# Patient Record
Sex: FEMALE | Race: BLACK OR AFRICAN AMERICAN | NOT HISPANIC OR LATINO | URBAN - METROPOLITAN AREA
[De-identification: names, ages, dates, MRNs, and addresses within clinical notes are randomized per-mention and may not be internally consistent; named-entity substitution may affect disease eponyms.]

---

## 2017-01-01 ENCOUNTER — EMERGENCY (EMERGENCY)
Facility: HOSPITAL | Age: 77
LOS: 1 days | Discharge: HOME | End: 2017-01-01
Attending: EMERGENCY MEDICINE | Admitting: EMERGENCY MEDICINE
Payer: MEDICARE

## 2017-01-01 ENCOUNTER — INPATIENT (INPATIENT)
Facility: HOSPITAL | Age: 77
LOS: 15 days | Discharge: NURSING, REHAB OR LONG-TERM CARE | End: 2017-12-11
Attending: HOSPITALIST | Admitting: HOSPITALIST
Payer: MEDICARE

## 2017-01-01 ENCOUNTER — INPATIENT (INPATIENT)
Facility: HOSPITAL | Age: 77
LOS: 5 days | Discharge: NURSING, REHAB OR LONG-TERM CARE | End: 2017-08-23
Attending: HOSPITALIST | Admitting: HOSPITALIST
Payer: MEDICARE

## 2017-01-01 ENCOUNTER — INPATIENT (INPATIENT)
Facility: HOSPITAL | Age: 77
LOS: 5 days | Discharge: NURSING, REHAB OR LONG-TERM CARE | End: 2017-05-26
Attending: INTERNAL MEDICINE | Admitting: INTERNAL MEDICINE
Payer: MEDICARE

## 2017-01-01 ENCOUNTER — INPATIENT (INPATIENT)
Facility: HOSPITAL | Age: 77
LOS: 11 days | Discharge: NURSING, REHAB OR LONG-TERM CARE | End: 2017-04-13
Attending: INTERNAL MEDICINE | Admitting: INTERNAL MEDICINE
Payer: MEDICARE

## 2017-01-01 ENCOUNTER — INPATIENT (INPATIENT)
Facility: HOSPITAL | Age: 77
LOS: 4 days | Discharge: HOME | End: 2017-03-30
Attending: INTERNAL MEDICINE | Admitting: INTERNAL MEDICINE
Payer: MEDICARE

## 2017-01-01 ENCOUNTER — INPATIENT (INPATIENT)
Facility: HOSPITAL | Age: 77
LOS: 9 days | Discharge: NURSING, REHAB OR LONG-TERM CARE | End: 2017-07-06
Attending: INTERNAL MEDICINE | Admitting: INTERNAL MEDICINE
Payer: MEDICARE

## 2017-01-01 ENCOUNTER — INPATIENT (INPATIENT)
Facility: HOSPITAL | Age: 77
LOS: 25 days | End: 2018-01-07
Attending: STUDENT IN AN ORGANIZED HEALTH CARE EDUCATION/TRAINING PROGRAM | Admitting: STUDENT IN AN ORGANIZED HEALTH CARE EDUCATION/TRAINING PROGRAM
Payer: MEDICARE

## 2017-01-01 VITALS
RESPIRATION RATE: 18 BRPM | HEART RATE: 86 BPM | OXYGEN SATURATION: 97 % | SYSTOLIC BLOOD PRESSURE: 116 MMHG | TEMPERATURE: 97 F | DIASTOLIC BLOOD PRESSURE: 58 MMHG

## 2017-01-01 VITALS
OXYGEN SATURATION: 100 % | DIASTOLIC BLOOD PRESSURE: 51 MMHG | TEMPERATURE: 98 F | RESPIRATION RATE: 18 BRPM | SYSTOLIC BLOOD PRESSURE: 97 MMHG | HEART RATE: 65 BPM

## 2017-01-01 VITALS
SYSTOLIC BLOOD PRESSURE: 126 MMHG | HEART RATE: 87 BPM | TEMPERATURE: 98 F | OXYGEN SATURATION: 100 % | RESPIRATION RATE: 16 BRPM | DIASTOLIC BLOOD PRESSURE: 59 MMHG

## 2017-01-01 VITALS
DIASTOLIC BLOOD PRESSURE: 60 MMHG | RESPIRATION RATE: 18 BRPM | SYSTOLIC BLOOD PRESSURE: 102 MMHG | OXYGEN SATURATION: 100 % | HEART RATE: 76 BPM

## 2017-01-01 VITALS
OXYGEN SATURATION: 100 % | HEART RATE: 77 BPM | TEMPERATURE: 98 F | SYSTOLIC BLOOD PRESSURE: 115 MMHG | RESPIRATION RATE: 18 BRPM | DIASTOLIC BLOOD PRESSURE: 69 MMHG

## 2017-01-01 VITALS
SYSTOLIC BLOOD PRESSURE: 115 MMHG | HEART RATE: 80 BPM | OXYGEN SATURATION: 98 % | RESPIRATION RATE: 14 BRPM | TEMPERATURE: 98 F | DIASTOLIC BLOOD PRESSURE: 62 MMHG

## 2017-01-01 VITALS
OXYGEN SATURATION: 98 % | DIASTOLIC BLOOD PRESSURE: 42 MMHG | HEART RATE: 67 BPM | SYSTOLIC BLOOD PRESSURE: 136 MMHG | RESPIRATION RATE: 18 BRPM

## 2017-01-01 VITALS
TEMPERATURE: 98 F | OXYGEN SATURATION: 97 % | HEART RATE: 72 BPM | RESPIRATION RATE: 16 BRPM | DIASTOLIC BLOOD PRESSURE: 51 MMHG | SYSTOLIC BLOOD PRESSURE: 99 MMHG

## 2017-01-01 VITALS
TEMPERATURE: 99 F | OXYGEN SATURATION: 79 % | RESPIRATION RATE: 20 BRPM | SYSTOLIC BLOOD PRESSURE: 97 MMHG | HEART RATE: 99 BPM | DIASTOLIC BLOOD PRESSURE: 59 MMHG

## 2017-01-01 VITALS
WEIGHT: 119.93 LBS | OXYGEN SATURATION: 100 % | HEIGHT: 61 IN | RESPIRATION RATE: 18 BRPM | TEMPERATURE: 99 F | DIASTOLIC BLOOD PRESSURE: 69 MMHG | HEART RATE: 94 BPM | SYSTOLIC BLOOD PRESSURE: 124 MMHG

## 2017-01-01 VITALS
SYSTOLIC BLOOD PRESSURE: 121 MMHG | HEART RATE: 77 BPM | DIASTOLIC BLOOD PRESSURE: 71 MMHG | OXYGEN SATURATION: 100 % | TEMPERATURE: 98 F | RESPIRATION RATE: 16 BRPM

## 2017-01-01 VITALS
OXYGEN SATURATION: 99 % | DIASTOLIC BLOOD PRESSURE: 94 MMHG | HEART RATE: 97 BPM | TEMPERATURE: 99 F | RESPIRATION RATE: 15 BRPM | SYSTOLIC BLOOD PRESSURE: 148 MMHG

## 2017-01-01 VITALS
SYSTOLIC BLOOD PRESSURE: 122 MMHG | TEMPERATURE: 99 F | OXYGEN SATURATION: 100 % | DIASTOLIC BLOOD PRESSURE: 59 MMHG | HEART RATE: 63 BPM | RESPIRATION RATE: 16 BRPM

## 2017-01-01 VITALS
SYSTOLIC BLOOD PRESSURE: 142 MMHG | OXYGEN SATURATION: 100 % | RESPIRATION RATE: 18 BRPM | HEART RATE: 88 BPM | DIASTOLIC BLOOD PRESSURE: 68 MMHG

## 2017-01-01 VITALS
HEART RATE: 103 BPM | TEMPERATURE: 101 F | DIASTOLIC BLOOD PRESSURE: 72 MMHG | OXYGEN SATURATION: 99 % | RESPIRATION RATE: 17 BRPM | SYSTOLIC BLOOD PRESSURE: 114 MMHG

## 2017-01-01 VITALS
SYSTOLIC BLOOD PRESSURE: 104 MMHG | TEMPERATURE: 98 F | HEART RATE: 86 BPM | OXYGEN SATURATION: 97 % | DIASTOLIC BLOOD PRESSURE: 53 MMHG | RESPIRATION RATE: 17 BRPM

## 2017-01-01 VITALS
TEMPERATURE: 98 F | HEART RATE: 66 BPM | DIASTOLIC BLOOD PRESSURE: 58 MMHG | RESPIRATION RATE: 17 BRPM | SYSTOLIC BLOOD PRESSURE: 112 MMHG | OXYGEN SATURATION: 100 %

## 2017-01-01 VITALS
TEMPERATURE: 99 F | RESPIRATION RATE: 18 BRPM | HEART RATE: 69 BPM | DIASTOLIC BLOOD PRESSURE: 75 MMHG | OXYGEN SATURATION: 98 % | SYSTOLIC BLOOD PRESSURE: 110 MMHG

## 2017-01-01 VITALS
DIASTOLIC BLOOD PRESSURE: 58 MMHG | HEART RATE: 66 BPM | OXYGEN SATURATION: 98 % | RESPIRATION RATE: 16 BRPM | SYSTOLIC BLOOD PRESSURE: 102 MMHG

## 2017-01-01 DIAGNOSIS — C85.90 NON-HODGKIN LYMPHOMA, UNSPECIFIED, UNSPECIFIED SITE: ICD-10-CM

## 2017-01-01 DIAGNOSIS — R50.9 FEVER, UNSPECIFIED: ICD-10-CM

## 2017-01-01 DIAGNOSIS — N30.00 ACUTE CYSTITIS WITHOUT HEMATURIA: ICD-10-CM

## 2017-01-01 DIAGNOSIS — I10 ESSENTIAL (PRIMARY) HYPERTENSION: ICD-10-CM

## 2017-01-01 DIAGNOSIS — E87.0 HYPEROSMOLALITY AND HYPERNATREMIA: ICD-10-CM

## 2017-01-01 DIAGNOSIS — R55 SYNCOPE AND COLLAPSE: ICD-10-CM

## 2017-01-01 DIAGNOSIS — I25.10 ATHEROSCLEROTIC HEART DISEASE OF NATIVE CORONARY ARTERY WITHOUT ANGINA PECTORIS: ICD-10-CM

## 2017-01-01 DIAGNOSIS — R07.9 CHEST PAIN, UNSPECIFIED: ICD-10-CM

## 2017-01-01 DIAGNOSIS — Z95.810 PRESENCE OF AUTOMATIC (IMPLANTABLE) CARDIAC DEFIBRILLATOR: Chronic | ICD-10-CM

## 2017-01-01 DIAGNOSIS — I48.0 PAROXYSMAL ATRIAL FIBRILLATION: ICD-10-CM

## 2017-01-01 DIAGNOSIS — Z98.89 OTHER SPECIFIED POSTPROCEDURAL STATES: Chronic | ICD-10-CM

## 2017-01-01 DIAGNOSIS — M79.2 NEURALGIA AND NEURITIS, UNSPECIFIED: ICD-10-CM

## 2017-01-01 DIAGNOSIS — Z95.5 PRESENCE OF CORONARY ANGIOPLASTY IMPLANT AND GRAFT: Chronic | ICD-10-CM

## 2017-01-01 DIAGNOSIS — E78.5 HYPERLIPIDEMIA, UNSPECIFIED: ICD-10-CM

## 2017-01-01 DIAGNOSIS — F05 DELIRIUM DUE TO KNOWN PHYSIOLOGICAL CONDITION: ICD-10-CM

## 2017-01-01 DIAGNOSIS — R74.0 NONSPECIFIC ELEVATION OF LEVELS OF TRANSAMINASE AND LACTIC ACID DEHYDROGENASE [LDH]: ICD-10-CM

## 2017-01-01 DIAGNOSIS — K21.9 GASTRO-ESOPHAGEAL REFLUX DISEASE WITHOUT ESOPHAGITIS: ICD-10-CM

## 2017-01-01 DIAGNOSIS — R30.0 DYSURIA: ICD-10-CM

## 2017-01-01 DIAGNOSIS — Z90.49 ACQUIRED ABSENCE OF OTHER SPECIFIED PARTS OF DIGESTIVE TRACT: Chronic | ICD-10-CM

## 2017-01-01 DIAGNOSIS — J44.9 CHRONIC OBSTRUCTIVE PULMONARY DISEASE, UNSPECIFIED: ICD-10-CM

## 2017-01-01 DIAGNOSIS — R11.0 NAUSEA: ICD-10-CM

## 2017-01-01 DIAGNOSIS — G93.40 ENCEPHALOPATHY, UNSPECIFIED: ICD-10-CM

## 2017-01-01 DIAGNOSIS — I50.9 HEART FAILURE, UNSPECIFIED: ICD-10-CM

## 2017-01-01 DIAGNOSIS — Z29.9 ENCOUNTER FOR PROPHYLACTIC MEASURES, UNSPECIFIED: ICD-10-CM

## 2017-01-01 DIAGNOSIS — I50.22 CHRONIC SYSTOLIC (CONGESTIVE) HEART FAILURE: ICD-10-CM

## 2017-01-01 DIAGNOSIS — Z87.19 PERSONAL HISTORY OF OTHER DISEASES OF THE DIGESTIVE SYSTEM: ICD-10-CM

## 2017-01-01 DIAGNOSIS — R52 PAIN, UNSPECIFIED: ICD-10-CM

## 2017-01-01 DIAGNOSIS — R53.2 FUNCTIONAL QUADRIPLEGIA: ICD-10-CM

## 2017-01-01 DIAGNOSIS — N39.0 URINARY TRACT INFECTION, SITE NOT SPECIFIED: ICD-10-CM

## 2017-01-01 DIAGNOSIS — J18.9 PNEUMONIA, UNSPECIFIED ORGANISM: ICD-10-CM

## 2017-01-01 DIAGNOSIS — S22.000A WEDGE COMPRESSION FRACTURE OF UNSPECIFIED THORACIC VERTEBRA, INITIAL ENCOUNTER FOR CLOSED FRACTURE: ICD-10-CM

## 2017-01-01 DIAGNOSIS — R10.84 GENERALIZED ABDOMINAL PAIN: ICD-10-CM

## 2017-01-01 DIAGNOSIS — A41.9 SEPSIS, UNSPECIFIED ORGANISM: ICD-10-CM

## 2017-01-01 DIAGNOSIS — Z51.5 ENCOUNTER FOR PALLIATIVE CARE: ICD-10-CM

## 2017-01-01 DIAGNOSIS — I50.23 ACUTE ON CHRONIC SYSTOLIC (CONGESTIVE) HEART FAILURE: ICD-10-CM

## 2017-01-01 DIAGNOSIS — E03.9 HYPOTHYROIDISM, UNSPECIFIED: ICD-10-CM

## 2017-01-01 DIAGNOSIS — Z78.9 OTHER SPECIFIED HEALTH STATUS: ICD-10-CM

## 2017-01-01 DIAGNOSIS — E11.9 TYPE 2 DIABETES MELLITUS WITHOUT COMPLICATIONS: ICD-10-CM

## 2017-01-01 DIAGNOSIS — L89.150 PRESSURE ULCER OF SACRAL REGION, UNSTAGEABLE: ICD-10-CM

## 2017-01-01 DIAGNOSIS — L89.154 PRESSURE ULCER OF SACRAL REGION, STAGE 4: ICD-10-CM

## 2017-01-01 DIAGNOSIS — R41.0 DISORIENTATION, UNSPECIFIED: ICD-10-CM

## 2017-01-01 DIAGNOSIS — S22.000S WEDGE COMPRESSION FRACTURE OF UNSPECIFIED THORACIC VERTEBRA, SEQUELA: ICD-10-CM

## 2017-01-01 DIAGNOSIS — R10.30 LOWER ABDOMINAL PAIN, UNSPECIFIED: ICD-10-CM

## 2017-01-01 DIAGNOSIS — E87.6 HYPOKALEMIA: ICD-10-CM

## 2017-01-01 DIAGNOSIS — Z88.0 ALLERGY STATUS TO PENICILLIN: ICD-10-CM

## 2017-01-01 DIAGNOSIS — Z71.89 OTHER SPECIFIED COUNSELING: ICD-10-CM

## 2017-01-01 DIAGNOSIS — L89.159 PRESSURE ULCER OF SACRAL REGION, UNSPECIFIED STAGE: ICD-10-CM

## 2017-01-01 DIAGNOSIS — R63.8 OTHER SYMPTOMS AND SIGNS CONCERNING FOOD AND FLUID INTAKE: ICD-10-CM

## 2017-01-01 DIAGNOSIS — E43 UNSPECIFIED SEVERE PROTEIN-CALORIE MALNUTRITION: ICD-10-CM

## 2017-01-01 DIAGNOSIS — J45.909 UNSPECIFIED ASTHMA, UNCOMPLICATED: ICD-10-CM

## 2017-01-01 DIAGNOSIS — Z41.8 ENCOUNTER FOR OTHER PROCEDURES FOR PURPOSES OTHER THAN REMEDYING HEALTH STATE: ICD-10-CM

## 2017-01-01 DIAGNOSIS — R13.10 DYSPHAGIA, UNSPECIFIED: ICD-10-CM

## 2017-01-01 DIAGNOSIS — R78.81 BACTEREMIA: ICD-10-CM

## 2017-01-01 DIAGNOSIS — M79.604 PAIN IN RIGHT LEG: ICD-10-CM

## 2017-01-01 DIAGNOSIS — R41.82 ALTERED MENTAL STATUS, UNSPECIFIED: ICD-10-CM

## 2017-01-01 LAB
-  AMIKACIN: SIGNIFICANT CHANGE UP
-  AMPICILLIN/SULBACTAM: SIGNIFICANT CHANGE UP
-  AMPICILLIN/SULBACTAM: SIGNIFICANT CHANGE UP
-  AMPICILLIN: SIGNIFICANT CHANGE UP
-  AZTREONAM: SIGNIFICANT CHANGE UP
-  CEFAZOLIN: SIGNIFICANT CHANGE UP
-  CEFEPIME: SIGNIFICANT CHANGE UP
-  CEFOXITIN: SIGNIFICANT CHANGE UP
-  CEFOXITIN: SIGNIFICANT CHANGE UP
-  CEFTAZIDIME: SIGNIFICANT CHANGE UP
-  CEFTRIAXONE: SIGNIFICANT CHANGE UP
-  CEFTRIAXONE: SIGNIFICANT CHANGE UP
-  CIPROFLOXACIN: SIGNIFICANT CHANGE UP
-  CLINDAMYCIN: SIGNIFICANT CHANGE UP
-  DAPTOMYCIN: SIGNIFICANT CHANGE UP
-  ERTAPENEM: SIGNIFICANT CHANGE UP
-  ERTAPENEM: SIGNIFICANT CHANGE UP
-  ERYTHROMYCIN: SIGNIFICANT CHANGE UP
-  GENTAMICIN: SIGNIFICANT CHANGE UP
-  IMIPENEM: SIGNIFICANT CHANGE UP
-  LEVOFLOXACIN: SIGNIFICANT CHANGE UP
-  LINEZOLID: SIGNIFICANT CHANGE UP
-  MEROPENEM: SIGNIFICANT CHANGE UP
-  MOXIFLOXACIN(AEROBIC): SIGNIFICANT CHANGE UP
-  NITROFURANTOIN: SIGNIFICANT CHANGE UP
-  NITROFURANTOIN: SIGNIFICANT CHANGE UP
-  OXACILLIN: SIGNIFICANT CHANGE UP
-  PENICILLIN: SIGNIFICANT CHANGE UP
-  PIPERACILLIN/TAZOBACTAM: SIGNIFICANT CHANGE UP
-  RIFAMPIN.: SIGNIFICANT CHANGE UP
-  TETRACYCLINE: SIGNIFICANT CHANGE UP
-  TIGECYCLINE: SIGNIFICANT CHANGE UP
-  TIGECYCLINE: SIGNIFICANT CHANGE UP
-  TOBRAMYCIN: SIGNIFICANT CHANGE UP
-  TRIMETHOPRIM/SULFAMETHOXAZOLE: SIGNIFICANT CHANGE UP
-  VANCOMYCIN: SIGNIFICANT CHANGE UP
ACID FAST STN SPT: SIGNIFICANT CHANGE UP
ALBUMIN SERPL ELPH-MCNC: 1.9 G/DL — LOW (ref 3.3–5)
ALBUMIN SERPL ELPH-MCNC: 2 G/DL — LOW (ref 3.3–5)
ALBUMIN SERPL ELPH-MCNC: 2.1 G/DL — LOW (ref 3.3–5)
ALBUMIN SERPL ELPH-MCNC: 2.1 G/DL — LOW (ref 3.3–5)
ALBUMIN SERPL ELPH-MCNC: 2.2 G/DL — LOW (ref 3.3–5)
ALBUMIN SERPL ELPH-MCNC: 2.3 G/DL — LOW (ref 3.3–5)
ALBUMIN SERPL ELPH-MCNC: 2.4 G/DL — LOW (ref 3.3–5)
ALBUMIN SERPL ELPH-MCNC: 2.4 G/DL — LOW (ref 3.3–5)
ALBUMIN SERPL ELPH-MCNC: 2.5 G/DL — LOW (ref 3.3–5)
ALBUMIN SERPL ELPH-MCNC: 2.8 G/DL — LOW (ref 3.3–5)
ALBUMIN SERPL ELPH-MCNC: 3 G/DL — LOW (ref 3.3–5)
ALBUMIN SERPL ELPH-MCNC: 3 G/DL — LOW (ref 3.3–5)
ALBUMIN SERPL ELPH-MCNC: 3.2 G/DL — LOW (ref 3.3–5)
ALBUMIN SERPL ELPH-MCNC: 3.3 G/DL — SIGNIFICANT CHANGE UP (ref 3.3–5)
ALBUMIN SERPL ELPH-MCNC: 3.4 G/DL — SIGNIFICANT CHANGE UP (ref 3.3–5)
ALBUMIN SERPL ELPH-MCNC: 3.5 G/DL — SIGNIFICANT CHANGE UP (ref 3.3–5)
ALBUMIN SERPL ELPH-MCNC: 3.6 G/DL — SIGNIFICANT CHANGE UP (ref 3.3–5)
ALBUMIN SERPL ELPH-MCNC: 3.7 G/DL — SIGNIFICANT CHANGE UP (ref 3.3–5)
ALBUMIN SERPL ELPH-MCNC: 3.7 G/DL — SIGNIFICANT CHANGE UP (ref 3.3–5)
ALBUMIN SERPL ELPH-MCNC: 3.8 G/DL — SIGNIFICANT CHANGE UP (ref 3.3–5)
ALBUMIN SERPL ELPH-MCNC: 3.9 G/DL — SIGNIFICANT CHANGE UP (ref 3.3–5)
ALP SERPL-CCNC: 100 U/L — SIGNIFICANT CHANGE UP (ref 40–120)
ALP SERPL-CCNC: 102 U/L — SIGNIFICANT CHANGE UP (ref 40–120)
ALP SERPL-CCNC: 102 U/L — SIGNIFICANT CHANGE UP (ref 40–120)
ALP SERPL-CCNC: 103 U/L — SIGNIFICANT CHANGE UP (ref 40–120)
ALP SERPL-CCNC: 105 U/L — SIGNIFICANT CHANGE UP (ref 40–120)
ALP SERPL-CCNC: 112 U/L — SIGNIFICANT CHANGE UP (ref 40–120)
ALP SERPL-CCNC: 113 U/L — SIGNIFICANT CHANGE UP (ref 40–120)
ALP SERPL-CCNC: 115 U/L — SIGNIFICANT CHANGE UP (ref 40–120)
ALP SERPL-CCNC: 122 U/L — HIGH (ref 40–120)
ALP SERPL-CCNC: 140 U/L — HIGH (ref 40–120)
ALP SERPL-CCNC: 140 U/L — HIGH (ref 40–120)
ALP SERPL-CCNC: 143 U/L — HIGH (ref 40–120)
ALP SERPL-CCNC: 169 U/L — HIGH (ref 40–120)
ALP SERPL-CCNC: 66 U/L — SIGNIFICANT CHANGE UP (ref 40–120)
ALP SERPL-CCNC: 74 U/L — SIGNIFICANT CHANGE UP (ref 40–120)
ALP SERPL-CCNC: 80 U/L — SIGNIFICANT CHANGE UP (ref 40–120)
ALP SERPL-CCNC: 83 U/L — SIGNIFICANT CHANGE UP (ref 40–120)
ALP SERPL-CCNC: 84 U/L — SIGNIFICANT CHANGE UP (ref 40–120)
ALP SERPL-CCNC: 84 U/L — SIGNIFICANT CHANGE UP (ref 40–120)
ALP SERPL-CCNC: 85 U/L — SIGNIFICANT CHANGE UP (ref 40–120)
ALP SERPL-CCNC: 86 U/L — SIGNIFICANT CHANGE UP (ref 40–120)
ALP SERPL-CCNC: 89 U/L — SIGNIFICANT CHANGE UP (ref 40–120)
ALP SERPL-CCNC: 90 U/L — SIGNIFICANT CHANGE UP (ref 40–120)
ALP SERPL-CCNC: 92 U/L — SIGNIFICANT CHANGE UP (ref 40–120)
ALP SERPL-CCNC: 93 U/L — SIGNIFICANT CHANGE UP (ref 40–120)
ALP SERPL-CCNC: 94 U/L — SIGNIFICANT CHANGE UP (ref 40–120)
ALP SERPL-CCNC: 95 U/L — SIGNIFICANT CHANGE UP (ref 40–120)
ALP SERPL-CCNC: 97 U/L — SIGNIFICANT CHANGE UP (ref 40–120)
ALP SERPL-CCNC: 97 U/L — SIGNIFICANT CHANGE UP (ref 40–120)
ALP SERPL-CCNC: 98 U/L — SIGNIFICANT CHANGE UP (ref 40–120)
ALT FLD-CCNC: 11 U/L — SIGNIFICANT CHANGE UP (ref 4–33)
ALT FLD-CCNC: 11 U/L — SIGNIFICANT CHANGE UP (ref 4–33)
ALT FLD-CCNC: 12 U/L — SIGNIFICANT CHANGE UP (ref 4–33)
ALT FLD-CCNC: 14 U/L — SIGNIFICANT CHANGE UP (ref 4–33)
ALT FLD-CCNC: 15 U/L — SIGNIFICANT CHANGE UP (ref 4–33)
ALT FLD-CCNC: 16 U/L — SIGNIFICANT CHANGE UP (ref 4–33)
ALT FLD-CCNC: 17 U/L — SIGNIFICANT CHANGE UP (ref 4–33)
ALT FLD-CCNC: 17 U/L — SIGNIFICANT CHANGE UP (ref 4–33)
ALT FLD-CCNC: 18 U/L — SIGNIFICANT CHANGE UP (ref 4–33)
ALT FLD-CCNC: 20 U/L — SIGNIFICANT CHANGE UP (ref 4–33)
ALT FLD-CCNC: 20 U/L — SIGNIFICANT CHANGE UP (ref 4–33)
ALT FLD-CCNC: 21 U/L — SIGNIFICANT CHANGE UP (ref 4–33)
ALT FLD-CCNC: 22 U/L — SIGNIFICANT CHANGE UP (ref 4–33)
ALT FLD-CCNC: 22 U/L — SIGNIFICANT CHANGE UP (ref 4–33)
ALT FLD-CCNC: 28 U/L — SIGNIFICANT CHANGE UP (ref 4–33)
ALT FLD-CCNC: 32 U/L — SIGNIFICANT CHANGE UP (ref 4–33)
ALT FLD-CCNC: 32 U/L — SIGNIFICANT CHANGE UP (ref 4–33)
ALT FLD-CCNC: 34 U/L — HIGH (ref 4–33)
ALT FLD-CCNC: 35 U/L — HIGH (ref 4–33)
ALT FLD-CCNC: 36 U/L — HIGH (ref 4–33)
ALT FLD-CCNC: 36 U/L — HIGH (ref 4–33)
ALT FLD-CCNC: 37 U/L — HIGH (ref 4–33)
ALT FLD-CCNC: 38 U/L — HIGH (ref 4–33)
ALT FLD-CCNC: 39 U/L — HIGH (ref 4–33)
ALT FLD-CCNC: 39 U/L — HIGH (ref 4–33)
ALT FLD-CCNC: 46 U/L — HIGH (ref 4–33)
ALT FLD-CCNC: 48 U/L — HIGH (ref 4–33)
ALT FLD-CCNC: 50 U/L — HIGH (ref 4–33)
ALT FLD-CCNC: 53 U/L — HIGH (ref 4–33)
ALT FLD-CCNC: 61 U/L — HIGH (ref 4–33)
AMYLASE P1 CFR SERPL: 65 U/L — SIGNIFICANT CHANGE UP (ref 25–125)
ANA TITR SER: NEGATIVE — SIGNIFICANT CHANGE UP
ANISOCYTOSIS BLD QL: SLIGHT — SIGNIFICANT CHANGE UP
APPEARANCE UR: CLEAR — SIGNIFICANT CHANGE UP
APPEARANCE UR: SIGNIFICANT CHANGE UP
APTT BLD: 26.9 SEC — LOW (ref 27.5–37.4)
APTT BLD: 28.4 SEC — SIGNIFICANT CHANGE UP (ref 27.5–37.4)
APTT BLD: 32.1 SEC — SIGNIFICANT CHANGE UP (ref 27.5–37.4)
APTT BLD: 38.7 SEC — HIGH (ref 27.5–37.4)
AST SERPL-CCNC: 19 U/L — SIGNIFICANT CHANGE UP (ref 4–32)
AST SERPL-CCNC: 21 U/L — SIGNIFICANT CHANGE UP (ref 4–32)
AST SERPL-CCNC: 21 U/L — SIGNIFICANT CHANGE UP (ref 4–32)
AST SERPL-CCNC: 22 U/L — SIGNIFICANT CHANGE UP (ref 4–32)
AST SERPL-CCNC: 24 U/L — SIGNIFICANT CHANGE UP (ref 4–32)
AST SERPL-CCNC: 25 U/L — SIGNIFICANT CHANGE UP (ref 4–32)
AST SERPL-CCNC: 25 U/L — SIGNIFICANT CHANGE UP (ref 4–32)
AST SERPL-CCNC: 27 U/L — SIGNIFICANT CHANGE UP (ref 4–32)
AST SERPL-CCNC: 27 U/L — SIGNIFICANT CHANGE UP (ref 4–32)
AST SERPL-CCNC: 28 U/L — SIGNIFICANT CHANGE UP (ref 4–32)
AST SERPL-CCNC: 29 U/L — SIGNIFICANT CHANGE UP (ref 4–32)
AST SERPL-CCNC: 30 U/L — SIGNIFICANT CHANGE UP (ref 4–32)
AST SERPL-CCNC: 32 U/L — SIGNIFICANT CHANGE UP (ref 4–32)
AST SERPL-CCNC: 34 U/L — HIGH (ref 4–32)
AST SERPL-CCNC: 34 U/L — HIGH (ref 4–32)
AST SERPL-CCNC: 37 U/L — HIGH (ref 4–32)
AST SERPL-CCNC: 38 U/L — HIGH (ref 4–32)
AST SERPL-CCNC: 39 U/L — HIGH (ref 4–32)
AST SERPL-CCNC: 39 U/L — HIGH (ref 4–32)
AST SERPL-CCNC: 43 U/L — HIGH (ref 4–32)
AST SERPL-CCNC: 44 U/L — HIGH (ref 4–32)
AST SERPL-CCNC: 44 U/L — HIGH (ref 4–32)
AST SERPL-CCNC: 51 U/L — HIGH (ref 4–32)
AST SERPL-CCNC: 51 U/L — HIGH (ref 4–32)
AST SERPL-CCNC: 52 U/L — HIGH (ref 4–32)
AST SERPL-CCNC: 57 U/L — HIGH (ref 4–32)
AST SERPL-CCNC: 59 U/L — HIGH (ref 4–32)
AST SERPL-CCNC: 68 U/L — HIGH (ref 4–32)
AST SERPL-CCNC: 69 U/L — HIGH (ref 4–32)
AST SERPL-CCNC: 81 U/L — HIGH (ref 4–32)
AST SERPL-CCNC: 84 U/L — HIGH (ref 4–32)
AST SERPL-CCNC: 89 U/L — HIGH (ref 4–32)
B PERT DNA SPEC QL NAA+PROBE: SIGNIFICANT CHANGE UP
B PERT DNA SPEC QL NAA+PROBE: SIGNIFICANT CHANGE UP
BACTERIA # UR AUTO: SIGNIFICANT CHANGE UP
BACTERIA BLD CULT: SIGNIFICANT CHANGE UP
BACTERIA SKIN AEROBE CULT: SIGNIFICANT CHANGE UP
BACTERIA UR CULT: SIGNIFICANT CHANGE UP
BACTERIA WND CULT: SIGNIFICANT CHANGE UP
BASE EXCESS BLDV CALC-SCNC: 4.1 MMOL/L — SIGNIFICANT CHANGE UP
BASE EXCESS BLDV CALC-SCNC: 4.3 MMOL/L — SIGNIFICANT CHANGE UP
BASE EXCESS BLDV CALC-SCNC: 7.1 MMOL/L — SIGNIFICANT CHANGE UP
BASE EXCESS BLDV CALC-SCNC: 7.1 MMOL/L — SIGNIFICANT CHANGE UP
BASE EXCESS BLDV CALC-SCNC: 7.3 MMOL/L — SIGNIFICANT CHANGE UP
BASE EXCESS BLDV CALC-SCNC: 7.3 MMOL/L — SIGNIFICANT CHANGE UP
BASE EXCESS BLDV CALC-SCNC: 8.2 MMOL/L — SIGNIFICANT CHANGE UP
BASE EXCESS BLDV CALC-SCNC: 8.5 MMOL/L — SIGNIFICANT CHANGE UP
BASOPHILS # BLD AUTO: 0.01 K/UL — SIGNIFICANT CHANGE UP (ref 0–0.2)
BASOPHILS # BLD AUTO: 0.02 K/UL — SIGNIFICANT CHANGE UP (ref 0–0.2)
BASOPHILS # BLD AUTO: 0.03 K/UL — SIGNIFICANT CHANGE UP (ref 0–0.2)
BASOPHILS # BLD AUTO: 0.04 K/UL — SIGNIFICANT CHANGE UP (ref 0–0.2)
BASOPHILS # BLD AUTO: 0.05 K/UL — SIGNIFICANT CHANGE UP (ref 0–0.2)
BASOPHILS # BLD AUTO: 0.06 K/UL — SIGNIFICANT CHANGE UP (ref 0–0.2)
BASOPHILS # BLD AUTO: 0.06 K/UL — SIGNIFICANT CHANGE UP (ref 0–0.2)
BASOPHILS NFR BLD AUTO: 0.1 % — SIGNIFICANT CHANGE UP (ref 0–2)
BASOPHILS NFR BLD AUTO: 0.2 % — SIGNIFICANT CHANGE UP (ref 0–2)
BASOPHILS NFR BLD AUTO: 0.3 % — SIGNIFICANT CHANGE UP (ref 0–2)
BASOPHILS NFR BLD AUTO: 0.4 % — SIGNIFICANT CHANGE UP (ref 0–2)
BASOPHILS NFR BLD AUTO: 0.5 % — SIGNIFICANT CHANGE UP (ref 0–2)
BASOPHILS NFR BLD AUTO: 0.6 % — SIGNIFICANT CHANGE UP (ref 0–2)
BASOPHILS NFR BLD AUTO: 0.7 % — SIGNIFICANT CHANGE UP (ref 0–2)
BASOPHILS NFR BLD AUTO: 0.8 % — SIGNIFICANT CHANGE UP (ref 0–2)
BASOPHILS NFR BLD AUTO: 0.8 % — SIGNIFICANT CHANGE UP (ref 0–2)
BASOPHILS NFR SPEC: 0 % — SIGNIFICANT CHANGE UP (ref 0–2)
BILIRUB SERPL-MCNC: 0.2 MG/DL — SIGNIFICANT CHANGE UP (ref 0.2–1.2)
BILIRUB SERPL-MCNC: 0.2 MG/DL — SIGNIFICANT CHANGE UP (ref 0.2–1.2)
BILIRUB SERPL-MCNC: 0.3 MG/DL — SIGNIFICANT CHANGE UP (ref 0.2–1.2)
BILIRUB SERPL-MCNC: 0.4 MG/DL — SIGNIFICANT CHANGE UP (ref 0.2–1.2)
BILIRUB SERPL-MCNC: 0.5 MG/DL — SIGNIFICANT CHANGE UP (ref 0.2–1.2)
BILIRUB SERPL-MCNC: 0.5 MG/DL — SIGNIFICANT CHANGE UP (ref 0.2–1.2)
BILIRUB SERPL-MCNC: < 0.2 MG/DL — LOW (ref 0.2–1.2)
BILIRUB UR-MCNC: NEGATIVE — SIGNIFICANT CHANGE UP
BLD GP AB SCN SERPL QL: NEGATIVE — SIGNIFICANT CHANGE UP
BLOOD GAS VENOUS - CREATININE: 0.43 MG/DL — LOW (ref 0.5–1.3)
BLOOD GAS VENOUS - CREATININE: 0.52 MG/DL — SIGNIFICANT CHANGE UP (ref 0.5–1.3)
BLOOD GAS VENOUS - CREATININE: 0.58 MG/DL — SIGNIFICANT CHANGE UP (ref 0.5–1.3)
BLOOD GAS VENOUS - CREATININE: 0.64 MG/DL — SIGNIFICANT CHANGE UP (ref 0.5–1.3)
BLOOD GAS VENOUS - CREATININE: 0.68 MG/DL — SIGNIFICANT CHANGE UP (ref 0.5–1.3)
BLOOD GAS VENOUS - CREATININE: 0.77 MG/DL — SIGNIFICANT CHANGE UP (ref 0.5–1.3)
BLOOD GAS VENOUS - CREATININE: 0.78 MG/DL — SIGNIFICANT CHANGE UP (ref 0.5–1.3)
BLOOD GAS VENOUS - CREATININE: 0.79 MG/DL — SIGNIFICANT CHANGE UP (ref 0.5–1.3)
BLOOD UR QL VISUAL: HIGH
BLOOD UR QL VISUAL: NEGATIVE — SIGNIFICANT CHANGE UP
BUN SERPL-MCNC: 10 MG/DL — SIGNIFICANT CHANGE UP (ref 7–23)
BUN SERPL-MCNC: 11 MG/DL — SIGNIFICANT CHANGE UP (ref 7–23)
BUN SERPL-MCNC: 12 MG/DL — SIGNIFICANT CHANGE UP (ref 7–23)
BUN SERPL-MCNC: 13 MG/DL — SIGNIFICANT CHANGE UP (ref 7–23)
BUN SERPL-MCNC: 14 MG/DL — SIGNIFICANT CHANGE UP (ref 7–23)
BUN SERPL-MCNC: 15 MG/DL — SIGNIFICANT CHANGE UP (ref 7–23)
BUN SERPL-MCNC: 16 MG/DL — SIGNIFICANT CHANGE UP (ref 7–23)
BUN SERPL-MCNC: 16 MG/DL — SIGNIFICANT CHANGE UP (ref 7–23)
BUN SERPL-MCNC: 17 MG/DL — SIGNIFICANT CHANGE UP (ref 7–23)
BUN SERPL-MCNC: 18 MG/DL — SIGNIFICANT CHANGE UP (ref 7–23)
BUN SERPL-MCNC: 18 MG/DL — SIGNIFICANT CHANGE UP (ref 7–23)
BUN SERPL-MCNC: 19 MG/DL — SIGNIFICANT CHANGE UP (ref 7–23)
BUN SERPL-MCNC: 20 MG/DL — SIGNIFICANT CHANGE UP (ref 7–23)
BUN SERPL-MCNC: 21 MG/DL — SIGNIFICANT CHANGE UP (ref 7–23)
BUN SERPL-MCNC: 21 MG/DL — SIGNIFICANT CHANGE UP (ref 7–23)
BUN SERPL-MCNC: 22 MG/DL — SIGNIFICANT CHANGE UP (ref 7–23)
BUN SERPL-MCNC: 23 MG/DL — SIGNIFICANT CHANGE UP (ref 7–23)
BUN SERPL-MCNC: 23 MG/DL — SIGNIFICANT CHANGE UP (ref 7–23)
BUN SERPL-MCNC: 24 MG/DL — HIGH (ref 7–23)
BUN SERPL-MCNC: 25 MG/DL — HIGH (ref 7–23)
BUN SERPL-MCNC: 27 MG/DL — HIGH (ref 7–23)
BUN SERPL-MCNC: 27 MG/DL — HIGH (ref 7–23)
BUN SERPL-MCNC: 3 MG/DL — LOW (ref 7–23)
BUN SERPL-MCNC: 30 MG/DL — HIGH (ref 7–23)
BUN SERPL-MCNC: 36 MG/DL — HIGH (ref 7–23)
BUN SERPL-MCNC: 37 MG/DL — HIGH (ref 7–23)
BUN SERPL-MCNC: 38 MG/DL — HIGH (ref 7–23)
BUN SERPL-MCNC: 38 MG/DL — HIGH (ref 7–23)
BUN SERPL-MCNC: 39 MG/DL — HIGH (ref 7–23)
BUN SERPL-MCNC: 4 MG/DL — LOW (ref 7–23)
BUN SERPL-MCNC: 6 MG/DL — LOW (ref 7–23)
BUN SERPL-MCNC: 8 MG/DL — SIGNIFICANT CHANGE UP (ref 7–23)
BUN SERPL-MCNC: 9 MG/DL — SIGNIFICANT CHANGE UP (ref 7–23)
C DIFF TOX GENS STL QL NAA+PROBE: SIGNIFICANT CHANGE UP
C PNEUM DNA SPEC QL NAA+PROBE: NOT DETECTED — SIGNIFICANT CHANGE UP
C PNEUM DNA SPEC QL NAA+PROBE: NOT DETECTED — SIGNIFICANT CHANGE UP
CALCIUM SERPL-MCNC: 10 MG/DL — SIGNIFICANT CHANGE UP (ref 8.4–10.5)
CALCIUM SERPL-MCNC: 10.2 MG/DL — SIGNIFICANT CHANGE UP (ref 8.4–10.5)
CALCIUM SERPL-MCNC: 10.4 MG/DL — SIGNIFICANT CHANGE UP (ref 8.4–10.5)
CALCIUM SERPL-MCNC: 7.8 MG/DL — LOW (ref 8.4–10.5)
CALCIUM SERPL-MCNC: 7.9 MG/DL — LOW (ref 8.4–10.5)
CALCIUM SERPL-MCNC: 8.2 MG/DL — LOW (ref 8.4–10.5)
CALCIUM SERPL-MCNC: 8.2 MG/DL — LOW (ref 8.4–10.5)
CALCIUM SERPL-MCNC: 8.3 MG/DL — LOW (ref 8.4–10.5)
CALCIUM SERPL-MCNC: 8.4 MG/DL — SIGNIFICANT CHANGE UP (ref 8.4–10.5)
CALCIUM SERPL-MCNC: 8.4 MG/DL — SIGNIFICANT CHANGE UP (ref 8.4–10.5)
CALCIUM SERPL-MCNC: 8.5 MG/DL — SIGNIFICANT CHANGE UP (ref 8.4–10.5)
CALCIUM SERPL-MCNC: 8.6 MG/DL — SIGNIFICANT CHANGE UP (ref 8.4–10.5)
CALCIUM SERPL-MCNC: 8.7 MG/DL — SIGNIFICANT CHANGE UP (ref 8.4–10.5)
CALCIUM SERPL-MCNC: 8.8 MG/DL — SIGNIFICANT CHANGE UP (ref 8.4–10.5)
CALCIUM SERPL-MCNC: 8.9 MG/DL — SIGNIFICANT CHANGE UP (ref 8.4–10.5)
CALCIUM SERPL-MCNC: 9 MG/DL — SIGNIFICANT CHANGE UP (ref 8.4–10.5)
CALCIUM SERPL-MCNC: 9.1 MG/DL — SIGNIFICANT CHANGE UP (ref 8.4–10.5)
CALCIUM SERPL-MCNC: 9.2 MG/DL — SIGNIFICANT CHANGE UP (ref 8.4–10.5)
CALCIUM SERPL-MCNC: 9.3 MG/DL — SIGNIFICANT CHANGE UP (ref 8.4–10.5)
CALCIUM SERPL-MCNC: 9.4 MG/DL — SIGNIFICANT CHANGE UP (ref 8.4–10.5)
CALCIUM SERPL-MCNC: 9.5 MG/DL — SIGNIFICANT CHANGE UP (ref 8.4–10.5)
CALCIUM SERPL-MCNC: 9.6 MG/DL — SIGNIFICANT CHANGE UP (ref 8.4–10.5)
CALCIUM SERPL-MCNC: 9.8 MG/DL — SIGNIFICANT CHANGE UP (ref 8.4–10.5)
CALCIUM SERPL-MCNC: 9.9 MG/DL — SIGNIFICANT CHANGE UP (ref 8.4–10.5)
CHLORIDE BLDV-SCNC: 100 MMOL/L — SIGNIFICANT CHANGE UP (ref 96–108)
CHLORIDE BLDV-SCNC: 107 MMOL/L — SIGNIFICANT CHANGE UP (ref 96–108)
CHLORIDE BLDV-SCNC: 108 MMOL/L — SIGNIFICANT CHANGE UP (ref 96–108)
CHLORIDE BLDV-SCNC: 95 MMOL/L — LOW (ref 96–108)
CHLORIDE BLDV-SCNC: 96 MMOL/L — SIGNIFICANT CHANGE UP (ref 96–108)
CHLORIDE BLDV-SCNC: 98 MMOL/L — SIGNIFICANT CHANGE UP (ref 96–108)
CHLORIDE BLDV-SCNC: 98 MMOL/L — SIGNIFICANT CHANGE UP (ref 96–108)
CHLORIDE BLDV-SCNC: 99 MMOL/L — SIGNIFICANT CHANGE UP (ref 96–108)
CHLORIDE SERPL-SCNC: 100 MMOL/L — SIGNIFICANT CHANGE UP (ref 98–107)
CHLORIDE SERPL-SCNC: 101 MMOL/L — SIGNIFICANT CHANGE UP (ref 98–107)
CHLORIDE SERPL-SCNC: 102 MMOL/L — SIGNIFICANT CHANGE UP (ref 98–107)
CHLORIDE SERPL-SCNC: 103 MMOL/L — SIGNIFICANT CHANGE UP (ref 98–107)
CHLORIDE SERPL-SCNC: 104 MMOL/L — SIGNIFICANT CHANGE UP (ref 98–107)
CHLORIDE SERPL-SCNC: 104 MMOL/L — SIGNIFICANT CHANGE UP (ref 98–107)
CHLORIDE SERPL-SCNC: 105 MMOL/L — SIGNIFICANT CHANGE UP (ref 98–107)
CHLORIDE SERPL-SCNC: 107 MMOL/L — SIGNIFICANT CHANGE UP (ref 98–107)
CHLORIDE SERPL-SCNC: 111 MMOL/L — HIGH (ref 98–107)
CHLORIDE SERPL-SCNC: 113 MMOL/L — HIGH (ref 98–107)
CHLORIDE SERPL-SCNC: 113 MMOL/L — HIGH (ref 98–107)
CHLORIDE SERPL-SCNC: 114 MMOL/L — HIGH (ref 98–107)
CHLORIDE SERPL-SCNC: 114 MMOL/L — HIGH (ref 98–107)
CHLORIDE SERPL-SCNC: 116 MMOL/L — HIGH (ref 98–107)
CHLORIDE SERPL-SCNC: 92 MMOL/L — LOW (ref 98–107)
CHLORIDE SERPL-SCNC: 93 MMOL/L — LOW (ref 98–107)
CHLORIDE SERPL-SCNC: 93 MMOL/L — LOW (ref 98–107)
CHLORIDE SERPL-SCNC: 94 MMOL/L — LOW (ref 98–107)
CHLORIDE SERPL-SCNC: 95 MMOL/L — LOW (ref 98–107)
CHLORIDE SERPL-SCNC: 96 MMOL/L — LOW (ref 98–107)
CHLORIDE SERPL-SCNC: 97 MMOL/L — LOW (ref 98–107)
CHLORIDE SERPL-SCNC: 98 MMOL/L — SIGNIFICANT CHANGE UP (ref 98–107)
CHLORIDE SERPL-SCNC: 99 MMOL/L — SIGNIFICANT CHANGE UP (ref 98–107)
CHOLEST SERPL-MCNC: 136 MG/DL — SIGNIFICANT CHANGE UP (ref 120–199)
CHOLEST SERPL-MCNC: 153 MG/DL — SIGNIFICANT CHANGE UP (ref 120–199)
CHOLEST SERPL-MCNC: 165 MG/DL — SIGNIFICANT CHANGE UP (ref 120–199)
CHOLEST SERPL-MCNC: 178 MG/DL — SIGNIFICANT CHANGE UP (ref 120–199)
CK MB BLD-MCNC: 0.6 — SIGNIFICANT CHANGE UP (ref 0–2.5)
CK MB BLD-MCNC: 1.42 NG/ML — SIGNIFICANT CHANGE UP (ref 1–4.7)
CK MB BLD-MCNC: 1.73 NG/ML — SIGNIFICANT CHANGE UP (ref 1–4.7)
CK MB BLD-MCNC: 1.75 NG/ML — SIGNIFICANT CHANGE UP (ref 1–4.7)
CK MB BLD-MCNC: 1.75 NG/ML — SIGNIFICANT CHANGE UP (ref 1–4.7)
CK MB BLD-MCNC: 1.84 NG/ML — SIGNIFICANT CHANGE UP (ref 1–4.7)
CK MB BLD-MCNC: 2.09 NG/ML — SIGNIFICANT CHANGE UP (ref 1–4.7)
CK MB BLD-MCNC: 2.27 NG/ML — SIGNIFICANT CHANGE UP (ref 1–4.7)
CK MB BLD-MCNC: 2.57 NG/ML — SIGNIFICANT CHANGE UP (ref 1–4.7)
CK MB BLD-MCNC: 2.59 NG/ML — SIGNIFICANT CHANGE UP (ref 1–4.7)
CK MB BLD-MCNC: 2.66 NG/ML — SIGNIFICANT CHANGE UP (ref 1–4.7)
CK MB BLD-MCNC: 2.72 NG/ML — SIGNIFICANT CHANGE UP (ref 1–4.7)
CK MB BLD-MCNC: SIGNIFICANT CHANGE UP (ref 0–2.5)
CK SERPL-CCNC: 173 U/L — HIGH (ref 25–170)
CK SERPL-CCNC: 223 U/L — HIGH (ref 25–170)
CK SERPL-CCNC: 29 U/L — SIGNIFICANT CHANGE UP (ref 25–170)
CK SERPL-CCNC: 33 U/L — SIGNIFICANT CHANGE UP (ref 25–170)
CK SERPL-CCNC: 33 U/L — SIGNIFICANT CHANGE UP (ref 25–170)
CK SERPL-CCNC: 38 U/L — SIGNIFICANT CHANGE UP (ref 25–170)
CK SERPL-CCNC: 41 U/L — SIGNIFICANT CHANGE UP (ref 25–170)
CK SERPL-CCNC: 49 U/L — SIGNIFICANT CHANGE UP (ref 25–170)
CK SERPL-CCNC: 49 U/L — SIGNIFICANT CHANGE UP (ref 25–170)
CK SERPL-CCNC: 54 U/L — SIGNIFICANT CHANGE UP (ref 25–170)
CK SERPL-CCNC: 60 U/L — SIGNIFICANT CHANGE UP (ref 25–170)
CLOSURE TME COLL+EPINEP BLD: SIGNIFICANT CHANGE UP K/UL (ref 150–400)
CO2 SERPL-SCNC: 19 MMOL/L — LOW (ref 22–31)
CO2 SERPL-SCNC: 20 MMOL/L — LOW (ref 22–31)
CO2 SERPL-SCNC: 21 MMOL/L — LOW (ref 22–31)
CO2 SERPL-SCNC: 21 MMOL/L — LOW (ref 22–31)
CO2 SERPL-SCNC: 22 MMOL/L — SIGNIFICANT CHANGE UP (ref 22–31)
CO2 SERPL-SCNC: 23 MMOL/L — SIGNIFICANT CHANGE UP (ref 22–31)
CO2 SERPL-SCNC: 24 MMOL/L — SIGNIFICANT CHANGE UP (ref 22–31)
CO2 SERPL-SCNC: 25 MMOL/L — SIGNIFICANT CHANGE UP (ref 22–31)
CO2 SERPL-SCNC: 26 MMOL/L — SIGNIFICANT CHANGE UP (ref 22–31)
CO2 SERPL-SCNC: 27 MMOL/L — SIGNIFICANT CHANGE UP (ref 22–31)
CO2 SERPL-SCNC: 28 MMOL/L — SIGNIFICANT CHANGE UP (ref 22–31)
CO2 SERPL-SCNC: 29 MMOL/L — SIGNIFICANT CHANGE UP (ref 22–31)
CO2 SERPL-SCNC: 30 MMOL/L — SIGNIFICANT CHANGE UP (ref 22–31)
CO2 SERPL-SCNC: 33 MMOL/L — HIGH (ref 22–31)
CO2 SERPL-SCNC: 33 MMOL/L — HIGH (ref 22–31)
CO2 SERPL-SCNC: 34 MMOL/L — HIGH (ref 22–31)
COD CRY URNS QL: SIGNIFICANT CHANGE UP (ref 0–0)
COLOR SPEC: SIGNIFICANT CHANGE UP
COLOR SPEC: YELLOW — SIGNIFICANT CHANGE UP
CREAT SERPL-MCNC: 0.27 MG/DL — LOW (ref 0.5–1.3)
CREAT SERPL-MCNC: 0.34 MG/DL — LOW (ref 0.5–1.3)
CREAT SERPL-MCNC: 0.35 MG/DL — LOW (ref 0.5–1.3)
CREAT SERPL-MCNC: 0.36 MG/DL — LOW (ref 0.5–1.3)
CREAT SERPL-MCNC: 0.37 MG/DL — LOW (ref 0.5–1.3)
CREAT SERPL-MCNC: 0.38 MG/DL — LOW (ref 0.5–1.3)
CREAT SERPL-MCNC: 0.39 MG/DL — LOW (ref 0.5–1.3)
CREAT SERPL-MCNC: 0.39 MG/DL — LOW (ref 0.5–1.3)
CREAT SERPL-MCNC: 0.4 MG/DL — LOW (ref 0.5–1.3)
CREAT SERPL-MCNC: 0.41 MG/DL — LOW (ref 0.5–1.3)
CREAT SERPL-MCNC: 0.41 MG/DL — LOW (ref 0.5–1.3)
CREAT SERPL-MCNC: 0.42 MG/DL — LOW (ref 0.5–1.3)
CREAT SERPL-MCNC: 0.42 MG/DL — LOW (ref 0.5–1.3)
CREAT SERPL-MCNC: 0.43 MG/DL — LOW (ref 0.5–1.3)
CREAT SERPL-MCNC: 0.43 MG/DL — LOW (ref 0.5–1.3)
CREAT SERPL-MCNC: 0.44 MG/DL — LOW (ref 0.5–1.3)
CREAT SERPL-MCNC: 0.45 MG/DL — LOW (ref 0.5–1.3)
CREAT SERPL-MCNC: 0.46 MG/DL — LOW (ref 0.5–1.3)
CREAT SERPL-MCNC: 0.47 MG/DL — LOW (ref 0.5–1.3)
CREAT SERPL-MCNC: 0.49 MG/DL — LOW (ref 0.5–1.3)
CREAT SERPL-MCNC: 0.49 MG/DL — LOW (ref 0.5–1.3)
CREAT SERPL-MCNC: 0.5 MG/DL — SIGNIFICANT CHANGE UP (ref 0.5–1.3)
CREAT SERPL-MCNC: 0.5 MG/DL — SIGNIFICANT CHANGE UP (ref 0.5–1.3)
CREAT SERPL-MCNC: 0.51 MG/DL — SIGNIFICANT CHANGE UP (ref 0.5–1.3)
CREAT SERPL-MCNC: 0.51 MG/DL — SIGNIFICANT CHANGE UP (ref 0.5–1.3)
CREAT SERPL-MCNC: 0.52 MG/DL — SIGNIFICANT CHANGE UP (ref 0.5–1.3)
CREAT SERPL-MCNC: 0.53 MG/DL — SIGNIFICANT CHANGE UP (ref 0.5–1.3)
CREAT SERPL-MCNC: 0.54 MG/DL — SIGNIFICANT CHANGE UP (ref 0.5–1.3)
CREAT SERPL-MCNC: 0.55 MG/DL — SIGNIFICANT CHANGE UP (ref 0.5–1.3)
CREAT SERPL-MCNC: 0.57 MG/DL — SIGNIFICANT CHANGE UP (ref 0.5–1.3)
CREAT SERPL-MCNC: 0.58 MG/DL — SIGNIFICANT CHANGE UP (ref 0.5–1.3)
CREAT SERPL-MCNC: 0.59 MG/DL — SIGNIFICANT CHANGE UP (ref 0.5–1.3)
CREAT SERPL-MCNC: 0.62 MG/DL — SIGNIFICANT CHANGE UP (ref 0.5–1.3)
CREAT SERPL-MCNC: 0.62 MG/DL — SIGNIFICANT CHANGE UP (ref 0.5–1.3)
CREAT SERPL-MCNC: 0.64 MG/DL — SIGNIFICANT CHANGE UP (ref 0.5–1.3)
CREAT SERPL-MCNC: 0.64 MG/DL — SIGNIFICANT CHANGE UP (ref 0.5–1.3)
CREAT SERPL-MCNC: 0.66 MG/DL — SIGNIFICANT CHANGE UP (ref 0.5–1.3)
CREAT SERPL-MCNC: 0.66 MG/DL — SIGNIFICANT CHANGE UP (ref 0.5–1.3)
CREAT SERPL-MCNC: 0.67 MG/DL — SIGNIFICANT CHANGE UP (ref 0.5–1.3)
CREAT SERPL-MCNC: 0.68 MG/DL — SIGNIFICANT CHANGE UP (ref 0.5–1.3)
CREAT SERPL-MCNC: 0.68 MG/DL — SIGNIFICANT CHANGE UP (ref 0.5–1.3)
CREAT SERPL-MCNC: 0.7 MG/DL — SIGNIFICANT CHANGE UP (ref 0.5–1.3)
CREAT SERPL-MCNC: 0.71 MG/DL — SIGNIFICANT CHANGE UP (ref 0.5–1.3)
CREAT SERPL-MCNC: 0.73 MG/DL — SIGNIFICANT CHANGE UP (ref 0.5–1.3)
CREAT SERPL-MCNC: 0.74 MG/DL — SIGNIFICANT CHANGE UP (ref 0.5–1.3)
CREAT SERPL-MCNC: 0.75 MG/DL — SIGNIFICANT CHANGE UP (ref 0.5–1.3)
CREAT SERPL-MCNC: 0.76 MG/DL — SIGNIFICANT CHANGE UP (ref 0.5–1.3)
CREAT SERPL-MCNC: 0.76 MG/DL — SIGNIFICANT CHANGE UP (ref 0.5–1.3)
CREAT SERPL-MCNC: 0.78 MG/DL — SIGNIFICANT CHANGE UP (ref 0.5–1.3)
CREAT SERPL-MCNC: 0.8 MG/DL — SIGNIFICANT CHANGE UP (ref 0.5–1.3)
CREAT SERPL-MCNC: 0.83 MG/DL — SIGNIFICANT CHANGE UP (ref 0.5–1.3)
CREAT SERPL-MCNC: 0.83 MG/DL — SIGNIFICANT CHANGE UP (ref 0.5–1.3)
CREAT SERPL-MCNC: 0.85 MG/DL — SIGNIFICANT CHANGE UP (ref 0.5–1.3)
CREAT SERPL-MCNC: 0.87 MG/DL — SIGNIFICANT CHANGE UP (ref 0.5–1.3)
CREAT SERPL-MCNC: 0.87 MG/DL — SIGNIFICANT CHANGE UP (ref 0.5–1.3)
CREAT SERPL-MCNC: 0.93 MG/DL — SIGNIFICANT CHANGE UP (ref 0.5–1.3)
CREAT SERPL-MCNC: 0.95 MG/DL — SIGNIFICANT CHANGE UP (ref 0.5–1.3)
CREAT SERPL-MCNC: 0.97 MG/DL — SIGNIFICANT CHANGE UP (ref 0.5–1.3)
CREAT SERPL-MCNC: 1 MG/DL — SIGNIFICANT CHANGE UP (ref 0.5–1.3)
CREAT SERPL-MCNC: 1.04 MG/DL — SIGNIFICANT CHANGE UP (ref 0.5–1.3)
CULTURE - ACID FAST SMEAR CONCENTRATED: SIGNIFICANT CHANGE UP
D DIMER BLD IA.RAPID-MCNC: 623 NG/ML — SIGNIFICANT CHANGE UP
D DIMER BLD IA.RAPID-MCNC: 876 NG/ML — SIGNIFICANT CHANGE UP
ENA SCL70 AB SER-ACNC: <0.2 ZZ — SIGNIFICANT CHANGE UP
EOSINOPHIL # BLD AUTO: 0 K/UL — SIGNIFICANT CHANGE UP (ref 0–0.5)
EOSINOPHIL # BLD AUTO: 0.01 K/UL — SIGNIFICANT CHANGE UP (ref 0–0.5)
EOSINOPHIL # BLD AUTO: 0.02 K/UL — SIGNIFICANT CHANGE UP (ref 0–0.5)
EOSINOPHIL # BLD AUTO: 0.03 K/UL — SIGNIFICANT CHANGE UP (ref 0–0.5)
EOSINOPHIL # BLD AUTO: 0.04 K/UL — SIGNIFICANT CHANGE UP (ref 0–0.5)
EOSINOPHIL # BLD AUTO: 0.05 K/UL — SIGNIFICANT CHANGE UP (ref 0–0.5)
EOSINOPHIL # BLD AUTO: 0.06 K/UL — SIGNIFICANT CHANGE UP (ref 0–0.5)
EOSINOPHIL # BLD AUTO: 0.07 K/UL — SIGNIFICANT CHANGE UP (ref 0–0.5)
EOSINOPHIL # BLD AUTO: 0.08 K/UL — SIGNIFICANT CHANGE UP (ref 0–0.5)
EOSINOPHIL # BLD AUTO: 0.09 K/UL — SIGNIFICANT CHANGE UP (ref 0–0.5)
EOSINOPHIL # BLD AUTO: 0.1 K/UL — SIGNIFICANT CHANGE UP (ref 0–0.5)
EOSINOPHIL # BLD AUTO: 0.1 K/UL — SIGNIFICANT CHANGE UP (ref 0–0.5)
EOSINOPHIL # BLD AUTO: 0.11 K/UL — SIGNIFICANT CHANGE UP (ref 0–0.5)
EOSINOPHIL # BLD AUTO: 0.12 K/UL — SIGNIFICANT CHANGE UP (ref 0–0.5)
EOSINOPHIL # BLD AUTO: 0.13 K/UL — SIGNIFICANT CHANGE UP (ref 0–0.5)
EOSINOPHIL # BLD AUTO: 0.13 K/UL — SIGNIFICANT CHANGE UP (ref 0–0.5)
EOSINOPHIL # BLD AUTO: 0.14 K/UL — SIGNIFICANT CHANGE UP (ref 0–0.5)
EOSINOPHIL # BLD AUTO: 0.18 K/UL — SIGNIFICANT CHANGE UP (ref 0–0.5)
EOSINOPHIL # BLD AUTO: 0.18 K/UL — SIGNIFICANT CHANGE UP (ref 0–0.5)
EOSINOPHIL NFR BLD AUTO: 0 % — SIGNIFICANT CHANGE UP (ref 0–6)
EOSINOPHIL NFR BLD AUTO: 0.1 % — SIGNIFICANT CHANGE UP (ref 0–6)
EOSINOPHIL NFR BLD AUTO: 0.2 % — SIGNIFICANT CHANGE UP (ref 0–6)
EOSINOPHIL NFR BLD AUTO: 0.3 % — SIGNIFICANT CHANGE UP (ref 0–6)
EOSINOPHIL NFR BLD AUTO: 0.4 % — SIGNIFICANT CHANGE UP (ref 0–6)
EOSINOPHIL NFR BLD AUTO: 0.4 % — SIGNIFICANT CHANGE UP (ref 0–6)
EOSINOPHIL NFR BLD AUTO: 0.5 % — SIGNIFICANT CHANGE UP (ref 0–6)
EOSINOPHIL NFR BLD AUTO: 0.6 % — SIGNIFICANT CHANGE UP (ref 0–6)
EOSINOPHIL NFR BLD AUTO: 0.7 % — SIGNIFICANT CHANGE UP (ref 0–6)
EOSINOPHIL NFR BLD AUTO: 0.8 % — SIGNIFICANT CHANGE UP (ref 0–6)
EOSINOPHIL NFR BLD AUTO: 0.8 % — SIGNIFICANT CHANGE UP (ref 0–6)
EOSINOPHIL NFR BLD AUTO: 0.9 % — SIGNIFICANT CHANGE UP (ref 0–6)
EOSINOPHIL NFR BLD AUTO: 1 % — SIGNIFICANT CHANGE UP (ref 0–6)
EOSINOPHIL NFR BLD AUTO: 1 % — SIGNIFICANT CHANGE UP (ref 0–6)
EOSINOPHIL NFR BLD AUTO: 1.1 % — SIGNIFICANT CHANGE UP (ref 0–6)
EOSINOPHIL NFR BLD AUTO: 1.3 % — SIGNIFICANT CHANGE UP (ref 0–6)
EOSINOPHIL NFR BLD AUTO: 1.3 % — SIGNIFICANT CHANGE UP (ref 0–6)
EOSINOPHIL NFR BLD AUTO: 1.4 % — SIGNIFICANT CHANGE UP (ref 0–6)
EOSINOPHIL NFR BLD AUTO: 1.5 % — SIGNIFICANT CHANGE UP (ref 0–6)
EOSINOPHIL NFR BLD AUTO: 1.5 % — SIGNIFICANT CHANGE UP (ref 0–6)
EOSINOPHIL NFR BLD AUTO: 1.7 % — SIGNIFICANT CHANGE UP (ref 0–6)
EOSINOPHIL NFR BLD AUTO: 2 % — SIGNIFICANT CHANGE UP (ref 0–6)
EOSINOPHIL NFR BLD AUTO: 2 % — SIGNIFICANT CHANGE UP (ref 0–6)
EOSINOPHIL NFR BLD AUTO: 2.6 % — SIGNIFICANT CHANGE UP (ref 0–6)
EOSINOPHIL NFR BLD AUTO: 2.8 % — SIGNIFICANT CHANGE UP (ref 0–6)
EOSINOPHIL NFR BLD AUTO: 2.8 % — SIGNIFICANT CHANGE UP (ref 0–6)
EOSINOPHIL NFR BLD AUTO: 3.4 % — SIGNIFICANT CHANGE UP (ref 0–6)
EOSINOPHIL NFR BLD AUTO: 3.9 % — SIGNIFICANT CHANGE UP (ref 0–6)
EOSINOPHIL NFR FLD: 0.9 % — SIGNIFICANT CHANGE UP (ref 0–6)
ERYTHROCYTE [SEDIMENTATION RATE] IN BLOOD: 101 MM/HR — HIGH (ref 4–25)
ERYTHROCYTE [SEDIMENTATION RATE] IN BLOOD: 87 MM/HR — HIGH (ref 4–25)
FERRITIN SERPL-MCNC: 280.5 NG/ML — HIGH (ref 15–150)
FLUAV H1 2009 PAND RNA SPEC QL NAA+PROBE: NOT DETECTED — SIGNIFICANT CHANGE UP
FLUAV H1 2009 PAND RNA SPEC QL NAA+PROBE: NOT DETECTED — SIGNIFICANT CHANGE UP
FLUAV H1 RNA SPEC QL NAA+PROBE: NOT DETECTED — SIGNIFICANT CHANGE UP
FLUAV H1 RNA SPEC QL NAA+PROBE: NOT DETECTED — SIGNIFICANT CHANGE UP
FLUAV H3 RNA SPEC QL NAA+PROBE: NOT DETECTED — SIGNIFICANT CHANGE UP
FLUAV H3 RNA SPEC QL NAA+PROBE: NOT DETECTED — SIGNIFICANT CHANGE UP
FLUAV SUBTYP SPEC NAA+PROBE: SIGNIFICANT CHANGE UP
FLUAV SUBTYP SPEC NAA+PROBE: SIGNIFICANT CHANGE UP
FLUBV RNA SPEC QL NAA+PROBE: NOT DETECTED — SIGNIFICANT CHANGE UP
FLUBV RNA SPEC QL NAA+PROBE: NOT DETECTED — SIGNIFICANT CHANGE UP
FOLATE SERPL-MCNC: 16.9 NG/ML — SIGNIFICANT CHANGE UP (ref 4.7–20)
GAS PNL BLDV: 130 MMOL/L — LOW (ref 136–146)
GAS PNL BLDV: 131 MMOL/L — LOW (ref 136–146)
GAS PNL BLDV: 132 MMOL/L — LOW (ref 136–146)
GAS PNL BLDV: 137 MMOL/L — SIGNIFICANT CHANGE UP (ref 136–146)
GAS PNL BLDV: 138 MMOL/L — SIGNIFICANT CHANGE UP (ref 136–146)
GAS PNL BLDV: 149 MMOL/L — HIGH (ref 136–146)
GIANT PLATELETS BLD QL SMEAR: PRESENT — SIGNIFICANT CHANGE UP
GLUCOSE BLDC GLUCOMTR-MCNC: 100 MG/DL — HIGH (ref 70–99)
GLUCOSE BLDC GLUCOMTR-MCNC: 100 MG/DL — HIGH (ref 70–99)
GLUCOSE BLDC GLUCOMTR-MCNC: 103 MG/DL — HIGH (ref 70–99)
GLUCOSE BLDC GLUCOMTR-MCNC: 104 MG/DL — HIGH (ref 70–99)
GLUCOSE BLDC GLUCOMTR-MCNC: 104 MG/DL — HIGH (ref 70–99)
GLUCOSE BLDC GLUCOMTR-MCNC: 108 MG/DL — HIGH (ref 70–99)
GLUCOSE BLDC GLUCOMTR-MCNC: 109 MG/DL — HIGH (ref 70–99)
GLUCOSE BLDC GLUCOMTR-MCNC: 111 MG/DL — HIGH (ref 70–99)
GLUCOSE BLDC GLUCOMTR-MCNC: 112 MG/DL — HIGH (ref 70–99)
GLUCOSE BLDC GLUCOMTR-MCNC: 115 MG/DL — HIGH (ref 70–99)
GLUCOSE BLDC GLUCOMTR-MCNC: 133 MG/DL — HIGH (ref 70–99)
GLUCOSE BLDC GLUCOMTR-MCNC: 133 MG/DL — HIGH (ref 70–99)
GLUCOSE BLDC GLUCOMTR-MCNC: 149 MG/DL — HIGH (ref 70–99)
GLUCOSE BLDC GLUCOMTR-MCNC: 159 MG/DL — HIGH (ref 70–99)
GLUCOSE BLDC GLUCOMTR-MCNC: 183 MG/DL — HIGH (ref 70–99)
GLUCOSE BLDC GLUCOMTR-MCNC: 223 MG/DL — HIGH (ref 70–99)
GLUCOSE BLDC GLUCOMTR-MCNC: 61 MG/DL — LOW (ref 70–99)
GLUCOSE BLDC GLUCOMTR-MCNC: 73 MG/DL — SIGNIFICANT CHANGE UP (ref 70–99)
GLUCOSE BLDC GLUCOMTR-MCNC: 74 MG/DL — SIGNIFICANT CHANGE UP (ref 70–99)
GLUCOSE BLDC GLUCOMTR-MCNC: 76 MG/DL — SIGNIFICANT CHANGE UP (ref 70–99)
GLUCOSE BLDC GLUCOMTR-MCNC: 76 MG/DL — SIGNIFICANT CHANGE UP (ref 70–99)
GLUCOSE BLDC GLUCOMTR-MCNC: 78 MG/DL — SIGNIFICANT CHANGE UP (ref 70–99)
GLUCOSE BLDC GLUCOMTR-MCNC: 78 MG/DL — SIGNIFICANT CHANGE UP (ref 70–99)
GLUCOSE BLDC GLUCOMTR-MCNC: 80 MG/DL — SIGNIFICANT CHANGE UP (ref 70–99)
GLUCOSE BLDC GLUCOMTR-MCNC: 81 MG/DL — SIGNIFICANT CHANGE UP (ref 70–99)
GLUCOSE BLDC GLUCOMTR-MCNC: 82 MG/DL — SIGNIFICANT CHANGE UP (ref 70–99)
GLUCOSE BLDC GLUCOMTR-MCNC: 84 MG/DL — SIGNIFICANT CHANGE UP (ref 70–99)
GLUCOSE BLDC GLUCOMTR-MCNC: 86 MG/DL — SIGNIFICANT CHANGE UP (ref 70–99)
GLUCOSE BLDC GLUCOMTR-MCNC: 87 MG/DL — SIGNIFICANT CHANGE UP (ref 70–99)
GLUCOSE BLDC GLUCOMTR-MCNC: 87 MG/DL — SIGNIFICANT CHANGE UP (ref 70–99)
GLUCOSE BLDC GLUCOMTR-MCNC: 88 MG/DL — SIGNIFICANT CHANGE UP (ref 70–99)
GLUCOSE BLDC GLUCOMTR-MCNC: 89 MG/DL — SIGNIFICANT CHANGE UP (ref 70–99)
GLUCOSE BLDC GLUCOMTR-MCNC: 90 MG/DL — SIGNIFICANT CHANGE UP (ref 70–99)
GLUCOSE BLDC GLUCOMTR-MCNC: 90 MG/DL — SIGNIFICANT CHANGE UP (ref 70–99)
GLUCOSE BLDC GLUCOMTR-MCNC: 92 MG/DL — SIGNIFICANT CHANGE UP (ref 70–99)
GLUCOSE BLDC GLUCOMTR-MCNC: 93 MG/DL — SIGNIFICANT CHANGE UP (ref 70–99)
GLUCOSE BLDC GLUCOMTR-MCNC: 95 MG/DL — SIGNIFICANT CHANGE UP (ref 70–99)
GLUCOSE BLDC GLUCOMTR-MCNC: 96 MG/DL — SIGNIFICANT CHANGE UP (ref 70–99)
GLUCOSE BLDC GLUCOMTR-MCNC: 96 MG/DL — SIGNIFICANT CHANGE UP (ref 70–99)
GLUCOSE BLDC GLUCOMTR-MCNC: 97 MG/DL — SIGNIFICANT CHANGE UP (ref 70–99)
GLUCOSE BLDC GLUCOMTR-MCNC: 98 MG/DL — SIGNIFICANT CHANGE UP (ref 70–99)
GLUCOSE BLDC GLUCOMTR-MCNC: 99 MG/DL — SIGNIFICANT CHANGE UP (ref 70–99)
GLUCOSE BLDV-MCNC: 103 — HIGH (ref 70–99)
GLUCOSE BLDV-MCNC: 105 — HIGH (ref 70–99)
GLUCOSE BLDV-MCNC: 108 — HIGH (ref 70–99)
GLUCOSE BLDV-MCNC: 109 — HIGH (ref 70–99)
GLUCOSE BLDV-MCNC: 122 — HIGH (ref 70–99)
GLUCOSE BLDV-MCNC: 94 — SIGNIFICANT CHANGE UP (ref 70–99)
GLUCOSE BLDV-MCNC: 96 — SIGNIFICANT CHANGE UP (ref 70–99)
GLUCOSE BLDV-MCNC: 99 — SIGNIFICANT CHANGE UP (ref 70–99)
GLUCOSE SERPL-MCNC: 101 MG/DL — HIGH (ref 70–99)
GLUCOSE SERPL-MCNC: 102 MG/DL — HIGH (ref 70–99)
GLUCOSE SERPL-MCNC: 102 MG/DL — HIGH (ref 70–99)
GLUCOSE SERPL-MCNC: 103 MG/DL — HIGH (ref 70–99)
GLUCOSE SERPL-MCNC: 105 MG/DL — HIGH (ref 70–99)
GLUCOSE SERPL-MCNC: 108 MG/DL — HIGH (ref 70–99)
GLUCOSE SERPL-MCNC: 109 MG/DL — HIGH (ref 70–99)
GLUCOSE SERPL-MCNC: 109 MG/DL — HIGH (ref 70–99)
GLUCOSE SERPL-MCNC: 114 MG/DL — HIGH (ref 70–99)
GLUCOSE SERPL-MCNC: 124 MG/DL — HIGH (ref 70–99)
GLUCOSE SERPL-MCNC: 131 MG/DL — HIGH (ref 70–99)
GLUCOSE SERPL-MCNC: 139 MG/DL — HIGH (ref 70–99)
GLUCOSE SERPL-MCNC: 66 MG/DL — LOW (ref 70–99)
GLUCOSE SERPL-MCNC: 70 MG/DL — SIGNIFICANT CHANGE UP (ref 70–99)
GLUCOSE SERPL-MCNC: 75 MG/DL — SIGNIFICANT CHANGE UP (ref 70–99)
GLUCOSE SERPL-MCNC: 76 MG/DL — SIGNIFICANT CHANGE UP (ref 70–99)
GLUCOSE SERPL-MCNC: 77 MG/DL — SIGNIFICANT CHANGE UP (ref 70–99)
GLUCOSE SERPL-MCNC: 78 MG/DL — SIGNIFICANT CHANGE UP (ref 70–99)
GLUCOSE SERPL-MCNC: 79 MG/DL — SIGNIFICANT CHANGE UP (ref 70–99)
GLUCOSE SERPL-MCNC: 80 MG/DL — SIGNIFICANT CHANGE UP (ref 70–99)
GLUCOSE SERPL-MCNC: 80 MG/DL — SIGNIFICANT CHANGE UP (ref 70–99)
GLUCOSE SERPL-MCNC: 81 MG/DL — SIGNIFICANT CHANGE UP (ref 70–99)
GLUCOSE SERPL-MCNC: 83 MG/DL — SIGNIFICANT CHANGE UP (ref 70–99)
GLUCOSE SERPL-MCNC: 84 MG/DL — SIGNIFICANT CHANGE UP (ref 70–99)
GLUCOSE SERPL-MCNC: 84 MG/DL — SIGNIFICANT CHANGE UP (ref 70–99)
GLUCOSE SERPL-MCNC: 85 MG/DL — SIGNIFICANT CHANGE UP (ref 70–99)
GLUCOSE SERPL-MCNC: 87 MG/DL — SIGNIFICANT CHANGE UP (ref 70–99)
GLUCOSE SERPL-MCNC: 88 MG/DL — SIGNIFICANT CHANGE UP (ref 70–99)
GLUCOSE SERPL-MCNC: 88 MG/DL — SIGNIFICANT CHANGE UP (ref 70–99)
GLUCOSE SERPL-MCNC: 89 MG/DL — SIGNIFICANT CHANGE UP (ref 70–99)
GLUCOSE SERPL-MCNC: 90 MG/DL — SIGNIFICANT CHANGE UP (ref 70–99)
GLUCOSE SERPL-MCNC: 91 MG/DL — SIGNIFICANT CHANGE UP (ref 70–99)
GLUCOSE SERPL-MCNC: 92 MG/DL — SIGNIFICANT CHANGE UP (ref 70–99)
GLUCOSE SERPL-MCNC: 93 MG/DL — SIGNIFICANT CHANGE UP (ref 70–99)
GLUCOSE SERPL-MCNC: 94 MG/DL — SIGNIFICANT CHANGE UP (ref 70–99)
GLUCOSE SERPL-MCNC: 94 MG/DL — SIGNIFICANT CHANGE UP (ref 70–99)
GLUCOSE SERPL-MCNC: 95 MG/DL — SIGNIFICANT CHANGE UP (ref 70–99)
GLUCOSE SERPL-MCNC: 95 MG/DL — SIGNIFICANT CHANGE UP (ref 70–99)
GLUCOSE SERPL-MCNC: 96 MG/DL — SIGNIFICANT CHANGE UP (ref 70–99)
GLUCOSE SERPL-MCNC: 96 MG/DL — SIGNIFICANT CHANGE UP (ref 70–99)
GLUCOSE SERPL-MCNC: 97 MG/DL — SIGNIFICANT CHANGE UP (ref 70–99)
GLUCOSE SERPL-MCNC: 97 MG/DL — SIGNIFICANT CHANGE UP (ref 70–99)
GLUCOSE SERPL-MCNC: 99 MG/DL — SIGNIFICANT CHANGE UP (ref 70–99)
GLUCOSE UR-MCNC: NEGATIVE — SIGNIFICANT CHANGE UP
GRAM STN WND: SIGNIFICANT CHANGE UP
HADV DNA SPEC QL NAA+PROBE: NOT DETECTED — SIGNIFICANT CHANGE UP
HADV DNA SPEC QL NAA+PROBE: NOT DETECTED — SIGNIFICANT CHANGE UP
HBA1C BLD-MCNC: 5.7 % — HIGH (ref 4–5.6)
HBA1C BLD-MCNC: 6.3 % — HIGH (ref 4–5.6)
HBA1C BLD-MCNC: 6.5 % — HIGH (ref 4–5.6)
HCO3 BLDV-SCNC: 26 MMOL/L — SIGNIFICANT CHANGE UP (ref 20–27)
HCO3 BLDV-SCNC: 28 MMOL/L — HIGH (ref 20–27)
HCO3 BLDV-SCNC: 28 MMOL/L — HIGH (ref 20–27)
HCO3 BLDV-SCNC: 29 MMOL/L — HIGH (ref 20–27)
HCO3 BLDV-SCNC: 29 MMOL/L — HIGH (ref 20–27)
HCO3 BLDV-SCNC: 30 MMOL/L — HIGH (ref 20–27)
HCO3 BLDV-SCNC: 30 MMOL/L — HIGH (ref 20–27)
HCO3 BLDV-SCNC: 31 MMOL/L — HIGH (ref 20–27)
HCOV 229E RNA SPEC QL NAA+PROBE: NOT DETECTED — SIGNIFICANT CHANGE UP
HCOV 229E RNA SPEC QL NAA+PROBE: NOT DETECTED — SIGNIFICANT CHANGE UP
HCOV HKU1 RNA SPEC QL NAA+PROBE: NOT DETECTED — SIGNIFICANT CHANGE UP
HCOV HKU1 RNA SPEC QL NAA+PROBE: NOT DETECTED — SIGNIFICANT CHANGE UP
HCOV NL63 RNA SPEC QL NAA+PROBE: NOT DETECTED — SIGNIFICANT CHANGE UP
HCOV NL63 RNA SPEC QL NAA+PROBE: NOT DETECTED — SIGNIFICANT CHANGE UP
HCOV OC43 RNA SPEC QL NAA+PROBE: NOT DETECTED — SIGNIFICANT CHANGE UP
HCOV OC43 RNA SPEC QL NAA+PROBE: NOT DETECTED — SIGNIFICANT CHANGE UP
HCT VFR BLD CALC: 15.2 % — CRITICAL LOW (ref 34.5–45)
HCT VFR BLD CALC: 24.8 % — LOW (ref 34.5–45)
HCT VFR BLD CALC: 24.9 % — LOW (ref 34.5–45)
HCT VFR BLD CALC: 25.7 % — LOW (ref 34.5–45)
HCT VFR BLD CALC: 26.2 % — LOW (ref 34.5–45)
HCT VFR BLD CALC: 26.4 % — LOW (ref 34.5–45)
HCT VFR BLD CALC: 27.2 % — LOW (ref 34.5–45)
HCT VFR BLD CALC: 27.7 % — LOW (ref 34.5–45)
HCT VFR BLD CALC: 27.8 % — LOW (ref 34.5–45)
HCT VFR BLD CALC: 27.8 % — LOW (ref 34.5–45)
HCT VFR BLD CALC: 28 % — LOW (ref 34.5–45)
HCT VFR BLD CALC: 28.1 % — LOW (ref 34.5–45)
HCT VFR BLD CALC: 28.3 % — LOW (ref 34.5–45)
HCT VFR BLD CALC: 28.4 % — LOW (ref 34.5–45)
HCT VFR BLD CALC: 29.1 % — LOW (ref 34.5–45)
HCT VFR BLD CALC: 29.3 % — LOW (ref 34.5–45)
HCT VFR BLD CALC: 29.3 % — LOW (ref 34.5–45)
HCT VFR BLD CALC: 30.2 % — LOW (ref 34.5–45)
HCT VFR BLD CALC: 30.6 % — LOW (ref 34.5–45)
HCT VFR BLD CALC: 30.8 % — LOW (ref 34.5–45)
HCT VFR BLD CALC: 30.8 % — LOW (ref 34.5–45)
HCT VFR BLD CALC: 31.4 % — LOW (ref 34.5–45)
HCT VFR BLD CALC: 31.5 % — LOW (ref 34.5–45)
HCT VFR BLD CALC: 31.7 % — LOW (ref 34.5–45)
HCT VFR BLD CALC: 31.8 % — LOW (ref 34.5–45)
HCT VFR BLD CALC: 32 % — LOW (ref 34.5–45)
HCT VFR BLD CALC: 32.2 % — LOW (ref 34.5–45)
HCT VFR BLD CALC: 32.5 % — LOW (ref 34.5–45)
HCT VFR BLD CALC: 32.6 % — LOW (ref 34.5–45)
HCT VFR BLD CALC: 32.6 % — LOW (ref 34.5–45)
HCT VFR BLD CALC: 32.7 % — LOW (ref 34.5–45)
HCT VFR BLD CALC: 32.8 % — LOW (ref 34.5–45)
HCT VFR BLD CALC: 32.8 % — LOW (ref 34.5–45)
HCT VFR BLD CALC: 32.9 % — LOW (ref 34.5–45)
HCT VFR BLD CALC: 33.3 % — LOW (ref 34.5–45)
HCT VFR BLD CALC: 33.5 % — LOW (ref 34.5–45)
HCT VFR BLD CALC: 33.6 % — LOW (ref 34.5–45)
HCT VFR BLD CALC: 33.7 % — LOW (ref 34.5–45)
HCT VFR BLD CALC: 34 % — LOW (ref 34.5–45)
HCT VFR BLD CALC: 34 % — LOW (ref 34.5–45)
HCT VFR BLD CALC: 34.4 % — LOW (ref 34.5–45)
HCT VFR BLD CALC: 34.4 % — LOW (ref 34.5–45)
HCT VFR BLD CALC: 34.6 % — SIGNIFICANT CHANGE UP (ref 34.5–45)
HCT VFR BLD CALC: 34.7 % — SIGNIFICANT CHANGE UP (ref 34.5–45)
HCT VFR BLD CALC: 36.1 % — SIGNIFICANT CHANGE UP (ref 34.5–45)
HCT VFR BLD CALC: 36.1 % — SIGNIFICANT CHANGE UP (ref 34.5–45)
HCT VFR BLD CALC: 36.3 % — SIGNIFICANT CHANGE UP (ref 34.5–45)
HCT VFR BLD CALC: 36.7 % — SIGNIFICANT CHANGE UP (ref 34.5–45)
HCT VFR BLD CALC: 36.7 % — SIGNIFICANT CHANGE UP (ref 34.5–45)
HCT VFR BLD CALC: 36.8 % — SIGNIFICANT CHANGE UP (ref 34.5–45)
HCT VFR BLD CALC: 37 % — SIGNIFICANT CHANGE UP (ref 34.5–45)
HCT VFR BLD CALC: 37.2 % — SIGNIFICANT CHANGE UP (ref 34.5–45)
HCT VFR BLD CALC: 37.5 % — SIGNIFICANT CHANGE UP (ref 34.5–45)
HCT VFR BLD CALC: 38 % — SIGNIFICANT CHANGE UP (ref 34.5–45)
HCT VFR BLD CALC: 38 % — SIGNIFICANT CHANGE UP (ref 34.5–45)
HCT VFR BLD CALC: 38.1 % — SIGNIFICANT CHANGE UP (ref 34.5–45)
HCT VFR BLD CALC: 38.1 % — SIGNIFICANT CHANGE UP (ref 34.5–45)
HCT VFR BLD CALC: 38.2 % — SIGNIFICANT CHANGE UP (ref 34.5–45)
HCT VFR BLD CALC: 38.6 % — SIGNIFICANT CHANGE UP (ref 34.5–45)
HCT VFR BLD CALC: 38.7 % — SIGNIFICANT CHANGE UP (ref 34.5–45)
HCT VFR BLD CALC: 38.9 % — SIGNIFICANT CHANGE UP (ref 34.5–45)
HCT VFR BLD CALC: 39 % — SIGNIFICANT CHANGE UP (ref 34.5–45)
HCT VFR BLD CALC: 39.5 % — SIGNIFICANT CHANGE UP (ref 34.5–45)
HCT VFR BLD CALC: 39.7 % — SIGNIFICANT CHANGE UP (ref 34.5–45)
HCT VFR BLD CALC: 39.8 % — SIGNIFICANT CHANGE UP (ref 34.5–45)
HCT VFR BLD CALC: 39.9 % — SIGNIFICANT CHANGE UP (ref 34.5–45)
HCT VFR BLD CALC: 39.9 % — SIGNIFICANT CHANGE UP (ref 34.5–45)
HCT VFR BLD CALC: 40.3 % — SIGNIFICANT CHANGE UP (ref 34.5–45)
HCT VFR BLD CALC: 40.8 % — SIGNIFICANT CHANGE UP (ref 34.5–45)
HCT VFR BLD CALC: 42.2 % — SIGNIFICANT CHANGE UP (ref 34.5–45)
HCT VFR BLDV CALC: 33 % — LOW (ref 34.5–45)
HCT VFR BLDV CALC: 35.2 % — SIGNIFICANT CHANGE UP (ref 34.5–45)
HCT VFR BLDV CALC: 36.2 % — SIGNIFICANT CHANGE UP (ref 34.5–45)
HCT VFR BLDV CALC: 38.6 % — SIGNIFICANT CHANGE UP (ref 34.5–45)
HCT VFR BLDV CALC: 39 % — SIGNIFICANT CHANGE UP (ref 34.5–45)
HCT VFR BLDV CALC: 39.7 % — SIGNIFICANT CHANGE UP (ref 34.5–45)
HCT VFR BLDV CALC: 39.9 % — SIGNIFICANT CHANGE UP (ref 34.5–45)
HCT VFR BLDV CALC: 43 % — SIGNIFICANT CHANGE UP (ref 34.5–45)
HDLC SERPL-MCNC: 57 MG/DL — SIGNIFICANT CHANGE UP (ref 45–65)
HDLC SERPL-MCNC: 61 MG/DL — SIGNIFICANT CHANGE UP (ref 45–65)
HDLC SERPL-MCNC: 71 MG/DL — HIGH (ref 45–65)
HDLC SERPL-MCNC: 72 MG/DL — HIGH (ref 45–65)
HGB BLD-MCNC: 10 G/DL — LOW (ref 11.5–15.5)
HGB BLD-MCNC: 10 G/DL — LOW (ref 11.5–15.5)
HGB BLD-MCNC: 10.1 G/DL — LOW (ref 11.5–15.5)
HGB BLD-MCNC: 10.1 G/DL — LOW (ref 11.5–15.5)
HGB BLD-MCNC: 10.2 G/DL — LOW (ref 11.5–15.5)
HGB BLD-MCNC: 10.3 G/DL — LOW (ref 11.5–15.5)
HGB BLD-MCNC: 10.5 G/DL — LOW (ref 11.5–15.5)
HGB BLD-MCNC: 10.6 G/DL — LOW (ref 11.5–15.5)
HGB BLD-MCNC: 10.6 G/DL — LOW (ref 11.5–15.5)
HGB BLD-MCNC: 10.7 G/DL — LOW (ref 11.5–15.5)
HGB BLD-MCNC: 10.7 G/DL — LOW (ref 11.5–15.5)
HGB BLD-MCNC: 10.8 G/DL — LOW (ref 11.5–15.5)
HGB BLD-MCNC: 10.9 G/DL — LOW (ref 11.5–15.5)
HGB BLD-MCNC: 11.1 G/DL — LOW (ref 11.5–15.5)
HGB BLD-MCNC: 11.1 G/DL — LOW (ref 11.5–15.5)
HGB BLD-MCNC: 11.2 G/DL — LOW (ref 11.5–15.5)
HGB BLD-MCNC: 11.3 G/DL — LOW (ref 11.5–15.5)
HGB BLD-MCNC: 11.4 G/DL — LOW (ref 11.5–15.5)
HGB BLD-MCNC: 11.5 G/DL — SIGNIFICANT CHANGE UP (ref 11.5–15.5)
HGB BLD-MCNC: 11.5 G/DL — SIGNIFICANT CHANGE UP (ref 11.5–15.5)
HGB BLD-MCNC: 11.6 G/DL — SIGNIFICANT CHANGE UP (ref 11.5–15.5)
HGB BLD-MCNC: 11.7 G/DL — SIGNIFICANT CHANGE UP (ref 11.5–15.5)
HGB BLD-MCNC: 11.7 G/DL — SIGNIFICANT CHANGE UP (ref 11.5–15.5)
HGB BLD-MCNC: 11.8 G/DL — SIGNIFICANT CHANGE UP (ref 11.5–15.5)
HGB BLD-MCNC: 11.9 G/DL — SIGNIFICANT CHANGE UP (ref 11.5–15.5)
HGB BLD-MCNC: 12.1 G/DL — SIGNIFICANT CHANGE UP (ref 11.5–15.5)
HGB BLD-MCNC: 12.2 G/DL — SIGNIFICANT CHANGE UP (ref 11.5–15.5)
HGB BLD-MCNC: 12.3 G/DL — SIGNIFICANT CHANGE UP (ref 11.5–15.5)
HGB BLD-MCNC: 12.3 G/DL — SIGNIFICANT CHANGE UP (ref 11.5–15.5)
HGB BLD-MCNC: 12.4 G/DL — SIGNIFICANT CHANGE UP (ref 11.5–15.5)
HGB BLD-MCNC: 12.4 G/DL — SIGNIFICANT CHANGE UP (ref 11.5–15.5)
HGB BLD-MCNC: 12.5 G/DL — SIGNIFICANT CHANGE UP (ref 11.5–15.5)
HGB BLD-MCNC: 12.6 G/DL — SIGNIFICANT CHANGE UP (ref 11.5–15.5)
HGB BLD-MCNC: 12.6 G/DL — SIGNIFICANT CHANGE UP (ref 11.5–15.5)
HGB BLD-MCNC: 12.7 G/DL — SIGNIFICANT CHANGE UP (ref 11.5–15.5)
HGB BLD-MCNC: 12.8 G/DL — SIGNIFICANT CHANGE UP (ref 11.5–15.5)
HGB BLD-MCNC: 12.8 G/DL — SIGNIFICANT CHANGE UP (ref 11.5–15.5)
HGB BLD-MCNC: 12.9 G/DL — SIGNIFICANT CHANGE UP (ref 11.5–15.5)
HGB BLD-MCNC: 13 G/DL — SIGNIFICANT CHANGE UP (ref 11.5–15.5)
HGB BLD-MCNC: 13.8 G/DL — SIGNIFICANT CHANGE UP (ref 11.5–15.5)
HGB BLD-MCNC: 5.3 G/DL — CRITICAL LOW (ref 11.5–15.5)
HGB BLD-MCNC: 8 G/DL — LOW (ref 11.5–15.5)
HGB BLD-MCNC: 8.3 G/DL — LOW (ref 11.5–15.5)
HGB BLD-MCNC: 8.3 G/DL — LOW (ref 11.5–15.5)
HGB BLD-MCNC: 8.4 G/DL — LOW (ref 11.5–15.5)
HGB BLD-MCNC: 8.4 G/DL — LOW (ref 11.5–15.5)
HGB BLD-MCNC: 8.6 G/DL — LOW (ref 11.5–15.5)
HGB BLD-MCNC: 8.6 G/DL — LOW (ref 11.5–15.5)
HGB BLD-MCNC: 8.7 G/DL — LOW (ref 11.5–15.5)
HGB BLD-MCNC: 8.7 G/DL — LOW (ref 11.5–15.5)
HGB BLD-MCNC: 9 G/DL — LOW (ref 11.5–15.5)
HGB BLD-MCNC: 9.1 G/DL — LOW (ref 11.5–15.5)
HGB BLD-MCNC: 9.2 G/DL — LOW (ref 11.5–15.5)
HGB BLD-MCNC: 9.2 G/DL — LOW (ref 11.5–15.5)
HGB BLD-MCNC: 9.6 G/DL — LOW (ref 11.5–15.5)
HGB BLD-MCNC: 9.7 G/DL — LOW (ref 11.5–15.5)
HGB BLD-MCNC: 9.9 G/DL — LOW (ref 11.5–15.5)
HGB BLDV-MCNC: 10.7 G/DL — LOW (ref 11.5–15.5)
HGB BLDV-MCNC: 11.4 G/DL — LOW (ref 11.5–15.5)
HGB BLDV-MCNC: 11.8 G/DL — SIGNIFICANT CHANGE UP (ref 11.5–15.5)
HGB BLDV-MCNC: 12.6 G/DL — SIGNIFICANT CHANGE UP (ref 11.5–15.5)
HGB BLDV-MCNC: 12.7 G/DL — SIGNIFICANT CHANGE UP (ref 11.5–15.5)
HGB BLDV-MCNC: 12.9 G/DL — SIGNIFICANT CHANGE UP (ref 11.5–15.5)
HGB BLDV-MCNC: 13 G/DL — SIGNIFICANT CHANGE UP (ref 11.5–15.5)
HGB BLDV-MCNC: 14 G/DL — SIGNIFICANT CHANGE UP (ref 11.5–15.5)
HMPV RNA SPEC QL NAA+PROBE: NOT DETECTED — SIGNIFICANT CHANGE UP
HMPV RNA SPEC QL NAA+PROBE: NOT DETECTED — SIGNIFICANT CHANGE UP
HPIV1 RNA SPEC QL NAA+PROBE: NOT DETECTED — SIGNIFICANT CHANGE UP
HPIV1 RNA SPEC QL NAA+PROBE: NOT DETECTED — SIGNIFICANT CHANGE UP
HPIV2 RNA SPEC QL NAA+PROBE: NOT DETECTED — SIGNIFICANT CHANGE UP
HPIV2 RNA SPEC QL NAA+PROBE: NOT DETECTED — SIGNIFICANT CHANGE UP
HPIV3 RNA SPEC QL NAA+PROBE: NOT DETECTED — SIGNIFICANT CHANGE UP
HPIV3 RNA SPEC QL NAA+PROBE: NOT DETECTED — SIGNIFICANT CHANGE UP
HPIV4 RNA SPEC QL NAA+PROBE: NOT DETECTED — SIGNIFICANT CHANGE UP
HPIV4 RNA SPEC QL NAA+PROBE: NOT DETECTED — SIGNIFICANT CHANGE UP
HYALINE CASTS # UR AUTO: SIGNIFICANT CHANGE UP (ref 0–?)
IMM GRANULOCYTES # BLD AUTO: 0.01 # — SIGNIFICANT CHANGE UP
IMM GRANULOCYTES # BLD AUTO: 0.02 # — SIGNIFICANT CHANGE UP
IMM GRANULOCYTES # BLD AUTO: 0.03 # — SIGNIFICANT CHANGE UP
IMM GRANULOCYTES # BLD AUTO: 0.04 # — SIGNIFICANT CHANGE UP
IMM GRANULOCYTES # BLD AUTO: 0.05 # — SIGNIFICANT CHANGE UP
IMM GRANULOCYTES # BLD AUTO: 0.05 # — SIGNIFICANT CHANGE UP
IMM GRANULOCYTES # BLD AUTO: 0.06 # — SIGNIFICANT CHANGE UP
IMM GRANULOCYTES # BLD AUTO: 0.09 # — SIGNIFICANT CHANGE UP
IMM GRANULOCYTES # BLD AUTO: 0.1 # — SIGNIFICANT CHANGE UP
IMM GRANULOCYTES # BLD AUTO: 0.11 # — SIGNIFICANT CHANGE UP
IMM GRANULOCYTES # BLD AUTO: 0.11 # — SIGNIFICANT CHANGE UP
IMM GRANULOCYTES # BLD AUTO: 0.12 # — SIGNIFICANT CHANGE UP
IMM GRANULOCYTES # BLD AUTO: 0.14 # — SIGNIFICANT CHANGE UP
IMM GRANULOCYTES # BLD AUTO: 0.15 # — SIGNIFICANT CHANGE UP
IMM GRANULOCYTES # BLD AUTO: 0.16 # — SIGNIFICANT CHANGE UP
IMM GRANULOCYTES # BLD AUTO: 0.17 # — SIGNIFICANT CHANGE UP
IMM GRANULOCYTES # BLD AUTO: 0.18 # — SIGNIFICANT CHANGE UP
IMM GRANULOCYTES # BLD AUTO: 0.18 # — SIGNIFICANT CHANGE UP
IMM GRANULOCYTES # BLD AUTO: 0.2 # — SIGNIFICANT CHANGE UP
IMM GRANULOCYTES # BLD AUTO: 0.23 # — SIGNIFICANT CHANGE UP
IMM GRANULOCYTES # BLD AUTO: 0.25 # — SIGNIFICANT CHANGE UP
IMM GRANULOCYTES # BLD AUTO: 0.29 # — SIGNIFICANT CHANGE UP
IMM GRANULOCYTES # BLD AUTO: 0.3 # — SIGNIFICANT CHANGE UP
IMM GRANULOCYTES # BLD AUTO: 0.31 # — SIGNIFICANT CHANGE UP
IMM GRANULOCYTES # BLD AUTO: 0.33 # — SIGNIFICANT CHANGE UP
IMM GRANULOCYTES # BLD AUTO: 0.38 # — SIGNIFICANT CHANGE UP
IMM GRANULOCYTES # BLD AUTO: 0.41 # — SIGNIFICANT CHANGE UP
IMM GRANULOCYTES # BLD AUTO: 0.56 # — SIGNIFICANT CHANGE UP
IMM GRANULOCYTES NFR BLD AUTO: 0 % — SIGNIFICANT CHANGE UP (ref 0–1.5)
IMM GRANULOCYTES NFR BLD AUTO: 0.1 % — SIGNIFICANT CHANGE UP (ref 0–1.5)
IMM GRANULOCYTES NFR BLD AUTO: 0.1 % — SIGNIFICANT CHANGE UP (ref 0–1.5)
IMM GRANULOCYTES NFR BLD AUTO: 0.2 % — SIGNIFICANT CHANGE UP (ref 0–1.5)
IMM GRANULOCYTES NFR BLD AUTO: 0.3 % — SIGNIFICANT CHANGE UP (ref 0–1.5)
IMM GRANULOCYTES NFR BLD AUTO: 0.4 % — SIGNIFICANT CHANGE UP (ref 0–1.5)
IMM GRANULOCYTES NFR BLD AUTO: 0.5 % — SIGNIFICANT CHANGE UP (ref 0–1.5)
IMM GRANULOCYTES NFR BLD AUTO: 0.6 % — SIGNIFICANT CHANGE UP (ref 0–1.5)
IMM GRANULOCYTES NFR BLD AUTO: 0.6 % — SIGNIFICANT CHANGE UP (ref 0–1.5)
IMM GRANULOCYTES NFR BLD AUTO: 0.7 % — SIGNIFICANT CHANGE UP (ref 0–1.5)
IMM GRANULOCYTES NFR BLD AUTO: 0.8 % — SIGNIFICANT CHANGE UP (ref 0–1.5)
IMM GRANULOCYTES NFR BLD AUTO: 0.8 % — SIGNIFICANT CHANGE UP (ref 0–1.5)
IMM GRANULOCYTES NFR BLD AUTO: 0.9 % — SIGNIFICANT CHANGE UP (ref 0–1.5)
IMM GRANULOCYTES NFR BLD AUTO: 1 % — SIGNIFICANT CHANGE UP (ref 0–1.5)
IMM GRANULOCYTES NFR BLD AUTO: 1.1 % — SIGNIFICANT CHANGE UP (ref 0–1.5)
IMM GRANULOCYTES NFR BLD AUTO: 1.1 % — SIGNIFICANT CHANGE UP (ref 0–1.5)
IMM GRANULOCYTES NFR BLD AUTO: 1.2 % — SIGNIFICANT CHANGE UP (ref 0–1.5)
IMM GRANULOCYTES NFR BLD AUTO: 1.3 % — SIGNIFICANT CHANGE UP (ref 0–1.5)
IMM GRANULOCYTES NFR BLD AUTO: 1.4 % — SIGNIFICANT CHANGE UP (ref 0–1.5)
IMM GRANULOCYTES NFR BLD AUTO: 1.4 % — SIGNIFICANT CHANGE UP (ref 0–1.5)
IMM GRANULOCYTES NFR BLD AUTO: 1.5 % — SIGNIFICANT CHANGE UP (ref 0–1.5)
IMM GRANULOCYTES NFR BLD AUTO: 1.8 % — HIGH (ref 0–1.5)
IMM GRANULOCYTES NFR BLD AUTO: 1.8 % — HIGH (ref 0–1.5)
IMM GRANULOCYTES NFR BLD AUTO: 2.1 % — HIGH (ref 0–1.5)
IMM GRANULOCYTES NFR BLD AUTO: 2.2 % — HIGH (ref 0–1.5)
IMM GRANULOCYTES NFR BLD AUTO: 2.4 % — HIGH (ref 0–1.5)
IMM GRANULOCYTES NFR BLD AUTO: 2.4 % — HIGH (ref 0–1.5)
IMM GRANULOCYTES NFR BLD AUTO: 2.5 % — HIGH (ref 0–1.5)
IMM GRANULOCYTES NFR BLD AUTO: 2.8 % — HIGH (ref 0–1.5)
IMM GRANULOCYTES NFR BLD AUTO: 3.2 % — HIGH (ref 0–1.5)
IMM GRANULOCYTES NFR BLD AUTO: 3.2 % — HIGH (ref 0–1.5)
IMM GRANULOCYTES NFR BLD AUTO: 4.1 % — HIGH (ref 0–1.5)
IMM GRANULOCYTES NFR BLD AUTO: 4.9 % — HIGH (ref 0–1.5)
INR BLD: 0.94 — SIGNIFICANT CHANGE UP (ref 0.88–1.17)
INR BLD: 0.99 — SIGNIFICANT CHANGE UP (ref 0.88–1.17)
INR BLD: 1 — SIGNIFICANT CHANGE UP (ref 0.88–1.17)
INR BLD: 1.02 — SIGNIFICANT CHANGE UP (ref 0.88–1.17)
INR BLD: 1.04 — SIGNIFICANT CHANGE UP (ref 0.88–1.17)
IRON SATN MFR SERPL: 22 UG/DL — LOW (ref 30–160)
IRON SATN MFR SERPL: 220 UG/DL — SIGNIFICANT CHANGE UP (ref 140–530)
KETONES UR-MCNC: NEGATIVE — SIGNIFICANT CHANGE UP
KETONES UR-MCNC: SIGNIFICANT CHANGE UP
LACTATE BLDV-MCNC: 0.9 MMOL/L — SIGNIFICANT CHANGE UP (ref 0.5–2)
LACTATE BLDV-MCNC: 1.3 MMOL/L — SIGNIFICANT CHANGE UP (ref 0.5–2)
LACTATE BLDV-MCNC: 1.4 MMOL/L — SIGNIFICANT CHANGE UP (ref 0.5–2)
LACTATE BLDV-MCNC: 1.4 MMOL/L — SIGNIFICANT CHANGE UP (ref 0.5–2)
LACTATE BLDV-MCNC: 1.5 MMOL/L — SIGNIFICANT CHANGE UP (ref 0.5–2)
LACTATE BLDV-MCNC: 1.8 MMOL/L — SIGNIFICANT CHANGE UP (ref 0.5–2)
LACTATE BLDV-MCNC: 1.9 MMOL/L — SIGNIFICANT CHANGE UP (ref 0.5–2)
LACTATE BLDV-MCNC: 1.9 MMOL/L — SIGNIFICANT CHANGE UP (ref 0.5–2)
LACTATE SERPL-SCNC: 1.1 MMOL/L — SIGNIFICANT CHANGE UP (ref 0.5–2)
LDH SERPL L TO P-CCNC: 272 U/L — HIGH (ref 135–225)
LDH SERPL L TO P-CCNC: 310 U/L — HIGH (ref 135–225)
LEUKOCYTE ESTERASE UR-ACNC: HIGH
LEUKOCYTE ESTERASE UR-ACNC: NEGATIVE — SIGNIFICANT CHANGE UP
LIDOCAIN IGE QN: 16.7 U/L — SIGNIFICANT CHANGE UP (ref 7–60)
LIDOCAIN IGE QN: 18.7 U/L — SIGNIFICANT CHANGE UP (ref 7–60)
LIPID PNL WITH DIRECT LDL SERPL: 105 MG/DL — SIGNIFICANT CHANGE UP
LIPID PNL WITH DIRECT LDL SERPL: 73 MG/DL — SIGNIFICANT CHANGE UP
LIPID PNL WITH DIRECT LDL SERPL: 76 MG/DL — SIGNIFICANT CHANGE UP
LIPID PNL WITH DIRECT LDL SERPL: 90 MG/DL — SIGNIFICANT CHANGE UP
LYMPHOCYTES # BLD AUTO: 0.67 K/UL — LOW (ref 1–3.3)
LYMPHOCYTES # BLD AUTO: 0.73 K/UL — LOW (ref 1–3.3)
LYMPHOCYTES # BLD AUTO: 0.8 K/UL — LOW (ref 1–3.3)
LYMPHOCYTES # BLD AUTO: 0.82 K/UL — LOW (ref 1–3.3)
LYMPHOCYTES # BLD AUTO: 0.83 K/UL — LOW (ref 1–3.3)
LYMPHOCYTES # BLD AUTO: 0.88 K/UL — LOW (ref 1–3.3)
LYMPHOCYTES # BLD AUTO: 0.9 K/UL — LOW (ref 1–3.3)
LYMPHOCYTES # BLD AUTO: 0.92 K/UL — LOW (ref 1–3.3)
LYMPHOCYTES # BLD AUTO: 0.94 K/UL — LOW (ref 1–3.3)
LYMPHOCYTES # BLD AUTO: 0.96 K/UL — LOW (ref 1–3.3)
LYMPHOCYTES # BLD AUTO: 0.97 K/UL — LOW (ref 1–3.3)
LYMPHOCYTES # BLD AUTO: 1.01 K/UL — SIGNIFICANT CHANGE UP (ref 1–3.3)
LYMPHOCYTES # BLD AUTO: 1.03 K/UL — SIGNIFICANT CHANGE UP (ref 1–3.3)
LYMPHOCYTES # BLD AUTO: 1.04 K/UL — SIGNIFICANT CHANGE UP (ref 1–3.3)
LYMPHOCYTES # BLD AUTO: 1.04 K/UL — SIGNIFICANT CHANGE UP (ref 1–3.3)
LYMPHOCYTES # BLD AUTO: 1.07 K/UL — SIGNIFICANT CHANGE UP (ref 1–3.3)
LYMPHOCYTES # BLD AUTO: 1.07 K/UL — SIGNIFICANT CHANGE UP (ref 1–3.3)
LYMPHOCYTES # BLD AUTO: 1.1 K/UL — SIGNIFICANT CHANGE UP (ref 1–3.3)
LYMPHOCYTES # BLD AUTO: 1.11 K/UL — SIGNIFICANT CHANGE UP (ref 1–3.3)
LYMPHOCYTES # BLD AUTO: 1.12 K/UL — SIGNIFICANT CHANGE UP (ref 1–3.3)
LYMPHOCYTES # BLD AUTO: 1.13 K/UL — SIGNIFICANT CHANGE UP (ref 1–3.3)
LYMPHOCYTES # BLD AUTO: 1.14 K/UL — SIGNIFICANT CHANGE UP (ref 1–3.3)
LYMPHOCYTES # BLD AUTO: 1.18 K/UL — SIGNIFICANT CHANGE UP (ref 1–3.3)
LYMPHOCYTES # BLD AUTO: 1.18 K/UL — SIGNIFICANT CHANGE UP (ref 1–3.3)
LYMPHOCYTES # BLD AUTO: 1.19 K/UL — SIGNIFICANT CHANGE UP (ref 1–3.3)
LYMPHOCYTES # BLD AUTO: 1.21 K/UL — SIGNIFICANT CHANGE UP (ref 1–3.3)
LYMPHOCYTES # BLD AUTO: 1.25 K/UL — SIGNIFICANT CHANGE UP (ref 1–3.3)
LYMPHOCYTES # BLD AUTO: 1.28 K/UL — SIGNIFICANT CHANGE UP (ref 1–3.3)
LYMPHOCYTES # BLD AUTO: 1.3 K/UL — SIGNIFICANT CHANGE UP (ref 1–3.3)
LYMPHOCYTES # BLD AUTO: 1.31 K/UL — SIGNIFICANT CHANGE UP (ref 1–3.3)
LYMPHOCYTES # BLD AUTO: 1.33 K/UL — SIGNIFICANT CHANGE UP (ref 1–3.3)
LYMPHOCYTES # BLD AUTO: 1.34 K/UL — SIGNIFICANT CHANGE UP (ref 1–3.3)
LYMPHOCYTES # BLD AUTO: 1.36 K/UL — SIGNIFICANT CHANGE UP (ref 1–3.3)
LYMPHOCYTES # BLD AUTO: 1.38 K/UL — SIGNIFICANT CHANGE UP (ref 1–3.3)
LYMPHOCYTES # BLD AUTO: 1.42 K/UL — SIGNIFICANT CHANGE UP (ref 1–3.3)
LYMPHOCYTES # BLD AUTO: 1.42 K/UL — SIGNIFICANT CHANGE UP (ref 1–3.3)
LYMPHOCYTES # BLD AUTO: 1.43 K/UL — SIGNIFICANT CHANGE UP (ref 1–3.3)
LYMPHOCYTES # BLD AUTO: 1.44 K/UL — SIGNIFICANT CHANGE UP (ref 1–3.3)
LYMPHOCYTES # BLD AUTO: 1.45 K/UL — SIGNIFICANT CHANGE UP (ref 1–3.3)
LYMPHOCYTES # BLD AUTO: 1.46 K/UL — SIGNIFICANT CHANGE UP (ref 1–3.3)
LYMPHOCYTES # BLD AUTO: 1.48 K/UL — SIGNIFICANT CHANGE UP (ref 1–3.3)
LYMPHOCYTES # BLD AUTO: 1.5 K/UL — SIGNIFICANT CHANGE UP (ref 1–3.3)
LYMPHOCYTES # BLD AUTO: 1.53 K/UL — SIGNIFICANT CHANGE UP (ref 1–3.3)
LYMPHOCYTES # BLD AUTO: 1.55 K/UL — SIGNIFICANT CHANGE UP (ref 1–3.3)
LYMPHOCYTES # BLD AUTO: 1.56 K/UL — SIGNIFICANT CHANGE UP (ref 1–3.3)
LYMPHOCYTES # BLD AUTO: 1.57 K/UL — SIGNIFICANT CHANGE UP (ref 1–3.3)
LYMPHOCYTES # BLD AUTO: 1.71 K/UL — SIGNIFICANT CHANGE UP (ref 1–3.3)
LYMPHOCYTES # BLD AUTO: 1.83 K/UL — SIGNIFICANT CHANGE UP (ref 1–3.3)
LYMPHOCYTES # BLD AUTO: 1.89 K/UL — SIGNIFICANT CHANGE UP (ref 1–3.3)
LYMPHOCYTES # BLD AUTO: 10.2 % — LOW (ref 13–44)
LYMPHOCYTES # BLD AUTO: 10.4 % — LOW (ref 13–44)
LYMPHOCYTES # BLD AUTO: 10.7 % — LOW (ref 13–44)
LYMPHOCYTES # BLD AUTO: 10.9 % — LOW (ref 13–44)
LYMPHOCYTES # BLD AUTO: 11.2 % — LOW (ref 13–44)
LYMPHOCYTES # BLD AUTO: 11.4 % — LOW (ref 13–44)
LYMPHOCYTES # BLD AUTO: 11.4 % — LOW (ref 13–44)
LYMPHOCYTES # BLD AUTO: 12 % — LOW (ref 13–44)
LYMPHOCYTES # BLD AUTO: 12.4 % — LOW (ref 13–44)
LYMPHOCYTES # BLD AUTO: 12.5 % — LOW (ref 13–44)
LYMPHOCYTES # BLD AUTO: 12.6 % — LOW (ref 13–44)
LYMPHOCYTES # BLD AUTO: 13.4 % — SIGNIFICANT CHANGE UP (ref 13–44)
LYMPHOCYTES # BLD AUTO: 13.7 % — SIGNIFICANT CHANGE UP (ref 13–44)
LYMPHOCYTES # BLD AUTO: 14.1 % — SIGNIFICANT CHANGE UP (ref 13–44)
LYMPHOCYTES # BLD AUTO: 14.2 % — SIGNIFICANT CHANGE UP (ref 13–44)
LYMPHOCYTES # BLD AUTO: 14.4 % — SIGNIFICANT CHANGE UP (ref 13–44)
LYMPHOCYTES # BLD AUTO: 14.7 % — SIGNIFICANT CHANGE UP (ref 13–44)
LYMPHOCYTES # BLD AUTO: 14.9 % — SIGNIFICANT CHANGE UP (ref 13–44)
LYMPHOCYTES # BLD AUTO: 14.9 % — SIGNIFICANT CHANGE UP (ref 13–44)
LYMPHOCYTES # BLD AUTO: 15 % — SIGNIFICANT CHANGE UP (ref 13–44)
LYMPHOCYTES # BLD AUTO: 15 % — SIGNIFICANT CHANGE UP (ref 13–44)
LYMPHOCYTES # BLD AUTO: 15.2 % — SIGNIFICANT CHANGE UP (ref 13–44)
LYMPHOCYTES # BLD AUTO: 15.3 % — SIGNIFICANT CHANGE UP (ref 13–44)
LYMPHOCYTES # BLD AUTO: 16 % — SIGNIFICANT CHANGE UP (ref 13–44)
LYMPHOCYTES # BLD AUTO: 16.2 % — SIGNIFICANT CHANGE UP (ref 13–44)
LYMPHOCYTES # BLD AUTO: 17.1 % — SIGNIFICANT CHANGE UP (ref 13–44)
LYMPHOCYTES # BLD AUTO: 17.7 % — SIGNIFICANT CHANGE UP (ref 13–44)
LYMPHOCYTES # BLD AUTO: 17.8 % — SIGNIFICANT CHANGE UP (ref 13–44)
LYMPHOCYTES # BLD AUTO: 17.8 % — SIGNIFICANT CHANGE UP (ref 13–44)
LYMPHOCYTES # BLD AUTO: 18.9 % — SIGNIFICANT CHANGE UP (ref 13–44)
LYMPHOCYTES # BLD AUTO: 19.1 % — SIGNIFICANT CHANGE UP (ref 13–44)
LYMPHOCYTES # BLD AUTO: 19.2 % — SIGNIFICANT CHANGE UP (ref 13–44)
LYMPHOCYTES # BLD AUTO: 19.7 % — SIGNIFICANT CHANGE UP (ref 13–44)
LYMPHOCYTES # BLD AUTO: 19.9 % — SIGNIFICANT CHANGE UP (ref 13–44)
LYMPHOCYTES # BLD AUTO: 2.11 K/UL — SIGNIFICANT CHANGE UP (ref 1–3.3)
LYMPHOCYTES # BLD AUTO: 2.26 K/UL — SIGNIFICANT CHANGE UP (ref 1–3.3)
LYMPHOCYTES # BLD AUTO: 2.43 K/UL — SIGNIFICANT CHANGE UP (ref 1–3.3)
LYMPHOCYTES # BLD AUTO: 20.3 % — SIGNIFICANT CHANGE UP (ref 13–44)
LYMPHOCYTES # BLD AUTO: 20.5 % — SIGNIFICANT CHANGE UP (ref 13–44)
LYMPHOCYTES # BLD AUTO: 21 % — SIGNIFICANT CHANGE UP (ref 13–44)
LYMPHOCYTES # BLD AUTO: 21.5 % — SIGNIFICANT CHANGE UP (ref 13–44)
LYMPHOCYTES # BLD AUTO: 22.6 % — SIGNIFICANT CHANGE UP (ref 13–44)
LYMPHOCYTES # BLD AUTO: 23.9 % — SIGNIFICANT CHANGE UP (ref 13–44)
LYMPHOCYTES # BLD AUTO: 24.3 % — SIGNIFICANT CHANGE UP (ref 13–44)
LYMPHOCYTES # BLD AUTO: 6.6 % — LOW (ref 13–44)
LYMPHOCYTES # BLD AUTO: 6.9 % — LOW (ref 13–44)
LYMPHOCYTES # BLD AUTO: 7.4 % — LOW (ref 13–44)
LYMPHOCYTES # BLD AUTO: 7.5 % — LOW (ref 13–44)
LYMPHOCYTES # BLD AUTO: 7.8 % — LOW (ref 13–44)
LYMPHOCYTES NFR SPEC AUTO: 8.7 % — LOW (ref 13–44)
M PNEUMO DNA SPEC QL NAA+PROBE: NOT DETECTED — SIGNIFICANT CHANGE UP
M PNEUMO DNA SPEC QL NAA+PROBE: NOT DETECTED — SIGNIFICANT CHANGE UP
MACROCYTES BLD QL: SLIGHT — SIGNIFICANT CHANGE UP
MAGNESIUM SERPL-MCNC: 1.6 MG/DL — SIGNIFICANT CHANGE UP (ref 1.6–2.6)
MAGNESIUM SERPL-MCNC: 1.7 MG/DL — SIGNIFICANT CHANGE UP (ref 1.6–2.6)
MAGNESIUM SERPL-MCNC: 1.8 MG/DL — SIGNIFICANT CHANGE UP (ref 1.6–2.6)
MAGNESIUM SERPL-MCNC: 1.9 MG/DL — SIGNIFICANT CHANGE UP (ref 1.6–2.6)
MAGNESIUM SERPL-MCNC: 2 MG/DL — SIGNIFICANT CHANGE UP (ref 1.6–2.6)
MAGNESIUM SERPL-MCNC: 2.1 MG/DL — SIGNIFICANT CHANGE UP (ref 1.6–2.6)
MAGNESIUM SERPL-MCNC: 2.2 MG/DL — SIGNIFICANT CHANGE UP (ref 1.6–2.6)
MAGNESIUM SERPL-MCNC: 2.3 MG/DL — SIGNIFICANT CHANGE UP (ref 1.6–2.6)
MAGNESIUM SERPL-MCNC: 2.3 MG/DL — SIGNIFICANT CHANGE UP (ref 1.6–2.6)
MAGNESIUM SERPL-MCNC: 2.4 MG/DL — SIGNIFICANT CHANGE UP (ref 1.6–2.6)
MANUAL SMEAR VERIFICATION: SIGNIFICANT CHANGE UP
MANUAL SMEAR VERIFICATION: SIGNIFICANT CHANGE UP
MCHC RBC-ENTMCNC: 26.3 PG — LOW (ref 27–34)
MCHC RBC-ENTMCNC: 27.5 % — LOW (ref 32–36)
MCHC RBC-ENTMCNC: 27.6 PG — SIGNIFICANT CHANGE UP (ref 27–34)
MCHC RBC-ENTMCNC: 27.7 PG — SIGNIFICANT CHANGE UP (ref 27–34)
MCHC RBC-ENTMCNC: 27.7 PG — SIGNIFICANT CHANGE UP (ref 27–34)
MCHC RBC-ENTMCNC: 28 PG — SIGNIFICANT CHANGE UP (ref 27–34)
MCHC RBC-ENTMCNC: 28.1 PG — SIGNIFICANT CHANGE UP (ref 27–34)
MCHC RBC-ENTMCNC: 28.2 PG — SIGNIFICANT CHANGE UP (ref 27–34)
MCHC RBC-ENTMCNC: 28.3 PG — SIGNIFICANT CHANGE UP (ref 27–34)
MCHC RBC-ENTMCNC: 28.4 PG — SIGNIFICANT CHANGE UP (ref 27–34)
MCHC RBC-ENTMCNC: 28.5 PG — SIGNIFICANT CHANGE UP (ref 27–34)
MCHC RBC-ENTMCNC: 28.6 PG — SIGNIFICANT CHANGE UP (ref 27–34)
MCHC RBC-ENTMCNC: 28.7 PG — SIGNIFICANT CHANGE UP (ref 27–34)
MCHC RBC-ENTMCNC: 28.7 PG — SIGNIFICANT CHANGE UP (ref 27–34)
MCHC RBC-ENTMCNC: 28.9 PG — SIGNIFICANT CHANGE UP (ref 27–34)
MCHC RBC-ENTMCNC: 28.9 PG — SIGNIFICANT CHANGE UP (ref 27–34)
MCHC RBC-ENTMCNC: 29 PG — SIGNIFICANT CHANGE UP (ref 27–34)
MCHC RBC-ENTMCNC: 29.1 PG — SIGNIFICANT CHANGE UP (ref 27–34)
MCHC RBC-ENTMCNC: 29.2 PG — SIGNIFICANT CHANGE UP (ref 27–34)
MCHC RBC-ENTMCNC: 29.3 PG — SIGNIFICANT CHANGE UP (ref 27–34)
MCHC RBC-ENTMCNC: 29.4 % — LOW (ref 32–36)
MCHC RBC-ENTMCNC: 29.4 PG — SIGNIFICANT CHANGE UP (ref 27–34)
MCHC RBC-ENTMCNC: 29.4 PG — SIGNIFICANT CHANGE UP (ref 27–34)
MCHC RBC-ENTMCNC: 29.5 PG — SIGNIFICANT CHANGE UP (ref 27–34)
MCHC RBC-ENTMCNC: 29.5 PG — SIGNIFICANT CHANGE UP (ref 27–34)
MCHC RBC-ENTMCNC: 29.6 PG — SIGNIFICANT CHANGE UP (ref 27–34)
MCHC RBC-ENTMCNC: 29.7 PG — SIGNIFICANT CHANGE UP (ref 27–34)
MCHC RBC-ENTMCNC: 29.8 PG — SIGNIFICANT CHANGE UP (ref 27–34)
MCHC RBC-ENTMCNC: 29.9 PG — SIGNIFICANT CHANGE UP (ref 27–34)
MCHC RBC-ENTMCNC: 29.9 PG — SIGNIFICANT CHANGE UP (ref 27–34)
MCHC RBC-ENTMCNC: 30 PG — SIGNIFICANT CHANGE UP (ref 27–34)
MCHC RBC-ENTMCNC: 30.1 PG — SIGNIFICANT CHANGE UP (ref 27–34)
MCHC RBC-ENTMCNC: 30.2 PG — SIGNIFICANT CHANGE UP (ref 27–34)
MCHC RBC-ENTMCNC: 30.3 PG — SIGNIFICANT CHANGE UP (ref 27–34)
MCHC RBC-ENTMCNC: 30.4 % — LOW (ref 32–36)
MCHC RBC-ENTMCNC: 30.4 PG — SIGNIFICANT CHANGE UP (ref 27–34)
MCHC RBC-ENTMCNC: 30.4 PG — SIGNIFICANT CHANGE UP (ref 27–34)
MCHC RBC-ENTMCNC: 30.5 % — LOW (ref 32–36)
MCHC RBC-ENTMCNC: 30.5 PG — SIGNIFICANT CHANGE UP (ref 27–34)
MCHC RBC-ENTMCNC: 30.6 % — LOW (ref 32–36)
MCHC RBC-ENTMCNC: 30.7 % — LOW (ref 32–36)
MCHC RBC-ENTMCNC: 30.7 % — LOW (ref 32–36)
MCHC RBC-ENTMCNC: 30.7 PG — SIGNIFICANT CHANGE UP (ref 27–34)
MCHC RBC-ENTMCNC: 30.8 PG — SIGNIFICANT CHANGE UP (ref 27–34)
MCHC RBC-ENTMCNC: 30.8 PG — SIGNIFICANT CHANGE UP (ref 27–34)
MCHC RBC-ENTMCNC: 30.9 % — LOW (ref 32–36)
MCHC RBC-ENTMCNC: 30.9 % — LOW (ref 32–36)
MCHC RBC-ENTMCNC: 30.9 PG — SIGNIFICANT CHANGE UP (ref 27–34)
MCHC RBC-ENTMCNC: 31 % — LOW (ref 32–36)
MCHC RBC-ENTMCNC: 31 PG — SIGNIFICANT CHANGE UP (ref 27–34)
MCHC RBC-ENTMCNC: 31.1 % — LOW (ref 32–36)
MCHC RBC-ENTMCNC: 31.1 % — LOW (ref 32–36)
MCHC RBC-ENTMCNC: 31.1 PG — SIGNIFICANT CHANGE UP (ref 27–34)
MCHC RBC-ENTMCNC: 31.1 PG — SIGNIFICANT CHANGE UP (ref 27–34)
MCHC RBC-ENTMCNC: 31.2 % — LOW (ref 32–36)
MCHC RBC-ENTMCNC: 31.2 % — LOW (ref 32–36)
MCHC RBC-ENTMCNC: 31.2 PG — SIGNIFICANT CHANGE UP (ref 27–34)
MCHC RBC-ENTMCNC: 31.3 % — LOW (ref 32–36)
MCHC RBC-ENTMCNC: 31.4 % — LOW (ref 32–36)
MCHC RBC-ENTMCNC: 31.4 PG — SIGNIFICANT CHANGE UP (ref 27–34)
MCHC RBC-ENTMCNC: 31.5 % — LOW (ref 32–36)
MCHC RBC-ENTMCNC: 31.5 % — LOW (ref 32–36)
MCHC RBC-ENTMCNC: 31.6 % — LOW (ref 32–36)
MCHC RBC-ENTMCNC: 31.7 % — LOW (ref 32–36)
MCHC RBC-ENTMCNC: 31.7 % — LOW (ref 32–36)
MCHC RBC-ENTMCNC: 31.8 % — LOW (ref 32–36)
MCHC RBC-ENTMCNC: 32 % — SIGNIFICANT CHANGE UP (ref 32–36)
MCHC RBC-ENTMCNC: 32 % — SIGNIFICANT CHANGE UP (ref 32–36)
MCHC RBC-ENTMCNC: 32.1 % — SIGNIFICANT CHANGE UP (ref 32–36)
MCHC RBC-ENTMCNC: 32.2 % — SIGNIFICANT CHANGE UP (ref 32–36)
MCHC RBC-ENTMCNC: 32.3 % — SIGNIFICANT CHANGE UP (ref 32–36)
MCHC RBC-ENTMCNC: 32.4 % — SIGNIFICANT CHANGE UP (ref 32–36)
MCHC RBC-ENTMCNC: 32.5 % — SIGNIFICANT CHANGE UP (ref 32–36)
MCHC RBC-ENTMCNC: 32.6 % — SIGNIFICANT CHANGE UP (ref 32–36)
MCHC RBC-ENTMCNC: 32.6 % — SIGNIFICANT CHANGE UP (ref 32–36)
MCHC RBC-ENTMCNC: 32.7 % — SIGNIFICANT CHANGE UP (ref 32–36)
MCHC RBC-ENTMCNC: 32.8 % — SIGNIFICANT CHANGE UP (ref 32–36)
MCHC RBC-ENTMCNC: 32.9 % — SIGNIFICANT CHANGE UP (ref 32–36)
MCHC RBC-ENTMCNC: 32.9 % — SIGNIFICANT CHANGE UP (ref 32–36)
MCHC RBC-ENTMCNC: 33 % — SIGNIFICANT CHANGE UP (ref 32–36)
MCHC RBC-ENTMCNC: 33.1 % — SIGNIFICANT CHANGE UP (ref 32–36)
MCHC RBC-ENTMCNC: 33.3 % — SIGNIFICANT CHANGE UP (ref 32–36)
MCHC RBC-ENTMCNC: 33.8 % — SIGNIFICANT CHANGE UP (ref 32–36)
MCHC RBC-ENTMCNC: 33.9 % — SIGNIFICANT CHANGE UP (ref 32–36)
MCHC RBC-ENTMCNC: 34.9 % — SIGNIFICANT CHANGE UP (ref 32–36)
MCV RBC AUTO: 85.4 FL — SIGNIFICANT CHANGE UP (ref 80–100)
MCV RBC AUTO: 85.9 FL — SIGNIFICANT CHANGE UP (ref 80–100)
MCV RBC AUTO: 86.8 FL — SIGNIFICANT CHANGE UP (ref 80–100)
MCV RBC AUTO: 86.8 FL — SIGNIFICANT CHANGE UP (ref 80–100)
MCV RBC AUTO: 87.3 FL — SIGNIFICANT CHANGE UP (ref 80–100)
MCV RBC AUTO: 87.4 FL — SIGNIFICANT CHANGE UP (ref 80–100)
MCV RBC AUTO: 87.5 FL — SIGNIFICANT CHANGE UP (ref 80–100)
MCV RBC AUTO: 88.3 FL — SIGNIFICANT CHANGE UP (ref 80–100)
MCV RBC AUTO: 88.7 FL — SIGNIFICANT CHANGE UP (ref 80–100)
MCV RBC AUTO: 89 FL — SIGNIFICANT CHANGE UP (ref 80–100)
MCV RBC AUTO: 89 FL — SIGNIFICANT CHANGE UP (ref 80–100)
MCV RBC AUTO: 89.1 FL — SIGNIFICANT CHANGE UP (ref 80–100)
MCV RBC AUTO: 89.1 FL — SIGNIFICANT CHANGE UP (ref 80–100)
MCV RBC AUTO: 89.4 FL — SIGNIFICANT CHANGE UP (ref 80–100)
MCV RBC AUTO: 89.6 FL — SIGNIFICANT CHANGE UP (ref 80–100)
MCV RBC AUTO: 90.1 FL — SIGNIFICANT CHANGE UP (ref 80–100)
MCV RBC AUTO: 90.2 FL — SIGNIFICANT CHANGE UP (ref 80–100)
MCV RBC AUTO: 90.4 FL — SIGNIFICANT CHANGE UP (ref 80–100)
MCV RBC AUTO: 90.9 FL — SIGNIFICANT CHANGE UP (ref 80–100)
MCV RBC AUTO: 91.3 FL — SIGNIFICANT CHANGE UP (ref 80–100)
MCV RBC AUTO: 91.4 FL — SIGNIFICANT CHANGE UP (ref 80–100)
MCV RBC AUTO: 92.1 FL — SIGNIFICANT CHANGE UP (ref 80–100)
MCV RBC AUTO: 92.4 FL — SIGNIFICANT CHANGE UP (ref 80–100)
MCV RBC AUTO: 92.6 FL — SIGNIFICANT CHANGE UP (ref 80–100)
MCV RBC AUTO: 92.7 FL — SIGNIFICANT CHANGE UP (ref 80–100)
MCV RBC AUTO: 92.7 FL — SIGNIFICANT CHANGE UP (ref 80–100)
MCV RBC AUTO: 92.8 FL — SIGNIFICANT CHANGE UP (ref 80–100)
MCV RBC AUTO: 93 FL — SIGNIFICANT CHANGE UP (ref 80–100)
MCV RBC AUTO: 93 FL — SIGNIFICANT CHANGE UP (ref 80–100)
MCV RBC AUTO: 93.1 FL — SIGNIFICANT CHANGE UP (ref 80–100)
MCV RBC AUTO: 93.2 FL — SIGNIFICANT CHANGE UP (ref 80–100)
MCV RBC AUTO: 93.3 FL — SIGNIFICANT CHANGE UP (ref 80–100)
MCV RBC AUTO: 93.4 FL — SIGNIFICANT CHANGE UP (ref 80–100)
MCV RBC AUTO: 93.5 FL — SIGNIFICANT CHANGE UP (ref 80–100)
MCV RBC AUTO: 93.6 FL — SIGNIFICANT CHANGE UP (ref 80–100)
MCV RBC AUTO: 93.7 FL — SIGNIFICANT CHANGE UP (ref 80–100)
MCV RBC AUTO: 93.7 FL — SIGNIFICANT CHANGE UP (ref 80–100)
MCV RBC AUTO: 93.8 FL — SIGNIFICANT CHANGE UP (ref 80–100)
MCV RBC AUTO: 93.8 FL — SIGNIFICANT CHANGE UP (ref 80–100)
MCV RBC AUTO: 93.9 FL — SIGNIFICANT CHANGE UP (ref 80–100)
MCV RBC AUTO: 94 FL — SIGNIFICANT CHANGE UP (ref 80–100)
MCV RBC AUTO: 94 FL — SIGNIFICANT CHANGE UP (ref 80–100)
MCV RBC AUTO: 94.2 FL — SIGNIFICANT CHANGE UP (ref 80–100)
MCV RBC AUTO: 94.3 FL — SIGNIFICANT CHANGE UP (ref 80–100)
MCV RBC AUTO: 94.7 FL — SIGNIFICANT CHANGE UP (ref 80–100)
MCV RBC AUTO: 94.9 FL — SIGNIFICANT CHANGE UP (ref 80–100)
MCV RBC AUTO: 95 FL — SIGNIFICANT CHANGE UP (ref 80–100)
MCV RBC AUTO: 95.2 FL — SIGNIFICANT CHANGE UP (ref 80–100)
MCV RBC AUTO: 95.2 FL — SIGNIFICANT CHANGE UP (ref 80–100)
MCV RBC AUTO: 95.6 FL — SIGNIFICANT CHANGE UP (ref 80–100)
MCV RBC AUTO: 95.6 FL — SIGNIFICANT CHANGE UP (ref 80–100)
MCV RBC AUTO: 95.7 FL — SIGNIFICANT CHANGE UP (ref 80–100)
MCV RBC AUTO: 96 FL — SIGNIFICANT CHANGE UP (ref 80–100)
MCV RBC AUTO: 96.1 FL — SIGNIFICANT CHANGE UP (ref 80–100)
MCV RBC AUTO: 96.2 FL — SIGNIFICANT CHANGE UP (ref 80–100)
MCV RBC AUTO: 96.3 FL — SIGNIFICANT CHANGE UP (ref 80–100)
MCV RBC AUTO: 96.5 FL — SIGNIFICANT CHANGE UP (ref 80–100)
MCV RBC AUTO: 96.9 FL — SIGNIFICANT CHANGE UP (ref 80–100)
MCV RBC AUTO: 96.9 FL — SIGNIFICANT CHANGE UP (ref 80–100)
MCV RBC AUTO: 97.9 FL — SIGNIFICANT CHANGE UP (ref 80–100)
MCV RBC AUTO: 99.1 FL — SIGNIFICANT CHANGE UP (ref 80–100)
METHOD TYPE: SIGNIFICANT CHANGE UP
MONOCYTES # BLD AUTO: 0.42 K/UL — SIGNIFICANT CHANGE UP (ref 0–0.9)
MONOCYTES # BLD AUTO: 0.44 K/UL — SIGNIFICANT CHANGE UP (ref 0–0.9)
MONOCYTES # BLD AUTO: 0.47 K/UL — SIGNIFICANT CHANGE UP (ref 0–0.9)
MONOCYTES # BLD AUTO: 0.47 K/UL — SIGNIFICANT CHANGE UP (ref 0–0.9)
MONOCYTES # BLD AUTO: 0.49 K/UL — SIGNIFICANT CHANGE UP (ref 0–0.9)
MONOCYTES # BLD AUTO: 0.49 K/UL — SIGNIFICANT CHANGE UP (ref 0–0.9)
MONOCYTES # BLD AUTO: 0.5 K/UL — SIGNIFICANT CHANGE UP (ref 0–0.9)
MONOCYTES # BLD AUTO: 0.5 K/UL — SIGNIFICANT CHANGE UP (ref 0–0.9)
MONOCYTES # BLD AUTO: 0.52 K/UL — SIGNIFICANT CHANGE UP (ref 0–0.9)
MONOCYTES # BLD AUTO: 0.58 K/UL — SIGNIFICANT CHANGE UP (ref 0–0.9)
MONOCYTES # BLD AUTO: 0.58 K/UL — SIGNIFICANT CHANGE UP (ref 0–0.9)
MONOCYTES # BLD AUTO: 0.59 K/UL — SIGNIFICANT CHANGE UP (ref 0–0.9)
MONOCYTES # BLD AUTO: 0.61 K/UL — SIGNIFICANT CHANGE UP (ref 0–0.9)
MONOCYTES # BLD AUTO: 0.61 K/UL — SIGNIFICANT CHANGE UP (ref 0–0.9)
MONOCYTES # BLD AUTO: 0.62 K/UL — SIGNIFICANT CHANGE UP (ref 0–0.9)
MONOCYTES # BLD AUTO: 0.66 K/UL — SIGNIFICANT CHANGE UP (ref 0–0.9)
MONOCYTES # BLD AUTO: 0.69 K/UL — SIGNIFICANT CHANGE UP (ref 0–0.9)
MONOCYTES # BLD AUTO: 0.69 K/UL — SIGNIFICANT CHANGE UP (ref 0–0.9)
MONOCYTES # BLD AUTO: 0.72 K/UL — SIGNIFICANT CHANGE UP (ref 0–0.9)
MONOCYTES # BLD AUTO: 0.72 K/UL — SIGNIFICANT CHANGE UP (ref 0–0.9)
MONOCYTES # BLD AUTO: 0.73 K/UL — SIGNIFICANT CHANGE UP (ref 0–0.9)
MONOCYTES # BLD AUTO: 0.77 K/UL — SIGNIFICANT CHANGE UP (ref 0–0.9)
MONOCYTES # BLD AUTO: 0.77 K/UL — SIGNIFICANT CHANGE UP (ref 0–0.9)
MONOCYTES # BLD AUTO: 0.8 K/UL — SIGNIFICANT CHANGE UP (ref 0–0.9)
MONOCYTES # BLD AUTO: 0.81 K/UL — SIGNIFICANT CHANGE UP (ref 0–0.9)
MONOCYTES # BLD AUTO: 0.85 K/UL — SIGNIFICANT CHANGE UP (ref 0–0.9)
MONOCYTES # BLD AUTO: 0.88 K/UL — SIGNIFICANT CHANGE UP (ref 0–0.9)
MONOCYTES # BLD AUTO: 0.89 K/UL — SIGNIFICANT CHANGE UP (ref 0–0.9)
MONOCYTES # BLD AUTO: 0.92 K/UL — HIGH (ref 0–0.9)
MONOCYTES # BLD AUTO: 0.93 K/UL — HIGH (ref 0–0.9)
MONOCYTES # BLD AUTO: 0.93 K/UL — HIGH (ref 0–0.9)
MONOCYTES # BLD AUTO: 0.94 K/UL — HIGH (ref 0–0.9)
MONOCYTES # BLD AUTO: 0.95 K/UL — HIGH (ref 0–0.9)
MONOCYTES # BLD AUTO: 0.98 K/UL — HIGH (ref 0–0.9)
MONOCYTES # BLD AUTO: 0.99 K/UL — HIGH (ref 0–0.9)
MONOCYTES # BLD AUTO: 1.02 K/UL — HIGH (ref 0–0.9)
MONOCYTES # BLD AUTO: 1.06 K/UL — HIGH (ref 0–0.9)
MONOCYTES # BLD AUTO: 1.15 K/UL — HIGH (ref 0–0.9)
MONOCYTES # BLD AUTO: 1.21 K/UL — HIGH (ref 0–0.9)
MONOCYTES # BLD AUTO: 1.26 K/UL — HIGH (ref 0–0.9)
MONOCYTES # BLD AUTO: 1.26 K/UL — HIGH (ref 0–0.9)
MONOCYTES # BLD AUTO: 1.33 K/UL — HIGH (ref 0–0.9)
MONOCYTES # BLD AUTO: 1.64 K/UL — HIGH (ref 0–0.9)
MONOCYTES NFR BLD AUTO: 10 % — SIGNIFICANT CHANGE UP (ref 2–14)
MONOCYTES NFR BLD AUTO: 10 % — SIGNIFICANT CHANGE UP (ref 2–14)
MONOCYTES NFR BLD AUTO: 10.1 % — SIGNIFICANT CHANGE UP (ref 2–14)
MONOCYTES NFR BLD AUTO: 10.2 % — SIGNIFICANT CHANGE UP (ref 2–14)
MONOCYTES NFR BLD AUTO: 10.7 % — SIGNIFICANT CHANGE UP (ref 2–14)
MONOCYTES NFR BLD AUTO: 11 % — SIGNIFICANT CHANGE UP (ref 2–14)
MONOCYTES NFR BLD AUTO: 11.3 % — SIGNIFICANT CHANGE UP (ref 2–14)
MONOCYTES NFR BLD AUTO: 12.8 % — SIGNIFICANT CHANGE UP (ref 2–14)
MONOCYTES NFR BLD AUTO: 12.9 % — SIGNIFICANT CHANGE UP (ref 2–14)
MONOCYTES NFR BLD AUTO: 13 % — SIGNIFICANT CHANGE UP (ref 2–14)
MONOCYTES NFR BLD AUTO: 13.4 % — SIGNIFICANT CHANGE UP (ref 2–14)
MONOCYTES NFR BLD AUTO: 15.8 % — HIGH (ref 2–14)
MONOCYTES NFR BLD AUTO: 19.5 % — HIGH (ref 2–14)
MONOCYTES NFR BLD AUTO: 5 % — SIGNIFICANT CHANGE UP (ref 2–14)
MONOCYTES NFR BLD AUTO: 5.2 % — SIGNIFICANT CHANGE UP (ref 2–14)
MONOCYTES NFR BLD AUTO: 5.2 % — SIGNIFICANT CHANGE UP (ref 2–14)
MONOCYTES NFR BLD AUTO: 5.7 % — SIGNIFICANT CHANGE UP (ref 2–14)
MONOCYTES NFR BLD AUTO: 5.7 % — SIGNIFICANT CHANGE UP (ref 2–14)
MONOCYTES NFR BLD AUTO: 5.8 % — SIGNIFICANT CHANGE UP (ref 2–14)
MONOCYTES NFR BLD AUTO: 6.1 % — SIGNIFICANT CHANGE UP (ref 2–14)
MONOCYTES NFR BLD AUTO: 6.2 % — SIGNIFICANT CHANGE UP (ref 2–14)
MONOCYTES NFR BLD AUTO: 6.2 % — SIGNIFICANT CHANGE UP (ref 2–14)
MONOCYTES NFR BLD AUTO: 6.4 % — SIGNIFICANT CHANGE UP (ref 2–14)
MONOCYTES NFR BLD AUTO: 6.4 % — SIGNIFICANT CHANGE UP (ref 2–14)
MONOCYTES NFR BLD AUTO: 6.5 % — SIGNIFICANT CHANGE UP (ref 2–14)
MONOCYTES NFR BLD AUTO: 6.7 % — SIGNIFICANT CHANGE UP (ref 2–14)
MONOCYTES NFR BLD AUTO: 6.8 % — SIGNIFICANT CHANGE UP (ref 2–14)
MONOCYTES NFR BLD AUTO: 7.2 % — SIGNIFICANT CHANGE UP (ref 2–14)
MONOCYTES NFR BLD AUTO: 7.2 % — SIGNIFICANT CHANGE UP (ref 2–14)
MONOCYTES NFR BLD AUTO: 7.3 % — SIGNIFICANT CHANGE UP (ref 2–14)
MONOCYTES NFR BLD AUTO: 7.4 % — SIGNIFICANT CHANGE UP (ref 2–14)
MONOCYTES NFR BLD AUTO: 7.5 % — SIGNIFICANT CHANGE UP (ref 2–14)
MONOCYTES NFR BLD AUTO: 7.6 % — SIGNIFICANT CHANGE UP (ref 2–14)
MONOCYTES NFR BLD AUTO: 7.8 % — SIGNIFICANT CHANGE UP (ref 2–14)
MONOCYTES NFR BLD AUTO: 7.9 % — SIGNIFICANT CHANGE UP (ref 2–14)
MONOCYTES NFR BLD AUTO: 8.2 % — SIGNIFICANT CHANGE UP (ref 2–14)
MONOCYTES NFR BLD AUTO: 8.2 % — SIGNIFICANT CHANGE UP (ref 2–14)
MONOCYTES NFR BLD AUTO: 8.5 % — SIGNIFICANT CHANGE UP (ref 2–14)
MONOCYTES NFR BLD AUTO: 8.6 % — SIGNIFICANT CHANGE UP (ref 2–14)
MONOCYTES NFR BLD AUTO: 8.8 % — SIGNIFICANT CHANGE UP (ref 2–14)
MONOCYTES NFR BLD AUTO: 9.2 % — SIGNIFICANT CHANGE UP (ref 2–14)
MONOCYTES NFR BLD AUTO: 9.3 % — SIGNIFICANT CHANGE UP (ref 2–14)
MONOCYTES NFR BLD AUTO: 9.4 % — SIGNIFICANT CHANGE UP (ref 2–14)
MONOCYTES NFR BLD AUTO: 9.5 % — SIGNIFICANT CHANGE UP (ref 2–14)
MONOCYTES NFR BLD AUTO: 9.5 % — SIGNIFICANT CHANGE UP (ref 2–14)
MONOCYTES NFR BLD AUTO: 9.6 % — SIGNIFICANT CHANGE UP (ref 2–14)
MONOCYTES NFR BLD AUTO: 9.6 % — SIGNIFICANT CHANGE UP (ref 2–14)
MONOCYTES NFR BLD AUTO: 9.8 % — SIGNIFICANT CHANGE UP (ref 2–14)
MONOCYTES NFR BLD AUTO: 9.9 % — SIGNIFICANT CHANGE UP (ref 2–14)
MONOCYTES NFR BLD AUTO: 9.9 % — SIGNIFICANT CHANGE UP (ref 2–14)
MONOCYTES NFR BLD: 3.5 % — SIGNIFICANT CHANGE UP (ref 2–9)
MORPHOLOGY BLD-IMP: NORMAL — SIGNIFICANT CHANGE UP
MUCOUS THREADS # UR AUTO: SIGNIFICANT CHANGE UP
MYELOCYTES NFR BLD: 1.7 % — HIGH (ref 0–0)
NEUTROPHIL AB SER-ACNC: 80.9 % — HIGH (ref 43–77)
NEUTROPHILS # BLD AUTO: 10.01 K/UL — HIGH (ref 1.8–7.4)
NEUTROPHILS # BLD AUTO: 10.06 K/UL — HIGH (ref 1.8–7.4)
NEUTROPHILS # BLD AUTO: 10.54 K/UL — HIGH (ref 1.8–7.4)
NEUTROPHILS # BLD AUTO: 10.95 K/UL — HIGH (ref 1.8–7.4)
NEUTROPHILS # BLD AUTO: 11.08 K/UL — HIGH (ref 1.8–7.4)
NEUTROPHILS # BLD AUTO: 11.1 K/UL — HIGH (ref 1.8–7.4)
NEUTROPHILS # BLD AUTO: 11.18 K/UL — HIGH (ref 1.8–7.4)
NEUTROPHILS # BLD AUTO: 11.42 K/UL — HIGH (ref 1.8–7.4)
NEUTROPHILS # BLD AUTO: 11.53 K/UL — HIGH (ref 1.8–7.4)
NEUTROPHILS # BLD AUTO: 12.55 K/UL — HIGH (ref 1.8–7.4)
NEUTROPHILS # BLD AUTO: 13.92 K/UL — HIGH (ref 1.8–7.4)
NEUTROPHILS # BLD AUTO: 16.77 K/UL — HIGH (ref 1.8–7.4)
NEUTROPHILS # BLD AUTO: 17.54 K/UL — HIGH (ref 1.8–7.4)
NEUTROPHILS # BLD AUTO: 17.74 K/UL — HIGH (ref 1.8–7.4)
NEUTROPHILS # BLD AUTO: 2.77 K/UL — SIGNIFICANT CHANGE UP (ref 1.8–7.4)
NEUTROPHILS # BLD AUTO: 2.97 K/UL — SIGNIFICANT CHANGE UP (ref 1.8–7.4)
NEUTROPHILS # BLD AUTO: 3.05 K/UL — SIGNIFICANT CHANGE UP (ref 1.8–7.4)
NEUTROPHILS # BLD AUTO: 3.24 K/UL — SIGNIFICANT CHANGE UP (ref 1.8–7.4)
NEUTROPHILS # BLD AUTO: 3.28 K/UL — SIGNIFICANT CHANGE UP (ref 1.8–7.4)
NEUTROPHILS # BLD AUTO: 3.33 K/UL — SIGNIFICANT CHANGE UP (ref 1.8–7.4)
NEUTROPHILS # BLD AUTO: 3.48 K/UL — SIGNIFICANT CHANGE UP (ref 1.8–7.4)
NEUTROPHILS # BLD AUTO: 3.7 K/UL — SIGNIFICANT CHANGE UP (ref 1.8–7.4)
NEUTROPHILS # BLD AUTO: 3.7 K/UL — SIGNIFICANT CHANGE UP (ref 1.8–7.4)
NEUTROPHILS # BLD AUTO: 3.84 K/UL — SIGNIFICANT CHANGE UP (ref 1.8–7.4)
NEUTROPHILS # BLD AUTO: 3.91 K/UL — SIGNIFICANT CHANGE UP (ref 1.8–7.4)
NEUTROPHILS # BLD AUTO: 4.22 K/UL — SIGNIFICANT CHANGE UP (ref 1.8–7.4)
NEUTROPHILS # BLD AUTO: 4.27 K/UL — SIGNIFICANT CHANGE UP (ref 1.8–7.4)
NEUTROPHILS # BLD AUTO: 4.28 K/UL — SIGNIFICANT CHANGE UP (ref 1.8–7.4)
NEUTROPHILS # BLD AUTO: 4.36 K/UL — SIGNIFICANT CHANGE UP (ref 1.8–7.4)
NEUTROPHILS # BLD AUTO: 4.54 K/UL — SIGNIFICANT CHANGE UP (ref 1.8–7.4)
NEUTROPHILS # BLD AUTO: 4.65 K/UL — SIGNIFICANT CHANGE UP (ref 1.8–7.4)
NEUTROPHILS # BLD AUTO: 4.66 K/UL — SIGNIFICANT CHANGE UP (ref 1.8–7.4)
NEUTROPHILS # BLD AUTO: 4.68 K/UL — SIGNIFICANT CHANGE UP (ref 1.8–7.4)
NEUTROPHILS # BLD AUTO: 5.28 K/UL — SIGNIFICANT CHANGE UP (ref 1.8–7.4)
NEUTROPHILS # BLD AUTO: 5.46 K/UL — SIGNIFICANT CHANGE UP (ref 1.8–7.4)
NEUTROPHILS # BLD AUTO: 5.48 K/UL — SIGNIFICANT CHANGE UP (ref 1.8–7.4)
NEUTROPHILS # BLD AUTO: 5.78 K/UL — SIGNIFICANT CHANGE UP (ref 1.8–7.4)
NEUTROPHILS # BLD AUTO: 5.85 K/UL — SIGNIFICANT CHANGE UP (ref 1.8–7.4)
NEUTROPHILS # BLD AUTO: 6.05 K/UL — SIGNIFICANT CHANGE UP (ref 1.8–7.4)
NEUTROPHILS # BLD AUTO: 6.93 K/UL — SIGNIFICANT CHANGE UP (ref 1.8–7.4)
NEUTROPHILS # BLD AUTO: 7.04 K/UL — SIGNIFICANT CHANGE UP (ref 1.8–7.4)
NEUTROPHILS # BLD AUTO: 8.35 K/UL — HIGH (ref 1.8–7.4)
NEUTROPHILS # BLD AUTO: 9.05 K/UL — HIGH (ref 1.8–7.4)
NEUTROPHILS # BLD AUTO: 9.06 K/UL — HIGH (ref 1.8–7.4)
NEUTROPHILS # BLD AUTO: 9.21 K/UL — HIGH (ref 1.8–7.4)
NEUTROPHILS # BLD AUTO: 9.22 K/UL — HIGH (ref 1.8–7.4)
NEUTROPHILS # BLD AUTO: 9.36 K/UL — HIGH (ref 1.8–7.4)
NEUTROPHILS # BLD AUTO: 9.37 K/UL — HIGH (ref 1.8–7.4)
NEUTROPHILS # BLD AUTO: 9.64 K/UL — HIGH (ref 1.8–7.4)
NEUTROPHILS # BLD AUTO: 9.91 K/UL — HIGH (ref 1.8–7.4)
NEUTROPHILS NFR BLD AUTO: 59.7 % — SIGNIFICANT CHANGE UP (ref 43–77)
NEUTROPHILS NFR BLD AUTO: 60.8 % — SIGNIFICANT CHANGE UP (ref 43–77)
NEUTROPHILS NFR BLD AUTO: 61.1 % — SIGNIFICANT CHANGE UP (ref 43–77)
NEUTROPHILS NFR BLD AUTO: 61.3 % — SIGNIFICANT CHANGE UP (ref 43–77)
NEUTROPHILS NFR BLD AUTO: 64.7 % — SIGNIFICANT CHANGE UP (ref 43–77)
NEUTROPHILS NFR BLD AUTO: 65.1 % — SIGNIFICANT CHANGE UP (ref 43–77)
NEUTROPHILS NFR BLD AUTO: 65.9 % — SIGNIFICANT CHANGE UP (ref 43–77)
NEUTROPHILS NFR BLD AUTO: 66.3 % — SIGNIFICANT CHANGE UP (ref 43–77)
NEUTROPHILS NFR BLD AUTO: 66.7 % — SIGNIFICANT CHANGE UP (ref 43–77)
NEUTROPHILS NFR BLD AUTO: 66.7 % — SIGNIFICANT CHANGE UP (ref 43–77)
NEUTROPHILS NFR BLD AUTO: 67.1 % — SIGNIFICANT CHANGE UP (ref 43–77)
NEUTROPHILS NFR BLD AUTO: 68.3 % — SIGNIFICANT CHANGE UP (ref 43–77)
NEUTROPHILS NFR BLD AUTO: 68.5 % — SIGNIFICANT CHANGE UP (ref 43–77)
NEUTROPHILS NFR BLD AUTO: 70.4 % — SIGNIFICANT CHANGE UP (ref 43–77)
NEUTROPHILS NFR BLD AUTO: 70.8 % — SIGNIFICANT CHANGE UP (ref 43–77)
NEUTROPHILS NFR BLD AUTO: 70.9 % — SIGNIFICANT CHANGE UP (ref 43–77)
NEUTROPHILS NFR BLD AUTO: 71.1 % — SIGNIFICANT CHANGE UP (ref 43–77)
NEUTROPHILS NFR BLD AUTO: 71.7 % — SIGNIFICANT CHANGE UP (ref 43–77)
NEUTROPHILS NFR BLD AUTO: 72.7 % — SIGNIFICANT CHANGE UP (ref 43–77)
NEUTROPHILS NFR BLD AUTO: 72.8 % — SIGNIFICANT CHANGE UP (ref 43–77)
NEUTROPHILS NFR BLD AUTO: 73.3 % — SIGNIFICANT CHANGE UP (ref 43–77)
NEUTROPHILS NFR BLD AUTO: 73.4 % — SIGNIFICANT CHANGE UP (ref 43–77)
NEUTROPHILS NFR BLD AUTO: 73.5 % — SIGNIFICANT CHANGE UP (ref 43–77)
NEUTROPHILS NFR BLD AUTO: 73.5 % — SIGNIFICANT CHANGE UP (ref 43–77)
NEUTROPHILS NFR BLD AUTO: 73.9 % — SIGNIFICANT CHANGE UP (ref 43–77)
NEUTROPHILS NFR BLD AUTO: 74 % — SIGNIFICANT CHANGE UP (ref 43–77)
NEUTROPHILS NFR BLD AUTO: 74.1 % — SIGNIFICANT CHANGE UP (ref 43–77)
NEUTROPHILS NFR BLD AUTO: 75.2 % — SIGNIFICANT CHANGE UP (ref 43–77)
NEUTROPHILS NFR BLD AUTO: 75.2 % — SIGNIFICANT CHANGE UP (ref 43–77)
NEUTROPHILS NFR BLD AUTO: 76 % — SIGNIFICANT CHANGE UP (ref 43–77)
NEUTROPHILS NFR BLD AUTO: 76.4 % — SIGNIFICANT CHANGE UP (ref 43–77)
NEUTROPHILS NFR BLD AUTO: 76.7 % — SIGNIFICANT CHANGE UP (ref 43–77)
NEUTROPHILS NFR BLD AUTO: 76.8 % — SIGNIFICANT CHANGE UP (ref 43–77)
NEUTROPHILS NFR BLD AUTO: 76.9 % — SIGNIFICANT CHANGE UP (ref 43–77)
NEUTROPHILS NFR BLD AUTO: 77.2 % — HIGH (ref 43–77)
NEUTROPHILS NFR BLD AUTO: 77.4 % — HIGH (ref 43–77)
NEUTROPHILS NFR BLD AUTO: 77.9 % — HIGH (ref 43–77)
NEUTROPHILS NFR BLD AUTO: 78.4 % — HIGH (ref 43–77)
NEUTROPHILS NFR BLD AUTO: 78.5 % — HIGH (ref 43–77)
NEUTROPHILS NFR BLD AUTO: 78.8 % — HIGH (ref 43–77)
NEUTROPHILS NFR BLD AUTO: 79.2 % — HIGH (ref 43–77)
NEUTROPHILS NFR BLD AUTO: 79.5 % — HIGH (ref 43–77)
NEUTROPHILS NFR BLD AUTO: 80.7 % — HIGH (ref 43–77)
NEUTROPHILS NFR BLD AUTO: 81.1 % — HIGH (ref 43–77)
NEUTROPHILS NFR BLD AUTO: 82 % — HIGH (ref 43–77)
NEUTROPHILS NFR BLD AUTO: 82.5 % — HIGH (ref 43–77)
NEUTROPHILS NFR BLD AUTO: 82.8 % — HIGH (ref 43–77)
NEUTROPHILS NFR BLD AUTO: 83.8 % — HIGH (ref 43–77)
NEUTROPHILS NFR BLD AUTO: 84.3 % — HIGH (ref 43–77)
NEUTROPHILS NFR BLD AUTO: 84.4 % — HIGH (ref 43–77)
NEUTROPHILS NFR BLD AUTO: 85.7 % — HIGH (ref 43–77)
NEUTROPHILS NFR BLD AUTO: 85.9 % — HIGH (ref 43–77)
NITRITE UR-MCNC: NEGATIVE — SIGNIFICANT CHANGE UP
NITRITE UR-MCNC: POSITIVE — HIGH
NON-SQ EPI CELLS # UR AUTO: 1 — SIGNIFICANT CHANGE UP
NON-SQ EPI CELLS # UR AUTO: 7 — SIGNIFICANT CHANGE UP
NON-SQ EPI CELLS # UR AUTO: <1 — SIGNIFICANT CHANGE UP
NRBC # FLD: 0 — SIGNIFICANT CHANGE UP
NRBC # FLD: 0.02 — SIGNIFICANT CHANGE UP
NT-PROBNP SERPL-SCNC: 1122 PG/ML — SIGNIFICANT CHANGE UP
NT-PROBNP SERPL-SCNC: 1404 PG/ML — SIGNIFICANT CHANGE UP
NT-PROBNP SERPL-SCNC: 2349 PG/ML — SIGNIFICANT CHANGE UP
NT-PROBNP SERPL-SCNC: 3787 PG/ML — SIGNIFICANT CHANGE UP
NT-PROBNP SERPL-SCNC: 4592 PG/ML — SIGNIFICANT CHANGE UP
NT-PROBNP SERPL-SCNC: 89.23 PG/ML — SIGNIFICANT CHANGE UP
OB PNL STL: NEGATIVE — SIGNIFICANT CHANGE UP
ORGANISM # SPEC MICROSCOPIC CNT: SIGNIFICANT CHANGE UP
P AERUGINOSA DNA BLD POS NAA+NON-PROBE: SIGNIFICANT CHANGE UP
PCO2 BLDV: 44 MMHG — SIGNIFICANT CHANGE UP (ref 41–51)
PCO2 BLDV: 47 MMHG — SIGNIFICANT CHANGE UP (ref 41–51)
PCO2 BLDV: 47 MMHG — SIGNIFICANT CHANGE UP (ref 41–51)
PCO2 BLDV: 50 MMHG — SIGNIFICANT CHANGE UP (ref 41–51)
PCO2 BLDV: 51 MMHG — SIGNIFICANT CHANGE UP (ref 41–51)
PCO2 BLDV: 54 MMHG — HIGH (ref 41–51)
PCO2 BLDV: 56 MMHG — HIGH (ref 41–51)
PCO2 BLDV: 57 MMHG — HIGH (ref 41–51)
PH BLDV: 7.39 PH — SIGNIFICANT CHANGE UP (ref 7.32–7.43)
PH BLDV: 7.39 PH — SIGNIFICANT CHANGE UP (ref 7.32–7.43)
PH BLDV: 7.4 PH — SIGNIFICANT CHANGE UP (ref 7.32–7.43)
PH BLDV: 7.4 PH — SIGNIFICANT CHANGE UP (ref 7.32–7.43)
PH BLDV: 7.41 PH — SIGNIFICANT CHANGE UP (ref 7.32–7.43)
PH BLDV: 7.42 PH — SIGNIFICANT CHANGE UP (ref 7.32–7.43)
PH BLDV: 7.43 PH — SIGNIFICANT CHANGE UP (ref 7.32–7.43)
PH BLDV: 7.44 PH — HIGH (ref 7.32–7.43)
PH UR: 5.5 — SIGNIFICANT CHANGE UP (ref 4.6–8)
PH UR: 6 — SIGNIFICANT CHANGE UP (ref 4.6–8)
PH UR: 6.5 — SIGNIFICANT CHANGE UP (ref 4.6–8)
PHOSPHATE SERPL-MCNC: 1.5 MG/DL — LOW (ref 2.5–4.5)
PHOSPHATE SERPL-MCNC: 1.7 MG/DL — LOW (ref 2.5–4.5)
PHOSPHATE SERPL-MCNC: 1.8 MG/DL — LOW (ref 2.5–4.5)
PHOSPHATE SERPL-MCNC: 1.8 MG/DL — LOW (ref 2.5–4.5)
PHOSPHATE SERPL-MCNC: 1.9 MG/DL — LOW (ref 2.5–4.5)
PHOSPHATE SERPL-MCNC: 1.9 MG/DL — LOW (ref 2.5–4.5)
PHOSPHATE SERPL-MCNC: 2.1 MG/DL — LOW (ref 2.5–4.5)
PHOSPHATE SERPL-MCNC: 2.3 MG/DL — LOW (ref 2.5–4.5)
PHOSPHATE SERPL-MCNC: 2.4 MG/DL — LOW (ref 2.5–4.5)
PHOSPHATE SERPL-MCNC: 2.5 MG/DL — SIGNIFICANT CHANGE UP (ref 2.5–4.5)
PHOSPHATE SERPL-MCNC: 2.6 MG/DL — SIGNIFICANT CHANGE UP (ref 2.5–4.5)
PHOSPHATE SERPL-MCNC: 2.7 MG/DL — SIGNIFICANT CHANGE UP (ref 2.5–4.5)
PHOSPHATE SERPL-MCNC: 2.8 MG/DL — SIGNIFICANT CHANGE UP (ref 2.5–4.5)
PHOSPHATE SERPL-MCNC: 3 MG/DL — SIGNIFICANT CHANGE UP (ref 2.5–4.5)
PHOSPHATE SERPL-MCNC: 3 MG/DL — SIGNIFICANT CHANGE UP (ref 2.5–4.5)
PHOSPHATE SERPL-MCNC: 3.3 MG/DL — SIGNIFICANT CHANGE UP (ref 2.5–4.5)
PHOSPHATE SERPL-MCNC: 3.6 MG/DL — SIGNIFICANT CHANGE UP (ref 2.5–4.5)
PHOSPHATE SERPL-MCNC: 3.6 MG/DL — SIGNIFICANT CHANGE UP (ref 2.5–4.5)
PLATELET # BLD AUTO: 112 K/UL — LOW (ref 150–400)
PLATELET # BLD AUTO: 123 K/UL — LOW (ref 150–400)
PLATELET # BLD AUTO: 144 K/UL — LOW (ref 150–400)
PLATELET # BLD AUTO: 182 K/UL — SIGNIFICANT CHANGE UP (ref 150–400)
PLATELET # BLD AUTO: 183 K/UL — SIGNIFICANT CHANGE UP (ref 150–400)
PLATELET # BLD AUTO: 187 K/UL — SIGNIFICANT CHANGE UP (ref 150–400)
PLATELET # BLD AUTO: 215 K/UL — SIGNIFICANT CHANGE UP (ref 150–400)
PLATELET # BLD AUTO: 224 K/UL — SIGNIFICANT CHANGE UP (ref 150–400)
PLATELET # BLD AUTO: 225 K/UL — SIGNIFICANT CHANGE UP (ref 150–400)
PLATELET # BLD AUTO: 225 K/UL — SIGNIFICANT CHANGE UP (ref 150–400)
PLATELET # BLD AUTO: 228 K/UL — SIGNIFICANT CHANGE UP (ref 150–400)
PLATELET # BLD AUTO: 228 K/UL — SIGNIFICANT CHANGE UP (ref 150–400)
PLATELET # BLD AUTO: 233 K/UL — SIGNIFICANT CHANGE UP (ref 150–400)
PLATELET # BLD AUTO: 237 K/UL — SIGNIFICANT CHANGE UP (ref 150–400)
PLATELET # BLD AUTO: 242 K/UL — SIGNIFICANT CHANGE UP (ref 150–400)
PLATELET # BLD AUTO: 243 K/UL — SIGNIFICANT CHANGE UP (ref 150–400)
PLATELET # BLD AUTO: 245 K/UL — SIGNIFICANT CHANGE UP (ref 150–400)
PLATELET # BLD AUTO: 253 K/UL — SIGNIFICANT CHANGE UP (ref 150–400)
PLATELET # BLD AUTO: 255 K/UL — SIGNIFICANT CHANGE UP (ref 150–400)
PLATELET # BLD AUTO: 255 K/UL — SIGNIFICANT CHANGE UP (ref 150–400)
PLATELET # BLD AUTO: 269 K/UL — SIGNIFICANT CHANGE UP (ref 150–400)
PLATELET # BLD AUTO: 273 K/UL — SIGNIFICANT CHANGE UP (ref 150–400)
PLATELET # BLD AUTO: 278 K/UL — SIGNIFICANT CHANGE UP (ref 150–400)
PLATELET # BLD AUTO: 280 K/UL — SIGNIFICANT CHANGE UP (ref 150–400)
PLATELET # BLD AUTO: 294 K/UL — SIGNIFICANT CHANGE UP (ref 150–400)
PLATELET # BLD AUTO: 295 K/UL — SIGNIFICANT CHANGE UP (ref 150–400)
PLATELET # BLD AUTO: 296 K/UL — SIGNIFICANT CHANGE UP (ref 150–400)
PLATELET # BLD AUTO: 302 K/UL — SIGNIFICANT CHANGE UP (ref 150–400)
PLATELET # BLD AUTO: 305 K/UL — SIGNIFICANT CHANGE UP (ref 150–400)
PLATELET # BLD AUTO: 305 K/UL — SIGNIFICANT CHANGE UP (ref 150–400)
PLATELET # BLD AUTO: 309 K/UL — SIGNIFICANT CHANGE UP (ref 150–400)
PLATELET # BLD AUTO: 310 K/UL — SIGNIFICANT CHANGE UP (ref 150–400)
PLATELET # BLD AUTO: 311 K/UL — SIGNIFICANT CHANGE UP (ref 150–400)
PLATELET # BLD AUTO: 312 K/UL — SIGNIFICANT CHANGE UP (ref 150–400)
PLATELET # BLD AUTO: 314 K/UL — SIGNIFICANT CHANGE UP (ref 150–400)
PLATELET # BLD AUTO: 317 K/UL — SIGNIFICANT CHANGE UP (ref 150–400)
PLATELET # BLD AUTO: 318 K/UL — SIGNIFICANT CHANGE UP (ref 150–400)
PLATELET # BLD AUTO: 323 K/UL — SIGNIFICANT CHANGE UP (ref 150–400)
PLATELET # BLD AUTO: 328 K/UL — SIGNIFICANT CHANGE UP (ref 150–400)
PLATELET # BLD AUTO: 329 K/UL — SIGNIFICANT CHANGE UP (ref 150–400)
PLATELET # BLD AUTO: 330 K/UL — SIGNIFICANT CHANGE UP (ref 150–400)
PLATELET # BLD AUTO: 335 K/UL — SIGNIFICANT CHANGE UP (ref 150–400)
PLATELET # BLD AUTO: 342 K/UL — SIGNIFICANT CHANGE UP (ref 150–400)
PLATELET # BLD AUTO: 347 K/UL — SIGNIFICANT CHANGE UP (ref 150–400)
PLATELET # BLD AUTO: 348 K/UL — SIGNIFICANT CHANGE UP (ref 150–400)
PLATELET # BLD AUTO: 348 K/UL — SIGNIFICANT CHANGE UP (ref 150–400)
PLATELET # BLD AUTO: 349 K/UL — SIGNIFICANT CHANGE UP (ref 150–400)
PLATELET # BLD AUTO: 351 K/UL — SIGNIFICANT CHANGE UP (ref 150–400)
PLATELET # BLD AUTO: 353 K/UL — SIGNIFICANT CHANGE UP (ref 150–400)
PLATELET # BLD AUTO: 356 K/UL — SIGNIFICANT CHANGE UP (ref 150–400)
PLATELET # BLD AUTO: 362 K/UL — SIGNIFICANT CHANGE UP (ref 150–400)
PLATELET # BLD AUTO: 363 K/UL — SIGNIFICANT CHANGE UP (ref 150–400)
PLATELET # BLD AUTO: 364 K/UL — SIGNIFICANT CHANGE UP (ref 150–400)
PLATELET # BLD AUTO: 366 K/UL — SIGNIFICANT CHANGE UP (ref 150–400)
PLATELET # BLD AUTO: 367 K/UL — SIGNIFICANT CHANGE UP (ref 150–400)
PLATELET # BLD AUTO: 378 K/UL — SIGNIFICANT CHANGE UP (ref 150–400)
PLATELET # BLD AUTO: 381 K/UL — SIGNIFICANT CHANGE UP (ref 150–400)
PLATELET # BLD AUTO: 382 K/UL — SIGNIFICANT CHANGE UP (ref 150–400)
PLATELET # BLD AUTO: 385 K/UL — SIGNIFICANT CHANGE UP (ref 150–400)
PLATELET # BLD AUTO: 388 K/UL — SIGNIFICANT CHANGE UP (ref 150–400)
PLATELET # BLD AUTO: 392 K/UL — SIGNIFICANT CHANGE UP (ref 150–400)
PLATELET # BLD AUTO: 396 K/UL — SIGNIFICANT CHANGE UP (ref 150–400)
PLATELET # BLD AUTO: 402 K/UL — HIGH (ref 150–400)
PLATELET # BLD AUTO: 414 K/UL — HIGH (ref 150–400)
PLATELET # BLD AUTO: 434 K/UL — HIGH (ref 150–400)
PLATELET # BLD AUTO: 450 K/UL — HIGH (ref 150–400)
PLATELET # BLD AUTO: 461 K/UL — HIGH (ref 150–400)
PLATELET # BLD AUTO: 481 K/UL — HIGH (ref 150–400)
PLATELET # BLD AUTO: 65 K/UL — LOW (ref 150–400)
PLATELET # BLD AUTO: 65 K/UL — LOW (ref 150–400)
PLATELET # BLD AUTO: 67 K/UL — LOW (ref 150–400)
PLATELET # BLD AUTO: 68 K/UL — LOW (ref 150–400)
PLATELET # BLD AUTO: 79 K/UL — LOW (ref 150–400)
PLATELET COUNT - ESTIMATE: NORMAL — SIGNIFICANT CHANGE UP
PLATELET COUNT - ESTIMATE: SIGNIFICANT CHANGE UP
PMV BLD: 10 FL — SIGNIFICANT CHANGE UP (ref 7–13)
PMV BLD: 10.1 FL — SIGNIFICANT CHANGE UP (ref 7–13)
PMV BLD: 10.2 FL — SIGNIFICANT CHANGE UP (ref 7–13)
PMV BLD: 10.3 FL — SIGNIFICANT CHANGE UP (ref 7–13)
PMV BLD: 10.4 FL — SIGNIFICANT CHANGE UP (ref 7–13)
PMV BLD: 10.5 FL — SIGNIFICANT CHANGE UP (ref 7–13)
PMV BLD: 10.6 FL — SIGNIFICANT CHANGE UP (ref 7–13)
PMV BLD: 10.7 FL — SIGNIFICANT CHANGE UP (ref 7–13)
PMV BLD: 10.8 FL — SIGNIFICANT CHANGE UP (ref 7–13)
PMV BLD: 10.9 FL — SIGNIFICANT CHANGE UP (ref 7–13)
PMV BLD: 11 FL — SIGNIFICANT CHANGE UP (ref 7–13)
PMV BLD: 11.1 FL — SIGNIFICANT CHANGE UP (ref 7–13)
PMV BLD: 11.2 FL — SIGNIFICANT CHANGE UP (ref 7–13)
PMV BLD: 11.3 FL — SIGNIFICANT CHANGE UP (ref 7–13)
PMV BLD: 11.4 FL — SIGNIFICANT CHANGE UP (ref 7–13)
PMV BLD: 11.5 FL — SIGNIFICANT CHANGE UP (ref 7–13)
PMV BLD: 11.7 FL — SIGNIFICANT CHANGE UP (ref 7–13)
PMV BLD: 11.7 FL — SIGNIFICANT CHANGE UP (ref 7–13)
PMV BLD: 11.8 FL — SIGNIFICANT CHANGE UP (ref 7–13)
PMV BLD: 12 FL — SIGNIFICANT CHANGE UP (ref 7–13)
PMV BLD: 9.7 FL — SIGNIFICANT CHANGE UP (ref 7–13)
PMV BLD: 9.9 FL — SIGNIFICANT CHANGE UP (ref 7–13)
PO2 BLDV: 25 MMHG — LOW (ref 35–40)
PO2 BLDV: 29 MMHG — LOW (ref 35–40)
PO2 BLDV: 30 MMHG — LOW (ref 35–40)
PO2 BLDV: 32 MMHG — LOW (ref 35–40)
PO2 BLDV: 40 MMHG — SIGNIFICANT CHANGE UP (ref 35–40)
PO2 BLDV: 45 MMHG — HIGH (ref 35–40)
PO2 BLDV: < 24 MMHG — LOW (ref 35–40)
PO2 BLDV: < 24 MMHG — LOW (ref 35–40)
POTASSIUM BLDV-SCNC: 2.9 MMOL/L — CRITICAL LOW (ref 3.4–4.5)
POTASSIUM BLDV-SCNC: 3.2 MMOL/L — LOW (ref 3.4–4.5)
POTASSIUM BLDV-SCNC: 3.8 MMOL/L — SIGNIFICANT CHANGE UP (ref 3.4–4.5)
POTASSIUM BLDV-SCNC: 4 MMOL/L — SIGNIFICANT CHANGE UP (ref 3.4–4.5)
POTASSIUM BLDV-SCNC: 4.5 MMOL/L — SIGNIFICANT CHANGE UP (ref 3.4–4.5)
POTASSIUM BLDV-SCNC: 5.1 MMOL/L — HIGH (ref 3.4–4.5)
POTASSIUM SERPL-MCNC: 3 MMOL/L — LOW (ref 3.5–5.3)
POTASSIUM SERPL-MCNC: 3.1 MMOL/L — LOW (ref 3.5–5.3)
POTASSIUM SERPL-MCNC: 3.1 MMOL/L — LOW (ref 3.5–5.3)
POTASSIUM SERPL-MCNC: 3.2 MMOL/L — LOW (ref 3.5–5.3)
POTASSIUM SERPL-MCNC: 3.3 MMOL/L — LOW (ref 3.5–5.3)
POTASSIUM SERPL-MCNC: 3.4 MMOL/L — LOW (ref 3.5–5.3)
POTASSIUM SERPL-MCNC: 3.5 MMOL/L — SIGNIFICANT CHANGE UP (ref 3.5–5.3)
POTASSIUM SERPL-MCNC: 3.6 MMOL/L — SIGNIFICANT CHANGE UP (ref 3.5–5.3)
POTASSIUM SERPL-MCNC: 3.6 MMOL/L — SIGNIFICANT CHANGE UP (ref 3.5–5.3)
POTASSIUM SERPL-MCNC: 3.7 MMOL/L — SIGNIFICANT CHANGE UP (ref 3.5–5.3)
POTASSIUM SERPL-MCNC: 3.8 MMOL/L — SIGNIFICANT CHANGE UP (ref 3.5–5.3)
POTASSIUM SERPL-MCNC: 3.9 MMOL/L — SIGNIFICANT CHANGE UP (ref 3.5–5.3)
POTASSIUM SERPL-MCNC: 4 MMOL/L — SIGNIFICANT CHANGE UP (ref 3.5–5.3)
POTASSIUM SERPL-MCNC: 4 MMOL/L — SIGNIFICANT CHANGE UP (ref 3.5–5.3)
POTASSIUM SERPL-MCNC: 4.1 MMOL/L — SIGNIFICANT CHANGE UP (ref 3.5–5.3)
POTASSIUM SERPL-MCNC: 4.2 MMOL/L — SIGNIFICANT CHANGE UP (ref 3.5–5.3)
POTASSIUM SERPL-MCNC: 4.3 MMOL/L — SIGNIFICANT CHANGE UP (ref 3.5–5.3)
POTASSIUM SERPL-MCNC: 4.4 MMOL/L — SIGNIFICANT CHANGE UP (ref 3.5–5.3)
POTASSIUM SERPL-MCNC: 4.5 MMOL/L — SIGNIFICANT CHANGE UP (ref 3.5–5.3)
POTASSIUM SERPL-MCNC: 4.5 MMOL/L — SIGNIFICANT CHANGE UP (ref 3.5–5.3)
POTASSIUM SERPL-MCNC: 4.6 MMOL/L — SIGNIFICANT CHANGE UP (ref 3.5–5.3)
POTASSIUM SERPL-MCNC: 4.7 MMOL/L — SIGNIFICANT CHANGE UP (ref 3.5–5.3)
POTASSIUM SERPL-MCNC: 4.8 MMOL/L — SIGNIFICANT CHANGE UP (ref 3.5–5.3)
POTASSIUM SERPL-MCNC: 4.8 MMOL/L — SIGNIFICANT CHANGE UP (ref 3.5–5.3)
POTASSIUM SERPL-MCNC: 4.9 MMOL/L — SIGNIFICANT CHANGE UP (ref 3.5–5.3)
POTASSIUM SERPL-MCNC: 5 MMOL/L — SIGNIFICANT CHANGE UP (ref 3.5–5.3)
POTASSIUM SERPL-MCNC: 6.8 MMOL/L — CRITICAL HIGH (ref 3.5–5.3)
POTASSIUM SERPL-MCNC: SIGNIFICANT CHANGE UP MMOL/L (ref 3.5–5.3)
POTASSIUM SERPL-SCNC: 3 MMOL/L — LOW (ref 3.5–5.3)
POTASSIUM SERPL-SCNC: 3.1 MMOL/L — LOW (ref 3.5–5.3)
POTASSIUM SERPL-SCNC: 3.1 MMOL/L — LOW (ref 3.5–5.3)
POTASSIUM SERPL-SCNC: 3.2 MMOL/L — LOW (ref 3.5–5.3)
POTASSIUM SERPL-SCNC: 3.3 MMOL/L — LOW (ref 3.5–5.3)
POTASSIUM SERPL-SCNC: 3.4 MMOL/L — LOW (ref 3.5–5.3)
POTASSIUM SERPL-SCNC: 3.5 MMOL/L — SIGNIFICANT CHANGE UP (ref 3.5–5.3)
POTASSIUM SERPL-SCNC: 3.6 MMOL/L — SIGNIFICANT CHANGE UP (ref 3.5–5.3)
POTASSIUM SERPL-SCNC: 3.6 MMOL/L — SIGNIFICANT CHANGE UP (ref 3.5–5.3)
POTASSIUM SERPL-SCNC: 3.7 MMOL/L — SIGNIFICANT CHANGE UP (ref 3.5–5.3)
POTASSIUM SERPL-SCNC: 3.8 MMOL/L — SIGNIFICANT CHANGE UP (ref 3.5–5.3)
POTASSIUM SERPL-SCNC: 3.9 MMOL/L — SIGNIFICANT CHANGE UP (ref 3.5–5.3)
POTASSIUM SERPL-SCNC: 4 MMOL/L — SIGNIFICANT CHANGE UP (ref 3.5–5.3)
POTASSIUM SERPL-SCNC: 4 MMOL/L — SIGNIFICANT CHANGE UP (ref 3.5–5.3)
POTASSIUM SERPL-SCNC: 4.1 MMOL/L — SIGNIFICANT CHANGE UP (ref 3.5–5.3)
POTASSIUM SERPL-SCNC: 4.2 MMOL/L — SIGNIFICANT CHANGE UP (ref 3.5–5.3)
POTASSIUM SERPL-SCNC: 4.3 MMOL/L — SIGNIFICANT CHANGE UP (ref 3.5–5.3)
POTASSIUM SERPL-SCNC: 4.4 MMOL/L — SIGNIFICANT CHANGE UP (ref 3.5–5.3)
POTASSIUM SERPL-SCNC: 4.5 MMOL/L — SIGNIFICANT CHANGE UP (ref 3.5–5.3)
POTASSIUM SERPL-SCNC: 4.5 MMOL/L — SIGNIFICANT CHANGE UP (ref 3.5–5.3)
POTASSIUM SERPL-SCNC: 4.6 MMOL/L — SIGNIFICANT CHANGE UP (ref 3.5–5.3)
POTASSIUM SERPL-SCNC: 4.7 MMOL/L — SIGNIFICANT CHANGE UP (ref 3.5–5.3)
POTASSIUM SERPL-SCNC: 4.8 MMOL/L — SIGNIFICANT CHANGE UP (ref 3.5–5.3)
POTASSIUM SERPL-SCNC: 4.8 MMOL/L — SIGNIFICANT CHANGE UP (ref 3.5–5.3)
POTASSIUM SERPL-SCNC: 4.9 MMOL/L — SIGNIFICANT CHANGE UP (ref 3.5–5.3)
POTASSIUM SERPL-SCNC: 5 MMOL/L — SIGNIFICANT CHANGE UP (ref 3.5–5.3)
POTASSIUM SERPL-SCNC: 6.8 MMOL/L — CRITICAL HIGH (ref 3.5–5.3)
POTASSIUM SERPL-SCNC: SIGNIFICANT CHANGE UP MMOL/L (ref 3.5–5.3)
PREALB SERPL-MCNC: 22 MG/DL — SIGNIFICANT CHANGE UP (ref 20–40)
PROT SERPL-MCNC: 4.7 G/DL — LOW (ref 6–8.3)
PROT SERPL-MCNC: 5.1 G/DL — LOW (ref 6–8.3)
PROT SERPL-MCNC: 5.1 G/DL — LOW (ref 6–8.3)
PROT SERPL-MCNC: 5.3 G/DL — LOW (ref 6–8.3)
PROT SERPL-MCNC: 5.5 G/DL — LOW (ref 6–8.3)
PROT SERPL-MCNC: 5.5 G/DL — LOW (ref 6–8.3)
PROT SERPL-MCNC: 5.6 G/DL — LOW (ref 6–8.3)
PROT SERPL-MCNC: 5.7 G/DL — LOW (ref 6–8.3)
PROT SERPL-MCNC: 5.7 G/DL — LOW (ref 6–8.3)
PROT SERPL-MCNC: 5.8 G/DL — LOW (ref 6–8.3)
PROT SERPL-MCNC: 5.8 G/DL — LOW (ref 6–8.3)
PROT SERPL-MCNC: 5.9 G/DL — LOW (ref 6–8.3)
PROT SERPL-MCNC: 5.9 G/DL — LOW (ref 6–8.3)
PROT SERPL-MCNC: 6 G/DL — SIGNIFICANT CHANGE UP (ref 6–8.3)
PROT SERPL-MCNC: 6.1 G/DL — SIGNIFICANT CHANGE UP (ref 6–8.3)
PROT SERPL-MCNC: 6.1 G/DL — SIGNIFICANT CHANGE UP (ref 6–8.3)
PROT SERPL-MCNC: 6.4 G/DL — SIGNIFICANT CHANGE UP (ref 6–8.3)
PROT SERPL-MCNC: 6.5 G/DL — SIGNIFICANT CHANGE UP (ref 6–8.3)
PROT SERPL-MCNC: 6.5 G/DL — SIGNIFICANT CHANGE UP (ref 6–8.3)
PROT SERPL-MCNC: 6.7 G/DL — SIGNIFICANT CHANGE UP (ref 6–8.3)
PROT SERPL-MCNC: 6.7 G/DL — SIGNIFICANT CHANGE UP (ref 6–8.3)
PROT SERPL-MCNC: 6.9 G/DL — SIGNIFICANT CHANGE UP (ref 6–8.3)
PROT SERPL-MCNC: 7 G/DL — SIGNIFICANT CHANGE UP (ref 6–8.3)
PROT SERPL-MCNC: 7.3 G/DL — SIGNIFICANT CHANGE UP (ref 6–8.3)
PROT SERPL-MCNC: 7.5 G/DL — SIGNIFICANT CHANGE UP (ref 6–8.3)
PROT SERPL-MCNC: 7.5 G/DL — SIGNIFICANT CHANGE UP (ref 6–8.3)
PROT SERPL-MCNC: 7.7 G/DL — SIGNIFICANT CHANGE UP (ref 6–8.3)
PROT UR-MCNC: 10 — SIGNIFICANT CHANGE UP
PROT UR-MCNC: 10 — SIGNIFICANT CHANGE UP
PROT UR-MCNC: 100 — HIGH
PROT UR-MCNC: 20 MG/DL — SIGNIFICANT CHANGE UP
PROT UR-MCNC: 20 — SIGNIFICANT CHANGE UP
PROT UR-MCNC: 30 MG/DL — HIGH
PROT UR-MCNC: 30 — HIGH
PROT UR-MCNC: 30 — HIGH
PROT UR-MCNC: 300 MG/DL — HIGH
PROT UR-MCNC: NEGATIVE — SIGNIFICANT CHANGE UP
PROTHROM AB SERPL-ACNC: 10.5 SEC — SIGNIFICANT CHANGE UP (ref 9.8–13.1)
PROTHROM AB SERPL-ACNC: 11.1 SEC — SIGNIFICANT CHANGE UP (ref 9.8–13.1)
PROTHROM AB SERPL-ACNC: 11.2 SEC — SIGNIFICANT CHANGE UP (ref 9.8–13.1)
PROTHROM AB SERPL-ACNC: 11.4 SEC — SIGNIFICANT CHANGE UP (ref 9.8–13.1)
PROTHROM AB SERPL-ACNC: 11.7 SEC — SIGNIFICANT CHANGE UP (ref 9.8–13.1)
RBC # BLD: 1.78 M/UL — LOW (ref 3.8–5.2)
RBC # BLD: 2.84 M/UL — LOW (ref 3.8–5.2)
RBC # BLD: 2.9 M/UL — LOW (ref 3.8–5.2)
RBC # BLD: 2.92 M/UL — LOW (ref 3.8–5.2)
RBC # BLD: 2.93 M/UL — LOW (ref 3.8–5.2)
RBC # BLD: 2.95 M/UL — LOW (ref 3.8–5.2)
RBC # BLD: 2.96 M/UL — LOW (ref 3.8–5.2)
RBC # BLD: 2.99 M/UL — LOW (ref 3.8–5.2)
RBC # BLD: 3 M/UL — LOW (ref 3.8–5.2)
RBC # BLD: 3.07 M/UL — LOW (ref 3.8–5.2)
RBC # BLD: 3.11 M/UL — LOW (ref 3.8–5.2)
RBC # BLD: 3.11 M/UL — LOW (ref 3.8–5.2)
RBC # BLD: 3.15 M/UL — LOW (ref 3.8–5.2)
RBC # BLD: 3.15 M/UL — LOW (ref 3.8–5.2)
RBC # BLD: 3.16 M/UL — LOW (ref 3.8–5.2)
RBC # BLD: 3.18 M/UL — LOW (ref 3.8–5.2)
RBC # BLD: 3.19 M/UL — LOW (ref 3.8–5.2)
RBC # BLD: 3.21 M/UL — LOW (ref 3.8–5.2)
RBC # BLD: 3.26 M/UL — LOW (ref 3.8–5.2)
RBC # BLD: 3.29 M/UL — LOW (ref 3.8–5.2)
RBC # BLD: 3.3 M/UL — LOW (ref 3.8–5.2)
RBC # BLD: 3.32 M/UL — LOW (ref 3.8–5.2)
RBC # BLD: 3.36 M/UL — LOW (ref 3.8–5.2)
RBC # BLD: 3.37 M/UL — LOW (ref 3.8–5.2)
RBC # BLD: 3.38 M/UL — LOW (ref 3.8–5.2)
RBC # BLD: 3.38 M/UL — LOW (ref 3.8–5.2)
RBC # BLD: 3.46 M/UL — LOW (ref 3.8–5.2)
RBC # BLD: 3.47 M/UL — LOW (ref 3.8–5.2)
RBC # BLD: 3.48 M/UL — LOW (ref 3.8–5.2)
RBC # BLD: 3.48 M/UL — LOW (ref 3.8–5.2)
RBC # BLD: 3.49 M/UL — LOW (ref 3.8–5.2)
RBC # BLD: 3.5 M/UL — LOW (ref 3.8–5.2)
RBC # BLD: 3.5 M/UL — LOW (ref 3.8–5.2)
RBC # BLD: 3.51 M/UL — LOW (ref 3.8–5.2)
RBC # BLD: 3.53 M/UL — LOW (ref 3.8–5.2)
RBC # BLD: 3.55 M/UL — LOW (ref 3.8–5.2)
RBC # BLD: 3.59 M/UL — LOW (ref 3.8–5.2)
RBC # BLD: 3.61 M/UL — LOW (ref 3.8–5.2)
RBC # BLD: 3.61 M/UL — LOW (ref 3.8–5.2)
RBC # BLD: 3.63 M/UL — LOW (ref 3.8–5.2)
RBC # BLD: 3.66 M/UL — LOW (ref 3.8–5.2)
RBC # BLD: 3.67 M/UL — LOW (ref 3.8–5.2)
RBC # BLD: 3.69 M/UL — LOW (ref 3.8–5.2)
RBC # BLD: 3.75 M/UL — LOW (ref 3.8–5.2)
RBC # BLD: 3.85 M/UL — SIGNIFICANT CHANGE UP (ref 3.8–5.2)
RBC # BLD: 3.85 M/UL — SIGNIFICANT CHANGE UP (ref 3.8–5.2)
RBC # BLD: 3.87 M/UL — SIGNIFICANT CHANGE UP (ref 3.8–5.2)
RBC # BLD: 3.87 M/UL — SIGNIFICANT CHANGE UP (ref 3.8–5.2)
RBC # BLD: 3.91 M/UL — SIGNIFICANT CHANGE UP (ref 3.8–5.2)
RBC # BLD: 3.92 M/UL — SIGNIFICANT CHANGE UP (ref 3.8–5.2)
RBC # BLD: 3.92 M/UL — SIGNIFICANT CHANGE UP (ref 3.8–5.2)
RBC # BLD: 3.96 M/UL — SIGNIFICANT CHANGE UP (ref 3.8–5.2)
RBC # BLD: 3.96 M/UL — SIGNIFICANT CHANGE UP (ref 3.8–5.2)
RBC # BLD: 3.98 M/UL — SIGNIFICANT CHANGE UP (ref 3.8–5.2)
RBC # BLD: 4.01 M/UL — SIGNIFICANT CHANGE UP (ref 3.8–5.2)
RBC # BLD: 4.01 M/UL — SIGNIFICANT CHANGE UP (ref 3.8–5.2)
RBC # BLD: 4.08 M/UL — SIGNIFICANT CHANGE UP (ref 3.8–5.2)
RBC # BLD: 4.08 M/UL — SIGNIFICANT CHANGE UP (ref 3.8–5.2)
RBC # BLD: 4.12 M/UL — SIGNIFICANT CHANGE UP (ref 3.8–5.2)
RBC # BLD: 4.12 M/UL — SIGNIFICANT CHANGE UP (ref 3.8–5.2)
RBC # BLD: 4.13 M/UL — SIGNIFICANT CHANGE UP (ref 3.8–5.2)
RBC # BLD: 4.15 M/UL — SIGNIFICANT CHANGE UP (ref 3.8–5.2)
RBC # BLD: 4.16 M/UL — SIGNIFICANT CHANGE UP (ref 3.8–5.2)
RBC # BLD: 4.17 M/UL — SIGNIFICANT CHANGE UP (ref 3.8–5.2)
RBC # BLD: 4.18 M/UL — SIGNIFICANT CHANGE UP (ref 3.8–5.2)
RBC # BLD: 4.19 M/UL — SIGNIFICANT CHANGE UP (ref 3.8–5.2)
RBC # BLD: 4.23 M/UL — SIGNIFICANT CHANGE UP (ref 3.8–5.2)
RBC # BLD: 4.31 M/UL — SIGNIFICANT CHANGE UP (ref 3.8–5.2)
RBC # BLD: 4.35 M/UL — SIGNIFICANT CHANGE UP (ref 3.8–5.2)
RBC # BLD: 4.4 M/UL — SIGNIFICANT CHANGE UP (ref 3.8–5.2)
RBC # BLD: 4.46 M/UL — SIGNIFICANT CHANGE UP (ref 3.8–5.2)
RBC # BLD: 4.48 M/UL — SIGNIFICANT CHANGE UP (ref 3.8–5.2)
RBC # BLD: 4.48 M/UL — SIGNIFICANT CHANGE UP (ref 3.8–5.2)
RBC # FLD: 14.3 % — SIGNIFICANT CHANGE UP (ref 10.3–14.5)
RBC # FLD: 14.3 % — SIGNIFICANT CHANGE UP (ref 10.3–14.5)
RBC # FLD: 14.4 % — SIGNIFICANT CHANGE UP (ref 10.3–14.5)
RBC # FLD: 14.5 % — SIGNIFICANT CHANGE UP (ref 10.3–14.5)
RBC # FLD: 14.7 % — HIGH (ref 10.3–14.5)
RBC # FLD: 14.8 % — HIGH (ref 10.3–14.5)
RBC # FLD: 14.8 % — HIGH (ref 10.3–14.5)
RBC # FLD: 14.9 % — HIGH (ref 10.3–14.5)
RBC # FLD: 15.1 % — HIGH (ref 10.3–14.5)
RBC # FLD: 15.2 % — HIGH (ref 10.3–14.5)
RBC # FLD: 15.3 % — HIGH (ref 10.3–14.5)
RBC # FLD: 15.4 % — HIGH (ref 10.3–14.5)
RBC # FLD: 15.5 % — HIGH (ref 10.3–14.5)
RBC # FLD: 15.6 % — HIGH (ref 10.3–14.5)
RBC # FLD: 15.6 % — HIGH (ref 10.3–14.5)
RBC # FLD: 15.7 % — HIGH (ref 10.3–14.5)
RBC # FLD: 15.8 % — HIGH (ref 10.3–14.5)
RBC # FLD: 15.9 % — HIGH (ref 10.3–14.5)
RBC # FLD: 16 % — HIGH (ref 10.3–14.5)
RBC # FLD: 16.1 % — HIGH (ref 10.3–14.5)
RBC # FLD: 16.1 % — HIGH (ref 10.3–14.5)
RBC # FLD: 16.3 % — HIGH (ref 10.3–14.5)
RBC # FLD: 16.3 % — HIGH (ref 10.3–14.5)
RBC # FLD: 16.5 % — HIGH (ref 10.3–14.5)
RBC # FLD: 16.6 % — HIGH (ref 10.3–14.5)
RBC # FLD: 16.7 % — HIGH (ref 10.3–14.5)
RBC # FLD: 17 % — HIGH (ref 10.3–14.5)
RBC # FLD: 17.2 % — HIGH (ref 10.3–14.5)
RBC # FLD: 17.2 % — HIGH (ref 10.3–14.5)
RBC # FLD: 17.6 % — HIGH (ref 10.3–14.5)
RBC # FLD: 17.6 % — HIGH (ref 10.3–14.5)
RBC # FLD: 17.8 % — HIGH (ref 10.3–14.5)
RBC # FLD: 17.9 % — HIGH (ref 10.3–14.5)
RBC # FLD: 18.4 % — HIGH (ref 10.3–14.5)
RBC # FLD: 18.6 % — HIGH (ref 10.3–14.5)
RBC # FLD: 18.7 % — HIGH (ref 10.3–14.5)
RBC # FLD: 18.8 % — HIGH (ref 10.3–14.5)
RBC CASTS # UR COMP ASSIST: >50 — HIGH (ref 0–?)
RBC CASTS # UR COMP ASSIST: >50 — HIGH (ref 0–?)
RBC CASTS # UR COMP ASSIST: SIGNIFICANT CHANGE UP (ref 0–?)
RETICS #: 86.9 10X3/UL — HIGH (ref 17–73)
RETICS/RBC NFR: 2.6 % — HIGH (ref 0.5–2.5)
REVIEW TO FOLLOW: YES — SIGNIFICANT CHANGE UP
REVIEW TO FOLLOW: YES — SIGNIFICANT CHANGE UP
RH IG SCN BLD-IMP: POSITIVE — SIGNIFICANT CHANGE UP
RSV RNA SPEC QL NAA+PROBE: NOT DETECTED — SIGNIFICANT CHANGE UP
RSV RNA SPEC QL NAA+PROBE: NOT DETECTED — SIGNIFICANT CHANGE UP
RV+EV RNA SPEC QL NAA+PROBE: NOT DETECTED — SIGNIFICANT CHANGE UP
RV+EV RNA SPEC QL NAA+PROBE: NOT DETECTED — SIGNIFICANT CHANGE UP
SAO2 % BLDV: 15.2 % — LOW (ref 60–85)
SAO2 % BLDV: 21.4 % — LOW (ref 60–85)
SAO2 % BLDV: 36 % — LOW (ref 60–85)
SAO2 % BLDV: 49 % — LOW (ref 60–85)
SAO2 % BLDV: 55.7 % — LOW (ref 60–85)
SAO2 % BLDV: 56.3 % — LOW (ref 60–85)
SAO2 % BLDV: 71.1 % — SIGNIFICANT CHANGE UP (ref 60–85)
SAO2 % BLDV: 78.4 % — SIGNIFICANT CHANGE UP (ref 60–85)
SODIUM SERPL-SCNC: 127 MMOL/L — LOW (ref 135–145)
SODIUM SERPL-SCNC: 128 MMOL/L — LOW (ref 135–145)
SODIUM SERPL-SCNC: 130 MMOL/L — LOW (ref 135–145)
SODIUM SERPL-SCNC: 131 MMOL/L — LOW (ref 135–145)
SODIUM SERPL-SCNC: 132 MMOL/L — LOW (ref 135–145)
SODIUM SERPL-SCNC: 132 MMOL/L — LOW (ref 135–145)
SODIUM SERPL-SCNC: 133 MMOL/L — LOW (ref 135–145)
SODIUM SERPL-SCNC: 134 MMOL/L — LOW (ref 135–145)
SODIUM SERPL-SCNC: 135 MMOL/L — SIGNIFICANT CHANGE UP (ref 135–145)
SODIUM SERPL-SCNC: 136 MMOL/L — SIGNIFICANT CHANGE UP (ref 135–145)
SODIUM SERPL-SCNC: 137 MMOL/L — SIGNIFICANT CHANGE UP (ref 135–145)
SODIUM SERPL-SCNC: 138 MMOL/L — SIGNIFICANT CHANGE UP (ref 135–145)
SODIUM SERPL-SCNC: 139 MMOL/L — SIGNIFICANT CHANGE UP (ref 135–145)
SODIUM SERPL-SCNC: 140 MMOL/L — SIGNIFICANT CHANGE UP (ref 135–145)
SODIUM SERPL-SCNC: 141 MMOL/L — SIGNIFICANT CHANGE UP (ref 135–145)
SODIUM SERPL-SCNC: 142 MMOL/L — SIGNIFICANT CHANGE UP (ref 135–145)
SODIUM SERPL-SCNC: 145 MMOL/L — SIGNIFICANT CHANGE UP (ref 135–145)
SODIUM SERPL-SCNC: 146 MMOL/L — HIGH (ref 135–145)
SODIUM SERPL-SCNC: 146 MMOL/L — HIGH (ref 135–145)
SODIUM SERPL-SCNC: 147 MMOL/L — HIGH (ref 135–145)
SODIUM SERPL-SCNC: 147 MMOL/L — HIGH (ref 135–145)
SODIUM SERPL-SCNC: 150 MMOL/L — HIGH (ref 135–145)
SODIUM SERPL-SCNC: 150 MMOL/L — HIGH (ref 135–145)
SODIUM SERPL-SCNC: 151 MMOL/L — HIGH (ref 135–145)
SODIUM SERPL-SCNC: 152 MMOL/L — HIGH (ref 135–145)
SP GR SPEC: 1 — SIGNIFICANT CHANGE UP (ref 1–1.04)
SP GR SPEC: 1.01 — SIGNIFICANT CHANGE UP (ref 1–1.03)
SP GR SPEC: 1.01 — SIGNIFICANT CHANGE UP (ref 1–1.04)
SP GR SPEC: 1.02 — SIGNIFICANT CHANGE UP (ref 1–1.03)
SP GR SPEC: 1.02 — SIGNIFICANT CHANGE UP (ref 1–1.04)
SP GR SPEC: > 1.04 — HIGH (ref 1–1.03)
SPECIMEN SOURCE: SIGNIFICANT CHANGE UP
SQUAMOUS # UR AUTO: SIGNIFICANT CHANGE UP
SURGICAL PATHOLOGY STUDY: SIGNIFICANT CHANGE UP
T4 FREE SERPL-MCNC: 1.35 NG/DL — SIGNIFICANT CHANGE UP (ref 0.9–1.8)
TARGETS BLD QL SMEAR: SLIGHT — SIGNIFICANT CHANGE UP
TRIGL SERPL-MCNC: 67 MG/DL — SIGNIFICANT CHANGE UP (ref 10–149)
TRIGL SERPL-MCNC: 69 MG/DL — SIGNIFICANT CHANGE UP (ref 10–149)
TRIGL SERPL-MCNC: 86 MG/DL — SIGNIFICANT CHANGE UP (ref 10–149)
TRIGL SERPL-MCNC: 94 MG/DL — SIGNIFICANT CHANGE UP (ref 10–149)
TROPONIN T SERPL-MCNC: < 0.06 NG/ML — SIGNIFICANT CHANGE UP (ref 0–0.06)
TSH SERPL-MCNC: 1.64 UIU/ML — SIGNIFICANT CHANGE UP (ref 0.27–4.2)
TSH SERPL-MCNC: 5.39 UIU/ML — HIGH (ref 0.27–4.2)
TSH SERPL-MCNC: 5.94 UIU/ML — HIGH (ref 0.27–4.2)
UIBC SERPL-MCNC: 198 UG/DL — SIGNIFICANT CHANGE UP (ref 110–370)
URATE SERPL-MCNC: 5.2 MG/DL — SIGNIFICANT CHANGE UP (ref 2.5–7)
UROBILINOGEN FLD QL: NORMAL E.U. — SIGNIFICANT CHANGE UP (ref 0.1–0.2)
UROBILINOGEN FLD QL: NORMAL MG/DL — SIGNIFICANT CHANGE UP
VANCOMYCIN TROUGH SERPL-MCNC: 12.1 UG/ML — SIGNIFICANT CHANGE UP (ref 10–20)
VANCOMYCIN TROUGH SERPL-MCNC: 14.6 UG/ML — SIGNIFICANT CHANGE UP (ref 10–20)
VANCOMYCIN TROUGH SERPL-MCNC: 15.6 UG/ML — SIGNIFICANT CHANGE UP (ref 10–20)
VANCOMYCIN TROUGH SERPL-MCNC: 16.1 UG/ML — SIGNIFICANT CHANGE UP (ref 10–20)
VANCOMYCIN TROUGH SERPL-MCNC: 19 UG/ML — SIGNIFICANT CHANGE UP (ref 10–20)
VANCOMYCIN TROUGH SERPL-MCNC: 21.5 UG/ML — HIGH (ref 10–20)
VANCOMYCIN TROUGH SERPL-MCNC: 26.1 UG/ML — CRITICAL HIGH (ref 10–20)
VANCOMYCIN TROUGH SERPL-MCNC: 29.1 UG/ML — CRITICAL HIGH (ref 10–20)
VANCOMYCIN TROUGH SERPL-MCNC: 7 UG/ML — LOW (ref 10–20)
VANCOMYCIN TROUGH SERPL-MCNC: 7.7 UG/ML — LOW (ref 10–20)
VANCOMYCIN TROUGH SERPL-MCNC: 9.8 UG/ML — LOW (ref 10–20)
VARIANT LYMPHS # BLD: 4.3 % — SIGNIFICANT CHANGE UP
VIT B12 SERPL-MCNC: 666 PG/ML — SIGNIFICANT CHANGE UP (ref 200–900)
WBC # BLD: 10.02 K/UL — SIGNIFICANT CHANGE UP (ref 3.8–10.5)
WBC # BLD: 11.39 K/UL — HIGH (ref 3.8–10.5)
WBC # BLD: 11.62 K/UL — HIGH (ref 3.8–10.5)
WBC # BLD: 11.63 K/UL — HIGH (ref 3.8–10.5)
WBC # BLD: 11.84 K/UL — HIGH (ref 3.8–10.5)
WBC # BLD: 11.94 K/UL — HIGH (ref 3.8–10.5)
WBC # BLD: 12.11 K/UL — HIGH (ref 3.8–10.5)
WBC # BLD: 12.29 K/UL — HIGH (ref 3.8–10.5)
WBC # BLD: 12.74 K/UL — HIGH (ref 3.8–10.5)
WBC # BLD: 12.77 K/UL — HIGH (ref 3.8–10.5)
WBC # BLD: 12.86 K/UL — HIGH (ref 3.8–10.5)
WBC # BLD: 13.09 K/UL — HIGH (ref 3.8–10.5)
WBC # BLD: 13.36 K/UL — HIGH (ref 3.8–10.5)
WBC # BLD: 13.47 K/UL — HIGH (ref 3.8–10.5)
WBC # BLD: 13.66 K/UL — HIGH (ref 3.8–10.5)
WBC # BLD: 13.69 K/UL — HIGH (ref 3.8–10.5)
WBC # BLD: 13.73 K/UL — HIGH (ref 3.8–10.5)
WBC # BLD: 13.96 K/UL — HIGH (ref 3.8–10.5)
WBC # BLD: 14.04 K/UL — HIGH (ref 3.8–10.5)
WBC # BLD: 14.5 K/UL — HIGH (ref 3.8–10.5)
WBC # BLD: 14.72 K/UL — HIGH (ref 3.8–10.5)
WBC # BLD: 15.14 K/UL — HIGH (ref 3.8–10.5)
WBC # BLD: 16.48 K/UL — HIGH (ref 3.8–10.5)
WBC # BLD: 20.21 K/UL — HIGH (ref 3.8–10.5)
WBC # BLD: 20.43 K/UL — HIGH (ref 3.8–10.5)
WBC # BLD: 20.71 K/UL — HIGH (ref 3.8–10.5)
WBC # BLD: 20.77 K/UL — HIGH (ref 3.8–10.5)
WBC # BLD: 4.01 K/UL — SIGNIFICANT CHANGE UP (ref 3.8–10.5)
WBC # BLD: 4.52 K/UL — SIGNIFICANT CHANGE UP (ref 3.8–10.5)
WBC # BLD: 4.63 K/UL — SIGNIFICANT CHANGE UP (ref 3.8–10.5)
WBC # BLD: 4.92 K/UL — SIGNIFICANT CHANGE UP (ref 3.8–10.5)
WBC # BLD: 4.98 K/UL — SIGNIFICANT CHANGE UP (ref 3.8–10.5)
WBC # BLD: 5.03 K/UL — SIGNIFICANT CHANGE UP (ref 3.8–10.5)
WBC # BLD: 5.33 K/UL — SIGNIFICANT CHANGE UP (ref 3.8–10.5)
WBC # BLD: 5.33 K/UL — SIGNIFICANT CHANGE UP (ref 3.8–10.5)
WBC # BLD: 5.38 K/UL — SIGNIFICANT CHANGE UP (ref 3.8–10.5)
WBC # BLD: 5.44 K/UL — SIGNIFICANT CHANGE UP (ref 3.8–10.5)
WBC # BLD: 5.54 K/UL — SIGNIFICANT CHANGE UP (ref 3.8–10.5)
WBC # BLD: 5.58 K/UL — SIGNIFICANT CHANGE UP (ref 3.8–10.5)
WBC # BLD: 5.62 K/UL — SIGNIFICANT CHANGE UP (ref 3.8–10.5)
WBC # BLD: 5.8 K/UL — SIGNIFICANT CHANGE UP (ref 3.8–10.5)
WBC # BLD: 5.84 K/UL — SIGNIFICANT CHANGE UP (ref 3.8–10.5)
WBC # BLD: 5.9 K/UL — SIGNIFICANT CHANGE UP (ref 3.8–10.5)
WBC # BLD: 5.9 K/UL — SIGNIFICANT CHANGE UP (ref 3.8–10.5)
WBC # BLD: 5.92 K/UL — SIGNIFICANT CHANGE UP (ref 3.8–10.5)
WBC # BLD: 5.96 K/UL — SIGNIFICANT CHANGE UP (ref 3.8–10.5)
WBC # BLD: 5.96 K/UL — SIGNIFICANT CHANGE UP (ref 3.8–10.5)
WBC # BLD: 6 K/UL — SIGNIFICANT CHANGE UP (ref 3.8–10.5)
WBC # BLD: 6.02 K/UL — SIGNIFICANT CHANGE UP (ref 3.8–10.5)
WBC # BLD: 6.08 K/UL — SIGNIFICANT CHANGE UP (ref 3.8–10.5)
WBC # BLD: 6.09 K/UL — SIGNIFICANT CHANGE UP (ref 3.8–10.5)
WBC # BLD: 6.17 K/UL — SIGNIFICANT CHANGE UP (ref 3.8–10.5)
WBC # BLD: 6.35 K/UL — SIGNIFICANT CHANGE UP (ref 3.8–10.5)
WBC # BLD: 6.36 K/UL — SIGNIFICANT CHANGE UP (ref 3.8–10.5)
WBC # BLD: 6.38 K/UL — SIGNIFICANT CHANGE UP (ref 3.8–10.5)
WBC # BLD: 6.39 K/UL — SIGNIFICANT CHANGE UP (ref 3.8–10.5)
WBC # BLD: 6.4 K/UL — SIGNIFICANT CHANGE UP (ref 3.8–10.5)
WBC # BLD: 6.53 K/UL — SIGNIFICANT CHANGE UP (ref 3.8–10.5)
WBC # BLD: 6.55 K/UL — SIGNIFICANT CHANGE UP (ref 3.8–10.5)
WBC # BLD: 6.64 K/UL — SIGNIFICANT CHANGE UP (ref 3.8–10.5)
WBC # BLD: 6.83 K/UL — SIGNIFICANT CHANGE UP (ref 3.8–10.5)
WBC # BLD: 6.98 K/UL — SIGNIFICANT CHANGE UP (ref 3.8–10.5)
WBC # BLD: 7.02 K/UL — SIGNIFICANT CHANGE UP (ref 3.8–10.5)
WBC # BLD: 7.18 K/UL — SIGNIFICANT CHANGE UP (ref 3.8–10.5)
WBC # BLD: 7.4 K/UL — SIGNIFICANT CHANGE UP (ref 3.8–10.5)
WBC # BLD: 7.79 K/UL — SIGNIFICANT CHANGE UP (ref 3.8–10.5)
WBC # BLD: 7.85 K/UL — SIGNIFICANT CHANGE UP (ref 3.8–10.5)
WBC # BLD: 7.86 K/UL — SIGNIFICANT CHANGE UP (ref 3.8–10.5)
WBC # BLD: 8.02 K/UL — SIGNIFICANT CHANGE UP (ref 3.8–10.5)
WBC # BLD: 8.47 K/UL — SIGNIFICANT CHANGE UP (ref 3.8–10.5)
WBC # BLD: 9.07 K/UL — SIGNIFICANT CHANGE UP (ref 3.8–10.5)
WBC # BLD: 9.51 K/UL — SIGNIFICANT CHANGE UP (ref 3.8–10.5)
WBC # BLD: 9.76 K/UL — SIGNIFICANT CHANGE UP (ref 3.8–10.5)
WBC # FLD AUTO: 10.02 K/UL — SIGNIFICANT CHANGE UP (ref 3.8–10.5)
WBC # FLD AUTO: 11.39 K/UL — HIGH (ref 3.8–10.5)
WBC # FLD AUTO: 11.62 K/UL — HIGH (ref 3.8–10.5)
WBC # FLD AUTO: 11.63 K/UL — HIGH (ref 3.8–10.5)
WBC # FLD AUTO: 11.84 K/UL — HIGH (ref 3.8–10.5)
WBC # FLD AUTO: 11.94 K/UL — HIGH (ref 3.8–10.5)
WBC # FLD AUTO: 12.11 K/UL — HIGH (ref 3.8–10.5)
WBC # FLD AUTO: 12.29 K/UL — HIGH (ref 3.8–10.5)
WBC # FLD AUTO: 12.74 K/UL — HIGH (ref 3.8–10.5)
WBC # FLD AUTO: 12.77 K/UL — HIGH (ref 3.8–10.5)
WBC # FLD AUTO: 12.86 K/UL — HIGH (ref 3.8–10.5)
WBC # FLD AUTO: 13.09 K/UL — HIGH (ref 3.8–10.5)
WBC # FLD AUTO: 13.36 K/UL — HIGH (ref 3.8–10.5)
WBC # FLD AUTO: 13.47 K/UL — HIGH (ref 3.8–10.5)
WBC # FLD AUTO: 13.66 K/UL — HIGH (ref 3.8–10.5)
WBC # FLD AUTO: 13.69 K/UL — HIGH (ref 3.8–10.5)
WBC # FLD AUTO: 13.73 K/UL — HIGH (ref 3.8–10.5)
WBC # FLD AUTO: 13.96 K/UL — HIGH (ref 3.8–10.5)
WBC # FLD AUTO: 14.04 K/UL — HIGH (ref 3.8–10.5)
WBC # FLD AUTO: 14.5 K/UL — HIGH (ref 3.8–10.5)
WBC # FLD AUTO: 14.72 K/UL — HIGH (ref 3.8–10.5)
WBC # FLD AUTO: 15.14 K/UL — HIGH (ref 3.8–10.5)
WBC # FLD AUTO: 16.48 K/UL — HIGH (ref 3.8–10.5)
WBC # FLD AUTO: 20.21 K/UL — HIGH (ref 3.8–10.5)
WBC # FLD AUTO: 20.43 K/UL — HIGH (ref 3.8–10.5)
WBC # FLD AUTO: 20.71 K/UL — HIGH (ref 3.8–10.5)
WBC # FLD AUTO: 20.77 K/UL — HIGH (ref 3.8–10.5)
WBC # FLD AUTO: 4.01 K/UL — SIGNIFICANT CHANGE UP (ref 3.8–10.5)
WBC # FLD AUTO: 4.52 K/UL — SIGNIFICANT CHANGE UP (ref 3.8–10.5)
WBC # FLD AUTO: 4.63 K/UL — SIGNIFICANT CHANGE UP (ref 3.8–10.5)
WBC # FLD AUTO: 4.92 K/UL — SIGNIFICANT CHANGE UP (ref 3.8–10.5)
WBC # FLD AUTO: 4.98 K/UL — SIGNIFICANT CHANGE UP (ref 3.8–10.5)
WBC # FLD AUTO: 5.03 K/UL — SIGNIFICANT CHANGE UP (ref 3.8–10.5)
WBC # FLD AUTO: 5.33 K/UL — SIGNIFICANT CHANGE UP (ref 3.8–10.5)
WBC # FLD AUTO: 5.33 K/UL — SIGNIFICANT CHANGE UP (ref 3.8–10.5)
WBC # FLD AUTO: 5.38 K/UL — SIGNIFICANT CHANGE UP (ref 3.8–10.5)
WBC # FLD AUTO: 5.44 K/UL — SIGNIFICANT CHANGE UP (ref 3.8–10.5)
WBC # FLD AUTO: 5.54 K/UL — SIGNIFICANT CHANGE UP (ref 3.8–10.5)
WBC # FLD AUTO: 5.58 K/UL — SIGNIFICANT CHANGE UP (ref 3.8–10.5)
WBC # FLD AUTO: 5.62 K/UL — SIGNIFICANT CHANGE UP (ref 3.8–10.5)
WBC # FLD AUTO: 5.8 K/UL — SIGNIFICANT CHANGE UP (ref 3.8–10.5)
WBC # FLD AUTO: 5.84 K/UL — SIGNIFICANT CHANGE UP (ref 3.8–10.5)
WBC # FLD AUTO: 5.9 K/UL — SIGNIFICANT CHANGE UP (ref 3.8–10.5)
WBC # FLD AUTO: 5.9 K/UL — SIGNIFICANT CHANGE UP (ref 3.8–10.5)
WBC # FLD AUTO: 5.92 K/UL — SIGNIFICANT CHANGE UP (ref 3.8–10.5)
WBC # FLD AUTO: 5.96 K/UL — SIGNIFICANT CHANGE UP (ref 3.8–10.5)
WBC # FLD AUTO: 5.96 K/UL — SIGNIFICANT CHANGE UP (ref 3.8–10.5)
WBC # FLD AUTO: 6 K/UL — SIGNIFICANT CHANGE UP (ref 3.8–10.5)
WBC # FLD AUTO: 6.02 K/UL — SIGNIFICANT CHANGE UP (ref 3.8–10.5)
WBC # FLD AUTO: 6.08 K/UL — SIGNIFICANT CHANGE UP (ref 3.8–10.5)
WBC # FLD AUTO: 6.09 K/UL — SIGNIFICANT CHANGE UP (ref 3.8–10.5)
WBC # FLD AUTO: 6.17 K/UL — SIGNIFICANT CHANGE UP (ref 3.8–10.5)
WBC # FLD AUTO: 6.35 K/UL — SIGNIFICANT CHANGE UP (ref 3.8–10.5)
WBC # FLD AUTO: 6.36 K/UL — SIGNIFICANT CHANGE UP (ref 3.8–10.5)
WBC # FLD AUTO: 6.38 K/UL — SIGNIFICANT CHANGE UP (ref 3.8–10.5)
WBC # FLD AUTO: 6.39 K/UL — SIGNIFICANT CHANGE UP (ref 3.8–10.5)
WBC # FLD AUTO: 6.4 K/UL — SIGNIFICANT CHANGE UP (ref 3.8–10.5)
WBC # FLD AUTO: 6.53 K/UL — SIGNIFICANT CHANGE UP (ref 3.8–10.5)
WBC # FLD AUTO: 6.55 K/UL — SIGNIFICANT CHANGE UP (ref 3.8–10.5)
WBC # FLD AUTO: 6.64 K/UL — SIGNIFICANT CHANGE UP (ref 3.8–10.5)
WBC # FLD AUTO: 6.83 K/UL — SIGNIFICANT CHANGE UP (ref 3.8–10.5)
WBC # FLD AUTO: 6.98 K/UL — SIGNIFICANT CHANGE UP (ref 3.8–10.5)
WBC # FLD AUTO: 7.02 K/UL — SIGNIFICANT CHANGE UP (ref 3.8–10.5)
WBC # FLD AUTO: 7.18 K/UL — SIGNIFICANT CHANGE UP (ref 3.8–10.5)
WBC # FLD AUTO: 7.4 K/UL — SIGNIFICANT CHANGE UP (ref 3.8–10.5)
WBC # FLD AUTO: 7.79 K/UL — SIGNIFICANT CHANGE UP (ref 3.8–10.5)
WBC # FLD AUTO: 7.85 K/UL — SIGNIFICANT CHANGE UP (ref 3.8–10.5)
WBC # FLD AUTO: 7.86 K/UL — SIGNIFICANT CHANGE UP (ref 3.8–10.5)
WBC # FLD AUTO: 8.02 K/UL — SIGNIFICANT CHANGE UP (ref 3.8–10.5)
WBC # FLD AUTO: 8.47 K/UL — SIGNIFICANT CHANGE UP (ref 3.8–10.5)
WBC # FLD AUTO: 9.07 K/UL — SIGNIFICANT CHANGE UP (ref 3.8–10.5)
WBC # FLD AUTO: 9.51 K/UL — SIGNIFICANT CHANGE UP (ref 3.8–10.5)
WBC # FLD AUTO: 9.76 K/UL — SIGNIFICANT CHANGE UP (ref 3.8–10.5)
WBC CLUMPS #/AREA URNS HPF: PRESENT — HIGH (ref 0–?)
WBC UR QL: >50 — HIGH (ref 0–?)
WBC UR QL: SIGNIFICANT CHANGE UP (ref 0–?)
YEAST BUDDING # UR COMP ASSIST: SIGNIFICANT CHANGE UP

## 2017-01-01 PROCEDURE — 99233 SBSQ HOSP IP/OBS HIGH 50: CPT

## 2017-01-01 PROCEDURE — 72170 X-RAY EXAM OF PELVIS: CPT | Mod: 26

## 2017-01-01 PROCEDURE — 71010: CPT | Mod: 26

## 2017-01-01 PROCEDURE — 99497 ADVNCD CARE PLAN 30 MIN: CPT | Mod: GC

## 2017-01-01 PROCEDURE — 72128 CT CHEST SPINE W/O DYE: CPT | Mod: 26

## 2017-01-01 PROCEDURE — 99233 SBSQ HOSP IP/OBS HIGH 50: CPT | Mod: GC

## 2017-01-01 PROCEDURE — 72132 CT LUMBAR SPINE W/DYE: CPT | Mod: 26

## 2017-01-01 PROCEDURE — 71250 CT THORAX DX C-: CPT | Mod: 26

## 2017-01-01 PROCEDURE — 99239 HOSP IP/OBS DSCHRG MGMT >30: CPT

## 2017-01-01 PROCEDURE — 99222 1ST HOSP IP/OBS MODERATE 55: CPT

## 2017-01-01 PROCEDURE — 99232 SBSQ HOSP IP/OBS MODERATE 35: CPT

## 2017-01-01 PROCEDURE — 99254 IP/OBS CNSLTJ NEW/EST MOD 60: CPT | Mod: GC

## 2017-01-01 PROCEDURE — 71010: CPT | Mod: 26,77

## 2017-01-01 PROCEDURE — 99284 EMERGENCY DEPT VISIT MOD MDM: CPT | Mod: GC

## 2017-01-01 PROCEDURE — 76770 US EXAM ABDO BACK WALL COMP: CPT | Mod: 26

## 2017-01-01 PROCEDURE — 93284 PRGRMG EVAL IMPLANTABLE DFB: CPT | Mod: 26

## 2017-01-01 PROCEDURE — 93306 TTE W/DOPPLER COMPLETE: CPT | Mod: 26

## 2017-01-01 PROCEDURE — 99285 EMERGENCY DEPT VISIT HI MDM: CPT | Mod: GC

## 2017-01-01 PROCEDURE — 71020: CPT | Mod: 26

## 2017-01-01 PROCEDURE — 99232 SBSQ HOSP IP/OBS MODERATE 35: CPT | Mod: GC

## 2017-01-01 PROCEDURE — 74220 X-RAY XM ESOPHAGUS 1CNTRST: CPT | Mod: 26

## 2017-01-01 PROCEDURE — 93010 ELECTROCARDIOGRAM REPORT: CPT

## 2017-01-01 PROCEDURE — 71275 CT ANGIOGRAPHY CHEST: CPT | Mod: 26

## 2017-01-01 PROCEDURE — 72125 CT NECK SPINE W/O DYE: CPT | Mod: 26

## 2017-01-01 PROCEDURE — 74230 X-RAY XM SWLNG FUNCJ C+: CPT | Mod: 26

## 2017-01-01 PROCEDURE — 70450 CT HEAD/BRAIN W/O DYE: CPT | Mod: 26

## 2017-01-01 PROCEDURE — 88312 SPECIAL STAINS GROUP 1: CPT | Mod: 26

## 2017-01-01 PROCEDURE — 99223 1ST HOSP IP/OBS HIGH 75: CPT | Mod: GC

## 2017-01-01 PROCEDURE — 70470 CT HEAD/BRAIN W/O & W/DYE: CPT | Mod: 26

## 2017-01-01 PROCEDURE — 11047 DBRDMT BONE EACH ADDL: CPT

## 2017-01-01 PROCEDURE — 72110 X-RAY EXAM L-2 SPINE 4/>VWS: CPT | Mod: 26

## 2017-01-01 PROCEDURE — 99222 1ST HOSP IP/OBS MODERATE 55: CPT | Mod: GC

## 2017-01-01 PROCEDURE — 88305 TISSUE EXAM BY PATHOLOGIST: CPT | Mod: 26

## 2017-01-01 PROCEDURE — 72131 CT LUMBAR SPINE W/O DYE: CPT | Mod: 26

## 2017-01-01 PROCEDURE — 74177 CT ABD & PELVIS W/CONTRAST: CPT | Mod: 26

## 2017-01-01 PROCEDURE — 99223 1ST HOSP IP/OBS HIGH 75: CPT

## 2017-01-01 PROCEDURE — 72100 X-RAY EXAM L-S SPINE 2/3 VWS: CPT | Mod: 26

## 2017-01-01 PROCEDURE — 11044 DBRDMT BONE 1ST 20 SQ CM/<: CPT

## 2017-01-01 PROCEDURE — 93970 EXTREMITY STUDY: CPT | Mod: 26

## 2017-01-01 PROCEDURE — 72129 CT CHEST SPINE W/DYE: CPT | Mod: 26

## 2017-01-01 PROCEDURE — 72074 X-RAY EXAM THORAC SPINE4/>VW: CPT | Mod: 26

## 2017-01-01 RX ORDER — LOSARTAN POTASSIUM 100 MG/1
25 TABLET, FILM COATED ORAL DAILY
Qty: 0 | Refills: 0 | Status: DISCONTINUED | OUTPATIENT
Start: 2017-01-01 | End: 2017-01-01

## 2017-01-01 RX ORDER — SODIUM CHLORIDE 9 MG/ML
1000 INJECTION, SOLUTION INTRAVENOUS
Qty: 0 | Refills: 0 | Status: DISCONTINUED | OUTPATIENT
Start: 2017-01-01 | End: 2017-01-01

## 2017-01-01 RX ORDER — CEFTRIAXONE 500 MG/1
1 INJECTION, POWDER, FOR SOLUTION INTRAMUSCULAR; INTRAVENOUS ONCE
Qty: 0 | Refills: 0 | Status: COMPLETED | OUTPATIENT
Start: 2017-01-01 | End: 2017-01-01

## 2017-01-01 RX ORDER — SUCRALFATE 1 G
1 TABLET ORAL
Qty: 0 | Refills: 0 | Status: DISCONTINUED | OUTPATIENT
Start: 2017-01-01 | End: 2017-01-01

## 2017-01-01 RX ORDER — SENNA PLUS 8.6 MG/1
2 TABLET ORAL
Qty: 0 | Refills: 0 | COMMUNITY
Start: 2017-01-01

## 2017-01-01 RX ORDER — DOCUSATE SODIUM 100 MG
1 CAPSULE ORAL
Qty: 0 | Refills: 0 | COMMUNITY
Start: 2017-01-01

## 2017-01-01 RX ORDER — AMIODARONE HYDROCHLORIDE 400 MG/1
400 TABLET ORAL DAILY
Qty: 0 | Refills: 0 | Status: DISCONTINUED | OUTPATIENT
Start: 2017-01-01 | End: 2017-01-01

## 2017-01-01 RX ORDER — MIRTAZAPINE 45 MG/1
1 TABLET, ORALLY DISINTEGRATING ORAL
Qty: 0 | Refills: 0 | COMMUNITY
Start: 2017-01-01

## 2017-01-01 RX ORDER — NIFEDIPINE 30 MG
30 TABLET, EXTENDED RELEASE 24 HR ORAL DAILY
Qty: 0 | Refills: 0 | Status: DISCONTINUED | OUTPATIENT
Start: 2017-01-01 | End: 2017-01-01

## 2017-01-01 RX ORDER — ACETAMINOPHEN 500 MG
650 TABLET ORAL EVERY 6 HOURS
Qty: 0 | Refills: 0 | Status: DISCONTINUED | OUTPATIENT
Start: 2017-01-01 | End: 2017-01-01

## 2017-01-01 RX ORDER — ACETAMINOPHEN 500 MG
650 TABLET ORAL ONCE
Qty: 0 | Refills: 0 | Status: COMPLETED | OUTPATIENT
Start: 2017-01-01 | End: 2017-01-01

## 2017-01-01 RX ORDER — LOSARTAN POTASSIUM 100 MG/1
1 TABLET, FILM COATED ORAL
Qty: 0 | Refills: 0 | COMMUNITY
Start: 2017-01-01

## 2017-01-01 RX ORDER — VANCOMYCIN HCL 1 G
VIAL (EA) INTRAVENOUS
Qty: 0 | Refills: 0 | Status: DISCONTINUED | OUTPATIENT
Start: 2017-01-01 | End: 2017-01-01

## 2017-01-01 RX ORDER — ATORVASTATIN CALCIUM 80 MG/1
1 TABLET, FILM COATED ORAL
Qty: 0 | Refills: 0 | COMMUNITY
Start: 2017-01-01

## 2017-01-01 RX ORDER — DOCUSATE SODIUM 100 MG
100 CAPSULE ORAL THREE TIMES A DAY
Qty: 0 | Refills: 0 | Status: DISCONTINUED | OUTPATIENT
Start: 2017-01-01 | End: 2017-01-01

## 2017-01-01 RX ORDER — METRONIDAZOLE 500 MG
500 TABLET ORAL EVERY 8 HOURS
Qty: 0 | Refills: 0 | Status: DISCONTINUED | OUTPATIENT
Start: 2017-01-01 | End: 2017-01-01

## 2017-01-01 RX ORDER — SIMETHICONE 80 MG/1
80 TABLET, CHEWABLE ORAL EVERY 6 HOURS
Qty: 0 | Refills: 0 | Status: DISCONTINUED | OUTPATIENT
Start: 2017-01-01 | End: 2017-01-01

## 2017-01-01 RX ORDER — METOPROLOL TARTRATE 50 MG
1 TABLET ORAL
Qty: 0 | Refills: 0 | COMMUNITY
Start: 2017-01-01

## 2017-01-01 RX ORDER — TIOTROPIUM BROMIDE 18 UG/1
1 CAPSULE ORAL; RESPIRATORY (INHALATION) DAILY
Qty: 0 | Refills: 0 | Status: DISCONTINUED | OUTPATIENT
Start: 2017-01-01 | End: 2017-01-01

## 2017-01-01 RX ORDER — LEVOTHYROXINE SODIUM 125 MCG
50 TABLET ORAL DAILY
Qty: 0 | Refills: 0 | Status: DISCONTINUED | OUTPATIENT
Start: 2017-01-01 | End: 2017-01-01

## 2017-01-01 RX ORDER — IMIPENEM AND CILASTATIN 250; 250 MG/100ML; MG/100ML
500 INJECTION, POWDER, FOR SOLUTION INTRAVENOUS EVERY 8 HOURS
Qty: 0 | Refills: 0 | Status: DISCONTINUED | OUTPATIENT
Start: 2017-01-01 | End: 2017-01-01

## 2017-01-01 RX ORDER — LACTOBACILLUS ACIDOPHILUS 100MM CELL
1 CAPSULE ORAL
Qty: 0 | Refills: 0 | COMMUNITY
Start: 2017-01-01

## 2017-01-01 RX ORDER — ATORVASTATIN CALCIUM 80 MG/1
20 TABLET, FILM COATED ORAL AT BEDTIME
Qty: 0 | Refills: 0 | Status: DISCONTINUED | OUTPATIENT
Start: 2017-01-01 | End: 2018-01-01

## 2017-01-01 RX ORDER — DEXTROSE 50 % IN WATER 50 %
25 SYRINGE (ML) INTRAVENOUS ONCE
Qty: 0 | Refills: 0 | Status: DISCONTINUED | OUTPATIENT
Start: 2017-01-01 | End: 2017-01-01

## 2017-01-01 RX ORDER — VANCOMYCIN HCL 1 G
1000 VIAL (EA) INTRAVENOUS EVERY 12 HOURS
Qty: 0 | Refills: 0 | Status: DISCONTINUED | OUTPATIENT
Start: 2017-01-01 | End: 2017-01-01

## 2017-01-01 RX ORDER — CEPHALEXIN 500 MG
1 CAPSULE ORAL
Qty: 10 | Refills: 0 | OUTPATIENT
Start: 2017-01-01 | End: 2017-01-01

## 2017-01-01 RX ORDER — SODIUM,POTASSIUM PHOSPHATES 278-250MG
1 POWDER IN PACKET (EA) ORAL
Qty: 0 | Refills: 0 | Status: COMPLETED | OUTPATIENT
Start: 2017-01-01 | End: 2017-01-01

## 2017-01-01 RX ORDER — SENNA PLUS 8.6 MG/1
2 TABLET ORAL AT BEDTIME
Qty: 0 | Refills: 0 | Status: DISCONTINUED | OUTPATIENT
Start: 2017-01-01 | End: 2017-01-01

## 2017-01-01 RX ORDER — SODIUM CHLORIDE 9 MG/ML
250 INJECTION INTRAMUSCULAR; INTRAVENOUS; SUBCUTANEOUS ONCE
Qty: 0 | Refills: 0 | Status: COMPLETED | OUTPATIENT
Start: 2017-01-01 | End: 2017-01-01

## 2017-01-01 RX ORDER — LATANOPROST 0.05 MG/ML
1 SOLUTION/ DROPS OPHTHALMIC; TOPICAL AT BEDTIME
Qty: 0 | Refills: 0 | Status: DISCONTINUED | OUTPATIENT
Start: 2017-01-01 | End: 2017-01-01

## 2017-01-01 RX ORDER — VANCOMYCIN HCL 1 G
500 VIAL (EA) INTRAVENOUS ONCE
Qty: 0 | Refills: 0 | Status: COMPLETED | OUTPATIENT
Start: 2017-01-01 | End: 2017-01-01

## 2017-01-01 RX ORDER — ASPIRIN/CALCIUM CARB/MAGNESIUM 324 MG
1 TABLET ORAL
Qty: 30 | Refills: 0 | OUTPATIENT
Start: 2017-01-01 | End: 2017-01-01

## 2017-01-01 RX ORDER — MIRTAZAPINE 45 MG/1
7.5 TABLET, ORALLY DISINTEGRATING ORAL AT BEDTIME
Qty: 0 | Refills: 0 | Status: DISCONTINUED | OUTPATIENT
Start: 2017-01-01 | End: 2018-01-01

## 2017-01-01 RX ORDER — PANTOPRAZOLE SODIUM 20 MG/1
1 TABLET, DELAYED RELEASE ORAL
Qty: 0 | Refills: 0 | COMMUNITY
Start: 2017-01-01

## 2017-01-01 RX ORDER — DEXTROSE 50 % IN WATER 50 %
12.5 SYRINGE (ML) INTRAVENOUS ONCE
Qty: 0 | Refills: 0 | Status: DISCONTINUED | OUTPATIENT
Start: 2017-01-01 | End: 2017-01-01

## 2017-01-01 RX ORDER — MORPHINE SULFATE 50 MG/1
1 CAPSULE, EXTENDED RELEASE ORAL EVERY 6 HOURS
Qty: 0 | Refills: 0 | Status: DISCONTINUED | OUTPATIENT
Start: 2017-01-01 | End: 2017-01-01

## 2017-01-01 RX ORDER — MEROPENEM 1 G/30ML
INJECTION INTRAVENOUS
Qty: 0 | Refills: 0 | Status: DISCONTINUED | OUTPATIENT
Start: 2017-01-01 | End: 2017-01-01

## 2017-01-01 RX ORDER — MAGNESIUM SULFATE 500 MG/ML
2 VIAL (ML) INJECTION ONCE
Qty: 0 | Refills: 0 | Status: COMPLETED | OUTPATIENT
Start: 2017-01-01 | End: 2017-01-01

## 2017-01-01 RX ORDER — SODIUM CHLORIDE 9 MG/ML
500 INJECTION INTRAMUSCULAR; INTRAVENOUS; SUBCUTANEOUS ONCE
Qty: 0 | Refills: 0 | Status: COMPLETED | OUTPATIENT
Start: 2017-01-01 | End: 2017-01-01

## 2017-01-01 RX ORDER — PANTOPRAZOLE SODIUM 20 MG/1
40 TABLET, DELAYED RELEASE ORAL
Qty: 0 | Refills: 0 | Status: DISCONTINUED | OUTPATIENT
Start: 2017-01-01 | End: 2017-01-01

## 2017-01-01 RX ORDER — SIMETHICONE 80 MG/1
80 TABLET, CHEWABLE ORAL EVERY 6 HOURS
Qty: 0 | Refills: 0 | Status: COMPLETED | OUTPATIENT
Start: 2017-01-01 | End: 2017-01-01

## 2017-01-01 RX ORDER — CEFEPIME 1 G/1
2000 INJECTION, POWDER, FOR SOLUTION INTRAMUSCULAR; INTRAVENOUS EVERY 12 HOURS
Qty: 0 | Refills: 0 | Status: DISCONTINUED | OUTPATIENT
Start: 2017-01-01 | End: 2017-01-01

## 2017-01-01 RX ORDER — DEXTROSE 50 % IN WATER 50 %
1 SYRINGE (ML) INTRAVENOUS EVERY 6 HOURS
Qty: 0 | Refills: 0 | Status: DISCONTINUED | OUTPATIENT
Start: 2017-01-01 | End: 2018-01-01

## 2017-01-01 RX ORDER — SODIUM CHLORIDE 9 MG/ML
1000 INJECTION INTRAMUSCULAR; INTRAVENOUS; SUBCUTANEOUS ONCE
Qty: 0 | Refills: 0 | Status: COMPLETED | OUTPATIENT
Start: 2017-01-01 | End: 2017-01-01

## 2017-01-01 RX ORDER — BUDESONIDE AND FORMOTEROL FUMARATE DIHYDRATE 160; 4.5 UG/1; UG/1
2 AEROSOL RESPIRATORY (INHALATION)
Qty: 0 | Refills: 0 | COMMUNITY
Start: 2017-01-01

## 2017-01-01 RX ORDER — ACETAMINOPHEN 500 MG
2 TABLET ORAL
Qty: 0 | Refills: 0 | COMMUNITY
Start: 2017-01-01

## 2017-01-01 RX ORDER — ACETAMINOPHEN 500 MG
1000 TABLET ORAL ONCE
Qty: 0 | Refills: 0 | Status: DISCONTINUED | OUTPATIENT
Start: 2017-01-01 | End: 2017-01-01

## 2017-01-01 RX ORDER — ACETAMINOPHEN 500 MG
1000 TABLET ORAL EVERY 24 HOURS
Qty: 0 | Refills: 0 | Status: DISCONTINUED | OUTPATIENT
Start: 2017-01-01 | End: 2017-01-01

## 2017-01-01 RX ORDER — ATORVASTATIN CALCIUM 80 MG/1
20 TABLET, FILM COATED ORAL AT BEDTIME
Qty: 0 | Refills: 0 | Status: DISCONTINUED | OUTPATIENT
Start: 2017-01-01 | End: 2017-01-01

## 2017-01-01 RX ORDER — CEFEPIME 1 G/1
2000 INJECTION, POWDER, FOR SOLUTION INTRAMUSCULAR; INTRAVENOUS ONCE
Qty: 0 | Refills: 0 | Status: DISCONTINUED | OUTPATIENT
Start: 2017-01-01 | End: 2017-01-01

## 2017-01-01 RX ORDER — METOPROLOL TARTRATE 50 MG
25 TABLET ORAL DAILY
Qty: 0 | Refills: 0 | Status: DISCONTINUED | OUTPATIENT
Start: 2017-01-01 | End: 2017-01-01

## 2017-01-01 RX ORDER — METOCLOPRAMIDE HCL 10 MG
5 TABLET ORAL EVERY 6 HOURS
Qty: 0 | Refills: 0 | Status: DISCONTINUED | OUTPATIENT
Start: 2017-01-01 | End: 2017-01-01

## 2017-01-01 RX ORDER — AMIODARONE HYDROCHLORIDE 400 MG/1
2 TABLET ORAL
Qty: 0 | Refills: 0 | COMMUNITY
Start: 2017-01-01

## 2017-01-01 RX ORDER — ACETAMINOPHEN 500 MG
1000 TABLET ORAL ONCE
Qty: 0 | Refills: 0 | Status: COMPLETED | OUTPATIENT
Start: 2017-01-01 | End: 2017-01-01

## 2017-01-01 RX ORDER — DOCUSATE SODIUM 100 MG
1 CAPSULE ORAL
Qty: 90 | Refills: 0 | OUTPATIENT
Start: 2017-01-01 | End: 2017-01-01

## 2017-01-01 RX ORDER — VANCOMYCIN HCL 1 G
1000 VIAL (EA) INTRAVENOUS EVERY 24 HOURS
Qty: 0 | Refills: 0 | Status: DISCONTINUED | OUTPATIENT
Start: 2017-01-01 | End: 2017-01-01

## 2017-01-01 RX ORDER — VANCOMYCIN HCL 1 G
750 VIAL (EA) INTRAVENOUS EVERY 24 HOURS
Qty: 0 | Refills: 0 | Status: DISCONTINUED | OUTPATIENT
Start: 2017-01-01 | End: 2017-01-01

## 2017-01-01 RX ORDER — SALIVA SUBSTITUTE COMB NO.11 351 MG
5 POWDER IN PACKET (EA) MUCOUS MEMBRANE THREE TIMES A DAY
Qty: 0 | Refills: 0 | Status: DISCONTINUED | OUTPATIENT
Start: 2017-01-01 | End: 2018-01-01

## 2017-01-01 RX ORDER — ALLOPURINOL 300 MG
1 TABLET ORAL
Qty: 0 | Refills: 0 | COMMUNITY
Start: 2017-01-01

## 2017-01-01 RX ORDER — ACETAMINOPHEN 500 MG
325 TABLET ORAL EVERY 6 HOURS
Qty: 0 | Refills: 0 | Status: DISCONTINUED | OUTPATIENT
Start: 2017-01-01 | End: 2017-01-01

## 2017-01-01 RX ORDER — ALBUTEROL 90 UG/1
2 AEROSOL, METERED ORAL
Qty: 1 | Refills: 0 | OUTPATIENT
Start: 2017-01-01 | End: 2017-01-01

## 2017-01-01 RX ORDER — BUDESONIDE AND FORMOTEROL FUMARATE DIHYDRATE 160; 4.5 UG/1; UG/1
2 AEROSOL RESPIRATORY (INHALATION)
Qty: 0 | Refills: 0 | Status: DISCONTINUED | OUTPATIENT
Start: 2017-01-01 | End: 2017-01-01

## 2017-01-01 RX ORDER — INSULIN LISPRO 100/ML
VIAL (ML) SUBCUTANEOUS AT BEDTIME
Qty: 0 | Refills: 0 | Status: DISCONTINUED | OUTPATIENT
Start: 2017-01-01 | End: 2017-01-01

## 2017-01-01 RX ORDER — ALUMINUM HYDROXIDE
30 POWDER (GRAM) MISCELLANEOUS ONCE
Qty: 0 | Refills: 0 | Status: DISCONTINUED | OUTPATIENT
Start: 2017-01-01 | End: 2017-01-01

## 2017-01-01 RX ORDER — VANCOMYCIN HCL 1 G
500 VIAL (EA) INTRAVENOUS EVERY 12 HOURS
Qty: 0 | Refills: 0 | Status: DISCONTINUED | OUTPATIENT
Start: 2017-01-01 | End: 2017-01-01

## 2017-01-01 RX ORDER — ALLOPURINOL 300 MG
300 TABLET ORAL DAILY
Qty: 0 | Refills: 0 | Status: DISCONTINUED | OUTPATIENT
Start: 2017-01-01 | End: 2017-01-01

## 2017-01-01 RX ORDER — POTASSIUM CHLORIDE 20 MEQ
10 PACKET (EA) ORAL
Qty: 0 | Refills: 0 | Status: DISCONTINUED | OUTPATIENT
Start: 2017-01-01 | End: 2017-01-01

## 2017-01-01 RX ORDER — ALLOPURINOL 300 MG
1 TABLET ORAL
Qty: 30 | Refills: 0 | OUTPATIENT
Start: 2017-01-01 | End: 2017-01-01

## 2017-01-01 RX ORDER — FUROSEMIDE 40 MG
40 TABLET ORAL DAILY
Qty: 0 | Refills: 0 | Status: DISCONTINUED | OUTPATIENT
Start: 2017-01-01 | End: 2017-01-01

## 2017-01-01 RX ORDER — HYDROMORPHONE HYDROCHLORIDE 2 MG/ML
0.25 INJECTION INTRAMUSCULAR; INTRAVENOUS; SUBCUTANEOUS ONCE
Qty: 0 | Refills: 0 | Status: DISCONTINUED | OUTPATIENT
Start: 2017-01-01 | End: 2017-01-01

## 2017-01-01 RX ORDER — TIOTROPIUM BROMIDE 18 UG/1
1 CAPSULE ORAL; RESPIRATORY (INHALATION)
Qty: 0 | Refills: 0 | COMMUNITY
Start: 2017-01-01

## 2017-01-01 RX ORDER — SODIUM,POTASSIUM PHOSPHATES 278-250MG
2 POWDER IN PACKET (EA) ORAL ONCE
Qty: 0 | Refills: 0 | Status: COMPLETED | OUTPATIENT
Start: 2017-01-01 | End: 2017-01-01

## 2017-01-01 RX ORDER — INSULIN LISPRO 100/ML
VIAL (ML) SUBCUTANEOUS
Qty: 0 | Refills: 0 | Status: DISCONTINUED | OUTPATIENT
Start: 2017-01-01 | End: 2017-01-01

## 2017-01-01 RX ORDER — POTASSIUM CHLORIDE 20 MEQ
20 PACKET (EA) ORAL ONCE
Qty: 0 | Refills: 0 | Status: DISCONTINUED | OUTPATIENT
Start: 2017-01-01 | End: 2017-01-01

## 2017-01-01 RX ORDER — SUCRALFATE 1 G
1 TABLET ORAL
Qty: 0 | Refills: 0 | COMMUNITY
Start: 2017-01-01

## 2017-01-01 RX ORDER — KETOROLAC TROMETHAMINE 30 MG/ML
15 SYRINGE (ML) INJECTION ONCE
Qty: 0 | Refills: 0 | Status: DISCONTINUED | OUTPATIENT
Start: 2017-01-01 | End: 2017-01-01

## 2017-01-01 RX ORDER — AMIODARONE HYDROCHLORIDE 400 MG/1
150 TABLET ORAL DAILY
Qty: 0 | Refills: 0 | Status: DISCONTINUED | OUTPATIENT
Start: 2017-01-01 | End: 2017-01-01

## 2017-01-01 RX ORDER — ASPIRIN/CALCIUM CARB/MAGNESIUM 324 MG
81 TABLET ORAL DAILY
Qty: 0 | Refills: 0 | Status: DISCONTINUED | OUTPATIENT
Start: 2017-01-01 | End: 2017-01-01

## 2017-01-01 RX ORDER — GABAPENTIN 400 MG/1
100 CAPSULE ORAL THREE TIMES A DAY
Qty: 0 | Refills: 0 | Status: DISCONTINUED | OUTPATIENT
Start: 2017-01-01 | End: 2017-01-01

## 2017-01-01 RX ORDER — POTASSIUM CHLORIDE 20 MEQ
40 PACKET (EA) ORAL ONCE
Qty: 0 | Refills: 0 | Status: COMPLETED | OUTPATIENT
Start: 2017-01-01 | End: 2017-01-01

## 2017-01-01 RX ORDER — DEXTROSE 50 % IN WATER 50 %
1 SYRINGE (ML) INTRAVENOUS ONCE
Qty: 0 | Refills: 0 | Status: DISCONTINUED | OUTPATIENT
Start: 2017-01-01 | End: 2017-01-01

## 2017-01-01 RX ORDER — SODIUM HYPOCHLORITE 0.125 %
1 SOLUTION, NON-ORAL MISCELLANEOUS
Qty: 0 | Refills: 0 | Status: DISCONTINUED | OUTPATIENT
Start: 2017-01-01 | End: 2018-01-01

## 2017-01-01 RX ORDER — MAGNESIUM SULFATE 500 MG/ML
1 VIAL (ML) INJECTION ONCE
Qty: 0 | Refills: 0 | Status: COMPLETED | OUTPATIENT
Start: 2017-01-01 | End: 2017-01-01

## 2017-01-01 RX ORDER — NIFEDIPINE 30 MG
1 TABLET, EXTENDED RELEASE 24 HR ORAL
Qty: 0 | Refills: 0 | COMMUNITY
Start: 2017-01-01

## 2017-01-01 RX ORDER — SODIUM CHLORIDE 9 MG/ML
3 INJECTION INTRAMUSCULAR; INTRAVENOUS; SUBCUTANEOUS EVERY 8 HOURS
Qty: 0 | Refills: 0 | Status: DISCONTINUED | OUTPATIENT
Start: 2017-01-01 | End: 2017-01-01

## 2017-01-01 RX ORDER — VANCOMYCIN HCL 1 G
1000 VIAL (EA) INTRAVENOUS ONCE
Qty: 0 | Refills: 0 | Status: COMPLETED | OUTPATIENT
Start: 2017-01-01 | End: 2017-01-01

## 2017-01-01 RX ORDER — BUDESONIDE AND FORMOTEROL FUMARATE DIHYDRATE 160; 4.5 UG/1; UG/1
2 AEROSOL RESPIRATORY (INHALATION)
Qty: 0 | Refills: 0 | COMMUNITY

## 2017-01-01 RX ORDER — MAGNESIUM SULFATE 500 MG/ML
4 VIAL (ML) INJECTION ONCE
Qty: 0 | Refills: 0 | Status: DISCONTINUED | OUTPATIENT
Start: 2017-01-01 | End: 2017-01-01

## 2017-01-01 RX ORDER — IPRATROPIUM/ALBUTEROL SULFATE 18-103MCG
3 AEROSOL WITH ADAPTER (GRAM) INHALATION ONCE
Qty: 0 | Refills: 0 | Status: COMPLETED | OUTPATIENT
Start: 2017-01-01 | End: 2017-01-01

## 2017-01-01 RX ORDER — POLYETHYLENE GLYCOL 3350 17 G/17G
17 POWDER, FOR SOLUTION ORAL
Qty: 0 | Refills: 0 | COMMUNITY
Start: 2017-01-01

## 2017-01-01 RX ORDER — CEFTRIAXONE 500 MG/1
1 INJECTION, POWDER, FOR SOLUTION INTRAMUSCULAR; INTRAVENOUS EVERY 24 HOURS
Qty: 0 | Refills: 0 | Status: DISCONTINUED | OUTPATIENT
Start: 2017-01-01 | End: 2017-01-01

## 2017-01-01 RX ORDER — SIMETHICONE 80 MG/1
80 TABLET, CHEWABLE ORAL ONCE
Qty: 0 | Refills: 0 | Status: COMPLETED | OUTPATIENT
Start: 2017-01-01 | End: 2017-01-01

## 2017-01-01 RX ORDER — POLYETHYLENE GLYCOL 3350 17 G/17G
17 POWDER, FOR SOLUTION ORAL ONCE
Qty: 0 | Refills: 0 | Status: COMPLETED | OUTPATIENT
Start: 2017-01-01 | End: 2017-01-01

## 2017-01-01 RX ORDER — OXYCODONE AND ACETAMINOPHEN 5; 325 MG/1; MG/1
1 TABLET ORAL EVERY 6 HOURS
Qty: 0 | Refills: 0 | Status: DISCONTINUED | OUTPATIENT
Start: 2017-01-01 | End: 2017-01-01

## 2017-01-01 RX ORDER — POTASSIUM CHLORIDE 20 MEQ
10 PACKET (EA) ORAL
Qty: 0 | Refills: 0 | Status: COMPLETED | OUTPATIENT
Start: 2017-01-01 | End: 2017-01-01

## 2017-01-01 RX ORDER — ASPIRIN/CALCIUM CARB/MAGNESIUM 324 MG
1 TABLET ORAL
Qty: 0 | Refills: 0 | COMMUNITY
Start: 2017-01-01

## 2017-01-01 RX ORDER — MORPHINE SULFATE 50 MG/1
2 CAPSULE, EXTENDED RELEASE ORAL ONCE
Qty: 0 | Refills: 0 | Status: DISCONTINUED | OUTPATIENT
Start: 2017-01-01 | End: 2017-01-01

## 2017-01-01 RX ORDER — ENOXAPARIN SODIUM 100 MG/ML
30 INJECTION SUBCUTANEOUS DAILY
Qty: 0 | Refills: 0 | Status: DISCONTINUED | OUTPATIENT
Start: 2017-01-01 | End: 2017-01-01

## 2017-01-01 RX ORDER — MEROPENEM 1 G/30ML
1000 INJECTION INTRAVENOUS ONCE
Qty: 0 | Refills: 0 | Status: COMPLETED | OUTPATIENT
Start: 2017-01-01 | End: 2017-01-01

## 2017-01-01 RX ORDER — LATANOPROST 0.05 MG/ML
1 SOLUTION/ DROPS OPHTHALMIC; TOPICAL
Qty: 0 | Refills: 0 | COMMUNITY

## 2017-01-01 RX ORDER — ENOXAPARIN SODIUM 100 MG/ML
40 INJECTION SUBCUTANEOUS DAILY
Qty: 0 | Refills: 0 | Status: DISCONTINUED | OUTPATIENT
Start: 2017-01-01 | End: 2017-01-01

## 2017-01-01 RX ORDER — PANTOPRAZOLE SODIUM 20 MG/1
1 TABLET, DELAYED RELEASE ORAL
Qty: 60 | Refills: 0 | OUTPATIENT
Start: 2017-01-01 | End: 2017-01-01

## 2017-01-01 RX ORDER — BENZOCAINE AND MENTHOL 5; 1 G/100ML; G/100ML
1 LIQUID ORAL ONCE
Qty: 0 | Refills: 0 | Status: COMPLETED | OUTPATIENT
Start: 2017-01-01 | End: 2017-01-01

## 2017-01-01 RX ORDER — MORPHINE SULFATE 50 MG/1
0.5 CAPSULE, EXTENDED RELEASE ORAL EVERY 4 HOURS
Qty: 0 | Refills: 0 | Status: DISCONTINUED | OUTPATIENT
Start: 2017-01-01 | End: 2017-01-01

## 2017-01-01 RX ORDER — MEROPENEM 1 G/30ML
1000 INJECTION INTRAVENOUS EVERY 12 HOURS
Qty: 0 | Refills: 0 | Status: COMPLETED | OUTPATIENT
Start: 2017-01-01 | End: 2018-01-01

## 2017-01-01 RX ORDER — ENOXAPARIN SODIUM 100 MG/ML
40 INJECTION SUBCUTANEOUS EVERY 24 HOURS
Qty: 0 | Refills: 0 | Status: DISCONTINUED | OUTPATIENT
Start: 2017-01-01 | End: 2017-01-01

## 2017-01-01 RX ORDER — SPIRONOLACTONE 25 MG/1
0.5 TABLET, FILM COATED ORAL
Qty: 0 | Refills: 0 | COMMUNITY
Start: 2017-01-01

## 2017-01-01 RX ORDER — POLYETHYLENE GLYCOL 3350 17 G/17G
17 POWDER, FOR SOLUTION ORAL DAILY
Qty: 0 | Refills: 0 | Status: DISCONTINUED | OUTPATIENT
Start: 2017-01-01 | End: 2017-01-01

## 2017-01-01 RX ORDER — CEFEPIME 1 G/1
INJECTION, POWDER, FOR SOLUTION INTRAMUSCULAR; INTRAVENOUS
Qty: 0 | Refills: 0 | Status: DISCONTINUED | OUTPATIENT
Start: 2017-01-01 | End: 2017-01-01

## 2017-01-01 RX ORDER — KETOROLAC TROMETHAMINE 30 MG/ML
30 SYRINGE (ML) INJECTION ONCE
Qty: 0 | Refills: 0 | Status: DISCONTINUED | OUTPATIENT
Start: 2017-01-01 | End: 2017-01-01

## 2017-01-01 RX ORDER — MORPHINE SULFATE 50 MG/1
0.5 CAPSULE, EXTENDED RELEASE ORAL EVERY 6 HOURS
Qty: 0 | Refills: 0 | Status: DISCONTINUED | OUTPATIENT
Start: 2017-01-01 | End: 2017-01-01

## 2017-01-01 RX ORDER — AZTREONAM 2 G
2000 VIAL (EA) INJECTION EVERY 12 HOURS
Qty: 0 | Refills: 0 | Status: DISCONTINUED | OUTPATIENT
Start: 2017-01-01 | End: 2017-01-01

## 2017-01-01 RX ORDER — IMIPENEM AND CILASTATIN 250; 250 MG/100ML; MG/100ML
INJECTION, POWDER, FOR SOLUTION INTRAVENOUS
Qty: 0 | Refills: 0 | Status: DISCONTINUED | OUTPATIENT
Start: 2017-01-01 | End: 2017-01-01

## 2017-01-01 RX ORDER — POTASSIUM PHOSPHATE, MONOBASIC POTASSIUM PHOSPHATE, DIBASIC 236; 224 MG/ML; MG/ML
15 INJECTION, SOLUTION INTRAVENOUS ONCE
Qty: 0 | Refills: 0 | Status: COMPLETED | OUTPATIENT
Start: 2017-01-01 | End: 2017-01-01

## 2017-01-01 RX ORDER — METOPROLOL TARTRATE 50 MG
2.5 TABLET ORAL EVERY 6 HOURS
Qty: 0 | Refills: 0 | Status: DISCONTINUED | OUTPATIENT
Start: 2017-01-01 | End: 2018-01-01

## 2017-01-01 RX ORDER — POTASSIUM CHLORIDE 20 MEQ
20 PACKET (EA) ORAL
Qty: 0 | Refills: 0 | Status: COMPLETED | OUTPATIENT
Start: 2017-01-01 | End: 2017-01-01

## 2017-01-01 RX ORDER — FUROSEMIDE 40 MG
40 TABLET ORAL
Qty: 0 | Refills: 0 | Status: DISCONTINUED | OUTPATIENT
Start: 2017-01-01 | End: 2017-01-01

## 2017-01-01 RX ORDER — SPIRONOLACTONE 25 MG/1
12.5 TABLET, FILM COATED ORAL DAILY
Qty: 0 | Refills: 0 | Status: DISCONTINUED | OUTPATIENT
Start: 2017-01-01 | End: 2017-01-01

## 2017-01-01 RX ORDER — GLUCAGON INJECTION, SOLUTION 0.5 MG/.1ML
1 INJECTION, SOLUTION SUBCUTANEOUS ONCE
Qty: 0 | Refills: 0 | Status: DISCONTINUED | OUTPATIENT
Start: 2017-01-01 | End: 2017-01-01

## 2017-01-01 RX ORDER — FUROSEMIDE 40 MG
1 TABLET ORAL
Qty: 0 | Refills: 0 | COMMUNITY
Start: 2017-01-01

## 2017-01-01 RX ORDER — DEXTROSE 50 % IN WATER 50 %
12.5 SYRINGE (ML) INTRAVENOUS ONCE
Qty: 0 | Refills: 0 | Status: DISCONTINUED | OUTPATIENT
Start: 2017-01-01 | End: 2018-01-01

## 2017-01-01 RX ORDER — COLLAGENASE CLOSTRIDIUM HIST. 250 UNIT/G
1 OINTMENT (GRAM) TOPICAL
Qty: 0 | Refills: 0 | Status: DISCONTINUED | OUTPATIENT
Start: 2017-01-01 | End: 2017-01-01

## 2017-01-01 RX ORDER — NIFEDIPINE 30 MG
1 TABLET, EXTENDED RELEASE 24 HR ORAL
Qty: 30 | Refills: 0 | OUTPATIENT
Start: 2017-01-01 | End: 2017-01-01

## 2017-01-01 RX ORDER — CIPROFLOXACIN LACTATE 400MG/40ML
400 VIAL (ML) INTRAVENOUS EVERY 12 HOURS
Qty: 0 | Refills: 0 | Status: DISCONTINUED | OUTPATIENT
Start: 2017-01-01 | End: 2017-01-01

## 2017-01-01 RX ORDER — TIOTROPIUM BROMIDE 18 UG/1
1 CAPSULE ORAL; RESPIRATORY (INHALATION) DAILY
Qty: 0 | Refills: 0 | Status: DISCONTINUED | OUTPATIENT
Start: 2017-01-01 | End: 2018-01-01

## 2017-01-01 RX ORDER — ACETAMINOPHEN 500 MG
650 TABLET ORAL EVERY 6 HOURS
Qty: 0 | Refills: 0 | Status: DISCONTINUED | OUTPATIENT
Start: 2017-01-01 | End: 2018-01-01

## 2017-01-01 RX ORDER — POTASSIUM CHLORIDE 20 MEQ
40 PACKET (EA) ORAL EVERY 4 HOURS
Qty: 0 | Refills: 0 | Status: COMPLETED | OUTPATIENT
Start: 2017-01-01 | End: 2017-01-01

## 2017-01-01 RX ORDER — SODIUM,POTASSIUM PHOSPHATES 278-250MG
1 POWDER IN PACKET (EA) ORAL
Qty: 0 | Refills: 0 | Status: DISCONTINUED | OUTPATIENT
Start: 2017-01-01 | End: 2017-01-01

## 2017-01-01 RX ORDER — LEVOTHYROXINE SODIUM 125 MCG
1 TABLET ORAL
Qty: 0 | Refills: 0 | COMMUNITY
Start: 2017-01-01

## 2017-01-01 RX ORDER — SODIUM CHLORIDE 9 MG/ML
1000 INJECTION, SOLUTION INTRAVENOUS
Qty: 0 | Refills: 0 | Status: DISCONTINUED | OUTPATIENT
Start: 2017-01-01 | End: 2018-01-01

## 2017-01-01 RX ORDER — CIPROFLOXACIN LACTATE 400MG/40ML
400 VIAL (ML) INTRAVENOUS ONCE
Qty: 0 | Refills: 0 | Status: COMPLETED | OUTPATIENT
Start: 2017-01-01 | End: 2017-01-01

## 2017-01-01 RX ORDER — DEXTROSE 50 % IN WATER 50 %
50 SYRINGE (ML) INTRAVENOUS ONCE
Qty: 0 | Refills: 0 | Status: COMPLETED | OUTPATIENT
Start: 2017-01-01 | End: 2017-01-01

## 2017-01-01 RX ORDER — VANCOMYCIN HCL 1 G
500 VIAL (EA) INTRAVENOUS EVERY 24 HOURS
Qty: 0 | Refills: 0 | Status: DISCONTINUED | OUTPATIENT
Start: 2017-01-01 | End: 2017-01-01

## 2017-01-01 RX ORDER — ASPIRIN/CALCIUM CARB/MAGNESIUM 324 MG
162 TABLET ORAL ONCE
Qty: 0 | Refills: 0 | Status: COMPLETED | OUTPATIENT
Start: 2017-01-01 | End: 2017-01-01

## 2017-01-01 RX ORDER — ALBUTEROL 90 UG/1
2 AEROSOL, METERED ORAL
Qty: 0 | Refills: 0 | COMMUNITY
Start: 2017-01-01

## 2017-01-01 RX ORDER — LACTOBACILLUS ACIDOPHILUS 100MM CELL
1 CAPSULE ORAL
Qty: 0 | Refills: 0 | Status: DISCONTINUED | OUTPATIENT
Start: 2017-01-01 | End: 2017-01-01

## 2017-01-01 RX ORDER — CEFEPIME 1 G/1
1000 INJECTION, POWDER, FOR SOLUTION INTRAMUSCULAR; INTRAVENOUS ONCE
Qty: 0 | Refills: 0 | Status: COMPLETED | OUTPATIENT
Start: 2017-01-01 | End: 2017-01-01

## 2017-01-01 RX ORDER — LEVOTHYROXINE SODIUM 125 MCG
1 TABLET ORAL
Qty: 30 | Refills: 0 | OUTPATIENT
Start: 2017-01-01 | End: 2017-01-01

## 2017-01-01 RX ORDER — CEFTRIAXONE 500 MG/1
INJECTION, POWDER, FOR SOLUTION INTRAMUSCULAR; INTRAVENOUS
Qty: 0 | Refills: 0 | Status: DISCONTINUED | OUTPATIENT
Start: 2017-01-01 | End: 2017-01-01

## 2017-01-01 RX ORDER — FUROSEMIDE 40 MG
40 TABLET ORAL ONCE
Qty: 0 | Refills: 0 | Status: COMPLETED | OUTPATIENT
Start: 2017-01-01 | End: 2017-01-01

## 2017-01-01 RX ORDER — TIOTROPIUM BROMIDE 18 UG/1
1 CAPSULE ORAL; RESPIRATORY (INHALATION)
Qty: 1 | Refills: 0 | OUTPATIENT
Start: 2017-01-01 | End: 2017-01-01

## 2017-01-01 RX ORDER — ASPIRIN/CALCIUM CARB/MAGNESIUM 324 MG
81 TABLET ORAL DAILY
Qty: 0 | Refills: 0 | Status: DISCONTINUED | OUTPATIENT
Start: 2017-01-01 | End: 2018-01-01

## 2017-01-01 RX ORDER — ENOXAPARIN SODIUM 100 MG/ML
40 INJECTION SUBCUTANEOUS DAILY
Qty: 0 | Refills: 0 | Status: DISCONTINUED | OUTPATIENT
Start: 2017-01-01 | End: 2018-01-01

## 2017-01-01 RX ORDER — LINEZOLID 600 MG/300ML
1 INJECTION, SOLUTION INTRAVENOUS
Qty: 0 | Refills: 0 | COMMUNITY
Start: 2017-01-01

## 2017-01-01 RX ORDER — MEROPENEM 1 G/30ML
INJECTION INTRAVENOUS
Qty: 0 | Refills: 0 | Status: COMPLETED | OUTPATIENT
Start: 2017-01-01 | End: 2018-01-01

## 2017-01-01 RX ORDER — LACTOBACILLUS ACIDOPHILUS 100MM CELL
1 CAPSULE ORAL DAILY
Qty: 0 | Refills: 0 | Status: DISCONTINUED | OUTPATIENT
Start: 2017-01-01 | End: 2017-01-01

## 2017-01-01 RX ORDER — MORPHINE SULFATE 50 MG/1
4 CAPSULE, EXTENDED RELEASE ORAL ONCE
Qty: 0 | Refills: 0 | Status: DISCONTINUED | OUTPATIENT
Start: 2017-01-01 | End: 2017-01-01

## 2017-01-01 RX ORDER — AMITRIPTYLINE HCL 25 MG
25 TABLET ORAL AT BEDTIME
Qty: 0 | Refills: 0 | Status: DISCONTINUED | OUTPATIENT
Start: 2017-01-01 | End: 2017-01-01

## 2017-01-01 RX ORDER — SODIUM CHLORIDE 9 MG/ML
500 INJECTION INTRAMUSCULAR; INTRAVENOUS; SUBCUTANEOUS ONCE
Qty: 0 | Refills: 0 | Status: DISCONTINUED | OUTPATIENT
Start: 2017-01-01 | End: 2017-01-01

## 2017-01-01 RX ORDER — VANCOMYCIN HCL 1 G
750 VIAL (EA) INTRAVENOUS EVERY 12 HOURS
Qty: 0 | Refills: 0 | Status: DISCONTINUED | OUTPATIENT
Start: 2017-01-01 | End: 2017-01-01

## 2017-01-01 RX ORDER — LEVOTHYROXINE SODIUM 125 MCG
25 TABLET ORAL DAILY
Qty: 0 | Refills: 0 | Status: DISCONTINUED | OUTPATIENT
Start: 2017-01-01 | End: 2017-01-01

## 2017-01-01 RX ORDER — SENNA PLUS 8.6 MG/1
2 TABLET ORAL
Qty: 60 | Refills: 0 | OUTPATIENT
Start: 2017-01-01 | End: 2017-01-01

## 2017-01-01 RX ORDER — GABAPENTIN 400 MG/1
200 CAPSULE ORAL THREE TIMES A DAY
Qty: 0 | Refills: 0 | Status: DISCONTINUED | OUTPATIENT
Start: 2017-01-01 | End: 2017-01-01

## 2017-01-01 RX ORDER — METOPROLOL TARTRATE 50 MG
2.5 TABLET ORAL EVERY 6 HOURS
Qty: 0 | Refills: 0 | Status: DISCONTINUED | OUTPATIENT
Start: 2017-01-01 | End: 2017-01-01

## 2017-01-01 RX ORDER — DULOXETINE HYDROCHLORIDE 30 MG/1
1 CAPSULE, DELAYED RELEASE ORAL
Qty: 0 | Refills: 0 | COMMUNITY
Start: 2017-01-01

## 2017-01-01 RX ORDER — CEFEPIME 1 G/1
2000 INJECTION, POWDER, FOR SOLUTION INTRAMUSCULAR; INTRAVENOUS ONCE
Qty: 0 | Refills: 0 | Status: COMPLETED | OUTPATIENT
Start: 2017-01-01 | End: 2017-01-01

## 2017-01-01 RX ORDER — AMIODARONE HYDROCHLORIDE 400 MG/1
400 TABLET ORAL DAILY
Qty: 0 | Refills: 0 | Status: DISCONTINUED | OUTPATIENT
Start: 2017-01-01 | End: 2018-01-01

## 2017-01-01 RX ORDER — ACETAMINOPHEN 500 MG
1000 TABLET ORAL EVERY 8 HOURS
Qty: 0 | Refills: 0 | Status: COMPLETED | OUTPATIENT
Start: 2017-01-01 | End: 2017-01-01

## 2017-01-01 RX ORDER — PIPERACILLIN AND TAZOBACTAM 4; .5 G/20ML; G/20ML
3.38 INJECTION, POWDER, LYOPHILIZED, FOR SOLUTION INTRAVENOUS EVERY 8 HOURS
Qty: 0 | Refills: 0 | Status: DISCONTINUED | OUTPATIENT
Start: 2017-01-01 | End: 2017-01-01

## 2017-01-01 RX ORDER — ACETAMINOPHEN 500 MG
1000 TABLET ORAL DAILY
Qty: 0 | Refills: 0 | Status: COMPLETED | OUTPATIENT
Start: 2017-01-01 | End: 2017-01-01

## 2017-01-01 RX ORDER — SPIRONOLACTONE 25 MG/1
0.5 TABLET, FILM COATED ORAL
Qty: 15 | Refills: 0 | OUTPATIENT
Start: 2017-01-01 | End: 2017-01-01

## 2017-01-01 RX ORDER — LATANOPROST 0.05 MG/ML
1 SOLUTION/ DROPS OPHTHALMIC; TOPICAL AT BEDTIME
Qty: 0 | Refills: 0 | Status: DISCONTINUED | OUTPATIENT
Start: 2017-01-01 | End: 2018-01-01

## 2017-01-01 RX ORDER — LINEZOLID 600 MG/300ML
600 INJECTION, SOLUTION INTRAVENOUS EVERY 12 HOURS
Qty: 0 | Refills: 0 | Status: DISCONTINUED | OUTPATIENT
Start: 2017-01-01 | End: 2017-01-01

## 2017-01-01 RX ORDER — LEVOTHYROXINE SODIUM 125 MCG
25 TABLET ORAL DAILY
Qty: 0 | Refills: 0 | Status: DISCONTINUED | OUTPATIENT
Start: 2017-01-01 | End: 2018-01-01

## 2017-01-01 RX ORDER — INFLUENZA VIRUS VACCINE 15; 15; 15; 15 UG/.5ML; UG/.5ML; UG/.5ML; UG/.5ML
0.5 SUSPENSION INTRAMUSCULAR ONCE
Qty: 0 | Refills: 0 | Status: DISCONTINUED | OUTPATIENT
Start: 2017-01-01 | End: 2017-01-01

## 2017-01-01 RX ORDER — MIRTAZAPINE 45 MG/1
7.5 TABLET, ORALLY DISINTEGRATING ORAL AT BEDTIME
Qty: 0 | Refills: 0 | Status: DISCONTINUED | OUTPATIENT
Start: 2017-01-01 | End: 2017-01-01

## 2017-01-01 RX ORDER — METRONIDAZOLE 500 MG
500 TABLET ORAL ONCE
Qty: 0 | Refills: 0 | Status: COMPLETED | OUTPATIENT
Start: 2017-01-01 | End: 2017-01-01

## 2017-01-01 RX ORDER — BUDESONIDE AND FORMOTEROL FUMARATE DIHYDRATE 160; 4.5 UG/1; UG/1
1 AEROSOL RESPIRATORY (INHALATION)
Qty: 0 | Refills: 0 | Status: DISCONTINUED | OUTPATIENT
Start: 2017-01-01 | End: 2017-01-01

## 2017-01-01 RX ORDER — DIPHENHYDRAMINE HYDROCHLORIDE AND LIDOCAINE HYDROCHLORIDE AND ALUMINUM HYDROXIDE AND MAGNESIUM HYDRO
5 KIT THREE TIMES A DAY
Qty: 0 | Refills: 0 | Status: DISCONTINUED | OUTPATIENT
Start: 2017-01-01 | End: 2018-01-01

## 2017-01-01 RX ORDER — GABAPENTIN 400 MG/1
300 CAPSULE ORAL EVERY 8 HOURS
Qty: 0 | Refills: 0 | Status: DISCONTINUED | OUTPATIENT
Start: 2017-01-01 | End: 2017-01-01

## 2017-01-01 RX ORDER — LIDOCAINE 4 G/100G
1 CREAM TOPICAL DAILY
Qty: 0 | Refills: 0 | Status: DISCONTINUED | OUTPATIENT
Start: 2017-01-01 | End: 2017-01-01

## 2017-01-01 RX ORDER — MIRTAZAPINE 45 MG/1
7.5 TABLET, ORALLY DISINTEGRATING ORAL DAILY
Qty: 0 | Refills: 0 | Status: DISCONTINUED | OUTPATIENT
Start: 2017-01-01 | End: 2017-01-01

## 2017-01-01 RX ORDER — AZTREONAM 2 G
2000 VIAL (EA) INJECTION ONCE
Qty: 0 | Refills: 0 | Status: COMPLETED | OUTPATIENT
Start: 2017-01-01 | End: 2017-01-01

## 2017-01-01 RX ORDER — MORPHINE SULFATE 50 MG/1
1 CAPSULE, EXTENDED RELEASE ORAL ONCE
Qty: 0 | Refills: 0 | Status: DISCONTINUED | OUTPATIENT
Start: 2017-01-01 | End: 2017-01-01

## 2017-01-01 RX ORDER — INSULIN LISPRO 100/ML
VIAL (ML) SUBCUTANEOUS EVERY 6 HOURS
Qty: 0 | Refills: 0 | Status: DISCONTINUED | OUTPATIENT
Start: 2017-01-01 | End: 2017-01-01

## 2017-01-01 RX ORDER — AMIODARONE HYDROCHLORIDE 400 MG/1
400 TABLET ORAL THREE TIMES A DAY
Qty: 0 | Refills: 0 | Status: COMPLETED | OUTPATIENT
Start: 2017-01-01 | End: 2017-01-01

## 2017-01-01 RX ORDER — INSULIN LISPRO 100/ML
0 VIAL (ML) SUBCUTANEOUS
Qty: 0 | Refills: 0 | COMMUNITY
Start: 2017-01-01

## 2017-01-01 RX ORDER — MORPHINE SULFATE 50 MG/1
1 CAPSULE, EXTENDED RELEASE ORAL EVERY 4 HOURS
Qty: 0 | Refills: 0 | Status: DISCONTINUED | OUTPATIENT
Start: 2017-01-01 | End: 2018-01-01

## 2017-01-01 RX ORDER — METOPROLOL TARTRATE 50 MG
1 TABLET ORAL
Qty: 30 | Refills: 0 | OUTPATIENT
Start: 2017-01-01 | End: 2017-01-01

## 2017-01-01 RX ORDER — GABAPENTIN 400 MG/1
1 CAPSULE ORAL
Qty: 0 | Refills: 0 | COMMUNITY
Start: 2017-01-01

## 2017-01-01 RX ORDER — SIMETHICONE 80 MG/1
1 TABLET, CHEWABLE ORAL
Qty: 0 | Refills: 0 | COMMUNITY
Start: 2017-01-01

## 2017-01-01 RX ORDER — VANCOMYCIN HCL 1 G
750 VIAL (EA) INTRAVENOUS ONCE
Qty: 0 | Refills: 0 | Status: COMPLETED | OUTPATIENT
Start: 2017-01-01 | End: 2017-01-01

## 2017-01-01 RX ORDER — NITROFURANTOIN MACROCRYSTAL 50 MG
100 CAPSULE ORAL
Qty: 0 | Refills: 0 | Status: DISCONTINUED | OUTPATIENT
Start: 2017-01-01 | End: 2017-01-01

## 2017-01-01 RX ORDER — DEXTROSE 50 % IN WATER 50 %
12.5 SYRINGE (ML) INTRAVENOUS ONCE
Qty: 0 | Refills: 0 | Status: COMPLETED | OUTPATIENT
Start: 2017-01-01 | End: 2017-01-01

## 2017-01-01 RX ORDER — DULOXETINE HYDROCHLORIDE 30 MG/1
60 CAPSULE, DELAYED RELEASE ORAL DAILY
Qty: 0 | Refills: 0 | Status: DISCONTINUED | OUTPATIENT
Start: 2017-01-01 | End: 2017-01-01

## 2017-01-01 RX ORDER — ALBUTEROL 90 UG/1
2 AEROSOL, METERED ORAL EVERY 6 HOURS
Qty: 0 | Refills: 0 | Status: DISCONTINUED | OUTPATIENT
Start: 2017-01-01 | End: 2017-01-01

## 2017-01-01 RX ORDER — KETOROLAC TROMETHAMINE 30 MG/ML
15 SYRINGE (ML) INJECTION EVERY 8 HOURS
Qty: 0 | Refills: 0 | Status: DISCONTINUED | OUTPATIENT
Start: 2017-01-01 | End: 2017-01-01

## 2017-01-01 RX ORDER — CIPROFLOXACIN LACTATE 400MG/40ML
500 VIAL (ML) INTRAVENOUS ONCE
Qty: 0 | Refills: 0 | Status: COMPLETED | OUTPATIENT
Start: 2017-01-01 | End: 2017-01-01

## 2017-01-01 RX ORDER — LOSARTAN POTASSIUM 100 MG/1
1 TABLET, FILM COATED ORAL
Qty: 30 | Refills: 0 | OUTPATIENT
Start: 2017-01-01 | End: 2017-01-01

## 2017-01-01 RX ORDER — OXYCODONE AND ACETAMINOPHEN 5; 325 MG/1; MG/1
1 TABLET ORAL ONCE
Qty: 0 | Refills: 0 | Status: DISCONTINUED | OUTPATIENT
Start: 2017-01-01 | End: 2017-01-01

## 2017-01-01 RX ORDER — LATANOPROST 0.05 MG/ML
1 SOLUTION/ DROPS OPHTHALMIC; TOPICAL
Qty: 0 | Refills: 0 | COMMUNITY
Start: 2017-01-01

## 2017-01-01 RX ORDER — IPRATROPIUM/ALBUTEROL SULFATE 18-103MCG
3 AEROSOL WITH ADAPTER (GRAM) INHALATION EVERY 6 HOURS
Qty: 0 | Refills: 0 | Status: DISCONTINUED | OUTPATIENT
Start: 2017-01-01 | End: 2017-01-01

## 2017-01-01 RX ORDER — METRONIDAZOLE 500 MG
TABLET ORAL
Qty: 0 | Refills: 0 | Status: DISCONTINUED | OUTPATIENT
Start: 2017-01-01 | End: 2017-01-01

## 2017-01-01 RX ORDER — IMIPENEM AND CILASTATIN 250; 250 MG/100ML; MG/100ML
500 INJECTION, POWDER, FOR SOLUTION INTRAVENOUS ONCE
Qty: 0 | Refills: 0 | Status: COMPLETED | OUTPATIENT
Start: 2017-01-01 | End: 2017-01-01

## 2017-01-01 RX ORDER — BUDESONIDE AND FORMOTEROL FUMARATE DIHYDRATE 160; 4.5 UG/1; UG/1
2 AEROSOL RESPIRATORY (INHALATION)
Qty: 0 | Refills: 0 | Status: DISCONTINUED | OUTPATIENT
Start: 2017-01-01 | End: 2018-01-01

## 2017-01-01 RX ORDER — BUDESONIDE AND FORMOTEROL FUMARATE DIHYDRATE 160; 4.5 UG/1; UG/1
2 AEROSOL RESPIRATORY (INHALATION)
Qty: 1 | Refills: 0 | OUTPATIENT
Start: 2017-01-01 | End: 2017-01-01

## 2017-01-01 RX ORDER — LIDOCAINE 4 G/100G
1 CREAM TOPICAL
Qty: 0 | Refills: 0 | COMMUNITY
Start: 2017-01-01

## 2017-01-01 RX ORDER — SUCRALFATE 1 G
1 TABLET ORAL
Qty: 120 | Refills: 0 | OUTPATIENT
Start: 2017-01-01 | End: 2017-01-01

## 2017-01-01 RX ORDER — SODIUM CHLORIDE 9 MG/ML
1000 INJECTION INTRAMUSCULAR; INTRAVENOUS; SUBCUTANEOUS
Qty: 0 | Refills: 0 | Status: DISCONTINUED | OUTPATIENT
Start: 2017-01-01 | End: 2017-01-01

## 2017-01-01 RX ORDER — OMEPRAZOLE 10 MG/1
1 CAPSULE, DELAYED RELEASE ORAL
Qty: 0 | Refills: 0 | COMMUNITY

## 2017-01-01 RX ORDER — ATORVASTATIN CALCIUM 80 MG/1
1 TABLET, FILM COATED ORAL
Qty: 30 | Refills: 0 | OUTPATIENT
Start: 2017-01-01 | End: 2017-01-01

## 2017-01-01 RX ADMIN — BUDESONIDE AND FORMOTEROL FUMARATE DIHYDRATE 2 PUFF(S): 160; 4.5 AEROSOL RESPIRATORY (INHALATION) at 23:46

## 2017-01-01 RX ADMIN — Medication 300 MILLIGRAM(S): at 12:12

## 2017-01-01 RX ADMIN — Medication 100 MILLIGRAM(S): at 07:30

## 2017-01-01 RX ADMIN — Medication 81 MILLIGRAM(S): at 11:26

## 2017-01-01 RX ADMIN — Medication 1 TABLET(S): at 11:34

## 2017-01-01 RX ADMIN — Medication 50 MICROGRAM(S): at 05:46

## 2017-01-01 RX ADMIN — SODIUM CHLORIDE 1000 MILLILITER(S): 9 INJECTION INTRAMUSCULAR; INTRAVENOUS; SUBCUTANEOUS at 12:25

## 2017-01-01 RX ADMIN — ENOXAPARIN SODIUM 40 MILLIGRAM(S): 100 INJECTION SUBCUTANEOUS at 11:47

## 2017-01-01 RX ADMIN — BUDESONIDE AND FORMOTEROL FUMARATE DIHYDRATE 2 PUFF(S): 160; 4.5 AEROSOL RESPIRATORY (INHALATION) at 11:43

## 2017-01-01 RX ADMIN — Medication 25 MILLIGRAM(S): at 05:28

## 2017-01-01 RX ADMIN — ENOXAPARIN SODIUM 40 MILLIGRAM(S): 100 INJECTION SUBCUTANEOUS at 22:56

## 2017-01-01 RX ADMIN — AMIODARONE HYDROCHLORIDE 400 MILLIGRAM(S): 400 TABLET ORAL at 05:10

## 2017-01-01 RX ADMIN — Medication 1 DROP(S): at 13:24

## 2017-01-01 RX ADMIN — AMIODARONE HYDROCHLORIDE 400 MILLIGRAM(S): 400 TABLET ORAL at 06:19

## 2017-01-01 RX ADMIN — TIOTROPIUM BROMIDE 1 CAPSULE(S): 18 CAPSULE ORAL; RESPIRATORY (INHALATION) at 10:52

## 2017-01-01 RX ADMIN — SODIUM CHLORIDE 60 MILLILITER(S): 9 INJECTION, SOLUTION INTRAVENOUS at 20:54

## 2017-01-01 RX ADMIN — SENNA PLUS 2 TABLET(S): 8.6 TABLET ORAL at 21:18

## 2017-01-01 RX ADMIN — Medication 1 GRAM(S): at 23:34

## 2017-01-01 RX ADMIN — Medication 650 MILLIGRAM(S): at 22:50

## 2017-01-01 RX ADMIN — Medication 81 MILLIGRAM(S): at 11:44

## 2017-01-01 RX ADMIN — Medication 250 MILLIGRAM(S): at 13:19

## 2017-01-01 RX ADMIN — Medication 81 MILLIGRAM(S): at 12:23

## 2017-01-01 RX ADMIN — CEFEPIME 100 MILLIGRAM(S): 1 INJECTION, POWDER, FOR SOLUTION INTRAMUSCULAR; INTRAVENOUS at 05:55

## 2017-01-01 RX ADMIN — SPIRONOLACTONE 12.5 MILLIGRAM(S): 25 TABLET, FILM COATED ORAL at 06:15

## 2017-01-01 RX ADMIN — Medication 81 MILLIGRAM(S): at 12:06

## 2017-01-01 RX ADMIN — Medication 150 MILLIGRAM(S): at 06:15

## 2017-01-01 RX ADMIN — Medication 100 MILLIEQUIVALENT(S): at 07:00

## 2017-01-01 RX ADMIN — Medication 650 MILLIGRAM(S): at 06:47

## 2017-01-01 RX ADMIN — Medication 40 MILLIGRAM(S): at 06:15

## 2017-01-01 RX ADMIN — MORPHINE SULFATE 0.5 MILLIGRAM(S): 50 CAPSULE, EXTENDED RELEASE ORAL at 06:49

## 2017-01-01 RX ADMIN — Medication 1 APPLICATION(S): at 06:00

## 2017-01-01 RX ADMIN — Medication 40 MILLIEQUIVALENT(S): at 05:53

## 2017-01-01 RX ADMIN — MIRTAZAPINE 7.5 MILLIGRAM(S): 45 TABLET, ORALLY DISINTEGRATING ORAL at 22:09

## 2017-01-01 RX ADMIN — SODIUM CHLORIDE 60 MILLILITER(S): 9 INJECTION, SOLUTION INTRAVENOUS at 22:59

## 2017-01-01 RX ADMIN — DIPHENHYDRAMINE HYDROCHLORIDE AND LIDOCAINE HYDROCHLORIDE AND ALUMINUM HYDROXIDE AND MAGNESIUM HYDRO 5 MILLILITER(S): KIT at 22:16

## 2017-01-01 RX ADMIN — MORPHINE SULFATE 1 MILLIGRAM(S): 50 CAPSULE, EXTENDED RELEASE ORAL at 18:44

## 2017-01-01 RX ADMIN — BUDESONIDE AND FORMOTEROL FUMARATE DIHYDRATE 2 PUFF(S): 160; 4.5 AEROSOL RESPIRATORY (INHALATION) at 22:08

## 2017-01-01 RX ADMIN — ATORVASTATIN CALCIUM 20 MILLIGRAM(S): 80 TABLET, FILM COATED ORAL at 22:13

## 2017-01-01 RX ADMIN — PANTOPRAZOLE SODIUM 40 MILLIGRAM(S): 20 TABLET, DELAYED RELEASE ORAL at 05:33

## 2017-01-01 RX ADMIN — MORPHINE SULFATE 0.5 MILLIGRAM(S): 50 CAPSULE, EXTENDED RELEASE ORAL at 20:23

## 2017-01-01 RX ADMIN — Medication 1 APPLICATION(S): at 18:11

## 2017-01-01 RX ADMIN — MORPHINE SULFATE 1 MILLIGRAM(S): 50 CAPSULE, EXTENDED RELEASE ORAL at 10:16

## 2017-01-01 RX ADMIN — MORPHINE SULFATE 1 MILLIGRAM(S): 50 CAPSULE, EXTENDED RELEASE ORAL at 18:52

## 2017-01-01 RX ADMIN — GABAPENTIN 300 MILLIGRAM(S): 400 CAPSULE ORAL at 08:42

## 2017-01-01 RX ADMIN — Medication 63.75 MILLIMOLE(S): at 18:56

## 2017-01-01 RX ADMIN — Medication 30 MILLIGRAM(S): at 20:45

## 2017-01-01 RX ADMIN — Medication 1 GRAM(S): at 11:37

## 2017-01-01 RX ADMIN — Medication 1 GRAM(S): at 23:45

## 2017-01-01 RX ADMIN — ENOXAPARIN SODIUM 40 MILLIGRAM(S): 100 INJECTION SUBCUTANEOUS at 13:13

## 2017-01-01 RX ADMIN — MORPHINE SULFATE 0.5 MILLIGRAM(S): 50 CAPSULE, EXTENDED RELEASE ORAL at 12:33

## 2017-01-01 RX ADMIN — OXYCODONE AND ACETAMINOPHEN 1 TABLET(S): 5; 325 TABLET ORAL at 21:59

## 2017-01-01 RX ADMIN — Medication 20 MILLIEQUIVALENT(S): at 13:11

## 2017-01-01 RX ADMIN — Medication 1 GRAM(S): at 17:18

## 2017-01-01 RX ADMIN — LATANOPROST 1 DROP(S): 0.05 SOLUTION/ DROPS OPHTHALMIC; TOPICAL at 21:23

## 2017-01-01 RX ADMIN — CEFEPIME 100 MILLIGRAM(S): 1 INJECTION, POWDER, FOR SOLUTION INTRAMUSCULAR; INTRAVENOUS at 17:16

## 2017-01-01 RX ADMIN — LINEZOLID 600 MILLIGRAM(S): 600 INJECTION, SOLUTION INTRAVENOUS at 06:25

## 2017-01-01 RX ADMIN — LOSARTAN POTASSIUM 25 MILLIGRAM(S): 100 TABLET, FILM COATED ORAL at 11:26

## 2017-01-01 RX ADMIN — AMIODARONE HYDROCHLORIDE 400 MILLIGRAM(S): 400 TABLET ORAL at 06:26

## 2017-01-01 RX ADMIN — AMIODARONE HYDROCHLORIDE 400 MILLIGRAM(S): 400 TABLET ORAL at 07:30

## 2017-01-01 RX ADMIN — Medication 5 MILLIGRAM(S): at 17:02

## 2017-01-01 RX ADMIN — LOSARTAN POTASSIUM 25 MILLIGRAM(S): 100 TABLET, FILM COATED ORAL at 05:19

## 2017-01-01 RX ADMIN — ATORVASTATIN CALCIUM 20 MILLIGRAM(S): 80 TABLET, FILM COATED ORAL at 22:03

## 2017-01-01 RX ADMIN — Medication 10 MILLIGRAM(S): at 17:01

## 2017-01-01 RX ADMIN — TIOTROPIUM BROMIDE 1 CAPSULE(S): 18 CAPSULE ORAL; RESPIRATORY (INHALATION) at 09:26

## 2017-01-01 RX ADMIN — AMIODARONE HYDROCHLORIDE 400 MILLIGRAM(S): 400 TABLET ORAL at 05:46

## 2017-01-01 RX ADMIN — Medication 81 MILLIGRAM(S): at 13:04

## 2017-01-01 RX ADMIN — BUDESONIDE AND FORMOTEROL FUMARATE DIHYDRATE 1 PUFF(S): 160; 4.5 AEROSOL RESPIRATORY (INHALATION) at 21:49

## 2017-01-01 RX ADMIN — ENOXAPARIN SODIUM 40 MILLIGRAM(S): 100 INJECTION SUBCUTANEOUS at 21:55

## 2017-01-01 RX ADMIN — SODIUM CHLORIDE 60 MILLILITER(S): 9 INJECTION, SOLUTION INTRAVENOUS at 06:35

## 2017-01-01 RX ADMIN — Medication 1 GRAM(S): at 22:42

## 2017-01-01 RX ADMIN — MORPHINE SULFATE 0.5 MILLIGRAM(S): 50 CAPSULE, EXTENDED RELEASE ORAL at 19:23

## 2017-01-01 RX ADMIN — SODIUM CHLORIDE 75 MILLILITER(S): 9 INJECTION, SOLUTION INTRAVENOUS at 10:24

## 2017-01-01 RX ADMIN — Medication 50 MICROGRAM(S): at 06:45

## 2017-01-01 RX ADMIN — ATORVASTATIN CALCIUM 20 MILLIGRAM(S): 80 TABLET, FILM COATED ORAL at 22:43

## 2017-01-01 RX ADMIN — ENOXAPARIN SODIUM 40 MILLIGRAM(S): 100 INJECTION SUBCUTANEOUS at 22:43

## 2017-01-01 RX ADMIN — Medication 1 APPLICATION(S): at 07:38

## 2017-01-01 RX ADMIN — GABAPENTIN 300 MILLIGRAM(S): 400 CAPSULE ORAL at 23:22

## 2017-01-01 RX ADMIN — Medication 25 MILLIGRAM(S): at 05:36

## 2017-01-01 RX ADMIN — Medication 300 MILLIGRAM(S): at 13:00

## 2017-01-01 RX ADMIN — ATORVASTATIN CALCIUM 20 MILLIGRAM(S): 80 TABLET, FILM COATED ORAL at 22:53

## 2017-01-01 RX ADMIN — MORPHINE SULFATE 0.5 MILLIGRAM(S): 50 CAPSULE, EXTENDED RELEASE ORAL at 02:53

## 2017-01-01 RX ADMIN — SODIUM CHLORIDE 3 MILLILITER(S): 9 INJECTION INTRAMUSCULAR; INTRAVENOUS; SUBCUTANEOUS at 21:19

## 2017-01-01 RX ADMIN — Medication 1 APPLICATION(S): at 05:24

## 2017-01-01 RX ADMIN — Medication 1 GRAM(S): at 23:32

## 2017-01-01 RX ADMIN — CEFEPIME 100 MILLIGRAM(S): 1 INJECTION, POWDER, FOR SOLUTION INTRAMUSCULAR; INTRAVENOUS at 17:46

## 2017-01-01 RX ADMIN — MORPHINE SULFATE 0.5 MILLIGRAM(S): 50 CAPSULE, EXTENDED RELEASE ORAL at 13:32

## 2017-01-01 RX ADMIN — SIMETHICONE 80 MILLIGRAM(S): 80 TABLET, CHEWABLE ORAL at 17:22

## 2017-01-01 RX ADMIN — Medication 1 GRAM(S): at 05:59

## 2017-01-01 RX ADMIN — Medication 100 MILLIGRAM(S): at 05:32

## 2017-01-01 RX ADMIN — Medication 25 MILLIGRAM(S): at 05:59

## 2017-01-01 RX ADMIN — Medication 250 MILLIGRAM(S): at 12:07

## 2017-01-01 RX ADMIN — PANTOPRAZOLE SODIUM 40 MILLIGRAM(S): 20 TABLET, DELAYED RELEASE ORAL at 05:19

## 2017-01-01 RX ADMIN — MORPHINE SULFATE 1 MILLIGRAM(S): 50 CAPSULE, EXTENDED RELEASE ORAL at 14:35

## 2017-01-01 RX ADMIN — OXYCODONE AND ACETAMINOPHEN 1 TABLET(S): 5; 325 TABLET ORAL at 08:25

## 2017-01-01 RX ADMIN — Medication 650 MILLIGRAM(S): at 11:27

## 2017-01-01 RX ADMIN — BUDESONIDE AND FORMOTEROL FUMARATE DIHYDRATE 2 PUFF(S): 160; 4.5 AEROSOL RESPIRATORY (INHALATION) at 21:10

## 2017-01-01 RX ADMIN — BUDESONIDE AND FORMOTEROL FUMARATE DIHYDRATE 2 PUFF(S): 160; 4.5 AEROSOL RESPIRATORY (INHALATION) at 10:09

## 2017-01-01 RX ADMIN — Medication 650 MILLIGRAM(S): at 23:21

## 2017-01-01 RX ADMIN — LATANOPROST 1 DROP(S): 0.05 SOLUTION/ DROPS OPHTHALMIC; TOPICAL at 22:41

## 2017-01-01 RX ADMIN — Medication 50 MICROGRAM(S): at 11:48

## 2017-01-01 RX ADMIN — ENOXAPARIN SODIUM 40 MILLIGRAM(S): 100 INJECTION SUBCUTANEOUS at 21:12

## 2017-01-01 RX ADMIN — Medication 10 MILLIGRAM(S): at 19:08

## 2017-01-01 RX ADMIN — Medication 1 GRAM(S): at 05:10

## 2017-01-01 RX ADMIN — Medication 1 GRAM(S): at 01:35

## 2017-01-01 RX ADMIN — TIOTROPIUM BROMIDE 1 CAPSULE(S): 18 CAPSULE ORAL; RESPIRATORY (INHALATION) at 10:09

## 2017-01-01 RX ADMIN — BUDESONIDE AND FORMOTEROL FUMARATE DIHYDRATE 1 PUFF(S): 160; 4.5 AEROSOL RESPIRATORY (INHALATION) at 21:04

## 2017-01-01 RX ADMIN — SODIUM CHLORIDE 75 MILLILITER(S): 9 INJECTION, SOLUTION INTRAVENOUS at 06:45

## 2017-01-01 RX ADMIN — Medication 40 MILLIGRAM(S): at 07:10

## 2017-01-01 RX ADMIN — BUDESONIDE AND FORMOTEROL FUMARATE DIHYDRATE 2 PUFF(S): 160; 4.5 AEROSOL RESPIRATORY (INHALATION) at 10:07

## 2017-01-01 RX ADMIN — Medication 81 MILLIGRAM(S): at 11:54

## 2017-01-01 RX ADMIN — SODIUM CHLORIDE 500 MILLILITER(S): 9 INJECTION INTRAMUSCULAR; INTRAVENOUS; SUBCUTANEOUS at 03:24

## 2017-01-01 RX ADMIN — BUDESONIDE AND FORMOTEROL FUMARATE DIHYDRATE 2 PUFF(S): 160; 4.5 AEROSOL RESPIRATORY (INHALATION) at 21:51

## 2017-01-01 RX ADMIN — ENOXAPARIN SODIUM 40 MILLIGRAM(S): 100 INJECTION SUBCUTANEOUS at 21:44

## 2017-01-01 RX ADMIN — CEFEPIME 100 MILLIGRAM(S): 1 INJECTION, POWDER, FOR SOLUTION INTRAMUSCULAR; INTRAVENOUS at 17:15

## 2017-01-01 RX ADMIN — Medication 100 MILLIGRAM(S): at 17:41

## 2017-01-01 RX ADMIN — Medication 25 MICROGRAM(S): at 06:29

## 2017-01-01 RX ADMIN — Medication 25 MILLIGRAM(S): at 14:32

## 2017-01-01 RX ADMIN — AMIODARONE HYDROCHLORIDE 400 MILLIGRAM(S): 400 TABLET ORAL at 06:40

## 2017-01-01 RX ADMIN — LIDOCAINE 1 PATCH: 4 CREAM TOPICAL at 23:30

## 2017-01-01 RX ADMIN — PANTOPRAZOLE SODIUM 40 MILLIGRAM(S): 20 TABLET, DELAYED RELEASE ORAL at 06:51

## 2017-01-01 RX ADMIN — Medication 10 MILLIGRAM(S): at 17:29

## 2017-01-01 RX ADMIN — Medication 650 MILLIGRAM(S): at 17:45

## 2017-01-01 RX ADMIN — AMIODARONE HYDROCHLORIDE 400 MILLIGRAM(S): 400 TABLET ORAL at 21:15

## 2017-01-01 RX ADMIN — LOSARTAN POTASSIUM 25 MILLIGRAM(S): 100 TABLET, FILM COATED ORAL at 06:02

## 2017-01-01 RX ADMIN — Medication 1 TABLET(S): at 11:35

## 2017-01-01 RX ADMIN — GABAPENTIN 300 MILLIGRAM(S): 400 CAPSULE ORAL at 15:21

## 2017-01-01 RX ADMIN — LATANOPROST 1 DROP(S): 0.05 SOLUTION/ DROPS OPHTHALMIC; TOPICAL at 22:55

## 2017-01-01 RX ADMIN — SPIRONOLACTONE 12.5 MILLIGRAM(S): 25 TABLET, FILM COATED ORAL at 07:14

## 2017-01-01 RX ADMIN — PANTOPRAZOLE SODIUM 40 MILLIGRAM(S): 20 TABLET, DELAYED RELEASE ORAL at 05:05

## 2017-01-01 RX ADMIN — Medication 81 MILLIGRAM(S): at 14:51

## 2017-01-01 RX ADMIN — Medication 400 MILLIGRAM(S): at 15:59

## 2017-01-01 RX ADMIN — Medication 5 MILLILITER(S): at 13:24

## 2017-01-01 RX ADMIN — Medication 25 MILLIGRAM(S): at 06:45

## 2017-01-01 RX ADMIN — Medication 1 GRAM(S): at 13:00

## 2017-01-01 RX ADMIN — DIPHENHYDRAMINE HYDROCHLORIDE AND LIDOCAINE HYDROCHLORIDE AND ALUMINUM HYDROXIDE AND MAGNESIUM HYDRO 5 MILLILITER(S): KIT at 06:45

## 2017-01-01 RX ADMIN — LATANOPROST 1 DROP(S): 0.05 SOLUTION/ DROPS OPHTHALMIC; TOPICAL at 21:54

## 2017-01-01 RX ADMIN — Medication 100 MILLIGRAM(S): at 22:03

## 2017-01-01 RX ADMIN — Medication 62.5 MILLIMOLE(S): at 11:44

## 2017-01-01 RX ADMIN — Medication 40 MILLIGRAM(S): at 06:00

## 2017-01-01 RX ADMIN — TIOTROPIUM BROMIDE 1 CAPSULE(S): 18 CAPSULE ORAL; RESPIRATORY (INHALATION) at 09:11

## 2017-01-01 RX ADMIN — ATORVASTATIN CALCIUM 20 MILLIGRAM(S): 80 TABLET, FILM COATED ORAL at 22:11

## 2017-01-01 RX ADMIN — Medication 25 MICROGRAM(S): at 06:23

## 2017-01-01 RX ADMIN — Medication 1 TABLET(S): at 17:38

## 2017-01-01 RX ADMIN — Medication 50 MICROGRAM(S): at 06:30

## 2017-01-01 RX ADMIN — AMIODARONE HYDROCHLORIDE 400 MILLIGRAM(S): 400 TABLET ORAL at 06:27

## 2017-01-01 RX ADMIN — Medication 63.75 MILLIMOLE(S): at 11:55

## 2017-01-01 RX ADMIN — Medication 1 GRAM(S): at 16:54

## 2017-01-01 RX ADMIN — Medication 650 MILLIGRAM(S): at 00:30

## 2017-01-01 RX ADMIN — Medication 650 MILLIGRAM(S): at 00:15

## 2017-01-01 RX ADMIN — Medication 300 MILLIGRAM(S): at 11:41

## 2017-01-01 RX ADMIN — IMIPENEM AND CILASTATIN 100 MILLIGRAM(S): 250; 250 INJECTION, POWDER, FOR SOLUTION INTRAVENOUS at 15:40

## 2017-01-01 RX ADMIN — SODIUM CHLORIDE 3 MILLILITER(S): 9 INJECTION INTRAMUSCULAR; INTRAVENOUS; SUBCUTANEOUS at 07:30

## 2017-01-01 RX ADMIN — LIDOCAINE 1 PATCH: 4 CREAM TOPICAL at 12:30

## 2017-01-01 RX ADMIN — SODIUM CHLORIDE 60 MILLILITER(S): 9 INJECTION, SOLUTION INTRAVENOUS at 17:37

## 2017-01-01 RX ADMIN — IMIPENEM AND CILASTATIN 100 MILLIGRAM(S): 250; 250 INJECTION, POWDER, FOR SOLUTION INTRAVENOUS at 05:13

## 2017-01-01 RX ADMIN — Medication 62.5 MILLIMOLE(S): at 12:51

## 2017-01-01 RX ADMIN — Medication 1 APPLICATION(S): at 20:27

## 2017-01-01 RX ADMIN — BUDESONIDE AND FORMOTEROL FUMARATE DIHYDRATE 2 PUFF(S): 160; 4.5 AEROSOL RESPIRATORY (INHALATION) at 10:23

## 2017-01-01 RX ADMIN — ENOXAPARIN SODIUM 40 MILLIGRAM(S): 100 INJECTION SUBCUTANEOUS at 21:39

## 2017-01-01 RX ADMIN — ENOXAPARIN SODIUM 40 MILLIGRAM(S): 100 INJECTION SUBCUTANEOUS at 11:24

## 2017-01-01 RX ADMIN — Medication 1 APPLICATION(S): at 10:42

## 2017-01-01 RX ADMIN — BUDESONIDE AND FORMOTEROL FUMARATE DIHYDRATE 2 PUFF(S): 160; 4.5 AEROSOL RESPIRATORY (INHALATION) at 22:23

## 2017-01-01 RX ADMIN — Medication 25 MILLIGRAM(S): at 05:34

## 2017-01-01 RX ADMIN — SPIRONOLACTONE 12.5 MILLIGRAM(S): 25 TABLET, FILM COATED ORAL at 05:47

## 2017-01-01 RX ADMIN — TIOTROPIUM BROMIDE 1 CAPSULE(S): 18 CAPSULE ORAL; RESPIRATORY (INHALATION) at 14:37

## 2017-01-01 RX ADMIN — DIPHENHYDRAMINE HYDROCHLORIDE AND LIDOCAINE HYDROCHLORIDE AND ALUMINUM HYDROXIDE AND MAGNESIUM HYDRO 5 MILLILITER(S): KIT at 14:37

## 2017-01-01 RX ADMIN — Medication 1 APPLICATION(S): at 18:06

## 2017-01-01 RX ADMIN — BUDESONIDE AND FORMOTEROL FUMARATE DIHYDRATE 2 PUFF(S): 160; 4.5 AEROSOL RESPIRATORY (INHALATION) at 09:07

## 2017-01-01 RX ADMIN — Medication 650 MILLIGRAM(S): at 23:54

## 2017-01-01 RX ADMIN — Medication 650 MILLIGRAM(S): at 18:15

## 2017-01-01 RX ADMIN — Medication 162 MILLIGRAM(S): at 23:34

## 2017-01-01 RX ADMIN — Medication 1 APPLICATION(S): at 17:58

## 2017-01-01 RX ADMIN — BUDESONIDE AND FORMOTEROL FUMARATE DIHYDRATE 2 PUFF(S): 160; 4.5 AEROSOL RESPIRATORY (INHALATION) at 22:57

## 2017-01-01 RX ADMIN — CEFEPIME 100 MILLIGRAM(S): 1 INJECTION, POWDER, FOR SOLUTION INTRAMUSCULAR; INTRAVENOUS at 17:27

## 2017-01-01 RX ADMIN — CEFEPIME 100 MILLIGRAM(S): 1 INJECTION, POWDER, FOR SOLUTION INTRAMUSCULAR; INTRAVENOUS at 18:09

## 2017-01-01 RX ADMIN — PANTOPRAZOLE SODIUM 40 MILLIGRAM(S): 20 TABLET, DELAYED RELEASE ORAL at 17:02

## 2017-01-01 RX ADMIN — Medication 81 MILLIGRAM(S): at 22:24

## 2017-01-01 RX ADMIN — Medication 25 MILLIGRAM(S): at 18:52

## 2017-01-01 RX ADMIN — Medication 1 GRAM(S): at 05:55

## 2017-01-01 RX ADMIN — MORPHINE SULFATE 0.5 MILLIGRAM(S): 50 CAPSULE, EXTENDED RELEASE ORAL at 19:08

## 2017-01-01 RX ADMIN — BUDESONIDE AND FORMOTEROL FUMARATE DIHYDRATE 2 PUFF(S): 160; 4.5 AEROSOL RESPIRATORY (INHALATION) at 22:41

## 2017-01-01 RX ADMIN — Medication 100 MILLIGRAM(S): at 22:27

## 2017-01-01 RX ADMIN — AMIODARONE HYDROCHLORIDE 400 MILLIGRAM(S): 400 TABLET ORAL at 14:32

## 2017-01-01 RX ADMIN — Medication 1 APPLICATION(S): at 06:46

## 2017-01-01 RX ADMIN — Medication 100 MILLIGRAM(S): at 21:50

## 2017-01-01 RX ADMIN — POLYETHYLENE GLYCOL 3350 17 GRAM(S): 17 POWDER, FOR SOLUTION ORAL at 13:42

## 2017-01-01 RX ADMIN — BUDESONIDE AND FORMOTEROL FUMARATE DIHYDRATE 2 PUFF(S): 160; 4.5 AEROSOL RESPIRATORY (INHALATION) at 21:43

## 2017-01-01 RX ADMIN — ATORVASTATIN CALCIUM 20 MILLIGRAM(S): 80 TABLET, FILM COATED ORAL at 21:10

## 2017-01-01 RX ADMIN — Medication 400 MILLIGRAM(S): at 11:28

## 2017-01-01 RX ADMIN — CEFEPIME 100 MILLIGRAM(S): 1 INJECTION, POWDER, FOR SOLUTION INTRAMUSCULAR; INTRAVENOUS at 17:33

## 2017-01-01 RX ADMIN — Medication 25 MICROGRAM(S): at 06:27

## 2017-01-01 RX ADMIN — Medication 25 MICROGRAM(S): at 05:59

## 2017-01-01 RX ADMIN — LATANOPROST 1 DROP(S): 0.05 SOLUTION/ DROPS OPHTHALMIC; TOPICAL at 21:45

## 2017-01-01 RX ADMIN — SENNA PLUS 2 TABLET(S): 8.6 TABLET ORAL at 23:23

## 2017-01-01 RX ADMIN — Medication 1 GRAM(S): at 21:53

## 2017-01-01 RX ADMIN — BUDESONIDE AND FORMOTEROL FUMARATE DIHYDRATE 1 PUFF(S): 160; 4.5 AEROSOL RESPIRATORY (INHALATION) at 11:59

## 2017-01-01 RX ADMIN — Medication 25 MILLIGRAM(S): at 05:37

## 2017-01-01 RX ADMIN — ATORVASTATIN CALCIUM 20 MILLIGRAM(S): 80 TABLET, FILM COATED ORAL at 21:15

## 2017-01-01 RX ADMIN — TIOTROPIUM BROMIDE 1 CAPSULE(S): 18 CAPSULE ORAL; RESPIRATORY (INHALATION) at 14:39

## 2017-01-01 RX ADMIN — BUDESONIDE AND FORMOTEROL FUMARATE DIHYDRATE 2 PUFF(S): 160; 4.5 AEROSOL RESPIRATORY (INHALATION) at 09:22

## 2017-01-01 RX ADMIN — PANTOPRAZOLE SODIUM 40 MILLIGRAM(S): 20 TABLET, DELAYED RELEASE ORAL at 05:36

## 2017-01-01 RX ADMIN — Medication 50 MICROGRAM(S): at 06:42

## 2017-01-01 RX ADMIN — MORPHINE SULFATE 0.5 MILLIGRAM(S): 50 CAPSULE, EXTENDED RELEASE ORAL at 09:54

## 2017-01-01 RX ADMIN — ATORVASTATIN CALCIUM 20 MILLIGRAM(S): 80 TABLET, FILM COATED ORAL at 21:23

## 2017-01-01 RX ADMIN — SENNA PLUS 2 TABLET(S): 8.6 TABLET ORAL at 21:01

## 2017-01-01 RX ADMIN — Medication 81 MILLIGRAM(S): at 18:52

## 2017-01-01 RX ADMIN — SODIUM CHLORIDE 3 MILLILITER(S): 9 INJECTION INTRAMUSCULAR; INTRAVENOUS; SUBCUTANEOUS at 06:25

## 2017-01-01 RX ADMIN — Medication 100 MILLIEQUIVALENT(S): at 20:24

## 2017-01-01 RX ADMIN — Medication 1 GRAM(S): at 17:29

## 2017-01-01 RX ADMIN — SPIRONOLACTONE 12.5 MILLIGRAM(S): 25 TABLET, FILM COATED ORAL at 07:01

## 2017-01-01 RX ADMIN — Medication 1 APPLICATION(S): at 17:09

## 2017-01-01 RX ADMIN — SIMETHICONE 80 MILLIGRAM(S): 80 TABLET, CHEWABLE ORAL at 23:45

## 2017-01-01 RX ADMIN — Medication 1 GRAM(S): at 05:33

## 2017-01-01 RX ADMIN — BUDESONIDE AND FORMOTEROL FUMARATE DIHYDRATE 2 PUFF(S): 160; 4.5 AEROSOL RESPIRATORY (INHALATION) at 08:49

## 2017-01-01 RX ADMIN — MORPHINE SULFATE 1 MILLIGRAM(S): 50 CAPSULE, EXTENDED RELEASE ORAL at 14:51

## 2017-01-01 RX ADMIN — CEFEPIME 100 MILLIGRAM(S): 1 INJECTION, POWDER, FOR SOLUTION INTRAMUSCULAR; INTRAVENOUS at 17:00

## 2017-01-01 RX ADMIN — MORPHINE SULFATE 0.5 MILLIGRAM(S): 50 CAPSULE, EXTENDED RELEASE ORAL at 17:25

## 2017-01-01 RX ADMIN — AMIODARONE HYDROCHLORIDE 400 MILLIGRAM(S): 400 TABLET ORAL at 06:20

## 2017-01-01 RX ADMIN — Medication 10 MILLIGRAM(S): at 15:13

## 2017-01-01 RX ADMIN — ENOXAPARIN SODIUM 40 MILLIGRAM(S): 100 INJECTION SUBCUTANEOUS at 23:23

## 2017-01-01 RX ADMIN — MORPHINE SULFATE 1 MILLIGRAM(S): 50 CAPSULE, EXTENDED RELEASE ORAL at 22:22

## 2017-01-01 RX ADMIN — MIRTAZAPINE 7.5 MILLIGRAM(S): 45 TABLET, ORALLY DISINTEGRATING ORAL at 21:48

## 2017-01-01 RX ADMIN — SPIRONOLACTONE 12.5 MILLIGRAM(S): 25 TABLET, FILM COATED ORAL at 06:55

## 2017-01-01 RX ADMIN — MORPHINE SULFATE 0.5 MILLIGRAM(S): 50 CAPSULE, EXTENDED RELEASE ORAL at 05:59

## 2017-01-01 RX ADMIN — Medication 81 MILLIGRAM(S): at 11:28

## 2017-01-01 RX ADMIN — CEFEPIME 100 MILLIGRAM(S): 1 INJECTION, POWDER, FOR SOLUTION INTRAMUSCULAR; INTRAVENOUS at 06:21

## 2017-01-01 RX ADMIN — PANTOPRAZOLE SODIUM 40 MILLIGRAM(S): 20 TABLET, DELAYED RELEASE ORAL at 17:39

## 2017-01-01 RX ADMIN — Medication 15 MILLIGRAM(S): at 22:53

## 2017-01-01 RX ADMIN — Medication 50 MICROGRAM(S): at 06:54

## 2017-01-01 RX ADMIN — LOSARTAN POTASSIUM 25 MILLIGRAM(S): 100 TABLET, FILM COATED ORAL at 06:25

## 2017-01-01 RX ADMIN — Medication 1 GRAM(S): at 16:57

## 2017-01-01 RX ADMIN — Medication 100 MILLIGRAM(S): at 05:09

## 2017-01-01 RX ADMIN — Medication 1 GRAM(S): at 06:04

## 2017-01-01 RX ADMIN — Medication 650 MILLIGRAM(S): at 18:25

## 2017-01-01 RX ADMIN — Medication 400 MILLIGRAM(S): at 11:31

## 2017-01-01 RX ADMIN — Medication 15 MILLIGRAM(S): at 23:08

## 2017-01-01 RX ADMIN — PANTOPRAZOLE SODIUM 40 MILLIGRAM(S): 20 TABLET, DELAYED RELEASE ORAL at 06:00

## 2017-01-01 RX ADMIN — Medication 1 GRAM(S): at 00:15

## 2017-01-01 RX ADMIN — Medication 100 MILLIGRAM(S): at 05:18

## 2017-01-01 RX ADMIN — TIOTROPIUM BROMIDE 1 CAPSULE(S): 18 CAPSULE ORAL; RESPIRATORY (INHALATION) at 09:25

## 2017-01-01 RX ADMIN — Medication 1 TABLET(S): at 12:27

## 2017-01-01 RX ADMIN — TIOTROPIUM BROMIDE 1 CAPSULE(S): 18 CAPSULE ORAL; RESPIRATORY (INHALATION) at 09:40

## 2017-01-01 RX ADMIN — Medication 25 MICROGRAM(S): at 07:26

## 2017-01-01 RX ADMIN — ATORVASTATIN CALCIUM 20 MILLIGRAM(S): 80 TABLET, FILM COATED ORAL at 21:38

## 2017-01-01 RX ADMIN — LATANOPROST 1 DROP(S): 0.05 SOLUTION/ DROPS OPHTHALMIC; TOPICAL at 22:03

## 2017-01-01 RX ADMIN — Medication 1 GRAM(S): at 15:40

## 2017-01-01 RX ADMIN — Medication 100 MILLIGRAM(S): at 05:29

## 2017-01-01 RX ADMIN — Medication 1 TABLET(S): at 09:22

## 2017-01-01 RX ADMIN — AMIODARONE HYDROCHLORIDE 400 MILLIGRAM(S): 400 TABLET ORAL at 05:55

## 2017-01-01 RX ADMIN — Medication 30 MILLILITER(S): at 23:54

## 2017-01-01 RX ADMIN — Medication 1 GRAM(S): at 17:23

## 2017-01-01 RX ADMIN — PANTOPRAZOLE SODIUM 40 MILLIGRAM(S): 20 TABLET, DELAYED RELEASE ORAL at 16:59

## 2017-01-01 RX ADMIN — Medication 400 MILLIGRAM(S): at 02:14

## 2017-01-01 RX ADMIN — Medication 100 MILLIGRAM(S): at 13:28

## 2017-01-01 RX ADMIN — Medication 10 MILLIGRAM(S): at 06:41

## 2017-01-01 RX ADMIN — OXYCODONE AND ACETAMINOPHEN 1 TABLET(S): 5; 325 TABLET ORAL at 23:45

## 2017-01-01 RX ADMIN — SPIRONOLACTONE 12.5 MILLIGRAM(S): 25 TABLET, FILM COATED ORAL at 05:19

## 2017-01-01 RX ADMIN — BUDESONIDE AND FORMOTEROL FUMARATE DIHYDRATE 2 PUFF(S): 160; 4.5 AEROSOL RESPIRATORY (INHALATION) at 11:57

## 2017-01-01 RX ADMIN — Medication 81 MILLIGRAM(S): at 12:12

## 2017-01-01 RX ADMIN — Medication 1 APPLICATION(S): at 22:28

## 2017-01-01 RX ADMIN — PANTOPRAZOLE SODIUM 40 MILLIGRAM(S): 20 TABLET, DELAYED RELEASE ORAL at 06:26

## 2017-01-01 RX ADMIN — Medication 50 MILLILITER(S): at 23:16

## 2017-01-01 RX ADMIN — ENOXAPARIN SODIUM 40 MILLIGRAM(S): 100 INJECTION SUBCUTANEOUS at 12:27

## 2017-01-01 RX ADMIN — Medication 1 DROP(S): at 13:30

## 2017-01-01 RX ADMIN — TIOTROPIUM BROMIDE 1 CAPSULE(S): 18 CAPSULE ORAL; RESPIRATORY (INHALATION) at 09:20

## 2017-01-01 RX ADMIN — Medication 1 GRAM(S): at 17:48

## 2017-01-01 RX ADMIN — Medication 100 MILLIGRAM(S): at 05:25

## 2017-01-01 RX ADMIN — Medication 650 MILLIGRAM(S): at 23:40

## 2017-01-01 RX ADMIN — LINEZOLID 600 MILLIGRAM(S): 600 INJECTION, SOLUTION INTRAVENOUS at 17:02

## 2017-01-01 RX ADMIN — Medication 1 GRAM(S): at 05:09

## 2017-01-01 RX ADMIN — MORPHINE SULFATE 0.5 MILLIGRAM(S): 50 CAPSULE, EXTENDED RELEASE ORAL at 03:08

## 2017-01-01 RX ADMIN — ENOXAPARIN SODIUM 40 MILLIGRAM(S): 100 INJECTION SUBCUTANEOUS at 16:12

## 2017-01-01 RX ADMIN — AMIODARONE HYDROCHLORIDE 400 MILLIGRAM(S): 400 TABLET ORAL at 21:38

## 2017-01-01 RX ADMIN — SODIUM CHLORIDE 3 MILLILITER(S): 9 INJECTION INTRAMUSCULAR; INTRAVENOUS; SUBCUTANEOUS at 05:18

## 2017-01-01 RX ADMIN — Medication 300 MILLIGRAM(S): at 11:48

## 2017-01-01 RX ADMIN — Medication 25 MILLIGRAM(S): at 05:09

## 2017-01-01 RX ADMIN — ENOXAPARIN SODIUM 40 MILLIGRAM(S): 100 INJECTION SUBCUTANEOUS at 12:59

## 2017-01-01 RX ADMIN — Medication 50 MICROGRAM(S): at 06:21

## 2017-01-01 RX ADMIN — TIOTROPIUM BROMIDE 1 CAPSULE(S): 18 CAPSULE ORAL; RESPIRATORY (INHALATION) at 09:06

## 2017-01-01 RX ADMIN — LOSARTAN POTASSIUM 25 MILLIGRAM(S): 100 TABLET, FILM COATED ORAL at 06:42

## 2017-01-01 RX ADMIN — AMIODARONE HYDROCHLORIDE 400 MILLIGRAM(S): 400 TABLET ORAL at 15:02

## 2017-01-01 RX ADMIN — TIOTROPIUM BROMIDE 1 CAPSULE(S): 18 CAPSULE ORAL; RESPIRATORY (INHALATION) at 10:53

## 2017-01-01 RX ADMIN — BUDESONIDE AND FORMOTEROL FUMARATE DIHYDRATE 2 PUFF(S): 160; 4.5 AEROSOL RESPIRATORY (INHALATION) at 09:03

## 2017-01-01 RX ADMIN — AMIODARONE HYDROCHLORIDE 400 MILLIGRAM(S): 400 TABLET ORAL at 06:56

## 2017-01-01 RX ADMIN — LATANOPROST 1 DROP(S): 0.05 SOLUTION/ DROPS OPHTHALMIC; TOPICAL at 21:03

## 2017-01-01 RX ADMIN — Medication 650 MILLIGRAM(S): at 22:10

## 2017-01-01 RX ADMIN — Medication 1 GRAM(S): at 22:26

## 2017-01-01 RX ADMIN — Medication 81 MILLIGRAM(S): at 11:51

## 2017-01-01 RX ADMIN — Medication 40 MILLIGRAM(S): at 05:11

## 2017-01-01 RX ADMIN — GABAPENTIN 300 MILLIGRAM(S): 400 CAPSULE ORAL at 05:37

## 2017-01-01 RX ADMIN — CEFEPIME 100 MILLIGRAM(S): 1 INJECTION, POWDER, FOR SOLUTION INTRAMUSCULAR; INTRAVENOUS at 05:49

## 2017-01-01 RX ADMIN — SODIUM CHLORIDE 100 MILLILITER(S): 9 INJECTION, SOLUTION INTRAVENOUS at 17:53

## 2017-01-01 RX ADMIN — Medication 40 MILLIGRAM(S): at 05:25

## 2017-01-01 RX ADMIN — Medication 1 GRAM(S): at 12:12

## 2017-01-01 RX ADMIN — Medication 100 MILLIGRAM(S): at 14:23

## 2017-01-01 RX ADMIN — LOSARTAN POTASSIUM 25 MILLIGRAM(S): 100 TABLET, FILM COATED ORAL at 14:49

## 2017-01-01 RX ADMIN — Medication 1 TABLET(S): at 11:44

## 2017-01-01 RX ADMIN — Medication 650 MILLIGRAM(S): at 12:00

## 2017-01-01 RX ADMIN — Medication 1 APPLICATION(S): at 05:49

## 2017-01-01 RX ADMIN — BUDESONIDE AND FORMOTEROL FUMARATE DIHYDRATE 2 PUFF(S): 160; 4.5 AEROSOL RESPIRATORY (INHALATION) at 08:38

## 2017-01-01 RX ADMIN — Medication 1 GRAM(S): at 05:07

## 2017-01-01 RX ADMIN — Medication 150 MILLIGRAM(S): at 17:38

## 2017-01-01 RX ADMIN — MORPHINE SULFATE 1 MILLIGRAM(S): 50 CAPSULE, EXTENDED RELEASE ORAL at 03:21

## 2017-01-01 RX ADMIN — Medication 1 GRAM(S): at 12:53

## 2017-01-01 RX ADMIN — Medication 150 MILLIGRAM(S): at 08:01

## 2017-01-01 RX ADMIN — Medication 1 GRAM(S): at 12:11

## 2017-01-01 RX ADMIN — Medication 81 MILLIGRAM(S): at 13:15

## 2017-01-01 RX ADMIN — Medication 100 MILLIGRAM(S): at 08:18

## 2017-01-01 RX ADMIN — PANTOPRAZOLE SODIUM 40 MILLIGRAM(S): 20 TABLET, DELAYED RELEASE ORAL at 17:28

## 2017-01-01 RX ADMIN — Medication 40 MILLIGRAM(S): at 07:00

## 2017-01-01 RX ADMIN — BUDESONIDE AND FORMOTEROL FUMARATE DIHYDRATE 2 PUFF(S): 160; 4.5 AEROSOL RESPIRATORY (INHALATION) at 08:13

## 2017-01-01 RX ADMIN — SODIUM CHLORIDE 100 MILLILITER(S): 9 INJECTION, SOLUTION INTRAVENOUS at 06:21

## 2017-01-01 RX ADMIN — Medication 1 DROP(S): at 11:44

## 2017-01-01 RX ADMIN — Medication 150 MILLIGRAM(S): at 10:28

## 2017-01-01 RX ADMIN — SODIUM CHLORIDE 60 MILLILITER(S): 9 INJECTION, SOLUTION INTRAVENOUS at 17:44

## 2017-01-01 RX ADMIN — Medication 100 MILLIGRAM(S): at 13:06

## 2017-01-01 RX ADMIN — Medication 25 MILLIGRAM(S): at 22:57

## 2017-01-01 RX ADMIN — PANTOPRAZOLE SODIUM 40 MILLIGRAM(S): 20 TABLET, DELAYED RELEASE ORAL at 06:10

## 2017-01-01 RX ADMIN — LATANOPROST 1 DROP(S): 0.05 SOLUTION/ DROPS OPHTHALMIC; TOPICAL at 22:53

## 2017-01-01 RX ADMIN — ENOXAPARIN SODIUM 40 MILLIGRAM(S): 100 INJECTION SUBCUTANEOUS at 13:23

## 2017-01-01 RX ADMIN — TIOTROPIUM BROMIDE 1 CAPSULE(S): 18 CAPSULE ORAL; RESPIRATORY (INHALATION) at 11:57

## 2017-01-01 RX ADMIN — Medication 300 MILLIGRAM(S): at 11:28

## 2017-01-01 RX ADMIN — Medication 40 MILLIGRAM(S): at 06:02

## 2017-01-01 RX ADMIN — LOSARTAN POTASSIUM 25 MILLIGRAM(S): 100 TABLET, FILM COATED ORAL at 05:10

## 2017-01-01 RX ADMIN — Medication 25 MILLIGRAM(S): at 06:26

## 2017-01-01 RX ADMIN — LATANOPROST 1 DROP(S): 0.05 SOLUTION/ DROPS OPHTHALMIC; TOPICAL at 21:27

## 2017-01-01 RX ADMIN — AMIODARONE HYDROCHLORIDE 400 MILLIGRAM(S): 400 TABLET ORAL at 05:36

## 2017-01-01 RX ADMIN — LATANOPROST 1 DROP(S): 0.05 SOLUTION/ DROPS OPHTHALMIC; TOPICAL at 22:36

## 2017-01-01 RX ADMIN — BUDESONIDE AND FORMOTEROL FUMARATE DIHYDRATE 2 PUFF(S): 160; 4.5 AEROSOL RESPIRATORY (INHALATION) at 22:42

## 2017-01-01 RX ADMIN — MORPHINE SULFATE 1 MILLIGRAM(S): 50 CAPSULE, EXTENDED RELEASE ORAL at 17:46

## 2017-01-01 RX ADMIN — PANTOPRAZOLE SODIUM 40 MILLIGRAM(S): 20 TABLET, DELAYED RELEASE ORAL at 05:26

## 2017-01-01 RX ADMIN — Medication 25 MICROGRAM(S): at 06:06

## 2017-01-01 RX ADMIN — LATANOPROST 1 DROP(S): 0.05 SOLUTION/ DROPS OPHTHALMIC; TOPICAL at 22:00

## 2017-01-01 RX ADMIN — Medication 1 TABLET(S): at 16:39

## 2017-01-01 RX ADMIN — IMIPENEM AND CILASTATIN 100 MILLIGRAM(S): 250; 250 INJECTION, POWDER, FOR SOLUTION INTRAVENOUS at 05:07

## 2017-01-01 RX ADMIN — Medication 650 MILLIGRAM(S): at 06:21

## 2017-01-01 RX ADMIN — Medication 40 MILLIGRAM(S): at 06:52

## 2017-01-01 RX ADMIN — Medication 1 APPLICATION(S): at 17:27

## 2017-01-01 RX ADMIN — Medication 650 MILLIGRAM(S): at 15:28

## 2017-01-01 RX ADMIN — GABAPENTIN 300 MILLIGRAM(S): 400 CAPSULE ORAL at 13:12

## 2017-01-01 RX ADMIN — SENNA PLUS 2 TABLET(S): 8.6 TABLET ORAL at 21:28

## 2017-01-01 RX ADMIN — Medication 150 MILLIGRAM(S): at 17:27

## 2017-01-01 RX ADMIN — CEFEPIME 100 MILLIGRAM(S): 1 INJECTION, POWDER, FOR SOLUTION INTRAMUSCULAR; INTRAVENOUS at 18:06

## 2017-01-01 RX ADMIN — ENOXAPARIN SODIUM 40 MILLIGRAM(S): 100 INJECTION SUBCUTANEOUS at 12:18

## 2017-01-01 RX ADMIN — MORPHINE SULFATE 1 MILLIGRAM(S): 50 CAPSULE, EXTENDED RELEASE ORAL at 02:08

## 2017-01-01 RX ADMIN — Medication 5 MILLIGRAM(S): at 12:33

## 2017-01-01 RX ADMIN — LATANOPROST 1 DROP(S): 0.05 SOLUTION/ DROPS OPHTHALMIC; TOPICAL at 21:48

## 2017-01-01 RX ADMIN — Medication 650 MILLIGRAM(S): at 00:21

## 2017-01-01 RX ADMIN — CEFEPIME 100 MILLIGRAM(S): 1 INJECTION, POWDER, FOR SOLUTION INTRAMUSCULAR; INTRAVENOUS at 17:50

## 2017-01-01 RX ADMIN — Medication 25 MICROGRAM(S): at 05:34

## 2017-01-01 RX ADMIN — Medication 100 MILLIGRAM(S): at 21:27

## 2017-01-01 RX ADMIN — Medication 250 MILLIGRAM(S): at 12:29

## 2017-01-01 RX ADMIN — SPIRONOLACTONE 12.5 MILLIGRAM(S): 25 TABLET, FILM COATED ORAL at 06:49

## 2017-01-01 RX ADMIN — LOSARTAN POTASSIUM 25 MILLIGRAM(S): 100 TABLET, FILM COATED ORAL at 06:20

## 2017-01-01 RX ADMIN — Medication 50 MILLIGRAM(S): at 22:05

## 2017-01-01 RX ADMIN — CEFEPIME 100 MILLIGRAM(S): 1 INJECTION, POWDER, FOR SOLUTION INTRAMUSCULAR; INTRAVENOUS at 12:18

## 2017-01-01 RX ADMIN — Medication 100 MILLIEQUIVALENT(S): at 12:09

## 2017-01-01 RX ADMIN — POLYETHYLENE GLYCOL 3350 17 GRAM(S): 17 POWDER, FOR SOLUTION ORAL at 12:12

## 2017-01-01 RX ADMIN — POLYETHYLENE GLYCOL 3350 17 GRAM(S): 17 POWDER, FOR SOLUTION ORAL at 11:26

## 2017-01-01 RX ADMIN — LATANOPROST 1 DROP(S): 0.05 SOLUTION/ DROPS OPHTHALMIC; TOPICAL at 22:25

## 2017-01-01 RX ADMIN — Medication 40 MILLIGRAM(S): at 06:30

## 2017-01-01 RX ADMIN — CEFEPIME 100 MILLIGRAM(S): 1 INJECTION, POWDER, FOR SOLUTION INTRAMUSCULAR; INTRAVENOUS at 06:03

## 2017-01-01 RX ADMIN — Medication 650 MILLIGRAM(S): at 12:05

## 2017-01-01 RX ADMIN — Medication 150 MILLIGRAM(S): at 09:54

## 2017-01-01 RX ADMIN — Medication 100 MILLIGRAM(S): at 21:26

## 2017-01-01 RX ADMIN — MORPHINE SULFATE 1 MILLIGRAM(S): 50 CAPSULE, EXTENDED RELEASE ORAL at 13:31

## 2017-01-01 RX ADMIN — Medication 1 TABLET(S): at 18:11

## 2017-01-01 RX ADMIN — ATORVASTATIN CALCIUM 20 MILLIGRAM(S): 80 TABLET, FILM COATED ORAL at 21:46

## 2017-01-01 RX ADMIN — Medication 5 MILLIGRAM(S): at 05:25

## 2017-01-01 RX ADMIN — Medication 30 MILLIGRAM(S): at 05:19

## 2017-01-01 RX ADMIN — Medication 1 TABLET(S): at 13:12

## 2017-01-01 RX ADMIN — Medication 650 MILLIGRAM(S): at 22:00

## 2017-01-01 RX ADMIN — ENOXAPARIN SODIUM 40 MILLIGRAM(S): 100 INJECTION SUBCUTANEOUS at 11:27

## 2017-01-01 RX ADMIN — GABAPENTIN 300 MILLIGRAM(S): 400 CAPSULE ORAL at 22:27

## 2017-01-01 RX ADMIN — PANTOPRAZOLE SODIUM 40 MILLIGRAM(S): 20 TABLET, DELAYED RELEASE ORAL at 05:59

## 2017-01-01 RX ADMIN — Medication 1 GRAM(S): at 05:36

## 2017-01-01 RX ADMIN — ENOXAPARIN SODIUM 40 MILLIGRAM(S): 100 INJECTION SUBCUTANEOUS at 15:21

## 2017-01-01 RX ADMIN — POLYETHYLENE GLYCOL 3350 17 GRAM(S): 17 POWDER, FOR SOLUTION ORAL at 11:54

## 2017-01-01 RX ADMIN — LIDOCAINE 1 PATCH: 4 CREAM TOPICAL at 11:04

## 2017-01-01 RX ADMIN — MORPHINE SULFATE 1 MILLIGRAM(S): 50 CAPSULE, EXTENDED RELEASE ORAL at 18:10

## 2017-01-01 RX ADMIN — MORPHINE SULFATE 1 MILLIGRAM(S): 50 CAPSULE, EXTENDED RELEASE ORAL at 02:13

## 2017-01-01 RX ADMIN — SODIUM CHLORIDE 75 MILLILITER(S): 9 INJECTION, SOLUTION INTRAVENOUS at 11:50

## 2017-01-01 RX ADMIN — Medication 150 MILLIGRAM(S): at 18:10

## 2017-01-01 RX ADMIN — ENOXAPARIN SODIUM 40 MILLIGRAM(S): 100 INJECTION SUBCUTANEOUS at 21:52

## 2017-01-01 RX ADMIN — Medication 1 GRAM(S): at 13:14

## 2017-01-01 RX ADMIN — SODIUM CHLORIDE 100 MILLILITER(S): 9 INJECTION, SOLUTION INTRAVENOUS at 23:05

## 2017-01-01 RX ADMIN — MORPHINE SULFATE 1 MILLIGRAM(S): 50 CAPSULE, EXTENDED RELEASE ORAL at 18:25

## 2017-01-01 RX ADMIN — ENOXAPARIN SODIUM 40 MILLIGRAM(S): 100 INJECTION SUBCUTANEOUS at 12:11

## 2017-01-01 RX ADMIN — MORPHINE SULFATE 1 MILLIGRAM(S): 50 CAPSULE, EXTENDED RELEASE ORAL at 18:01

## 2017-01-01 RX ADMIN — PANTOPRAZOLE SODIUM 40 MILLIGRAM(S): 20 TABLET, DELAYED RELEASE ORAL at 11:48

## 2017-01-01 RX ADMIN — Medication 50 MICROGRAM(S): at 06:00

## 2017-01-01 RX ADMIN — Medication 100 MILLIGRAM(S): at 14:31

## 2017-01-01 RX ADMIN — Medication 200 MILLIGRAM(S): at 17:22

## 2017-01-01 RX ADMIN — Medication 1 DROP(S): at 12:51

## 2017-01-01 RX ADMIN — MORPHINE SULFATE 0.5 MILLIGRAM(S): 50 CAPSULE, EXTENDED RELEASE ORAL at 06:34

## 2017-01-01 RX ADMIN — Medication 5 MILLIGRAM(S): at 23:45

## 2017-01-01 RX ADMIN — GABAPENTIN 300 MILLIGRAM(S): 400 CAPSULE ORAL at 22:57

## 2017-01-01 RX ADMIN — PANTOPRAZOLE SODIUM 40 MILLIGRAM(S): 20 TABLET, DELAYED RELEASE ORAL at 05:28

## 2017-01-01 RX ADMIN — Medication 1 TABLET(S): at 17:14

## 2017-01-01 RX ADMIN — Medication 1 DROP(S): at 21:48

## 2017-01-01 RX ADMIN — Medication 650 MILLIGRAM(S): at 21:57

## 2017-01-01 RX ADMIN — DIPHENHYDRAMINE HYDROCHLORIDE AND LIDOCAINE HYDROCHLORIDE AND ALUMINUM HYDROXIDE AND MAGNESIUM HYDRO 5 MILLILITER(S): KIT at 13:24

## 2017-01-01 RX ADMIN — MEROPENEM 100 MILLIGRAM(S): 1 INJECTION INTRAVENOUS at 06:58

## 2017-01-01 RX ADMIN — PANTOPRAZOLE SODIUM 40 MILLIGRAM(S): 20 TABLET, DELAYED RELEASE ORAL at 05:25

## 2017-01-01 RX ADMIN — Medication 50 MICROGRAM(S): at 18:52

## 2017-01-01 RX ADMIN — Medication 30 MILLILITER(S): at 21:53

## 2017-01-01 RX ADMIN — Medication 250 MILLIGRAM(S): at 17:13

## 2017-01-01 RX ADMIN — BUDESONIDE AND FORMOTEROL FUMARATE DIHYDRATE 2 PUFF(S): 160; 4.5 AEROSOL RESPIRATORY (INHALATION) at 22:46

## 2017-01-01 RX ADMIN — AMIODARONE HYDROCHLORIDE 400 MILLIGRAM(S): 400 TABLET ORAL at 05:34

## 2017-01-01 RX ADMIN — Medication 5 MILLILITER(S): at 13:01

## 2017-01-01 RX ADMIN — Medication 81 MILLIGRAM(S): at 11:05

## 2017-01-01 RX ADMIN — LATANOPROST 1 DROP(S): 0.05 SOLUTION/ DROPS OPHTHALMIC; TOPICAL at 22:58

## 2017-01-01 RX ADMIN — Medication 1 GRAM(S): at 15:22

## 2017-01-01 RX ADMIN — SIMETHICONE 80 MILLIGRAM(S): 80 TABLET, CHEWABLE ORAL at 05:33

## 2017-01-01 RX ADMIN — BUDESONIDE AND FORMOTEROL FUMARATE DIHYDRATE 2 PUFF(S): 160; 4.5 AEROSOL RESPIRATORY (INHALATION) at 10:58

## 2017-01-01 RX ADMIN — Medication 25 MICROGRAM(S): at 06:11

## 2017-01-01 RX ADMIN — BUDESONIDE AND FORMOTEROL FUMARATE DIHYDRATE 2 PUFF(S): 160; 4.5 AEROSOL RESPIRATORY (INHALATION) at 11:48

## 2017-01-01 RX ADMIN — MORPHINE SULFATE 1 MILLIGRAM(S): 50 CAPSULE, EXTENDED RELEASE ORAL at 21:48

## 2017-01-01 RX ADMIN — CEFEPIME 100 MILLIGRAM(S): 1 INJECTION, POWDER, FOR SOLUTION INTRAMUSCULAR; INTRAVENOUS at 06:46

## 2017-01-01 RX ADMIN — Medication 1 GRAM(S): at 23:43

## 2017-01-01 RX ADMIN — LATANOPROST 1 DROP(S): 0.05 SOLUTION/ DROPS OPHTHALMIC; TOPICAL at 21:57

## 2017-01-01 RX ADMIN — Medication 100 MILLIEQUIVALENT(S): at 21:37

## 2017-01-01 RX ADMIN — IMIPENEM AND CILASTATIN 100 MILLIGRAM(S): 250; 250 INJECTION, POWDER, FOR SOLUTION INTRAVENOUS at 16:56

## 2017-01-01 RX ADMIN — MEROPENEM 100 MILLIGRAM(S): 1 INJECTION INTRAVENOUS at 07:26

## 2017-01-01 RX ADMIN — Medication 100 MILLIGRAM(S): at 15:58

## 2017-01-01 RX ADMIN — Medication 100 MILLIGRAM(S): at 08:42

## 2017-01-01 RX ADMIN — Medication 1 TABLET(S): at 11:51

## 2017-01-01 RX ADMIN — SODIUM CHLORIDE 50 MILLILITER(S): 9 INJECTION, SOLUTION INTRAVENOUS at 13:03

## 2017-01-01 RX ADMIN — AMIODARONE HYDROCHLORIDE 400 MILLIGRAM(S): 400 TABLET ORAL at 05:25

## 2017-01-01 RX ADMIN — BUDESONIDE AND FORMOTEROL FUMARATE DIHYDRATE 1 PUFF(S): 160; 4.5 AEROSOL RESPIRATORY (INHALATION) at 12:15

## 2017-01-01 RX ADMIN — Medication 40 MILLIGRAM(S): at 09:22

## 2017-01-01 RX ADMIN — Medication 50 MICROGRAM(S): at 05:25

## 2017-01-01 RX ADMIN — Medication 100 MILLIEQUIVALENT(S): at 05:00

## 2017-01-01 RX ADMIN — LATANOPROST 1 DROP(S): 0.05 SOLUTION/ DROPS OPHTHALMIC; TOPICAL at 22:43

## 2017-01-01 RX ADMIN — Medication 650 MILLIGRAM(S): at 03:00

## 2017-01-01 RX ADMIN — MORPHINE SULFATE 1 MILLIGRAM(S): 50 CAPSULE, EXTENDED RELEASE ORAL at 06:21

## 2017-01-01 RX ADMIN — Medication 25 MICROGRAM(S): at 05:46

## 2017-01-01 RX ADMIN — MORPHINE SULFATE 1 MILLIGRAM(S): 50 CAPSULE, EXTENDED RELEASE ORAL at 10:42

## 2017-01-01 RX ADMIN — Medication 1 APPLICATION(S): at 08:10

## 2017-01-01 RX ADMIN — Medication 50 MICROGRAM(S): at 05:32

## 2017-01-01 RX ADMIN — Medication 50 MICROGRAM(S): at 05:19

## 2017-01-01 RX ADMIN — SIMETHICONE 80 MILLIGRAM(S): 80 TABLET, CHEWABLE ORAL at 17:28

## 2017-01-01 RX ADMIN — Medication 50 MICROGRAM(S): at 05:37

## 2017-01-01 RX ADMIN — MEROPENEM 100 MILLIGRAM(S): 1 INJECTION INTRAVENOUS at 07:37

## 2017-01-01 RX ADMIN — LATANOPROST 1 DROP(S): 0.05 SOLUTION/ DROPS OPHTHALMIC; TOPICAL at 22:56

## 2017-01-01 RX ADMIN — Medication 650 MILLIGRAM(S): at 23:11

## 2017-01-01 RX ADMIN — Medication 81 MILLIGRAM(S): at 12:08

## 2017-01-01 RX ADMIN — ENOXAPARIN SODIUM 40 MILLIGRAM(S): 100 INJECTION SUBCUTANEOUS at 13:28

## 2017-01-01 RX ADMIN — ATORVASTATIN CALCIUM 20 MILLIGRAM(S): 80 TABLET, FILM COATED ORAL at 21:52

## 2017-01-01 RX ADMIN — Medication 50 MICROGRAM(S): at 05:58

## 2017-01-01 RX ADMIN — Medication 200 MILLIGRAM(S): at 16:54

## 2017-01-01 RX ADMIN — Medication 1 GRAM(S): at 14:50

## 2017-01-01 RX ADMIN — CEFEPIME 100 MILLIGRAM(S): 1 INJECTION, POWDER, FOR SOLUTION INTRAMUSCULAR; INTRAVENOUS at 14:12

## 2017-01-01 RX ADMIN — SPIRONOLACTONE 12.5 MILLIGRAM(S): 25 TABLET, FILM COATED ORAL at 05:56

## 2017-01-01 RX ADMIN — POLYETHYLENE GLYCOL 3350 17 GRAM(S): 17 POWDER, FOR SOLUTION ORAL at 12:33

## 2017-01-01 RX ADMIN — Medication 30 MILLIGRAM(S): at 15:52

## 2017-01-01 RX ADMIN — BUDESONIDE AND FORMOTEROL FUMARATE DIHYDRATE 1 PUFF(S): 160; 4.5 AEROSOL RESPIRATORY (INHALATION) at 08:46

## 2017-01-01 RX ADMIN — Medication 25 MILLIGRAM(S): at 08:43

## 2017-01-01 RX ADMIN — GABAPENTIN 200 MILLIGRAM(S): 400 CAPSULE ORAL at 21:44

## 2017-01-01 RX ADMIN — Medication 1 GRAM(S): at 17:09

## 2017-01-01 RX ADMIN — BUDESONIDE AND FORMOTEROL FUMARATE DIHYDRATE 2 PUFF(S): 160; 4.5 AEROSOL RESPIRATORY (INHALATION) at 21:22

## 2017-01-01 RX ADMIN — Medication 40 MILLIGRAM(S): at 06:19

## 2017-01-01 RX ADMIN — Medication 25 MICROGRAM(S): at 06:15

## 2017-01-01 RX ADMIN — LATANOPROST 1 DROP(S): 0.05 SOLUTION/ DROPS OPHTHALMIC; TOPICAL at 22:38

## 2017-01-01 RX ADMIN — Medication 40 MILLIGRAM(S): at 05:18

## 2017-01-01 RX ADMIN — Medication 30 MILLIGRAM(S): at 21:15

## 2017-01-01 RX ADMIN — MIRTAZAPINE 7.5 MILLIGRAM(S): 45 TABLET, ORALLY DISINTEGRATING ORAL at 21:37

## 2017-01-01 RX ADMIN — Medication 2.5 MILLIGRAM(S): at 12:47

## 2017-01-01 RX ADMIN — LATANOPROST 1 DROP(S): 0.05 SOLUTION/ DROPS OPHTHALMIC; TOPICAL at 22:46

## 2017-01-01 RX ADMIN — Medication 1 APPLICATION(S): at 22:57

## 2017-01-01 RX ADMIN — Medication 10 MILLIGRAM(S): at 17:00

## 2017-01-01 RX ADMIN — SODIUM CHLORIDE 60 MILLILITER(S): 9 INJECTION, SOLUTION INTRAVENOUS at 23:57

## 2017-01-01 RX ADMIN — TIOTROPIUM BROMIDE 1 CAPSULE(S): 18 CAPSULE ORAL; RESPIRATORY (INHALATION) at 09:55

## 2017-01-01 RX ADMIN — Medication 1 APPLICATION(S): at 05:50

## 2017-01-01 RX ADMIN — Medication 300 MILLIGRAM(S): at 13:04

## 2017-01-01 RX ADMIN — SPIRONOLACTONE 12.5 MILLIGRAM(S): 25 TABLET, FILM COATED ORAL at 05:58

## 2017-01-01 RX ADMIN — PANTOPRAZOLE SODIUM 40 MILLIGRAM(S): 20 TABLET, DELAYED RELEASE ORAL at 05:47

## 2017-01-01 RX ADMIN — GABAPENTIN 200 MILLIGRAM(S): 400 CAPSULE ORAL at 21:23

## 2017-01-01 RX ADMIN — Medication 50 MICROGRAM(S): at 05:29

## 2017-01-01 RX ADMIN — Medication 1 TABLET(S): at 17:09

## 2017-01-01 RX ADMIN — BUDESONIDE AND FORMOTEROL FUMARATE DIHYDRATE 2 PUFF(S): 160; 4.5 AEROSOL RESPIRATORY (INHALATION) at 09:30

## 2017-01-01 RX ADMIN — BUDESONIDE AND FORMOTEROL FUMARATE DIHYDRATE 2 PUFF(S): 160; 4.5 AEROSOL RESPIRATORY (INHALATION) at 21:54

## 2017-01-01 RX ADMIN — Medication 1 APPLICATION(S): at 06:02

## 2017-01-01 RX ADMIN — Medication 1 GRAM(S): at 17:22

## 2017-01-01 RX ADMIN — MORPHINE SULFATE 0.5 MILLIGRAM(S): 50 CAPSULE, EXTENDED RELEASE ORAL at 00:27

## 2017-01-01 RX ADMIN — SPIRONOLACTONE 12.5 MILLIGRAM(S): 25 TABLET, FILM COATED ORAL at 06:21

## 2017-01-01 RX ADMIN — Medication 81 MILLIGRAM(S): at 17:35

## 2017-01-01 RX ADMIN — AMIODARONE HYDROCHLORIDE 400 MILLIGRAM(S): 400 TABLET ORAL at 05:09

## 2017-01-01 RX ADMIN — LIDOCAINE 1 PATCH: 4 CREAM TOPICAL at 11:53

## 2017-01-01 RX ADMIN — MORPHINE SULFATE 0.5 MILLIGRAM(S): 50 CAPSULE, EXTENDED RELEASE ORAL at 07:10

## 2017-01-01 RX ADMIN — Medication 40 MILLIGRAM(S): at 05:29

## 2017-01-01 RX ADMIN — MORPHINE SULFATE 1 MILLIGRAM(S): 50 CAPSULE, EXTENDED RELEASE ORAL at 18:19

## 2017-01-01 RX ADMIN — MEROPENEM 100 MILLIGRAM(S): 1 INJECTION INTRAVENOUS at 18:28

## 2017-01-01 RX ADMIN — PANTOPRAZOLE SODIUM 40 MILLIGRAM(S): 20 TABLET, DELAYED RELEASE ORAL at 07:01

## 2017-01-01 RX ADMIN — PANTOPRAZOLE SODIUM 40 MILLIGRAM(S): 20 TABLET, DELAYED RELEASE ORAL at 17:22

## 2017-01-01 RX ADMIN — SPIRONOLACTONE 12.5 MILLIGRAM(S): 25 TABLET, FILM COATED ORAL at 06:51

## 2017-01-01 RX ADMIN — SPIRONOLACTONE 12.5 MILLIGRAM(S): 25 TABLET, FILM COATED ORAL at 06:25

## 2017-01-01 RX ADMIN — Medication 1000 MILLIGRAM(S): at 22:30

## 2017-01-01 RX ADMIN — Medication 1 GRAM(S): at 23:27

## 2017-01-01 RX ADMIN — LOSARTAN POTASSIUM 25 MILLIGRAM(S): 100 TABLET, FILM COATED ORAL at 05:25

## 2017-01-01 RX ADMIN — BUDESONIDE AND FORMOTEROL FUMARATE DIHYDRATE 1 PUFF(S): 160; 4.5 AEROSOL RESPIRATORY (INHALATION) at 21:00

## 2017-01-01 RX ADMIN — PANTOPRAZOLE SODIUM 40 MILLIGRAM(S): 20 TABLET, DELAYED RELEASE ORAL at 17:29

## 2017-01-01 RX ADMIN — BUDESONIDE AND FORMOTEROL FUMARATE DIHYDRATE 1 PUFF(S): 160; 4.5 AEROSOL RESPIRATORY (INHALATION) at 11:25

## 2017-01-01 RX ADMIN — BUDESONIDE AND FORMOTEROL FUMARATE DIHYDRATE 2 PUFF(S): 160; 4.5 AEROSOL RESPIRATORY (INHALATION) at 22:35

## 2017-01-01 RX ADMIN — LATANOPROST 1 DROP(S): 0.05 SOLUTION/ DROPS OPHTHALMIC; TOPICAL at 23:07

## 2017-01-01 RX ADMIN — Medication 1 APPLICATION(S): at 10:00

## 2017-01-01 RX ADMIN — Medication 40 MILLIGRAM(S): at 02:07

## 2017-01-01 RX ADMIN — Medication 100 GRAM(S): at 12:20

## 2017-01-01 RX ADMIN — LOSARTAN POTASSIUM 25 MILLIGRAM(S): 100 TABLET, FILM COATED ORAL at 17:02

## 2017-01-01 RX ADMIN — Medication 30 MILLILITER(S): at 13:53

## 2017-01-01 RX ADMIN — TIOTROPIUM BROMIDE 1 CAPSULE(S): 18 CAPSULE ORAL; RESPIRATORY (INHALATION) at 10:24

## 2017-01-01 RX ADMIN — Medication 40 MILLIGRAM(S): at 06:56

## 2017-01-01 RX ADMIN — Medication 200 MILLIGRAM(S): at 05:19

## 2017-01-01 RX ADMIN — Medication 1 GRAM(S): at 07:00

## 2017-01-01 RX ADMIN — Medication 10 MILLIGRAM(S): at 17:09

## 2017-01-01 RX ADMIN — BUDESONIDE AND FORMOTEROL FUMARATE DIHYDRATE 2 PUFF(S): 160; 4.5 AEROSOL RESPIRATORY (INHALATION) at 22:14

## 2017-01-01 RX ADMIN — Medication 1 TABLET(S): at 11:48

## 2017-01-01 RX ADMIN — SIMETHICONE 80 MILLIGRAM(S): 80 TABLET, CHEWABLE ORAL at 17:29

## 2017-01-01 RX ADMIN — Medication 1 APPLICATION(S): at 10:47

## 2017-01-01 RX ADMIN — Medication 100 MILLIGRAM(S): at 02:02

## 2017-01-01 RX ADMIN — SIMETHICONE 80 MILLIGRAM(S): 80 TABLET, CHEWABLE ORAL at 23:34

## 2017-01-01 RX ADMIN — DULOXETINE HYDROCHLORIDE 60 MILLIGRAM(S): 30 CAPSULE, DELAYED RELEASE ORAL at 12:11

## 2017-01-01 RX ADMIN — Medication 100 MILLIGRAM(S): at 06:28

## 2017-01-01 RX ADMIN — Medication 25 MILLIGRAM(S): at 05:19

## 2017-01-01 RX ADMIN — IMIPENEM AND CILASTATIN 100 MILLIGRAM(S): 250; 250 INJECTION, POWDER, FOR SOLUTION INTRAVENOUS at 06:55

## 2017-01-01 RX ADMIN — Medication 200 MILLIGRAM(S): at 14:19

## 2017-01-01 RX ADMIN — Medication 1 GRAM(S): at 11:42

## 2017-01-01 RX ADMIN — OXYCODONE AND ACETAMINOPHEN 1 TABLET(S): 5; 325 TABLET ORAL at 22:44

## 2017-01-01 RX ADMIN — BUDESONIDE AND FORMOTEROL FUMARATE DIHYDRATE 2 PUFF(S): 160; 4.5 AEROSOL RESPIRATORY (INHALATION) at 21:56

## 2017-01-01 RX ADMIN — Medication 25 MILLIGRAM(S): at 06:05

## 2017-01-01 RX ADMIN — Medication 300 MILLIGRAM(S): at 11:25

## 2017-01-01 RX ADMIN — Medication 5 MILLILITER(S): at 06:30

## 2017-01-01 RX ADMIN — DIPHENHYDRAMINE HYDROCHLORIDE AND LIDOCAINE HYDROCHLORIDE AND ALUMINUM HYDROXIDE AND MAGNESIUM HYDRO 5 MILLILITER(S): KIT at 22:35

## 2017-01-01 RX ADMIN — SPIRONOLACTONE 12.5 MILLIGRAM(S): 25 TABLET, FILM COATED ORAL at 05:09

## 2017-01-01 RX ADMIN — Medication 100 MILLIGRAM(S): at 13:09

## 2017-01-01 RX ADMIN — BUDESONIDE AND FORMOTEROL FUMARATE DIHYDRATE 1 PUFF(S): 160; 4.5 AEROSOL RESPIRATORY (INHALATION) at 09:57

## 2017-01-01 RX ADMIN — SODIUM CHLORIDE 100 MILLILITER(S): 9 INJECTION, SOLUTION INTRAVENOUS at 13:19

## 2017-01-01 RX ADMIN — SIMETHICONE 80 MILLIGRAM(S): 80 TABLET, CHEWABLE ORAL at 17:33

## 2017-01-01 RX ADMIN — Medication 1 GRAM(S): at 05:18

## 2017-01-01 RX ADMIN — Medication 1 GRAM(S): at 18:56

## 2017-01-01 RX ADMIN — Medication 1 GRAM(S): at 17:06

## 2017-01-01 RX ADMIN — LOSARTAN POTASSIUM 25 MILLIGRAM(S): 100 TABLET, FILM COATED ORAL at 06:53

## 2017-01-01 RX ADMIN — CEFEPIME 100 MILLIGRAM(S): 1 INJECTION, POWDER, FOR SOLUTION INTRAMUSCULAR; INTRAVENOUS at 04:20

## 2017-01-01 RX ADMIN — TIOTROPIUM BROMIDE 1 CAPSULE(S): 18 CAPSULE ORAL; RESPIRATORY (INHALATION) at 10:10

## 2017-01-01 RX ADMIN — BUDESONIDE AND FORMOTEROL FUMARATE DIHYDRATE 2 PUFF(S): 160; 4.5 AEROSOL RESPIRATORY (INHALATION) at 08:45

## 2017-01-01 RX ADMIN — TIOTROPIUM BROMIDE 1 CAPSULE(S): 18 CAPSULE ORAL; RESPIRATORY (INHALATION) at 09:02

## 2017-01-01 RX ADMIN — Medication 1 TABLET(S): at 14:49

## 2017-01-01 RX ADMIN — Medication 650 MILLIGRAM(S): at 02:02

## 2017-01-01 RX ADMIN — Medication 1 GRAM(S): at 14:32

## 2017-01-01 RX ADMIN — Medication 81 MILLIGRAM(S): at 12:27

## 2017-01-01 RX ADMIN — Medication 1000 MILLIGRAM(S): at 16:30

## 2017-01-01 RX ADMIN — Medication 25 MILLIGRAM(S): at 07:16

## 2017-01-01 RX ADMIN — SIMETHICONE 80 MILLIGRAM(S): 80 TABLET, CHEWABLE ORAL at 06:29

## 2017-01-01 RX ADMIN — LOSARTAN POTASSIUM 25 MILLIGRAM(S): 100 TABLET, FILM COATED ORAL at 11:06

## 2017-01-01 RX ADMIN — TIOTROPIUM BROMIDE 1 CAPSULE(S): 18 CAPSULE ORAL; RESPIRATORY (INHALATION) at 12:22

## 2017-01-01 RX ADMIN — ENOXAPARIN SODIUM 40 MILLIGRAM(S): 100 INJECTION SUBCUTANEOUS at 13:15

## 2017-01-01 RX ADMIN — Medication 1 APPLICATION(S): at 17:34

## 2017-01-01 RX ADMIN — Medication 300 MILLIGRAM(S): at 11:54

## 2017-01-01 RX ADMIN — MIRTAZAPINE 7.5 MILLIGRAM(S): 45 TABLET, ORALLY DISINTEGRATING ORAL at 22:38

## 2017-01-01 RX ADMIN — AMIODARONE HYDROCHLORIDE 400 MILLIGRAM(S): 400 TABLET ORAL at 17:03

## 2017-01-01 RX ADMIN — ENOXAPARIN SODIUM 40 MILLIGRAM(S): 100 INJECTION SUBCUTANEOUS at 14:02

## 2017-01-01 RX ADMIN — MORPHINE SULFATE 1 MILLIGRAM(S): 50 CAPSULE, EXTENDED RELEASE ORAL at 02:09

## 2017-01-01 RX ADMIN — IMIPENEM AND CILASTATIN 100 MILLIGRAM(S): 250; 250 INJECTION, POWDER, FOR SOLUTION INTRAVENOUS at 11:06

## 2017-01-01 RX ADMIN — Medication 1 DROP(S): at 13:41

## 2017-01-01 RX ADMIN — SODIUM CHLORIDE 60 MILLILITER(S): 9 INJECTION, SOLUTION INTRAVENOUS at 11:40

## 2017-01-01 RX ADMIN — Medication 50 MICROGRAM(S): at 05:59

## 2017-01-01 RX ADMIN — Medication 250 MILLIGRAM(S): at 10:10

## 2017-01-01 RX ADMIN — Medication 1 GRAM(S): at 11:53

## 2017-01-01 RX ADMIN — Medication 300 MILLIGRAM(S): at 12:53

## 2017-01-01 RX ADMIN — AMIODARONE HYDROCHLORIDE 400 MILLIGRAM(S): 400 TABLET ORAL at 15:39

## 2017-01-01 RX ADMIN — Medication 40 MILLIGRAM(S): at 06:42

## 2017-01-01 RX ADMIN — Medication 40 MILLIGRAM(S): at 06:26

## 2017-01-01 RX ADMIN — SODIUM CHLORIDE 75 MILLILITER(S): 9 INJECTION INTRAMUSCULAR; INTRAVENOUS; SUBCUTANEOUS at 22:05

## 2017-01-01 RX ADMIN — Medication 150 MILLIGRAM(S): at 06:19

## 2017-01-01 RX ADMIN — Medication 100 MILLIGRAM(S): at 10:25

## 2017-01-01 RX ADMIN — BUDESONIDE AND FORMOTEROL FUMARATE DIHYDRATE 2 PUFF(S): 160; 4.5 AEROSOL RESPIRATORY (INHALATION) at 21:39

## 2017-01-01 RX ADMIN — Medication 1 TABLET(S): at 13:35

## 2017-01-01 RX ADMIN — Medication 100 MILLIGRAM(S): at 22:21

## 2017-01-01 RX ADMIN — OXYCODONE AND ACETAMINOPHEN 1 TABLET(S): 5; 325 TABLET ORAL at 07:25

## 2017-01-01 RX ADMIN — MORPHINE SULFATE 1 MILLIGRAM(S): 50 CAPSULE, EXTENDED RELEASE ORAL at 07:03

## 2017-01-01 RX ADMIN — MEROPENEM 100 MILLIGRAM(S): 1 INJECTION INTRAVENOUS at 07:10

## 2017-01-01 RX ADMIN — MEROPENEM 100 MILLIGRAM(S): 1 INJECTION INTRAVENOUS at 05:50

## 2017-01-01 RX ADMIN — LATANOPROST 1 DROP(S): 0.05 SOLUTION/ DROPS OPHTHALMIC; TOPICAL at 22:49

## 2017-01-01 RX ADMIN — SODIUM CHLORIDE 60 MILLILITER(S): 9 INJECTION, SOLUTION INTRAVENOUS at 19:00

## 2017-01-01 RX ADMIN — SODIUM CHLORIDE 1000 MILLILITER(S): 9 INJECTION INTRAMUSCULAR; INTRAVENOUS; SUBCUTANEOUS at 15:50

## 2017-01-01 RX ADMIN — Medication 25 MILLIGRAM(S): at 06:21

## 2017-01-01 RX ADMIN — Medication 1 GRAM(S): at 05:46

## 2017-01-01 RX ADMIN — CEFTRIAXONE 100 GRAM(S): 500 INJECTION, POWDER, FOR SOLUTION INTRAMUSCULAR; INTRAVENOUS at 08:28

## 2017-01-01 RX ADMIN — Medication 100 MILLIEQUIVALENT(S): at 06:21

## 2017-01-01 RX ADMIN — SPIRONOLACTONE 12.5 MILLIGRAM(S): 25 TABLET, FILM COATED ORAL at 06:41

## 2017-01-01 RX ADMIN — MORPHINE SULFATE 1 MILLIGRAM(S): 50 CAPSULE, EXTENDED RELEASE ORAL at 18:29

## 2017-01-01 RX ADMIN — Medication 1 GRAM(S): at 06:44

## 2017-01-01 RX ADMIN — Medication 650 MILLIGRAM(S): at 07:21

## 2017-01-01 RX ADMIN — Medication 10 MILLIGRAM(S): at 17:02

## 2017-01-01 RX ADMIN — ATORVASTATIN CALCIUM 20 MILLIGRAM(S): 80 TABLET, FILM COATED ORAL at 22:57

## 2017-01-01 RX ADMIN — MORPHINE SULFATE 1 MILLIGRAM(S): 50 CAPSULE, EXTENDED RELEASE ORAL at 06:39

## 2017-01-01 RX ADMIN — SPIRONOLACTONE 12.5 MILLIGRAM(S): 25 TABLET, FILM COATED ORAL at 06:10

## 2017-01-01 RX ADMIN — Medication 1 TABLET(S): at 22:07

## 2017-01-01 RX ADMIN — Medication 5 MILLIGRAM(S): at 17:45

## 2017-01-01 RX ADMIN — BUDESONIDE AND FORMOTEROL FUMARATE DIHYDRATE 2 PUFF(S): 160; 4.5 AEROSOL RESPIRATORY (INHALATION) at 11:52

## 2017-01-01 RX ADMIN — Medication 1 GRAM(S): at 06:28

## 2017-01-01 RX ADMIN — MORPHINE SULFATE 1 MILLIGRAM(S): 50 CAPSULE, EXTENDED RELEASE ORAL at 02:55

## 2017-01-01 RX ADMIN — SODIUM CHLORIDE 60 MILLILITER(S): 9 INJECTION, SOLUTION INTRAVENOUS at 10:28

## 2017-01-01 RX ADMIN — BUDESONIDE AND FORMOTEROL FUMARATE DIHYDRATE 2 PUFF(S): 160; 4.5 AEROSOL RESPIRATORY (INHALATION) at 09:32

## 2017-01-01 RX ADMIN — Medication 25 MILLIGRAM(S): at 17:02

## 2017-01-01 RX ADMIN — Medication 100 MILLIGRAM(S): at 21:41

## 2017-01-01 RX ADMIN — SODIUM CHLORIDE 60 MILLILITER(S): 9 INJECTION, SOLUTION INTRAVENOUS at 18:07

## 2017-01-01 RX ADMIN — Medication 1 GRAM(S): at 11:35

## 2017-01-01 RX ADMIN — ENOXAPARIN SODIUM 40 MILLIGRAM(S): 100 INJECTION SUBCUTANEOUS at 22:58

## 2017-01-01 RX ADMIN — Medication 81 MILLIGRAM(S): at 12:11

## 2017-01-01 RX ADMIN — Medication 1000 MILLIGRAM(S): at 09:21

## 2017-01-01 RX ADMIN — SODIUM CHLORIDE 3 MILLILITER(S): 9 INJECTION INTRAMUSCULAR; INTRAVENOUS; SUBCUTANEOUS at 13:25

## 2017-01-01 RX ADMIN — Medication 1 GRAM(S): at 13:12

## 2017-01-01 RX ADMIN — AMIODARONE HYDROCHLORIDE 400 MILLIGRAM(S): 400 TABLET ORAL at 06:02

## 2017-01-01 RX ADMIN — Medication 650 MILLIGRAM(S): at 20:22

## 2017-01-01 RX ADMIN — Medication 1 GRAM(S): at 17:28

## 2017-01-01 RX ADMIN — Medication 100 MILLIEQUIVALENT(S): at 03:40

## 2017-01-01 RX ADMIN — Medication 300 MILLIGRAM(S): at 12:22

## 2017-01-01 RX ADMIN — MORPHINE SULFATE 1 MILLIGRAM(S): 50 CAPSULE, EXTENDED RELEASE ORAL at 12:21

## 2017-01-01 RX ADMIN — Medication 1 GRAM(S): at 23:31

## 2017-01-01 RX ADMIN — Medication 650 MILLIGRAM(S): at 19:32

## 2017-01-01 RX ADMIN — Medication 1 GRAM(S): at 22:57

## 2017-01-01 RX ADMIN — Medication 150 MILLIGRAM(S): at 18:48

## 2017-01-01 RX ADMIN — Medication 30 MILLIGRAM(S): at 05:09

## 2017-01-01 RX ADMIN — Medication 40 MILLIGRAM(S): at 05:37

## 2017-01-01 RX ADMIN — ATORVASTATIN CALCIUM 20 MILLIGRAM(S): 80 TABLET, FILM COATED ORAL at 21:32

## 2017-01-01 RX ADMIN — Medication 100 MILLIGRAM(S): at 22:57

## 2017-01-01 RX ADMIN — BUDESONIDE AND FORMOTEROL FUMARATE DIHYDRATE 2 PUFF(S): 160; 4.5 AEROSOL RESPIRATORY (INHALATION) at 09:06

## 2017-01-01 RX ADMIN — Medication 25 MICROGRAM(S): at 06:44

## 2017-01-01 RX ADMIN — Medication 300 MILLIGRAM(S): at 10:53

## 2017-01-01 RX ADMIN — Medication 1 GRAM(S): at 17:33

## 2017-01-01 RX ADMIN — Medication 5 MILLIGRAM(S): at 11:35

## 2017-01-01 RX ADMIN — Medication 1 GRAM(S): at 11:58

## 2017-01-01 RX ADMIN — PANTOPRAZOLE SODIUM 40 MILLIGRAM(S): 20 TABLET, DELAYED RELEASE ORAL at 17:25

## 2017-01-01 RX ADMIN — AMIODARONE HYDROCHLORIDE 400 MILLIGRAM(S): 400 TABLET ORAL at 06:41

## 2017-01-01 RX ADMIN — Medication 300 MILLIGRAM(S): at 17:09

## 2017-01-01 RX ADMIN — ATORVASTATIN CALCIUM 20 MILLIGRAM(S): 80 TABLET, FILM COATED ORAL at 22:36

## 2017-01-01 RX ADMIN — Medication 100 MILLIGRAM(S): at 13:33

## 2017-01-01 RX ADMIN — SODIUM CHLORIDE 3 MILLILITER(S): 9 INJECTION INTRAMUSCULAR; INTRAVENOUS; SUBCUTANEOUS at 13:27

## 2017-01-01 RX ADMIN — SPIRONOLACTONE 12.5 MILLIGRAM(S): 25 TABLET, FILM COATED ORAL at 06:29

## 2017-01-01 RX ADMIN — ENOXAPARIN SODIUM 40 MILLIGRAM(S): 100 INJECTION SUBCUTANEOUS at 11:59

## 2017-01-01 RX ADMIN — MIRTAZAPINE 7.5 MILLIGRAM(S): 45 TABLET, ORALLY DISINTEGRATING ORAL at 23:15

## 2017-01-01 RX ADMIN — Medication 2 TABLET(S): at 05:59

## 2017-01-01 RX ADMIN — Medication 40 MILLIGRAM(S): at 17:39

## 2017-01-01 RX ADMIN — SPIRONOLACTONE 12.5 MILLIGRAM(S): 25 TABLET, FILM COATED ORAL at 06:28

## 2017-01-01 RX ADMIN — Medication 15 MILLIGRAM(S): at 01:07

## 2017-01-01 RX ADMIN — Medication 50 MILLIGRAM(S): at 06:30

## 2017-01-01 RX ADMIN — LOSARTAN POTASSIUM 25 MILLIGRAM(S): 100 TABLET, FILM COATED ORAL at 11:48

## 2017-01-01 RX ADMIN — Medication 150 MILLIGRAM(S): at 15:45

## 2017-01-01 RX ADMIN — Medication 1 GRAM(S): at 01:40

## 2017-01-01 RX ADMIN — ENOXAPARIN SODIUM 40 MILLIGRAM(S): 100 INJECTION SUBCUTANEOUS at 23:15

## 2017-01-01 RX ADMIN — SODIUM CHLORIDE 3 MILLILITER(S): 9 INJECTION INTRAMUSCULAR; INTRAVENOUS; SUBCUTANEOUS at 15:22

## 2017-01-01 RX ADMIN — SODIUM CHLORIDE 3 MILLILITER(S): 9 INJECTION INTRAMUSCULAR; INTRAVENOUS; SUBCUTANEOUS at 06:27

## 2017-01-01 RX ADMIN — Medication 1 TABLET(S): at 11:53

## 2017-01-01 RX ADMIN — Medication 650 MILLIGRAM(S): at 23:20

## 2017-01-01 RX ADMIN — MORPHINE SULFATE 1 MILLIGRAM(S): 50 CAPSULE, EXTENDED RELEASE ORAL at 22:49

## 2017-01-01 RX ADMIN — Medication 81 MILLIGRAM(S): at 12:21

## 2017-01-01 RX ADMIN — SIMETHICONE 80 MILLIGRAM(S): 80 TABLET, CHEWABLE ORAL at 06:26

## 2017-01-01 RX ADMIN — LINEZOLID 600 MILLIGRAM(S): 600 INJECTION, SOLUTION INTRAVENOUS at 06:52

## 2017-01-01 RX ADMIN — Medication 50 MICROGRAM(S): at 05:10

## 2017-01-01 RX ADMIN — Medication 650 MILLIGRAM(S): at 03:59

## 2017-01-01 RX ADMIN — LIDOCAINE 1 PATCH: 4 CREAM TOPICAL at 01:00

## 2017-01-01 RX ADMIN — AMIODARONE HYDROCHLORIDE 400 MILLIGRAM(S): 400 TABLET ORAL at 05:44

## 2017-01-01 RX ADMIN — SENNA PLUS 2 TABLET(S): 8.6 TABLET ORAL at 21:03

## 2017-01-01 RX ADMIN — CEFEPIME 100 MILLIGRAM(S): 1 INJECTION, POWDER, FOR SOLUTION INTRAMUSCULAR; INTRAVENOUS at 03:38

## 2017-01-01 RX ADMIN — BUDESONIDE AND FORMOTEROL FUMARATE DIHYDRATE 2 PUFF(S): 160; 4.5 AEROSOL RESPIRATORY (INHALATION) at 21:36

## 2017-01-01 RX ADMIN — Medication 100 MILLIGRAM(S): at 13:53

## 2017-01-01 RX ADMIN — ATORVASTATIN CALCIUM 20 MILLIGRAM(S): 80 TABLET, FILM COATED ORAL at 21:17

## 2017-01-01 RX ADMIN — ENOXAPARIN SODIUM 40 MILLIGRAM(S): 100 INJECTION SUBCUTANEOUS at 12:17

## 2017-01-01 RX ADMIN — DIPHENHYDRAMINE HYDROCHLORIDE AND LIDOCAINE HYDROCHLORIDE AND ALUMINUM HYDROXIDE AND MAGNESIUM HYDRO 5 MILLILITER(S): KIT at 22:58

## 2017-01-01 RX ADMIN — Medication 1 GRAM(S): at 18:07

## 2017-01-01 RX ADMIN — Medication 1 APPLICATION(S): at 15:44

## 2017-01-01 RX ADMIN — Medication 25 MICROGRAM(S): at 06:22

## 2017-01-01 RX ADMIN — Medication 5 MILLILITER(S): at 06:34

## 2017-01-01 RX ADMIN — Medication 10 MILLIGRAM(S): at 06:00

## 2017-01-01 RX ADMIN — SIMETHICONE 80 MILLIGRAM(S): 80 TABLET, CHEWABLE ORAL at 06:19

## 2017-01-01 RX ADMIN — IMIPENEM AND CILASTATIN 100 MILLIGRAM(S): 250; 250 INJECTION, POWDER, FOR SOLUTION INTRAVENOUS at 06:45

## 2017-01-01 RX ADMIN — MORPHINE SULFATE 0.5 MILLIGRAM(S): 50 CAPSULE, EXTENDED RELEASE ORAL at 07:25

## 2017-01-01 RX ADMIN — Medication 81 MILLIGRAM(S): at 12:53

## 2017-01-01 RX ADMIN — PANTOPRAZOLE SODIUM 40 MILLIGRAM(S): 20 TABLET, DELAYED RELEASE ORAL at 05:55

## 2017-01-01 RX ADMIN — BUDESONIDE AND FORMOTEROL FUMARATE DIHYDRATE 2 PUFF(S): 160; 4.5 AEROSOL RESPIRATORY (INHALATION) at 21:45

## 2017-01-01 RX ADMIN — BUDESONIDE AND FORMOTEROL FUMARATE DIHYDRATE 2 PUFF(S): 160; 4.5 AEROSOL RESPIRATORY (INHALATION) at 21:37

## 2017-01-01 RX ADMIN — SODIUM CHLORIDE 60 MILLILITER(S): 9 INJECTION, SOLUTION INTRAVENOUS at 12:12

## 2017-01-01 RX ADMIN — SODIUM CHLORIDE 1000 MILLILITER(S): 9 INJECTION INTRAMUSCULAR; INTRAVENOUS; SUBCUTANEOUS at 09:02

## 2017-01-01 RX ADMIN — BUDESONIDE AND FORMOTEROL FUMARATE DIHYDRATE 2 PUFF(S): 160; 4.5 AEROSOL RESPIRATORY (INHALATION) at 09:41

## 2017-01-01 RX ADMIN — BUDESONIDE AND FORMOTEROL FUMARATE DIHYDRATE 2 PUFF(S): 160; 4.5 AEROSOL RESPIRATORY (INHALATION) at 22:22

## 2017-01-01 RX ADMIN — MORPHINE SULFATE 0.5 MILLIGRAM(S): 50 CAPSULE, EXTENDED RELEASE ORAL at 15:17

## 2017-01-01 RX ADMIN — MORPHINE SULFATE 1 MILLIGRAM(S): 50 CAPSULE, EXTENDED RELEASE ORAL at 10:27

## 2017-01-01 RX ADMIN — Medication 50 GRAM(S): at 08:43

## 2017-01-01 RX ADMIN — Medication 5 MILLIGRAM(S): at 05:11

## 2017-01-01 RX ADMIN — SPIRONOLACTONE 12.5 MILLIGRAM(S): 25 TABLET, FILM COATED ORAL at 05:25

## 2017-01-01 RX ADMIN — ENOXAPARIN SODIUM 40 MILLIGRAM(S): 100 INJECTION SUBCUTANEOUS at 14:38

## 2017-01-01 RX ADMIN — BUDESONIDE AND FORMOTEROL FUMARATE DIHYDRATE 2 PUFF(S): 160; 4.5 AEROSOL RESPIRATORY (INHALATION) at 10:46

## 2017-01-01 RX ADMIN — SODIUM CHLORIDE 500 MILLILITER(S): 9 INJECTION INTRAMUSCULAR; INTRAVENOUS; SUBCUTANEOUS at 22:01

## 2017-01-01 RX ADMIN — SODIUM CHLORIDE 60 MILLILITER(S): 9 INJECTION, SOLUTION INTRAVENOUS at 11:11

## 2017-01-01 RX ADMIN — Medication 1 GRAM(S): at 05:28

## 2017-01-01 RX ADMIN — BUDESONIDE AND FORMOTEROL FUMARATE DIHYDRATE 2 PUFF(S): 160; 4.5 AEROSOL RESPIRATORY (INHALATION) at 10:24

## 2017-01-01 RX ADMIN — Medication 1 GRAM(S): at 23:07

## 2017-01-01 RX ADMIN — Medication 200 MILLIGRAM(S): at 17:20

## 2017-01-01 RX ADMIN — BUDESONIDE AND FORMOTEROL FUMARATE DIHYDRATE 2 PUFF(S): 160; 4.5 AEROSOL RESPIRATORY (INHALATION) at 10:13

## 2017-01-01 RX ADMIN — Medication 650 MILLIGRAM(S): at 15:13

## 2017-01-01 RX ADMIN — Medication 10 MILLIGRAM(S): at 17:22

## 2017-01-01 RX ADMIN — MORPHINE SULFATE 1 MILLIGRAM(S): 50 CAPSULE, EXTENDED RELEASE ORAL at 01:42

## 2017-01-01 RX ADMIN — Medication 40 MILLIGRAM(S): at 05:35

## 2017-01-01 RX ADMIN — Medication 650 MILLIGRAM(S): at 21:12

## 2017-01-01 RX ADMIN — TIOTROPIUM BROMIDE 1 CAPSULE(S): 18 CAPSULE ORAL; RESPIRATORY (INHALATION) at 12:16

## 2017-01-01 RX ADMIN — DIPHENHYDRAMINE HYDROCHLORIDE AND LIDOCAINE HYDROCHLORIDE AND ALUMINUM HYDROXIDE AND MAGNESIUM HYDRO 5 MILLILITER(S): KIT at 16:09

## 2017-01-01 RX ADMIN — ATORVASTATIN CALCIUM 20 MILLIGRAM(S): 80 TABLET, FILM COATED ORAL at 21:44

## 2017-01-01 RX ADMIN — Medication 25 MILLIGRAM(S): at 07:30

## 2017-01-01 RX ADMIN — BUDESONIDE AND FORMOTEROL FUMARATE DIHYDRATE 2 PUFF(S): 160; 4.5 AEROSOL RESPIRATORY (INHALATION) at 08:55

## 2017-01-01 RX ADMIN — Medication 650 MILLIGRAM(S): at 14:27

## 2017-01-01 RX ADMIN — Medication 81 MILLIGRAM(S): at 11:59

## 2017-01-01 RX ADMIN — Medication 400 MILLIGRAM(S): at 22:01

## 2017-01-01 RX ADMIN — Medication 25 MILLIGRAM(S): at 06:36

## 2017-01-01 RX ADMIN — Medication 1 TABLET(S): at 12:11

## 2017-01-01 RX ADMIN — Medication 1 APPLICATION(S): at 06:31

## 2017-01-01 RX ADMIN — Medication 1 GRAM(S): at 22:11

## 2017-01-01 RX ADMIN — BUDESONIDE AND FORMOTEROL FUMARATE DIHYDRATE 2 PUFF(S): 160; 4.5 AEROSOL RESPIRATORY (INHALATION) at 20:54

## 2017-01-01 RX ADMIN — LOSARTAN POTASSIUM 25 MILLIGRAM(S): 100 TABLET, FILM COATED ORAL at 15:50

## 2017-01-01 RX ADMIN — Medication 1 TABLET(S): at 12:21

## 2017-01-01 RX ADMIN — TIOTROPIUM BROMIDE 1 CAPSULE(S): 18 CAPSULE ORAL; RESPIRATORY (INHALATION) at 09:21

## 2017-01-01 RX ADMIN — Medication 63.75 MILLIMOLE(S): at 09:09

## 2017-01-01 RX ADMIN — Medication 50 MICROGRAM(S): at 06:44

## 2017-01-01 RX ADMIN — Medication 1 APPLICATION(S): at 17:50

## 2017-01-01 RX ADMIN — Medication 300 MILLIGRAM(S): at 12:16

## 2017-01-01 RX ADMIN — Medication 650 MILLIGRAM(S): at 14:57

## 2017-01-01 RX ADMIN — Medication 30 MILLILITER(S): at 22:11

## 2017-01-01 RX ADMIN — GABAPENTIN 300 MILLIGRAM(S): 400 CAPSULE ORAL at 13:18

## 2017-01-01 RX ADMIN — MORPHINE SULFATE 1 MILLIGRAM(S): 50 CAPSULE, EXTENDED RELEASE ORAL at 06:53

## 2017-01-01 RX ADMIN — PANTOPRAZOLE SODIUM 40 MILLIGRAM(S): 20 TABLET, DELAYED RELEASE ORAL at 06:04

## 2017-01-01 RX ADMIN — PANTOPRAZOLE SODIUM 40 MILLIGRAM(S): 20 TABLET, DELAYED RELEASE ORAL at 05:09

## 2017-01-01 RX ADMIN — Medication 650 MILLIGRAM(S): at 20:41

## 2017-01-01 RX ADMIN — Medication 50 MICROGRAM(S): at 05:36

## 2017-01-01 RX ADMIN — Medication 1 GRAM(S): at 17:05

## 2017-01-01 RX ADMIN — MORPHINE SULFATE 1 MILLIGRAM(S): 50 CAPSULE, EXTENDED RELEASE ORAL at 22:26

## 2017-01-01 RX ADMIN — SPIRONOLACTONE 12.5 MILLIGRAM(S): 25 TABLET, FILM COATED ORAL at 06:26

## 2017-01-01 RX ADMIN — Medication 1000 MILLIGRAM(S): at 14:20

## 2017-01-01 RX ADMIN — DIPHENHYDRAMINE HYDROCHLORIDE AND LIDOCAINE HYDROCHLORIDE AND ALUMINUM HYDROXIDE AND MAGNESIUM HYDRO 5 MILLILITER(S): KIT at 12:52

## 2017-01-01 RX ADMIN — SODIUM CHLORIDE 3 MILLILITER(S): 9 INJECTION INTRAMUSCULAR; INTRAVENOUS; SUBCUTANEOUS at 13:53

## 2017-01-01 RX ADMIN — AMIODARONE HYDROCHLORIDE 400 MILLIGRAM(S): 400 TABLET ORAL at 06:53

## 2017-01-01 RX ADMIN — BUDESONIDE AND FORMOTEROL FUMARATE DIHYDRATE 2 PUFF(S): 160; 4.5 AEROSOL RESPIRATORY (INHALATION) at 22:12

## 2017-01-01 RX ADMIN — TIOTROPIUM BROMIDE 1 CAPSULE(S): 18 CAPSULE ORAL; RESPIRATORY (INHALATION) at 10:23

## 2017-01-01 RX ADMIN — BUDESONIDE AND FORMOTEROL FUMARATE DIHYDRATE 2 PUFF(S): 160; 4.5 AEROSOL RESPIRATORY (INHALATION) at 22:00

## 2017-01-01 RX ADMIN — Medication 1 TABLET(S): at 16:56

## 2017-01-01 RX ADMIN — Medication 100 MILLIGRAM(S): at 05:10

## 2017-01-01 RX ADMIN — MORPHINE SULFATE 0.5 MILLIGRAM(S): 50 CAPSULE, EXTENDED RELEASE ORAL at 22:35

## 2017-01-01 RX ADMIN — Medication 100 MILLIGRAM(S): at 23:34

## 2017-01-01 RX ADMIN — Medication 1 GRAM(S): at 16:55

## 2017-01-01 RX ADMIN — Medication 1 TABLET(S): at 10:10

## 2017-01-01 RX ADMIN — Medication 25 MICROGRAM(S): at 07:00

## 2017-01-01 RX ADMIN — MORPHINE SULFATE 1 MILLIGRAM(S): 50 CAPSULE, EXTENDED RELEASE ORAL at 22:11

## 2017-01-01 RX ADMIN — Medication 100 MILLIGRAM(S): at 21:47

## 2017-01-01 RX ADMIN — Medication 150 MILLIGRAM(S): at 16:12

## 2017-01-01 RX ADMIN — Medication 1 APPLICATION(S): at 20:16

## 2017-01-01 RX ADMIN — SENNA PLUS 2 TABLET(S): 8.6 TABLET ORAL at 21:36

## 2017-01-01 RX ADMIN — Medication 25 MILLIGRAM(S): at 06:40

## 2017-01-01 RX ADMIN — Medication 10 MILLIGRAM(S): at 17:12

## 2017-01-01 RX ADMIN — Medication 30 MILLIGRAM(S): at 14:05

## 2017-01-01 RX ADMIN — LATANOPROST 1 DROP(S): 0.05 SOLUTION/ DROPS OPHTHALMIC; TOPICAL at 23:24

## 2017-01-01 RX ADMIN — Medication 1 APPLICATION(S): at 22:21

## 2017-01-01 RX ADMIN — Medication 1 DROP(S): at 10:58

## 2017-01-01 RX ADMIN — GABAPENTIN 300 MILLIGRAM(S): 400 CAPSULE ORAL at 21:27

## 2017-01-01 RX ADMIN — Medication 81 MILLIGRAM(S): at 13:19

## 2017-01-01 RX ADMIN — AMIODARONE HYDROCHLORIDE 400 MILLIGRAM(S): 400 TABLET ORAL at 06:36

## 2017-01-01 RX ADMIN — Medication 1 GRAM(S): at 05:25

## 2017-01-01 RX ADMIN — BUDESONIDE AND FORMOTEROL FUMARATE DIHYDRATE 2 PUFF(S): 160; 4.5 AEROSOL RESPIRATORY (INHALATION) at 08:52

## 2017-01-01 RX ADMIN — Medication 1 TABLET(S): at 15:39

## 2017-01-01 RX ADMIN — Medication 500 MILLIGRAM(S): at 21:59

## 2017-01-01 RX ADMIN — Medication 5 MILLILITER(S): at 16:09

## 2017-01-01 RX ADMIN — Medication 5 MILLILITER(S): at 00:44

## 2017-01-01 RX ADMIN — Medication 650 MILLIGRAM(S): at 21:27

## 2017-01-01 RX ADMIN — LATANOPROST 1 DROP(S): 0.05 SOLUTION/ DROPS OPHTHALMIC; TOPICAL at 22:15

## 2017-01-01 RX ADMIN — BUDESONIDE AND FORMOTEROL FUMARATE DIHYDRATE 2 PUFF(S): 160; 4.5 AEROSOL RESPIRATORY (INHALATION) at 21:28

## 2017-01-01 RX ADMIN — Medication 100 MILLIGRAM(S): at 05:36

## 2017-01-01 RX ADMIN — LATANOPROST 1 DROP(S): 0.05 SOLUTION/ DROPS OPHTHALMIC; TOPICAL at 21:37

## 2017-01-01 RX ADMIN — TIOTROPIUM BROMIDE 1 CAPSULE(S): 18 CAPSULE ORAL; RESPIRATORY (INHALATION) at 12:00

## 2017-01-01 RX ADMIN — Medication 25 MILLIGRAM(S): at 06:52

## 2017-01-01 RX ADMIN — LATANOPROST 1 DROP(S): 0.05 SOLUTION/ DROPS OPHTHALMIC; TOPICAL at 21:49

## 2017-01-01 RX ADMIN — Medication 100 MILLIGRAM(S): at 21:54

## 2017-01-01 RX ADMIN — Medication 81 MILLIGRAM(S): at 08:52

## 2017-01-01 RX ADMIN — Medication 1 APPLICATION(S): at 05:34

## 2017-01-01 RX ADMIN — Medication 1 APPLICATION(S): at 05:57

## 2017-01-01 RX ADMIN — Medication 250 MILLIGRAM(S): at 21:04

## 2017-01-01 RX ADMIN — SIMETHICONE 80 MILLIGRAM(S): 80 TABLET, CHEWABLE ORAL at 00:15

## 2017-01-01 RX ADMIN — GABAPENTIN 200 MILLIGRAM(S): 400 CAPSULE ORAL at 13:38

## 2017-01-01 RX ADMIN — Medication 10 MILLIGRAM(S): at 05:36

## 2017-01-01 RX ADMIN — Medication 5 MILLIGRAM(S): at 23:32

## 2017-01-01 RX ADMIN — GABAPENTIN 100 MILLIGRAM(S): 400 CAPSULE ORAL at 13:28

## 2017-01-01 RX ADMIN — Medication 1 APPLICATION(S): at 16:13

## 2017-01-01 RX ADMIN — BUDESONIDE AND FORMOTEROL FUMARATE DIHYDRATE 2 PUFF(S): 160; 4.5 AEROSOL RESPIRATORY (INHALATION) at 21:12

## 2017-01-01 RX ADMIN — Medication 1 APPLICATION(S): at 17:04

## 2017-01-01 RX ADMIN — IMIPENEM AND CILASTATIN 100 MILLIGRAM(S): 250; 250 INJECTION, POWDER, FOR SOLUTION INTRAVENOUS at 22:23

## 2017-01-01 RX ADMIN — SPIRONOLACTONE 12.5 MILLIGRAM(S): 25 TABLET, FILM COATED ORAL at 05:11

## 2017-01-01 RX ADMIN — Medication 81 MILLIGRAM(S): at 11:06

## 2017-01-01 RX ADMIN — BUDESONIDE AND FORMOTEROL FUMARATE DIHYDRATE 2 PUFF(S): 160; 4.5 AEROSOL RESPIRATORY (INHALATION) at 09:04

## 2017-01-01 RX ADMIN — IMIPENEM AND CILASTATIN 100 MILLIGRAM(S): 250; 250 INJECTION, POWDER, FOR SOLUTION INTRAVENOUS at 05:09

## 2017-01-01 RX ADMIN — Medication 20 MILLIEQUIVALENT(S): at 12:20

## 2017-01-01 RX ADMIN — ENOXAPARIN SODIUM 40 MILLIGRAM(S): 100 INJECTION SUBCUTANEOUS at 11:38

## 2017-01-01 RX ADMIN — Medication 10 MILLIGRAM(S): at 06:29

## 2017-01-01 RX ADMIN — OXYCODONE AND ACETAMINOPHEN 1 TABLET(S): 5; 325 TABLET ORAL at 23:00

## 2017-01-01 RX ADMIN — Medication 650 MILLIGRAM(S): at 21:21

## 2017-01-01 RX ADMIN — ATORVASTATIN CALCIUM 20 MILLIGRAM(S): 80 TABLET, FILM COATED ORAL at 21:29

## 2017-01-01 RX ADMIN — CEFEPIME 100 MILLIGRAM(S): 1 INJECTION, POWDER, FOR SOLUTION INTRAMUSCULAR; INTRAVENOUS at 07:12

## 2017-01-01 RX ADMIN — BUDESONIDE AND FORMOTEROL FUMARATE DIHYDRATE 2 PUFF(S): 160; 4.5 AEROSOL RESPIRATORY (INHALATION) at 21:44

## 2017-01-01 RX ADMIN — BUDESONIDE AND FORMOTEROL FUMARATE DIHYDRATE 2 PUFF(S): 160; 4.5 AEROSOL RESPIRATORY (INHALATION) at 22:03

## 2017-01-01 RX ADMIN — Medication 81 MILLIGRAM(S): at 15:38

## 2017-01-01 RX ADMIN — Medication 1 APPLICATION(S): at 06:51

## 2017-01-01 RX ADMIN — Medication 1 TABLET(S): at 11:26

## 2017-01-01 RX ADMIN — Medication 650 MILLIGRAM(S): at 19:41

## 2017-01-01 RX ADMIN — Medication 15 MILLIGRAM(S): at 23:33

## 2017-01-01 RX ADMIN — TIOTROPIUM BROMIDE 1 CAPSULE(S): 18 CAPSULE ORAL; RESPIRATORY (INHALATION) at 22:56

## 2017-01-01 RX ADMIN — PANTOPRAZOLE SODIUM 40 MILLIGRAM(S): 20 TABLET, DELAYED RELEASE ORAL at 07:10

## 2017-01-01 RX ADMIN — MORPHINE SULFATE 1 MILLIGRAM(S): 50 CAPSULE, EXTENDED RELEASE ORAL at 00:26

## 2017-01-01 RX ADMIN — Medication 1 GRAM(S): at 06:26

## 2017-01-01 RX ADMIN — SODIUM CHLORIDE 3 MILLILITER(S): 9 INJECTION INTRAMUSCULAR; INTRAVENOUS; SUBCUTANEOUS at 06:42

## 2017-01-01 RX ADMIN — MORPHINE SULFATE 0.5 MILLIGRAM(S): 50 CAPSULE, EXTENDED RELEASE ORAL at 11:32

## 2017-01-01 RX ADMIN — Medication 50 MILLIGRAM(S): at 17:14

## 2017-01-01 RX ADMIN — ENOXAPARIN SODIUM 40 MILLIGRAM(S): 100 INJECTION SUBCUTANEOUS at 13:31

## 2017-01-01 RX ADMIN — AMIODARONE HYDROCHLORIDE 400 MILLIGRAM(S): 400 TABLET ORAL at 06:44

## 2017-01-01 RX ADMIN — Medication 300 MILLIGRAM(S): at 11:23

## 2017-01-01 RX ADMIN — SIMETHICONE 80 MILLIGRAM(S): 80 TABLET, CHEWABLE ORAL at 13:14

## 2017-01-01 RX ADMIN — SIMETHICONE 80 MILLIGRAM(S): 80 TABLET, CHEWABLE ORAL at 12:31

## 2017-01-01 RX ADMIN — SODIUM CHLORIDE 500 MILLILITER(S): 9 INJECTION INTRAMUSCULAR; INTRAVENOUS; SUBCUTANEOUS at 04:16

## 2017-01-01 RX ADMIN — Medication 1 TABLET(S): at 12:59

## 2017-01-01 RX ADMIN — Medication 10 MILLIGRAM(S): at 06:54

## 2017-01-01 RX ADMIN — BUDESONIDE AND FORMOTEROL FUMARATE DIHYDRATE 2 PUFF(S): 160; 4.5 AEROSOL RESPIRATORY (INHALATION) at 09:21

## 2017-01-01 RX ADMIN — SODIUM CHLORIDE 3 MILLILITER(S): 9 INJECTION INTRAMUSCULAR; INTRAVENOUS; SUBCUTANEOUS at 07:10

## 2017-01-01 RX ADMIN — Medication 100 MILLIGRAM(S): at 05:34

## 2017-01-01 RX ADMIN — Medication 50 MICROGRAM(S): at 05:56

## 2017-01-01 RX ADMIN — SPIRONOLACTONE 12.5 MILLIGRAM(S): 25 TABLET, FILM COATED ORAL at 05:35

## 2017-01-01 RX ADMIN — ATORVASTATIN CALCIUM 20 MILLIGRAM(S): 80 TABLET, FILM COATED ORAL at 22:02

## 2017-01-01 RX ADMIN — Medication 25 MILLIGRAM(S): at 06:00

## 2017-01-01 RX ADMIN — SPIRONOLACTONE 12.5 MILLIGRAM(S): 25 TABLET, FILM COATED ORAL at 06:00

## 2017-01-01 RX ADMIN — Medication 40 MILLIGRAM(S): at 05:58

## 2017-01-01 RX ADMIN — ENOXAPARIN SODIUM 40 MILLIGRAM(S): 100 INJECTION SUBCUTANEOUS at 23:22

## 2017-01-01 RX ADMIN — ATORVASTATIN CALCIUM 20 MILLIGRAM(S): 80 TABLET, FILM COATED ORAL at 22:23

## 2017-01-01 RX ADMIN — Medication 100 MILLIEQUIVALENT(S): at 18:29

## 2017-01-01 RX ADMIN — Medication 25 MICROGRAM(S): at 07:37

## 2017-01-01 RX ADMIN — Medication 1 GRAM(S): at 17:17

## 2017-01-01 RX ADMIN — MORPHINE SULFATE 0.5 MILLIGRAM(S): 50 CAPSULE, EXTENDED RELEASE ORAL at 22:20

## 2017-01-01 RX ADMIN — ENOXAPARIN SODIUM 40 MILLIGRAM(S): 100 INJECTION SUBCUTANEOUS at 12:21

## 2017-01-01 RX ADMIN — Medication 5 MILLILITER(S): at 21:04

## 2017-01-01 RX ADMIN — GABAPENTIN 300 MILLIGRAM(S): 400 CAPSULE ORAL at 06:29

## 2017-01-01 RX ADMIN — ATORVASTATIN CALCIUM 20 MILLIGRAM(S): 80 TABLET, FILM COATED ORAL at 21:39

## 2017-01-01 RX ADMIN — Medication 1 GRAM(S): at 11:54

## 2017-01-01 RX ADMIN — MEROPENEM 100 MILLIGRAM(S): 1 INJECTION INTRAVENOUS at 06:53

## 2017-01-01 RX ADMIN — Medication 650 MILLIGRAM(S): at 15:41

## 2017-01-01 RX ADMIN — LOSARTAN POTASSIUM 25 MILLIGRAM(S): 100 TABLET, FILM COATED ORAL at 07:00

## 2017-01-01 RX ADMIN — Medication 1 GRAM(S): at 17:02

## 2017-01-01 RX ADMIN — Medication 1 GRAM(S): at 00:51

## 2017-01-01 RX ADMIN — PANTOPRAZOLE SODIUM 40 MILLIGRAM(S): 20 TABLET, DELAYED RELEASE ORAL at 06:52

## 2017-01-01 RX ADMIN — Medication 25 MILLIGRAM(S): at 06:02

## 2017-01-01 RX ADMIN — Medication 1 GRAM(S): at 15:52

## 2017-01-01 RX ADMIN — LATANOPROST 1 DROP(S): 0.05 SOLUTION/ DROPS OPHTHALMIC; TOPICAL at 23:46

## 2017-01-01 RX ADMIN — PANTOPRAZOLE SODIUM 40 MILLIGRAM(S): 20 TABLET, DELAYED RELEASE ORAL at 07:27

## 2017-01-01 RX ADMIN — SENNA PLUS 2 TABLET(S): 8.6 TABLET ORAL at 21:45

## 2017-01-01 RX ADMIN — Medication 10 MILLIGRAM(S): at 17:06

## 2017-01-01 RX ADMIN — DIPHENHYDRAMINE HYDROCHLORIDE AND LIDOCAINE HYDROCHLORIDE AND ALUMINUM HYDROXIDE AND MAGNESIUM HYDRO 5 MILLILITER(S): KIT at 21:03

## 2017-01-01 RX ADMIN — MIRTAZAPINE 7.5 MILLIGRAM(S): 45 TABLET, ORALLY DISINTEGRATING ORAL at 21:27

## 2017-01-01 RX ADMIN — Medication 1 TABLET(S): at 13:19

## 2017-01-01 RX ADMIN — GABAPENTIN 300 MILLIGRAM(S): 400 CAPSULE ORAL at 05:59

## 2017-01-01 RX ADMIN — MORPHINE SULFATE 0.5 MILLIGRAM(S): 50 CAPSULE, EXTENDED RELEASE ORAL at 23:14

## 2017-01-01 RX ADMIN — Medication 100 MILLIGRAM(S): at 21:17

## 2017-01-01 RX ADMIN — Medication 81 MILLIGRAM(S): at 10:46

## 2017-01-01 RX ADMIN — LOSARTAN POTASSIUM 25 MILLIGRAM(S): 100 TABLET, FILM COATED ORAL at 06:00

## 2017-01-01 RX ADMIN — BUDESONIDE AND FORMOTEROL FUMARATE DIHYDRATE 2 PUFF(S): 160; 4.5 AEROSOL RESPIRATORY (INHALATION) at 08:51

## 2017-01-01 RX ADMIN — Medication 25 MILLIGRAM(S): at 05:08

## 2017-01-01 RX ADMIN — Medication 1 DROP(S): at 12:12

## 2017-01-01 RX ADMIN — Medication 100 MILLIGRAM(S): at 21:02

## 2017-01-01 RX ADMIN — LOSARTAN POTASSIUM 25 MILLIGRAM(S): 100 TABLET, FILM COATED ORAL at 05:58

## 2017-01-01 RX ADMIN — Medication 100 MILLIGRAM(S): at 13:18

## 2017-01-01 RX ADMIN — Medication 100 MILLIGRAM(S): at 05:59

## 2017-01-01 RX ADMIN — Medication 1 GRAM(S): at 11:26

## 2017-01-01 RX ADMIN — MORPHINE SULFATE 1 MILLIGRAM(S): 50 CAPSULE, EXTENDED RELEASE ORAL at 22:40

## 2017-01-01 RX ADMIN — Medication 100 MILLIGRAM(S): at 06:20

## 2017-01-01 RX ADMIN — SIMETHICONE 80 MILLIGRAM(S): 80 TABLET, CHEWABLE ORAL at 06:51

## 2017-01-01 RX ADMIN — Medication 5 MILLILITER(S): at 22:37

## 2017-01-01 RX ADMIN — Medication 1 GRAM(S): at 05:32

## 2017-01-01 RX ADMIN — Medication 1 TABLET(S): at 11:05

## 2017-01-01 RX ADMIN — Medication 1 APPLICATION(S): at 06:17

## 2017-01-01 RX ADMIN — Medication 1 GRAM(S): at 11:59

## 2017-01-01 RX ADMIN — LINEZOLID 600 MILLIGRAM(S): 600 INJECTION, SOLUTION INTRAVENOUS at 17:46

## 2017-01-01 RX ADMIN — ATORVASTATIN CALCIUM 20 MILLIGRAM(S): 80 TABLET, FILM COATED ORAL at 22:12

## 2017-01-01 RX ADMIN — Medication 300 MILLIGRAM(S): at 11:53

## 2017-01-01 RX ADMIN — ATORVASTATIN CALCIUM 20 MILLIGRAM(S): 80 TABLET, FILM COATED ORAL at 22:58

## 2017-01-01 RX ADMIN — BUDESONIDE AND FORMOTEROL FUMARATE DIHYDRATE 2 PUFF(S): 160; 4.5 AEROSOL RESPIRATORY (INHALATION) at 08:37

## 2017-01-01 RX ADMIN — Medication 1 APPLICATION(S): at 05:12

## 2017-01-01 RX ADMIN — CEFEPIME 100 MILLIGRAM(S): 1 INJECTION, POWDER, FOR SOLUTION INTRAMUSCULAR; INTRAVENOUS at 06:16

## 2017-01-01 RX ADMIN — Medication 150 MILLIGRAM(S): at 04:50

## 2017-01-01 RX ADMIN — Medication 1 GRAM(S): at 14:16

## 2017-01-01 RX ADMIN — PANTOPRAZOLE SODIUM 40 MILLIGRAM(S): 20 TABLET, DELAYED RELEASE ORAL at 05:07

## 2017-01-01 RX ADMIN — ENOXAPARIN SODIUM 40 MILLIGRAM(S): 100 INJECTION SUBCUTANEOUS at 22:21

## 2017-01-01 RX ADMIN — Medication 40 MILLIGRAM(S): at 05:33

## 2017-01-01 RX ADMIN — Medication 81 MILLIGRAM(S): at 13:33

## 2017-01-01 RX ADMIN — Medication 100 MILLIGRAM(S): at 17:02

## 2017-01-01 RX ADMIN — AMIODARONE HYDROCHLORIDE 400 MILLIGRAM(S): 400 TABLET ORAL at 05:06

## 2017-01-01 RX ADMIN — TIOTROPIUM BROMIDE 1 CAPSULE(S): 18 CAPSULE ORAL; RESPIRATORY (INHALATION) at 10:45

## 2017-01-01 RX ADMIN — Medication 25 MILLIGRAM(S): at 11:07

## 2017-01-01 RX ADMIN — ENOXAPARIN SODIUM 40 MILLIGRAM(S): 100 INJECTION SUBCUTANEOUS at 21:15

## 2017-01-01 RX ADMIN — Medication 63.75 MILLIMOLE(S): at 10:47

## 2017-01-01 RX ADMIN — ENOXAPARIN SODIUM 40 MILLIGRAM(S): 100 INJECTION SUBCUTANEOUS at 21:49

## 2017-01-01 RX ADMIN — Medication 81 MILLIGRAM(S): at 17:52

## 2017-01-01 RX ADMIN — Medication 1 GRAM(S): at 05:35

## 2017-01-01 RX ADMIN — TIOTROPIUM BROMIDE 1 CAPSULE(S): 18 CAPSULE ORAL; RESPIRATORY (INHALATION) at 12:35

## 2017-01-01 RX ADMIN — Medication 300 MILLIGRAM(S): at 15:50

## 2017-01-01 RX ADMIN — Medication 650 MILLIGRAM(S): at 10:51

## 2017-01-01 RX ADMIN — ATORVASTATIN CALCIUM 20 MILLIGRAM(S): 80 TABLET, FILM COATED ORAL at 21:27

## 2017-01-01 RX ADMIN — LOSARTAN POTASSIUM 25 MILLIGRAM(S): 100 TABLET, FILM COATED ORAL at 06:15

## 2017-01-01 RX ADMIN — Medication 25 MILLIGRAM(S): at 07:00

## 2017-01-01 RX ADMIN — TIOTROPIUM BROMIDE 1 CAPSULE(S): 18 CAPSULE ORAL; RESPIRATORY (INHALATION) at 11:37

## 2017-01-01 RX ADMIN — SODIUM CHLORIDE 60 MILLILITER(S): 9 INJECTION, SOLUTION INTRAVENOUS at 12:18

## 2017-01-01 RX ADMIN — Medication 60 MILLIGRAM(S): at 06:40

## 2017-01-01 RX ADMIN — AMIODARONE HYDROCHLORIDE 400 MILLIGRAM(S): 400 TABLET ORAL at 05:11

## 2017-01-01 RX ADMIN — Medication 300 MILLIGRAM(S): at 11:27

## 2017-01-01 RX ADMIN — LOSARTAN POTASSIUM 25 MILLIGRAM(S): 100 TABLET, FILM COATED ORAL at 05:37

## 2017-01-01 RX ADMIN — Medication 81 MILLIGRAM(S): at 13:00

## 2017-01-01 RX ADMIN — Medication 81 MILLIGRAM(S): at 15:21

## 2017-01-01 RX ADMIN — Medication 1 APPLICATION(S): at 10:30

## 2017-01-01 RX ADMIN — ENOXAPARIN SODIUM 40 MILLIGRAM(S): 100 INJECTION SUBCUTANEOUS at 21:29

## 2017-01-01 RX ADMIN — BUDESONIDE AND FORMOTEROL FUMARATE DIHYDRATE 2 PUFF(S): 160; 4.5 AEROSOL RESPIRATORY (INHALATION) at 15:55

## 2017-01-01 RX ADMIN — AMIODARONE HYDROCHLORIDE 400 MILLIGRAM(S): 400 TABLET ORAL at 05:35

## 2017-01-01 RX ADMIN — Medication 50 MICROGRAM(S): at 06:05

## 2017-01-01 RX ADMIN — Medication 400 MILLIGRAM(S): at 13:50

## 2017-01-01 RX ADMIN — Medication 1 GRAM(S): at 06:25

## 2017-01-01 RX ADMIN — BUDESONIDE AND FORMOTEROL FUMARATE DIHYDRATE 2 PUFF(S): 160; 4.5 AEROSOL RESPIRATORY (INHALATION) at 22:56

## 2017-01-01 RX ADMIN — BUDESONIDE AND FORMOTEROL FUMARATE DIHYDRATE 2 PUFF(S): 160; 4.5 AEROSOL RESPIRATORY (INHALATION) at 21:38

## 2017-01-01 RX ADMIN — Medication 100 MILLIGRAM(S): at 05:11

## 2017-01-01 RX ADMIN — Medication 100 MILLIGRAM(S): at 14:30

## 2017-01-01 RX ADMIN — Medication 63.75 MILLIMOLE(S): at 18:59

## 2017-01-01 RX ADMIN — BUDESONIDE AND FORMOTEROL FUMARATE DIHYDRATE 2 PUFF(S): 160; 4.5 AEROSOL RESPIRATORY (INHALATION) at 23:22

## 2017-01-01 RX ADMIN — Medication 1 APPLICATION(S): at 07:16

## 2017-01-01 RX ADMIN — BUDESONIDE AND FORMOTEROL FUMARATE DIHYDRATE 2 PUFF(S): 160; 4.5 AEROSOL RESPIRATORY (INHALATION) at 21:14

## 2017-01-01 RX ADMIN — ATORVASTATIN CALCIUM 20 MILLIGRAM(S): 80 TABLET, FILM COATED ORAL at 22:09

## 2017-01-01 RX ADMIN — LATANOPROST 1 DROP(S): 0.05 SOLUTION/ DROPS OPHTHALMIC; TOPICAL at 21:47

## 2017-01-01 RX ADMIN — BUDESONIDE AND FORMOTEROL FUMARATE DIHYDRATE 2 PUFF(S): 160; 4.5 AEROSOL RESPIRATORY (INHALATION) at 20:10

## 2017-01-01 RX ADMIN — SODIUM CHLORIDE 3 MILLILITER(S): 9 INJECTION INTRAMUSCULAR; INTRAVENOUS; SUBCUTANEOUS at 22:04

## 2017-01-01 RX ADMIN — GABAPENTIN 300 MILLIGRAM(S): 400 CAPSULE ORAL at 07:00

## 2017-01-01 RX ADMIN — Medication 650 MILLIGRAM(S): at 07:27

## 2017-01-01 RX ADMIN — ENOXAPARIN SODIUM 40 MILLIGRAM(S): 100 INJECTION SUBCUTANEOUS at 21:16

## 2017-01-01 RX ADMIN — Medication 25 MICROGRAM(S): at 05:42

## 2017-01-01 RX ADMIN — TIOTROPIUM BROMIDE 1 CAPSULE(S): 18 CAPSULE ORAL; RESPIRATORY (INHALATION) at 13:41

## 2017-01-01 RX ADMIN — ENOXAPARIN SODIUM 40 MILLIGRAM(S): 100 INJECTION SUBCUTANEOUS at 21:56

## 2017-01-01 RX ADMIN — Medication 1 GRAM(S): at 23:55

## 2017-01-01 RX ADMIN — ATORVASTATIN CALCIUM 20 MILLIGRAM(S): 80 TABLET, FILM COATED ORAL at 22:56

## 2017-01-01 RX ADMIN — BUDESONIDE AND FORMOTEROL FUMARATE DIHYDRATE 2 PUFF(S): 160; 4.5 AEROSOL RESPIRATORY (INHALATION) at 11:33

## 2017-01-01 RX ADMIN — TIOTROPIUM BROMIDE 1 CAPSULE(S): 18 CAPSULE ORAL; RESPIRATORY (INHALATION) at 10:12

## 2017-01-01 RX ADMIN — Medication 81 MILLIGRAM(S): at 12:20

## 2017-01-01 RX ADMIN — Medication 81 MILLIGRAM(S): at 14:41

## 2017-01-01 RX ADMIN — IMIPENEM AND CILASTATIN 100 MILLIGRAM(S): 250; 250 INJECTION, POWDER, FOR SOLUTION INTRAVENOUS at 14:18

## 2017-01-01 RX ADMIN — ATORVASTATIN CALCIUM 20 MILLIGRAM(S): 80 TABLET, FILM COATED ORAL at 23:34

## 2017-01-01 RX ADMIN — Medication 100 MILLIGRAM(S): at 21:28

## 2017-01-01 RX ADMIN — Medication 50 MILLIGRAM(S): at 17:23

## 2017-01-01 RX ADMIN — Medication 50 MICROGRAM(S): at 05:07

## 2017-01-01 RX ADMIN — Medication 30 MILLILITER(S): at 14:05

## 2017-01-01 RX ADMIN — SIMETHICONE 80 MILLIGRAM(S): 80 TABLET, CHEWABLE ORAL at 17:02

## 2017-01-01 RX ADMIN — Medication 1 TABLET(S): at 12:20

## 2017-01-01 RX ADMIN — Medication 1 GRAM(S): at 17:38

## 2017-01-01 RX ADMIN — SENNA PLUS 2 TABLET(S): 8.6 TABLET ORAL at 22:03

## 2017-01-01 RX ADMIN — SENNA PLUS 2 TABLET(S): 8.6 TABLET ORAL at 21:54

## 2017-01-01 RX ADMIN — SODIUM CHLORIDE 60 MILLILITER(S): 9 INJECTION, SOLUTION INTRAVENOUS at 09:55

## 2017-01-01 RX ADMIN — Medication 3 MILLILITER(S): at 18:16

## 2017-01-01 RX ADMIN — GABAPENTIN 300 MILLIGRAM(S): 400 CAPSULE ORAL at 06:19

## 2017-01-01 RX ADMIN — BUDESONIDE AND FORMOTEROL FUMARATE DIHYDRATE 2 PUFF(S): 160; 4.5 AEROSOL RESPIRATORY (INHALATION) at 09:25

## 2017-01-01 RX ADMIN — Medication 1000 MILLIGRAM(S): at 15:59

## 2017-01-01 RX ADMIN — SODIUM CHLORIDE 40 MILLILITER(S): 9 INJECTION, SOLUTION INTRAVENOUS at 16:58

## 2017-01-01 RX ADMIN — ENOXAPARIN SODIUM 40 MILLIGRAM(S): 100 INJECTION SUBCUTANEOUS at 13:38

## 2017-01-01 RX ADMIN — Medication 1 GRAM(S): at 21:29

## 2017-01-01 RX ADMIN — CEFEPIME 100 MILLIGRAM(S): 1 INJECTION, POWDER, FOR SOLUTION INTRAMUSCULAR; INTRAVENOUS at 06:56

## 2017-01-01 RX ADMIN — Medication 1 GRAM(S): at 12:17

## 2017-01-01 RX ADMIN — Medication 10 MILLIGRAM(S): at 17:25

## 2017-01-01 RX ADMIN — ATORVASTATIN CALCIUM 20 MILLIGRAM(S): 80 TABLET, FILM COATED ORAL at 23:21

## 2017-01-01 RX ADMIN — Medication 1 GRAM(S): at 12:22

## 2017-01-01 RX ADMIN — Medication 25 MICROGRAM(S): at 06:35

## 2017-01-01 RX ADMIN — ENOXAPARIN SODIUM 40 MILLIGRAM(S): 100 INJECTION SUBCUTANEOUS at 11:17

## 2017-01-01 RX ADMIN — Medication 1 DOSE(S): at 19:02

## 2017-01-01 RX ADMIN — Medication 650 MILLIGRAM(S): at 12:45

## 2017-01-01 RX ADMIN — PANTOPRAZOLE SODIUM 40 MILLIGRAM(S): 20 TABLET, DELAYED RELEASE ORAL at 17:32

## 2017-01-01 RX ADMIN — LATANOPROST 1 DROP(S): 0.05 SOLUTION/ DROPS OPHTHALMIC; TOPICAL at 21:12

## 2017-01-01 RX ADMIN — Medication 1 GRAM(S): at 11:04

## 2017-01-01 RX ADMIN — BUDESONIDE AND FORMOTEROL FUMARATE DIHYDRATE 2 PUFF(S): 160; 4.5 AEROSOL RESPIRATORY (INHALATION) at 10:11

## 2017-01-01 RX ADMIN — Medication 1 APPLICATION(S): at 05:56

## 2017-01-01 RX ADMIN — Medication 650 MILLIGRAM(S): at 17:48

## 2017-01-01 RX ADMIN — Medication 40 MILLIGRAM(S): at 00:00

## 2017-01-01 RX ADMIN — BUDESONIDE AND FORMOTEROL FUMARATE DIHYDRATE 2 PUFF(S): 160; 4.5 AEROSOL RESPIRATORY (INHALATION) at 09:38

## 2017-01-01 RX ADMIN — Medication 10 MILLIGRAM(S): at 09:22

## 2017-01-01 RX ADMIN — Medication 1 APPLICATION(S): at 17:16

## 2017-01-01 RX ADMIN — CEFEPIME 100 MILLIGRAM(S): 1 INJECTION, POWDER, FOR SOLUTION INTRAMUSCULAR; INTRAVENOUS at 17:44

## 2017-01-01 RX ADMIN — Medication 150 MILLIGRAM(S): at 14:49

## 2017-01-01 RX ADMIN — TIOTROPIUM BROMIDE 1 CAPSULE(S): 18 CAPSULE ORAL; RESPIRATORY (INHALATION) at 09:18

## 2017-01-01 RX ADMIN — Medication 50 MICROGRAM(S): at 06:26

## 2017-01-01 RX ADMIN — Medication 100 MILLIGRAM(S): at 23:45

## 2017-01-01 RX ADMIN — ATORVASTATIN CALCIUM 20 MILLIGRAM(S): 80 TABLET, FILM COATED ORAL at 22:38

## 2017-01-01 RX ADMIN — SIMETHICONE 80 MILLIGRAM(S): 80 TABLET, CHEWABLE ORAL at 23:55

## 2017-01-01 RX ADMIN — ENOXAPARIN SODIUM 40 MILLIGRAM(S): 100 INJECTION SUBCUTANEOUS at 13:05

## 2017-01-01 RX ADMIN — MORPHINE SULFATE 1 MILLIGRAM(S): 50 CAPSULE, EXTENDED RELEASE ORAL at 12:51

## 2017-01-01 RX ADMIN — Medication 100 MILLIGRAM(S): at 10:59

## 2017-01-01 RX ADMIN — Medication 1 GRAM(S): at 23:02

## 2017-01-01 RX ADMIN — TIOTROPIUM BROMIDE 1 CAPSULE(S): 18 CAPSULE ORAL; RESPIRATORY (INHALATION) at 09:51

## 2017-01-01 RX ADMIN — Medication 5 MILLIGRAM(S): at 12:11

## 2017-01-01 RX ADMIN — Medication 10 MILLIGRAM(S): at 07:09

## 2017-01-01 RX ADMIN — Medication 30 MILLIGRAM(S): at 11:07

## 2017-01-01 RX ADMIN — SODIUM CHLORIDE 50 MILLILITER(S): 9 INJECTION, SOLUTION INTRAVENOUS at 18:00

## 2017-01-01 RX ADMIN — TIOTROPIUM BROMIDE 1 CAPSULE(S): 18 CAPSULE ORAL; RESPIRATORY (INHALATION) at 15:55

## 2017-01-01 RX ADMIN — SPIRONOLACTONE 12.5 MILLIGRAM(S): 25 TABLET, FILM COATED ORAL at 07:30

## 2017-01-01 RX ADMIN — Medication 1 GRAM(S): at 11:57

## 2017-01-01 RX ADMIN — ENOXAPARIN SODIUM 40 MILLIGRAM(S): 100 INJECTION SUBCUTANEOUS at 13:41

## 2017-01-01 RX ADMIN — PANTOPRAZOLE SODIUM 40 MILLIGRAM(S): 20 TABLET, DELAYED RELEASE ORAL at 05:11

## 2017-01-01 RX ADMIN — Medication 5 MILLIGRAM(S): at 06:26

## 2017-01-01 RX ADMIN — AMIODARONE HYDROCHLORIDE 400 MILLIGRAM(S): 400 TABLET ORAL at 06:52

## 2017-01-01 RX ADMIN — Medication 1 APPLICATION(S): at 10:02

## 2017-01-01 RX ADMIN — Medication 1 APPLICATION(S): at 06:56

## 2017-01-01 RX ADMIN — Medication 300 MILLIGRAM(S): at 13:53

## 2017-01-01 RX ADMIN — SODIUM CHLORIDE 75 MILLILITER(S): 9 INJECTION, SOLUTION INTRAVENOUS at 09:27

## 2017-01-01 RX ADMIN — Medication 25 MILLIGRAM(S): at 05:35

## 2017-01-01 RX ADMIN — TIOTROPIUM BROMIDE 1 CAPSULE(S): 18 CAPSULE ORAL; RESPIRATORY (INHALATION) at 11:49

## 2017-01-01 RX ADMIN — Medication 81 MILLIGRAM(S): at 13:35

## 2017-01-01 RX ADMIN — CEFEPIME 100 MILLIGRAM(S): 1 INJECTION, POWDER, FOR SOLUTION INTRAMUSCULAR; INTRAVENOUS at 17:35

## 2017-01-01 RX ADMIN — Medication 100 MILLIGRAM(S): at 05:58

## 2017-01-01 RX ADMIN — Medication 1 APPLICATION(S): at 11:59

## 2017-01-01 RX ADMIN — Medication 1 TABLET(S): at 16:57

## 2017-01-01 RX ADMIN — Medication 1 APPLICATION(S): at 21:02

## 2017-01-01 RX ADMIN — Medication 1 APPLICATION(S): at 05:55

## 2017-01-01 RX ADMIN — AMIODARONE HYDROCHLORIDE 400 MILLIGRAM(S): 400 TABLET ORAL at 21:52

## 2017-01-01 RX ADMIN — Medication 100 MILLIGRAM(S): at 22:54

## 2017-01-01 RX ADMIN — PANTOPRAZOLE SODIUM 40 MILLIGRAM(S): 20 TABLET, DELAYED RELEASE ORAL at 06:29

## 2017-01-01 RX ADMIN — Medication 81 MILLIGRAM(S): at 12:22

## 2017-01-01 RX ADMIN — ENOXAPARIN SODIUM 40 MILLIGRAM(S): 100 INJECTION SUBCUTANEOUS at 12:22

## 2017-01-01 RX ADMIN — BUDESONIDE AND FORMOTEROL FUMARATE DIHYDRATE 2 PUFF(S): 160; 4.5 AEROSOL RESPIRATORY (INHALATION) at 22:26

## 2017-01-01 RX ADMIN — SIMETHICONE 80 MILLIGRAM(S): 80 TABLET, CHEWABLE ORAL at 13:12

## 2017-01-01 RX ADMIN — SPIRONOLACTONE 12.5 MILLIGRAM(S): 25 TABLET, FILM COATED ORAL at 11:55

## 2017-01-01 RX ADMIN — Medication 1 TABLET(S): at 17:22

## 2017-01-01 RX ADMIN — Medication 1 DROP(S): at 11:11

## 2017-01-01 RX ADMIN — AMIODARONE HYDROCHLORIDE 400 MILLIGRAM(S): 400 TABLET ORAL at 06:28

## 2017-01-01 RX ADMIN — Medication 1 APPLICATION(S): at 06:36

## 2017-01-01 RX ADMIN — MORPHINE SULFATE 1 MILLIGRAM(S): 50 CAPSULE, EXTENDED RELEASE ORAL at 11:00

## 2017-01-01 RX ADMIN — PANTOPRAZOLE SODIUM 40 MILLIGRAM(S): 20 TABLET, DELAYED RELEASE ORAL at 17:33

## 2017-01-01 RX ADMIN — LOSARTAN POTASSIUM 25 MILLIGRAM(S): 100 TABLET, FILM COATED ORAL at 07:30

## 2017-01-01 RX ADMIN — Medication 1 APPLICATION(S): at 18:09

## 2017-01-01 RX ADMIN — ENOXAPARIN SODIUM 40 MILLIGRAM(S): 100 INJECTION SUBCUTANEOUS at 12:12

## 2017-01-01 RX ADMIN — Medication 81 MILLIGRAM(S): at 11:48

## 2017-01-01 RX ADMIN — SPIRONOLACTONE 12.5 MILLIGRAM(S): 25 TABLET, FILM COATED ORAL at 05:28

## 2017-01-01 RX ADMIN — Medication 100 MILLIGRAM(S): at 14:18

## 2017-01-01 RX ADMIN — GABAPENTIN 300 MILLIGRAM(S): 400 CAPSULE ORAL at 22:03

## 2017-01-01 RX ADMIN — TIOTROPIUM BROMIDE 1 CAPSULE(S): 18 CAPSULE ORAL; RESPIRATORY (INHALATION) at 09:22

## 2017-01-01 RX ADMIN — AMIODARONE HYDROCHLORIDE 400 MILLIGRAM(S): 400 TABLET ORAL at 06:46

## 2017-01-01 RX ADMIN — Medication 5 MILLIGRAM(S): at 23:34

## 2017-01-01 RX ADMIN — LATANOPROST 1 DROP(S): 0.05 SOLUTION/ DROPS OPHTHALMIC; TOPICAL at 22:13

## 2017-01-01 RX ADMIN — Medication 81 MILLIGRAM(S): at 12:16

## 2017-01-01 RX ADMIN — Medication 1 TABLET(S): at 10:22

## 2017-01-01 RX ADMIN — MORPHINE SULFATE 1 MILLIGRAM(S): 50 CAPSULE, EXTENDED RELEASE ORAL at 14:31

## 2017-01-01 RX ADMIN — MEROPENEM 100 MILLIGRAM(S): 1 INJECTION INTRAVENOUS at 17:37

## 2017-01-01 RX ADMIN — Medication 50 GRAM(S): at 10:12

## 2017-01-01 RX ADMIN — Medication 1 GRAM(S): at 11:27

## 2017-01-01 RX ADMIN — Medication 1 TABLET(S): at 12:23

## 2017-01-01 RX ADMIN — PANTOPRAZOLE SODIUM 40 MILLIGRAM(S): 20 TABLET, DELAYED RELEASE ORAL at 06:25

## 2017-01-01 RX ADMIN — IMIPENEM AND CILASTATIN 100 MILLIGRAM(S): 250; 250 INJECTION, POWDER, FOR SOLUTION INTRAVENOUS at 05:06

## 2017-01-01 RX ADMIN — SODIUM CHLORIDE 60 MILLILITER(S): 9 INJECTION, SOLUTION INTRAVENOUS at 12:53

## 2017-01-01 RX ADMIN — TIOTROPIUM BROMIDE 1 CAPSULE(S): 18 CAPSULE ORAL; RESPIRATORY (INHALATION) at 16:16

## 2017-01-01 RX ADMIN — Medication 40 MILLIGRAM(S): at 08:29

## 2017-01-01 RX ADMIN — Medication 5 MILLILITER(S): at 05:36

## 2017-01-01 RX ADMIN — ATORVASTATIN CALCIUM 20 MILLIGRAM(S): 80 TABLET, FILM COATED ORAL at 21:55

## 2017-01-01 RX ADMIN — BUDESONIDE AND FORMOTEROL FUMARATE DIHYDRATE 2 PUFF(S): 160; 4.5 AEROSOL RESPIRATORY (INHALATION) at 09:31

## 2017-01-01 RX ADMIN — Medication 10 MILLIGRAM(S): at 17:33

## 2017-01-01 RX ADMIN — Medication 300 MILLIGRAM(S): at 11:35

## 2017-01-01 RX ADMIN — ATORVASTATIN CALCIUM 20 MILLIGRAM(S): 80 TABLET, FILM COATED ORAL at 23:45

## 2017-01-01 RX ADMIN — POTASSIUM PHOSPHATE, MONOBASIC POTASSIUM PHOSPHATE, DIBASIC 62.5 MILLIMOLE(S): 236; 224 INJECTION, SOLUTION INTRAVENOUS at 08:51

## 2017-01-01 RX ADMIN — Medication 1 GRAM(S): at 17:31

## 2017-01-01 RX ADMIN — ENOXAPARIN SODIUM 40 MILLIGRAM(S): 100 INJECTION SUBCUTANEOUS at 22:49

## 2017-01-01 RX ADMIN — Medication 100 MILLIGRAM(S): at 23:21

## 2017-01-01 RX ADMIN — MORPHINE SULFATE 0.5 MILLIGRAM(S): 50 CAPSULE, EXTENDED RELEASE ORAL at 17:40

## 2017-01-01 RX ADMIN — MORPHINE SULFATE 1 MILLIGRAM(S): 50 CAPSULE, EXTENDED RELEASE ORAL at 11:10

## 2017-01-01 RX ADMIN — SODIUM CHLORIDE 60 MILLILITER(S): 9 INJECTION, SOLUTION INTRAVENOUS at 20:27

## 2017-01-01 RX ADMIN — Medication 100 MILLIGRAM(S): at 08:47

## 2017-01-01 RX ADMIN — MORPHINE SULFATE 1 MILLIGRAM(S): 50 CAPSULE, EXTENDED RELEASE ORAL at 18:33

## 2017-01-01 RX ADMIN — LOSARTAN POTASSIUM 25 MILLIGRAM(S): 100 TABLET, FILM COATED ORAL at 05:36

## 2017-01-01 RX ADMIN — BUDESONIDE AND FORMOTEROL FUMARATE DIHYDRATE 2 PUFF(S): 160; 4.5 AEROSOL RESPIRATORY (INHALATION) at 10:03

## 2017-01-01 RX ADMIN — Medication 1 GRAM(S): at 17:14

## 2017-01-01 RX ADMIN — AMIODARONE HYDROCHLORIDE 400 MILLIGRAM(S): 400 TABLET ORAL at 06:50

## 2017-01-01 RX ADMIN — Medication 650 MILLIGRAM(S): at 02:13

## 2017-01-01 RX ADMIN — Medication 150 MILLIGRAM(S): at 18:00

## 2017-01-01 RX ADMIN — LATANOPROST 1 DROP(S): 0.05 SOLUTION/ DROPS OPHTHALMIC; TOPICAL at 21:00

## 2017-01-01 RX ADMIN — Medication 650 MILLIGRAM(S): at 18:43

## 2017-01-01 RX ADMIN — Medication 300 MILLIGRAM(S): at 11:34

## 2017-01-01 RX ADMIN — Medication 1 GRAM(S): at 11:23

## 2017-01-01 RX ADMIN — MORPHINE SULFATE 1 MILLIGRAM(S): 50 CAPSULE, EXTENDED RELEASE ORAL at 10:11

## 2017-01-01 RX ADMIN — Medication 150 MILLIGRAM(S): at 17:46

## 2017-01-01 RX ADMIN — BUDESONIDE AND FORMOTEROL FUMARATE DIHYDRATE 1 PUFF(S): 160; 4.5 AEROSOL RESPIRATORY (INHALATION) at 22:54

## 2017-01-01 RX ADMIN — Medication 25 MICROGRAM(S): at 07:11

## 2017-01-01 RX ADMIN — Medication 1 TABLET(S): at 12:08

## 2017-01-01 RX ADMIN — Medication 1 GRAM(S): at 07:09

## 2017-01-01 RX ADMIN — GABAPENTIN 300 MILLIGRAM(S): 400 CAPSULE ORAL at 13:28

## 2017-01-01 RX ADMIN — GABAPENTIN 300 MILLIGRAM(S): 400 CAPSULE ORAL at 14:31

## 2017-01-01 RX ADMIN — Medication 10 MILLIGRAM(S): at 05:19

## 2017-01-01 RX ADMIN — Medication 650 MILLIGRAM(S): at 07:30

## 2017-01-01 RX ADMIN — GABAPENTIN 100 MILLIGRAM(S): 400 CAPSULE ORAL at 21:10

## 2017-01-01 RX ADMIN — BUDESONIDE AND FORMOTEROL FUMARATE DIHYDRATE 2 PUFF(S): 160; 4.5 AEROSOL RESPIRATORY (INHALATION) at 22:25

## 2017-01-01 RX ADMIN — SODIUM CHLORIDE 3 MILLILITER(S): 9 INJECTION INTRAMUSCULAR; INTRAVENOUS; SUBCUTANEOUS at 21:36

## 2017-01-01 RX ADMIN — Medication 250 MILLIGRAM(S): at 11:44

## 2017-01-01 RX ADMIN — ATORVASTATIN CALCIUM 20 MILLIGRAM(S): 80 TABLET, FILM COATED ORAL at 23:15

## 2017-01-01 RX ADMIN — Medication 50 MICROGRAM(S): at 06:10

## 2017-01-01 RX ADMIN — Medication 25 MILLIGRAM(S): at 06:29

## 2017-01-01 RX ADMIN — Medication 50 MICROGRAM(S): at 05:05

## 2017-01-01 RX ADMIN — BUDESONIDE AND FORMOTEROL FUMARATE DIHYDRATE 2 PUFF(S): 160; 4.5 AEROSOL RESPIRATORY (INHALATION) at 02:53

## 2017-01-01 RX ADMIN — Medication 150 MILLIGRAM(S): at 09:25

## 2017-01-01 RX ADMIN — SODIUM CHLORIDE 3 MILLILITER(S): 9 INJECTION INTRAMUSCULAR; INTRAVENOUS; SUBCUTANEOUS at 13:00

## 2017-01-01 RX ADMIN — SODIUM CHLORIDE 3 MILLILITER(S): 9 INJECTION INTRAMUSCULAR; INTRAVENOUS; SUBCUTANEOUS at 05:38

## 2017-01-01 RX ADMIN — SPIRONOLACTONE 12.5 MILLIGRAM(S): 25 TABLET, FILM COATED ORAL at 05:37

## 2017-01-01 RX ADMIN — POLYETHYLENE GLYCOL 3350 17 GRAM(S): 17 POWDER, FOR SOLUTION ORAL at 11:36

## 2017-01-01 RX ADMIN — ALBUTEROL 2 PUFF(S): 90 AEROSOL, METERED ORAL at 05:14

## 2017-01-01 RX ADMIN — MORPHINE SULFATE 0.5 MILLIGRAM(S): 50 CAPSULE, EXTENDED RELEASE ORAL at 11:17

## 2017-01-01 RX ADMIN — Medication 63.75 MILLIMOLE(S): at 12:25

## 2017-01-01 RX ADMIN — SPIRONOLACTONE 12.5 MILLIGRAM(S): 25 TABLET, FILM COATED ORAL at 06:04

## 2017-01-01 RX ADMIN — Medication 100 MILLIEQUIVALENT(S): at 10:44

## 2017-01-01 RX ADMIN — GABAPENTIN 300 MILLIGRAM(S): 400 CAPSULE ORAL at 13:15

## 2017-01-01 RX ADMIN — BUDESONIDE AND FORMOTEROL FUMARATE DIHYDRATE 2 PUFF(S): 160; 4.5 AEROSOL RESPIRATORY (INHALATION) at 22:58

## 2017-01-01 RX ADMIN — MORPHINE SULFATE 0.5 MILLIGRAM(S): 50 CAPSULE, EXTENDED RELEASE ORAL at 20:03

## 2017-01-01 RX ADMIN — LINEZOLID 600 MILLIGRAM(S): 600 INJECTION, SOLUTION INTRAVENOUS at 05:11

## 2017-01-01 RX ADMIN — Medication 100 MILLIGRAM(S): at 14:12

## 2017-01-01 RX ADMIN — TIOTROPIUM BROMIDE 1 CAPSULE(S): 18 CAPSULE ORAL; RESPIRATORY (INHALATION) at 11:53

## 2017-01-01 RX ADMIN — ATORVASTATIN CALCIUM 20 MILLIGRAM(S): 80 TABLET, FILM COATED ORAL at 22:06

## 2017-01-01 RX ADMIN — BUDESONIDE AND FORMOTEROL FUMARATE DIHYDRATE 2 PUFF(S): 160; 4.5 AEROSOL RESPIRATORY (INHALATION) at 22:53

## 2017-01-01 RX ADMIN — Medication 50 MICROGRAM(S): at 07:10

## 2017-01-01 RX ADMIN — Medication 50 MICROGRAM(S): at 05:44

## 2017-01-01 RX ADMIN — AMIODARONE HYDROCHLORIDE 400 MILLIGRAM(S): 400 TABLET ORAL at 05:32

## 2017-01-01 RX ADMIN — Medication 1 TABLET(S): at 10:44

## 2017-01-01 RX ADMIN — Medication 1 GRAM(S): at 11:48

## 2017-01-01 RX ADMIN — Medication 1 GRAM(S): at 13:34

## 2017-01-01 RX ADMIN — AMIODARONE HYDROCHLORIDE 400 MILLIGRAM(S): 400 TABLET ORAL at 07:14

## 2017-01-01 RX ADMIN — ENOXAPARIN SODIUM 40 MILLIGRAM(S): 100 INJECTION SUBCUTANEOUS at 22:36

## 2017-01-01 RX ADMIN — Medication 100 MILLIGRAM(S): at 22:12

## 2017-01-01 RX ADMIN — LIDOCAINE 1 PATCH: 4 CREAM TOPICAL at 12:23

## 2017-01-01 RX ADMIN — LATANOPROST 1 DROP(S): 0.05 SOLUTION/ DROPS OPHTHALMIC; TOPICAL at 21:39

## 2017-01-01 RX ADMIN — Medication 650 MILLIGRAM(S): at 18:45

## 2017-01-01 RX ADMIN — Medication 25 MILLIGRAM(S): at 06:01

## 2017-01-01 RX ADMIN — Medication 100 MILLIGRAM(S): at 14:06

## 2017-01-01 RX ADMIN — CEFEPIME 100 MILLIGRAM(S): 1 INJECTION, POWDER, FOR SOLUTION INTRAMUSCULAR; INTRAVENOUS at 17:52

## 2017-01-01 RX ADMIN — Medication 100 MILLIGRAM(S): at 21:55

## 2017-01-01 RX ADMIN — LATANOPROST 1 DROP(S): 0.05 SOLUTION/ DROPS OPHTHALMIC; TOPICAL at 23:15

## 2017-01-01 RX ADMIN — Medication 2.5 MILLIGRAM(S): at 17:49

## 2017-01-01 RX ADMIN — Medication 650 MILLIGRAM(S): at 03:13

## 2017-01-01 RX ADMIN — MORPHINE SULFATE 1 MILLIGRAM(S): 50 CAPSULE, EXTENDED RELEASE ORAL at 02:17

## 2017-01-01 RX ADMIN — MORPHINE SULFATE 4 MILLIGRAM(S): 50 CAPSULE, EXTENDED RELEASE ORAL at 15:41

## 2017-01-01 RX ADMIN — AMIODARONE HYDROCHLORIDE 400 MILLIGRAM(S): 400 TABLET ORAL at 05:07

## 2017-01-01 RX ADMIN — BUDESONIDE AND FORMOTEROL FUMARATE DIHYDRATE 2 PUFF(S): 160; 4.5 AEROSOL RESPIRATORY (INHALATION) at 09:40

## 2017-01-01 RX ADMIN — ATORVASTATIN CALCIUM 20 MILLIGRAM(S): 80 TABLET, FILM COATED ORAL at 22:54

## 2017-01-01 RX ADMIN — SENNA PLUS 2 TABLET(S): 8.6 TABLET ORAL at 22:12

## 2017-01-01 RX ADMIN — Medication 300 MILLIGRAM(S): at 14:51

## 2017-01-01 RX ADMIN — Medication 100 MILLIGRAM(S): at 13:38

## 2017-01-01 RX ADMIN — Medication 81 MILLIGRAM(S): at 13:12

## 2017-01-01 RX ADMIN — Medication 1 TABLET(S): at 16:47

## 2017-01-01 RX ADMIN — Medication 25 MICROGRAM(S): at 06:03

## 2017-01-01 RX ADMIN — SODIUM CHLORIDE 3 MILLILITER(S): 9 INJECTION INTRAMUSCULAR; INTRAVENOUS; SUBCUTANEOUS at 06:00

## 2017-01-01 RX ADMIN — Medication 10 MILLIGRAM(S): at 16:05

## 2017-01-01 RX ADMIN — Medication 100 MILLIGRAM(S): at 10:52

## 2017-01-01 RX ADMIN — MORPHINE SULFATE 1 MILLIGRAM(S): 50 CAPSULE, EXTENDED RELEASE ORAL at 14:50

## 2017-01-01 RX ADMIN — Medication 1 APPLICATION(S): at 11:06

## 2017-01-01 RX ADMIN — Medication 1 APPLICATION(S): at 17:14

## 2017-01-01 RX ADMIN — Medication 40 MILLIEQUIVALENT(S): at 22:28

## 2017-01-01 RX ADMIN — LIDOCAINE 1 PATCH: 4 CREAM TOPICAL at 11:33

## 2017-01-01 RX ADMIN — Medication 250 MILLIGRAM(S): at 08:13

## 2017-01-01 RX ADMIN — LATANOPROST 1 DROP(S): 0.05 SOLUTION/ DROPS OPHTHALMIC; TOPICAL at 21:29

## 2017-01-01 RX ADMIN — TIOTROPIUM BROMIDE 1 CAPSULE(S): 18 CAPSULE ORAL; RESPIRATORY (INHALATION) at 10:47

## 2017-01-01 RX ADMIN — CEFEPIME 100 MILLIGRAM(S): 1 INJECTION, POWDER, FOR SOLUTION INTRAMUSCULAR; INTRAVENOUS at 05:41

## 2017-01-01 RX ADMIN — BUDESONIDE AND FORMOTEROL FUMARATE DIHYDRATE 2 PUFF(S): 160; 4.5 AEROSOL RESPIRATORY (INHALATION) at 21:27

## 2017-01-01 RX ADMIN — Medication 50 MICROGRAM(S): at 06:19

## 2017-01-01 RX ADMIN — ENOXAPARIN SODIUM 40 MILLIGRAM(S): 100 INJECTION SUBCUTANEOUS at 12:34

## 2017-01-01 RX ADMIN — AMIODARONE HYDROCHLORIDE 400 MILLIGRAM(S): 400 TABLET ORAL at 07:00

## 2017-01-01 RX ADMIN — ENOXAPARIN SODIUM 40 MILLIGRAM(S): 100 INJECTION SUBCUTANEOUS at 21:27

## 2017-01-01 RX ADMIN — ATORVASTATIN CALCIUM 20 MILLIGRAM(S): 80 TABLET, FILM COATED ORAL at 22:27

## 2017-01-01 RX ADMIN — ENOXAPARIN SODIUM 40 MILLIGRAM(S): 100 INJECTION SUBCUTANEOUS at 22:23

## 2017-01-01 RX ADMIN — Medication 50 MICROGRAM(S): at 06:03

## 2017-01-01 RX ADMIN — Medication 1000 MILLIGRAM(S): at 12:01

## 2017-01-01 RX ADMIN — MORPHINE SULFATE 0.5 MILLIGRAM(S): 50 CAPSULE, EXTENDED RELEASE ORAL at 19:05

## 2017-01-01 RX ADMIN — Medication 100 MILLIGRAM(S): at 08:07

## 2017-01-01 RX ADMIN — LIDOCAINE 1 PATCH: 4 CREAM TOPICAL at 23:06

## 2017-01-01 RX ADMIN — MORPHINE SULFATE 1 MILLIGRAM(S): 50 CAPSULE, EXTENDED RELEASE ORAL at 14:44

## 2017-01-01 RX ADMIN — BUDESONIDE AND FORMOTEROL FUMARATE DIHYDRATE 2 PUFF(S): 160; 4.5 AEROSOL RESPIRATORY (INHALATION) at 11:07

## 2017-01-01 RX ADMIN — MORPHINE SULFATE 1 MILLIGRAM(S): 50 CAPSULE, EXTENDED RELEASE ORAL at 03:23

## 2017-01-01 RX ADMIN — SPIRONOLACTONE 12.5 MILLIGRAM(S): 25 TABLET, FILM COATED ORAL at 07:09

## 2017-01-01 RX ADMIN — SODIUM CHLORIDE 2000 MILLILITER(S): 9 INJECTION INTRAMUSCULAR; INTRAVENOUS; SUBCUTANEOUS at 15:49

## 2017-01-01 RX ADMIN — BUDESONIDE AND FORMOTEROL FUMARATE DIHYDRATE 2 PUFF(S): 160; 4.5 AEROSOL RESPIRATORY (INHALATION) at 21:42

## 2017-01-01 RX ADMIN — TIOTROPIUM BROMIDE 1 CAPSULE(S): 18 CAPSULE ORAL; RESPIRATORY (INHALATION) at 11:07

## 2017-01-01 RX ADMIN — SIMETHICONE 80 MILLIGRAM(S): 80 TABLET, CHEWABLE ORAL at 05:26

## 2017-01-01 RX ADMIN — Medication 25 MILLIGRAM(S): at 05:10

## 2017-01-01 RX ADMIN — MORPHINE SULFATE 1 MILLIGRAM(S): 50 CAPSULE, EXTENDED RELEASE ORAL at 02:34

## 2017-01-01 RX ADMIN — LOSARTAN POTASSIUM 25 MILLIGRAM(S): 100 TABLET, FILM COATED ORAL at 05:32

## 2017-01-01 RX ADMIN — Medication 1 GRAM(S): at 00:59

## 2017-01-01 RX ADMIN — Medication 100 MILLIGRAM(S): at 22:58

## 2017-01-01 RX ADMIN — MORPHINE SULFATE 1 MILLIGRAM(S): 50 CAPSULE, EXTENDED RELEASE ORAL at 06:18

## 2017-01-01 RX ADMIN — Medication 1 APPLICATION(S): at 06:45

## 2017-01-01 RX ADMIN — LIDOCAINE 1 PATCH: 4 CREAM TOPICAL at 23:45

## 2017-01-01 RX ADMIN — SODIUM CHLORIDE 500 MILLILITER(S): 9 INJECTION INTRAMUSCULAR; INTRAVENOUS; SUBCUTANEOUS at 18:29

## 2017-01-01 RX ADMIN — AMIODARONE HYDROCHLORIDE 400 MILLIGRAM(S): 400 TABLET ORAL at 06:18

## 2017-01-01 RX ADMIN — DIPHENHYDRAMINE HYDROCHLORIDE AND LIDOCAINE HYDROCHLORIDE AND ALUMINUM HYDROXIDE AND MAGNESIUM HYDRO 5 MILLILITER(S): KIT at 06:31

## 2017-01-01 RX ADMIN — Medication 1 TABLET(S): at 11:50

## 2017-01-01 RX ADMIN — MORPHINE SULFATE 1 MILLIGRAM(S): 50 CAPSULE, EXTENDED RELEASE ORAL at 19:21

## 2017-01-01 RX ADMIN — ENOXAPARIN SODIUM 40 MILLIGRAM(S): 100 INJECTION SUBCUTANEOUS at 22:38

## 2017-01-01 RX ADMIN — Medication 100 MILLIGRAM(S): at 15:21

## 2017-01-01 RX ADMIN — HYDROMORPHONE HYDROCHLORIDE 0.25 MILLIGRAM(S): 2 INJECTION INTRAMUSCULAR; INTRAVENOUS; SUBCUTANEOUS at 15:59

## 2017-01-01 RX ADMIN — SODIUM CHLORIDE 3 MILLILITER(S): 9 INJECTION INTRAMUSCULAR; INTRAVENOUS; SUBCUTANEOUS at 12:04

## 2017-01-01 RX ADMIN — Medication 50 MICROGRAM(S): at 07:30

## 2017-01-01 RX ADMIN — Medication 650 MILLIGRAM(S): at 22:15

## 2017-01-01 RX ADMIN — Medication 1 GRAM(S): at 05:26

## 2017-01-01 RX ADMIN — Medication 30 MILLIGRAM(S): at 06:25

## 2017-01-01 RX ADMIN — Medication 650 MILLIGRAM(S): at 06:52

## 2017-01-01 RX ADMIN — MEROPENEM 100 MILLIGRAM(S): 1 INJECTION INTRAVENOUS at 18:53

## 2017-01-01 RX ADMIN — LATANOPROST 1 DROP(S): 0.05 SOLUTION/ DROPS OPHTHALMIC; TOPICAL at 22:22

## 2017-01-01 RX ADMIN — Medication 1 GRAM(S): at 17:01

## 2017-01-01 RX ADMIN — SODIUM CHLORIDE 40 MILLILITER(S): 9 INJECTION, SOLUTION INTRAVENOUS at 11:31

## 2017-01-01 RX ADMIN — Medication 100 MILLIGRAM(S): at 06:04

## 2017-01-01 RX ADMIN — BUDESONIDE AND FORMOTEROL FUMARATE DIHYDRATE 2 PUFF(S): 160; 4.5 AEROSOL RESPIRATORY (INHALATION) at 15:40

## 2017-01-01 RX ADMIN — MEROPENEM 100 MILLIGRAM(S): 1 INJECTION INTRAVENOUS at 06:15

## 2017-01-01 RX ADMIN — DIPHENHYDRAMINE HYDROCHLORIDE AND LIDOCAINE HYDROCHLORIDE AND ALUMINUM HYDROXIDE AND MAGNESIUM HYDRO 5 MILLILITER(S): KIT at 06:15

## 2017-01-01 RX ADMIN — TIOTROPIUM BROMIDE 1 CAPSULE(S): 18 CAPSULE ORAL; RESPIRATORY (INHALATION) at 10:58

## 2017-01-01 RX ADMIN — AMIODARONE HYDROCHLORIDE 400 MILLIGRAM(S): 400 TABLET ORAL at 06:54

## 2017-01-01 RX ADMIN — PANTOPRAZOLE SODIUM 40 MILLIGRAM(S): 20 TABLET, DELAYED RELEASE ORAL at 16:55

## 2017-01-01 RX ADMIN — AMIODARONE HYDROCHLORIDE 400 MILLIGRAM(S): 400 TABLET ORAL at 11:48

## 2017-01-01 RX ADMIN — MORPHINE SULFATE 4 MILLIGRAM(S): 50 CAPSULE, EXTENDED RELEASE ORAL at 17:20

## 2017-01-01 RX ADMIN — Medication 100 MILLIGRAM(S): at 13:24

## 2017-01-01 RX ADMIN — Medication 1 TABLET(S): at 11:58

## 2017-01-01 RX ADMIN — Medication 400 MILLIGRAM(S): at 12:40

## 2017-01-01 RX ADMIN — Medication 1 GRAM(S): at 12:31

## 2017-01-01 RX ADMIN — Medication 25 MILLIGRAM(S): at 06:20

## 2017-01-01 RX ADMIN — Medication 1 APPLICATION(S): at 17:46

## 2017-01-01 RX ADMIN — Medication 1 GRAM(S): at 06:51

## 2017-01-01 RX ADMIN — Medication 50 MICROGRAM(S): at 06:52

## 2017-01-01 RX ADMIN — CEFEPIME 100 MILLIGRAM(S): 1 INJECTION, POWDER, FOR SOLUTION INTRAMUSCULAR; INTRAVENOUS at 01:00

## 2017-01-01 RX ADMIN — SENNA PLUS 2 TABLET(S): 8.6 TABLET ORAL at 21:44

## 2017-01-01 RX ADMIN — TIOTROPIUM BROMIDE 1 CAPSULE(S): 18 CAPSULE ORAL; RESPIRATORY (INHALATION) at 11:39

## 2017-01-01 RX ADMIN — SPIRONOLACTONE 12.5 MILLIGRAM(S): 25 TABLET, FILM COATED ORAL at 05:59

## 2017-01-01 RX ADMIN — SIMETHICONE 80 MILLIGRAM(S): 80 TABLET, CHEWABLE ORAL at 11:36

## 2017-01-01 RX ADMIN — Medication 100 MILLIGRAM(S): at 06:44

## 2017-01-01 RX ADMIN — Medication 10 MILLIGRAM(S): at 17:28

## 2017-01-01 RX ADMIN — LATANOPROST 1 DROP(S): 0.05 SOLUTION/ DROPS OPHTHALMIC; TOPICAL at 22:59

## 2017-01-01 RX ADMIN — SODIUM CHLORIDE 75 MILLILITER(S): 9 INJECTION, SOLUTION INTRAVENOUS at 08:55

## 2017-01-01 RX ADMIN — BUDESONIDE AND FORMOTEROL FUMARATE DIHYDRATE 2 PUFF(S): 160; 4.5 AEROSOL RESPIRATORY (INHALATION) at 22:48

## 2017-01-01 RX ADMIN — Medication 100 MILLIGRAM(S): at 06:42

## 2017-01-01 RX ADMIN — Medication 25 MILLIGRAM(S): at 06:55

## 2017-01-01 RX ADMIN — Medication 1 GRAM(S): at 00:25

## 2017-01-01 RX ADMIN — SPIRONOLACTONE 12.5 MILLIGRAM(S): 25 TABLET, FILM COATED ORAL at 05:29

## 2017-01-01 RX ADMIN — SODIUM CHLORIDE 3 MILLILITER(S): 9 INJECTION INTRAMUSCULAR; INTRAVENOUS; SUBCUTANEOUS at 21:56

## 2017-01-01 RX ADMIN — SPIRONOLACTONE 12.5 MILLIGRAM(S): 25 TABLET, FILM COATED ORAL at 06:20

## 2017-01-01 RX ADMIN — Medication 100 MILLIGRAM(S): at 14:13

## 2017-01-01 RX ADMIN — Medication 50 MILLIGRAM(S): at 10:52

## 2017-01-01 RX ADMIN — SODIUM CHLORIDE 3 MILLILITER(S): 9 INJECTION INTRAMUSCULAR; INTRAVENOUS; SUBCUTANEOUS at 01:28

## 2017-01-01 RX ADMIN — GABAPENTIN 100 MILLIGRAM(S): 400 CAPSULE ORAL at 05:28

## 2017-01-01 RX ADMIN — MEROPENEM 100 MILLIGRAM(S): 1 INJECTION INTRAVENOUS at 18:19

## 2017-01-01 RX ADMIN — Medication 12.5 GRAM(S): at 00:31

## 2017-01-01 RX ADMIN — Medication 5 MILLILITER(S): at 23:12

## 2017-01-01 RX ADMIN — CEFEPIME 100 MILLIGRAM(S): 1 INJECTION, POWDER, FOR SOLUTION INTRAMUSCULAR; INTRAVENOUS at 17:08

## 2017-01-01 RX ADMIN — Medication 300 MILLIGRAM(S): at 11:59

## 2017-01-01 RX ADMIN — Medication 25 MILLIGRAM(S): at 06:50

## 2017-01-01 RX ADMIN — PANTOPRAZOLE SODIUM 40 MILLIGRAM(S): 20 TABLET, DELAYED RELEASE ORAL at 05:35

## 2017-01-01 RX ADMIN — LOSARTAN POTASSIUM 25 MILLIGRAM(S): 100 TABLET, FILM COATED ORAL at 07:09

## 2017-01-01 RX ADMIN — MEROPENEM 100 MILLIGRAM(S): 1 INJECTION INTRAVENOUS at 17:25

## 2017-01-01 RX ADMIN — Medication 25 MILLIGRAM(S): at 05:56

## 2017-01-01 RX ADMIN — Medication 1 GRAM(S): at 06:19

## 2017-01-01 RX ADMIN — Medication 300 MILLIGRAM(S): at 12:31

## 2017-01-01 RX ADMIN — Medication 81 MILLIGRAM(S): at 14:19

## 2017-01-01 RX ADMIN — Medication 50 MICROGRAM(S): at 07:01

## 2017-01-01 RX ADMIN — Medication 300 MILLIGRAM(S): at 13:35

## 2017-01-01 RX ADMIN — MORPHINE SULFATE 1 MILLIGRAM(S): 50 CAPSULE, EXTENDED RELEASE ORAL at 14:59

## 2017-01-01 RX ADMIN — LOSARTAN POTASSIUM 25 MILLIGRAM(S): 100 TABLET, FILM COATED ORAL at 18:52

## 2017-01-01 RX ADMIN — TIOTROPIUM BROMIDE 1 CAPSULE(S): 18 CAPSULE ORAL; RESPIRATORY (INHALATION) at 11:01

## 2017-01-01 RX ADMIN — MORPHINE SULFATE 1 MILLIGRAM(S): 50 CAPSULE, EXTENDED RELEASE ORAL at 17:54

## 2017-01-01 RX ADMIN — Medication 30 MILLILITER(S): at 17:38

## 2017-01-01 RX ADMIN — Medication 25 MILLIGRAM(S): at 11:26

## 2017-01-01 RX ADMIN — BUDESONIDE AND FORMOTEROL FUMARATE DIHYDRATE 1 PUFF(S): 160; 4.5 AEROSOL RESPIRATORY (INHALATION) at 21:18

## 2017-01-01 RX ADMIN — AMIODARONE HYDROCHLORIDE 400 MILLIGRAM(S): 400 TABLET ORAL at 05:19

## 2017-01-01 RX ADMIN — Medication 150 MILLIGRAM(S): at 17:34

## 2017-01-01 RX ADMIN — Medication 200 MILLIGRAM(S): at 18:56

## 2017-01-01 RX ADMIN — Medication 1 APPLICATION(S): at 06:53

## 2017-01-01 RX ADMIN — Medication 81 MILLIGRAM(S): at 11:25

## 2017-01-01 RX ADMIN — Medication 30 MILLIGRAM(S): at 03:52

## 2017-01-01 RX ADMIN — Medication 63.75 MILLIMOLE(S): at 10:39

## 2017-01-01 RX ADMIN — Medication 650 MILLIGRAM(S): at 06:17

## 2017-01-01 RX ADMIN — Medication 100 MILLIGRAM(S): at 08:10

## 2017-01-01 RX ADMIN — PANTOPRAZOLE SODIUM 40 MILLIGRAM(S): 20 TABLET, DELAYED RELEASE ORAL at 21:56

## 2017-01-01 RX ADMIN — CEFEPIME 100 MILLIGRAM(S): 1 INJECTION, POWDER, FOR SOLUTION INTRAMUSCULAR; INTRAVENOUS at 16:09

## 2017-01-01 RX ADMIN — Medication 400 MILLIGRAM(S): at 18:37

## 2017-01-01 RX ADMIN — SODIUM CHLORIDE 75 MILLILITER(S): 9 INJECTION INTRAMUSCULAR; INTRAVENOUS; SUBCUTANEOUS at 12:21

## 2017-01-01 RX ADMIN — LIDOCAINE 1 PATCH: 4 CREAM TOPICAL at 13:25

## 2017-01-01 RX ADMIN — Medication 5 MILLIGRAM(S): at 17:22

## 2017-01-01 RX ADMIN — Medication 100 MILLIGRAM(S): at 00:59

## 2017-01-01 RX ADMIN — Medication 1 TABLET(S): at 16:55

## 2017-01-01 RX ADMIN — DIPHENHYDRAMINE HYDROCHLORIDE AND LIDOCAINE HYDROCHLORIDE AND ALUMINUM HYDROXIDE AND MAGNESIUM HYDRO 5 MILLILITER(S): KIT at 13:01

## 2017-01-01 RX ADMIN — Medication 100 MILLIEQUIVALENT(S): at 09:44

## 2017-01-01 RX ADMIN — TIOTROPIUM BROMIDE 1 CAPSULE(S): 18 CAPSULE ORAL; RESPIRATORY (INHALATION) at 13:35

## 2017-01-01 RX ADMIN — Medication 81 MILLIGRAM(S): at 11:34

## 2017-01-01 RX ADMIN — MORPHINE SULFATE 0.5 MILLIGRAM(S): 50 CAPSULE, EXTENDED RELEASE ORAL at 17:52

## 2017-01-01 RX ADMIN — Medication 1 APPLICATION(S): at 20:13

## 2017-01-01 RX ADMIN — Medication 200 MILLIGRAM(S): at 05:29

## 2017-01-01 RX ADMIN — Medication 150 MILLIGRAM(S): at 22:31

## 2017-01-01 RX ADMIN — Medication 50 MICROGRAM(S): at 05:35

## 2017-01-01 RX ADMIN — GABAPENTIN 300 MILLIGRAM(S): 400 CAPSULE ORAL at 21:36

## 2017-01-01 RX ADMIN — Medication 50 MICROGRAM(S): at 05:09

## 2017-01-01 RX ADMIN — Medication 250 MILLIGRAM(S): at 18:51

## 2017-01-01 RX ADMIN — Medication 10 MILLIGRAM(S): at 05:28

## 2017-01-01 RX ADMIN — PANTOPRAZOLE SODIUM 40 MILLIGRAM(S): 20 TABLET, DELAYED RELEASE ORAL at 06:44

## 2017-01-01 RX ADMIN — Medication 1 GRAM(S): at 07:10

## 2017-01-01 RX ADMIN — PANTOPRAZOLE SODIUM 40 MILLIGRAM(S): 20 TABLET, DELAYED RELEASE ORAL at 05:37

## 2017-01-01 RX ADMIN — TIOTROPIUM BROMIDE 1 CAPSULE(S): 18 CAPSULE ORAL; RESPIRATORY (INHALATION) at 08:37

## 2017-01-01 RX ADMIN — Medication 1 APPLICATION(S): at 23:23

## 2017-01-01 RX ADMIN — SODIUM CHLORIDE 60 MILLILITER(S): 9 INJECTION, SOLUTION INTRAVENOUS at 16:10

## 2017-01-01 RX ADMIN — Medication 400 MILLIGRAM(S): at 08:51

## 2017-01-01 RX ADMIN — SODIUM CHLORIDE 3 MILLILITER(S): 9 INJECTION INTRAMUSCULAR; INTRAVENOUS; SUBCUTANEOUS at 05:22

## 2017-01-01 RX ADMIN — Medication 25 MICROGRAM(S): at 06:56

## 2017-01-01 RX ADMIN — Medication 100 MILLIGRAM(S): at 21:10

## 2017-01-01 RX ADMIN — Medication 650 MILLIGRAM(S): at 19:30

## 2017-01-01 RX ADMIN — Medication 1 GRAM(S): at 18:15

## 2017-01-01 RX ADMIN — MIRTAZAPINE 7.5 MILLIGRAM(S): 45 TABLET, ORALLY DISINTEGRATING ORAL at 22:25

## 2017-01-01 RX ADMIN — MORPHINE SULFATE 0.5 MILLIGRAM(S): 50 CAPSULE, EXTENDED RELEASE ORAL at 10:09

## 2017-01-01 RX ADMIN — Medication 650 MILLIGRAM(S): at 17:50

## 2017-01-01 RX ADMIN — CEFEPIME 100 MILLIGRAM(S): 1 INJECTION, POWDER, FOR SOLUTION INTRAMUSCULAR; INTRAVENOUS at 03:24

## 2017-01-01 RX ADMIN — LATANOPROST 1 DROP(S): 0.05 SOLUTION/ DROPS OPHTHALMIC; TOPICAL at 22:26

## 2017-01-01 RX ADMIN — LOSARTAN POTASSIUM 25 MILLIGRAM(S): 100 TABLET, FILM COATED ORAL at 06:05

## 2017-01-01 RX ADMIN — TIOTROPIUM BROMIDE 1 CAPSULE(S): 18 CAPSULE ORAL; RESPIRATORY (INHALATION) at 08:49

## 2017-01-01 RX ADMIN — MORPHINE SULFATE 1 MILLIGRAM(S): 50 CAPSULE, EXTENDED RELEASE ORAL at 11:25

## 2017-01-01 RX ADMIN — PANTOPRAZOLE SODIUM 40 MILLIGRAM(S): 20 TABLET, DELAYED RELEASE ORAL at 10:22

## 2017-01-01 RX ADMIN — Medication 81 MILLIGRAM(S): at 11:01

## 2017-01-01 RX ADMIN — Medication 25 MILLIGRAM(S): at 06:17

## 2017-01-01 RX ADMIN — TIOTROPIUM BROMIDE 1 CAPSULE(S): 18 CAPSULE ORAL; RESPIRATORY (INHALATION) at 08:58

## 2017-01-01 RX ADMIN — SPIRONOLACTONE 12.5 MILLIGRAM(S): 25 TABLET, FILM COATED ORAL at 15:51

## 2017-01-01 RX ADMIN — GABAPENTIN 300 MILLIGRAM(S): 400 CAPSULE ORAL at 23:21

## 2017-01-01 RX ADMIN — MORPHINE SULFATE 1 MILLIGRAM(S): 50 CAPSULE, EXTENDED RELEASE ORAL at 14:36

## 2017-01-01 RX ADMIN — Medication 100 MILLIGRAM(S): at 18:15

## 2017-01-01 RX ADMIN — Medication 10 MILLIGRAM(S): at 07:00

## 2017-01-01 RX ADMIN — POLYETHYLENE GLYCOL 3350 17 GRAM(S): 17 POWDER, FOR SOLUTION ORAL at 17:48

## 2017-01-01 RX ADMIN — ATORVASTATIN CALCIUM 20 MILLIGRAM(S): 80 TABLET, FILM COATED ORAL at 21:41

## 2017-01-01 RX ADMIN — ATORVASTATIN CALCIUM 20 MILLIGRAM(S): 80 TABLET, FILM COATED ORAL at 21:36

## 2017-01-01 RX ADMIN — SODIUM CHLORIDE 3 MILLILITER(S): 9 INJECTION INTRAMUSCULAR; INTRAVENOUS; SUBCUTANEOUS at 13:54

## 2017-01-01 RX ADMIN — AMIODARONE HYDROCHLORIDE 400 MILLIGRAM(S): 400 TABLET ORAL at 05:59

## 2017-01-01 RX ADMIN — ENOXAPARIN SODIUM 40 MILLIGRAM(S): 100 INJECTION SUBCUTANEOUS at 21:38

## 2017-01-01 RX ADMIN — Medication 650 MILLIGRAM(S): at 11:50

## 2017-01-01 RX ADMIN — Medication 10 MILLIGRAM(S): at 17:04

## 2017-01-01 RX ADMIN — MORPHINE SULFATE 0.5 MILLIGRAM(S): 50 CAPSULE, EXTENDED RELEASE ORAL at 03:05

## 2017-01-01 RX ADMIN — SIMETHICONE 80 MILLIGRAM(S): 80 TABLET, CHEWABLE ORAL at 11:04

## 2017-01-01 RX ADMIN — MORPHINE SULFATE 0.5 MILLIGRAM(S): 50 CAPSULE, EXTENDED RELEASE ORAL at 00:12

## 2017-01-01 RX ADMIN — Medication 1 APPLICATION(S): at 17:59

## 2017-01-01 RX ADMIN — Medication 40 MILLIEQUIVALENT(S): at 11:27

## 2017-01-01 RX ADMIN — MORPHINE SULFATE 2 MILLIGRAM(S): 50 CAPSULE, EXTENDED RELEASE ORAL at 18:10

## 2017-01-01 RX ADMIN — BUDESONIDE AND FORMOTEROL FUMARATE DIHYDRATE 2 PUFF(S): 160; 4.5 AEROSOL RESPIRATORY (INHALATION) at 08:35

## 2017-01-01 RX ADMIN — Medication 1 GRAM(S): at 23:05

## 2017-01-01 RX ADMIN — Medication 1 GRAM(S): at 17:25

## 2017-01-01 RX ADMIN — GABAPENTIN 300 MILLIGRAM(S): 400 CAPSULE ORAL at 06:34

## 2017-01-01 RX ADMIN — MORPHINE SULFATE 1 MILLIGRAM(S): 50 CAPSULE, EXTENDED RELEASE ORAL at 06:54

## 2017-01-01 RX ADMIN — MEROPENEM 100 MILLIGRAM(S): 1 INJECTION INTRAVENOUS at 19:07

## 2017-01-01 RX ADMIN — AMIODARONE HYDROCHLORIDE 400 MILLIGRAM(S): 400 TABLET ORAL at 11:42

## 2017-01-01 RX ADMIN — Medication 1 GRAM(S): at 21:19

## 2017-01-01 RX ADMIN — Medication 100 MILLIGRAM(S): at 21:01

## 2017-01-01 RX ADMIN — ATORVASTATIN CALCIUM 20 MILLIGRAM(S): 80 TABLET, FILM COATED ORAL at 21:49

## 2017-01-01 RX ADMIN — GABAPENTIN 300 MILLIGRAM(S): 400 CAPSULE ORAL at 13:24

## 2017-01-01 RX ADMIN — SPIRONOLACTONE 12.5 MILLIGRAM(S): 25 TABLET, FILM COATED ORAL at 18:53

## 2017-01-01 RX ADMIN — AMIODARONE HYDROCHLORIDE 400 MILLIGRAM(S): 400 TABLET ORAL at 07:10

## 2017-01-01 RX ADMIN — Medication 1 DROP(S): at 12:21

## 2017-01-01 RX ADMIN — BUDESONIDE AND FORMOTEROL FUMARATE DIHYDRATE 2 PUFF(S): 160; 4.5 AEROSOL RESPIRATORY (INHALATION) at 21:15

## 2017-01-01 RX ADMIN — Medication 1 GRAM(S): at 23:03

## 2017-01-01 RX ADMIN — BUDESONIDE AND FORMOTEROL FUMARATE DIHYDRATE 2 PUFF(S): 160; 4.5 AEROSOL RESPIRATORY (INHALATION) at 09:26

## 2017-01-01 RX ADMIN — Medication 1 APPLICATION(S): at 06:03

## 2017-01-01 RX ADMIN — Medication 1 TABLET(S): at 08:27

## 2017-01-01 RX ADMIN — SODIUM CHLORIDE 3 MILLILITER(S): 9 INJECTION INTRAMUSCULAR; INTRAVENOUS; SUBCUTANEOUS at 05:37

## 2017-01-01 RX ADMIN — AMIODARONE HYDROCHLORIDE 400 MILLIGRAM(S): 400 TABLET ORAL at 05:56

## 2017-01-01 RX ADMIN — SODIUM CHLORIDE 3 MILLILITER(S): 9 INJECTION INTRAMUSCULAR; INTRAVENOUS; SUBCUTANEOUS at 13:30

## 2017-01-01 RX ADMIN — Medication 100 MILLIGRAM(S): at 11:09

## 2017-01-01 RX ADMIN — Medication 1 APPLICATION(S): at 10:04

## 2017-01-01 RX ADMIN — LATANOPROST 1 DROP(S): 0.05 SOLUTION/ DROPS OPHTHALMIC; TOPICAL at 22:35

## 2017-01-01 RX ADMIN — SIMETHICONE 80 MILLIGRAM(S): 80 TABLET, CHEWABLE ORAL at 11:23

## 2017-01-01 RX ADMIN — Medication 100 MILLIGRAM(S): at 09:26

## 2017-01-01 RX ADMIN — BUDESONIDE AND FORMOTEROL FUMARATE DIHYDRATE 2 PUFF(S): 160; 4.5 AEROSOL RESPIRATORY (INHALATION) at 09:55

## 2017-01-01 RX ADMIN — Medication 250 MILLIGRAM(S): at 04:00

## 2017-01-01 RX ADMIN — Medication 650 MILLIGRAM(S): at 18:05

## 2017-01-01 RX ADMIN — BUDESONIDE AND FORMOTEROL FUMARATE DIHYDRATE 2 PUFF(S): 160; 4.5 AEROSOL RESPIRATORY (INHALATION) at 23:15

## 2017-01-01 RX ADMIN — BUDESONIDE AND FORMOTEROL FUMARATE DIHYDRATE 2 PUFF(S): 160; 4.5 AEROSOL RESPIRATORY (INHALATION) at 22:02

## 2017-01-01 RX ADMIN — Medication 650 MILLIGRAM(S): at 11:13

## 2017-01-01 RX ADMIN — Medication 1000 MILLIGRAM(S): at 13:41

## 2017-01-01 RX ADMIN — Medication 1 GRAM(S): at 06:54

## 2017-01-01 RX ADMIN — Medication 10 MILLIGRAM(S): at 06:51

## 2017-01-01 RX ADMIN — CEFEPIME 100 MILLIGRAM(S): 1 INJECTION, POWDER, FOR SOLUTION INTRAMUSCULAR; INTRAVENOUS at 14:38

## 2017-01-01 RX ADMIN — Medication 1 GRAM(S): at 22:02

## 2017-01-01 RX ADMIN — Medication 300 MILLIGRAM(S): at 14:18

## 2017-01-01 RX ADMIN — AMIODARONE HYDROCHLORIDE 400 MILLIGRAM(S): 400 TABLET ORAL at 22:11

## 2017-01-01 RX ADMIN — ENOXAPARIN SODIUM 40 MILLIGRAM(S): 100 INJECTION SUBCUTANEOUS at 11:11

## 2017-01-01 RX ADMIN — Medication 1 GRAM(S): at 23:10

## 2017-01-01 RX ADMIN — Medication 100 MILLIEQUIVALENT(S): at 11:00

## 2017-01-01 RX ADMIN — Medication 50 MICROGRAM(S): at 05:55

## 2017-01-01 RX ADMIN — Medication 100 MILLIGRAM(S): at 06:27

## 2017-01-01 RX ADMIN — Medication 1 APPLICATION(S): at 09:44

## 2017-01-01 RX ADMIN — LIDOCAINE 1 PATCH: 4 CREAM TOPICAL at 23:03

## 2017-01-01 RX ADMIN — Medication 250 MILLIGRAM(S): at 16:20

## 2017-01-01 RX ADMIN — Medication 650 MILLIGRAM(S): at 03:01

## 2017-01-01 RX ADMIN — LATANOPROST 1 DROP(S): 0.05 SOLUTION/ DROPS OPHTHALMIC; TOPICAL at 21:10

## 2017-01-01 RX ADMIN — MORPHINE SULFATE 1 MILLIGRAM(S): 50 CAPSULE, EXTENDED RELEASE ORAL at 06:46

## 2017-01-01 RX ADMIN — Medication 100 MILLIGRAM(S): at 05:15

## 2017-01-01 RX ADMIN — BENZOCAINE AND MENTHOL 1 LOZENGE: 5; 1 LIQUID ORAL at 21:38

## 2017-01-01 RX ADMIN — Medication 1 TABLET(S): at 13:15

## 2017-01-01 RX ADMIN — Medication 650 MILLIGRAM(S): at 21:52

## 2017-01-01 RX ADMIN — CEFEPIME 100 MILLIGRAM(S): 1 INJECTION, POWDER, FOR SOLUTION INTRAMUSCULAR; INTRAVENOUS at 06:27

## 2017-01-01 RX ADMIN — Medication 5 MILLIGRAM(S): at 18:00

## 2017-01-01 RX ADMIN — Medication 250 MILLIGRAM(S): at 18:06

## 2017-01-01 RX ADMIN — Medication 40 MILLIEQUIVALENT(S): at 08:51

## 2017-01-01 RX ADMIN — Medication 5 MILLILITER(S): at 12:52

## 2017-01-01 RX ADMIN — Medication 650 MILLIGRAM(S): at 22:12

## 2017-01-01 RX ADMIN — ENOXAPARIN SODIUM 40 MILLIGRAM(S): 100 INJECTION SUBCUTANEOUS at 12:00

## 2017-01-01 RX ADMIN — LATANOPROST 1 DROP(S): 0.05 SOLUTION/ DROPS OPHTHALMIC; TOPICAL at 22:01

## 2017-01-01 RX ADMIN — LATANOPROST 1 DROP(S): 0.05 SOLUTION/ DROPS OPHTHALMIC; TOPICAL at 22:27

## 2017-01-01 RX ADMIN — SIMETHICONE 80 MILLIGRAM(S): 80 TABLET, CHEWABLE ORAL at 05:37

## 2017-01-01 RX ADMIN — SIMETHICONE 80 MILLIGRAM(S): 80 TABLET, CHEWABLE ORAL at 23:07

## 2017-01-01 RX ADMIN — SODIUM CHLORIDE 60 MILLILITER(S): 9 INJECTION, SOLUTION INTRAVENOUS at 11:58

## 2017-01-01 RX ADMIN — TIOTROPIUM BROMIDE 1 CAPSULE(S): 18 CAPSULE ORAL; RESPIRATORY (INHALATION) at 22:35

## 2017-01-01 RX ADMIN — LOSARTAN POTASSIUM 25 MILLIGRAM(S): 100 TABLET, FILM COATED ORAL at 06:26

## 2017-01-01 RX ADMIN — IMIPENEM AND CILASTATIN 100 MILLIGRAM(S): 250; 250 INJECTION, POWDER, FOR SOLUTION INTRAVENOUS at 23:02

## 2017-01-01 RX ADMIN — Medication 300 MILLIGRAM(S): at 13:11

## 2017-01-01 RX ADMIN — Medication 1 DROP(S): at 13:23

## 2017-01-01 RX ADMIN — Medication 1 GRAM(S): at 22:54

## 2017-01-01 RX ADMIN — Medication 250 MILLIGRAM(S): at 07:13

## 2017-01-01 RX ADMIN — Medication 1 GRAM(S): at 10:51

## 2017-01-01 RX ADMIN — TIOTROPIUM BROMIDE 1 CAPSULE(S): 18 CAPSULE ORAL; RESPIRATORY (INHALATION) at 09:58

## 2017-01-01 RX ADMIN — SODIUM CHLORIDE 750 MILLILITER(S): 9 INJECTION INTRAMUSCULAR; INTRAVENOUS; SUBCUTANEOUS at 14:45

## 2017-01-01 RX ADMIN — Medication 25 MILLIGRAM(S): at 05:57

## 2017-01-01 RX ADMIN — Medication 100 MILLIGRAM(S): at 14:03

## 2017-01-01 RX ADMIN — Medication 10 MILLIGRAM(S): at 05:14

## 2017-01-01 RX ADMIN — CEFEPIME 100 MILLIGRAM(S): 1 INJECTION, POWDER, FOR SOLUTION INTRAMUSCULAR; INTRAVENOUS at 03:06

## 2017-01-01 RX ADMIN — Medication 300 MILLIGRAM(S): at 15:40

## 2017-01-01 RX ADMIN — Medication 100 MILLIGRAM(S): at 06:54

## 2017-01-01 RX ADMIN — MORPHINE SULFATE 0.5 MILLIGRAM(S): 50 CAPSULE, EXTENDED RELEASE ORAL at 23:30

## 2017-01-01 RX ADMIN — Medication 100 MILLIGRAM(S): at 13:20

## 2017-01-01 RX ADMIN — MORPHINE SULFATE 1 MILLIGRAM(S): 50 CAPSULE, EXTENDED RELEASE ORAL at 10:23

## 2017-01-01 RX ADMIN — Medication 650 MILLIGRAM(S): at 14:30

## 2017-01-01 RX ADMIN — MORPHINE SULFATE 1 MILLIGRAM(S): 50 CAPSULE, EXTENDED RELEASE ORAL at 10:01

## 2017-01-01 RX ADMIN — Medication 300 MILLIGRAM(S): at 12:06

## 2017-01-01 RX ADMIN — CEFEPIME 100 MILLIGRAM(S): 1 INJECTION, POWDER, FOR SOLUTION INTRAMUSCULAR; INTRAVENOUS at 05:28

## 2017-01-01 RX ADMIN — MORPHINE SULFATE 1 MILLIGRAM(S): 50 CAPSULE, EXTENDED RELEASE ORAL at 03:13

## 2017-01-01 RX ADMIN — CEFEPIME 100 MILLIGRAM(S): 1 INJECTION, POWDER, FOR SOLUTION INTRAMUSCULAR; INTRAVENOUS at 05:12

## 2017-01-01 RX ADMIN — Medication 10 MILLIGRAM(S): at 06:20

## 2017-01-01 RX ADMIN — Medication 100 MILLIGRAM(S): at 14:41

## 2017-01-01 RX ADMIN — SODIUM CHLORIDE 3 MILLILITER(S): 9 INJECTION INTRAMUSCULAR; INTRAVENOUS; SUBCUTANEOUS at 13:29

## 2017-01-01 RX ADMIN — Medication 50 MICROGRAM(S): at 05:28

## 2017-01-01 RX ADMIN — PANTOPRAZOLE SODIUM 40 MILLIGRAM(S): 20 TABLET, DELAYED RELEASE ORAL at 06:30

## 2017-01-01 RX ADMIN — BUDESONIDE AND FORMOTEROL FUMARATE DIHYDRATE 2 PUFF(S): 160; 4.5 AEROSOL RESPIRATORY (INHALATION) at 21:48

## 2017-01-01 RX ADMIN — Medication 100 MILLIGRAM(S): at 21:23

## 2017-01-01 RX ADMIN — Medication 63.75 MILLIMOLE(S): at 11:48

## 2017-01-01 RX ADMIN — Medication 1 APPLICATION(S): at 09:05

## 2017-01-01 RX ADMIN — MORPHINE SULFATE 1 MILLIGRAM(S): 50 CAPSULE, EXTENDED RELEASE ORAL at 23:10

## 2017-01-01 RX ADMIN — MEROPENEM 100 MILLIGRAM(S): 1 INJECTION INTRAVENOUS at 17:46

## 2017-01-01 RX ADMIN — LOSARTAN POTASSIUM 25 MILLIGRAM(S): 100 TABLET, FILM COATED ORAL at 06:29

## 2017-01-01 RX ADMIN — Medication 1 DROP(S): at 11:17

## 2017-01-01 RX ADMIN — CEFEPIME 100 MILLIGRAM(S): 1 INJECTION, POWDER, FOR SOLUTION INTRAMUSCULAR; INTRAVENOUS at 05:59

## 2017-01-01 RX ADMIN — MORPHINE SULFATE 0.5 MILLIGRAM(S): 50 CAPSULE, EXTENDED RELEASE ORAL at 13:40

## 2017-01-01 RX ADMIN — Medication 1 TABLET(S): at 10:46

## 2017-01-01 RX ADMIN — MORPHINE SULFATE 1 MILLIGRAM(S): 50 CAPSULE, EXTENDED RELEASE ORAL at 10:26

## 2017-01-01 RX ADMIN — ENOXAPARIN SODIUM 40 MILLIGRAM(S): 100 INJECTION SUBCUTANEOUS at 12:52

## 2017-01-01 RX ADMIN — Medication 25 MILLIGRAM(S): at 05:32

## 2017-01-01 RX ADMIN — AMIODARONE HYDROCHLORIDE 400 MILLIGRAM(S): 400 TABLET ORAL at 15:51

## 2017-01-01 RX ADMIN — IMIPENEM AND CILASTATIN 100 MILLIGRAM(S): 250; 250 INJECTION, POWDER, FOR SOLUTION INTRAVENOUS at 14:13

## 2017-01-01 RX ADMIN — MORPHINE SULFATE 1 MILLIGRAM(S): 50 CAPSULE, EXTENDED RELEASE ORAL at 07:00

## 2017-01-01 RX ADMIN — ATORVASTATIN CALCIUM 20 MILLIGRAM(S): 80 TABLET, FILM COATED ORAL at 22:08

## 2017-01-01 RX ADMIN — IMIPENEM AND CILASTATIN 100 MILLIGRAM(S): 250; 250 INJECTION, POWDER, FOR SOLUTION INTRAVENOUS at 21:16

## 2017-01-01 RX ADMIN — Medication 1 GRAM(S): at 17:39

## 2017-01-01 RX ADMIN — IMIPENEM AND CILASTATIN 100 MILLIGRAM(S): 250; 250 INJECTION, POWDER, FOR SOLUTION INTRAVENOUS at 21:29

## 2017-01-01 RX ADMIN — Medication 1 GRAM(S): at 05:19

## 2017-01-01 RX ADMIN — IMIPENEM AND CILASTATIN 100 MILLIGRAM(S): 250; 250 INJECTION, POWDER, FOR SOLUTION INTRAVENOUS at 11:56

## 2017-01-01 RX ADMIN — Medication 400 MILLIGRAM(S): at 15:49

## 2017-01-01 RX ADMIN — Medication 1 TABLET(S): at 08:13

## 2017-01-01 RX ADMIN — MORPHINE SULFATE 1 MILLIGRAM(S): 50 CAPSULE, EXTENDED RELEASE ORAL at 11:15

## 2017-01-01 RX ADMIN — GABAPENTIN 300 MILLIGRAM(S): 400 CAPSULE ORAL at 05:33

## 2017-01-01 RX ADMIN — LOSARTAN POTASSIUM 25 MILLIGRAM(S): 100 TABLET, FILM COATED ORAL at 05:29

## 2017-01-01 RX ADMIN — Medication 25 MILLIGRAM(S): at 06:15

## 2017-01-01 RX ADMIN — SENNA PLUS 2 TABLET(S): 8.6 TABLET ORAL at 21:23

## 2017-01-01 RX ADMIN — MEROPENEM 100 MILLIGRAM(S): 1 INJECTION INTRAVENOUS at 17:39

## 2017-01-01 RX ADMIN — Medication 650 MILLIGRAM(S): at 22:57

## 2017-01-01 RX ADMIN — Medication 100 MILLIEQUIVALENT(S): at 19:50

## 2017-01-01 RX ADMIN — Medication 250 MILLIGRAM(S): at 19:37

## 2017-01-01 RX ADMIN — Medication 1 GRAM(S): at 17:41

## 2017-01-01 RX ADMIN — Medication 25 MILLIGRAM(S): at 05:44

## 2017-01-01 RX ADMIN — Medication 1 APPLICATION(S): at 17:00

## 2017-01-01 RX ADMIN — SPIRONOLACTONE 12.5 MILLIGRAM(S): 25 TABLET, FILM COATED ORAL at 14:50

## 2017-01-01 RX ADMIN — Medication 100 MILLIGRAM(S): at 13:12

## 2017-01-01 RX ADMIN — Medication 5 MILLILITER(S): at 06:16

## 2017-01-01 RX ADMIN — Medication 1 TABLET(S): at 11:28

## 2017-01-01 RX ADMIN — Medication 1 TABLET(S): at 09:39

## 2017-01-01 RX ADMIN — Medication 150 MILLIGRAM(S): at 07:07

## 2017-01-01 RX ADMIN — BUDESONIDE AND FORMOTEROL FUMARATE DIHYDRATE 2 PUFF(S): 160; 4.5 AEROSOL RESPIRATORY (INHALATION) at 10:45

## 2017-01-01 RX ADMIN — Medication 1 TABLET(S): at 12:30

## 2017-01-01 RX ADMIN — MORPHINE SULFATE 0.5 MILLIGRAM(S): 50 CAPSULE, EXTENDED RELEASE ORAL at 23:57

## 2017-01-01 RX ADMIN — CEFTRIAXONE 100 GRAM(S): 500 INJECTION, POWDER, FOR SOLUTION INTRAMUSCULAR; INTRAVENOUS at 08:00

## 2017-01-01 RX ADMIN — Medication 100 MILLIGRAM(S): at 22:40

## 2017-01-01 RX ADMIN — SPIRONOLACTONE 12.5 MILLIGRAM(S): 25 TABLET, FILM COATED ORAL at 06:02

## 2017-01-01 RX ADMIN — Medication 1 APPLICATION(S): at 17:47

## 2017-01-01 RX ADMIN — GABAPENTIN 300 MILLIGRAM(S): 400 CAPSULE ORAL at 13:06

## 2017-01-01 RX ADMIN — BUDESONIDE AND FORMOTEROL FUMARATE DIHYDRATE 2 PUFF(S): 160; 4.5 AEROSOL RESPIRATORY (INHALATION) at 22:09

## 2017-01-01 RX ADMIN — Medication 25 MILLIGRAM(S): at 06:54

## 2017-01-01 RX ADMIN — ENOXAPARIN SODIUM 40 MILLIGRAM(S): 100 INJECTION SUBCUTANEOUS at 22:25

## 2017-01-01 RX ADMIN — LIDOCAINE 1 PATCH: 4 CREAM TOPICAL at 13:10

## 2017-01-01 RX ADMIN — Medication 300 MILLIGRAM(S): at 15:21

## 2017-01-01 RX ADMIN — ATORVASTATIN CALCIUM 20 MILLIGRAM(S): 80 TABLET, FILM COATED ORAL at 22:00

## 2017-01-01 RX ADMIN — Medication 5 MILLILITER(S): at 14:37

## 2017-01-01 RX ADMIN — PANTOPRAZOLE SODIUM 40 MILLIGRAM(S): 20 TABLET, DELAYED RELEASE ORAL at 17:55

## 2017-01-01 RX ADMIN — Medication 30 MILLIGRAM(S): at 05:10

## 2017-01-01 RX ADMIN — AMIODARONE HYDROCHLORIDE 400 MILLIGRAM(S): 400 TABLET ORAL at 14:13

## 2017-01-01 RX ADMIN — LOSARTAN POTASSIUM 25 MILLIGRAM(S): 100 TABLET, FILM COATED ORAL at 11:55

## 2017-01-01 RX ADMIN — LINEZOLID 600 MILLIGRAM(S): 600 INJECTION, SOLUTION INTRAVENOUS at 17:22

## 2017-01-01 RX ADMIN — Medication 1 APPLICATION(S): at 20:54

## 2017-01-01 RX ADMIN — SPIRONOLACTONE 12.5 MILLIGRAM(S): 25 TABLET, FILM COATED ORAL at 06:50

## 2017-01-01 RX ADMIN — Medication 5 MILLILITER(S): at 22:19

## 2017-01-01 RX ADMIN — BUDESONIDE AND FORMOTEROL FUMARATE DIHYDRATE 2 PUFF(S): 160; 4.5 AEROSOL RESPIRATORY (INHALATION) at 16:16

## 2017-01-01 RX ADMIN — SPIRONOLACTONE 12.5 MILLIGRAM(S): 25 TABLET, FILM COATED ORAL at 06:45

## 2017-01-01 RX ADMIN — BUDESONIDE AND FORMOTEROL FUMARATE DIHYDRATE 2 PUFF(S): 160; 4.5 AEROSOL RESPIRATORY (INHALATION) at 08:28

## 2017-01-01 RX ADMIN — LATANOPROST 1 DROP(S): 0.05 SOLUTION/ DROPS OPHTHALMIC; TOPICAL at 22:24

## 2017-01-01 RX ADMIN — Medication 1 GRAM(S): at 05:29

## 2017-01-01 RX ADMIN — AMIODARONE HYDROCHLORIDE 400 MILLIGRAM(S): 400 TABLET ORAL at 21:27

## 2017-01-01 RX ADMIN — Medication 250 MILLIGRAM(S): at 18:13

## 2017-01-01 RX ADMIN — TIOTROPIUM BROMIDE 1 CAPSULE(S): 18 CAPSULE ORAL; RESPIRATORY (INHALATION) at 09:03

## 2017-01-01 RX ADMIN — BUDESONIDE AND FORMOTEROL FUMARATE DIHYDRATE 2 PUFF(S): 160; 4.5 AEROSOL RESPIRATORY (INHALATION) at 08:58

## 2017-01-01 RX ADMIN — DULOXETINE HYDROCHLORIDE 60 MILLIGRAM(S): 30 CAPSULE, DELAYED RELEASE ORAL at 17:04

## 2017-01-01 RX ADMIN — ATORVASTATIN CALCIUM 20 MILLIGRAM(S): 80 TABLET, FILM COATED ORAL at 21:54

## 2017-01-01 RX ADMIN — AMIODARONE HYDROCHLORIDE 400 MILLIGRAM(S): 400 TABLET ORAL at 05:29

## 2017-01-01 RX ADMIN — Medication 30 MILLIGRAM(S): at 05:29

## 2017-01-01 RX ADMIN — Medication 63.75 MILLIMOLE(S): at 10:10

## 2017-01-01 RX ADMIN — Medication 25 MILLIGRAM(S): at 05:25

## 2017-01-01 RX ADMIN — Medication 1 GRAM(S): at 17:57

## 2017-01-01 RX ADMIN — Medication 162 MILLIGRAM(S): at 14:03

## 2017-01-01 RX ADMIN — SENNA PLUS 2 TABLET(S): 8.6 TABLET ORAL at 22:27

## 2017-01-01 RX ADMIN — SODIUM CHLORIDE 3 MILLILITER(S): 9 INJECTION INTRAMUSCULAR; INTRAVENOUS; SUBCUTANEOUS at 21:32

## 2017-01-01 RX ADMIN — ATORVASTATIN CALCIUM 20 MILLIGRAM(S): 80 TABLET, FILM COATED ORAL at 22:25

## 2017-01-01 RX ADMIN — PANTOPRAZOLE SODIUM 40 MILLIGRAM(S): 20 TABLET, DELAYED RELEASE ORAL at 06:42

## 2017-01-01 RX ADMIN — TIOTROPIUM BROMIDE 1 CAPSULE(S): 18 CAPSULE ORAL; RESPIRATORY (INHALATION) at 09:09

## 2017-01-01 RX ADMIN — PANTOPRAZOLE SODIUM 40 MILLIGRAM(S): 20 TABLET, DELAYED RELEASE ORAL at 05:32

## 2017-01-01 RX ADMIN — Medication 50 MILLILITER(S): at 08:37

## 2017-01-01 RX ADMIN — MEROPENEM 100 MILLIGRAM(S): 1 INJECTION INTRAVENOUS at 05:31

## 2017-01-01 RX ADMIN — Medication 300 MILLIGRAM(S): at 13:31

## 2017-01-01 RX ADMIN — Medication 10 MILLIGRAM(S): at 17:41

## 2017-01-01 RX ADMIN — TIOTROPIUM BROMIDE 1 CAPSULE(S): 18 CAPSULE ORAL; RESPIRATORY (INHALATION) at 09:00

## 2017-01-01 RX ADMIN — Medication 25 MICROGRAM(S): at 06:18

## 2017-01-01 RX ADMIN — Medication 1 GRAM(S): at 11:28

## 2017-01-01 RX ADMIN — SPIRONOLACTONE 12.5 MILLIGRAM(S): 25 TABLET, FILM COATED ORAL at 05:34

## 2017-01-01 RX ADMIN — Medication 1 GRAM(S): at 11:07

## 2017-01-01 RX ADMIN — CEFEPIME 100 MILLIGRAM(S): 1 INJECTION, POWDER, FOR SOLUTION INTRAMUSCULAR; INTRAVENOUS at 06:23

## 2017-01-01 RX ADMIN — TIOTROPIUM BROMIDE 1 CAPSULE(S): 18 CAPSULE ORAL; RESPIRATORY (INHALATION) at 12:50

## 2017-01-01 RX ADMIN — Medication 25 MILLIGRAM(S): at 12:05

## 2017-01-01 RX ADMIN — MEROPENEM 100 MILLIGRAM(S): 1 INJECTION INTRAVENOUS at 18:10

## 2017-01-01 RX ADMIN — Medication 30 MILLIGRAM(S): at 11:59

## 2017-01-01 RX ADMIN — IMIPENEM AND CILASTATIN 100 MILLIGRAM(S): 250; 250 INJECTION, POWDER, FOR SOLUTION INTRAVENOUS at 21:55

## 2017-01-01 RX ADMIN — Medication 650 MILLIGRAM(S): at 18:32

## 2017-01-01 RX ADMIN — AMIODARONE HYDROCHLORIDE 400 MILLIGRAM(S): 400 TABLET ORAL at 11:55

## 2017-01-01 RX ADMIN — SIMETHICONE 80 MILLIGRAM(S): 80 TABLET, CHEWABLE ORAL at 17:31

## 2017-01-01 RX ADMIN — Medication 100 MILLIEQUIVALENT(S): at 18:19

## 2017-01-01 RX ADMIN — Medication 81 MILLIGRAM(S): at 14:32

## 2017-01-01 RX ADMIN — ENOXAPARIN SODIUM 40 MILLIGRAM(S): 100 INJECTION SUBCUTANEOUS at 22:12

## 2017-01-01 RX ADMIN — Medication 25 MICROGRAM(S): at 06:53

## 2017-01-01 RX ADMIN — SODIUM CHLORIDE 3 MILLILITER(S): 9 INJECTION INTRAMUSCULAR; INTRAVENOUS; SUBCUTANEOUS at 22:03

## 2017-01-01 RX ADMIN — TIOTROPIUM BROMIDE 1 CAPSULE(S): 18 CAPSULE ORAL; RESPIRATORY (INHALATION) at 10:29

## 2017-01-01 RX ADMIN — LOSARTAN POTASSIUM 25 MILLIGRAM(S): 100 TABLET, FILM COATED ORAL at 06:10

## 2017-01-01 RX ADMIN — Medication 250 MILLIGRAM(S): at 16:34

## 2017-01-01 RX ADMIN — Medication 1 TABLET(S): at 17:57

## 2017-01-01 RX ADMIN — ENOXAPARIN SODIUM 40 MILLIGRAM(S): 100 INJECTION SUBCUTANEOUS at 11:54

## 2017-01-01 RX ADMIN — Medication 650 MILLIGRAM(S): at 11:04

## 2017-01-01 RX ADMIN — ENOXAPARIN SODIUM 40 MILLIGRAM(S): 100 INJECTION SUBCUTANEOUS at 13:33

## 2017-01-01 RX ADMIN — Medication 1 GRAM(S): at 13:04

## 2017-01-01 RX ADMIN — ENOXAPARIN SODIUM 40 MILLIGRAM(S): 100 INJECTION SUBCUTANEOUS at 22:11

## 2017-01-01 RX ADMIN — LIDOCAINE 1 PATCH: 4 CREAM TOPICAL at 23:43

## 2017-01-01 RX ADMIN — BUDESONIDE AND FORMOTEROL FUMARATE DIHYDRATE 1 PUFF(S): 160; 4.5 AEROSOL RESPIRATORY (INHALATION) at 21:20

## 2017-01-01 RX ADMIN — Medication 300 MILLIGRAM(S): at 11:26

## 2017-01-01 RX ADMIN — LIDOCAINE 1 PATCH: 4 CREAM TOPICAL at 11:28

## 2017-01-01 RX ADMIN — IMIPENEM AND CILASTATIN 100 MILLIGRAM(S): 250; 250 INJECTION, POWDER, FOR SOLUTION INTRAVENOUS at 16:47

## 2017-01-01 RX ADMIN — SENNA PLUS 2 TABLET(S): 8.6 TABLET ORAL at 22:40

## 2017-01-01 RX ADMIN — ENOXAPARIN SODIUM 40 MILLIGRAM(S): 100 INJECTION SUBCUTANEOUS at 13:24

## 2017-01-01 RX ADMIN — POTASSIUM PHOSPHATE, MONOBASIC POTASSIUM PHOSPHATE, DIBASIC 62.5 MILLIMOLE(S): 236; 224 INJECTION, SOLUTION INTRAVENOUS at 18:29

## 2017-01-01 RX ADMIN — LATANOPROST 1 DROP(S): 0.05 SOLUTION/ DROPS OPHTHALMIC; TOPICAL at 23:22

## 2017-01-01 RX ADMIN — Medication 1 APPLICATION(S): at 20:33

## 2017-01-01 RX ADMIN — Medication 1 GRAM(S): at 17:46

## 2017-01-01 RX ADMIN — Medication 1 TABLET(S): at 14:41

## 2017-01-01 RX ADMIN — Medication 1 TABLET(S): at 11:56

## 2017-01-01 RX ADMIN — MIRTAZAPINE 7.5 MILLIGRAM(S): 45 TABLET, ORALLY DISINTEGRATING ORAL at 23:22

## 2017-01-01 RX ADMIN — Medication 20 MILLIEQUIVALENT(S): at 16:38

## 2017-01-01 RX ADMIN — BUDESONIDE AND FORMOTEROL FUMARATE DIHYDRATE 2 PUFF(S): 160; 4.5 AEROSOL RESPIRATORY (INHALATION) at 12:22

## 2017-01-01 RX ADMIN — TIOTROPIUM BROMIDE 1 CAPSULE(S): 18 CAPSULE ORAL; RESPIRATORY (INHALATION) at 11:44

## 2017-01-01 RX ADMIN — Medication 20 MILLIGRAM(S): at 14:19

## 2017-01-01 RX ADMIN — SIMETHICONE 80 MILLIGRAM(S): 80 TABLET, CHEWABLE ORAL at 11:35

## 2017-01-01 RX ADMIN — BUDESONIDE AND FORMOTEROL FUMARATE DIHYDRATE 2 PUFF(S): 160; 4.5 AEROSOL RESPIRATORY (INHALATION) at 21:00

## 2017-01-01 RX ADMIN — PANTOPRAZOLE SODIUM 40 MILLIGRAM(S): 20 TABLET, DELAYED RELEASE ORAL at 06:54

## 2017-01-01 RX ADMIN — Medication 150 MILLIGRAM(S): at 20:10

## 2017-01-01 RX ADMIN — Medication 250 MILLIGRAM(S): at 12:09

## 2017-01-01 RX ADMIN — Medication 81 MILLIGRAM(S): at 08:38

## 2017-01-01 RX ADMIN — TIOTROPIUM BROMIDE 1 CAPSULE(S): 18 CAPSULE ORAL; RESPIRATORY (INHALATION) at 09:31

## 2017-01-01 RX ADMIN — OXYCODONE AND ACETAMINOPHEN 1 TABLET(S): 5; 325 TABLET ORAL at 19:23

## 2017-01-01 RX ADMIN — Medication 100 MILLIGRAM(S): at 01:01

## 2017-01-01 RX ADMIN — Medication 81 MILLIGRAM(S): at 15:50

## 2017-01-01 RX ADMIN — TIOTROPIUM BROMIDE 1 CAPSULE(S): 18 CAPSULE ORAL; RESPIRATORY (INHALATION) at 12:11

## 2017-01-01 RX ADMIN — CEFEPIME 100 MILLIGRAM(S): 1 INJECTION, POWDER, FOR SOLUTION INTRAMUSCULAR; INTRAVENOUS at 16:12

## 2017-01-01 RX ADMIN — ATORVASTATIN CALCIUM 20 MILLIGRAM(S): 80 TABLET, FILM COATED ORAL at 21:48

## 2017-01-01 RX ADMIN — Medication 1 GRAM(S): at 07:31

## 2017-01-01 RX ADMIN — BUDESONIDE AND FORMOTEROL FUMARATE DIHYDRATE 2 PUFF(S): 160; 4.5 AEROSOL RESPIRATORY (INHALATION) at 08:02

## 2017-01-01 RX ADMIN — Medication 81 MILLIGRAM(S): at 12:32

## 2017-01-01 RX ADMIN — Medication 10 MILLIGRAM(S): at 06:28

## 2017-01-01 RX ADMIN — CEFTRIAXONE 100 GRAM(S): 500 INJECTION, POWDER, FOR SOLUTION INTRAMUSCULAR; INTRAVENOUS at 08:43

## 2017-01-01 RX ADMIN — Medication 15 MILLIGRAM(S): at 17:14

## 2017-01-01 RX ADMIN — SODIUM CHLORIDE 1000 MILLILITER(S): 9 INJECTION INTRAMUSCULAR; INTRAVENOUS; SUBCUTANEOUS at 05:50

## 2017-01-01 RX ADMIN — SENNA PLUS 2 TABLET(S): 8.6 TABLET ORAL at 22:57

## 2017-01-01 RX ADMIN — Medication 1000 MILLIGRAM(S): at 04:16

## 2017-01-01 RX ADMIN — Medication 1 APPLICATION(S): at 10:12

## 2017-01-01 RX ADMIN — MORPHINE SULFATE 0.5 MILLIGRAM(S): 50 CAPSULE, EXTENDED RELEASE ORAL at 18:50

## 2017-01-01 RX ADMIN — Medication 50 MICROGRAM(S): at 06:25

## 2017-01-01 RX ADMIN — MEROPENEM 100 MILLIGRAM(S): 1 INJECTION INTRAVENOUS at 18:50

## 2017-01-01 RX ADMIN — SENNA PLUS 2 TABLET(S): 8.6 TABLET ORAL at 21:11

## 2017-01-01 RX ADMIN — CEFEPIME 100 MILLIGRAM(S): 1 INJECTION, POWDER, FOR SOLUTION INTRAMUSCULAR; INTRAVENOUS at 18:10

## 2017-01-01 RX ADMIN — BUDESONIDE AND FORMOTEROL FUMARATE DIHYDRATE 2 PUFF(S): 160; 4.5 AEROSOL RESPIRATORY (INHALATION) at 09:10

## 2017-01-01 RX ADMIN — PANTOPRAZOLE SODIUM 40 MILLIGRAM(S): 20 TABLET, DELAYED RELEASE ORAL at 17:17

## 2017-01-01 RX ADMIN — SENNA PLUS 2 TABLET(S): 8.6 TABLET ORAL at 21:55

## 2017-01-01 RX ADMIN — Medication 5 MILLIGRAM(S): at 11:23

## 2017-01-01 RX ADMIN — Medication 1 GRAM(S): at 05:43

## 2017-01-01 RX ADMIN — LATANOPROST 1 DROP(S): 0.05 SOLUTION/ DROPS OPHTHALMIC; TOPICAL at 21:36

## 2017-01-01 RX ADMIN — MORPHINE SULFATE 0.5 MILLIGRAM(S): 50 CAPSULE, EXTENDED RELEASE ORAL at 06:46

## 2017-01-01 RX ADMIN — Medication 1 APPLICATION(S): at 05:41

## 2017-01-01 RX ADMIN — IMIPENEM AND CILASTATIN 100 MILLIGRAM(S): 250; 250 INJECTION, POWDER, FOR SOLUTION INTRAVENOUS at 22:11

## 2017-01-01 RX ADMIN — MIRTAZAPINE 7.5 MILLIGRAM(S): 45 TABLET, ORALLY DISINTEGRATING ORAL at 23:26

## 2017-01-01 RX ADMIN — SODIUM CHLORIDE 40 MILLILITER(S): 9 INJECTION, SOLUTION INTRAVENOUS at 22:48

## 2017-01-01 RX ADMIN — Medication 300 MILLIGRAM(S): at 13:14

## 2017-01-01 RX ADMIN — Medication 81 MILLIGRAM(S): at 11:53

## 2017-01-01 RX ADMIN — ENOXAPARIN SODIUM 40 MILLIGRAM(S): 100 INJECTION SUBCUTANEOUS at 11:36

## 2017-01-01 RX ADMIN — Medication 50 MILLILITER(S): at 10:38

## 2017-01-01 RX ADMIN — Medication 5 MILLIGRAM(S): at 06:54

## 2017-01-01 RX ADMIN — Medication 81 MILLIGRAM(S): at 11:23

## 2017-01-01 RX ADMIN — IMIPENEM AND CILASTATIN 100 MILLIGRAM(S): 250; 250 INJECTION, POWDER, FOR SOLUTION INTRAVENOUS at 22:00

## 2017-01-01 RX ADMIN — Medication 400 MILLIGRAM(S): at 11:55

## 2017-01-01 RX ADMIN — Medication 1 APPLICATION(S): at 22:48

## 2017-01-01 RX ADMIN — Medication 100 MILLIGRAM(S): at 13:44

## 2017-01-01 RX ADMIN — LATANOPROST 1 DROP(S): 0.05 SOLUTION/ DROPS OPHTHALMIC; TOPICAL at 22:09

## 2017-01-01 RX ADMIN — Medication 1 APPLICATION(S): at 17:44

## 2017-01-01 RX ADMIN — Medication 100 MILLIGRAM(S): at 21:36

## 2017-01-01 RX ADMIN — MORPHINE SULFATE 0.5 MILLIGRAM(S): 50 CAPSULE, EXTENDED RELEASE ORAL at 10:18

## 2017-01-01 RX ADMIN — Medication 650 MILLIGRAM(S): at 21:15

## 2017-01-01 RX ADMIN — SODIUM CHLORIDE 60 MILLILITER(S): 9 INJECTION, SOLUTION INTRAVENOUS at 02:09

## 2017-01-01 RX ADMIN — Medication 1 GRAM(S): at 12:40

## 2017-01-01 RX ADMIN — Medication 25 MICROGRAM(S): at 07:05

## 2017-01-01 RX ADMIN — ATORVASTATIN CALCIUM 20 MILLIGRAM(S): 80 TABLET, FILM COATED ORAL at 22:49

## 2017-01-01 RX ADMIN — Medication 1 APPLICATION(S): at 11:10

## 2017-01-01 RX ADMIN — SIMETHICONE 80 MILLIGRAM(S): 80 TABLET, CHEWABLE ORAL at 23:00

## 2017-01-01 RX ADMIN — SENNA PLUS 2 TABLET(S): 8.6 TABLET ORAL at 22:54

## 2017-01-01 RX ADMIN — Medication 50 MICROGRAM(S): at 05:12

## 2017-01-01 RX ADMIN — SODIUM CHLORIDE 3 MILLILITER(S): 9 INJECTION INTRAMUSCULAR; INTRAVENOUS; SUBCUTANEOUS at 21:44

## 2017-01-01 RX ADMIN — DIPHENHYDRAMINE HYDROCHLORIDE AND LIDOCAINE HYDROCHLORIDE AND ALUMINUM HYDROXIDE AND MAGNESIUM HYDRO 5 MILLILITER(S): KIT at 22:48

## 2017-01-01 RX ADMIN — Medication 10 MILLIGRAM(S): at 17:39

## 2017-01-01 RX ADMIN — CEFEPIME 100 MILLIGRAM(S): 1 INJECTION, POWDER, FOR SOLUTION INTRAMUSCULAR; INTRAVENOUS at 13:14

## 2017-01-01 RX ADMIN — Medication 1 GRAM(S): at 23:04

## 2017-01-01 RX ADMIN — Medication 1 APPLICATION(S): at 09:36

## 2017-01-01 RX ADMIN — ATORVASTATIN CALCIUM 20 MILLIGRAM(S): 80 TABLET, FILM COATED ORAL at 22:21

## 2017-01-01 RX ADMIN — TIOTROPIUM BROMIDE 1 CAPSULE(S): 18 CAPSULE ORAL; RESPIRATORY (INHALATION) at 10:59

## 2017-01-01 RX ADMIN — Medication 1 APPLICATION(S): at 18:10

## 2017-01-01 RX ADMIN — Medication 81 MILLIGRAM(S): at 13:13

## 2017-01-01 RX ADMIN — Medication 1 APPLICATION(S): at 19:09

## 2017-01-01 RX ADMIN — LINEZOLID 600 MILLIGRAM(S): 600 INJECTION, SOLUTION INTRAVENOUS at 17:59

## 2017-01-01 RX ADMIN — SODIUM CHLORIDE 60 MILLILITER(S): 9 INJECTION, SOLUTION INTRAVENOUS at 14:58

## 2017-01-01 RX ADMIN — Medication 300 MILLIGRAM(S): at 12:17

## 2017-01-01 RX ADMIN — Medication 1 TABLET(S): at 11:23

## 2017-01-01 RX ADMIN — BUDESONIDE AND FORMOTEROL FUMARATE DIHYDRATE 2 PUFF(S): 160; 4.5 AEROSOL RESPIRATORY (INHALATION) at 21:29

## 2017-01-01 RX ADMIN — Medication 1 GRAM(S): at 00:26

## 2017-01-01 RX ADMIN — ATORVASTATIN CALCIUM 20 MILLIGRAM(S): 80 TABLET, FILM COATED ORAL at 21:57

## 2017-01-01 RX ADMIN — Medication 50 MICROGRAM(S): at 06:28

## 2017-01-01 RX ADMIN — MORPHINE SULFATE 0.5 MILLIGRAM(S): 50 CAPSULE, EXTENDED RELEASE ORAL at 13:25

## 2017-01-01 RX ADMIN — BUDESONIDE AND FORMOTEROL FUMARATE DIHYDRATE 2 PUFF(S): 160; 4.5 AEROSOL RESPIRATORY (INHALATION) at 12:23

## 2017-01-01 RX ADMIN — MORPHINE SULFATE 0.5 MILLIGRAM(S): 50 CAPSULE, EXTENDED RELEASE ORAL at 02:47

## 2017-01-01 RX ADMIN — BUDESONIDE AND FORMOTEROL FUMARATE DIHYDRATE 2 PUFF(S): 160; 4.5 AEROSOL RESPIRATORY (INHALATION) at 07:35

## 2017-01-01 RX ADMIN — Medication 25 MILLIGRAM(S): at 05:58

## 2017-01-01 RX ADMIN — Medication 40 MILLIGRAM(S): at 05:46

## 2017-01-01 RX ADMIN — SODIUM CHLORIDE 100 MILLILITER(S): 9 INJECTION, SOLUTION INTRAVENOUS at 06:46

## 2017-01-01 RX ADMIN — Medication 650 MILLIGRAM(S): at 23:07

## 2017-01-01 RX ADMIN — ENOXAPARIN SODIUM 40 MILLIGRAM(S): 100 INJECTION SUBCUTANEOUS at 13:08

## 2017-01-01 RX ADMIN — Medication 1 APPLICATION(S): at 06:28

## 2017-01-01 RX ADMIN — Medication 50 MICROGRAM(S): at 06:50

## 2017-01-01 RX ADMIN — AMIODARONE HYDROCHLORIDE 400 MILLIGRAM(S): 400 TABLET ORAL at 21:55

## 2017-01-01 RX ADMIN — BUDESONIDE AND FORMOTEROL FUMARATE DIHYDRATE 2 PUFF(S): 160; 4.5 AEROSOL RESPIRATORY (INHALATION) at 13:34

## 2017-01-01 RX ADMIN — Medication 250 MILLIGRAM(S): at 17:44

## 2017-01-01 RX ADMIN — ATORVASTATIN CALCIUM 20 MILLIGRAM(S): 80 TABLET, FILM COATED ORAL at 21:37

## 2017-01-01 RX ADMIN — Medication 100 MILLIEQUIVALENT(S): at 09:38

## 2017-01-01 RX ADMIN — PANTOPRAZOLE SODIUM 40 MILLIGRAM(S): 20 TABLET, DELAYED RELEASE ORAL at 09:23

## 2017-01-01 RX ADMIN — SODIUM CHLORIDE 3 MILLILITER(S): 9 INJECTION INTRAMUSCULAR; INTRAVENOUS; SUBCUTANEOUS at 22:27

## 2017-01-01 RX ADMIN — Medication 650 MILLIGRAM(S): at 13:28

## 2017-01-01 RX ADMIN — Medication 650 MILLIGRAM(S): at 17:39

## 2017-01-01 RX ADMIN — Medication 10 MILLIGRAM(S): at 17:32

## 2017-01-01 RX ADMIN — GABAPENTIN 200 MILLIGRAM(S): 400 CAPSULE ORAL at 05:36

## 2017-01-01 RX ADMIN — Medication 100 MILLIGRAM(S): at 13:52

## 2017-01-01 RX ADMIN — ENOXAPARIN SODIUM 40 MILLIGRAM(S): 100 INJECTION SUBCUTANEOUS at 11:51

## 2017-01-01 RX ADMIN — TIOTROPIUM BROMIDE 1 CAPSULE(S): 18 CAPSULE ORAL; RESPIRATORY (INHALATION) at 09:05

## 2017-01-01 RX ADMIN — MORPHINE SULFATE 1 MILLIGRAM(S): 50 CAPSULE, EXTENDED RELEASE ORAL at 21:15

## 2017-01-01 RX ADMIN — Medication 40 MILLIGRAM(S): at 06:28

## 2017-01-01 RX ADMIN — Medication 300 MILLIGRAM(S): at 14:32

## 2017-01-01 RX ADMIN — Medication 300 MILLIGRAM(S): at 13:13

## 2017-01-01 RX ADMIN — ATORVASTATIN CALCIUM 20 MILLIGRAM(S): 80 TABLET, FILM COATED ORAL at 21:01

## 2017-01-01 RX ADMIN — PANTOPRAZOLE SODIUM 40 MILLIGRAM(S): 20 TABLET, DELAYED RELEASE ORAL at 17:12

## 2017-01-01 RX ADMIN — GABAPENTIN 200 MILLIGRAM(S): 400 CAPSULE ORAL at 14:03

## 2017-01-01 RX ADMIN — Medication 650 MILLIGRAM(S): at 21:14

## 2017-01-01 RX ADMIN — MORPHINE SULFATE 2 MILLIGRAM(S): 50 CAPSULE, EXTENDED RELEASE ORAL at 19:04

## 2017-01-01 RX ADMIN — CEFTRIAXONE 100 GRAM(S): 500 INJECTION, POWDER, FOR SOLUTION INTRAMUSCULAR; INTRAVENOUS at 01:57

## 2017-01-01 RX ADMIN — MORPHINE SULFATE 1 MILLIGRAM(S): 50 CAPSULE, EXTENDED RELEASE ORAL at 18:21

## 2017-01-01 RX ADMIN — BUDESONIDE AND FORMOTEROL FUMARATE DIHYDRATE 2 PUFF(S): 160; 4.5 AEROSOL RESPIRATORY (INHALATION) at 14:39

## 2017-01-01 RX ADMIN — TIOTROPIUM BROMIDE 1 CAPSULE(S): 18 CAPSULE ORAL; RESPIRATORY (INHALATION) at 13:34

## 2017-01-01 RX ADMIN — MORPHINE SULFATE 0.5 MILLIGRAM(S): 50 CAPSULE, EXTENDED RELEASE ORAL at 12:17

## 2017-01-01 RX ADMIN — BUDESONIDE AND FORMOTEROL FUMARATE DIHYDRATE 2 PUFF(S): 160; 4.5 AEROSOL RESPIRATORY (INHALATION) at 10:50

## 2017-01-01 RX ADMIN — MIRTAZAPINE 7.5 MILLIGRAM(S): 45 TABLET, ORALLY DISINTEGRATING ORAL at 21:39

## 2017-01-01 RX ADMIN — Medication 25 MICROGRAM(S): at 06:43

## 2017-01-01 RX ADMIN — Medication 650 MILLIGRAM(S): at 18:49

## 2017-01-01 RX ADMIN — Medication 81 MILLIGRAM(S): at 11:35

## 2017-01-01 RX ADMIN — Medication 1 GRAM(S): at 17:59

## 2017-01-01 RX ADMIN — Medication 100 MILLIGRAM(S): at 13:15

## 2017-01-01 RX ADMIN — MORPHINE SULFATE 0.5 MILLIGRAM(S): 50 CAPSULE, EXTENDED RELEASE ORAL at 17:37

## 2017-01-01 RX ADMIN — AMIODARONE HYDROCHLORIDE 400 MILLIGRAM(S): 400 TABLET ORAL at 06:15

## 2017-01-01 RX ADMIN — ENOXAPARIN SODIUM 40 MILLIGRAM(S): 100 INJECTION SUBCUTANEOUS at 11:44

## 2017-01-01 RX ADMIN — Medication 25 MILLIGRAM(S): at 06:44

## 2017-01-01 RX ADMIN — MORPHINE SULFATE 1 MILLIGRAM(S): 50 CAPSULE, EXTENDED RELEASE ORAL at 23:15

## 2017-01-01 RX ADMIN — SODIUM CHLORIDE 60 MILLILITER(S): 9 INJECTION, SOLUTION INTRAVENOUS at 12:24

## 2017-01-01 RX ADMIN — SPIRONOLACTONE 12.5 MILLIGRAM(S): 25 TABLET, FILM COATED ORAL at 05:07

## 2017-01-01 RX ADMIN — AMIODARONE HYDROCHLORIDE 400 MILLIGRAM(S): 400 TABLET ORAL at 05:57

## 2017-01-01 RX ADMIN — DIPHENHYDRAMINE HYDROCHLORIDE AND LIDOCAINE HYDROCHLORIDE AND ALUMINUM HYDROXIDE AND MAGNESIUM HYDRO 5 MILLILITER(S): KIT at 05:34

## 2017-01-01 RX ADMIN — BUDESONIDE AND FORMOTEROL FUMARATE DIHYDRATE 2 PUFF(S): 160; 4.5 AEROSOL RESPIRATORY (INHALATION) at 21:40

## 2017-01-01 RX ADMIN — Medication 10 MILLIGRAM(S): at 06:26

## 2017-01-01 RX ADMIN — Medication 1 APPLICATION(S): at 06:26

## 2017-01-01 RX ADMIN — MEROPENEM 100 MILLIGRAM(S): 1 INJECTION INTRAVENOUS at 06:00

## 2017-01-01 RX ADMIN — ENOXAPARIN SODIUM 40 MILLIGRAM(S): 100 INJECTION SUBCUTANEOUS at 10:59

## 2017-01-01 RX ADMIN — SODIUM CHLORIDE 3 MILLILITER(S): 9 INJECTION INTRAMUSCULAR; INTRAVENOUS; SUBCUTANEOUS at 13:34

## 2017-01-01 RX ADMIN — MORPHINE SULFATE 1 MILLIGRAM(S): 50 CAPSULE, EXTENDED RELEASE ORAL at 22:57

## 2017-01-01 RX ADMIN — Medication 15 MILLIGRAM(S): at 21:50

## 2017-01-01 RX ADMIN — ATORVASTATIN CALCIUM 20 MILLIGRAM(S): 80 TABLET, FILM COATED ORAL at 21:02

## 2017-01-01 RX ADMIN — TIOTROPIUM BROMIDE 1 CAPSULE(S): 18 CAPSULE ORAL; RESPIRATORY (INHALATION) at 15:40

## 2017-01-01 RX ADMIN — Medication 1 DROP(S): at 14:38

## 2017-01-01 RX ADMIN — CEFEPIME 100 MILLIGRAM(S): 1 INJECTION, POWDER, FOR SOLUTION INTRAMUSCULAR; INTRAVENOUS at 06:51

## 2017-01-01 RX ADMIN — Medication 1 DROP(S): at 12:18

## 2017-01-01 RX ADMIN — CEFEPIME 100 MILLIGRAM(S): 1 INJECTION, POWDER, FOR SOLUTION INTRAMUSCULAR; INTRAVENOUS at 04:37

## 2017-01-01 RX ADMIN — BUDESONIDE AND FORMOTEROL FUMARATE DIHYDRATE 2 PUFF(S): 160; 4.5 AEROSOL RESPIRATORY (INHALATION) at 22:39

## 2017-01-01 RX ADMIN — SODIUM CHLORIDE 40 MILLILITER(S): 9 INJECTION, SOLUTION INTRAVENOUS at 06:55

## 2017-01-01 RX ADMIN — Medication 100 MILLIGRAM(S): at 06:10

## 2017-01-01 RX ADMIN — MORPHINE SULFATE 1 MILLIGRAM(S): 50 CAPSULE, EXTENDED RELEASE ORAL at 17:39

## 2017-01-01 RX ADMIN — Medication 25 MICROGRAM(S): at 07:07

## 2017-01-01 RX ADMIN — Medication 25 MICROGRAM(S): at 09:21

## 2017-01-01 RX ADMIN — MORPHINE SULFATE 1 MILLIGRAM(S): 50 CAPSULE, EXTENDED RELEASE ORAL at 19:08

## 2017-01-01 RX ADMIN — Medication 100 MILLIGRAM(S): at 21:44

## 2017-01-01 RX ADMIN — Medication 1 GRAM(S): at 13:08

## 2017-01-01 RX ADMIN — CEFEPIME 100 MILLIGRAM(S): 1 INJECTION, POWDER, FOR SOLUTION INTRAMUSCULAR; INTRAVENOUS at 05:35

## 2017-01-01 RX ADMIN — Medication 300 MILLIGRAM(S): at 11:05

## 2017-01-01 RX ADMIN — Medication 81 MILLIGRAM(S): at 17:03

## 2017-01-01 RX ADMIN — MORPHINE SULFATE 0.5 MILLIGRAM(S): 50 CAPSULE, EXTENDED RELEASE ORAL at 10:03

## 2017-01-01 RX ADMIN — ENOXAPARIN SODIUM 40 MILLIGRAM(S): 100 INJECTION SUBCUTANEOUS at 22:07

## 2017-01-01 RX ADMIN — Medication 100 MILLIGRAM(S): at 16:39

## 2017-01-01 RX ADMIN — Medication 100 MILLIEQUIVALENT(S): at 06:00

## 2017-01-01 RX ADMIN — Medication 25 MILLIGRAM(S): at 06:10

## 2017-01-01 RX ADMIN — Medication 62.5 MILLIMOLE(S): at 13:01

## 2017-01-01 RX ADMIN — Medication 81 MILLIGRAM(S): at 11:42

## 2017-01-01 RX ADMIN — Medication 1 DROP(S): at 12:26

## 2017-01-01 RX ADMIN — MORPHINE SULFATE 1 MILLIGRAM(S): 50 CAPSULE, EXTENDED RELEASE ORAL at 06:30

## 2017-01-01 RX ADMIN — Medication 100 MILLIEQUIVALENT(S): at 17:24

## 2017-01-01 RX ADMIN — MIRTAZAPINE 7.5 MILLIGRAM(S): 45 TABLET, ORALLY DISINTEGRATING ORAL at 22:53

## 2017-01-01 RX ADMIN — Medication 1 GRAM(S): at 12:06

## 2017-01-01 RX ADMIN — Medication 1 TABLET(S): at 15:22

## 2017-01-01 RX ADMIN — Medication 81 MILLIGRAM(S): at 10:52

## 2017-01-01 RX ADMIN — Medication 81 MILLIGRAM(S): at 12:17

## 2017-01-01 RX ADMIN — MIRTAZAPINE 7.5 MILLIGRAM(S): 45 TABLET, ORALLY DISINTEGRATING ORAL at 21:45

## 2017-01-01 RX ADMIN — Medication 25 MILLIGRAM(S): at 05:12

## 2017-03-25 NOTE — H&P ADULT. - PROBLEM SELECTOR PLAN 2
Patient w/o any dyspnea at rest but does have crackles on exam  - c/w home aldactone, losartan, lipitor, toprol XL  - will evaluate volume status and diurese as needed

## 2017-03-25 NOTE — H&P ADULT. - PROBLEM SELECTOR PLAN 1
- will c/w home aspirin  - will trend cardiac enzymes  - will hold off on heparin drip and plavix for now

## 2017-03-25 NOTE — ED ADULT NURSE REASSESSMENT NOTE - NS ED NURSE REASSESS COMMENT FT1
pt brought in by EMS for CP possible STEMI pt state CP feels like chocking sensation with some SOB denies N/V/fever.   pt AOX 3 symptoms started approx. 3:30pm pt with Hx. PPM/KEISHA/Asthma/MI /cardiac stent 1 /mult. syncope on chemo last 2 wks ago, pt use Oxygen at home as needed.    Lab sent S.L. to right AC 20g angio cath. on CM with NSR lungs clear resp. equal 20b/min  Oxygen sat %.      Arleen Ruano RN (Facilitator)

## 2017-03-25 NOTE — H&P ADULT. - PROBLEM SELECTOR PLAN 3
- c/w home pulmicort, tiotropium  - PRN albuterol - monitor CBC  - c/w allopurinol  - will discuss w/ hematologist about patient's status and when next chemo would be

## 2017-03-25 NOTE — ED PROVIDER NOTE - MEDICAL DECISION MAKING DETAILS
75yo F PMHx CAD, MI, hypotension, COPD p/w CC chest pain - will send for cbc cmp vbg coags cardiac enzymes CXR serial EKGs - pt. already received aspirin from EMS - will admit to CCU for r/o ACS.

## 2017-03-25 NOTE — ED PROVIDER NOTE - ATTENDING CONTRIBUTION TO CARE
[place]  pt presented with throat pain chest heaviness about  45 min prior to presentation    Feels pain was similar to pain with MI but less severe  Pain improving here  EKG here  c/w prior  elevation I aVL  V5-V6  appear more pronounced  presented as STEMI  cath attending reviewed WKG and past cath    feel not STEMI candidate at thiss point but wants pt in CCU for observation  here  lungs clear  card S1S2  lateral PMI  abd benign  nl carotid pulses  and UE pulses

## 2017-03-25 NOTE — H&P ADULT. - HISTORY OF PRESENT ILLNESS
76 F w/ hx of CAD (s/p PCI w/ 1 stent, MI 3 years ago), HFrEF (EF of 19%), COPD,  HTN, and HLD, who is presenting with 1 hour of chest pain. The patient states that pain began 1 hour prior to presentation to [facility] ED. The patient described it as severe, crushing, radiating to back/throat, associated w/ SOB and diaphoresis. The pain improved w/ ASA given by EMS. Patient explains that she felt similar pain 3 years ago when she had an MI. While she was in the ED, the patient reported that pain is improved. Denies fever, chills, cough, palpitations, N/V/D, dizziness.     In the ED, the patient's vitals were: T - 98.3, HR - 77, BP - 121/71, RR - 16, O2 100% on NC. The patient did not receive any medications in the ED. 76 F w/ hx of CAD (s/p PCI w/ 1 stent, MI 3 years ago), HFrEF (EF of 19%), recently diagnosed lymphoma (currently on chemo), COPD,  HTN, and HLD, who is presenting with 1 hour of chest pain. The patient states that pain began 1 hour prior to presentation to [facility] ED. The patient described it as severe, crushing, radiating to back/throat, associated w/ SOB and diaphoresis. The pain improved w/ ASA given by EMS. Patient explains that she felt similar pain 3 years ago when she had an MI. While she was in the ED, the patient reported that pain is improved. Denies fever, chills, cough, palpitations, N/V/D, dizziness.     In the ED, the patient's vitals were: T - 98.3, HR - 77, BP - 121/71, RR - 16, O2 100% on NC. The patient did not receive any medications in the ED.

## 2017-03-25 NOTE — ED PROVIDER NOTE - PMH
Asthma    CAD (coronary artery disease)    CHF (congestive heart failure)  On home oxygen 2L PRN  HLD (hyperlipidemia)    HTN (hypertension)    Lymphoma  S/P resection and chemotherapy in remission  MI (myocardial infarction)

## 2017-03-25 NOTE — ED ADULT TRIAGE NOTE - CHIEF COMPLAINT QUOTE
Notification :  Pt of  crushing chest Pain  Nausea and weakness x 45 mins .  Asp 325 mg given .. Denies sob, dizziness

## 2017-03-25 NOTE — ED PROVIDER NOTE - PSH
ICD (implantable cardioverter-defibrillator) in place    S/P appendectomy    S/P coronary artery stent placement    S/P lymph node biopsy  Biopsy and resection  S/P myomectomy

## 2017-03-25 NOTE — H&P ADULT. - ASSESSMENT
76 F w/ hx of CAD (s/p PCI w/ 1 stent, MI 3 years ago), HFrEF (EF of 19%), COPD,  HTN, and HLD who is being admitted for possible ACS and to rule out MI. 76 F w/ hx of CAD (s/p PCI w/ 1 stent, MI 3 years ago), HFrEF (EF of 19%), lymphoma (diagnosed in October), COPD,  HTN, and HLD who is being admitted for possible ACS and to rule out MI.

## 2017-03-25 NOTE — ED PROVIDER NOTE - OBJECTIVE STATEMENT
77yo F PMHx CAD w/ 1 stent, COPD, hypotension, MI 3 years ago, spinal CA, hypothyroidism p/w CC chest pain. Pt. states pain began 1 hour ago while she was sitting down, describes it as severe, crushing, radiating to back/throat, associated w/ SOB and diaphoresis, improved w/ ASA given by EMS. Pt. explains that she felt similar pain 3 years ago when she had an MI. Pt. currently reports that pain is improved. Denies fever, chills, cough, palpitations, N/V/D, dizziness.

## 2017-03-26 NOTE — DISCHARGE NOTE ADULT - PATIENT PORTAL LINK FT
“You can access the [employer] Patient Portal, offered by [facility], by registering with the following website: [email or web address]”

## 2017-03-26 NOTE — DISCHARGE NOTE ADULT - MEDICATION SUMMARY - MEDICATIONS TO STOP TAKING
I will STOP taking the medications listed below when I get home from the hospital:    esomeprazole 40 mg oral delayed release capsule  -- 1 cap(s) by mouth once a day  -- It is very important that you take or use this exactly as directed.  Do not skip doses or discontinue unless directed by your doctor.  Obtain medical advice before taking any non-prescription drugs as some may affect the action of this medication.  Swallow whole.  Do not crush.  Take medication on an empty stomach 1 hour before or 2 to 3 hours after a meal unless otherwise directed by your doctor.    furosemide 40 mg oral tablet  -- 1 tab(s) by mouth once a day

## 2017-03-26 NOTE — DISCHARGE NOTE ADULT - MEDICATION SUMMARY - MEDICATIONS TO TAKE
I will START or STAY ON the medications listed below when I get home from the hospital:    spironolactone 25 mg oral tablet  -- 0.5 tab(s) by mouth once a day  -- Indication: For Cardiac    aspirin 81 mg oral delayed release tablet  -- 1 tab(s) by mouth once a day  -- Indication: For MI (myocardial infarction)    losartan 25 mg oral tablet  -- 1 tab(s) by mouth once a day  -- Indication: For CHF (congestive heart failure)    allopurinol 300 mg oral tablet  -- 1 tab(s) by mouth once a day  -- Indication: For Gout    atorvastatin 20 mg oral tablet  -- 1 tab(s) by mouth once a day (at bedtime)  -- Indication: For Hyperlipidemia    metoprolol succinate 25 mg oral tablet, extended release  -- 1 tab(s) by mouth once a day  -- Indication: For Chest pain    budesonide-formoterol 160 mcg-4.5 mcg/inh inhalation aerosol  -- 2 puff(s) inhaled 2 times a day  -- Indication: For COPD (chronic obstructive pulmonary disease)    albuterol CFC free 90 mcg/inh inhalation aerosol  -- 2 puff(s) inhaled 4 times a day, As Needed  -- Indication: For COPD (chronic obstructive pulmonary disease)    tiotropium 18 mcg inhalation capsule  -- 1 cap(s) inhaled once a day  -- Indication: For COPD (chronic obstructive pulmonary disease)    NIFEdipine 30 mg oral tablet, extended release  -- 1 tab(s) by mouth once a day  -- Indication: For MI (myocardial infarction)    docusate sodium 100 mg oral capsule  -- 1 cap(s) by mouth 3 times a day  -- Indication: For Constipation    senna oral tablet  -- 2 tab(s) by mouth once a day (at bedtime)  -- Indication: For Constipation    sucralfate 1 g oral tablet  -- 1 tab(s) by mouth 4 times a day  -- Indication: For GI prophylaxis    pantoprazole 40 mg oral delayed release tablet  -- 1 tab(s) by mouth 2 times a day (before meals)  -- Indication: For Gastric ulcer    levothyroxine 50 mcg (0.05 mg) oral tablet  -- 1 tab(s) by mouth once a day  -- Indication: For Hypothyroidism

## 2017-03-26 NOTE — DISCHARGE NOTE ADULT - CARE PROVIDER_API CALL
Nehal López), Cardiovascular Disease; Internal Medicine; Interventional Cardiology  [address] [facility] [address] [place] [address]  Phone: [phone]  Fax: [phone]

## 2017-03-26 NOTE — DISCHARGE NOTE ADULT - ADDITIONAL INSTRUCTIONS
Follow up with your PMD  Dr López within  1 week. Call for an appointment - [phone]  Take medications as prescribed

## 2017-03-26 NOTE — DISCHARGE NOTE ADULT - PLAN OF CARE
Resolution of symptoms Follow up with your PMD  Dr López within  1 week. Call for an appointment - [phone] Follow with PMD Follow up with your PMD  Dr López within  1 week. Call for an appointment - [phone]  Take medications as prescribed

## 2017-03-26 NOTE — DISCHARGE NOTE ADULT - HOSPITAL COURSE
76 F w/ hx of CAD (s/p PCI w/ 1 stent, MI 3 years ago), HFrEF (EF of 19%), recently diagnosed lymphoma (currently on chemo), COPD,  HTN, and HLD, who is presenting with 1 hour of chest pain. The patient states that pain began 1 hour prior to presentation to [facility] ED. The patient described it as severe, crushing, radiating to back/throat, associated w/ SOB and diaphoresis. The pain improved w/ ASA given by EMS. Patient explains that she felt similar pain 3 years ago when she had an MI. While she was in the ED, the patient reported that pain is improved. Denies fever, chills, cough, palpitations, N/V/D, dizziness.     In the ED, the patient's vitals were: T - 98.3, HR - 77, BP - 121/71, RR - 16, O2 100% on NC. The patient did not receive any medications in the ED.     Hospital Course:    Patient was admitted to the CCU. Patient was constipated and was given a bowel regimen and Miralax and made a bowel movement. Cardiac enzymes were negative for three sets. Prior catheterization showed clean coronary arteries in April. Per Dr. Kinney, patient's cardiologist, he did not believe the chest pain was due to ACS but rather PE so he recommended a CT PE protocol. We considered possible esophageal spasm and recommended GI inpatient or outpatient evaluation. Patient was stable and transferred to floor. 76 F w/ hx of CAD (s/p PCI w/ 1 stent, MI 3 years ago), HFrEF (EF of 19%), recently diagnosed lymphoma (currently on chemo), COPD,  HTN, and HLD, who is presenting with 1 hour of chest pain. The patient states that pain began 1 hour prior to presentation to [facility] ED. The patient described it as severe, crushing, radiating to back/throat, associated w/ SOB and diaphoresis. The pain improved w/ ASA given by EMS. Patient explains that she felt similar pain 3 years ago when she had an MI. While she was in the ED, the patient reported that pain is improved. Denies fever, chills, cough, palpitations, N/V/D, dizziness.     In the ED, the patient's vitals were: T - 98.3, HR - 77, BP - 121/71, RR - 16, O2 100% on NC. The patient did not receive any medications in the ED.   Hospital Course:    Patient was admitted to the CCU. Patient was constipated and was given a bowel regimen and Miralax and made a bowel movement. Cardiac enzymes were negative for three sets. Prior catheterization showed clean coronary arteries in April. Per Dr. Kinney, patient's cardiologist, he did not believe the chest pain was due to ACS but rather PE so he recommended a CT PE protocol. We considered possible esophageal spasm and recommended GI inpatient or outpatient evaluation. Patient was stable and transferred to floor.  CE x 3 negative   TTE 3/2016: EF of 19% Mitral annular calcification, otherwise normal mitral valve. Mild-moderate mitral regurgitation. Severely dilated left atrium.  LA volume index = 62  cc/m2. Severe left ventricular enlargement. Severe global left ventricular systolic dysfunction. Normal right ventricular size with decreased right ventricular systolic function.  A device wire is noted in the right heart.  3/27 CTPA: No pulmonary embolus. Resolution of left upper lobe centrilobular nodules and lingular nodular  branching opacities. Decreased size of right lower lobe nodule.  3/27 Cardio: CTPA no PE, Cath with patent stent, d/c home.   3/27 GI: Esophagram ordered for dysphagia.   3/28 Cardio: Esophagram pending, ICD interrogation eval called, f/u with Dr. Hussein on 4/6 at 12:30pm.   3/28 ICD interrogation:  one episode of NSVT lasting one sec, on 3/28/17 , no events, normal fxn  3/28 GI: Atypical chest pain with dysphagia/CHF, cont PPI/Carafate, maalox prn, pain control prn, miralax BID with senna, colace. esophagram pending, regular diet.   3/29-Esophagram-Mild presbyesophagus. Trace gastroesophageal reflux. Patient was not able to tolerate 13mm barium pill ingestion at the level  of the oropharynx.  3/29 LE dopplers : No evidence of bilateral lower extremity deep venous thrombosis. 76 F w/ hx of CAD (s/p PCI w/ 1 stent, MI 3 years ago), HFrEF (EF of 19%), recently diagnosed lymphoma (currently on chemo), COPD,  HTN, and HLD, who is presenting with 1 hour of chest pain. The patient states that pain began 1 hour prior to presentation to [facility] ED. The patient described it as severe, crushing, radiating to back/throat, associated w/ SOB and diaphoresis. The pain improved w/ ASA given by EMS. Patient explains that she felt similar pain 3 years ago when she had an MI. While she was in the ED, the patient reported that pain is improved. Denies fever, chills, cough, palpitations, N/V/D, dizziness.     In the ED, the patient's vitals were: T - 98.3, HR - 77, BP - 121/71, RR - 16, O2 100% on NC. The patient did not receive any medications in the ED.   Hospital Course:    Patient was admitted to the CCU. Patient was constipated and was given a bowel regimen and Miralax and made a bowel movement. Cardiac enzymes were negative for three sets. Prior catheterization showed clean coronary arteries in April. Per Dr. Kinney, patient's cardiologist, he did not believe the chest pain was due to ACS but rather PE so he recommended a CT PE protocol. We considered possible esophageal spasm and recommended GI inpatient or outpatient evaluation. Patient was stable and transferred to floor.  CE x 3 negative   TTE 3/2016: EF of 19% Mitral annular calcification, otherwise normal mitral valve. Mild-moderate mitral regurgitation. Severely dilated left atrium.  LA volume index = 62  cc/m2. Severe left ventricular enlargement. Severe global left ventricular systolic dysfunction. Normal right ventricular size with decreased right ventricular systolic function.  A device wire is noted in the right heart.  3/27 CTPA: No pulmonary embolus. Resolution of left upper lobe centrilobular nodules and lingular nodular  branching opacities. Decreased size of right lower lobe nodule.  3/27 Cardio: CTPA no PE, Cath with patent stent, d/c home.   3/27 GI: Esophagram ordered for dysphagia.   3/28 Cardio: Esophagram pending, ICD interrogation eval called, f/u with Dr. Hussein on 4/6 at 12:30pm.   3/28 ICD interrogation:  one episode of NSVT lasting one sec, on 3/28/17 , no events, normal fxn  3/28 GI: Atypical chest pain with dysphagia/CHF, cont PPI/Carafate, maalox prn, pain control prn, miralax BID with senna, colace. esophagram pending, regular diet.   3/29-Esophagram-Mild presbyesophagus. Trace gastroesophageal reflux. Patient was not able to tolerate 13mm barium pill ingestion at the level  of the oropharynx.  3/29 LE dopplers : No evidence of bilateral lower extremity deep venous thrombosis.  3/30 -Endoscopy - normal espoghagus, normal duodenum, gastric ulcer - biopsy taken. Continue protonix and carafate

## 2017-03-26 NOTE — DISCHARGE NOTE ADULT - CARE PLAN
Principal Discharge DX:	Chest pain  Goal:	Resolution of symptoms  Instructions for follow-up, activity and diet:	Follow up with your PMD  Dr López within  1 week. Call for an appointment - [phone]  Secondary Diagnosis:	GERD (gastroesophageal reflux disease)  Instructions for follow-up, activity and diet:	Follow with PMD Principal Discharge DX:	Chest pain  Goal:	Resolution of symptoms  Instructions for follow-up, activity and diet:	Follow up with your PMD  Dr López within  1 week. Call for an appointment - [phone]  Secondary Diagnosis:	GERD (gastroesophageal reflux disease)  Instructions for follow-up, activity and diet:	Follow with PMD Principal Discharge DX:	Chest pain  Goal:	Resolution of symptoms  Instructions for follow-up, activity and diet:	Follow up with your PMD  Dr López within  1 week. Call for an appointment - [phone]  Take medications as prescribed  Secondary Diagnosis:	GERD (gastroesophageal reflux disease)  Instructions for follow-up, activity and diet:	Follow with PMD Principal Discharge DX:	Chest pain  Goal:	Resolution of symptoms  Instructions for follow-up, activity and diet:	Follow up with your PMD  Dr López within  1 week. Call for an appointment - [phone]  Take medications as prescribed  Secondary Diagnosis:	GERD (gastroesophageal reflux disease)  Instructions for follow-up, activity and diet:	Follow with PMD

## 2017-03-26 NOTE — DISCHARGE NOTE ADULT - NS AS DC HF EDUCATION INSTRUCTIONS
Call Primary Care Provider for follow-up after discharge/Report weight gain of 2 or more pounds in one day or 3 or more pounds in one week, worsening shortness of breath, fatigue, weakness, increased swelling of hands and feet to primary care provider/Low salt diet/Monitor Weight Daily/Activities as tolerated

## 2017-04-01 NOTE — ED PROVIDER NOTE - OBJECTIVE STATEMENT
76 F w/ hx of CAD (s/p PCI w/ 1 stent, MI 3 years ago), HFrEF (EF of 19%), recently diagnosed lymphoma, COPD,  HTN, and HLD,  recent hospitalization for chest pain (neg CTPA, neg cath) p/w syncope. Pt got into bath tub this AM and felt dizzy, then passed out. Family heard a thud and ran into the room, pt was awake when family found her. Pt now complaining of chest pressure and right should pain. No known fevers or chills. No cough. + chest pain since last hospitalization. No urinary symptoms.

## 2017-04-01 NOTE — H&P ADULT. - HISTORY OF PRESENT ILLNESS
76F with a history of CAD s/p PCI w/ 1 stent, MI 3 years ago, HFrEF (EF of 19%), recently diagnosed lymphoma, COPD,  HTN, and Dyslipidemia, recent hospitalization for chest pain, has neg CTPA, & neg cath,  The pt got into bath tub on 4/1  AM and felt dizzy, then passed out. Family heard a thud and ran into the room, pt was awake when family found her. Pt now complaining of chest pressure and right should pain. No known fevers or chills. No cough. Positive chest pain since last her hospitalization. No urinary symptoms.    In the Ed, she had radiologic studies that showed a possible Thoracis fx. She seen by neuro surgery with no surgical intervention, treat for pain control. The pt also had CASTRO & EKG changes, was see by cardio and no urgent cardiac.

## 2017-04-01 NOTE — ED ADULT NURSE NOTE - OBJECTIVE STATEMENT
Patient is alert, oriented x3, able to make needs known verbally.  Patient complaining of chest and left shoulder pain as well as back pain, 9/10 severity.  Patient vitals recorded, hemodynamically stable, normal sinus rhythm on monitor, EKG performed.  MD at bedside evaluating patient.  Labs obtained, peripheral IV started.  Call bell within reach, safety maintained.  Son at bedside for emotional support.

## 2017-04-01 NOTE — H&P ADULT. - PROBLEM SELECTOR PLAN 1
CM  F/U CE & EKG\  F/U Orthostatics and Neuro checks  Fall precautions  F/U TTE  Neuro Sx consulted for Spinal finding= treat pain

## 2017-04-01 NOTE — H&P ADULT. - ATTENDING COMMENTS
CARDIOLOGY ATTENDING    Patient seen and examined. Agree with above. 77 y/o female with stable NICM. Has MDT BiV-ICD. Now admitted with fall r/o syncope while in bath tub. Serial imaging demonstrates no fractures or injury. Neurosurgery consulted for abnormal spine CT and they did not recommend any interventions.     Plan  -ICD check in AM r/o arrhythmia as cause of fall/syncope  -if ICD check normal then d/c home   -physical therapy evaluation  -medicine consult Dr Gray called  -final cardiology reccs pending results of device interrogaiton

## 2017-04-01 NOTE — ED PROVIDER NOTE - ATTENDING CONTRIBUTION TO CARE
I performed a history and physical exam of the patient and discussed their management with the resident. I reviewed the resident's note and agree with the documented findings and plan of care. My medical decision making and observations are found above.  Chest wall tender, neck non tender. awake and alert.

## 2017-04-01 NOTE — ED ADULT TRIAGE NOTE - CHIEF COMPLAINT QUOTE
c/o pain to right shoulder, right scapular and right rib pain s/p fall in bat tub. Pt states felt lightheaded getting into bathtub and fell, may have had LOC but unsure. Hx AICD, CAD, MI, CHF

## 2017-04-01 NOTE — H&P ADULT. - PROBLEM SELECTOR PLAN 2
Continue Vancomycin, Cipro, Aztreonam IV started in Ed   Lactobacillus for GI protection   F/U Blood Cx   Tylenol PRN pain and fever

## 2017-04-02 NOTE — PHYSICAL THERAPY INITIAL EVALUATION ADULT - MANUAL MUSCLE TESTING RESULTS, REHAB EVAL
at least 3/5 throughout except at least 2/5 bilateral shoulder flexion/extension and bilateral hip/knee flexion/extension (not formally assessed secondary to pain)

## 2017-04-02 NOTE — PHYSICAL THERAPY INITIAL EVALUATION ADULT - PERTINENT HX OF CURRENT PROBLEM, REHAB EVAL
Pt. is a 75 y/o female admitted to [facility] on 04/01/17 with a dx of syncope/collapse and s/p fall x 1 day.  PT consult request secondary to s/p fall.  H/O COPD.  (-) CXR.  Cardiac Enzymes (-) x 2.  (-) pelvis X-ray.

## 2017-04-02 NOTE — PHYSICAL THERAPY INITIAL EVALUATION ADULT - LIVES WITH, PROFILE
(son), private home, 4-5 steps-to-enter/exit home with unilateral handrail, (+) stair lift in the home/children

## 2017-04-02 NOTE — PHYSICAL THERAPY INITIAL EVALUATION ADULT - ACTIVE RANGE OF MOTION EXAMINATION, REHAB EVAL
deficits as listed below/WNL's except between 1/4-1/2 range Bilateral shoulder flexion/extension and Bilateral hip/knee flexion/extension (limited secondary to pain)

## 2017-04-02 NOTE — PHYSICAL THERAPY INITIAL EVALUATION ADULT - LEVEL OF INDEPENDENCE: SUPINE/SIT, REHAB EVAL
not assessed --> pt. deferred at this time secondary to above-stated c/o pain which worsened with active ROM assessment --> RN Liana Garcia made aware

## 2017-04-02 NOTE — PHYSICAL THERAPY INITIAL EVALUATION ADULT - PATIENT PROFILE REVIEW, REHAB EVAL
yes/ACTIVITY: ambulate as tolerated; consult with Liana Garcia RN --> pt. OK for PT as tolerated if agreeable, as she has been experiencing a lot of pain this morning

## 2017-04-02 NOTE — PHYSICAL THERAPY INITIAL EVALUATION ADULT - PLANNED THERAPY INTERVENTIONS, PT EVAL
transfer training/balance training/strengthening/bed mobility training/stair training PRN/gait training

## 2017-04-02 NOTE — PHYSICAL THERAPY INITIAL EVALUATION ADULT - GENERAL OBSERVATIONS, REHAB EVAL
Pt. received in bed in NAD with c/o 7/10 generalized pain at rest.  Agreeable to participate with PT as tolerated, despite c/o pain.

## 2017-04-02 NOTE — PHYSICAL THERAPY INITIAL EVALUATION ADULT - ADDITIONAL COMMENTS
Pt. left in bed post-PT in NAD with all lines/tubes intact & call bell within reach.  RN Liana Garcia made aware.

## 2017-04-02 NOTE — PHYSICAL THERAPY INITIAL EVALUATION ADULT - DISCHARGE DISPOSITION, PT EVAL
TBD post-functional mobility assessment; PT to attempt to see pt. again tomorrow, if time permits, for functional mobility assessment

## 2017-04-06 NOTE — SWALLOW BEDSIDE ASSESSMENT ADULT - SWALLOW EVAL: RECOMMENDED FEEDING/EATING TECHNIQUES
allow for swallow between intakes/no straws/small sips/bites/maintain upright posture during/after eating for 30 mins/position upright (90 degrees)

## 2017-04-06 NOTE — SWALLOW BEDSIDE ASSESSMENT ADULT - SWALLOW EVAL: DIAGNOSIS
1) Mild oral dysphagia for puree, mechanical soft, nectar-thick and thin liquids marked by adequate bolus collection, transfer and transport; 2) Mild-moderate oral dysphagia for regular solids marked by increased mastication time and delayed bolus collection and transport; 3) Mild pharygneal dysphagia for puree, mech soft, regular solids and nectar-thick liquids marked by delayed swallow trigger, decreased laryngeal elevation upon palpation, and no clinical evidence of impaired airway protection; 4) Moderate pharyngeal dysphagia for thin liquids marked by delayed swallow trigger, decreased laryngeal elevation upon palpation, and delayed cough subsequent to deglutition suggesting airway penetration and/or aspiration; 5) It should be noted patient with report of a globus sensation accompanied by facial grimacing during the swallow, described as especially on the left side and more so with regular solid textures. 1) Mild oral dysphagia for puree, mechanical soft, nectar-thick and thin liquids marked by adequate bolus collection, transfer and transport; 2) Mild-moderate oral dysphagia for regular solids marked by increased mastication time and delayed bolus collection and transport; 3) Mild pharygneal dysphagia for puree, mech soft, regular solids and nectar-thick liquids marked by delayed swallow trigger, decreased laryngeal elevation upon palpation, and no clinical evidence of impaired airway protection; 4) Moderate pharyngeal dysphagia for thin liquids marked by delayed swallow trigger, decreased laryngeal elevation upon palpation, and delayed cough subsequent to deglutition suggesting airway penetration and/or aspiration; 5) It should be noted pt presented with c/o a globus sensation "especially on the left side" during the swallow which was accompanied by facial grimacing, and occurred more so with regular solids vs other consistencies

## 2017-04-06 NOTE — SWALLOW BEDSIDE ASSESSMENT ADULT - SWALLOW EVAL: RECOMMENDED DIET
1) Dysphagia 2 with Nectar-Thick Liquids; 2) Nutrition consult to assess and monitor daily caloric intake

## 2017-04-06 NOTE — SWALLOW BEDSIDE ASSESSMENT ADULT - COMMENTS
The patient was received for a bedside swallow evaluation at which time she was awake, alert and cooperative. Supplemental O2 via nasal cannula in place. pt with c/o globus sensation accompanied by facial grimacing during the swallow The patient was received for a bedside swallow evaluation at which time she was awake, alert and cooperative. Supplemental O2 via nasal cannula in place. Pt with c/o difficulty swallowing characterized by a globus sensation when swallowing foods/liquids. Findings and recommendations discussed with the patient, primary RN and Tele PA.

## 2017-04-06 NOTE — SWALLOW BEDSIDE ASSESSMENT ADULT - PHARYNGEAL PHASE
Decreased laryngeal elevation/Delayed pharyngeal swallow Delayed pharyngeal swallow/Decreased laryngeal elevation Delayed pharyngeal swallow/Decreased laryngeal elevation/Delayed cough post oral intake

## 2017-04-06 NOTE — SWALLOW BEDSIDE ASSESSMENT ADULT - ASR SWALLOW ASPIRATION MONITOR
upper respiratory infection/fever/pneumonia/change of breathing pattern/position upright (90Y)/oral hygiene/throat clearing/gurgly voice/cough

## 2017-04-06 NOTE — SWALLOW BEDSIDE ASSESSMENT ADULT - SLP PERTINENT HISTORY OF CURRENT PROBLEM
R/O dysphagia. Pt is a 75 y/o female admitted with syncope and HCAP. CXR 4/5 revealed "pulmonary  vascular congestion,  most  prominent  in  the  right  hilar  region,  with  indistinct pulmonary  vessels  in  the  right  upper  lung  field".

## 2017-04-07 NOTE — SWALLOW VFSS/MBS ASSESSMENT ADULT - ORAL PHASE
delayed bolus collection, transfer and posterior transport delayed bolus collection, transfer and posterior transport; trace bolus spillover to the hypopharynx delayed bolus collection, transfer and posterior transport; trace bolus spillover to the hypopharynx was observed

## 2017-04-07 NOTE — SWALLOW VFSS/MBS ASSESSMENT ADULT - NS SWALLOW VFSS REC ASPIR MON
position upright (90Y)/oral hygiene/fever/throat clearing/pneumonia/change of breathing pattern/upper respiratory infection/cough/gurgly voice

## 2017-04-07 NOTE — SWALLOW VFSS/MBS ASSESSMENT ADULT - COMMENTS
The patient was received in the radiology suite this AM at which time she was awake, alert and cooperative. Findings and recommendations of today's study were discussed with the patient, primary RN and Tele PA. The patient was received in the radiology suite this AM at which time she was awake, alert and cooperative. Supplemental O2 via nasal cannula in place. Findings and recommendations of today's study were discussed with the patient, primary RN and Tele PA.

## 2017-04-07 NOTE — SWALLOW VFSS/MBS ASSESSMENT ADULT - ADDITIONAL RECOMMENDATIONS
The patient will be followed to monitor diet tolerance and instruct in use of safe swallow strategies during this acute care stay as schedule permits.

## 2017-04-07 NOTE — SWALLOW VFSS/MBS ASSESSMENT ADULT - DIAGNOSTIC IMPRESSIONS
The patient was administered trials of puree, regular solid, honey-thick, nectar-thick and thin liquids. 1) Moderate oral dysphagia characterized by delayed bolus collection, transfer and posterior transport across all consistencies trialed; there was trace spillage over the base of tongue with thin liquids prior to the swallow. 2) Mild pharyngeal dysphagia characterized by mildly reduced hyolaryngeal elevation/excursion. Swallow timeliness, base of tongue retraction and pharyngeal constriction were adequate. There was no evidence of laryngeal penetration/aspiration with puree, regular solids, and honey-thick liquids. There was gross laryngeal penetration during the swallow with complete retrieval with thin and nectar-thick liquids during large consecutive cup sip drinking. However this was not duplicated on subsequent trials of thin consumed via small cup sips and adequate airway protection was maintained. The patient was administered trials of puree, regular solid, honey-thick, nectar-thick and thin liquids.   1) Moderate oral dysphagia characterized by delayed bolus collection, transfer and posterior transport across all consistencies trialed; there was trace bolus spillover to the hypopharynx with thin liquids prior to the swallow.   2) Mild pharyngeal dysphagia characterized by mildly reduced hyolaryngeal elevation/excursion. Swallow timeliness, base of tongue retraction and pharyngeal constriction were adequate. There was no evidence of laryngeal penetration/aspiration with puree, regular solids, and honey-thick liquids. There was gross laryngeal penetration during the swallow with complete retrieval with thin and nectar-thick liquids during large consecutive cup sip drinking. However, penetration was not duplicated and adequate airway protection was maintained on subsequent trials of thin liquids consumed via small cup sips. The patient was administered trials of puree, mechanical soft, honey-thick, nectar-thick and thin liquids.   1) Moderate oral dysphagia characterized by delayed bolus collection, transfer and posterior transport across all consistencies trialed; there was trace bolus spillover to the hypopharynx with thin liquids prior to the swallow.   2) Mild pharyngeal dysphagia characterized by mildly reduced hyolaryngeal elevation/excursion. Swallow timeliness, base of tongue retraction and pharyngeal constriction were adequate. There was no evidence of laryngeal penetration/aspiration with puree, regular solids, and honey-thick liquids. There was gross laryngeal penetration during the swallow with complete retrieval with thin and nectar-thick liquids during large consecutive cup sip drinking. However, penetration was not duplicated and adequate airway protection was maintained on subsequent trials of thin liquids consumed via small cup sips.

## 2017-04-07 NOTE — SWALLOW VFSS/MBS ASSESSMENT ADULT - PHARYNGEAL PHASE COMMENTS
Mildly reduced hyolaryngeal elevation/excursion. Swallow timeliness, base of tongue retraction and pharyngeal constriction were adequate.

## 2017-04-07 NOTE — SWALLOW VFSS/MBS ASSESSMENT ADULT - SLP PERTINENT HISTORY OF CURRENT PROBLEM
R/O aspiration; The patient is known to this department as she was seen for a bedside swallow evaluation yesterday 4/6 at which time a dysphagia 2 diet with nectar-thick liquids was recommended (see full report for details).

## 2017-04-07 NOTE — SWALLOW VFSS/MBS ASSESSMENT ADULT - RECOMMENDED FEEDING/EATING TECHNIQUES
allow for swallow between intakes/maintain upright posture during/after eating for 30 mins/position upright (90 degrees)/small sips/bites/no straws

## 2017-04-13 NOTE — DISCHARGE NOTE ADULT - ADDITIONAL INSTRUCTIONS
Follow up with Cardiologist Dr Hussein on 4/20/17 at 12:30 at the [place] office, Heme oncology Dr Geiger and PMD within one week of discharge. Call for appointment. Return to ED for any concerning symptoms. Continue medications as prescribed. Low salt, low fat, low cholesterol diet. Follow up with Cardiologist Dr Hussein on 4/20/17 at 12:30 at the [place] office, Heme oncology Dr Geiger and PMD within one week of discharge. Call for appointment. Return to ED for any concerning symptoms. Continue medications as prescribed. Low salt, low fat, low cholesterol diet.  Please use [facility] brace for rehab.

## 2017-04-13 NOTE — DISCHARGE NOTE ADULT - HOSPITAL COURSE
76F h/o HTN, HLD, systolic HF s/p AICD, non-Hodgkins  lymphoma admitted for syncope and HCAP   CT Chest = Bibasilar patchy airspace opacities likely representing atelectasis and/or pneumonia.  CT Head = Age-appropriate involutional and ischemic gliotic changes. No   hemorrhage. No change since  CT C spine= Mild degenerative changes of the cervical spine. Mild superior endplate compression deformity of T2 raising the possibility of an acute fracture. No bony retropulsion    AP X ray Residual contrast material present within the colon and with extensive   pancolonic diverticuli also apparent. Portion of the left iliac wing obscured by the  superimposed contrast No hip fractures or dislocations.  CXR = Clear lungs. No pleural effusions or pneumothorax. Stable left chest wall biventricular AICD and cardiomegaly.  ON Vanco, Cipro , Aztreonam for HCAP   T max= 100.1   CE negative x 2  EKG NSR @ 89b/ min, RAD, CASTRO 1, AVL TWi V4 to V6   4/1 Cardio  c/s house: a/w unwitnessed fall/ syncope,  NSR on tele, EKG unchanged,CT head- P , ICD interrogation  in am, c/w current home meds, no need for urgent cath  4/1 NeuroSx cs: T2 possible acute frx, no acute neurosurgical intervention, no collar needed, pain control, no Weight bearing restrictions  4/3: ICD interrogation: episode of VT at 300bpm on 4/1 Am successfully terminated with 35J shock, biV pacing at 97.5%. Device sensing and pacing well  4/3: Cardio: t2 ? acute fracture, no neurosurgery intervention f/u with Dr. Walker, VT-->amio load 400mg TID x 7 days then change to 400mg daily, echo, IV abx for PNA, pulm eval P  Blood Cx neg X 48 hrs   4/3 Echo- EF 20%,  Mitral annular calcification, otherwise normal mitral  valve. Mild mitral regurgitation.  Moderately dilated left atrium.  LA volume index = 46  cc/m2. Moderate left ventricular enlargement. Severe global left ventricular systolic dysfunction. Endocardial visualization enhanced with intravenous injection of echo contrast (Definity).  The right ventricle is not well visualized; grossly normal right ventricular systolic function.  A device wire is noted in the right heart. Estimated right ventricular systolic pressure equals 60 mm Hg, assuming right atrial pressure equals 10 mm Hg, consistent with moderate pulmonary hypertension.  4/3 Pulm c/s Dr. Leyva- observe off antibiotics   4/4: Cardio: Amio load to 7 days, then change to 400mg PO daily  4/4: Pulm: from pulm standpoint doing ok  4/4 Med: afebrile, Bcx/Ucx negative, no antibiotics needed, HTN acceptable, anemia mild no intervention, thrombocytopenia last admission resolved.  4/5 CXR: Pulmonary vascular congestion.  4/5 Med: UC/BC remain negative, low back pain for months no new changes, CT scan to r/o any cord impact, amio load  4/5 Cards: c/w amio load x 7 days, C/W ASA, Statin, xrays of lumbar/thoracic spine  4/5 Pulm: no WBC, continue off abx  4/5 CT T/L: T7 and T10 compression deformities. No evidence of significant spinal stenosis, thecal sac compression or intraspinal mass identified.  Swallow eval: dysphagia 2 with nectar thick liquids, Nutrition consult, [facility]  4/6 Cards: f/u with Dr. Hussein at [facility] on 4/20 at 1230 PM, may need to hold procardia or cozaar to allow for lasix if pulm edema on CT chest, d/w neurosx will get TLSO brace, d/w ID likely PNA IV Abx ordered, S&S to r/o asp PNA  4/6 Neurosx: TLSO brace, pain control  4/6 Oncology ([place]): continue Abx on Vanco/Imipenem, TIBC, Ferritin, iron, B12   4/7: XR T/L spine:  Broad slight thoracic dextrocurvature. Stable previously seen mild wedge-shaped anterior compression deformities of T7 and T10. Remaining vertebral body heights maintained. No spondylolistheses or spondylolysis defects. Noncontiguous multilevel small anterior disc margin osteophytes and lower lumbar posterior facet arthrosis. Otherwise relatively preserved intervertebral disc spaces. Symmetrically aligned and spaced SI joints and pubic symphysis. Generalized osteopenia again noted otherwise no discrete lytic or blastic lesions. Left chest wall biventricular AICD and cardiomegaly. Multiple small round radiodensities along the distribution of the colon compatible with retained contrast material in diverticula. Formed elements of stool stained with residual contrast also noted in the rectosigmoid region.  4/7: CT chest: No evidence of pneumonia. Colonic diverticulosis. Tiny layering gallstones versus gallbladder sludge.  4/7: Cine: Dysphagia 2 (Mechanical Soft) with Thin Liquids via small cup sips  4/7: Heme/Onc: cont abx per ID, [place]  ID following, BCx neg x 24hrs, UCx neg x 24hrs, Sputum culture no AFB  4/8 Cardio: IV Abx/ID. Amio Load.   4/8 Heme/Onc: Abx as per ID, Lovenox dvt propx, anemia of chronic disease, monitor.   4/8 Med: Anemia w/u neg, pt clinically with HCAP, cont Abx, pain control adequate.   4/8 Pulm: PNA- primaxin /vanco per ID, SOB- cont keep O2 sat>92% while awake, nebs, dysphagia- asp precautions, DVT propx.   ID following, BCx neg x 48hrs, UCx neg x 24hrs, Sputum culture no AFB  4/10 Pulm: afebrile and normal wbc, day 5/7 abx  4/10 day 5/7 abx, d/c planning encourage to walk with PT  4/10 Cards: CT head, orthostatics, will need rehab, MATTHIEU brace, completed amio load will start daily dosing  CT head no change  4/11Pulm: day 6/7 of abx  4/11 Med: finish abx, must participate with PT  4/11Cards: d/c planning after antibiotics  4/13- As per attending, patient stable for discharge.

## 2017-04-13 NOTE — DISCHARGE NOTE ADULT - COMMUNITY RESOURCES
[facility] [address] [place], [place] [address], [phone], [facility] [phone]

## 2017-04-13 NOTE — DISCHARGE NOTE ADULT - PATIENT PORTAL LINK FT
“You can access the Follow[employer] Patient Portal, offered by [facility], by registering with the following website: [email or web address]”

## 2017-04-13 NOTE — DISCHARGE NOTE ADULT - CARE PLAN
Principal Discharge DX:	Syncope  Goal:	Followup with PMD and take all medications prescribed.  Instructions for follow-up, activity and diet:	Followup with PMD and take all medications prescribed. Low salt, low fat, low cholesterol diet  Secondary Diagnosis:	HCAP (healthcare-associated pneumonia)  Goal:	Followup with PMD and take all medications prescribed.  Instructions for follow-up, activity and diet:	Followup with PMD and take all medications prescribed. Low salt, low fat, low cholesterol diet  Secondary Diagnosis:	Chronic systolic congestive heart failure  Goal:	Followup with PMD and take all medications prescribed.  Instructions for follow-up, activity and diet:	Followup with PMD and take all medications prescribed. Low salt, low fat, low cholesterol diet  Secondary Diagnosis:	Essential hypertension  Goal:	Followup with PMD and take all medications prescribed.  Instructions for follow-up, activity and diet:	Followup with PMD and take all medications prescribed. Low salt, low fat, low cholesterol diet

## 2017-04-13 NOTE — DISCHARGE NOTE ADULT - MEDICATION SUMMARY - MEDICATIONS TO TAKE
I will START or STAY ON the medications listed below when I get home from the hospital:    predniSONE 20 mg oral tablet  -- 2 tab(s) by mouth once a day for 4 days  -- Indication: For wheezing    spironolactone 25 mg oral tablet  -- 0.5 tab(s) by mouth once a day  -- Indication: For Chronic systolic congestive heart failure    aspirin 81 mg oral delayed release tablet  -- 1 tab(s) by mouth once a day  -- Indication: For Heart health    acetaminophen 325 mg oral tablet  -- 2 tab(s) by mouth every 6 hours, As needed, Mild and Moderate and Severe pain (1-10)  -- Indication: For Pain reliever    acetaminophen 325 mg oral tablet  -- 2 tab(s) by mouth every 6 hours, As needed, For Temp greater than 38 C (100.4 F)  -- Indication: For Pain reliever    losartan 25 mg oral tablet  -- 1 tab(s) by mouth once a day  -- Indication: For Htn    amiodarone 200 mg oral tablet  -- 2 tab(s) by mouth once a day  -- Indication: For Control heart rate    allopurinol 300 mg oral tablet  -- 1 tab(s) by mouth once a day  -- Indication: For gout    atorvastatin 20 mg oral tablet  -- 1 tab(s) by mouth once a day (at bedtime)  -- Indication: For Hld    metoprolol succinate 25 mg oral tablet, extended release  -- 1 tab(s) by mouth once a day  -- Indication: For Htn    albuterol 90 mcg/inh inhalation aerosol  -- 2 puff(s) inhaled every 6 hours, As needed, Shortness of Breath and/or Wheezing  -- Indication: For breathing    budesonide-formoterol 160 mcg-4.5 mcg/inh inhalation aerosol  -- 2 puff(s) inhaled 2 times a day  -- Indication: For breathing    tiotropium 18 mcg inhalation capsule  -- 1 cap(s) inhaled once a day  -- Indication: For breathing    guaiFENesin 100 mg/5 mL oral liquid  -- 10 milliliter(s) by mouth every 6 hours  -- Indication: For Cough    senna oral tablet  -- 2 tab(s) by mouth once a day (at bedtime)  -- Indication: For laxative    docusate sodium 100 mg oral capsule  -- 1 cap(s) by mouth 3 times a day  -- Indication: For laxative    sucralfate 1 g oral tablet  -- 1 tab(s) by mouth 4 times a day  -- Indication: For gerd    lactobacillus acidophilus oral capsule  -- 1 cap(s) by mouth 3 times a day (with meals)  -- Indication: For Stomach olag    pantoprazole 40 mg oral delayed release tablet  -- 1 tab(s) by mouth 2 times a day (before meals)  -- Indication: For gerd    levothyroxine 50 mcg (0.05 mg) oral tablet  -- 1 tab(s) by mouth once a day  -- Indication: For Hypothyroid

## 2017-04-13 NOTE — DISCHARGE NOTE ADULT - CARE PROVIDER_API CALL
Wilfrid Hussein), Cardiology  [address]  [place] [address]  Phone: [phone]  Fax: [phone]    Dr Reyna,   Phone: [phone]  Fax: (   )    -

## 2017-04-13 NOTE — DISCHARGE NOTE ADULT - SECONDARY DIAGNOSIS.
HCAP (healthcare-associated pneumonia) Chronic systolic congestive heart failure Essential hypertension

## 2017-05-20 NOTE — ED ADULT NURSE NOTE - CHIEF COMPLAINT QUOTE
Pt from [facility] sent for shortness of breath and feeling of "heart fluttering all day."  Pt c/o back pain 2/2 recent surgery.  PMHx MI, CAD, asthma, CHF, lymphoma.  No advance directives noted in paperwork

## 2017-05-20 NOTE — ED ADULT TRIAGE NOTE - CHIEF COMPLAINT QUOTE
Pt from [facility] sent for shortness of breath and feeling of "heart fluttering all day."  Pt c/o back pain 2/2 recent surgery.  PMHx MI, CAD, asthma, CHF, lymphoma Pt from [facility] sent for shortness of breath and feeling of "heart fluttering all day."  Pt c/o back pain 2/2 recent surgery.  PMHx MI, CAD, asthma, CHF, lymphoma.  No advance directives noted in paperwork

## 2017-05-20 NOTE — ED ADULT NURSE NOTE - OBJECTIVE STATEMENT
Pt rcvd to 8 aaox3, calm/pleasant affect from [facility] - sent in for c/o "heat fluttering & SOB" x1 day. PMHx CHF, CAD stents, MI, Asthma, HTN. Pt reports some midsternal CP, generalized Abd Pain and b/l LExt pain. Denies increasing swelling to Lext. Pt tachypneic, w/ increased WOB and b/l coarse lung sounds. Pt admits to wet cough, worse at night when laying down. Denies fevers/chills/nausea/vomiting.  Pt ambulates w/ walker and does PT in NH.  Pt on 3L NC sat 97%, HR NSR w/ frequent paired PVCs on CM. IVL placed to L Forearm 20g, labs obtained via orders, RVP sent placed on r/o droplet iso precautions. MD Davenport &  @ bedside for evaluation. Awaiting CXR. Call bell provided, side rails up, will CTM.

## 2017-05-21 NOTE — H&P ADULT - NSHPREVIEWOFSYSTEMS_GEN_ALL_CORE
Pt admits to constipation, nausea and chronic lower abdominal pain. Pt denies fever, chills, diaphoresis, vomiting, or diarrhea.

## 2017-05-21 NOTE — H&P ADULT - ATTENDING COMMENTS
Pt. seen and examined.  Agree with above.   -IV lasix to keep O > I  -Continue with IV abx  -EPS consult with Dr. Shea

## 2017-05-21 NOTE — H&P ADULT - PROBLEM SELECTOR PLAN 7
Lovenox 40mg SC Q24h Daily glucose monitoring  insulin sliding scale  DASH 1800 ADA diet with 1500 cc Fluid restriction

## 2017-05-21 NOTE — H&P ADULT - PROBLEM SELECTOR PLAN 8
Lovenox 40mg SC Q24h CHADS2 score=4  continue aspirin and amiodarone.  Consider Full anticoagulation

## 2017-05-21 NOTE — H&P ADULT - PROBLEM SELECTOR PLAN 1
tele monitor  cardiac enzymes x 2 to R/O ACS.  serial EKGs  2G Sodium 1800 ADA diet with 1500 cc Fluid restriction  Strict I&Os plus Daily weights.  Lasix 40mg IV Q24

## 2017-05-21 NOTE — ED PROVIDER NOTE - OBJECTIVE STATEMENT
77 F with recent pacemaker/AICD placement, CHF with EF 20%, presents with SOB and chest discomfort over past 1-2 days.  Patient currently at rehab.  No fever/chills, no cough.  No leg swelling, no GI complaints.

## 2017-05-21 NOTE — H&P ADULT - PROBLEM SELECTOR PLAN 6
Daily glucose monitoring  insulin sliding scale  DASH 1800 ADA diet with 1500 cc Fluid restriction continue PPI

## 2017-05-21 NOTE — H&P ADULT - HISTORY OF PRESENT ILLNESS
76 yo female PMHx of Asthma, CAD, CHF, HLD, HTN, Lymphoma s/p resection and chemotherapy (in remission), and AICD presents with chest pain, sob and palpitations x 2-3 days. Chest pain described as midsternal chest pressure, 6/10, non-pleuritic, non-radiating, intermittent in nature where each episode lasts for 5 mins. Chest pain accompanied by palpitations and SOB. Pt admits to chronic GLASGOW with ADLS, but since last night her dyspnea was at rest. 76 yo female PMHx of Asthma, Afib on ASA, CAD, CHF, HLD, HTN, Lymphoma s/p resection and chemotherapy (in remission), and AICD presents with chest pain, sob and palpitations x 2-3 days. Chest pain described as midsternal chest pressure, 6/10, non-pleuritic, non-radiating, intermittent in nature where each episode lasts for 5 mins. Chest pain accompanied by palpitations and SOB. Pt admits to chronic GLASGOW with ADLS, but since last night her dyspnea was at rest.

## 2017-05-21 NOTE — H&P ADULT - PMH
Asthma    CAD (coronary artery disease)    CHF (congestive heart failure)  On home oxygen 2L PRN  DM (diabetes mellitus)    GERD (gastroesophageal reflux disease)    HLD (hyperlipidemia)    HTN (hypertension)    Lymphoma  S/P resection and chemotherapy in remission  MI (myocardial infarction)

## 2017-05-21 NOTE — H&P ADULT - ASSESSMENT
76 yo female PMHx of Asthma, CAD, CHF, HLD, HTN, Lymphoma s/p resection and chemotherapy (in remission), and AICD presents with chest pain, sob and palpitations x 2-3 days, admitted to tele for acute on chronic systolic heart failure, r/o ACS. 76 yo female PMHx of Asthma, CAD, CHF, HLD, HTN, Lymphoma s/p resection and chemotherapy (in remission), and AICD presents with chest pain, sob and palpitations x 2-3 days, admitted to tele for acute on chronic systolic heart failure, r/o ACS.    +CHF Exac-->IV lasix 40mg Q24h 78 yo female PMHx of Asthma, CAD, CHF, HLD, HTN, Lymphoma s/p resection and chemotherapy (in remission), and AICD presents with chest pain, sob and palpitations x 2-3 days, admitted to tele for acute on chronic systolic heart failure, r/o ACS.    +CHF Exac-->IV lasix 40mg Q24h  +UTI-->IV Ceftriaxone 1g Q24h

## 2017-05-21 NOTE — ED PROVIDER NOTE - ATTENDING CONTRIBUTION TO CARE
76F h/o HTN, HLD, CHF s/p AICD, non-Hodgkins lymphoma recent admission for syncope due to VT s/p AICD and PNA.  Now with worsening SOB over the last several days.  No CP, no cough or fever. On exam mildly ill appearing, nad,+ JVD,  lungs bibasilar rales, rrr, abd soft, 2+ pulses, no edema, no rash,  speech fluent, no focal neuro deficits.  EKG atrial paced.  Plan for CXR, labs and admission to cardiology.

## 2017-05-22 NOTE — CONSULT NOTE ADULT - PROBLEM SELECTOR RECOMMENDATION 2
-abdominal discomfort likely associated to low flow state from CHF w/EF 20%;  -diurese per cardiology  -senna/colace  -miralax qd  -simethicone q6h x 3 days; then prn
suspect UTI may be contributing  Check renal ultrasound
CONT IV DIURESIS

## 2017-05-22 NOTE — PHYSICAL THERAPY INITIAL EVALUATION ADULT - ADDITIONAL COMMENTS
Pt came from rehab prior to admission. Pt has a limited ambulation in rehab and was wearing a back brace due to recent spinal fracture.

## 2017-05-22 NOTE — PHYSICAL THERAPY INITIAL EVALUATION ADULT - LEVEL OF INDEPENDENCE, REHAB EVAL
As per patient, she needed her brace at all times when she gets OOB. No brace at bedside./unable to perform Pt was seen sitting in chair

## 2017-05-22 NOTE — CONSULT NOTE ADULT - PROBLEM SELECTOR PROBLEM 4
HLD (hyperlipidemia)
Acute on chronic congestive heart failure, unspecified congestive heart failure type

## 2017-05-22 NOTE — CONSULT NOTE ADULT - SUBJECTIVE AND OBJECTIVE BOX
HPI:  78 yo female PMHx of Asthma, Afib on ASA, CAD, CHF, HLD, HTN, Lymphoma s/p resection and chemotherapy (in remission), and AICD presents with chest pain, sob and palpitations x 2-3 days. Chest pain described as midsternal chest pressure, 6/10, non-pleuritic, non-radiating, intermittent in nature where each episode lasts for 5 mins. Chest pain accompanied by palpitations and SOB. Pt admits to chronic GLASGOW with ADLS, but since last night her dyspnea was at rest. (21 May 2017 07:31)    Since admission, patient notes dysuria.  Associated lower quadrant abdominal pain.  She  notes subjective fever, but no fever has been recorded        PAST MEDICAL & SURGICAL HISTORY:  Paroxysmal atrial fibrillation  GERD (gastroesophageal reflux disease)  DM (diabetes mellitus)  Asthma  MI (myocardial infarction)  CAD (coronary artery disease)  Lymphoma: S/P resection and chemotherapy in remission  HLD (hyperlipidemia)  CHF (congestive heart failure): On home oxygen 2L PRN  HTN (hypertension)  ICD (implantable cardioverter-defibrillator) in place  S/P coronary artery stent placement  S/P myomectomy  S/P appendectomy  S/P lymph node biopsy: Biopsy and resection  H/O lymph node excision  H/O myomectomy  History of appendectomy      Allergies    peanuts (Unknown)  penicillin (Nausea)    Intolerances        ANTIMICROBIALS:  cefTRIAXone   IVPB  IV Intermittent   cefTRIAXone   IVPB 1Gram(s) IV Intermittent every 24 hours      OTHER MEDS:  acetaminophen   Tablet. 650milliGRAM(s) Oral every 6 hours PRN  oxyCODONE  5 mG/acetaminophen 325 mG 1Tablet(s) Oral every 6 hours PRN  acetaminophen   Tablet 650milliGRAM(s) Oral every 6 hours PRN  ALBUTerol    90 MICROgram(s) HFA Inhaler 2Puff(s) Inhalation every 6 hours PRN  allopurinol 300milliGRAM(s) Oral daily  amiodarone    Tablet 400milliGRAM(s) Oral daily  aspirin enteric coated 81milliGRAM(s) Oral daily  atorvastatin 20milliGRAM(s) Oral at bedtime  buDESOnide 160 MICROgram(s)/formoterol 4.5 MICROgram(s) Inhaler 2Puff(s) Inhalation two times a day  lactobacillus acidophilus 1Tablet(s) Oral daily  levothyroxine 50MICROGram(s) Oral daily  losartan 25milliGRAM(s) Oral daily  metoprolol succinate ER 25milliGRAM(s) Oral daily  senna 2Tablet(s) Oral at bedtime  spironolactone 12.5milliGRAM(s) Oral daily  sucralfate 1Gram(s) Oral four times a day  tiotropium 18 MICROgram(s) Capsule 1Capsule(s) Inhalation daily  furosemide   Injectable 40milliGRAM(s) IV Push daily  enoxaparin Injectable 40milliGRAM(s) SubCutaneous every 24 hours  insulin lispro (HumaLOG) corrective regimen sliding scale  SubCutaneous three times a day before meals  insulin lispro (HumaLOG) corrective regimen sliding scale  SubCutaneous at bedtime  dextrose 5%. 1000milliLiter(s) IV Continuous <Continuous>  dextrose Gel 1Dose(s) Oral once PRN  dextrose 50% Injectable 12.5Gram(s) IV Push once  dextrose 50% Injectable 25Gram(s) IV Push once  dextrose 50% Injectable 25Gram(s) IV Push once  glucagon  Injectable 1milliGRAM(s) IntraMuscular once PRN  pantoprazole    Tablet 40milliGRAM(s) Oral two times a day before meals  metoclopramide Injectable 5milliGRAM(s) IV Push every 6 hours  docusate sodium 100milliGRAM(s) Oral three times a day  polyethylene glycol 3350 17Gram(s) Oral daily  simethicone 80milliGRAM(s) Chew every 6 hours      SOCIAL HISTORY: lives with son, no tobacco, no IDU    FAMILY HISTORY:  No pertinent family history in first degree relatives      REVIEW OF SYSTEMS  [  ] ROS unobtainable because:    [x  ] All other systems negative except as noted below:	    Constitutional: [ ] fever [x ]chills [ ] sweats [x ] fatigue [ ] weight loss [ ]  Skin:  [ ] rash [ ] phlebitis	  Eyes: [ ] icterus [ ] inflammation	  ENMT: [ ] discharge [ ] thrush [ ] ulcers [ ] exudates  Respiratory: [x ] dyspnea [ ] hemoptysis [ ] cough x[ ] sputum	  Cardiovascular:  [x ] chest pain [ ] palpitations [ ] edema	  Gastrointestinal:  [ ] nausea [ ] vomiting [ ] diarrhea [ ] constipation [x ] pain	  Genitourinary:  [ ] dysuria [ ] frequency [ ] hematuria [ ] discharge [ ] flank pain  Musculoskeletal:  [ ] myalgias [ ] arthralgias [ ] arthritis	  Neurological:  [ ] headache [ ] seizures	  Psychiatric:  [ ] anxiety [ ] depression	  Hematology/Lymphatics:  [ ] lymphadenopathy  Endocrine:  [ ] adrenal [ ] thyroid  Allergic/Immunologic:	 [ ] transplant [ ] seasonal    PHYSICAL EXAM:  General: [x ] non-toxic  HEAD/EYES: [ ] PERRL [ x] white sclera [ ] icterus  ENT:  [ x] normal [x ] supple [ ] thrush [ ] pharyngeal exudate  Cardiovascular:   [ ] murmur [x ] normal [ ] PPM/AICD  Respiratory:  [x ] clear to ausculation bilaterally  GI:  mild LQ abd pain  :  [ ] knutson [x ] no CVA tenderness   Musculoskeletal:  [x ] no synovitis  Neurologic:  [x ] non-focal exam   Skin:  [x ] no rash  Lymph: [ ] no lymphadenopathy  Psychiatric:  [x ] appropriate affect [x ] alert & oriented  Lines:  [x ] no phlebitis [ ] central line          Drug Dosing Weight  Height (cm): 154.9 (21 May 2017 07:31)  Weight (kg): 60 (21 May 2017 07:31)  BMI (kg/m2): 25 (21 May 2017 07:31)  BSA (m2): 1.58 (21 May 2017 07:31)    Vital Signs Last 24 Hrs  T(F): 97.3, Max: 98.8 (05 @ 22:53)  Wt(kg): --    Vital Signs Last 24 Hrs  HR: 76 (66 - 87)  BP: 111/53 (103/60 - 117/53)  RR: 18  SpO2: 100% (98% - 100%)  Wt(kg): --                          12.9   5.03  )-----------( 310      ( 22 May 2017 06:15 )             42.2           142  |  100  |  15  ----------------------------<  79  3.7   |  26  |  0.85    Ca    9.6      22 May 2017 06:15  Phos  3.6       Mg     1.7         TPro  6.7  /  Alb  3.8  /  TBili  0.3  /  DBili  x   /  AST  25  /  ALT  12  /  AlkPhos  92        Urinalysis Basic - ( 21 May 2017 00:10 )    Color: PLYEL / Appearance: CLEAR / S.014 / pH: 6.5  Gluc: NEGATIVE / Ketone: NEGATIVE  / Bili: NEGATIVE / Urobili: NORMAL E.U.   Blood: NEGATIVE / Protein: 20 / Nitrite: NEGATIVE   Leuk Esterase: LARGE / RBC: 2-5 / WBC 25-50   Sq Epi: OCC / Non Sq Epi: x / Bacteria: FEW        MICROBIOLOGY:    Urine Culture: GPC 100k     RADIOLOGY:

## 2017-05-22 NOTE — CONSULT NOTE ADULT - ATTENDING COMMENTS
EP ATTENDING    Patient seen and examined. Agree with above. Has known NICM with prior upgrade to MDT BiV-ICD. Admitted with CHF exacerbation. Recommend continue amio 400mg daily for prior VT as well as beta blockers.

## 2017-05-22 NOTE — CONSULT NOTE ADULT - SUBJECTIVE AND OBJECTIVE BOX
Requesting Physician : Dr López    Reason for Consultation: palpitations     HISTORY OF PRESENT ILLNESS:   Ms. Pulido is a very pleasant 77 year old female well known to our service with hypertension, dyslipidemia, CAD s/p LAD stent with only mild re-stenosis with minor luminal irregularities otherwise on cath 4/16, with known severe LV dysfunction , ICM s/p Medtronic BiV ICD, COPD, lymphoma on active chemo, who is now admitted with shortness of breath, palpitations, reported wheezing at home with no associated chest pain or perceived ICD fire or syncope/near syncope.   She is currently breathing comfortably with no active wheezing.  Of note patient found to have a UTI and admits to chills but no fevers. the patient does not have a history of AFIB - she is on Amio 400mg for VT .     PAST MEDICAL & SURGICAL HISTORY:  Paroxysmal atrial fibrillation  GERD (gastroesophageal reflux disease)  DM (diabetes mellitus)  Asthma  MI (myocardial infarction)  CAD (coronary artery disease)  Lymphoma: S/P resection and chemotherapy in remission  HLD (hyperlipidemia)  CHF (congestive heart failure): On home oxygen 2L PRN  HTN (hypertension)  ICD (implantable cardioverter-defibrillator) in place  S/P coronary artery stent placement  S/P myomectomy  S/P appendectomy  S/P lymph node biopsy: Biopsy and resection  H/O lymph node excision  H/O myomectomy  History of appendectomy          PREVIOUS DIAGNOSTIC TESTING:    [ ] Echocardiogram: 3/31/16: CONCLUSIONS:  1. Mitral annular calcification, otherwise normal mitral  valve. Mild-moderate mitral regurgitation.  2. Severely dilated left atrium.  LA volume index = 62  cc/m2.  3. Severe left ventricular enlargement.  4. Severe global left ventricular systolic dysfunction.  5. Normal right ventricular size with decreased right  ventricular systolic function.  A device wire is noted in  the right heart.    [ ]  Catheterization: 4/2016 - LAD:   --  Proximal LAD: There was a [id number] % stenosis. There was mild  restenosis in proximal LAD stent.      	    MEDICATIONS:  amiodarone    Tablet 400milliGRAM(s) Oral daily  aspirin enteric coated 81milliGRAM(s) Oral daily  losartan 25milliGRAM(s) Oral daily  metoprolol succinate ER 25milliGRAM(s) Oral daily  spironolactone 12.5milliGRAM(s) Oral daily  furosemide   Injectable 40milliGRAM(s) IV Push daily  enoxaparin Injectable 40milliGRAM(s) SubCutaneous every 24 hours    cefTRIAXone   IVPB  IV Intermittent   cefTRIAXone   IVPB 1Gram(s) IV Intermittent every 24 hours    ALBUTerol    90 MICROgram(s) HFA Inhaler 2Puff(s) Inhalation every 6 hours PRN  buDESOnide 160 MICROgram(s)/formoterol 4.5 MICROgram(s) Inhaler 2Puff(s) Inhalation two times a day  tiotropium 18 MICROgram(s) Capsule 1Capsule(s) Inhalation daily    acetaminophen   Tablet. 650milliGRAM(s) Oral every 6 hours PRN  oxyCODONE  5 mG/acetaminophen 325 mG 1Tablet(s) Oral every 6 hours PRN  acetaminophen   Tablet 650milliGRAM(s) Oral every 6 hours PRN    pantoprazole    Tablet 40milliGRAM(s) Oral before breakfast  senna 2Tablet(s) Oral at bedtime  sucralfate 1Gram(s) Oral four times a day    allopurinol 300milliGRAM(s) Oral daily  atorvastatin 20milliGRAM(s) Oral at bedtime  levothyroxine 50MICROGram(s) Oral daily  insulin lispro (HumaLOG) corrective regimen sliding scale  SubCutaneous three times a day before meals  insulin lispro (HumaLOG) corrective regimen sliding scale  SubCutaneous at bedtime  dextrose Gel 1Dose(s) Oral once PRN  dextrose 50% Injectable 12.5Gram(s) IV Push once  dextrose 50% Injectable 25Gram(s) IV Push once  dextrose 50% Injectable 25Gram(s) IV Push once  glucagon  Injectable 1milliGRAM(s) IntraMuscular once PRN    dextrose 5%. 1000milliLiter(s) IV Continuous <Continuous>      Allergies    peanuts (Unknown)  penicillin (Nausea)    Intolerances        FAMILY HISTORY:  No pertinent family history in first degree relatives      SOCIAL HISTORY:    [+ ] Non-smoker  [ ] Smoker  [ ] Alcohol      REVIEW OF SYSTEMS:  [ ]chest pain  [ + ]shortness of breath  [+  ]palpitations  [  ]syncope  [ ]near syncope [  ]diplopia  [  ]altered mental status   [  ]fevers  [+ ]chills [+ ]nausea  [ ]vomitting  [ ]abdominal pain  [ ]melena  [ ]BRBPR  [  ]epistaxis  [  ]rash  [  ]lower extremity edema      CONSTITUTIONAL: No fever, weight loss, or fatigue  EYES: No eye pain, visual disturbances, or discharge  ENMT:  No difficulty hearing, tinnitus, vertigo; No sinus or throat pain  NECK: No pain or stiffness  RESPIRATORY: No cough, wheezing, chills or hemoptysis; No Shortness of Breath  CARDIOVASCULAR: No chest pain, palpitations, passing out, dizziness, or leg swelling  GASTROINTESTINAL: No abdominal or epigastric pain. No nausea, vomiting, or hematemesis; No diarrhea or constipation. No melena or hematochezia.  GENITOURINARY: No dysuria, frequency, hematuria, or incontinence  NEUROLOGICAL: No headaches, memory loss, loss of strength, numbness, or tremors  SKIN: No itching, burning, rashes, or lesions   LYMPH Nodes: No enlarged glands  ENDOCRINE: No heat or cold intolerance; No hair loss  MUSCULOSKELETAL: No joint pain or swelling; No muscle, back, or extremity pain  PSYCHIATRIC: No depression, anxiety, mood swings, or difficulty sleeping  HEME/LYMPH: No easy bruising, or bleeding gums  ALLERY AND IMMUNOLOGIC: No hives or eczema	    [+ ] All others negative	  [ ] Unable to obtain    PHYSICAL EXAM:  T(C): 36.8, Max: 36.9 (05-21 @ 14:24)  HR: 87 (66 - 87)  BP: 106/55 (103/60 - 117/53)  RR: 18 (18 - 22)  SpO2: 98% (98% - 100%)  Wt(kg): --  I&O's Summary    I & Os for current day (as of 22 May 2017 13:18)  =============================================  IN: 0 ml / OUT: 25 ml / NET: -25 ml      Appearance: Normal	  HEENT:   Normal oral mucosa, PERRL, EOMI	  Lymphatic: No lymphadenopathy  Cardiovascular: Normal S1 S2, No JVD, No murmurs, No edema  Respiratory: Lungs clear to auscultation	  Psychiatry: A & O x 3, Mood & affect appropriate  Gastrointestinal:  Soft, Non-tender, + BS	  Skin: No rashes, No ecchymoses, No cyanosis	  Neurologic: Non-focal  Extremities: Normal range of motion, No clubbing, cyanosis or edema  Vascular: Peripheral pulses palpable 2+ bilaterally    TELEMETRY: 	    ECG:  	  RADIOLOGY:  OTHER: 	  	  LABS:	 	    CARDIAC MARKERS:                                  12.9   5.03  )-----------( 310      ( 22 May 2017 06:15 )             42.2     05-22    142  |  100  |  15  ----------------------------<  79  3.7   |  26  |  0.85    Ca    9.6      22 May 2017 06:15  Phos  3.6     05-21  Mg     1.7     05-21    TPro  6.7  /  Alb  3.8  /  TBili  0.3  /  DBili  x   /  AST  25  /  ALT  12  /  AlkPhos  92  05-21    proBNP:   Lipid Profile:   HgA1c:   TSH:     ASSESSMENT/PLAN: 	Ms. Pulido is a very pleasant 77 year old female well known to our service with hypertension, dyslipidemia, CAD s/p LAD stent with only mild re-stenosis with minor luminal irregularities otherwise on cath 4/16, with known severe LV dysfunction , ICM s/p Medtronic BiV ICD, COPD, lymphoma on active chemo, who is now admitted with shortness of breath, palpitations, reported wheezing at home with no associated chest pain or perceived ICD fire or syncope/near syncope.   She is currently breathing comfortably with no active wheezing.  Of note patient found to have a UTI and admits to chills but no fevers.     - check Medtronic ICD interrogation   - c/w Amio 400mg po qday for h/o VT  - c/w cozaar, toprol, and aldactone for CM  - diurese with LAsix IV per primary team  - per primary team follow up with ID eval for UTI, pulm eval for SOB, and GI for nausea  - final EP recs pending ICD interrogation       Taryn Garrett [facility]  O:  [phone]  P: [phone]

## 2017-05-22 NOTE — CONSULT NOTE ADULT - SUBJECTIVE AND OBJECTIVE BOX
In addition to below information: to me patients tells me she wheeze at times but currently is doing ok. She does have asthma and takes BD, the name of which she does not remember she had fallen down few weeks ago and now with a "broken back" and she wal;ks with the help of braces.  At this time she says she has been doing well, except she feels sick.     Patient is a 77y old  Female who presents with a chief complaint of chest pain and sob (21 May 2017 07:31)      HPI:  78 yo female PMHx of Asthma, Afib on ASA, CAD, CHF, HLD, HTN, Lymphoma s/p resection and chemotherapy (in remission), and AICD presents with chest pain, sob and palpitations x 2-3 days. Chest pain described as midsternal chest pressure, 6/10, non-pleuritic, non-radiating, intermittent in nature where each episode lasts for 5 mins. Chest pain accompanied by palpitations and SOB. Pt admits to chronic GLASGOW with ADLS, but since last night her dyspnea was at rest. (21 May 2017 07:31)      ?FOLLOWING PRESENT  [ ] Hx of PE/DVT, [ ] Hx COPD, [ x] Hx of Asthma, [ ] Hx of Hospitalization, [ ]  Hx of BiPAP/CPAP use, [ ] Hx of JOB    Allergies    peanuts (Unknown)  penicillin (Nausea)    Intolerances        PAST MEDICAL & SURGICAL HISTORY:  Paroxysmal atrial fibrillation  GERD (gastroesophageal reflux disease)  DM (diabetes mellitus)  Asthma  MI (myocardial infarction)  CAD (coronary artery disease)  Lymphoma: S/P resection and chemotherapy in remission  HLD (hyperlipidemia)  CHF (congestive heart failure): On home oxygen 2L PRN  HTN (hypertension)  ICD (implantable cardioverter-defibrillator) in place  S/P coronary artery stent placement  S/P myomectomy  S/P appendectomy  S/P lymph node biopsy: Biopsy and resection  H/O lymph node excision  H/O myomectomy  History of appendectomy      FAMILY HISTORY:  No pertinent family history in first degree relatives      Social History: [ - ] TOBACCO                  [ - ] ETOH                                 [ - ] IVDA/DRUGS    REVIEW OF SYSTEMS      General:	weak    Skin/Breast:  	  Ophthalmologic:  	  ENMT:	    Respiratory and Thorax: sob , wheeze  	  Cardiovascular:	    Gastrointestinal:	    Genitourinary:	    Musculoskeletal:	    Neurological:	    Psychiatric:	    Hematology/Lymphatics:	    Endocrine:	    Allergic/Immunologic:	    MEDICATIONS  (STANDING):  allopurinol 300milliGRAM(s) Oral daily  amiodarone    Tablet 400milliGRAM(s) Oral daily  aspirin enteric coated 81milliGRAM(s) Oral daily  atorvastatin 20milliGRAM(s) Oral at bedtime  buDESOnide 160 MICROgram(s)/formoterol 4.5 MICROgram(s) Inhaler 2Puff(s) Inhalation two times a day  lactobacillus acidophilus 1Tablet(s) Oral daily  levothyroxine 50MICROGram(s) Oral daily  losartan 25milliGRAM(s) Oral daily  metoprolol succinate ER 25milliGRAM(s) Oral daily  pantoprazole    Tablet 40milliGRAM(s) Oral before breakfast  senna 2Tablet(s) Oral at bedtime  spironolactone 12.5milliGRAM(s) Oral daily  sucralfate 1Gram(s) Oral four times a day  tiotropium 18 MICROgram(s) Capsule 1Capsule(s) Inhalation daily  cefTRIAXone   IVPB  IV Intermittent   furosemide   Injectable 40milliGRAM(s) IV Push daily  enoxaparin Injectable 40milliGRAM(s) SubCutaneous every 24 hours  insulin lispro (HumaLOG) corrective regimen sliding scale  SubCutaneous three times a day before meals  insulin lispro (HumaLOG) corrective regimen sliding scale  SubCutaneous at bedtime  dextrose 5%. 1000milliLiter(s) IV Continuous <Continuous>  dextrose 50% Injectable 12.5Gram(s) IV Push once  dextrose 50% Injectable 25Gram(s) IV Push once  dextrose 50% Injectable 25Gram(s) IV Push once  cefTRIAXone   IVPB 1Gram(s) IV Intermittent every 24 hours    MEDICATIONS  (PRN):  acetaminophen   Tablet. 650milliGRAM(s) Oral every 6 hours PRN Mild and Moderate pain  oxyCODONE  5 mG/acetaminophen 325 mG 1Tablet(s) Oral every 6 hours PRN Severe Pain (7 - 10)  acetaminophen   Tablet 650milliGRAM(s) Oral every 6 hours PRN For Temp greater than 38 C (100.4 F)  ALBUTerol    90 MICROgram(s) HFA Inhaler 2Puff(s) Inhalation every 6 hours PRN Shortness of Breath and/or Wheezing  dextrose Gel 1Dose(s) Oral once PRN Blood Glucose LESS THAN 70 milliGRAM(s)/deciliter  glucagon  Injectable 1milliGRAM(s) IntraMuscular once PRN Glucose LESS THAN 70 milligrams/deciliter       Vital Signs Last 24 Hrs  T(C): 36.8, Max: 36.9 ( @ 14:24)  T(F): 98.2, Max: 98.5 ( @ 14:24)  HR: 87 (66 - 87)  BP: 106/55 (103/60 - 117/53)  BP(mean): --  RR: 18 (18 - 22)  SpO2: 98% (98% - 100%)        I&O's Summary    I & Os for current day (as of 22 May 2017 13:28)  =============================================  IN: 0 ml / OUT: 25 ml / NET: -25 ml      Physical Exam:   GENERAL: NAD, well-groomed, well-developed  HEENT: VALDO/   Atraumatic, Normocephalic  ENMT: No tonsillar erythema, exudates, or enlargement; Moist mucous membranes, Good dentition, No lesions  NECK: Supple, No JVD, Normal thyroid  CHEST/LUNG: Clear to percussion bilaterally; No rales, rhonchi, wheezing, or rubs  CVS: Regular rate and rhythm; No murmurs, rubs, or gallops  GI: : Soft, Nontender, Nondistended; Bowel sounds present  NERVOUS SYSTEM:  Alert & Oriented X3, Good concentration; Motor Strength 5/5 B/L upper and lower extremities; DTRs 2+ intact and symmetric  EXTREMITIES:  2+ Peripheral Pulses, No clubbing, cyanosis, or edema  LYMPH: No lymphadenopathy noted  SKIN: No rashes or lesions  ENDOCRINOLOGY: No Thyromegaly  PSYCH: Appropriate    Labs:    CARDIAC MARKERS ( 21 May 2017 05:40 )  x     / < 0.06 ng/mL / 54 u/L / 1.84 ng/mL / x      CARDIAC MARKERS ( 20 May 2017 23:30 )  x     / < 0.06 ng/mL / 49 u/L / 1.73 ng/mL / x                                12.9   5.03  )-----------( 310      ( 22 May 2017 06:15 )             42.2     -    142  |  100  |  15  ----------------------------<  79  3.7   |  26  |  0.85    Ca    9.6      22 May 2017 06:15  Phos  3.6     -  Mg     1.7         TPro  6.7  /  Alb  3.8  /  TBili  0.3  /  DBili  x   /  AST  25  /  ALT  12  /  AlkPhos  92  -    CAPILLARY BLOOD GLUCOSE  101 (22 May 2017 12:10)  92 (22 May 2017 08:35)  106 (21 May 2017 21:27)  93 (21 May 2017 17:10)    LIVER FUNCTIONS - ( 21 May 2017 05:40 )  Alb: 3.8 g/dL / Pro: 6.7 g/dL / ALK PHOS: 92 u/L / ALT: 12 u/L / AST: 25 u/L / GGT: x           PT/INR - ( 20 May 2017 23:30 )   PT: 11.1 SEC;   INR: 0.99          PTT - ( 20 May 2017 23:30 )  PTT:28.4 SEC  Urinalysis Basic - ( 21 May 2017 00:10 )    Color: PLYEL / Appearance: CLEAR / S.014 / pH: 6.5  Gluc: NEGATIVE / Ketone: NEGATIVE  / Bili: NEGATIVE / Urobili: NORMAL E.U.   Blood: NEGATIVE / Protein: 20 / Nitrite: NEGATIVE   Leuk Esterase: LARGE / RBC: 2-5 / WBC 25-50   Sq Epi: OCC / Non Sq Epi: x / Bacteria: FEW      D DImer      Studies  Chest X-RAYFINDINGS:   AICD in unchanged position over left chest.    Unchanged cardiomegaly.  Lungs are clear.  No pleural effusions or pneumothorax.  No acute osseous abnormality.      IMPRESSION:   Clear lungs.   CT SCAN Chest : [facility], 2017  FINDINGS:    CHEST:     LUNGS AND LARGE AIRWAYS: Small amount of debris is seen in the trachea.   There is mucus plugging in the bilateral lower lobe distal bronchi. There   is bibasilar and lingular linear atelectasis. No pulmonary nodules.  PLEURA: No pleural effusion.  VESSELS: Within normal limits.  HEART: Cardiomegaly. A cardiac stent in the left anterior descending   artery is noted. No pericardial effusion.  MEDIASTINUM AND TARYN: No lymphadenopathy.  CHEST WALL AND LOWER NECK: Left chest wall biventricular AICD noted.  VISUALIZED UPPER ABDOMEN: Small hiatal hernia. Retained contrast material   noted in the transverse colon. Colonic diverticulosis without evidence of   diverticulitis.Layering hyperattenuation in the gallbladder may   represent tiny layering gallstones versus sludge.  BONES: Degenerative changes of the thoracolumbar spine. Unchanged mild   compression fracture deformities of T7 and T10 superior endplates.    IMPRESSION:    No evidence of pneumonia.    Colonic diverticulosis.    Tiny layering gallstones versus gallbladder sludge.  CT Abdomen  Venous Dopplers: LE:   Others

## 2017-05-22 NOTE — CONSULT NOTE ADULT - PROBLEM SELECTOR RECOMMENDATION 9
await ID of GPC in urine   Check blood culture    After blood culture, give vacnomycin 1 gm iv x 1 (pt has ppm/ AICD)

## 2017-05-22 NOTE — CONSULT NOTE ADULT - SUBJECTIVE AND OBJECTIVE BOX
Chief Complaint:  Patient is a 77y old  Female who presents with a chief complaint of chest pain and sob with associated nausea and abdominal discomfort    PMHx:  Paroxysmal atrial fibrillation  GERD (gastroesophageal reflux disease)  DM (diabetes mellitus)  Asthma  MI (myocardial infarction)  CAD (coronary artery disease)  Lymphoma  HLD (hyperlipidemia)  CHF (congestive heart failure)  HTN (hypertension)    SHx:  ICD (implantable cardioverter-defibrillator) in place  S/P coronary artery stent placement  S/P myomectomy  S/P appendectomy  S/P lymph node biopsy  H/O lymph node excision  H/O myomectomy  History of appendectomy     HPI:  76 yo female PMHx of Asthma, Afib on ASA, CAD, CHF, HLD, HTN,Gastric Ulcer on EGD 3/2017 with presbyesophagus noted, Lymphoma s/p resection and chemotherapy (in remission), and AICD presents with chest pain, sob and palpitations x 2-3 days. Chest pain described as midsternal chest pressure, 6/10, non-pleuritic, non-radiating, intermittent in nature where each episode lasts for 5 mins. Chest pain accompanied by palpitations and SOB. Pt admits to chronic GLASGOW with ADLS, but since last night her dyspnea was at rest.PT reports that since she has been feeling SOB she has also been experiencing diffuse abdominal discomfort with 'gas' and nausea. Denying vomiting, melena, brbrpr, dysphagia, odynophagia, weight loss or appetite changes. Admits to last bm 2 days ago but states +flatus      Allergies:  peanuts (Unknown)  penicillin (Nausea)      acetaminophen   Tablet. 650milliGRAM(s) Oral every 6 hours PRN  oxyCODONE  5 mG/acetaminophen 325 mG 1Tablet(s) Oral every 6 hours PRN  acetaminophen   Tablet 650milliGRAM(s) Oral every 6 hours PRN  ALBUTerol    90 MICROgram(s) HFA Inhaler 2Puff(s) Inhalation every 6 hours PRN  allopurinol 300milliGRAM(s) Oral daily  amiodarone    Tablet 400milliGRAM(s) Oral daily  aspirin enteric coated 81milliGRAM(s) Oral daily  atorvastatin 20milliGRAM(s) Oral at bedtime  buDESOnide 160 MICROgram(s)/formoterol 4.5 MICROgram(s) Inhaler 2Puff(s) Inhalation two times a day  lactobacillus acidophilus 1Tablet(s) Oral daily  levothyroxine 50MICROGram(s) Oral daily  losartan 25milliGRAM(s) Oral daily  metoprolol succinate ER 25milliGRAM(s) Oral daily  pantoprazole    Tablet 40milliGRAM(s) Oral before breakfast  senna 2Tablet(s) Oral at bedtime  spironolactone 12.5milliGRAM(s) Oral daily  sucralfate 1Gram(s) Oral four times a day  tiotropium 18 MICROgram(s) Capsule 1Capsule(s) Inhalation daily  cefTRIAXone   IVPB  IV Intermittent   furosemide   Injectable 40milliGRAM(s) IV Push daily  enoxaparin Injectable 40milliGRAM(s) SubCutaneous every 24 hours  insulin lispro (HumaLOG) corrective regimen sliding scale  SubCutaneous three times a day before meals  insulin lispro (HumaLOG) corrective regimen sliding scale  SubCutaneous at bedtime  dextrose 5%. 1000milliLiter(s) IV Continuous <Continuous>  dextrose Gel 1Dose(s) Oral once PRN  dextrose 50% Injectable 12.5Gram(s) IV Push once  dextrose 50% Injectable 25Gram(s) IV Push once  dextrose 50% Injectable 25Gram(s) IV Push once  glucagon  Injectable 1milliGRAM(s) IntraMuscular once PRN  cefTRIAXone   IVPB 1Gram(s) IV Intermittent every 24 hours        FAMILY HISTORY:  No pertinent family history in first degree relatives        Review of Systems:    General:  No wt loss, fevers, chills, night sweats,fatigue,   Eyes:  Good vision, no reported pain  ENT:  No sore throat, pain, runny nose, dysphagia  CV:  No pain, palpitatioins, hypo/hypertension  Resp:  No dyspnea, cough, tachypnea, wheezing  :  No pain, bleeding, incontinence, nocturia  GI: diffuse abd discomfort "gas", nausea  Muscle:  No pain, weakness  Neuro:  No weakness, tingling, memory problems  Psych:  No fatigue, insomnia, mood problems, depression  Endocrine:  No polyuria, polydypsia, cold/heat intolerance  Heme:  No petechiae, ecchymosis, easy bruisability  Skin:  No rash, tattoos, scars, edema    Relevant Family History:       Relevant Social History:       Physical Exam:    Vital Signs:  Vital Signs Last 24 Hrs  T(C): 36.8, Max: 36.9 (05-21 @ 14:24)  T(F): 98.2, Max: 98.5 (-21 @ 14:24)  HR: 87 (66 - 87)  BP: 106/55 (103/60 - 117/53)  BP(mean): --  RR: 18 (18 - 22)  SpO2: 98% (98% - 100%)  Daily     Daily     General:  Appears stated age, well-groomed, well-nourished, no distress  HEENT:  NC/AT,  conjunctivae clear and pink, no thyromegaly, nodules, adenopathy, no JVD  Chest:  Full & symmetric excursion, no increased effort, breath sounds clear  Cardiovascular:  Regular rhythm, S1, S2, no murmur/rub/S3/S4, no abdominal bruit, no edema  Abdomen:  Soft, non-tender, non-distended, normoactive bowel sounds,  no masses ,no hepatosplenomeagaly, no signs of chronic liver disease  Extremities:  no cyanosis,clubbing or edema  Skin:  No rash/erythema/ecchymoses/petechiae/wounds/abscess/warm/dry  Neuro/Psych:  Alert, oriented, no asterixis, no tremor, no encephalopathy    Laboratory:                            12.9   5.03  )-----------( 310      ( 22 May 2017 06:15 )             42.2     -    142  |  100  |  15  ----------------------------<  79  3.7   |  26  |  0.85    Ca    9.6      22 May 2017 06:15  Phos  3.6     -  Mg     1.7         TPro  6.7  /  Alb  3.8  /  TBili  0.3  /  DBili  x   /  AST  25  /  ALT  12  /  AlkPhos  92  05-21    LIVER FUNCTIONS - ( 21 May 2017 05:40 )  Alb: 3.8 g/dL / Pro: 6.7 g/dL / ALK PHOS: 92 u/L / ALT: 12 u/L / AST: 25 u/L / GGT: x           PT/INR - ( 20 May 2017 23:30 )   PT: 11.1 SEC;   INR: 0.99          PTT - ( 20 May 2017 23:30 )  PTT:28.4 SEC  Urinalysis Basic - ( 21 May 2017 00:10 )    Color: PLYEL / Appearance: CLEAR / S.014 / pH: 6.5  Gluc: NEGATIVE / Ketone: NEGATIVE  / Bili: NEGATIVE / Urobili: NORMAL E.U.   Blood: NEGATIVE / Protein: 20 / Nitrite: NEGATIVE   Leuk Esterase: LARGE / RBC: 2-5 / WBC 25-50   Sq Epi: OCC / Non Sq Epi: x / Bacteria: FEW        Imaging:    3/2017 EGD - Normal esophagus. Z-line regular, 39 cm from the incisors. Gastric ulcer. Biopsied. Normal examined duodenum  3/2017 EGD Biopsy results -- Stomach, antrum, biopsy. Minimal chronic gastritis.No H. pylori seen on Warthin-Starry stain

## 2017-05-22 NOTE — PROGRESS NOTE ADULT - SUBJECTIVE AND OBJECTIVE BOX
ELECTROPHYSIOLOGY  Device Interrogation Performed                                  Date/Time: 5/22/17 15:00  :    vivio       Model:   Viva Quad CRT-D                 Mode: DDD         Rate:    60/130    Atrial Lead:  P wave amplitude:        2      mv          Impedence:       456     Ohms      Threshold:                V@             ms      Ventricular Lead(s):  RV Lead: R wave amplitude:           17.9 mv          Impedence:   437   Ohms      Threshold:   2.15   V@   0.40 ms   LV Lead:  R wave amplitude:                          mv          Impedence:   456       Ohms      Threshold:    0.75    V@ 0.50        ms     Battery Status:                    X Good                LONNIE                     EOL    Underlying Rhythm:   NSR    Events/Observation: No arrhythmias    Impression/Plan:  Normal PPM / ICD function.   Normal sensing and pacing via iterative testing. Good battery status. Excellent threshold capture.  Pt w/ adaptive CRT/LV pacing.  No reprogramming.

## 2017-05-23 NOTE — DIETITIAN INITIAL EVALUATION ADULT. - DIET TYPE
consistent carbohydrate (no snacks)/1500ml/caffeine free/mechanical soft/low sodium/DASH/TLC (sodium and cholesterol restricted diet)

## 2017-05-23 NOTE — PROGRESS NOTE ADULT - SUBJECTIVE AND OBJECTIVE BOX
Follow Up:      Inverval History/ROS:Patient is a 77y old  Female who presents with a chief complaint of chest pain and sob (21 May 2017 07:31)    Subsequently complained of dysuria.  Still notes dysuria.    No Fever.    Allergies    peanuts (Unknown)  penicillin (Nausea)    Intolerances        ANTIMICROBIALS:  linezolid    Tablet 600milliGRAM(s) Oral every 12 hours      OTHER MEDS:  acetaminophen   Tablet. 650milliGRAM(s) Oral every 6 hours PRN  oxyCODONE  5 mG/acetaminophen 325 mG 1Tablet(s) Oral every 6 hours PRN  acetaminophen   Tablet 650milliGRAM(s) Oral every 6 hours PRN  ALBUTerol    90 MICROgram(s) HFA Inhaler 2Puff(s) Inhalation every 6 hours PRN  allopurinol 300milliGRAM(s) Oral daily  amiodarone    Tablet 400milliGRAM(s) Oral daily  aspirin enteric coated 81milliGRAM(s) Oral daily  atorvastatin 20milliGRAM(s) Oral at bedtime  buDESOnide 160 MICROgram(s)/formoterol 4.5 MICROgram(s) Inhaler 2Puff(s) Inhalation two times a day  lactobacillus acidophilus 1Tablet(s) Oral daily  levothyroxine 50MICROGram(s) Oral daily  losartan 25milliGRAM(s) Oral daily  metoprolol succinate ER 25milliGRAM(s) Oral daily  senna 2Tablet(s) Oral at bedtime  spironolactone 12.5milliGRAM(s) Oral daily  sucralfate 1Gram(s) Oral four times a day  tiotropium 18 MICROgram(s) Capsule 1Capsule(s) Inhalation daily  furosemide   Injectable 40milliGRAM(s) IV Push daily  enoxaparin Injectable 40milliGRAM(s) SubCutaneous every 24 hours  insulin lispro (HumaLOG) corrective regimen sliding scale  SubCutaneous three times a day before meals  insulin lispro (HumaLOG) corrective regimen sliding scale  SubCutaneous at bedtime  dextrose 5%. 1000milliLiter(s) IV Continuous <Continuous>  dextrose Gel 1Dose(s) Oral once PRN  dextrose 50% Injectable 12.5Gram(s) IV Push once  dextrose 50% Injectable 25Gram(s) IV Push once  dextrose 50% Injectable 25Gram(s) IV Push once  glucagon  Injectable 1milliGRAM(s) IntraMuscular once PRN  pantoprazole    Tablet 40milliGRAM(s) Oral two times a day before meals  metoclopramide Injectable 5milliGRAM(s) IV Push every 6 hours  docusate sodium 100milliGRAM(s) Oral three times a day  polyethylene glycol 3350 17Gram(s) Oral daily  simethicone 80milliGRAM(s) Chew every 6 hours  dicyclomine 10milliGRAM(s) Oral two times a day before meals      Vital Signs Last 24 Hrs  T(C): 36.5, Max: 36.5 (05-22 @ 20:36)  T(F): 97.7, Max: 97.7 (05-22 @ 20:36)  HR: 65 (65 - 76)  BP: 102/53 (90/51 - 111/53)  BP(mean): --  RR: 17 (17 - 18)  SpO2: 100% (100% - 100%)    PHYSICAL EXAM:  General: [x ] non-toxic  HEAD/EYES: [ ] PERRL [x ] white sclera [ ] icterus  ENT:  [x ] normal [x ] supple [ ] thrush [ ] pharyngeal exudate  Cardiovascular:   [ ] murmur [x ] normal [ ] PPM/AICD  Respiratory:  [x ] clear to ausculation bilaterally  GI:  [x ] soft, non-tender, normal bowel sounds  :  [ ] knutson [ ] no CVA tenderness   Musculoskeletal:  [xx ] no synovitis  Neurologic:  [ ] non-focal exam   Skin:  [x ] no rash  Lymph: [x ] no lymphadenopathy  Psychiatric:  [ ] appropriate affect [ x] alert & oriented  Lines:  [ x] no phlebitis [ ] central line                                12.9   5.03  )-----------( 310      ( 22 May 2017 06:15 )             42.2       05-22    142  |  100  |  15  ----------------------------<  79  3.7   |  26  |  0.85    Ca    9.6      22 May 2017 06:15            MICROBIOLOGY:    RADIOLOGY:

## 2017-05-23 NOTE — PROGRESS NOTE ADULT - SUBJECTIVE AND OBJECTIVE BOX
Subjective: no chest pain; SOB mildly improved . No further nausea.     MEDS  acetaminophen   Tablet. 650milliGRAM(s) Oral every 6 hours PRN  oxyCODONE  5 mG/acetaminophen 325 mG 1Tablet(s) Oral every 6 hours PRN  acetaminophen   Tablet 650milliGRAM(s) Oral every 6 hours PRN  ALBUTerol    90 MICROgram(s) HFA Inhaler 2Puff(s) Inhalation every 6 hours PRN  allopurinol 300milliGRAM(s) Oral daily  amiodarone    Tablet 400milliGRAM(s) Oral daily  aspirin enteric coated 81milliGRAM(s) Oral daily  atorvastatin 20milliGRAM(s) Oral at bedtime  buDESOnide 160 MICROgram(s)/formoterol 4.5 MICROgram(s) Inhaler 2Puff(s) Inhalation two times a day  lactobacillus acidophilus 1Tablet(s) Oral daily  levothyroxine 50MICROGram(s) Oral daily  losartan 25milliGRAM(s) Oral daily  metoprolol succinate ER 25milliGRAM(s) Oral daily  senna 2Tablet(s) Oral at bedtime  spironolactone 12.5milliGRAM(s) Oral daily  sucralfate 1Gram(s) Oral four times a day  tiotropium 18 MICROgram(s) Capsule 1Capsule(s) Inhalation daily  furosemide   Injectable 40milliGRAM(s) IV Push daily  enoxaparin Injectable 40milliGRAM(s) SubCutaneous every 24 hours  insulin lispro (HumaLOG) corrective regimen sliding scale  SubCutaneous three times a day before meals  insulin lispro (HumaLOG) corrective regimen sliding scale  SubCutaneous at bedtime  dextrose 5%. 1000milliLiter(s) IV Continuous <Continuous>  dextrose Gel 1Dose(s) Oral once PRN  dextrose 50% Injectable 12.5Gram(s) IV Push once  dextrose 50% Injectable 25Gram(s) IV Push once  dextrose 50% Injectable 25Gram(s) IV Push once  glucagon  Injectable 1milliGRAM(s) IntraMuscular once PRN  pantoprazole    Tablet 40milliGRAM(s) Oral two times a day before meals  metoclopramide Injectable 5milliGRAM(s) IV Push every 6 hours  docusate sodium 100milliGRAM(s) Oral three times a day  polyethylene glycol 3350 17Gram(s) Oral daily  simethicone 80milliGRAM(s) Chew every 6 hours  dicyclomine 10milliGRAM(s) Oral two times a day before meals  linezolid    Tablet 600milliGRAM(s) Oral every 12 hours         labs :                    12.9   5.03  )-----------( 310      ( 22 May 2017 06:15 )             42.2       05-22    142  |  100  |  15  ----------------------------<  79  3.7   |  26  |  0.85    Ca    9.6      22 May 2017 06:15        CARDIAC MARKERS ( 21 May 2017 05:40 )  x     / < 0.06 ng/mL / 54 u/L / 1.84 ng/mL / x      CARDIAC MARKERS ( 20 May 2017 23:30 )  x     / < 0.06 ng/mL / 49 u/L / 1.73 ng/mL / x            T(C): 36.5, Max: 36.5 (05-22 @ 20:36)  HR: 65 (65 - 76)  BP: 102/53 (90/51 - 111/53)  RR: 17 (17 - 18)  SpO2: 100% (100% - 100%)  Wt(kg): --    I&O's Summary  I & Os for 24h ending 23 May 2017 07:00  =============================================  IN: 250 ml / OUT: 900 ml / NET: -650 ml    I & Os for current day (as of 23 May 2017 13:20)  =============================================  IN: 200 ml / OUT: 225 ml / NET: -25 ml        PREVIOUS DIAGNOSTIC TESTING:    [ ] Echocardiogram: 3/31/16: CONCLUSIONS:  1. Mitral annular calcification, otherwise normal mitral  valve. Mild-moderate mitral regurgitation.  2. Severely dilated left atrium.  LA volume index = 62  cc/m2.  3. Severe left ventricular enlargement.  4. Severe global left ventricular systolic dysfunction.  5. Normal right ventricular size with decreased right  ventricular systolic function.  A device wire is noted in  the right heart.    [ ]  Catheterization: 4/2016 - LAD:   --  Proximal LAD: There was a [id number] % stenosis. There was mild  restenosis in proximal LAD stent.        Allergies    peanuts (Unknown)  penicillin (Nausea)    Intolerances; none      Appearance: Normal	  HEENT:   Normal oral mucosa, PERRL, EOMI	  Lymphatic: No lymphadenopathy  Cardiovascular: Normal S1 S2, No JVD, No murmurs, No edema  Respiratory: Lungs clear to auscultation	  Psychiatry: A & O x 3, Mood & affect appropriate  Gastrointestinal:  Soft, Non-tender, + BS	  Skin: No rashes, No ecchymoses, No cyanosis	  Neurologic: Non-focal  Extremities: Normal range of motion, No clubbing, cyanosis or edema  Vascular: Peripheral pulses palpable 2+ bilaterally    TELEMETRY: 	  paced   ECG:  av paced   RADIOLOGY: none  OTHER: 	  	renal u/s pending     ASSESSMENT/PLAN: 	Ms. Pulido is a very pleasant 77 year old female well known to our service with hypertension, dyslipidemia, CAD s/p LAD stent with only mild re-stenosis with minor luminal irregularities otherwise on cath 4/16, with known severe LV dysfunction , ICM s/p Medtronic BiV ICD, COPD, lymphoma on active chemo with known spinal mets, who is now admitted with shortness of breath, palpitations, reported wheezing at home with no associated chest pain or perceived ICD fire or syncope/near syncope with +UTI who ruled out for ACS with acute on chronic systolic CHF exac:          - acute CHF exac - c/w diuresis with LAsix IV   -  c/w cozaar, toprol, and aldactone for CM  - ID -c/w IV abx for UTI - f/u blood cx  -Medtronic ICD interrogation    Events/Observation: No arrhythmias; underlying rhythm NSR  Normal PPM / ICD function.   Normal sensing and pacing via iterative testing. Good battery status. Excellent threshold capture.  Pt w/ adaptive CRT/LV pacing.  No reprogramming.  - - c/w Amio 400mg po qday for h/o VT - the patient has NO HISTORY OF AFIB  - renal u/s pending   - pulm noted - c/w diuresis   - onc eval with Dr. Walsh pending - patient with known lymphoma and spinal mets (follows with outside onc Dr. escalante [phone], [phone])  - PT eval pending    Taryn Garrett [facility]  O:  [phone]  P: [phone]

## 2017-05-23 NOTE — DIETITIAN INITIAL EVALUATION ADULT. - NS AS NUTRI INTERV MEALS SNACK
Texture-modified diet/1. Suggest: PO diet rx: Mechanical Soft, Consistent Carbohydrate (no snacks), DASH/TLC (cholesterol and sodium restricted); PO supplement: Glucerna Shake 8oz. 1x daily (will provide additional ~220kcals, ~10g protein); Fluid restriction per MD discretion;            2. Encourage & assist Pt with meals; Monitor labs, weights, hydration status;             3. Monitor labs, weights, hydration status;                4. Suggest: Swallow Bedside Assessment Adult to determine appropriate PO diet/Fluid consistency;/Diets modified for specific foods and ingredients/Other (specify)

## 2017-05-23 NOTE — PROGRESS NOTE ADULT - SUBJECTIVE AND OBJECTIVE BOX
INTERVAL HPI/OVERNIGHT EVENTS:    pt s+e reports diffuse abdominal discomfort. Denying any n/v/d. Denied BM, Admits to +Flatus  tolerating PO intake well    MEDICATIONS  (STANDING):  allopurinol 300milliGRAM(s) Oral daily  amiodarone    Tablet 400milliGRAM(s) Oral daily  aspirin enteric coated 81milliGRAM(s) Oral daily  atorvastatin 20milliGRAM(s) Oral at bedtime  buDESOnide 160 MICROgram(s)/formoterol 4.5 MICROgram(s) Inhaler 2Puff(s) Inhalation two times a day  lactobacillus acidophilus 1Tablet(s) Oral daily  levothyroxine 50MICROGram(s) Oral daily  losartan 25milliGRAM(s) Oral daily  metoprolol succinate ER 25milliGRAM(s) Oral daily  senna 2Tablet(s) Oral at bedtime  spironolactone 12.5milliGRAM(s) Oral daily  sucralfate 1Gram(s) Oral four times a day  tiotropium 18 MICROgram(s) Capsule 1Capsule(s) Inhalation daily  cefTRIAXone   IVPB  IV Intermittent   furosemide   Injectable 40milliGRAM(s) IV Push daily  enoxaparin Injectable 40milliGRAM(s) SubCutaneous every 24 hours  insulin lispro (HumaLOG) corrective regimen sliding scale  SubCutaneous three times a day before meals  insulin lispro (HumaLOG) corrective regimen sliding scale  SubCutaneous at bedtime  dextrose 5%. 1000milliLiter(s) IV Continuous <Continuous>  dextrose 50% Injectable 12.5Gram(s) IV Push once  dextrose 50% Injectable 25Gram(s) IV Push once  dextrose 50% Injectable 25Gram(s) IV Push once  cefTRIAXone   IVPB 1Gram(s) IV Intermittent every 24 hours  pantoprazole    Tablet 40milliGRAM(s) Oral two times a day before meals  metoclopramide Injectable 5milliGRAM(s) IV Push every 6 hours  docusate sodium 100milliGRAM(s) Oral three times a day  polyethylene glycol 3350 17Gram(s) Oral daily  simethicone 80milliGRAM(s) Chew every 6 hours  dicyclomine 10milliGRAM(s) Oral two times a day before meals    MEDICATIONS  (PRN):  acetaminophen   Tablet. 650milliGRAM(s) Oral every 6 hours PRN Mild and Moderate pain  oxyCODONE  5 mG/acetaminophen 325 mG 1Tablet(s) Oral every 6 hours PRN Severe Pain (7 - 10)  acetaminophen   Tablet 650milliGRAM(s) Oral every 6 hours PRN For Temp greater than 38 C (100.4 F)  ALBUTerol    90 MICROgram(s) HFA Inhaler 2Puff(s) Inhalation every 6 hours PRN Shortness of Breath and/or Wheezing  dextrose Gel 1Dose(s) Oral once PRN Blood Glucose LESS THAN 70 milliGRAM(s)/deciliter  glucagon  Injectable 1milliGRAM(s) IntraMuscular once PRN Glucose LESS THAN 70 milligrams/deciliter      Allergies    peanuts (Unknown)  penicillin (Nausea)    Intolerances        Review of Systems:    General:  No wt loss, fevers, chills, night sweats,fatigue,   Eyes:  Good vision, no reported pain  ENT:  No sore throat, pain, runny nose, dysphagia  CV:  No pain, palpitatioins, hypo/hypertension  Resp:  No dyspnea, cough, tachypnea, wheezing  GI:  + abd "soreness", No nausea, No vomiting, No diarrhea, +constipatiion, No weight loss, No fever, No pruritis, No rectal bleeding, No tarry stools, No dysphagia,  :  No pain, bleeding, incontinence, nocturia  Muscle:  No pain, weakness  Neuro:  No weakness, tingling, memory problems  Psych:  No fatigue, insomnia, mood problems, depression  Endocrine:  No polyuria, polydypsia, cold/heat intolerance  Heme:  No petechiae, ecchymosis, easy bruisability  Skin:  No rash, tattoos, scars, edema      Vital Signs Last 24 Hrs  T(C): 36.5, Max: 36.5 (05-22 @ 20:36)  T(F): 97.7, Max: 97.7 (05-22 @ 20:36)  HR: 65 (65 - 76)  BP: 102/53 (90/51 - 111/53)  BP(mean): --  RR: 17 (17 - 18)  SpO2: 100% (100% - 100%)    PHYSICAL EXAM:    Constitutional: NAD, well-developed  HEENT: EOMI, throat clear  Neck: No LAD, supple  Respiratory: CTA and P  Cardiovascular: S1 and S2, RRR, no M  Gastrointestinal: BS+, soft, NT/ND, neg HSM,  Extremities: No peripheral edema, neg clubing, cyanosis  Vascular: 2+ peripheral pulses  Neurological: A/O x 3, no focal deficits  Psychiatric: Normal mood, normal affect  Skin: No rashes      LABS:                        12.9   5.03  )-----------( 310      ( 22 May 2017 06:15 )             42.2     05-22    142  |  100  |  15  ----------------------------<  79  3.7   |  26  |  0.85    Ca    9.6      22 May 2017 06:15            RADIOLOGY & ADDITIONAL TESTS:

## 2017-05-23 NOTE — DIETITIAN INITIAL EVALUATION ADULT. - OTHER INFO
Nutrition Consult X Registered Dietitian. Pt 78 yo female appears alert, oriented. Per Pt her appetite not good lately. RDN offered PO supplement: Glucerna shake, Pt agreed to try. Pt C/O chewing difficulty. Pt on Mechanical Soft diet @ this time. No nausea/vomiting/diarrhea @ present. Pt C/O constipation. Pt usually avoids salt at home reported. Food preferences discussed. Per Pt her weight (PTA): 122#. No weight loss or weight change voiced. No other food related concern voiced. RDN remains available, Pt made aware.

## 2017-05-23 NOTE — DIETITIAN INITIAL EVALUATION ADULT. - PERTINENT MEDS FT
Aspirin, Lovenox, Colace, Reglan, Miralax, Mylicon, Lipitor, Lasix, Insulin (Humalog), Lactobacillus Acidophilus, Senna

## 2017-05-23 NOTE — PROGRESS NOTE ADULT - SUBJECTIVE AND OBJECTIVE BOX
Patient is a 77y old  Female who presents with a chief complaint of chest pain and sob (21 May 2017 07:31)  feels her SOB is better      Any change in ROS: none     MEDICATIONS  (STANDING):  allopurinol 300milliGRAM(s) Oral daily  amiodarone    Tablet 400milliGRAM(s) Oral daily  aspirin enteric coated 81milliGRAM(s) Oral daily  atorvastatin 20milliGRAM(s) Oral at bedtime  buDESOnide 160 MICROgram(s)/formoterol 4.5 MICROgram(s) Inhaler 2Puff(s) Inhalation two times a day  lactobacillus acidophilus 1Tablet(s) Oral daily  levothyroxine 50MICROGram(s) Oral daily  losartan 25milliGRAM(s) Oral daily  metoprolol succinate ER 25milliGRAM(s) Oral daily  senna 2Tablet(s) Oral at bedtime  spironolactone 12.5milliGRAM(s) Oral daily  sucralfate 1Gram(s) Oral four times a day  tiotropium 18 MICROgram(s) Capsule 1Capsule(s) Inhalation daily  furosemide   Injectable 40milliGRAM(s) IV Push daily  enoxaparin Injectable 40milliGRAM(s) SubCutaneous every 24 hours  insulin lispro (HumaLOG) corrective regimen sliding scale  SubCutaneous three times a day before meals  insulin lispro (HumaLOG) corrective regimen sliding scale  SubCutaneous at bedtime  dextrose 5%. 1000milliLiter(s) IV Continuous <Continuous>  dextrose 50% Injectable 12.5Gram(s) IV Push once  dextrose 50% Injectable 25Gram(s) IV Push once  dextrose 50% Injectable 25Gram(s) IV Push once  pantoprazole    Tablet 40milliGRAM(s) Oral two times a day before meals  metoclopramide Injectable 5milliGRAM(s) IV Push every 6 hours  docusate sodium 100milliGRAM(s) Oral three times a day  polyethylene glycol 3350 17Gram(s) Oral daily  simethicone 80milliGRAM(s) Chew every 6 hours  dicyclomine 10milliGRAM(s) Oral two times a day before meals  linezolid    Tablet 600milliGRAM(s) Oral every 12 hours    MEDICATIONS  (PRN):  acetaminophen   Tablet. 650milliGRAM(s) Oral every 6 hours PRN Mild and Moderate pain  oxyCODONE  5 mG/acetaminophen 325 mG 1Tablet(s) Oral every 6 hours PRN Severe Pain (7 - 10)  acetaminophen   Tablet 650milliGRAM(s) Oral every 6 hours PRN For Temp greater than 38 C (100.4 F)  ALBUTerol    90 MICROgram(s) HFA Inhaler 2Puff(s) Inhalation every 6 hours PRN Shortness of Breath and/or Wheezing  dextrose Gel 1Dose(s) Oral once PRN Blood Glucose LESS THAN 70 milliGRAM(s)/deciliter  glucagon  Injectable 1milliGRAM(s) IntraMuscular once PRN Glucose LESS THAN 70 milligrams/deciliter    Vital Signs Last 24 Hrs  T(C): 36.3, Max: 36.5 (05-22 @ 20:36)  T(F): 97.3, Max: 97.7 (05-22 @ 20:36)  HR: 65 (65 - 68)  BP: 98/57 (90/51 - 102/53)  BP(mean): --  RR: 18 (17 - 18)  SpO2: 100% (100% - 100%)    I&O's Summary  I & Os for 24h ending 23 May 2017 07:00  =============================================  IN: 250 ml / OUT: 900 ml / NET: -650 ml    I & Os for current day (as of 23 May 2017 17:01)  =============================================  IN: 260 ml / OUT: 225 ml / NET: 35 ml        Physical Exam:   GENERAL: NAD, well-groomed, well-developed  HEENT: VALDO/   Atraumatic, Normocephalic  ENMT: No tonsillar erythema, exudates, or enlargement; Moist mucous membranes, Good dentition, No lesions  NECK: Supple, No JVD, Normal thyroid  CHEST/LUNG: Clear to percussion bilaterally; No rales, rhonchi, wheezing, or rubs  CVS: Regular rate and rhythm; No murmurs, rubs, or gallops  GI: : Soft, Nontender, Nondistended; Bowel sounds present  NERVOUS SYSTEM:  Alert & Oriented X3, Good concentration; Motor Strength 5/5 B/L upper and lower extremities; DTRs 2+ intact and symmetric  EXTREMITIES:edema  LYMPH: No lymphadenopathy noted  SKIN: No rashes or lesions  ENDOCRINOLOGY: No Thyromegaly  PSYCH: Appropriate and calm     Labs:                              12.9   5.03  )-----------( 310      ( 22 May 2017 06:15 )             42.2     05-22    142  |  100  |  15  ----------------------------<  79  3.7   |  26  |  0.85    Ca    9.6      22 May 2017 06:15      CAPILLARY BLOOD GLUCOSE  100 (23 May 2017 11:31)  80 (23 May 2017 08:51)  94 (22 May 2017 22:12)  95 (22 May 2017 17:13)          Serum Pro-Brain Natriuretic Peptide: 1122 pg/mL (05-23 @ 06:00)  Serum Pro-Brain Natriuretic Peptide: 4592 pg/mL (05-20 @ 23:30)    Cultures:           Blood Culture:           05-21 @ 01:17  Organism Enterococcus faecalis VRE  COLONY COUNT: > = 100,000 CFU/ML  Gram stain --        Urine Culture:          05-21 @ 01:17  Organism Enterococcus faecalis VRE  COLONY COUNT: > = 100,000 CFU/ML        Wound culture:                05-22 @ 16:50  Organism --  Culture w/ gram stain --  Specimen Source BLOOD PERIPHERAL    Wound culture:                05-21 @ 01:17  Organism Enterococcus faecalis VRE  COLONY COUNT: > = 100,000 CFU/ML  Culture w/ gram stain --  Specimen Source URINE MIDSTREAM        Abscess culture:             05-22 @ 16:50  Organism --  Gram Stain --  Specimen Source BLOOD PERIPHERAL    Abscess culture:             05-21 @ 01:17  Organism Enterococcus faecalis VRE  COLONY COUNT: > = 100,000 CFU/ML  Gram Stain --  Specimen Source URINE MIDSTREAM        CSF:              05-21 @ 01:17  Organism Enterococcus faecalis VRE  COLONY COUNT: > = 100,000 CFU/ML  Gram Stain --        Tissue culture:           05-22 @ 16:50  Organism --  Gram Stain --  Specimen Source BLOOD PERIPHERAL    Tissue culture:           05-21 @ 01:17  Organism Enterococcus faecalis VRE  COLONY COUNT: > = 100,000 CFU/ML  Gram Stain --  Specimen Source URINE MIDSTREAM        Body Fluid Smear & Culture:                        05-22 @ 16:50  AFB Smear  --  Culture Acid Fast Body Fluid w/ Smear  --  Culture Acid Fast Smear Concentrated   --    Culture Results:     --  Specimen Source BLOOD PERIPHERAL    Body Fluid Smear & Culture:                        05-21 @ 01:17  AFB Smear  --  Culture Acid Fast Body Fluid w/ Smear  --  Culture Acid Fast Smear Concentrated   --    Culture Results:     --  Specimen Source URINE MIDSTREAM            Rapid Respiratory Viral Panel Result        05-20 @ 23:30  Rapid RVP --  Coronovirus NOT DETECTED  Adenovirus NOT DETECTED  Bordetella Pertussis NOT DETECTED  Chlamydia Pneumonia NOT DETECTED  Entero/RhinovirusNOT DETECTED  HKU1 Coronovirus NOT DETECTED  HMPV Coronovirus NOT DETECTED  Influenza A NOT DETECTED (any subtype)  Influenza AH1 NOT DETECTED  Influenza AH1 2009 NOT DETECTED  Influenza AH3 NOT DETECTED  Influenza B NOT DETECTED  Mycoplasma Pneumoniae NOT DETECTED This nucleic acid amplification assay was performed using  the [employer]. The following pathogens are tested  for: Adenovirus, Coronavirus 229E, Coronavirus HKU1,  Coronavirus NL63, Coronavirus OC43, Human Metapneumovirus  (HMPV),  NL63 Coronovirus NOT DETECTED  OC43 Coronovirus NOT DETECTED  Parainfluenza 1 NOT DETECTED  Parainfluenza 2 NOT DETECTED  Parainfluenza 3 NOT DETECTED  Parainfluenza 4 NOT DETECTED  Resp Syncytial Virus NOT DETECTED        Studies  Chest X-RAY  IMPRESSION:   Clear lungs.   CT SCAN Chest   CT Abdomen  Venous Dopplers: LE:   Others

## 2017-05-23 NOTE — PROGRESS NOTE ADULT - SUBJECTIVE AND OBJECTIVE BOX
Subjective: no chest pain; SOB mildly improved . No palpitations.    MEDS  acetaminophen   Tablet. 650milliGRAM(s) Oral every 6 hours PRN  oxyCODONE  5 mG/acetaminophen 325 mG 1Tablet(s) Oral every 6 hours PRN  acetaminophen   Tablet 650milliGRAM(s) Oral every 6 hours PRN  ALBUTerol    90 MICROgram(s) HFA Inhaler 2Puff(s) Inhalation every 6 hours PRN  allopurinol 300milliGRAM(s) Oral daily  amiodarone    Tablet 400milliGRAM(s) Oral daily  aspirin enteric coated 81milliGRAM(s) Oral daily  atorvastatin 20milliGRAM(s) Oral at bedtime  buDESOnide 160 MICROgram(s)/formoterol 4.5 MICROgram(s) Inhaler 2Puff(s) Inhalation two times a day  lactobacillus acidophilus 1Tablet(s) Oral daily  levothyroxine 50MICROGram(s) Oral daily  losartan 25milliGRAM(s) Oral daily  metoprolol succinate ER 25milliGRAM(s) Oral daily  senna 2Tablet(s) Oral at bedtime  spironolactone 12.5milliGRAM(s) Oral daily  sucralfate 1Gram(s) Oral four times a day  tiotropium 18 MICROgram(s) Capsule 1Capsule(s) Inhalation daily  furosemide   Injectable 40milliGRAM(s) IV Push daily  enoxaparin Injectable 40milliGRAM(s) SubCutaneous every 24 hours  insulin lispro (HumaLOG) corrective regimen sliding scale  SubCutaneous three times a day before meals  insulin lispro (HumaLOG) corrective regimen sliding scale  SubCutaneous at bedtime  dextrose 5%. 1000milliLiter(s) IV Continuous <Continuous>  dextrose Gel 1Dose(s) Oral once PRN  dextrose 50% Injectable 12.5Gram(s) IV Push once  dextrose 50% Injectable 25Gram(s) IV Push once  dextrose 50% Injectable 25Gram(s) IV Push once  glucagon  Injectable 1milliGRAM(s) IntraMuscular once PRN  pantoprazole    Tablet 40milliGRAM(s) Oral two times a day before meals  metoclopramide Injectable 5milliGRAM(s) IV Push every 6 hours  docusate sodium 100milliGRAM(s) Oral three times a day  polyethylene glycol 3350 17Gram(s) Oral daily  simethicone 80milliGRAM(s) Chew every 6 hours  dicyclomine 10milliGRAM(s) Oral two times a day before meals  linezolid    Tablet 600milliGRAM(s) Oral every 12 hours         labs :                    12.9   5.03  )-----------( 310      ( 22 May 2017 06:15 )             42.2       05-22    142  |  100  |  15  ----------------------------<  79  3.7   |  26  |  0.85    Ca    9.6      22 May 2017 06:15        CARDIAC MARKERS ( 21 May 2017 05:40 )  x     / < 0.06 ng/mL / 54 u/L / 1.84 ng/mL / x      CARDIAC MARKERS ( 20 May 2017 23:30 )  x     / < 0.06 ng/mL / 49 u/L / 1.73 ng/mL / x            T(C): 36.5, Max: 36.5 (05-22 @ 20:36)  HR: 65 (65 - 76)  BP: 102/53 (90/51 - 111/53)  RR: 17 (17 - 18)  SpO2: 100% (100% - 100%)  Wt(kg): --    I&O's Summary  I & Os for 24h ending 23 May 2017 07:00  =============================================  IN: 250 ml / OUT: 900 ml / NET: -650 ml    I & Os for current day (as of 23 May 2017 13:20)  =============================================  IN: 200 ml / OUT: 225 ml / NET: -25 ml        PREVIOUS DIAGNOSTIC TESTING:    [ ] Echocardiogram: 3/31/16: CONCLUSIONS:  1. Mitral annular calcification, otherwise normal mitral  valve. Mild-moderate mitral regurgitation.  2. Severely dilated left atrium.  LA volume index = 62  cc/m2.  3. Severe left ventricular enlargement.  4. Severe global left ventricular systolic dysfunction.  5. Normal right ventricular size with decreased right  ventricular systolic function.  A device wire is noted in  the right heart.    [ ]  Catheterization: 4/2016 - LAD:   --  Proximal LAD: There was a [id number] % stenosis. There was mild  restenosis in proximal LAD stent.        Allergies    peanuts (Unknown)  penicillin (Nausea)    Intolerances; none      Appearance: Normal	  HEENT:   Normal oral mucosa, PERRL, EOMI	  Lymphatic: No lymphadenopathy  Cardiovascular: Normal S1 S2, No JVD, No murmurs, No edema  Respiratory: Lungs clear to auscultation	  Psychiatry: A & O x 3, Mood & affect appropriate  Gastrointestinal:  Soft, Non-tender, + BS	  Skin: No rashes, No ecchymoses, No cyanosis	  Neurologic: Non-focal  Extremities: Normal range of motion, No clubbing, cyanosis or edema  Vascular: Peripheral pulses palpable 2+ bilaterally    TELEMETRY: 	 AV paced   ECG:  av paced    RADIOLOGY: none  OTHER: 	  	renal u/s pending     ASSESSMENT/PLAN: 	Ms. Pulido is a very pleasant 77 year old female well known to our service with hypertension, dyslipidemia, CAD s/p LAD stent with only mild re-stenosis with minor luminal irregularities otherwise on cath 4/16, with known severe LV dysfunction , ICM s/p Medtronic BiV ICD, COPD, lymphoma on active chemo with known spinal mets, who is now admitted with shortness of breath, palpitations, reported wheezing at home with no associated chest pain or perceived ICD fire or syncope/near syncope with +UTI who ruled out for ACS with acute on chronic systolic CHF exac:    -Medtronic ICD interrogation :  No arrhythmias; underlying rhythm NSR. Normal PPM / ICD function. Normal sensing and pacing via iterative testing. Good battery status. Excellent threshold capture.  Pt w/ adaptive CRT/LV pacing.  No reprogramming.  - c/w Amio 400mg po qday and Toprol XL 25  for h/o VT - the patient has NO HISTORY OF AFIB  - diurese for acute on chronic CHF exac per primary cardio team  -  c/w cozaar, toprol, and aldactone for CM  - no further EP work up at this time   - f/u with Dr. Hussein for cardiology upon DC 6/2 at 2pm    Taryn Garrett [facility]  O:  [phone]  P: [phone]

## 2017-05-24 NOTE — PROGRESS NOTE ADULT - SUBJECTIVE AND OBJECTIVE BOX
Subjective: no chest pain; SOB  improved . Mild nausea and constipation but patient tolerating PO with no vomiting.     MEDS  MEDICATIONS  (STANDING):  allopurinol 300milliGRAM(s) Oral daily  amiodarone    Tablet 400milliGRAM(s) Oral daily  aspirin enteric coated 81milliGRAM(s) Oral daily  atorvastatin 20milliGRAM(s) Oral at bedtime  buDESOnide 160 MICROgram(s)/formoterol 4.5 MICROgram(s) Inhaler 2Puff(s) Inhalation two times a day  lactobacillus acidophilus 1Tablet(s) Oral daily  levothyroxine 50MICROGram(s) Oral daily  losartan 25milliGRAM(s) Oral daily  metoprolol succinate ER 25milliGRAM(s) Oral daily  senna 2Tablet(s) Oral at bedtime  spironolactone 12.5milliGRAM(s) Oral daily  sucralfate 1Gram(s) Oral four times a day  tiotropium 18 MICROgram(s) Capsule 1Capsule(s) Inhalation daily  furosemide   Injectable 40milliGRAM(s) IV Push daily  enoxaparin Injectable 40milliGRAM(s) SubCutaneous every 24 hours  insulin lispro (HumaLOG) corrective regimen sliding scale  SubCutaneous three times a day before meals  insulin lispro (HumaLOG) corrective regimen sliding scale  SubCutaneous at bedtime  dextrose 5%. 1000milliLiter(s) IV Continuous <Continuous>  dextrose 50% Injectable 12.5Gram(s) IV Push once  dextrose 50% Injectable 25Gram(s) IV Push once  dextrose 50% Injectable 25Gram(s) IV Push once  pantoprazole    Tablet 40milliGRAM(s) Oral two times a day before meals  metoclopramide Injectable 5milliGRAM(s) IV Push every 6 hours  docusate sodium 100milliGRAM(s) Oral three times a day  polyethylene glycol 3350 17Gram(s) Oral daily  simethicone 80milliGRAM(s) Chew every 6 hours  dicyclomine 10milliGRAM(s) Oral two times a day before meals  linezolid    Tablet 600milliGRAM(s) Oral every 12 hours    MEDICATIONS  (PRN):  acetaminophen   Tablet. 650milliGRAM(s) Oral every 6 hours PRN Mild and Moderate pain  oxyCODONE  5 mG/acetaminophen 325 mG 1Tablet(s) Oral every 6 hours PRN Severe Pain (7 - 10)  acetaminophen   Tablet 650milliGRAM(s) Oral every 6 hours PRN For Temp greater than 38 C (100.4 F)  ALBUTerol    90 MICROgram(s) HFA Inhaler 2Puff(s) Inhalation every 6 hours PRN Shortness of Breath and/or Wheezing  dextrose Gel 1Dose(s) Oral once PRN Blood Glucose LESS THAN 70 milliGRAM(s)/deciliter  glucagon  Injectable 1milliGRAM(s) IntraMuscular once PRN Glucose LESS THAN 70 milligrams/deciliter                            11.6   6.36  )-----------( 309      ( 24 May 2017 05:10 )             38.2       05-24    138  |  98  |  23  ----------------------------<  78  3.8   |  24  |  0.85    Ca    9.5      24 May 2017 05:10  Mg     1.9     05-24              T(C): 36.7, Max: 36.7 (05-24 @ 06:04)  HR: 60 (60 - 66)  BP: 108/60 (98/57 - 115/56)  RR: 18 (18 - 18)  SpO2: 99% (99% - 100%)  Wt(kg): --    I&O's Summary  I & Os for 24h ending 24 May 2017 07:00  =============================================  IN: 760 ml / OUT: 575 ml / NET: 185 ml    I & Os for current day (as of 24 May 2017 11:40)  =============================================  IN: 40 ml / OUT: 0 ml / NET: 40 ml      PREVIOUS DIAGNOSTIC TESTING:    [ ] Echocardiogram: 3/31/16: CONCLUSIONS:  1. Mitral annular calcification, otherwise normal mitral  valve. Mild-moderate mitral regurgitation.  2. Severely dilated left atrium.  LA volume index = 62  cc/m2.  3. Severe left ventricular enlargement.  4. Severe global left ventricular systolic dysfunction.  5. Normal right ventricular size with decreased right  ventricular systolic function.  A device wire is noted in  the right heart.    [ ]  Catheterization: 4/2016 - LAD:   --  Proximal LAD: There was a [id number] % stenosis. There was mild  restenosis in proximal LAD stent.        Allergies    peanuts (Unknown)  penicillin (Nausea)    Intolerances; none      Appearance: Normal	  HEENT:   Normal oral mucosa, PERRL, EOMI	  Lymphatic: No lymphadenopathy  Cardiovascular: Normal S1 S2, No JVD, No murmurs, No edema  Respiratory: Lungs clear to auscultation	  Psychiatry: A & O x 3, Mood & affect appropriate  Gastrointestinal:  Soft, Non-tender, + BS	  Skin: No rashes, No ecchymoses, No cyanosis	  Neurologic: Non-focal  Extremities: Normal range of motion, No clubbing, cyanosis or edema  Vascular: Peripheral pulses palpable 2+ bilaterally    TELEMETRY: 	  paced   ECG:  av paced   RADIOLOGY: none  OTHER: 	  	renal u/s - normal    ASSESSMENT/PLAN: 	Ms. Pulido is a very pleasant 77 year old female well known to our service with hypertension, dyslipidemia, CAD s/p LAD stent with only mild re-stenosis with minor luminal irregularities otherwise on cath 4/16, with known severe LV dysfunction , ICM s/p Medtronic BiV ICD, COPD, lymphoma previously on chemo with known spinal mets, who is now admitted with shortness of breath, palpitations, reported wheezing at home with no associated chest pain or  ICD fire or syncope/near syncope with +E.faecalis UTI who ruled out for ACS with acute on chronic systolic CHF exac:      - acute on chronic systolic CHF exac - patient diuresed well -  will change to Lasix 40 mg po daily in AM  -  c/w cozaar, toprol, and aldactone for known CM  - ID -UTI - ok to be on PO antibiotics, blood cx negative   -EP appreciated - Medtronic ICD interrogation    Events/Observation: No arrhythmias; underlying rhythm NSR  Normal PPM / ICD function.   Normal sensing and pacing via iterative testing. Good battery status. Excellent threshold capture.  Pt w/ adaptive CRT/LV pacing.  No reprogramming.  - - c/w Amio 400mg po qday for h/o VT - the patient has NO HISTORY OF AFIB  - renal u/s normal  - pulm noted - c/w diuresis   - onc eval with Dr. Walsh pending - patient with known lymphoma and spinal mets (follows with outside onc Dr. escalante [phone], [phone])  - PT eval recommending rehab - patient deciding on rehab vs home with PT (she lives with her son who works however she does have assistance from a neighbor at home)   - f/u with Dr. Hussein upon DC for cardio 6/2 @ 2pm    Taryn Garrett [facility]  O:  [phone]  P: [phone]

## 2017-05-24 NOTE — PROGRESS NOTE ADULT - SUBJECTIVE AND OBJECTIVE BOX
Subjective: no chest pain; SOB improved . No palpitations/dizziness.    MEDS  MEDICATIONS  (STANDING):  allopurinol 300milliGRAM(s) Oral daily  amiodarone    Tablet 400milliGRAM(s) Oral daily  aspirin enteric coated 81milliGRAM(s) Oral daily  atorvastatin 20milliGRAM(s) Oral at bedtime  buDESOnide 160 MICROgram(s)/formoterol 4.5 MICROgram(s) Inhaler 2Puff(s) Inhalation two times a day  lactobacillus acidophilus 1Tablet(s) Oral daily  levothyroxine 50MICROGram(s) Oral daily  losartan 25milliGRAM(s) Oral daily  metoprolol succinate ER 25milliGRAM(s) Oral daily  senna 2Tablet(s) Oral at bedtime  spironolactone 12.5milliGRAM(s) Oral daily  sucralfate 1Gram(s) Oral four times a day  tiotropium 18 MICROgram(s) Capsule 1Capsule(s) Inhalation daily  furosemide   Injectable 40milliGRAM(s) IV Push daily  enoxaparin Injectable 40milliGRAM(s) SubCutaneous every 24 hours  insulin lispro (HumaLOG) corrective regimen sliding scale  SubCutaneous three times a day before meals  insulin lispro (HumaLOG) corrective regimen sliding scale  SubCutaneous at bedtime  dextrose 5%. 1000milliLiter(s) IV Continuous <Continuous>  dextrose 50% Injectable 12.5Gram(s) IV Push once  dextrose 50% Injectable 25Gram(s) IV Push once  dextrose 50% Injectable 25Gram(s) IV Push once  pantoprazole    Tablet 40milliGRAM(s) Oral two times a day before meals  metoclopramide Injectable 5milliGRAM(s) IV Push every 6 hours  docusate sodium 100milliGRAM(s) Oral three times a day  polyethylene glycol 3350 17Gram(s) Oral daily  simethicone 80milliGRAM(s) Chew every 6 hours  dicyclomine 10milliGRAM(s) Oral two times a day before meals  linezolid    Tablet 600milliGRAM(s) Oral every 12 hours    MEDICATIONS  (PRN):  acetaminophen   Tablet. 650milliGRAM(s) Oral every 6 hours PRN Mild and Moderate pain  oxyCODONE  5 mG/acetaminophen 325 mG 1Tablet(s) Oral every 6 hours PRN Severe Pain (7 - 10)  acetaminophen   Tablet 650milliGRAM(s) Oral every 6 hours PRN For Temp greater than 38 C (100.4 F)  ALBUTerol    90 MICROgram(s) HFA Inhaler 2Puff(s) Inhalation every 6 hours PRN Shortness of Breath and/or Wheezing  dextrose Gel 1Dose(s) Oral once PRN Blood Glucose LESS THAN 70 milliGRAM(s)/deciliter  glucagon  Injectable 1milliGRAM(s) IntraMuscular once PRN Glucose LESS THAN 70 milligrams/deciliter                            11.6   6.36  )-----------( 309      ( 24 May 2017 05:10 )             38.2       05-24    138  |  98  |  23  ----------------------------<  78  3.8   |  24  |  0.85    Ca    9.5      24 May 2017 05:10  Mg     1.9     05-24              T(C): 36.7, Max: 36.7 (05-24 @ 06:04)  HR: 60 (60 - 66)  BP: 108/60 (98/57 - 115/56)  RR: 18 (18 - 18)  SpO2: 99% (99% - 100%)  Wt(kg): --    I&O's Summary  I & Os for 24h ending 24 May 2017 07:00  =============================================  IN: 760 ml / OUT: 575 ml / NET: 185 ml    I & Os for current day (as of 24 May 2017 12:19)  =============================================  IN: 40 ml / OUT: 0 ml / NET: 40 ml          PREVIOUS DIAGNOSTIC TESTING:    [ ] Echocardiogram: 3/31/16: CONCLUSIONS:  1. Mitral annular calcification, otherwise normal mitral  valve. Mild-moderate mitral regurgitation.  2. Severely dilated left atrium.  LA volume index = 62  cc/m2.  3. Severe left ventricular enlargement.  4. Severe global left ventricular systolic dysfunction.  5. Normal right ventricular size with decreased right  ventricular systolic function.  A device wire is noted in  the right heart.    [ ]  Catheterization: 4/2016 - LAD:   --  Proximal LAD: There was a [id number] % stenosis. There was mild  restenosis in proximal LAD stent.        Allergies    peanuts (Unknown)  penicillin (Nausea)    Intolerances; none      Appearance: Normal	  HEENT:   Normal oral mucosa, PERRL, EOMI	  Lymphatic: No lymphadenopathy  Cardiovascular: Normal S1 S2, No JVD, No murmurs, No edema  Respiratory: Lungs clear to auscultation	  Psychiatry: A & O x 3, Mood & affect appropriate  Gastrointestinal:  Soft, Non-tender, + BS	  Skin: No rashes, No ecchymoses, No cyanosis	  Neurologic: Non-focal  Extremities: Normal range of motion, No clubbing, cyanosis or edema  Vascular: Peripheral pulses palpable 2+ bilaterally    TELEMETRY: 	 AV paced   ECG:  av paced    RADIOLOGY: none  OTHER: 	  	renal u/snormal     ASSESSMENT/PLAN: 	Ms. Pulido is a very pleasant 77 year old female well known to our service with hypertension, dyslipidemia, CAD s/p LAD stent with only mild re-stenosis with minor luminal irregularities otherwise on cath 4/16, with known severe LV dysfunction , ICM s/p Medtronic BiV ICD, COPD, lymphoma on active chemo with known spinal mets, who is now admitted with shortness of breath, palpitations, reported wheezing at home with no associated chest pain or perceived ICD fire or syncope/near syncope with +UTI who ruled out for ACS with acute on chronic systolic CHF exac:    -Medtronic ICD interrogation :  No arrhythmias; underlying rhythm NSR. Normal PPM / ICD function. Normal sensing and pacing via iterative testing. Good battery status. Excellent threshold capture.  Pt w/ adaptive CRT/LV pacing.  No reprogramming.  - c/w Amio 400mg po qday and Toprol XL 25  for h/o VT - the patient has NO HISTORY OF AFIB  - diurese for acute on chronic CHF exac per primary cardio team  -  c/w cozaar, toprol, and aldactone for CM  - no further EP work up at this time   - f/u with Dr. Hussein for cardiology upon DC 6/2 at 2pm    Taryn Garrett [facility]  O:  [phone]  P: [phone]

## 2017-05-24 NOTE — PROGRESS NOTE ADULT - SUBJECTIVE AND OBJECTIVE BOX
Patient is a 77y old  Female who presents with a chief complaint of chest pain and sob (21 May 2017 07:31)  feels weak, says her breathing is weak       Any change in ROS:     MEDICATIONS  (STANDING):  allopurinol 300milliGRAM(s) Oral daily  amiodarone    Tablet 400milliGRAM(s) Oral daily  aspirin enteric coated 81milliGRAM(s) Oral daily  atorvastatin 20milliGRAM(s) Oral at bedtime  buDESOnide 160 MICROgram(s)/formoterol 4.5 MICROgram(s) Inhaler 2Puff(s) Inhalation two times a day  lactobacillus acidophilus 1Tablet(s) Oral daily  levothyroxine 50MICROGram(s) Oral daily  losartan 25milliGRAM(s) Oral daily  metoprolol succinate ER 25milliGRAM(s) Oral daily  senna 2Tablet(s) Oral at bedtime  spironolactone 12.5milliGRAM(s) Oral daily  sucralfate 1Gram(s) Oral four times a day  tiotropium 18 MICROgram(s) Capsule 1Capsule(s) Inhalation daily  furosemide   Injectable 40milliGRAM(s) IV Push daily  enoxaparin Injectable 40milliGRAM(s) SubCutaneous every 24 hours  insulin lispro (HumaLOG) corrective regimen sliding scale  SubCutaneous three times a day before meals  insulin lispro (HumaLOG) corrective regimen sliding scale  SubCutaneous at bedtime  dextrose 5%. 1000milliLiter(s) IV Continuous <Continuous>  dextrose 50% Injectable 12.5Gram(s) IV Push once  dextrose 50% Injectable 25Gram(s) IV Push once  dextrose 50% Injectable 25Gram(s) IV Push once  pantoprazole    Tablet 40milliGRAM(s) Oral two times a day before meals  metoclopramide Injectable 5milliGRAM(s) IV Push every 6 hours  docusate sodium 100milliGRAM(s) Oral three times a day  polyethylene glycol 3350 17Gram(s) Oral daily  simethicone 80milliGRAM(s) Chew every 6 hours  dicyclomine 10milliGRAM(s) Oral two times a day before meals  linezolid    Tablet 600milliGRAM(s) Oral every 12 hours    MEDICATIONS  (PRN):  acetaminophen   Tablet. 650milliGRAM(s) Oral every 6 hours PRN Mild and Moderate pain  oxyCODONE  5 mG/acetaminophen 325 mG 1Tablet(s) Oral every 6 hours PRN Severe Pain (7 - 10)  acetaminophen   Tablet 650milliGRAM(s) Oral every 6 hours PRN For Temp greater than 38 C (100.4 F)  ALBUTerol    90 MICROgram(s) HFA Inhaler 2Puff(s) Inhalation every 6 hours PRN Shortness of Breath and/or Wheezing  dextrose Gel 1Dose(s) Oral once PRN Blood Glucose LESS THAN 70 milliGRAM(s)/deciliter  glucagon  Injectable 1milliGRAM(s) IntraMuscular once PRN Glucose LESS THAN 70 milligrams/deciliter    Vital Signs Last 24 Hrs  T(C): 36.7, Max: 36.7 (05-24 @ 06:04)  T(F): 98, Max: 98 (05-24 @ 06:04)  HR: 60 (60 - 66)  BP: 108/60 (108/60 - 115/56)  BP(mean): --  RR: 18 (18 - 18)  SpO2: 99% (99% - 99%)    I&O's Summary  I & Os for 24h ending 24 May 2017 07:00  =============================================  IN: 760 ml / OUT: 575 ml / NET: 185 ml    I & Os for current day (as of 24 May 2017 14:32)  =============================================  IN: 40 ml / OUT: 0 ml / NET: 40 ml        Physical Exam:     · Constitutional	detailed exam	  · Constitutional Details	well-developed; well-groomed; well-nourished; no distress	  · Eyes	EOMI; PERRL; no drainage or redness	  · ENMT	No oral lesions; no gross abnormalities	  · Neck	detailed exam	  · Neck Details	supple; no JVD	  · Breasts	not examined	  · Back	No deformity or limitation of movement	  · Respiratory	detailed exam	  · Respiratory Details	rales	  · Rales	lower L	  · Cardiovascular	detailed exam	  · Cardiovascular Details	regular rate and rhythm	  · Cardiovascular Details	positive S1; positive S2; murmur	  · Murmur Timing	diastolic	  · Character of Diastolic Murmur	murmur loudness: II/VI; rumbling; upper lsb	  · Gastrointestinal	detailed exam	  · GI Normal	soft	  		  		  Genitourinary	not examined	  · Rectal	not examined	  · Extremities	No cyanosis, clubbing or edema	  · Vascular	Equal and normal pulses (carotid, femoral, dorsalis pedis)	  · Neurological	Alert & oriented; no sensory, motor or coordination deficits, normal reflexes	  · Skin	No lesions; no rash	  · Lymph Nodes	No lymphadedenopathy	  · Musculoskeletal	detailed exam	  · Musculoskeletal Details	decreased ROM; decreased ROM due to pain	  · Psychiatric	Affect and characteristics of appearance, verbalizations, behaviors are appropriate	    Labs:                              11.6   6.36  )-----------( 309      ( 24 May 2017 05:10 )             38.2     05-24    138  |  98  |  23  ----------------------------<  78  3.8   |  24  |  0.85    Ca    9.5      24 May 2017 05:10  Mg     1.9     05-24      CAPILLARY BLOOD GLUCOSE  118 (24 May 2017 11:36)  103 (24 May 2017 08:31)  100 (23 May 2017 21:37)  81 (23 May 2017 17:22)          Serum Pro-Brain Natriuretic Peptide: 1122 pg/mL (05-23 @ 06:00)      Cultures:           Blood Culture:           05-21 @ 01:17  Organism Enterococcus faecalis VRE  COLONY COUNT: > = 100,000 CFU/ML  Gram stain --        Urine Culture:          05-21 @ 01:17  Organism Enterococcus faecalis VRE  COLONY COUNT: > = 100,000 CFU/ML        Wound culture:                05-22 @ 16:50  Organism --  Culture w/ gram stain --  Specimen Source BLOOD PERIPHERAL    Wound culture:                05-21 @ 01:17  Organism Enterococcus faecalis VRE  COLONY COUNT: > = 100,000 CFU/ML  Culture w/ gram stain --  Specimen Source URINE MIDSTREAM        Abscess culture:             05-22 @ 16:50  Organism --  Gram Stain --  Specimen Source BLOOD PERIPHERAL    Abscess culture:             05-21 @ 01:17  Organism Enterococcus faecalis VRE  COLONY COUNT: > = 100,000 CFU/ML  Gram Stain --  Specimen Source URINE MIDSTREAM        CSF:              05-21 @ 01:17  Organism Enterococcus faecalis VRE  COLONY COUNT: > = 100,000 CFU/ML  Gram Stain --        Tissue culture:           05-22 @ 16:50  Organism --  Gram Stain --  Specimen Source BLOOD PERIPHERAL    Tissue culture:           05-21 @ 01:17  Organism Enterococcus faecalis VRE  COLONY COUNT: > = 100,000 CFU/ML  Gram Stain --  Specimen Source URINE MIDSTREAM        Body Fluid Smear & Culture:                        05-22 @ 16:50  AFB Smear  --  Culture Acid Fast Body Fluid w/ Smear  --  Culture Acid Fast Smear Concentrated   --    Culture Results:     --  Specimen Source BLOOD PERIPHERAL    Body Fluid Smear & Culture:                        05-21 @ 01:17  AFB Smear  --  Culture Acid Fast Body Fluid w/ Smear  --  Culture Acid Fast Smear Concentrated   --    Culture Results:     --  Specimen Source URINE MIDSTREAM            Rapid Respiratory Viral Panel Result        05-20 @ 23:30  Rapid RVP --  Coronovirus NOT DETECTED  Adenovirus NOT DETECTED  Bordetella Pertussis NOT DETECTED  Chlamydia Pneumonia NOT DETECTED  Entero/RhinovirusNOT DETECTED  HKU1 Coronovirus NOT DETECTED  HMPV Coronovirus NOT DETECTED  Influenza A NOT DETECTED (any subtype)  Influenza AH1 NOT DETECTED  Influenza AH1 2009 NOT DETECTED  Influenza AH3 NOT DETECTED  Influenza B NOT DETECTED  Mycoplasma Pneumoniae NOT DETECTED This nucleic acid amplification assay was performed using  the [employer] [employer]. The following pathogens are tested  for: Adenovirus, Coronavirus 229E, Coronavirus HKU1,  Coronavirus NL63, Coronavirus OC43, Human Metapneumovirus  (HMPV),  NL63 Coronovirus NOT DETECTED  OC43 Coronovirus NOT DETECTED  Parainfluenza 1 NOT DETECTED  Parainfluenza 2 NOT DETECTED  Parainfluenza 3 NOT DETECTED  Parainfluenza 4 NOT DETECTED  Resp Syncytial Virus NOT DETECTED        Studies  Chest X-RAY  CT SCAN Chest   IMPRESSION:     Normal renal ultrasound.  CT Abdomen  Venous Dopplers: LE:   Others

## 2017-05-24 NOTE — PROGRESS NOTE ADULT - SUBJECTIVE AND OBJECTIVE BOX
INTERVAL HPI/OVERNIGHT EVENTS:    pt reports + small brown bm earlier this am. no GIB   tolerating PO intake  abdominal discomfort improving    MEDICATIONS  (STANDING):  allopurinol 300milliGRAM(s) Oral daily  amiodarone    Tablet 400milliGRAM(s) Oral daily  aspirin enteric coated 81milliGRAM(s) Oral daily  atorvastatin 20milliGRAM(s) Oral at bedtime  buDESOnide 160 MICROgram(s)/formoterol 4.5 MICROgram(s) Inhaler 2Puff(s) Inhalation two times a day  lactobacillus acidophilus 1Tablet(s) Oral daily  levothyroxine 50MICROGram(s) Oral daily  losartan 25milliGRAM(s) Oral daily  metoprolol succinate ER 25milliGRAM(s) Oral daily  senna 2Tablet(s) Oral at bedtime  spironolactone 12.5milliGRAM(s) Oral daily  sucralfate 1Gram(s) Oral four times a day  tiotropium 18 MICROgram(s) Capsule 1Capsule(s) Inhalation daily  furosemide   Injectable 40milliGRAM(s) IV Push daily  enoxaparin Injectable 40milliGRAM(s) SubCutaneous every 24 hours  insulin lispro (HumaLOG) corrective regimen sliding scale  SubCutaneous three times a day before meals  insulin lispro (HumaLOG) corrective regimen sliding scale  SubCutaneous at bedtime  dextrose 5%. 1000milliLiter(s) IV Continuous <Continuous>  dextrose 50% Injectable 12.5Gram(s) IV Push once  dextrose 50% Injectable 25Gram(s) IV Push once  dextrose 50% Injectable 25Gram(s) IV Push once  pantoprazole    Tablet 40milliGRAM(s) Oral two times a day before meals  metoclopramide Injectable 5milliGRAM(s) IV Push every 6 hours  docusate sodium 100milliGRAM(s) Oral three times a day  polyethylene glycol 3350 17Gram(s) Oral daily  simethicone 80milliGRAM(s) Chew every 6 hours  dicyclomine 10milliGRAM(s) Oral two times a day before meals  linezolid    Tablet 600milliGRAM(s) Oral every 12 hours    MEDICATIONS  (PRN):  acetaminophen   Tablet. 650milliGRAM(s) Oral every 6 hours PRN Mild and Moderate pain  oxyCODONE  5 mG/acetaminophen 325 mG 1Tablet(s) Oral every 6 hours PRN Severe Pain (7 - 10)  acetaminophen   Tablet 650milliGRAM(s) Oral every 6 hours PRN For Temp greater than 38 C (100.4 F)  ALBUTerol    90 MICROgram(s) HFA Inhaler 2Puff(s) Inhalation every 6 hours PRN Shortness of Breath and/or Wheezing  dextrose Gel 1Dose(s) Oral once PRN Blood Glucose LESS THAN 70 milliGRAM(s)/deciliter  glucagon  Injectable 1milliGRAM(s) IntraMuscular once PRN Glucose LESS THAN 70 milligrams/deciliter      Allergies    peanuts (Unknown)  penicillin (Nausea)    Intolerances        Review of Systems:    General:  No wt loss, fevers, chills, night sweats,fatigue,   Eyes:  Good vision, no reported pain  ENT:  No sore throat, pain, runny nose, dysphagia  CV:  No pain, palpitatioins, hypo/hypertension  Resp:  No dyspnea, cough, tachypnea, wheezing  GI:  No pain, No nausea, No vomiting, No diarrhea, No constipatiion, No weight loss, No fever, No pruritis, No rectal bleeding, No tarry stools, No dysphagia,  :  No pain, bleeding, incontinence, nocturia  Muscle:  No pain, weakness  Neuro:  No weakness, tingling, memory problems  Psych:  No fatigue, insomnia, mood problems, depression  Endocrine:  No polyuria, polydypsia, cold/heat intolerance  Heme:  No petechiae, ecchymosis, easy bruisability  Skin:  No rash, tattoos, scars, edema      Vital Signs Last 24 Hrs  T(C): 36.7, Max: 36.7 (05-24 @ 06:04)  T(F): 98, Max: 98 (05-24 @ 06:04)  HR: 60 (60 - 66)  BP: 108/60 (98/57 - 115/56)  BP(mean): --  RR: 18 (18 - 18)  SpO2: 99% (99% - 100%)    PHYSICAL EXAM:    Constitutional: NAD, well-developed  HEENT: EOMI, throat clear  Neck: No LAD, supple  Respiratory: CTA and P  Cardiovascular: S1 and S2, RRR, no M  Gastrointestinal: BS+, soft, NT/ND, neg HSM,  Extremities: No peripheral edema, neg clubing, cyanosis  Vascular: 2+ peripheral pulses  Neurological: A/O x 3, no focal deficits  Psychiatric: Normal mood, normal affect  Skin: No rashes      LABS:                        11.6   6.36  )-----------( 309      ( 24 May 2017 05:10 )             38.2     05-24    138  |  98  |  23  ----------------------------<  78  3.8   |  24  |  0.85    Ca    9.5      24 May 2017 05:10  Mg     1.9     05-24            RADIOLOGY & ADDITIONAL TESTS:

## 2017-05-24 NOTE — PROGRESS NOTE ADULT - SUBJECTIVE AND OBJECTIVE BOX
Patient is a 77y old  Female who presents with a chief complaint of chest pain and sob (21 May 2017 07:31)      SUBJECTIVE / OVERNIGHT EVENTS: No nausea, vomiting or diarrhea, no fever or chills, no dizziness or chest pain, no dysuria or hematuria, no jt pain or swelling    I am feeling better    MEDICATIONS  (STANDING):  allopurinol 300milliGRAM(s) Oral daily  amiodarone    Tablet 400milliGRAM(s) Oral daily  aspirin enteric coated 81milliGRAM(s) Oral daily  atorvastatin 20milliGRAM(s) Oral at bedtime  buDESOnide 160 MICROgram(s)/formoterol 4.5 MICROgram(s) Inhaler 2Puff(s) Inhalation two times a day  lactobacillus acidophilus 1Tablet(s) Oral daily  levothyroxine 50MICROGram(s) Oral daily  losartan 25milliGRAM(s) Oral daily  metoprolol succinate ER 25milliGRAM(s) Oral daily  senna 2Tablet(s) Oral at bedtime  spironolactone 12.5milliGRAM(s) Oral daily  sucralfate 1Gram(s) Oral four times a day  tiotropium 18 MICROgram(s) Capsule 1Capsule(s) Inhalation daily  furosemide   Injectable 40milliGRAM(s) IV Push daily  enoxaparin Injectable 40milliGRAM(s) SubCutaneous every 24 hours  insulin lispro (HumaLOG) corrective regimen sliding scale  SubCutaneous three times a day before meals  insulin lispro (HumaLOG) corrective regimen sliding scale  SubCutaneous at bedtime  dextrose 5%. 1000milliLiter(s) IV Continuous <Continuous>  dextrose 50% Injectable 12.5Gram(s) IV Push once  dextrose 50% Injectable 25Gram(s) IV Push once  dextrose 50% Injectable 25Gram(s) IV Push once  pantoprazole    Tablet 40milliGRAM(s) Oral two times a day before meals  metoclopramide Injectable 5milliGRAM(s) IV Push every 6 hours  docusate sodium 100milliGRAM(s) Oral three times a day  polyethylene glycol 3350 17Gram(s) Oral daily  simethicone 80milliGRAM(s) Chew every 6 hours  dicyclomine 10milliGRAM(s) Oral two times a day before meals  linezolid    Tablet 600milliGRAM(s) Oral every 12 hours    MEDICATIONS  (PRN):  acetaminophen   Tablet. 650milliGRAM(s) Oral every 6 hours PRN Mild and Moderate pain  oxyCODONE  5 mG/acetaminophen 325 mG 1Tablet(s) Oral every 6 hours PRN Severe Pain (7 - 10)  acetaminophen   Tablet 650milliGRAM(s) Oral every 6 hours PRN For Temp greater than 38 C (100.4 F)  ALBUTerol    90 MICROgram(s) HFA Inhaler 2Puff(s) Inhalation every 6 hours PRN Shortness of Breath and/or Wheezing  dextrose Gel 1Dose(s) Oral once PRN Blood Glucose LESS THAN 70 milliGRAM(s)/deciliter  glucagon  Injectable 1milliGRAM(s) IntraMuscular once PRN Glucose LESS THAN 70 milligrams/deciliter      Vital Signs Last 24 Hrs  T(C): 36.7, Max: 36.7 (05-24 @ 06:04)  HR: 60 (60 - 66)  BP: 108/60 (98/57 - 115/56)  RR: 18 (18 - 18)  SpO2: 99% (99% - 100%)  Wt(kg): --  CAPILLARY BLOOD GLUCOSE  103 (24 May 2017 08:31)  100 (23 May 2017 21:37)  81 (23 May 2017 17:22)  100 (23 May 2017 11:31)    I&O's Summary  I & Os for 24h ending 24 May 2017 07:00  =============================================  IN: 760 ml / OUT: 575 ml / NET: 185 ml    I & Os for current day (as of 24 May 2017 11:19)  =============================================  IN: 40 ml / OUT: 0 ml / NET: 40 ml      PHYSICAL EXAM:  GENERAL: NAD, well-developed  HEAD:  Atraumatic, Normocephalic  EYES: EOMI, PERRLA, conjunctiva and sclera clear  NECK: Supple, No JVD  CHEST/LUNG: Clear to auscultation bilaterally; No wheeze  HEART: Regular rate and rhythm; No murmurs, rubs, or gallops  ABDOMEN: Soft, Nontender, Nondistended; Bowel sounds present  EXTREMITIES:  2+ Peripheral Pulses, No clubbing, cyanosis, or edema  PSYCH: AAOx3  NEUROLOGY: non-focal  SKIN: No rashes or lesions    LABS:                        11.6   6.36  )-----------( 309      ( 24 May 2017 05:10 )             38.2     05-24    138  |  98  |  23  ----------------------------<  78  3.8   |  24  |  0.85    Ca    9.5      24 May 2017 05:10  Mg     1.9     05-24                  Consultant(s) Notes Reviewed:      Care Discussed with Consultants/Other Providers:

## 2017-05-25 NOTE — PROGRESS NOTE ADULT - SUBJECTIVE AND OBJECTIVE BOX
Patient is a 77y old  Female who presents with a chief complaint of chest pain and sob (21 May 2017 07:31)      SUBJECTIVE / OVERNIGHT EVENTS: No nausea, vomiting or diarrhea, no fever or chills, no dizziness or chest pain, no dysuria or hematuria, no jt pain or swelling, feels improved    MEDICATIONS  (STANDING):  allopurinol 300milliGRAM(s) Oral daily  amiodarone    Tablet 400milliGRAM(s) Oral daily  aspirin enteric coated 81milliGRAM(s) Oral daily  atorvastatin 20milliGRAM(s) Oral at bedtime  buDESOnide 160 MICROgram(s)/formoterol 4.5 MICROgram(s) Inhaler 2Puff(s) Inhalation two times a day  lactobacillus acidophilus 1Tablet(s) Oral daily  levothyroxine 50MICROGram(s) Oral daily  losartan 25milliGRAM(s) Oral daily  metoprolol succinate ER 25milliGRAM(s) Oral daily  senna 2Tablet(s) Oral at bedtime  spironolactone 12.5milliGRAM(s) Oral daily  sucralfate 1Gram(s) Oral four times a day  tiotropium 18 MICROgram(s) Capsule 1Capsule(s) Inhalation daily  enoxaparin Injectable 40milliGRAM(s) SubCutaneous every 24 hours  insulin lispro (HumaLOG) corrective regimen sliding scale  SubCutaneous three times a day before meals  insulin lispro (HumaLOG) corrective regimen sliding scale  SubCutaneous at bedtime  dextrose 5%. 1000milliLiter(s) IV Continuous <Continuous>  dextrose 50% Injectable 12.5Gram(s) IV Push once  dextrose 50% Injectable 25Gram(s) IV Push once  dextrose 50% Injectable 25Gram(s) IV Push once  pantoprazole    Tablet 40milliGRAM(s) Oral two times a day before meals  metoclopramide Injectable 5milliGRAM(s) IV Push every 6 hours  docusate sodium 100milliGRAM(s) Oral three times a day  polyethylene glycol 3350 17Gram(s) Oral daily  simethicone 80milliGRAM(s) Chew every 6 hours  dicyclomine 10milliGRAM(s) Oral two times a day before meals  linezolid    Tablet 600milliGRAM(s) Oral every 12 hours  furosemide    Tablet 40milliGRAM(s) Oral daily    MEDICATIONS  (PRN):  acetaminophen   Tablet. 650milliGRAM(s) Oral every 6 hours PRN Mild and Moderate pain  oxyCODONE  5 mG/acetaminophen 325 mG 1Tablet(s) Oral every 6 hours PRN Severe Pain (7 - 10)  acetaminophen   Tablet 650milliGRAM(s) Oral every 6 hours PRN For Temp greater than 38 C (100.4 F)  ALBUTerol    90 MICROgram(s) HFA Inhaler 2Puff(s) Inhalation every 6 hours PRN Shortness of Breath and/or Wheezing  dextrose Gel 1Dose(s) Oral once PRN Blood Glucose LESS THAN 70 milliGRAM(s)/deciliter  glucagon  Injectable 1milliGRAM(s) IntraMuscular once PRN Glucose LESS THAN 70 milligrams/deciliter      Vital Signs Last 24 Hrs  T(C): 36.4, Max: 36.4 (05-24 @ 11:36)  HR: 78 (66 - 78)  BP: 139/67 (89/44 - 139/67)  RR: 18 (18 - 18)  SpO2: 99% (99% - 100%)  Wt(kg): --  CAPILLARY BLOOD GLUCOSE  101 (25 May 2017 08:30)  120 (24 May 2017 21:13)  84 (24 May 2017 16:48)  118 (24 May 2017 11:36)    I&O's Summary  I & Os for 24h ending 25 May 2017 07:00  =============================================  IN: 640 ml / OUT: 200 ml / NET: 440 ml    I & Os for current day (as of 25 May 2017 11:12)  =============================================  IN: 50 ml / OUT: 0 ml / NET: 50 ml      PHYSICAL EXAM:  GENERAL: NAD, well-developed  HEAD:  Atraumatic, Normocephalic  EYES: EOMI, PERRLA, conjunctiva and sclera clear  NECK: Supple, No JVD  CHEST/LUNG: Clear to auscultation bilaterally; No wheeze  HEART: Regular rate and rhythm; No murmurs, rubs, or gallops  ABDOMEN: Soft, Nontender, Nondistended; Bowel sounds present  EXTREMITIES:  2+ Peripheral Pulses, No clubbing, cyanosis, or edema  PSYCH: AAOx3  NEUROLOGY: non-focal  SKIN: No rashes or lesions    LABS:                        11.9   5.90  )-----------( 312      ( 25 May 2017 06:00 )             38.1     05-25    141  |  99  |  20  ----------------------------<  89  3.7   |  25  |  0.87    Ca    9.8      25 May 2017 06:00  Mg     2.0     05-25

## 2017-05-25 NOTE — CONSULT NOTE ADULT - SUBJECTIVE AND OBJECTIVE BOX
Patient is a 77y old  Female who presents with a chief complaint of chest pain and sob (21 May 2017 07:31).  as per pt , she was diagnosed with NHL 1 year ago, has been receiving chemo until a few weeks ago at [facility], has received 12 doses of intrathecal RX - the details of lymphoma type of Rx no clear, she will look for the name and no of the oncologist, here with SOB.      PAST MEDICAL & SURGICAL HISTORY:  Paroxysmal atrial fibrillation  GERD (gastroesophageal reflux disease)  DM (diabetes mellitus)  Asthma  MI (myocardial infarction)  CAD (coronary artery disease)  Lymphoma: S/P resection and chemotherapy in remission  HLD (hyperlipidemia)  CHF (congestive heart failure): On home oxygen 2L PRN  HTN (hypertension)  ICD (implantable cardioverter-defibrillator) in place  S/P coronary artery stent placement  S/P myomectomy  S/P appendectomy  S/P lymph node biopsy: Biopsy and resection  H/O lymph node excision  H/O myomectomy  History of appendectomy    Meds:  acetaminophen   Tablet. 650milliGRAM(s) Oral every 6 hours PRN  oxyCODONE  5 mG/acetaminophen 325 mG 1Tablet(s) Oral every 6 hours PRN  acetaminophen   Tablet 650milliGRAM(s) Oral every 6 hours PRN  ALBUTerol    90 MICROgram(s) HFA Inhaler 2Puff(s) Inhalation every 6 hours PRN  allopurinol 300milliGRAM(s) Oral daily  amiodarone    Tablet 400milliGRAM(s) Oral daily  aspirin enteric coated 81milliGRAM(s) Oral daily  atorvastatin 20milliGRAM(s) Oral at bedtime  buDESOnide 160 MICROgram(s)/formoterol 4.5 MICROgram(s) Inhaler 2Puff(s) Inhalation two times a day  lactobacillus acidophilus 1Tablet(s) Oral daily  levothyroxine 50MICROGram(s) Oral daily  losartan 25milliGRAM(s) Oral daily  metoprolol succinate ER 25milliGRAM(s) Oral daily  senna 2Tablet(s) Oral at bedtime  spironolactone 12.5milliGRAM(s) Oral daily  sucralfate 1Gram(s) Oral four times a day  tiotropium 18 MICROgram(s) Capsule 1Capsule(s) Inhalation daily  enoxaparin Injectable 40milliGRAM(s) SubCutaneous every 24 hours  insulin lispro (HumaLOG) corrective regimen sliding scale  SubCutaneous three times a day before meals  insulin lispro (HumaLOG) corrective regimen sliding scale  SubCutaneous at bedtime  dextrose 5%. 1000milliLiter(s) IV Continuous <Continuous>  dextrose Gel 1Dose(s) Oral once PRN  dextrose 50% Injectable 12.5Gram(s) IV Push once  dextrose 50% Injectable 25Gram(s) IV Push once  dextrose 50% Injectable 25Gram(s) IV Push once  glucagon  Injectable 1milliGRAM(s) IntraMuscular once PRN  pantoprazole    Tablet 40milliGRAM(s) Oral two times a day before meals  metoclopramide Injectable 5milliGRAM(s) IV Push every 6 hours  docusate sodium 100milliGRAM(s) Oral three times a day  polyethylene glycol 3350 17Gram(s) Oral daily  dicyclomine 10milliGRAM(s) Oral two times a day before meals  linezolid    Tablet 600milliGRAM(s) Oral every 12 hours  furosemide    Tablet 40milliGRAM(s) Oral daily  simethicone 80milliGRAM(s) Chew every 6 hours PRN    Allergies:  peanuts (Unknown)  penicillin (Nausea)      FAMILY HISTORY:  No pertinent family history in first degree relatives      Vital Signs Last 24 Hrs  T(C): 36.4, Max: 36.4 (05-24 @ 23:42)  T(F): 97.5, Max: 97.6 (05-24 @ 23:42)  HR: 66 (66 - 78)  BP: 119/65 (119/65 - 139/67)  BP(mean): --  RR: 16 (16 - 18)  SpO2: 100% (99% - 100%)                          11.9   5.90  )-----------( 312      ( 25 May 2017 06:00 )             38.1       05-25    141  |  99  |  20  ----------------------------<  89  3.7   |  25  |  0.87    Ca    9.8      25 May 2017 06:00  Mg     2.0     05-25      CT chest from april:   IMPRESSION:    No evidence of pneumonia.    Colonic diverticulosis.    Tiny layering gallstones versus gallbladder sludge.    CT chest from march:     IMPRESSION: No pulmonary embolus.  Resolution of left upper lobe centrilobular nodules and lingular nodular   branching opacities.  Decreased size of right lower lobe nodule.      [place] of kid:   IMPRESSION:     Normal renal ultrasound.

## 2017-05-25 NOTE — PROGRESS NOTE ADULT - SUBJECTIVE AND OBJECTIVE BOX
Patient is a 77y adm with cp and SOB and have been doing reasonably well       Any change in ROS: feeling ok     MEDICATIONS  (STANDING):  allopurinol 300milliGRAM(s) Oral daily  amiodarone    Tablet 400milliGRAM(s) Oral daily  aspirin enteric coated 81milliGRAM(s) Oral daily  atorvastatin 20milliGRAM(s) Oral at bedtime  buDESOnide 160 MICROgram(s)/formoterol 4.5 MICROgram(s) Inhaler 2Puff(s) Inhalation two times a day  lactobacillus acidophilus 1Tablet(s) Oral daily  levothyroxine 50MICROGram(s) Oral daily  losartan 25milliGRAM(s) Oral daily  metoprolol succinate ER 25milliGRAM(s) Oral daily  senna 2Tablet(s) Oral at bedtime  spironolactone 12.5milliGRAM(s) Oral daily  sucralfate 1Gram(s) Oral four times a day  tiotropium 18 MICROgram(s) Capsule 1Capsule(s) Inhalation daily  enoxaparin Injectable 40milliGRAM(s) SubCutaneous every 24 hours  insulin lispro (HumaLOG) corrective regimen sliding scale  SubCutaneous three times a day before meals  insulin lispro (HumaLOG) corrective regimen sliding scale  SubCutaneous at bedtime  dextrose 5%. 1000milliLiter(s) IV Continuous <Continuous>  dextrose 50% Injectable 12.5Gram(s) IV Push once  dextrose 50% Injectable 25Gram(s) IV Push once  dextrose 50% Injectable 25Gram(s) IV Push once  pantoprazole    Tablet 40milliGRAM(s) Oral two times a day before meals  metoclopramide Injectable 5milliGRAM(s) IV Push every 6 hours  docusate sodium 100milliGRAM(s) Oral three times a day  polyethylene glycol 3350 17Gram(s) Oral daily  dicyclomine 10milliGRAM(s) Oral two times a day before meals  linezolid    Tablet 600milliGRAM(s) Oral every 12 hours  furosemide    Tablet 40milliGRAM(s) Oral daily    MEDICATIONS  (PRN):  acetaminophen   Tablet. 650milliGRAM(s) Oral every 6 hours PRN Mild and Moderate pain  oxyCODONE  5 mG/acetaminophen 325 mG 1Tablet(s) Oral every 6 hours PRN Severe Pain (7 - 10)  acetaminophen   Tablet 650milliGRAM(s) Oral every 6 hours PRN For Temp greater than 38 C (100.4 F)  ALBUTerol    90 MICROgram(s) HFA Inhaler 2Puff(s) Inhalation every 6 hours PRN Shortness of Breath and/or Wheezing  dextrose Gel 1Dose(s) Oral once PRN Blood Glucose LESS THAN 70 milliGRAM(s)/deciliter  glucagon  Injectable 1milliGRAM(s) IntraMuscular once PRN Glucose LESS THAN 70 milligrams/deciliter  simethicone 80milliGRAM(s) Chew every 6 hours PRN Upset Stomach    Vital Signs Last 24 Hrs  T(C): 36.4, Max: 36.4 (05-24 @ 23:42)  T(F): 97.5, Max: 97.6 (05-24 @ 23:42)  HR: 66 (66 - 78)  BP: 119/65 (119/65 - 139/67)  BP(mean): --  RR: 16 (16 - 18)  SpO2: 100% (99% - 100%)    I&O's Summary  I & Os for 24h ending 25 May 2017 07:00  =============================================  IN: 640 ml / OUT: 200 ml / NET: 440 ml    I & Os for current day (as of 25 May 2017 17:56)  =============================================  IN: 110 ml / OUT: 0 ml / NET: 110 ml        Physical Exam:   GENERAL: NAD, well-groomed, well-developed  HEENT: VALDO/   Atraumatic, Normocephalic  ENMT: No tonsillar erythema, exudates, or enlargement; Moist mucous membranes, Good dentition, No lesions  NECK: Supple, No JVD, Normal thyroid  CHEST/LUNG: Clear to percussion bilaterally; No rales, rhonchi, wheezing, or rubs  CVS: Regular rate and rhythm; No murmurs, rubs, or gallops  GI: : Soft, Nontender, Nondistended; Bowel sounds present  NERVOUS SYSTEM:  Alert & Oriented X3, Good concentration; Motor Strength 5/5 B/L upper and lower extremities; DTRs 2+ intact and symmetric  EXTREMITIES:  2+   LYMPH: No lymphadenopathy noted  SKIN: No rashes or lesions  ENDOCRINOLOGY: No Thyromegaly  PSYCH: Appropriate/demented/others    Labs:                              11.9   5.90  )-----------( 312      ( 25 May 2017 06:00 )             38.1     05-25    141  |  99  |  20  ----------------------------<  89  3.7   |  25  |  0.87    Ca    9.8      25 May 2017 06:00  Mg     2.0     05-25      CAPILLARY BLOOD GLUCOSE  82 (25 May 2017 17:06)  104 (25 May 2017 11:05)  101 (25 May 2017 08:30)  120 (24 May 2017 21:13)          Serum Pro-Brain Natriuretic Peptide: 1122 pg/mL (05-23 @ 06:00)      Cultures:           Blood Culture:           05-21 @ 01:17  Organism Enterococcus faecalis VRE  COLONY COUNT: > = 100,000 CFU/ML  Gram stain --        Urine Culture:          05-21 @ 01:17  Organism Enterococcus faecalis VRE  COLONY COUNT: > = 100,000 CFU/ML        Wound culture:                05-22 @ 16:50  Organism --  Culture w/ gram stain --  Specimen Source BLOOD PERIPHERAL    Wound culture:                05-21 @ 01:17  Organism Enterococcus faecalis VRE  COLONY COUNT: > = 100,000 CFU/ML  Culture w/ gram stain --  Specimen Source URINE MIDSTREAM        Abscess culture:             05-22 @ 16:50  Organism --  Gram Stain --  Specimen Source BLOOD PERIPHERAL    Abscess culture:             05-21 @ 01:17  Organism Enterococcus faecalis VRE  COLONY COUNT: > = 100,000 CFU/ML  Gram Stain --  Specimen Source URINE MIDSTREAM        CSF:              05-21 @ 01:17  Organism Enterococcus faecalis VRE  COLONY COUNT: > = 100,000 CFU/ML  Gram Stain --        Tissue culture:           05-22 @ 16:50  Organism --  Gram Stain --  Specimen Source BLOOD PERIPHERAL    Tissue culture:           05-21 @ 01:17  Organism Enterococcus faecalis VRE  COLONY COUNT: > = 100,000 CFU/ML  Gram Stain --  Specimen Source URINE MIDSTREAM        Body Fluid Smear & Culture:                        05-22 @ 16:50  AFB Smear  --  Culture Acid Fast Body Fluid w/ Smear  --  Culture Acid Fast Smear Concentrated   --    Culture Results:     --  Specimen Source BLOOD PERIPHERAL    Body Fluid Smear & Culture:                        05-21 @ 01:17  AFB Smear  --  Culture Acid Fast Body Fluid w/ Smear  --  Culture Acid Fast Smear Concentrated   --    Culture Results:     --  Specimen Source URINE MIDSTREAM            Rapid Respiratory Viral Panel Result        05-20 @ 23:30  Rapid RVP --  Coronovirus NOT DETECTED  Adenovirus NOT DETECTED  Bordetella Pertussis NOT DETECTED  Chlamydia Pneumonia NOT DETECTED  Entero/RhinovirusNOT DETECTED  HKU1 Coronovirus NOT DETECTED  HMPV Coronovirus NOT DETECTED  Influenza A NOT DETECTED (any subtype)  Influenza AH1 NOT DETECTED  Influenza AH1 2009 NOT DETECTED  Influenza AH3 NOT DETECTED  Influenza B NOT DETECTED  Mycoplasma Pneumoniae NOT DETECTED This nucleic acid amplification assay was performed using  the [employer]. The following pathogens are tested  for: Adenovirus, Coronavirus 229E, Coronavirus HKU1,  Coronavirus NL63, Coronavirus OC43, Human Metapneumovirus  (HMPV),  NL63 Coronovirus NOT DETECTED  OC43 Coronovirus NOT DETECTED  Parainfluenza 1 NOT DETECTED  Parainfluenza 2 NOT DETECTED  Parainfluenza 3 NOT DETECTED  Parainfluenza 4 NOT DETECTED  Resp Syncytial Virus NOT DETECTED        Studies  Chest X-RAYEXAM:  RAD CHEST PORTABLE URGENT        PROCEDURE DATE:  May 21 2017         INTERPRETATION:    CLINICAL INDICATION: Shortness of breath     TECHNIQUE: Portable frontal view of the chest.    COMPARISON: Frontal chest radiograph from 4/3/2017.    FINDINGS:   AICD in unchanged position over left chest.    Unchanged cardiomegaly.  Lungs are clear.  No pleural effusions or pneumothorax.  No acute osseous abnormality.      IMPRESSION:   Clear lungs.   CT SCAN Chest IMPRESSION:    No evidence of pneumonia.    Colonic diverticulosis.    Tiny layering gallstones versus gallbladder sludge.  CT Abdomen  Venous Dopplers: LE:   Others    CT Angio: 3 / 2017  IMPRESSION: No pulmonary embolus.  Resolution of left upper lobe centrilobular nodules and lingular nodular  branching opacities.  Decreased size of right lower lobe nodule.

## 2017-05-25 NOTE — PROGRESS NOTE ADULT - SUBJECTIVE AND OBJECTIVE BOX
EP ATTENDING    Tele: NSR, Biv pacing    Subjective: no chest pain; SOB improved . No palpitations/dizziness.    acetaminophen   Tablet. 650milliGRAM(s) Oral every 6 hours PRN  oxyCODONE  5 mG/acetaminophen 325 mG 1Tablet(s) Oral every 6 hours PRN  acetaminophen   Tablet 650milliGRAM(s) Oral every 6 hours PRN  ALBUTerol    90 MICROgram(s) HFA Inhaler 2Puff(s) Inhalation every 6 hours PRN  allopurinol 300milliGRAM(s) Oral daily  amiodarone    Tablet 400milliGRAM(s) Oral daily  aspirin enteric coated 81milliGRAM(s) Oral daily  atorvastatin 20milliGRAM(s) Oral at bedtime  buDESOnide 160 MICROgram(s)/formoterol 4.5 MICROgram(s) Inhaler 2Puff(s) Inhalation two times a day  lactobacillus acidophilus 1Tablet(s) Oral daily  levothyroxine 50MICROGram(s) Oral daily  losartan 25milliGRAM(s) Oral daily  metoprolol succinate ER 25milliGRAM(s) Oral daily  senna 2Tablet(s) Oral at bedtime  spironolactone 12.5milliGRAM(s) Oral daily  sucralfate 1Gram(s) Oral four times a day  tiotropium 18 MICROgram(s) Capsule 1Capsule(s) Inhalation daily  enoxaparin Injectable 40milliGRAM(s) SubCutaneous every 24 hours  insulin lispro (HumaLOG) corrective regimen sliding scale  SubCutaneous three times a day before meals  insulin lispro (HumaLOG) corrective regimen sliding scale  SubCutaneous at bedtime  dextrose 5%. 1000milliLiter(s) IV Continuous <Continuous>  dextrose Gel 1Dose(s) Oral once PRN  dextrose 50% Injectable 12.5Gram(s) IV Push once  dextrose 50% Injectable 25Gram(s) IV Push once  dextrose 50% Injectable 25Gram(s) IV Push once  glucagon  Injectable 1milliGRAM(s) IntraMuscular once PRN  pantoprazole    Tablet 40milliGRAM(s) Oral two times a day before meals  metoclopramide Injectable 5milliGRAM(s) IV Push every 6 hours  docusate sodium 100milliGRAM(s) Oral three times a day  polyethylene glycol 3350 17Gram(s) Oral daily  dicyclomine 10milliGRAM(s) Oral two times a day before meals  linezolid    Tablet 600milliGRAM(s) Oral every 12 hours  furosemide    Tablet 40milliGRAM(s) Oral daily                            11.9   5.90  )-----------( 312      ( 25 May 2017 06:00 )             38.1       05-25    141  |  99  |  20  ----------------------------<  89  3.7   |  25  |  0.87    Ca    9.8      25 May 2017 06:00  Mg     2.0     05-25        T(C): 36.4, Max: 36.4 (05-24 @ 23:42)  HR: 78 (66 - 78)  BP: 139/67 (100/59 - 139/67)  RR: 18 (18 - 18)  SpO2: 99% (99% - 99%)  Wt(kg): --    Appearance: Normal	  HEENT:   Normal oral mucosa, PERRL, EOMI	  Lymphatic: No lymphadenopathy  Cardiovascular: Normal S1 S2, No JVD, No murmurs, No edema  Respiratory: Lungs clear to auscultation	  Psychiatry: A & O x 3, Mood & affect appropriate  Gastrointestinal:  Soft, Non-tender, + BS	  Skin: No rashes, No ecchymoses, No cyanosis	  Neurologic: Non-focal  Extremities: Normal range of motion, No clubbing, cyanosis or edema  Vascular: Peripheral pulses palpable 2+ bilaterally    TELEMETRY: NSR, biv pacing      ASSESSMENT/PLAN: 	Ms. Pulido is a very pleasant 77 year old female with a NICM and prior VT/VF - who has a well functioning [employer] BiV-ICD in place.    - diurese for acute on chronic CHF as per cardiology   - continue toprol and amiodarone for prior VT/VF  - further EP work up at this time   - f/u with Dr. Hussein for cardiology upon DC 6/2 at 2pm      Castillo Shea M.D., [facility]  [phone]

## 2017-05-25 NOTE — CONSULT NOTE ADULT - ASSESSMENT
76 yo female PMHx of Asthma, Afib on ASA, CAD, CHF, HLD, HTN,Gastric Ulcer on EGD 3/2017 with presbyesophagus noted, Lymphoma s/p resection and chemotherapy (in remission), and AICD presents with chest pain, sob and palpitations x 2-3 days. Pt also reports that she has also been experiencing some nausea with abdominal discomfort w/"gas discomfort" since being short of breath
76 yo female PMHx of Asthma, CAD, CHF, HLD, HTN, Lymphoma s/p resection and chemotherapy (in remission), and AICD presents with chest pain, sob and palpitations x 2-3 days, admitted to tele for acute on chronic systolic heart failure, r/o ACS.    +CHF Exac-->IV lasix 40mg Q24h  +UTI-->IV Ceftriaxone 1g Q24h
77 year old female with SOB and cough likely due to fluid overload.     In addition, she has dysuria with pyuria and bacteruria.    Suspect UTI- Urine culture is growing GPC.
H/O NHL on chemo, last chemo a few weeks ago, details not clear, clinically no evidence od NHL, will recommend:  - continue Rx as per medicine, cardiology  - told the pt to obtain info on the oncologist  - on DVT prophylaxis  - obtain info from primary oncologist

## 2017-05-25 NOTE — PROGRESS NOTE ADULT - SUBJECTIVE AND OBJECTIVE BOX
Follow Up:      [place] History/ROS:Patient is a 77y old  Female who presents with a chief complaint of chest pain and sob (21 May 2017 07:31)    She has complained of dysuria. treated with antibiotics-  dtill has dysuria.     Allergies    peanuts (Unknown)  penicillin (Nausea)    Intolerances        ANTIMICROBIALS:  linezolid    Tablet 600milliGRAM(s) Oral every 12 hours      OTHER MEDS:  acetaminophen   Tablet. 650milliGRAM(s) Oral every 6 hours PRN  oxyCODONE  5 mG/acetaminophen 325 mG 1Tablet(s) Oral every 6 hours PRN  acetaminophen   Tablet 650milliGRAM(s) Oral every 6 hours PRN  ALBUTerol    90 MICROgram(s) HFA Inhaler 2Puff(s) Inhalation every 6 hours PRN  allopurinol 300milliGRAM(s) Oral daily  amiodarone    Tablet 400milliGRAM(s) Oral daily  aspirin enteric coated 81milliGRAM(s) Oral daily  atorvastatin 20milliGRAM(s) Oral at bedtime  buDESOnide 160 MICROgram(s)/formoterol 4.5 MICROgram(s) Inhaler 2Puff(s) Inhalation two times a day  lactobacillus acidophilus 1Tablet(s) Oral daily  levothyroxine 50MICROGram(s) Oral daily  losartan 25milliGRAM(s) Oral daily  metoprolol succinate ER 25milliGRAM(s) Oral daily  senna 2Tablet(s) Oral at bedtime  spironolactone 12.5milliGRAM(s) Oral daily  sucralfate 1Gram(s) Oral four times a day  tiotropium 18 MICROgram(s) Capsule 1Capsule(s) Inhalation daily  enoxaparin Injectable 40milliGRAM(s) SubCutaneous every 24 hours  insulin lispro (HumaLOG) corrective regimen sliding scale  SubCutaneous three times a day before meals  insulin lispro (HumaLOG) corrective regimen sliding scale  SubCutaneous at bedtime  dextrose 5%. 1000milliLiter(s) IV Continuous <Continuous>  dextrose Gel 1Dose(s) Oral once PRN  dextrose 50% Injectable 12.5Gram(s) IV Push once  dextrose 50% Injectable 25Gram(s) IV Push once  dextrose 50% Injectable 25Gram(s) IV Push once  glucagon  Injectable 1milliGRAM(s) IntraMuscular once PRN  pantoprazole    Tablet 40milliGRAM(s) Oral two times a day before meals  metoclopramide Injectable 5milliGRAM(s) IV Push every 6 hours  docusate sodium 100milliGRAM(s) Oral three times a day  polyethylene glycol 3350 17Gram(s) Oral daily  simethicone 80milliGRAM(s) Chew every 6 hours  dicyclomine 10milliGRAM(s) Oral two times a day before meals  furosemide    Tablet 40milliGRAM(s) Oral daily      Vital Signs Last 24 Hrs  T(C): 36.4, Max: 36.4 (05-24 @ 11:36)  T(F): 97.6, Max: 97.6 (05-24 @ 11:36)  HR: 78 (66 - 78)  BP: 139/67 (89/44 - 139/67)  BP(mean): --  RR: 18 (18 - 18)  SpO2: 99% (99% - 100%)    PHYSICAL EXAM:  General: [x ] non-toxic  HEAD/EYES: [x ] PERRL [x ] white sclera [ ] icterus  ENT:  [x ] normal [x ] supple [ ] thrush [ ] pharyngeal exudate  Cardiovascular:   [ ] murmur [x ] normal [ ] PPM/AICD  Respiratory:  [x ] clear to ausculation bilaterally  GI:  [x ] soft, non-tender, normal bowel sounds  :  [ ] knutson [ ] no CVA tenderness   Musculoskeletal:  [ ] no synovitis  Neurologic:  [x ] non-focal exam   Skin:  [x ] no rash  Lymph: [ ] no lymphadenopathy  Psychiatric:  [ ] appropriate affect [x ] alert & oriented  Lines:  [x ] no phlebitis [ ] central line                                11.9   5.90  )-----------( 312      ( 25 May 2017 06:00 )             38.1       05-25    141  |  99  |  20  ----------------------------<  89  3.7   |  25  |  0.87    Ca    9.8      25 May 2017 06:00  Mg     2.0     05-25            MICROBIOLOGY:    RADIOLOGY:

## 2017-05-25 NOTE — PROGRESS NOTE ADULT - SUBJECTIVE AND OBJECTIVE BOX
Subjective: no chest pain; SOB  improved .  Tolerating PO with no issues.     MEDS  MEDICATIONS  (STANDING):  allopurinol 300milliGRAM(s) Oral daily  amiodarone    Tablet 400milliGRAM(s) Oral daily  aspirin enteric coated 81milliGRAM(s) Oral daily  atorvastatin 20milliGRAM(s) Oral at bedtime  buDESOnide 160 MICROgram(s)/formoterol 4.5 MICROgram(s) Inhaler 2Puff(s) Inhalation two times a day  lactobacillus acidophilus 1Tablet(s) Oral daily  levothyroxine 50MICROGram(s) Oral daily  losartan 25milliGRAM(s) Oral daily  metoprolol succinate ER 25milliGRAM(s) Oral daily  senna 2Tablet(s) Oral at bedtime  spironolactone 12.5milliGRAM(s) Oral daily  sucralfate 1Gram(s) Oral four times a day  tiotropium 18 MICROgram(s) Capsule 1Capsule(s) Inhalation daily  enoxaparin Injectable 40milliGRAM(s) SubCutaneous every 24 hours  insulin lispro (HumaLOG) corrective regimen sliding scale  SubCutaneous three times a day before meals  insulin lispro (HumaLOG) corrective regimen sliding scale  SubCutaneous at bedtime  dextrose 5%. 1000milliLiter(s) IV Continuous <Continuous>  dextrose 50% Injectable 12.5Gram(s) IV Push once  dextrose 50% Injectable 25Gram(s) IV Push once  dextrose 50% Injectable 25Gram(s) IV Push once  pantoprazole    Tablet 40milliGRAM(s) Oral two times a day before meals  metoclopramide Injectable 5milliGRAM(s) IV Push every 6 hours  docusate sodium 100milliGRAM(s) Oral three times a day  polyethylene glycol 3350 17Gram(s) Oral daily  dicyclomine 10milliGRAM(s) Oral two times a day before meals  linezolid    Tablet 600milliGRAM(s) Oral every 12 hours  furosemide    Tablet 40milliGRAM(s) Oral daily    MEDICATIONS  (PRN):  acetaminophen   Tablet. 650milliGRAM(s) Oral every 6 hours PRN Mild and Moderate pain  oxyCODONE  5 mG/acetaminophen 325 mG 1Tablet(s) Oral every 6 hours PRN Severe Pain (7 - 10)  acetaminophen   Tablet 650milliGRAM(s) Oral every 6 hours PRN For Temp greater than 38 C (100.4 F)  ALBUTerol    90 MICROgram(s) HFA Inhaler 2Puff(s) Inhalation every 6 hours PRN Shortness of Breath and/or Wheezing  dextrose Gel 1Dose(s) Oral once PRN Blood Glucose LESS THAN 70 milliGRAM(s)/deciliter  glucagon  Injectable 1milliGRAM(s) IntraMuscular once PRN Glucose LESS THAN 70 milligrams/deciliter  simethicone 80milliGRAM(s) Chew every 6 hours PRN Upset Stomach      T(C): 36.4, Max: 36.4 (05-24 @ 23:42)  HR: 78 (66 - 78)  BP: 139/67 (100/59 - 139/67)  RR: 18 (18 - 18)  SpO2: 99% (99% - 99%)  Wt(kg): --    I&O's Summary  I & Os for 24h ending 25 May 2017 07:00  =============================================  IN: 640 ml / OUT: 200 ml / NET: 440 ml    I & Os for current day (as of 25 May 2017 14:06)  =============================================  IN: 50 ml / OUT: 0 ml / NET: 50 ml                            11.9   5.90  )-----------( 312      ( 25 May 2017 06:00 )             38.1       05-25    141  |  99  |  20  ----------------------------<  89  3.7   |  25  |  0.87    Ca    9.8      25 May 2017 06:00  Mg     2.0     05-25    PREVIOUS DIAGNOSTIC TESTING:    [ ] Echocardiogram: 3/31/16: CONCLUSIONS:  1. Mitral annular calcification, otherwise normal mitral  valve. Mild-moderate mitral regurgitation.  2. Severely dilated left atrium.  LA volume index = 62  cc/m2.  3. Severe left ventricular enlargement.  4. Severe global left ventricular systolic dysfunction.  5. Normal right ventricular size with decreased right  ventricular systolic function.  A device wire is noted in  the right heart.    [ ]  Catheterization: 4/2016 - LAD:   --  Proximal LAD: There was a [id number] % stenosis. There was mild  restenosis in proximal LAD stent.        Allergies    peanuts (Unknown)  penicillin (Nausea)    Intolerances; none      Appearance: Normal	  HEENT:   Normal oral mucosa, PERRL, EOMI	  Lymphatic: No lymphadenopathy  Cardiovascular: Normal S1 S2, No JVD, No murmurs, No edema  Respiratory: Lungs clear to auscultation	  Psychiatry: A & O x 3, Mood & affect appropriate  Gastrointestinal:  Soft, Non-tender, + BS	  Skin: No rashes, No ecchymoses, No cyanosis	  Neurologic: Non-focal  Extremities: Normal range of motion, No clubbing, cyanosis or edema  Vascular: Peripheral pulses palpable 2+ bilaterally    TELEMETRY: 	  paced   ECG:  av paced   RADIOLOGY: none  OTHER: 	  	renal u/s - normal    ASSESSMENT/PLAN: 	Ms. Pulido is a very pleasant 77 year old female well known to our service with hypertension, dyslipidemia, CAD s/p LAD stent with only mild re-stenosis with minor luminal irregularities otherwise on cath 4/16, with known severe LV dysfunction , ICM s/p Medtronic BiV ICD, COPD, lymphoma previously on chemo with known spinal mets, who is now admitted with shortness of breath, palpitations, reported wheezing at home with no associated chest pain or  ICD fire or syncope/near syncope with +E.faecalis UTI who ruled out for ACS with acute on chronic systolic CHF exac:      - acute on chronic systolic CHF exac - patient diuresed well -  tolerating Lasix 40 mg po daily   -  c/w cozaar, toprol, and aldactone for known CM  - ID noted - blood cx negative -  Acute cystitis with VRE without hematuria.  Plan: Culture grew VRE  - c/w PO Linezolid through 5/27 and rec outpatient gyn eval for dysuria - low suspicion for acute bacterial infection  -EP appreciated - Medtronic ICD interrogation WNL --> NSR   - c/w Amio 400mg po qday for h/o VT - the patient has NO HISTORY OF AFIB  - renal u/s normal  - pulm noted - c/w diuresis   - onc eval with Dr. Walsh pendmasood - patient with known lymphoma and spinal mets (follows with outside onc Dr. escalante [phone], [phone])  - patient for dc to rehab when bed available  - f/u with Dr. Hussein upon DC for cardio 6/2 @ 2pm    Taryn Garrett [facility]  O:  [phone]  P: [phone]

## 2017-05-25 NOTE — PROGRESS NOTE ADULT - SUBJECTIVE AND OBJECTIVE BOX
INTERVAL HPI/OVERNIGHT EVENTS:    pt reports feeling 'better' today. no n/v/d/c or abdominal events    MEDICATIONS  (STANDING):  allopurinol 300milliGRAM(s) Oral daily  amiodarone    Tablet 400milliGRAM(s) Oral daily  aspirin enteric coated 81milliGRAM(s) Oral daily  atorvastatin 20milliGRAM(s) Oral at bedtime  buDESOnide 160 MICROgram(s)/formoterol 4.5 MICROgram(s) Inhaler 2Puff(s) Inhalation two times a day  lactobacillus acidophilus 1Tablet(s) Oral daily  levothyroxine 50MICROGram(s) Oral daily  losartan 25milliGRAM(s) Oral daily  metoprolol succinate ER 25milliGRAM(s) Oral daily  senna 2Tablet(s) Oral at bedtime  spironolactone 12.5milliGRAM(s) Oral daily  sucralfate 1Gram(s) Oral four times a day  tiotropium 18 MICROgram(s) Capsule 1Capsule(s) Inhalation daily  enoxaparin Injectable 40milliGRAM(s) SubCutaneous every 24 hours  insulin lispro (HumaLOG) corrective regimen sliding scale  SubCutaneous three times a day before meals  insulin lispro (HumaLOG) corrective regimen sliding scale  SubCutaneous at bedtime  dextrose 5%. 1000milliLiter(s) IV Continuous <Continuous>  dextrose 50% Injectable 12.5Gram(s) IV Push once  dextrose 50% Injectable 25Gram(s) IV Push once  dextrose 50% Injectable 25Gram(s) IV Push once  pantoprazole    Tablet 40milliGRAM(s) Oral two times a day before meals  metoclopramide Injectable 5milliGRAM(s) IV Push every 6 hours  docusate sodium 100milliGRAM(s) Oral three times a day  polyethylene glycol 3350 17Gram(s) Oral daily  dicyclomine 10milliGRAM(s) Oral two times a day before meals  linezolid    Tablet 600milliGRAM(s) Oral every 12 hours  furosemide    Tablet 40milliGRAM(s) Oral daily    MEDICATIONS  (PRN):  acetaminophen   Tablet. 650milliGRAM(s) Oral every 6 hours PRN Mild and Moderate pain  oxyCODONE  5 mG/acetaminophen 325 mG 1Tablet(s) Oral every 6 hours PRN Severe Pain (7 - 10)  acetaminophen   Tablet 650milliGRAM(s) Oral every 6 hours PRN For Temp greater than 38 C (100.4 F)  ALBUTerol    90 MICROgram(s) HFA Inhaler 2Puff(s) Inhalation every 6 hours PRN Shortness of Breath and/or Wheezing  dextrose Gel 1Dose(s) Oral once PRN Blood Glucose LESS THAN 70 milliGRAM(s)/deciliter  glucagon  Injectable 1milliGRAM(s) IntraMuscular once PRN Glucose LESS THAN 70 milligrams/deciliter      Allergies    peanuts (Unknown)  penicillin (Nausea)    Intolerances        Review of Systems:    General:  No wt loss, fevers, chills, night sweats,fatigue,   Eyes:  Good vision, no reported pain  ENT:  No sore throat, pain, runny nose, dysphagia  CV:  No pain, palpitatioins, hypo/hypertension  Resp:  No dyspnea, cough, tachypnea, wheezing  GI:  No pain, No nausea, No vomiting, No diarrhea, No constipatiion, No weight loss, No fever, No pruritis, No rectal bleeding, No tarry stools, No dysphagia,  :  No pain, bleeding, incontinence, nocturia  Muscle:  No pain, weakness  Neuro:  No weakness, tingling, memory problems  Psych:  No fatigue, insomnia, mood problems, depression  Endocrine:  No polyuria, polydypsia, cold/heat intolerance  Heme:  No petechiae, ecchymosis, easy bruisability  Skin:  No rash, tattoos, scars, edema      Vital Signs Last 24 Hrs  T(C): 36.4, Max: 36.4 (05-24 @ 23:42)  T(F): 97.6, Max: 97.6 (05-24 @ 23:42)  HR: 78 (66 - 78)  BP: 139/67 (100/59 - 139/67)  BP(mean): --  RR: 18 (18 - 18)  SpO2: 99% (99% - 99%)    PHYSICAL EXAM:    Constitutional: NAD, well-developed  HEENT: EOMI, throat clear  Neck: No LAD, supple  Respiratory: CTA and P  Cardiovascular: S1 and S2, RRR, no M  Gastrointestinal: BS+, soft, NT/ND, neg HSM,  Extremities: No peripheral edema, neg clubing, cyanosis  Vascular: 2+ peripheral pulses  Neurological: A/O x 3, no focal deficits  Psychiatric: Normal mood, normal affect  Skin: No rashes      LABS:                        11.9   5.90  )-----------( 312      ( 25 May 2017 06:00 )             38.1     05-25    141  |  99  |  20  ----------------------------<  89  3.7   |  25  |  0.87    Ca    9.8      25 May 2017 06:00  Mg     2.0     05-25            RADIOLOGY & ADDITIONAL TESTS:

## 2017-05-26 NOTE — PROGRESS NOTE ADULT - SUBJECTIVE AND OBJECTIVE BOX
EP ATTENDING    Tele: NSR, Biv pacing    Subjective: no chest pain; SOB improved . No palpitations/dizziness.    acetaminophen   Tablet. 650milliGRAM(s) Oral every 6 hours PRN  oxyCODONE  5 mG/acetaminophen 325 mG 1Tablet(s) Oral every 6 hours PRN  acetaminophen   Tablet 650milliGRAM(s) Oral every 6 hours PRN  ALBUTerol    90 MICROgram(s) HFA Inhaler 2Puff(s) Inhalation every 6 hours PRN  allopurinol 300milliGRAM(s) Oral daily  amiodarone    Tablet 400milliGRAM(s) Oral daily  aspirin enteric coated 81milliGRAM(s) Oral daily  atorvastatin 20milliGRAM(s) Oral at bedtime  buDESOnide 160 MICROgram(s)/formoterol 4.5 MICROgram(s) Inhaler 2Puff(s) Inhalation two times a day  lactobacillus acidophilus 1Tablet(s) Oral daily  levothyroxine 50MICROGram(s) Oral daily  losartan 25milliGRAM(s) Oral daily  metoprolol succinate ER 25milliGRAM(s) Oral daily  senna 2Tablet(s) Oral at bedtime  spironolactone 12.5milliGRAM(s) Oral daily  sucralfate 1Gram(s) Oral four times a day  tiotropium 18 MICROgram(s) Capsule 1Capsule(s) Inhalation daily  enoxaparin Injectable 40milliGRAM(s) SubCutaneous every 24 hours  insulin lispro (HumaLOG) corrective regimen sliding scale  SubCutaneous three times a day before meals  insulin lispro (HumaLOG) corrective regimen sliding scale  SubCutaneous at bedtime  dextrose 5%. 1000milliLiter(s) IV Continuous <Continuous>  dextrose Gel 1Dose(s) Oral once PRN  dextrose 50% Injectable 12.5Gram(s) IV Push once  dextrose 50% Injectable 25Gram(s) IV Push once  dextrose 50% Injectable 25Gram(s) IV Push once  glucagon  Injectable 1milliGRAM(s) IntraMuscular once PRN  pantoprazole    Tablet 40milliGRAM(s) Oral two times a day before meals  metoclopramide Injectable 5milliGRAM(s) IV Push every 6 hours  docusate sodium 100milliGRAM(s) Oral three times a day  polyethylene glycol 3350 17Gram(s) Oral daily  dicyclomine 10milliGRAM(s) Oral two times a day before meals  linezolid    Tablet 600milliGRAM(s) Oral every 12 hours  furosemide    Tablet 40milliGRAM(s) Oral daily  simethicone 80milliGRAM(s) Chew every 6 hours PRN  guaiFENesin   Syrup  (Sugar-Free) 100milliGRAM(s) Oral every 6 hours PRN                            11.6   6.83  )-----------( 296      ( 26 May 2017 04:25 )             38.0       05-26    141  |  98  |  20  ----------------------------<  102<H>  3.9   |  30  |  1.00    Ca    9.4      26 May 2017 04:26  Mg     2.1     05-26      T(C): 36.3, Max: 36.6 (05-25 @ 20:30)  HR: 66 (60 - 74)  BP: 102/58 (100/50 - 109/57)  RR: 16 (16 - 17)  SpO2: 98% (98% - 100%)  Wt(kg): --    I&O's Summary  I & Os for 24h ending 26 May 2017 07:00  =============================================  IN: 410 ml / OUT: 0 ml / NET: 410 ml    I & Os for current day (as of 26 May 2017 18:40)  =============================================  IN: 100 ml / OUT: 225 ml / NET: -125 ml    Appearance: Normal	  HEENT:   Normal oral mucosa, PERRL, EOMI	  Lymphatic: No lymphadenopathy  Cardiovascular: Normal S1 S2, No JVD, No murmurs, No edema  Respiratory: Lungs clear to auscultation	  Psychiatry: A & O x 3, Mood & affect appropriate  Gastrointestinal:  Soft, Non-tender, + BS	  Skin: No rashes, No ecchymoses, No cyanosis	  Neurologic: Non-focal  Extremities: Normal range of motion, No clubbing, cyanosis or edema  Vascular: Peripheral pulses palpable 2+ bilaterally    TELEMETRY: NSR, biv pacing      ASSESSMENT/PLAN: 	Ms. Pulido is a very pleasant 77 year old female with a NICM and prior VT/VF - who has a well functioning Medtronic BiV-ICD in place.    - diurese for acute on chronic CHF as per cardiology   - continue toprol and amiodarone for prior VT/VF  - further EP work up at this time   - f/u with Dr. Hussein for cardiology upon DC 6/2 at 2pm      Castillo Shea M.D., [facility]  [phone]

## 2017-05-26 NOTE — DISCHARGE NOTE ADULT - HOSPITAL COURSE
76 yo female PMHx of Asthma, CAD, CHF, HLD, HTN, Lymphoma s/p resection and chemotherapy (in remission), and AICD presents with chest pain, sob and palpitations x 2-3 days, admitted to tele for acute on chronic systolic heart failure, started on IV lasix now transitioned to PO Lasix.  Patient started on IV Ceftriaxone for UTI with VRE then changed to oral linezolid and will complete course on 5/27. Patient seen by GI for abdominal pain likely due to low flow, started on bentyl. 76 yo female PMHx of Asthma, CAD, CHF, HLD, HTN, Lymphoma s/p resection and chemotherapy (in remission), and AICD presents with chest pain, sob and palpitations x 2-3 days, admitted to [facility] for acute on chronic systolic heart failure, r/o ACS.  EKG: V-Paced @ 90 bpm  CE x 2: Negative   BNP: 4592  K: 3.3-->supplemented   UA: Large leuks, negative nitrites   RVP: Negative   Prelim CXR: No urgent/emergent findings.     5/22 ICD interogation - normal sensing and pacing   5/22 Dr Bowen (ID)- give vanco x 1 , ultrasound forabd pain, UTI may be contributing   5/22 Dr Leyva (pulmonary) c/s - following   5/22  GI c/s Dr Bella for nausea - protonix BID, simethicone, bowel regimen   5/23- ID -  acute cystitis without hematuria + now for VRE - started on oral linezolid x 5 days ,   5/23- GI follow up - abdominal pain due to low flow state, bentyl added , simethicone added   5/23- Pro BNP 1122   5/23 US renal: Normal renal ultrasound.  5/23 PUlm: symbicort: no wheezing: no need for steroids  5/23 CArdio: -Medtronic ICD interrogation :  No arrhythmias; underlying rhythm NSR. Normal PPM / ICD function. Normal sensing and pacing via iterative testing. Good battery status. Excellent threshold capture.  Pt w/ adaptive CRT/LV pacing.  No reprogramming.  - c/w Amio 400mg po qday and Toprol XL 25  for h/o VT - the patient has NO HISTORY OF AFIB - diurese for acute on chronic CHF exac per primary cardio team -  c/w cozaar, toprol, and aldactone for CM - no further EP work up at this time  - f/u with Dr. Hussein for cardiology upon DC 6/2 at 2pm  5/24 Cards: acute on chronic systolic CHF exac, patient diuresed well -  will change to Lasix 40 mg po daily in AM, onc eval with Dr. Walsh pending - patient with known lymphoma and spinal mets (follows with outside onc Dr. escalante [phone], [phone])  5/24 GI: reglan x 5 days, zofran prn, abdominal discomfort likely associated to low flow state from CHF w/EF 20%, c/w diuresis, bentyl bid, senna/colace, miralax qd, simethicone q6h x 3 days, then prn, ppi bid    5/25 ID: 77 year old female with SOB and cough likely due to fluid overload. In addition, she has dysuria with pyuria and bacteruria.  Suspect UTI- Urine culture is VRE, Acute cystitis without hematuria.  Plan: Culture grew VRE, Still has symptoms, Linezolid through 5/27.  Outpatient gyn eval- she notes dysuria for months.  I doubt that is due to an acute bacterial infection.  Afebrile with normal WBC.   ·  Problem: Lower abdominal pain.  Plan: Multifactorial- UTI, low flow state.   ·  Problem: Penicillin allergy.  Plan: Patient reports history of pcn allergy.  She provided limited history.  She recalls GI upset. But is unclear if she had breathing difficulty.   Will sign off, Call ID service with questions.    5/25 Med: DM (diabetes mellitus).  Plan: FS well controlled   well below 150, Monitor with sliding scale insulin.   R/O UTI (urinary tract infection).  Plan: Enterococcus s to ampicillin and linezolid, Duration per ID.   Acute on chronic systolic heart failure.  Plan: switched to Lasix 40 mg po qd. HLD (hyperlipidemia).  Plan: continue lipitor  Outpatient recheck Lipid panel. HTN (hypertension).  Plan: Well controlled monitor. Asthma. Plan: continue spiriva and albuterol  Monitor SpO2 no bronchospasm observed. Paroxysmal atrial fibrillation.  Plan: Now in NSR, Paced rhythm on tele.     5/25 EP: 77 year old female with a NICM and prior VT/VF - who has a well functioning Medtronic BiV-ICD in place.  - diurese for acute on chronic CHF as per cardiology   - continue toprol and amiodarone for prior VT/VF   - f/u with Dr. Hussein for cardiology upon DC 6/2 at 2pm    5/25 GI: Pt reports that she has also been experiencing some nausea with abdominal discomfort w/"gas discomfort" since being short of breath  Nausea.  Plan: -reglan x 5 days -zofran prn.   Abdominal pain, diffuse.  Plan: -abdominal discomfort likely associated to low flow state from CHF w/EF 20%;-diurese per cardiology -bentyl bid   -senna/colace -miralax qd -simethicone q6h prn. H/O gastric ulcer.  Plan: ppi bid, carafate qid.     5/25 Cardio- acute on chronic systolic CHF exac - patient diuresed well -  tolerating Lasix 40 mg po daily c/w cozaar, toprol, and aldactone for known CM, c/w PO Linezolid through 5/27 and rec outpatient gyn eval for dysuria - low suspicion for acute bacterial infection  EP appreciated - Medtronic ICD interrogation WNL --> NSR, c/w Amio 400mg po qday for h/o VT - the patient has NO HISTORY OF AFIB, renal u/s normal, onc eval with Dr. Walsh pending - patient with known lymphoma and spinal mets (follows with outside onc Dr. escalante [phone], [phone]), patient for dc to rehab when bed available, f/u with Dr. Hussein upon DC for cardio 6/2 @ 2pm  5/25 Oncology: H/O NHL on chemo, last chemo a few weeks ago, details not clear, clinically no evidence od NHL, will recommend :continue Rx as per medicine, cardiology. obtain info from primary oncologist.   5/26- As per attending, patient stable for discharge. 78 yo female PMHx of Asthma, CAD, CHF, HLD, HTN, Lymphoma s/p resection and chemotherapy (in remission), and AICD presents with chest pain, sob and palpitations x 2-3 days, admitted to [facility] for acute on chronic systolic heart failure, r/o ACS.  EKG: V-Paced @ 90 bpm  CE x 2: Negative   BNP: 4592  K: 3.3-->supplemented   UA: Large leuks, negative nitrites   RVP: Negative   Prelim CXR: No urgent/emergent findings.     5/22 ICD interogation - normal sensing and pacing   5/22 Dr Bowen (ID)- give vanco x 1 , ultrasound forabd pain, UTI may be contributing   5/22 Dr Leyva (pulmonary) c/s - following   5/22  GI c/s Dr Bella for nausea - protonix BID, simethicone, bowel regimen   5/23- ID -  acute cystitis without hematuria + now for VRE - started on oral linezolid x 5 days ,   5/23- GI follow up - abdominal pain due to low flow state, bentyl added , simethicone added   5/23- Pro BNP 1122   5/23 US renal: Normal renal ultrasound.  5/23 PUlm: symbicort: no wheezing: no need for steroids  5/23 CArdio: -Medtronic ICD interrogation :  No arrhythmias; underlying rhythm NSR. Normal PPM / ICD function. Normal sensing and pacing via iterative testing. Good battery status. Excellent threshold capture.  Pt w/ adaptive CRT/LV pacing.  No reprogramming.  - c/w Amio 400mg po qday and Toprol XL 25  for h/o VT - the patient has NO HISTORY OF AFIB - diurese for acute on chronic CHF exac per primary cardio team -  c/w cozaar, toprol, and aldactone for CM - no further EP work up at this time  - f/u with Dr. Hussein for cardiology upon DC 6/2 at 2pm  5/24 Cards: acute on chronic systolic CHF exac, patient diuresed well -  will change to Lasix 40 mg po daily in AM, onc eval with Dr. Walsh pending - patient with known lymphoma and spinal mets (follows with outside onc Dr. escalante [phone], [phone])  5/24 GI: reglan x 5 days, zofran prn, abdominal discomfort likely associated to low flow state from CHF w/EF 20%, c/w diuresis, bentyl bid, senna/colace, miralax qd, simethicone q6h x 3 days, then prn, ppi bid    5/25 ID: 77 year old female with SOB and cough likely due to fluid overload. In addition, she has dysuria with pyuria and bacteruria.  Suspect UTI- Urine culture is VRE, Acute cystitis without hematuria.  Plan: Culture grew VRE, Still has symptoms, Linezolid through 5/27.  Outpatient gyn eval- she notes dysuria for months.  I doubt that is due to an acute bacterial infection.  Afebrile with normal WBC.   ·  Problem: Lower abdominal pain.  Plan: Multifactorial- UTI, low flow state.   ·  Problem: Penicillin allergy.  Plan: Patient reports history of pcn allergy.  She provided limited history.  She recalls GI upset. But is unclear if she had breathing difficulty.   Will sign off, Call ID service with questions.    5/25 Med: DM (diabetes mellitus).  Plan: FS well controlled   well below 150, Monitor with sliding scale insulin.   R/O UTI (urinary tract infection).  Plan: Enterococcus s to ampicillin and linezolid, Duration per ID.   Acute on chronic systolic heart failure.  Plan: switched to Lasix 40 mg po qd. HLD (hyperlipidemia).  Plan: continue lipitor  Outpatient recheck Lipid panel. HTN (hypertension).  Plan: Well controlled monitor. Asthma. Plan: continue spiriva and albuterol  Monitor SpO2 no bronchospasm observed. Paroxysmal atrial fibrillation.  Plan: Now in NSR, Paced rhythm on tele.     5/25 EP: 77 year old female with a NICM and prior VT/VF - who has a well functioning Medtronic BiV-ICD in place.  - diurese for acute on chronic CHF as per cardiology   - continue toprol and amiodarone for prior VT/VF   - f/u with Dr. Hussein for cardiology upon DC 6/2 at 2pm    5/25 GI: Pt reports that she has also been experiencing some nausea with abdominal discomfort w/"gas discomfort" since being short of breath  Nausea.  Plan: -reglan x 5 days -zofran prn.   Abdominal pain, diffuse.  Plan: -abdominal discomfort likely associated to low flow state from CHF w/EF 20%;-diurese per cardiology -bentyl bid   -senna/colace -miralax qd -simethicone q6h prn. H/O gastric ulcer.  Plan: ppi bid, carafate qid.     5/25 Cardio- acute on chronic systolic CHF exac - patient diuresed well -  tolerating Lasix 40 mg po daily c/w cozaar, toprol, and aldactone for known CM, c/w PO Linezolid through 5/27 and rec outpatient gyn eval for dysuria - low suspicion for acute bacterial infection  EP appreciated - Medtronic ICD interrogation WNL --> NSR, c/w Amio 400mg po qday for h/o VT - the patient has NO HISTORY OF AFIB, renal u/s normal, onc eval with Dr. Walsh pending - patient with known lymphoma and spinal mets (follows with outside onc Dr. escalante [phone], [phone]), patient for dc to rehab when bed available, f/u with Dr. Hussein upon DC for cardio 6/2 @ 2pm  5/25 Oncology: H/O NHL on chemo, last chemo a few weeks ago, details not clear, clinically no evidence od NHL, will recommend :continue Rx as per medicine, cardiology. obtain info from primary oncologist.   5/26- As per attending, patient stable for discharge.       Attending addendum:  Pt. seen and examined.  Agree with above.  Pt. admitted with ADHF.  The patient was started on IV diuresis with improvement in symptoms.  Oncology evaluated the patient.  EPS was consulted.  The patient was discharged euvolemic in stable condition.

## 2017-05-26 NOTE — DISCHARGE NOTE ADULT - ADDITIONAL INSTRUCTIONS
Follow up with Cardiologist Dr. Hussein upon DC for cardio 6/2 at 2pm  and PMD within one week of discharge. Call for appointment. Return to ED for any concerning symptoms. Continue medications as prescribed. Low salt, low fat, low cholesterol diet.

## 2017-05-26 NOTE — DISCHARGE NOTE ADULT - SECONDARY DIAGNOSIS.
UTI (urinary tract infection) Abdominal pain Lymphoma DM (diabetes mellitus) CAD (coronary artery disease) HLD (hyperlipidemia)

## 2017-05-26 NOTE — PROGRESS NOTE ADULT - SUBJECTIVE AND OBJECTIVE BOX
Patient is a 77y old  Female who presents with a chief complaint of chest pain and sob (26 May 2017 02:50)      SUBJECTIVE / OVERNIGHT EVENTS: No nausea, vomiting or diarrhea, no fever or chills, no dizziness or chest pain, no dysuria or hematuria, no jt pain or swelling    States feels much better c/w admission    MEDICATIONS  (STANDING):  allopurinol 300milliGRAM(s) Oral daily  amiodarone    Tablet 400milliGRAM(s) Oral daily  aspirin enteric coated 81milliGRAM(s) Oral daily  atorvastatin 20milliGRAM(s) Oral at bedtime  buDESOnide 160 MICROgram(s)/formoterol 4.5 MICROgram(s) Inhaler 2Puff(s) Inhalation two times a day  lactobacillus acidophilus 1Tablet(s) Oral daily  levothyroxine 50MICROGram(s) Oral daily  losartan 25milliGRAM(s) Oral daily  metoprolol succinate ER 25milliGRAM(s) Oral daily  senna 2Tablet(s) Oral at bedtime  spironolactone 12.5milliGRAM(s) Oral daily  sucralfate 1Gram(s) Oral four times a day  tiotropium 18 MICROgram(s) Capsule 1Capsule(s) Inhalation daily  enoxaparin Injectable 40milliGRAM(s) SubCutaneous every 24 hours  insulin lispro (HumaLOG) corrective regimen sliding scale  SubCutaneous three times a day before meals  insulin lispro (HumaLOG) corrective regimen sliding scale  SubCutaneous at bedtime  dextrose 5%. 1000milliLiter(s) IV Continuous <Continuous>  dextrose 50% Injectable 12.5Gram(s) IV Push once  dextrose 50% Injectable 25Gram(s) IV Push once  dextrose 50% Injectable 25Gram(s) IV Push once  pantoprazole    Tablet 40milliGRAM(s) Oral two times a day before meals  metoclopramide Injectable 5milliGRAM(s) IV Push every 6 hours  docusate sodium 100milliGRAM(s) Oral three times a day  polyethylene glycol 3350 17Gram(s) Oral daily  dicyclomine 10milliGRAM(s) Oral two times a day before meals  linezolid    Tablet 600milliGRAM(s) Oral every 12 hours  furosemide    Tablet 40milliGRAM(s) Oral daily    MEDICATIONS  (PRN):  acetaminophen   Tablet. 650milliGRAM(s) Oral every 6 hours PRN Mild and Moderate pain  oxyCODONE  5 mG/acetaminophen 325 mG 1Tablet(s) Oral every 6 hours PRN Severe Pain (7 - 10)  acetaminophen   Tablet 650milliGRAM(s) Oral every 6 hours PRN For Temp greater than 38 C (100.4 F)  ALBUTerol    90 MICROgram(s) HFA Inhaler 2Puff(s) Inhalation every 6 hours PRN Shortness of Breath and/or Wheezing  dextrose Gel 1Dose(s) Oral once PRN Blood Glucose LESS THAN 70 milliGRAM(s)/deciliter  glucagon  Injectable 1milliGRAM(s) IntraMuscular once PRN Glucose LESS THAN 70 milligrams/deciliter  simethicone 80milliGRAM(s) Chew every 6 hours PRN Upset Stomach  guaiFENesin   Syrup  (Sugar-Free) 100milliGRAM(s) Oral every 6 hours PRN Cough      Vital Signs Last 24 Hrs  T(C): 36.4, Max: 36.6 (05-25 @ 20:30)  HR: 60 (60 - 74)  BP: 109/57 (107/58 - 119/65)  RR: 17 (16 - 17)  SpO2: 100% (100% - 100%)  Wt(kg): --  CAPILLARY BLOOD GLUCOSE  136 (26 May 2017 11:16)  97 (26 May 2017 08:17)  118 (25 May 2017 21:40)  82 (25 May 2017 17:06)    I&O's Summary  I & Os for 24h ending 26 May 2017 07:00  =============================================  IN: 410 ml / OUT: 0 ml / NET: 410 ml    I & Os for current day (as of 26 May 2017 13:32)  =============================================  IN: 0 ml / OUT: 225 ml / NET: -225 ml      PHYSICAL EXAM:  GENERAL: NAD, well-developed  HEAD:  Atraumatic, Normocephalic  EYES: EOMI, PERRLA, conjunctiva and sclera clear  NECK: Supple, No JVD  CHEST/LUNG: Clear to auscultation bilaterally; No wheeze  HEART: Regular rate and rhythm; No murmurs, rubs, or gallops  ABDOMEN: Soft, Nontender, Nondistended; Bowel sounds present  EXTREMITIES:  2+ Peripheral Pulses, No clubbing, cyanosis, or edema  PSYCH: AAOx3  NEUROLOGY: non-focal  SKIN: No rashes or lesions    LABS:                        11.6   6.83  )-----------( 296      ( 26 May 2017 04:25 )             38.0     05-26    141  |  98  |  20  ----------------------------<  102<H>  3.9   |  30  |  1.00    Ca    9.4      26 May 2017 04:26  Mg     2.1     05-26                  Consultant(s) Notes Reviewed:      Care Discussed with Consultants/Other Providers:

## 2017-05-26 NOTE — DISCHARGE NOTE ADULT - PROVIDER TOKENS
TOKEN:'[id number]:'[id number]:'[id number]',TOKEN:'[id number]' TOKEN:'[id number]:'[id number]',TOKEN:'[id number]'

## 2017-05-26 NOTE — DISCHARGE NOTE ADULT - CARE PROVIDERS DIRECT ADDRESSES
,DirectAddress_Unknown,DirectAddress_Unknown,DirectAddress_Unknown,DirectAddress_Unknown ,DirectAddress_Unknown,DirectAddress_Unknown,DirectAddress_Unknown

## 2017-05-26 NOTE — DISCHARGE NOTE ADULT - MEDICATION SUMMARY - MEDICATIONS TO TAKE
I will START or STAY ON the medications listed below when I get home from the hospital:    spironolactone 25 mg oral tablet  -- 0.5 tab(s) by mouth once a day  -- Indication: For CHF    acetaminophen 325 mg oral tablet  -- 2 tab(s) by mouth every 6 hours, As needed, For Temp greater than 38 C (100.4 F)  -- Indication: For Pain reliever    aspirin 81 mg oral delayed release tablet  -- 1 tab(s) by mouth once a day  -- Indication: For CAD (coronary artery disease)    acetaminophen 325 mg oral tablet  -- 2 tab(s) by mouth every 6 hours, As needed, Mild and Moderate (1-7)  -- Indication: For Pain reliever    Percocet 5/325 oral tablet  -- 1 tab(s) by mouth every 6 hours, As Needed for severe pain (8-10)  -- Indication: For Pain reliever    losartan 25 mg oral tablet  -- 1 tab(s) by mouth once a day  -- Indication: For HTN (hypertension)    amiodarone 200 mg oral tablet  -- 2 tab(s) by mouth once a day  -- Indication: For Antiarrhythmia    insulin lispro 100 units/mL subcutaneous solution  --  subcutaneous 3 times a day (before meals); 1 Unit(s) if Glucose 151 - 200  2 Unit(s) if Glucose 201 - 250  3 Unit(s) if Glucose 251 - 300  4 Unit(s) if Glucose 301 - 350  5 Unit(s) if Glucose 351 - 400  6 Unit(s) if Glucose Greater Than 400  -- Indication: For DM (diabetes mellitus)    insulin lispro 100 units/mL subcutaneous solution  --  subcutaneous once a day (at bedtime); 0 Unit(s) if Glucose 0 - 250  1 Unit(s) if Glucose 251 - 300  2 Unit(s) if Glucose 301 - 350  3 Unit(s) if Glucose 351 - 400  4 Unit(s) if Glucose Greater Than 400  -- Indication: For DM (diabetes mellitus)    allopurinol 300 mg oral tablet  -- 1 tab(s) by mouth once a day  -- Indication: For Gout    atorvastatin 20 mg oral tablet  -- 1 tab(s) by mouth once a day (at bedtime)  -- Indication: For HDL    metoprolol succinate 25 mg oral tablet, extended release  -- 1 tab(s) by mouth once a day  -- Indication: For HTN (hypertension)    albuterol 90 mcg/inh inhalation aerosol  -- 2 puff(s) inhaled every 6 hours, As needed, Shortness of Breath and/or Wheezing  -- Indication: For Breathing    budesonide-formoterol 160 mcg-4.5 mcg/inh inhalation aerosol  -- 2 puff(s) inhaled 2 times a day  -- Indication: For Breathing    tiotropium 18 mcg inhalation capsule  -- 1 cap(s) inhaled once a day  -- Indication: For Breathing    furosemide 40 mg oral tablet  -- 1 tab(s) by mouth once a day  -- Indication: For CHF    guaiFENesin 100 mg/5 mL oral liquid  -- 5 milliliter(s) by mouth every 6 hours, As needed, Cough  -- Indication: For Cough    dicyclomine 10 mg oral capsule  -- 1 cap(s) by mouth 2 times a day (before meals)  -- Indication: For GI    docusate sodium 100 mg oral capsule  -- 1 cap(s) by mouth 3 times a day  -- Indication: For Laxative    senna oral tablet  -- 2 tab(s) by mouth once a day (at bedtime)  -- Indication: For Laxative    simethicone 80 mg oral tablet, chewable  -- 1 tab(s) by mouth every 6 hours, As needed, Upset Stomach  -- Indication: For GERD (gastroesophageal reflux disease)    sucralfate 1 g oral tablet  -- 1 tab(s) by mouth 4 times a day  -- Indication: For GERD (gastroesophageal reflux disease)    linezolid 600 mg oral tablet  -- 1 tab(s) by mouth every 12 hours until 5/27/17.  -- Indication: For UTI (urinary tract infection)    lactobacillus acidophilus oral capsule  -- 1 cap(s) by mouth 3 times a day (with meals)  -- Indication: For GI    omeprazole 40 mg oral delayed release capsule  -- 1 cap(s) by mouth once a day  -- Indication: For GERD (gastroesophageal reflux disease)    levothyroxine 50 mcg (0.05 mg) oral tablet  -- 1 tab(s) by mouth once a day  -- Indication: For Hypothyroid

## 2017-05-26 NOTE — PROGRESS NOTE ADULT - PROBLEM SELECTOR PLAN 2
Enterococcus s to ampicillin and linezolid  Pt allergic to PCN  on Linezolid completing course
-abdominal discomfort likely associated to low flow state from CHF w/EF 20%;  -diurese per cardiology  -bentyl bid   -senna/colace  -miralax qd  -simethicone q6h prn
-abdominal discomfort likely associated to low flow state from CHF w/EF 20%;  -diurese per cardiology  -bentyl bid   -senna/colace  -miralax qd  -simethicone q6h prn  -dc per primary team
-abdominal discomfort likely associated to low flow state from CHF w/EF 20%;  -diurese per cardiology  -bentyl bid   -senna/colace  -miralax qd  -simethicone q6h x 3 days; then prn
Enterococcus s to ampicillin and linezolid  Duration per ID
Enterococcus s to ampicillin and linezolid  pt allergic to PCN  continue linezolid for now  BC neg
Multifactorial- UTI, low flow state
now on linezolid
-abdominal discomfort likely associated to low flow state from CHF w/EF 20%;  -diurese per cardiology  -bentyl bid added  -senna/colace  -miralax qd  -simethicone q6h x 3 days; then prn
Multifactorial- UTI, low flow state

## 2017-05-26 NOTE — PROGRESS NOTE ADULT - SUBJECTIVE AND OBJECTIVE BOX
Patient is a 77y old  Female who presents with a chief complaint of chest pain and sob (26 May 2017 02:50)  pt looks and feel weak  she sys as she tries to walk she gets sob   she is not sob while sitting       Any change in ROS: none from last night     MEDICATIONS  (STANDING):  allopurinol 300milliGRAM(s) Oral daily  amiodarone    Tablet 400milliGRAM(s) Oral daily  aspirin enteric coated 81milliGRAM(s) Oral daily  atorvastatin 20milliGRAM(s) Oral at bedtime  buDESOnide 160 MICROgram(s)/formoterol 4.5 MICROgram(s) Inhaler 2Puff(s) Inhalation two times a day  lactobacillus acidophilus 1Tablet(s) Oral daily  levothyroxine 50MICROGram(s) Oral daily  losartan 25milliGRAM(s) Oral daily  metoprolol succinate ER 25milliGRAM(s) Oral daily  senna 2Tablet(s) Oral at bedtime  spironolactone 12.5milliGRAM(s) Oral daily  sucralfate 1Gram(s) Oral four times a day  tiotropium 18 MICROgram(s) Capsule 1Capsule(s) Inhalation daily  enoxaparin Injectable 40milliGRAM(s) SubCutaneous every 24 hours  insulin lispro (HumaLOG) corrective regimen sliding scale  SubCutaneous three times a day before meals  insulin lispro (HumaLOG) corrective regimen sliding scale  SubCutaneous at bedtime  dextrose 5%. 1000milliLiter(s) IV Continuous <Continuous>  dextrose 50% Injectable 12.5Gram(s) IV Push once  dextrose 50% Injectable 25Gram(s) IV Push once  dextrose 50% Injectable 25Gram(s) IV Push once  pantoprazole    Tablet 40milliGRAM(s) Oral two times a day before meals  metoclopramide Injectable 5milliGRAM(s) IV Push every 6 hours  docusate sodium 100milliGRAM(s) Oral three times a day  polyethylene glycol 3350 17Gram(s) Oral daily  dicyclomine 10milliGRAM(s) Oral two times a day before meals  linezolid    Tablet 600milliGRAM(s) Oral every 12 hours  furosemide    Tablet 40milliGRAM(s) Oral daily    MEDICATIONS  (PRN):  acetaminophen   Tablet. 650milliGRAM(s) Oral every 6 hours PRN Mild and Moderate pain  oxyCODONE  5 mG/acetaminophen 325 mG 1Tablet(s) Oral every 6 hours PRN Severe Pain (7 - 10)  acetaminophen   Tablet 650milliGRAM(s) Oral every 6 hours PRN For Temp greater than 38 C (100.4 F)  ALBUTerol    90 MICROgram(s) HFA Inhaler 2Puff(s) Inhalation every 6 hours PRN Shortness of Breath and/or Wheezing  dextrose Gel 1Dose(s) Oral once PRN Blood Glucose LESS THAN 70 milliGRAM(s)/deciliter  glucagon  Injectable 1milliGRAM(s) IntraMuscular once PRN Glucose LESS THAN 70 milligrams/deciliter  simethicone 80milliGRAM(s) Chew every 6 hours PRN Upset Stomach  guaiFENesin   Syrup  (Sugar-Free) 100milliGRAM(s) Oral every 6 hours PRN Cough    Vital Signs Last 24 Hrs  T(C): 36.4, Max: 36.6 (05-25 @ 20:30)  T(F): 97.6, Max: 97.8 (05-25 @ 20:30)  HR: 60 (60 - 74)  BP: 109/57 (107/58 - 119/65)  BP(mean): --  RR: 17 (16 - 17)  SpO2: 100% (100% - 100%)    I&O's Summary  I & Os for 24h ending 26 May 2017 07:00  =============================================  IN: 410 ml / OUT: 0 ml / NET: 410 ml    I & Os for current day (as of 26 May 2017 12:59)  =============================================  IN: 0 ml / OUT: 225 ml / NET: -225 ml        Physical Exam:   GENERAL: NAD, well-groomed, well-developed  HEENT: VALDO/   Atraumatic, Normocephalic  ENMT: No tonsillar erythema, exudates, or enlargement; Moist mucous membranes, Good dentition, No lesions  NECK: Supple, No JVD, Normal thyroid  CHEST/LUNG: Clear to percussion bilaterally; No rales, rhonchi, wheezing, or rubs  CVS: Regular rate and rhythm; No murmurs, rubs, or gallops  GI: : Soft, Nontender, Nondistended; Bowel sounds present  NERVOUS SYSTEM:  Alert & Oriented X3, Good concentration; Motor Strength 5/5 B/L upper and lower extremities; DTRs 2+ intact and symmetric  EXTREMITIES:  2+ Peripheral Pulses, No clubbing, cyanosis, or edema  LYMPH: No lymphadenopathy noted  SKIN: No rashes or lesions  ENDOCRINOLOGY: No Thyromegaly  PSYCH: Appropriate/demented/others    Labs:                              11.6   6.83  )-----------( 296      ( 26 May 2017 04:25 )             38.0     05-26    141  |  98  |  20  ----------------------------<  102<H>  3.9   |  30  |  1.00    Ca    9.4      26 May 2017 04:26  Mg     2.1     05-26      CAPILLARY BLOOD GLUCOSE  136 (26 May 2017 11:16)  97 (26 May 2017 08:17)  118 (25 May 2017 21:40)  82 (25 May 2017 17:06)              Cultures:           Blood Culture:           05-21 @ 01:17  Organism Enterococcus faecalis VRE  COLONY COUNT: > = 100,000 CFU/ML  Gram stain --        Urine Culture:          05-21 @ 01:17  Organism Enterococcus faecalis VRE  COLONY COUNT: > = 100,000 CFU/ML        Wound culture:                05-22 @ 16:50  Organism --  Culture w/ gram stain --  Specimen Source BLOOD PERIPHERAL    Wound culture:                05-21 @ 01:17  Organism Enterococcus faecalis VRE  COLONY COUNT: > = 100,000 CFU/ML  Culture w/ gram stain --  Specimen Source URINE MIDSTREAM        Abscess culture:             05-22 @ 16:50  Organism --  Gram Stain --  Specimen Source BLOOD PERIPHERAL    Abscess culture:             05-21 @ 01:17  Organism Enterococcus faecalis VRE  COLONY COUNT: > = 100,000 CFU/ML  Gram Stain --  Specimen Source URINE MIDSTREAM        CSF:              05-21 @ 01:17  Organism Enterococcus faecalis VRE  COLONY COUNT: > = 100,000 CFU/ML  Gram Stain --        Tissue culture:           05-22 @ 16:50  Organism --  Gram Stain --  Specimen Source BLOOD PERIPHERAL    Tissue culture:           05-21 @ 01:17  Organism Enterococcus faecalis VRE  COLONY COUNT: > = 100,000 CFU/ML  Gram Stain --  Specimen Source URINE MIDSTREAM        Body Fluid Smear & Culture:                        05-22 @ 16:50  AFB Smear  --  Culture Acid Fast Body Fluid w/ Smear  --  Culture Acid Fast Smear Concentrated   --    Culture Results:     --  Specimen Source BLOOD PERIPHERAL    Body Fluid Smear & Culture:                        05-21 @ 01:17  AFB Smear  --  Culture Acid Fast Body Fluid w/ Smear  --  Culture Acid Fast Smear Concentrated   --    Culture Results:     --  Specimen Source URINE MIDSTREAM            Rapid Respiratory Viral Panel Result        05-20 @ 23:30  Rapid RVP --  Coronovirus NOT DETECTED  Adenovirus NOT DETECTED  Bordetella Pertussis NOT DETECTED  Chlamydia Pneumonia NOT DETECTED  Entero/RhinovirusNOT DETECTED  HKU1 Coronovirus NOT DETECTED  HMPV Coronovirus NOT DETECTED  Influenza A NOT DETECTED (any subtype)  Influenza AH1 NOT DETECTED  Influenza AH1 2009 NOT DETECTED  Influenza AH3 NOT DETECTED  Influenza B NOT DETECTED  Mycoplasma Pneumoniae NOT DETECTED This nucleic acid amplification assay was performed using  the sevenload. The following pathogens are tested  for: Adenovirus, Coronavirus 229E, Coronavirus HKU1,  Coronavirus NL63, Coronavirus OC43, Human Metapneumovirus  (HMPV),  NL63 Coronovirus NOT DETECTED  OC43 Coronovirus NOT DETECTED  Parainfluenza 1 NOT DETECTED  Parainfluenza 2 NOT DETECTED  Parainfluenza 3 NOT DETECTED  Parainfluenza 4 NOT DETECTED  Resp Syncytial Virus NOT DETECTED        StudiesIMPRESSION:     Normal renal ultrasound.  Chest X-RAY  INTERPRETATION:    CLINICAL INDICATION: Shortness of breath     TECHNIQUE: Portable frontal view of the chest.    COMPARISON: Frontal chest radiograph from 4/3/2017.    FINDINGS:   AICD in unchanged position over left chest.    Unchanged cardiomegaly.  Lungs are clear.  No pleural effusions or pneumothorax.  No acute osseous abnormality.      IMPRESSION:   Clear lungs.   CT SCAN Chest  IMPRESSION:    No evidence of pneumonia.    Colonic diverticulosis.    Tiny layering gallstones versus gallbladder sludge.   CT Abdomen  Venous Dopplers: LE:   Others

## 2017-05-26 NOTE — PROGRESS NOTE ADULT - ATTENDING COMMENTS
EP ATTENDING    Agree with above. Stable NICM - has MDT BiV-ICD. Continue toprol and amio 400mg daily for prior VF/VT. No further EP workup needed. Medical management for NICM and d/c planning as per primary cardiology team.
Pt. seen and examined.  Agree with above.    -Lasix changed to PO  -F/u heme onc  -No further CV workup needed at this time  -DC planning to rehab pending above   -f/u ID
Pt. seen and examined.  Agree with above.   -Continue with IV lasix  -Heme-on eval
Pt. seen and examined.  Agree with above.   -Continue with IV lasix  -Will change to PO lasix in am  -Onc eval - ? prognosis given metastatic CA  -EPS follow up
Will sign off, Call ID service with questions
No h/o A Fib per cardiology

## 2017-05-26 NOTE — PROGRESS NOTE ADULT - PROBLEM SELECTOR PROBLEM 3
H/O gastric ulcer
Penicillin allergy
Acute on chronic systolic heart failure
H/O gastric ulcer
HLD (hyperlipidemia)
Penicillin allergy

## 2017-05-26 NOTE — PROGRESS NOTE ADULT - PROBLEM SELECTOR PROBLEM 6
Asthma
GERD (gastroesophageal reflux disease)

## 2017-05-26 NOTE — PROGRESS NOTE ADULT - PROBLEM SELECTOR PROBLEM 1
DM (diabetes mellitus)
Acute cystitis without hematuria
Acute cystitis without hematuria
Acute on chronic systolic heart failure
DM (diabetes mellitus)
DM (diabetes mellitus)
Nausea

## 2017-05-26 NOTE — PROGRESS NOTE ADULT - PROBLEM SELECTOR PROBLEM 4
Acute on chronic congestive heart failure, unspecified congestive heart failure type
HLD (hyperlipidemia)
Acute on chronic congestive heart failure, unspecified congestive heart failure type
HLD (hyperlipidemia)
HLD (hyperlipidemia)
HTN (hypertension)

## 2017-05-26 NOTE — PROGRESS NOTE ADULT - PROBLEM SELECTOR PLAN 1
FS well controlled   well below 150  Monitor with sliding scale insulin
-improved  -zofran prn
-reglan x 5 days  -zofran prn
-reglan x 5 days  -zofran prn
Culture grew VRE  Still has symptoms  Linezolid through 5/27    Outpatient gyn eval- she notes dysuria for months.  I doubt that is due to an acute bacterial infection    Afebrile with normal WBC
FS well controlled   well below 150  Monitor with sliding scale insulin
FS well controlled   well below 150  Monitor with sliding scale insulin
Lasix 40mg IV Q24
Lasix 40mg IV Q24
on po lasix now  CT A in March have been negative for PE  CT CHEST 4TH April: No Pneumonia  patient has not been wheezing
on po lasix now  CT A in March have been negative for PE  CT CHEST 4TH April: No Pneumonia  patient has not been wheezing, cont current treatment
-reglan x 5 days  -zofran prn
Culture grew VRE  Still has symptoms  Change toliezolid for 5 days

## 2017-05-26 NOTE — DISCHARGE NOTE ADULT - PATIENT PORTAL LINK FT
“You can access the [facility] Patient Portal, offered by [facility], by registering with the following website: [email or web address]”

## 2017-05-26 NOTE — DISCHARGE NOTE ADULT - PLAN OF CARE
prevent future exacerbations with Dr. Hussein upon DC for cardio 6/2 at 2pm. Continue medications as prescribed. Continue low sodium diet. monitor fluid intake, monitor weight every morning. Monitor blood pressure. Do not smoke. Please inform your doctors with any new or worsening symptoms such as shortness of breath or leg swelling. Continue Antibiotics until 5/27. Follow up with gynecologist in 1 week. Started on Bentyl. Follow up with Gastroenterologist in 1 week. Follow up with oncologist in 1 week. Monitor fingersticks, low carbohydrate diet, minimize glucose intake.  Follow up with primary care physician for routine Hemoglobin A1C checks. Routine yearly eye and podiatry exams. Continue aspirin and Plavix, do not stop unless instructed by physician. Follow up with cardiologist in 1 week. Continue low cholesterol diet. Do not smoke, or drink alcohol. Continue with medications as prescribed. Decrease the total number of fat you eat, include fish in your diet, eat foods with high fiber. Limit alcohol and do not smoke. Maintain a healthy weight and exercise regulary. Follow up with primary care provider in 1 week. Followup with PMD and take all medications prescribed.

## 2017-05-26 NOTE — PROGRESS NOTE ADULT - PROBLEM SELECTOR PROBLEM 2
Abdominal pain, diffuse
Lower abdominal pain
R/O UTI (urinary tract infection)
Abdominal pain, diffuse
Lower abdominal pain
R/O UTI (urinary tract infection)
R/O UTI (urinary tract infection)
UTI (urinary tract infection)

## 2017-05-26 NOTE — PROGRESS NOTE ADULT - PROBLEM SELECTOR PLAN 3
switched to Lasix 40 mg po qd  Doing well  SOB resolved
Lasix 40mg IV Q24  Feeling better already  Tolerating well
Patient reports history of pcn allergy.  She provided limited history.  She recalls GI upset. But is unclear if she had breathing difficulty
continue lipitor
ppi bid  carafate qid
switched to Lasix 40 mg po qd
Patient reports history of pcn allergy.  She provided limited history.  She recalls GI upset. But is unclear if she had breathing difficulty
ppi bid  carafate qid

## 2017-05-26 NOTE — PROGRESS NOTE ADULT - SUBJECTIVE AND OBJECTIVE BOX
HPI:  78 yo female PMHx of Asthma, Afib on ASA, CAD, CHF, HLD, HTN, Lymphoma s/p resection and chemotherapy (in remission), and AICD presents with chest pain, sob and palpitations x 2-3 days. Chest pain described as midsternal chest pressure, 6/10, non-pleuritic, non-radiating, intermittent in nature where each episode lasts for 5 mins. Chest pain accompanied by palpitations and SOB. Pt admits to chronic GLASGOW with ADLS, but since last night her dyspnea was at rest. (21 May 2017 07:31)    Pt feels OK, gets lightheaded at times and sometimes SOB.    Meds:   acetaminophen   Tablet. 650milliGRAM(s) Oral every 6 hours PRN  oxyCODONE  5 mG/acetaminophen 325 mG 1Tablet(s) Oral every 6 hours PRN  acetaminophen   Tablet 650milliGRAM(s) Oral every 6 hours PRN  ALBUTerol    90 MICROgram(s) HFA Inhaler 2Puff(s) Inhalation every 6 hours PRN  allopurinol 300milliGRAM(s) Oral daily  amiodarone    Tablet 400milliGRAM(s) Oral daily  aspirin enteric coated 81milliGRAM(s) Oral daily  atorvastatin 20milliGRAM(s) Oral at bedtime  buDESOnide 160 MICROgram(s)/formoterol 4.5 MICROgram(s) Inhaler 2Puff(s) Inhalation two times a day  lactobacillus acidophilus 1Tablet(s) Oral daily  levothyroxine 50MICROGram(s) Oral daily  losartan 25milliGRAM(s) Oral daily  metoprolol succinate ER 25milliGRAM(s) Oral daily  senna 2Tablet(s) Oral at bedtime  spironolactone 12.5milliGRAM(s) Oral daily  sucralfate 1Gram(s) Oral four times a day  tiotropium 18 MICROgram(s) Capsule 1Capsule(s) Inhalation daily  enoxaparin Injectable 40milliGRAM(s) SubCutaneous every 24 hours  insulin lispro (HumaLOG) corrective regimen sliding scale  SubCutaneous three times a day before meals  insulin lispro (HumaLOG) corrective regimen sliding scale  SubCutaneous at bedtime  dextrose 5%. 1000milliLiter(s) IV Continuous <Continuous>  dextrose Gel 1Dose(s) Oral once PRN  dextrose 50% Injectable 12.5Gram(s) IV Push once  dextrose 50% Injectable 25Gram(s) IV Push once  dextrose 50% Injectable 25Gram(s) IV Push once  glucagon  Injectable 1milliGRAM(s) IntraMuscular once PRN  pantoprazole    Tablet 40milliGRAM(s) Oral two times a day before meals  metoclopramide Injectable 5milliGRAM(s) IV Push every 6 hours  docusate sodium 100milliGRAM(s) Oral three times a day  polyethylene glycol 3350 17Gram(s) Oral daily  dicyclomine 10milliGRAM(s) Oral two times a day before meals  linezolid    Tablet 600milliGRAM(s) Oral every 12 hours  furosemide    Tablet 40milliGRAM(s) Oral daily  simethicone 80milliGRAM(s) Chew every 6 hours PRN  guaiFENesin   Syrup  (Sugar-Free) 100milliGRAM(s) Oral every 6 hours PRN      Vital Signs Last 24 Hrs  T(C): 36.4, Max: 36.6 (05-25 @ 20:30)  T(F): 97.6, Max: 97.8 (05-25 @ 20:30)  HR: 60 (60 - 74)  BP: 109/57 (107/58 - 119/65)  BP(mean): --  RR: 17 (16 - 17)  SpO2: 100% (100% - 100%)                          11.6   6.83  )-----------( 296      ( 26 May 2017 04:25 )             38.0       05-26    141  |  98  |  20  ----------------------------<  102<H>  3.9   |  30  |  1.00    Ca    9.4      26 May 2017 04:26  Mg     2.1     05-26

## 2017-05-26 NOTE — DISCHARGE NOTE ADULT - CARE PLAN
Principal Discharge DX:	Acute on chronic congestive heart failure, unspecified congestive heart failure type  Goal:	prevent future exacerbations  Instructions for follow-up, activity and diet:	with Dr. Hsusein upon [place] for cardio 6/2 at 2pm. Continue medications as prescribed. Continue low sodium diet. monitor fluid intake, monitor weight every morning. Monitor blood pressure. Do not smoke. Please inform your doctors with any new or worsening symptoms such as shortness of breath or leg swelling.  Secondary Diagnosis:	UTI (urinary tract infection)  Instructions for follow-up, activity and diet:	Continue Antibiotics until 5/27. Follow up with gynecologist in 1 week.  Secondary Diagnosis:	Abdominal pain  Instructions for follow-up, activity and diet:	Started on Bentyl. Follow up with Gastroenterologist in 1 week.  Secondary Diagnosis:	Lymphoma  Instructions for follow-up, activity and diet:	Follow up with oncologist in 1 week.  Secondary Diagnosis:	DM (diabetes mellitus)  Instructions for follow-up, activity and diet:	Monitor fingersticks, low carbohydrate diet, minimize glucose intake.  Follow up with primary care physician for routine Hemoglobin A1C checks. Routine yearly eye and podiatry exams.  Secondary Diagnosis:	CAD (coronary artery disease)  Instructions for follow-up, activity and diet:	Continue aspirin and Plavix, do not stop unless instructed by physician. Follow up with cardiologist in 1 week. Continue low cholesterol diet. Do not smoke, or drink alcohol.  Secondary Diagnosis:	HLD (hyperlipidemia)  Instructions for follow-up, activity and diet:	Continue with medications as prescribed. Decrease the total number of fat you eat, include fish in your diet, eat foods with high fiber. Limit alcohol and do not smoke. Maintain a healthy weight and exercise regulary. Follow up with primary care provider in 1 week. Principal Discharge DX:	Acute on chronic congestive heart failure, unspecified congestive heart failure type  Goal:	prevent future exacerbations  Instructions for follow-up, activity and diet:	with Dr. Hussein upon [place] for cardio 6/2 at 2pm. Continue medications as prescribed. Continue low sodium diet. monitor fluid intake, monitor weight every morning. Monitor blood pressure. Do not smoke. Please inform your doctors with any new or worsening symptoms such as shortness of breath or leg swelling.  Secondary Diagnosis:	UTI (urinary tract infection)  Goal:	Followup with PMD and take all medications prescribed.  Instructions for follow-up, activity and diet:	Continue Antibiotics until 5/27. Follow up with gynecologist in 1 week.  Secondary Diagnosis:	Abdominal pain  Goal:	Followup with PMD and take all medications prescribed.  Instructions for follow-up, activity and diet:	Started on Bentyl. Follow up with Gastroenterologist in 1 week.  Secondary Diagnosis:	Lymphoma  Goal:	Followup with PMD and take all medications prescribed.  Instructions for follow-up, activity and diet:	Follow up with oncologist in 1 week.  Secondary Diagnosis:	DM (diabetes mellitus)  Goal:	Followup with PMD and take all medications prescribed.  Instructions for follow-up, activity and diet:	Monitor fingersticks, low carbohydrate diet, minimize glucose intake.  Follow up with primary care physician for routine Hemoglobin A1C checks. Routine yearly eye and podiatry exams.  Secondary Diagnosis:	CAD (coronary artery disease)  Goal:	Followup with PMD and take all medications prescribed.  Instructions for follow-up, activity and diet:	Continue aspirin and Plavix, do not stop unless instructed by physician. Follow up with cardiologist in 1 week. Continue low cholesterol diet. Do not smoke, or drink alcohol.  Secondary Diagnosis:	HLD (hyperlipidemia)  Goal:	Followup with PMD and take all medications prescribed.  Instructions for follow-up, activity and diet:	Continue with medications as prescribed. Decrease the total number of fat you eat, include fish in your diet, eat foods with high fiber. Limit alcohol and do not smoke. Maintain a healthy weight and exercise regulary. Follow up with primary care provider in 1 week. Principal Discharge DX:	Acute on chronic congestive heart failure, unspecified congestive heart failure type  Goal:	prevent future exacerbations  Instructions for follow-up, activity and diet:	with Dr. Hussein upon [facility] for cardio 6/2 at 2pm. Continue medications as prescribed. Continue low sodium diet. monitor fluid intake, monitor weight every morning. Monitor blood pressure. Do not smoke. Please inform your doctors with any new or worsening symptoms such as shortness of breath or leg swelling.  Secondary Diagnosis:	UTI (urinary tract infection)  Goal:	Followup with PMD and take all medications prescribed.  Instructions for follow-up, activity and diet:	Continue Antibiotics until 5/27. Follow up with gynecologist in 1 week.  Secondary Diagnosis:	Abdominal pain  Goal:	Followup with PMD and take all medications prescribed.  Instructions for follow-up, activity and diet:	Started on Bentyl. Follow up with Gastroenterologist in 1 week.  Secondary Diagnosis:	Lymphoma  Goal:	Followup with PMD and take all medications prescribed.  Instructions for follow-up, activity and diet:	Follow up with oncologist in 1 week.  Secondary Diagnosis:	DM (diabetes mellitus)  Goal:	Followup with PMD and take all medications prescribed.  Instructions for follow-up, activity and diet:	Monitor fingersticks, low carbohydrate diet, minimize glucose intake.  Follow up with primary care physician for routine Hemoglobin A1C checks. Routine yearly eye and podiatry exams.  Secondary Diagnosis:	CAD (coronary artery disease)  Goal:	Followup with PMD and take all medications prescribed.  Instructions for follow-up, activity and diet:	Continue aspirin and Plavix, do not stop unless instructed by physician. Follow up with cardiologist in 1 week. Continue low cholesterol diet. Do not smoke, or drink alcohol.  Secondary Diagnosis:	HLD (hyperlipidemia)  Goal:	Followup with PMD and take all medications prescribed.  Instructions for follow-up, activity and diet:	Continue with medications as prescribed. Decrease the total number of fat you eat, include fish in your diet, eat foods with high fiber. Limit alcohol and do not smoke. Maintain a healthy weight and exercise regulary. Follow up with primary care provider in 1 week. Principal Discharge DX:	Acute on chronic congestive heart failure, unspecified congestive heart failure type  Goal:	prevent future exacerbations  Instructions for follow-up, activity and diet:	with Dr. Hussein upon [place] for cardio 6/2 at 2pm. Continue medications as prescribed. Continue low sodium diet. monitor fluid intake, monitor weight every morning. Monitor blood pressure. Do not smoke. Please inform your doctors with any new or worsening symptoms such as shortness of breath or leg swelling.  Secondary Diagnosis:	UTI (urinary tract infection)  Goal:	Followup with PMD and take all medications prescribed.  Instructions for follow-up, activity and diet:	Continue Antibiotics until 5/27. Follow up with gynecologist in 1 week.  Secondary Diagnosis:	Abdominal pain  Goal:	Followup with PMD and take all medications prescribed.  Instructions for follow-up, activity and diet:	Started on Bentyl. Follow up with Gastroenterologist in 1 week.  Secondary Diagnosis:	Lymphoma  Goal:	Followup with PMD and take all medications prescribed.  Instructions for follow-up, activity and diet:	Follow up with oncologist in 1 week.  Secondary Diagnosis:	DM (diabetes mellitus)  Goal:	Followup with PMD and take all medications prescribed.  Instructions for follow-up, activity and diet:	Monitor fingersticks, low carbohydrate diet, minimize glucose intake.  Follow up with primary care physician for routine Hemoglobin A1C checks. Routine yearly eye and podiatry exams.  Secondary Diagnosis:	CAD (coronary artery disease)  Goal:	Followup with PMD and take all medications prescribed.  Instructions for follow-up, activity and diet:	Continue aspirin and Plavix, do not stop unless instructed by physician. Follow up with cardiologist in 1 week. Continue low cholesterol diet. Do not smoke, or drink alcohol.  Secondary Diagnosis:	HLD (hyperlipidemia)  Goal:	Followup with PMD and take all medications prescribed.  Instructions for follow-up, activity and diet:	Continue with medications as prescribed. Decrease the total number of fat you eat, include fish in your diet, eat foods with high fiber. Limit alcohol and do not smoke. Maintain a healthy weight and exercise regulary. Follow up with primary care provider in 1 week.

## 2017-05-26 NOTE — DISCHARGE NOTE ADULT - MEDICATION SUMMARY - MEDICATIONS TO STOP TAKING
I will STOP taking the medications listed below when I get home from the hospital:    predniSONE 20 mg oral tablet  -- 2 tab(s) by mouth once a day for 4 days

## 2017-05-26 NOTE — PROGRESS NOTE ADULT - ASSESSMENT
76 yo female PMHx of Asthma, Afib on ASA, CAD, CHF, HLD, HTN,Gastric Ulcer on EGD 3/2017 with presbyesophagus noted, Lymphoma s/p resection and chemotherapy (in remission), and AICD presents with chest pain, sob and palpitations x 2-3 days. Pt also reports that she has also been experiencing some nausea with abdominal discomfort w/"gas discomfort" since being short of breath
76 yo female PMHx of Asthma, Afib on ASA, CAD, CHF, HLD, HTN,Gastric Ulcer on EGD 3/2017 with presbyesophagus noted, Lymphoma s/p resection and chemotherapy (in remission), and AICD presents with chest pain, sob and palpitations x 2-3 days. Pt also reports that she has also been experiencing some nausea with abdominal discomfort w/"gas discomfort" since being short of breath
76 yo female PMHx of Asthma, CAD, CHF, HLD, HTN, Lymphoma s/p resection and chemotherapy (in remission), and AICD presents with chest pain, sob and palpitations x 2-3 days, admitted to tele for acute on chronic systolic heart failure, r/o ACS.
76 yo female PMHx of Asthma, CAD, CHF, HLD, HTN, Lymphoma s/p resection and chemotherapy (in remission), and AICD presents with chest pain, sob and palpitations x 2-3 days, admitted to tele for acute on chronic systolic heart failure, r/o ACS.  Asthma is stable
77 year old female with SOB and cough likely due to fluid overload.     In addition, she has dysuria with pyuria and bacteruria.    Suspect UTI- Urine culture isVRE
77 year old female with SOB and cough likely due to fluid overload.     In addition, she has dysuria with pyuria and bacteruria.    Suspect UTI- Urine culture isVRE
78 yo female PMHx of Asthma, Afib on ASA, CAD, CHF, HLD, HTN,Gastric Ulcer on EGD 3/2017 with presbyesophagus noted, Lymphoma s/p resection and chemotherapy (in remission), and AICD presents with chest pain, sob and palpitations x 2-3 days. Pt also reports that she has also been experiencing some nausea with abdominal discomfort w/"gas discomfort" since being short of breath
78 yo female PMHx of Asthma, Afib on ASA, CAD, CHF, HLD, HTN,Gastric Ulcer on EGD 3/2017 with presbyesophagus noted, Lymphoma s/p resection and chemotherapy (in remission), and AICD presents with chest pain, sob and palpitations x 2-3 days. Pt also reports that she has also been experiencing some nausea with abdominal discomfort w/"gas discomfort" since being short of breath
78 yo female PMHx of Asthma, CAD, CHF, HLD, HTN, Lymphoma s/p resection and chemotherapy (in remission), and AICD presents with chest pain, sob and palpitations x 2-3 days, admitted to tele for acute on chronic systolic heart failure, r/o ACS.
78 yo female PMHx of Asthma, CAD, CHF, HLD, HTN, Lymphoma s/p resection and chemotherapy (in remission), and AICD presents with chest pain, sob and palpitations x 2-3 days, admitted to tele for acute on chronic systolic heart failure, r/o ACS.  Asthma is stable
· Assessment and Rec:       H/O NHL on chemo, last chemo a few weeks ago, details not clear, clinically no evidence od NHL, will recommend:  - pt was able to find info for Dr. Izzy Lara - [phone] and [phone] - called both nos and left messages for a call back  - continue Rx as per medicine, cardiology  - told the pt to obtain info on the oncologist  - on DVT prophylaxis  - obtain info from primary oncologist, waiting for the call back  - discussed with cardiology.

## 2017-05-26 NOTE — PROGRESS NOTE ADULT - SUBJECTIVE AND OBJECTIVE BOX
Subjective    denies chest pain SOB improving     Medications   acetaminophen   Tablet. 650milliGRAM(s) Oral every 6 hours PRN  oxyCODONE  5 mG/acetaminophen 325 mG 1Tablet(s) Oral every 6 hours PRN  acetaminophen   Tablet 650milliGRAM(s) Oral every 6 hours PRN  ALBUTerol    90 MICROgram(s) HFA Inhaler 2Puff(s) Inhalation every 6 hours PRN  allopurinol 300milliGRAM(s) Oral daily  amiodarone    Tablet 400milliGRAM(s) Oral daily  aspirin enteric coated 81milliGRAM(s) Oral daily  atorvastatin 20milliGRAM(s) Oral at bedtime  buDESOnide 160 MICROgram(s)/formoterol 4.5 MICROgram(s) Inhaler 2Puff(s) Inhalation two times a day  lactobacillus acidophilus 1Tablet(s) Oral daily  levothyroxine 50MICROGram(s) Oral daily  losartan 25milliGRAM(s) Oral daily  metoprolol succinate ER 25milliGRAM(s) Oral daily  senna 2Tablet(s) Oral at bedtime  spironolactone 12.5milliGRAM(s) Oral daily  sucralfate 1Gram(s) Oral four times a day  tiotropium 18 MICROgram(s) Capsule 1Capsule(s) Inhalation daily  enoxaparin Injectable 40milliGRAM(s) SubCutaneous every 24 hours  insulin lispro (HumaLOG) corrective regimen sliding scale  SubCutaneous three times a day before meals  insulin lispro (HumaLOG) corrective regimen sliding scale  SubCutaneous at bedtime  dextrose 5%. 1000milliLiter(s) IV Continuous <Continuous>  dextrose Gel 1Dose(s) Oral once PRN  dextrose 50% Injectable 12.5Gram(s) IV Push once  dextrose 50% Injectable 25Gram(s) IV Push once  dextrose 50% Injectable 25Gram(s) IV Push once  glucagon  Injectable 1milliGRAM(s) IntraMuscular once PRN  pantoprazole    Tablet 40milliGRAM(s) Oral two times a day before meals  metoclopramide Injectable 5milliGRAM(s) IV Push every 6 hours  docusate sodium 100milliGRAM(s) Oral three times a day  polyethylene glycol 3350 17Gram(s) Oral daily  dicyclomine 10milliGRAM(s) Oral two times a day before meals  linezolid    Tablet 600milliGRAM(s) Oral every 12 hours  furosemide    Tablet 40milliGRAM(s) Oral daily  simethicone 80milliGRAM(s) Chew every 6 hours PRN  guaiFENesin   Syrup  (Sugar-Free) 100milliGRAM(s) Oral every 6 hours PRN    Allergies    peanuts (Unknown)  penicillin (Nausea)    Intolerances; none                              11.6   6.83  )-----------( 296      ( 26 May 2017 04:25 )             38.0       05-26    141  |  98  |  20  ----------------------------<  102<H>  3.9   |  30  |  1.00    Ca    9.4      26 May 2017 04:26  Mg     2.1     05-26              T(C): 36.4, Max: 36.6 (05-25 @ 20:30)  HR: 60 (60 - 74)  BP: 109/57 (107/58 - 119/65)  RR: 17 (16 - 17)  SpO2: 100% (100% - 100%)  Wt(kg): --    I&O's Summary  I & Os for 24h ending 26 May 2017 07:00  =============================================  IN: 410 ml / OUT: 0 ml / NET: 410 ml    I & Os for current day (as of 26 May 2017 13:18)  =============================================  IN: 0 ml / OUT: 225 ml / NET: -225 ml    PREVIOUS DIAGNOSTIC TESTING:    [ ] Echocardiogram: 3/31/16: CONCLUSIONS:  1. Mitral annular calcification, otherwise normal mitral  valve. Mild-moderate mitral regurgitation.  2. Severely dilated left atrium.  LA volume index = 62  cc/m2.  3. Severe left ventricular enlargement.  4. Severe global left ventricular systolic dysfunction.  5. Normal right ventricular size with decreased right  ventricular systolic function.  A device wire is noted in  the right heart.    [ ]  Catheterization: 4/2016 - LAD:   --  Proximal LAD: There was a [id number] % stenosis. There was mild  restenosis in proximal LAD stent.    Appearance: Normal	  HEENT:   Normal oral mucosa, PERRL, EOMI	  Lymphatic: No lymphadenopathy  Cardiovascular: Normal S1 S2, No JVD, No murmurs, No edema  Respiratory: Lungs clear to auscultation	  Psychiatry: A & O x 3, Mood & affect appropriate  Gastrointestinal:  Soft, Non-tender, + BS	  Skin: No rashes, No ecchymoses, No cyanosis	  Neurologic: Non-focal  Extremities:AROM WFL's , No clubbing, cyanosis or edema  Vascular: Peripheral pulses palpable 2+ bilaterally    TELEMETRY: 	  paced   ECG:  av paced   RADIOLOGY:   Renal US  normal study   OTHER: 	    ASSESSMENT/PLAN: 	Ms. Pulido is a very pleasant 77 year old female well known to our service with hypertension, dyslipidemia, CAD s/p LAD stent with only mild re-stenosis with minor luminal irregularities otherwise on cath 4/16, with known severe LV dysfunction , ICM s/p Medtronic BiV ICD, COPD, lymphoma previously on chemo with known spinal mets, who is now admitted with shortness of breath, palpitations, reported wheezing at home with no associated chest pain or  ICD fire or syncope/near syncope with +E.faecalis UTI who ruled out for ACS with acute on chronic systolic CHF exac:      - acute on chronic systolic CHF exac - patient diuresed well -  tolerating Lasix 40 mg po daily   -  c/w cozaar, toprol, and aldactone for known CM  - ID noted - blood cx negative -  Acute cystitis with VRE without hematuria.  Plan: Culture grew VRE  - c/w PO Linezolid through 5/27 and rec outpatient gyn eval for dysuria - low suspicion for acute bacterial infection  -EP appreciated - Medtronic ICD interrogation WNL --> NSR   - c/w Amio 400mg po qday for h/o VT - the patient has NO HISTORY OF AFIB  - renal u/s normal  - pulm noted - c/w diuresis   - onc eval with Dr. Walsh pendmasood - patient with known lymphoma and spinal mets (follows with outside onc Dr. escalante [phone], [phone])  No further cardiac work up required at this time  - patient for dc to rehab when bed available  - f/u with Dr. Hussein upon DC for cardio 6/2 @ 2pm

## 2017-05-26 NOTE — DISCHARGE NOTE ADULT - CARE PROVIDER_API CALL
Raymond Kinney (MD), Cardiovascular Disease  [address] [facility]  [place] [address]  Phone: [phone]  Fax: [phone]    Jose Bella (DO), Internal Medicine  [address] [place] [address]  Phone: [phone]  Fax: [phone]    Castillo Shea (MD), Cardiac Electrophysiology; Cardiovascular Disease; Internal Medicine; Nuclear Cardiology  [address] [facility]  [place] [address]  Phone: [phone]  Fax: [phone]    Wilfrid Hussein), Cardiology  [address] [facility]  [place] [address]  Phone: [phone]  Fax: [phone] Jose Bella (), Internal Medicine  [address] [place] [address]  Phone: [phone]  Fax: [phone]    Castillo Shea), Cardiac Electrophysiology; Cardiovascular Disease; Internal Medicine; Nuclear Cardiology  [address] [facility]  [place] [address]  Phone: [phone]  Fax: [phone]    Wilfrid Hussein), Cardiology  [address] [facility]  [place] [address]  Phone: [phone]  Fax: [phone]

## 2017-05-26 NOTE — PROGRESS NOTE ADULT - PROBLEM SELECTOR PLAN 6
continue spiriva and albuterol  Monitor SpO2  no bronchospasm observed
continue PPI
continue spiriva and albuterol  Monitor SpO2  no bronchospasm observed
continue spiriva and albuterol  Monitor SpO2  no bronchospasm observed

## 2017-05-26 NOTE — PROGRESS NOTE ADULT - PROBLEM SELECTOR PLAN 4
continue lipitor  Outpatient recheck Lipid panel
Monitor BP daily  DASH diet  Continue bp meds
continue lipitor  Outpatient recheck Lipid panel
continue lipitor  Outpatient recheck Lipid panel
dijeanniee per cardiology/appreciated  dc per primary team
diurese per cardiology/appreciated

## 2017-05-26 NOTE — PROGRESS NOTE ADULT - PROBLEM SELECTOR PLAN 5
Well controlled  monitor
symbicort & Spiriva  : no wheezing: no need for steroids
symbicort & Spiriva  : no wheezing: no need for steroids
symbicort: no wheezing: no need for steroids
symbicort: no wheezing: no need for steroids

## 2017-06-26 NOTE — ED PROVIDER NOTE - PMH
Asthma    CAD (coronary artery disease)    CHF (congestive heart failure)  On home oxygen 2L PRN  DM (diabetes mellitus)    GERD (gastroesophageal reflux disease)    HLD (hyperlipidemia)    HTN (hypertension)    Lymphoma  S/P resection and chemotherapy in remission  MI (myocardial infarction)    Paroxysmal atrial fibrillation

## 2017-06-26 NOTE — ED PROVIDER NOTE - CALF TENDERNESS
bilateral +leg and back pain with bilateral straight leg raise, no radiating pain. No calf swelling, erythema or tenderness/none

## 2017-06-26 NOTE — ED PROVIDER NOTE - ATTENDING CONTRIBUTION TO CARE
Case of a 78 y/o female patient with past medical history of asthma, CAD, CHF (EF 21%), HLD, HTN, Hodgkin lymphoma s/p resection and chemotherapy (in remission), and AICD presenting with episode of chest pain and generalized pain all over. Physical exam within normal limits, will do cardiac workup, pain meds and reassess. Agree with resident's physical exam, assessment and documentation.

## 2017-06-26 NOTE — ED PROVIDER NOTE - PROGRESS NOTE DETAILS
BNP elevated, 3700, pulm congestion on CXR treating with lasix 40 IVP. CXR read of LLL opacity, no symptoms of pneumonia, will wait to treat.

## 2017-06-26 NOTE — ED ADULT NURSE NOTE - OBJECTIVE STATEMENT
Pt received in #7, aaox3 with c/o chest pain, sob, back pain, hip pain and b/l lower extremity pain. States she was given percocet at NH for her pain, which relieved her pain but symptoms returned shortly thereafter. No swelling of lower extremities noted. Pt on CM in SR, IV established, labs sent.

## 2017-06-26 NOTE — ED PROVIDER NOTE - OBJECTIVE STATEMENT
78yo female pmh asthma, CAD, CHF (EF 21%), HLD, HTN, lymphoma s/p resection and chemotherapy (in remission), and AICD p/w chest pain. Intermittent chest pain, non-radiating, 6/10 no modifying factors located left parasternal. C/o worsening back and bilateral leg pain, non-radiating x weeks, improves with percocetDenies fevers, vision changes, recent travel or illness, n/v/d, incontinence, saddle anesthesia, dysuria, leg swelling. 78yo female pmh asthma, CAD, CHF (EF 21%), HLD, HTN, lymphoma s/p resection and chemotherapy (in remission), and AICD p/w chest pain. Intermittent chest pain, non-radiating, 6/10 no modifying factors located left parasternal. C/o worsening back and bilateral leg pain, non-radiating x weeks, improves with percocet. Denies fevers, vision changes, recent travel or illness, n/v/d, incontinence, saddle anesthesia, dysuria, leg swelling. Cough at baseline.

## 2017-06-26 NOTE — ED PROVIDER NOTE - MEDICAL DECISION MAKING DETAILS
77F CAD, CHF, HTN, HLD, NHL in remission p/w chest pain, bibasilar rales concern for CHF exacerbation. No ST changes on EKG. No clinical signs of PE. Back and leg pain appear MSK in origin, no signs of cord compression. Labs for electrolyte imbalance. Will need admission

## 2017-06-27 NOTE — H&P ADULT - HISTORY OF PRESENT ILLNESS
76 y/o F with hx of asthma, CAD, CHF (EF; 21%), HLD, HTN, presents with chest pain x 1 day. Patient states chest pain is intermittent, non radiating, L sided. She cannot recall what makes the pain better or worse. Also complains of worsening of back and b/l leg pain x weeks. Patient also complains of SOB with exertion. Endorses 3 pillow orhtopnea. Denies fever, chills, cough, falls, LOC, melena, hematochezia, LE edema, diarrhea, constipation. 76 y/o F with hx of asthma, CAD, CHF (EF; 21%), HLD, HTN, presents with chest pain x 1 day. Patient states chest pain is intermittent, non radiating, L sided. She cannot recall what makes the pain better or worse. Also complains of worsening of back and b/l leg pain x weeks. Patient also complains of SOB with exertion. Endorses 3 pillow orthopnea. Denies fever, chills, cough, falls, LOC, melena, hematochezia, LE edema, diarrhea, or constipation    On admission: comfortable, no complaints

## 2017-06-27 NOTE — H&P ADULT - ASSESSMENT
78 y/o F with hx of asthma, CAD, CHF (EF; 21%), HLD, HTN, presents with chest pain x 1 day. admitted to r/o ACS and acute on chronic CHF

## 2017-06-27 NOTE — CONSULT NOTE ADULT - SUBJECTIVE AND OBJECTIVE BOX
Patient is a 77y old  Female who presents with a chief complaint of chest pain, abdominal pain (27 Jun 2017 14:57), has h/o lymphoma of the spine, has received one IV Rx and 12 intrathecal Rx but does not know when her last Rx was and the details of her lymphoma. Here with cardiac issues again - has cough, SOB, sometimes lightheadedness. Could not find the name or no. of her oncologist today.      PAST MEDICAL & SURGICAL HISTORY:  Paroxysmal atrial fibrillation  GERD (gastroesophageal reflux disease)  DM (diabetes mellitus)  Asthma  MI (myocardial infarction)  CAD (coronary artery disease)  Lymphoma: S/P resection and chemotherapy in remission  HLD (hyperlipidemia)  CHF (congestive heart failure): On home oxygen 2L PRN  HTN (hypertension)  ICD (implantable cardioverter-defibrillator) in place  S/P coronary artery stent placement  S/P myomectomy  S/P appendectomy  S/P lymph node biopsy: Biopsy and resection    Meds:  spironolactone 12.5 milliGRAM(s) Oral daily  aspirin enteric coated 81 milliGRAM(s) Oral daily  losartan 25 milliGRAM(s) Oral daily  amiodarone    Tablet 400 milliGRAM(s) Oral daily  allopurinol 300 milliGRAM(s) Oral daily  atorvastatin 20 milliGRAM(s) Oral at bedtime  metoprolol succinate ER 25 milliGRAM(s) Oral daily  buDESOnide 160 MICROgram(s)/formoterol 4.5 MICROgram(s) Inhaler 2 Puff(s) Inhalation two times a day  tiotropium 18 MICROgram(s) Capsule 1 Capsule(s) Inhalation daily  furosemide   Injectable 40 milliGRAM(s) IV Push daily  dicyclomine 10 milliGRAM(s) Oral two times a day before meals  docusate sodium 100 milliGRAM(s) Oral three times a day  senna 2 Tablet(s) Oral at bedtime  simethicone 80 milliGRAM(s) Chew every 6 hours PRN  sucralfate 1 Gram(s) Oral four times a day  latanoprost 0.005% Ophthalmic Solution 1 Drop(s) Both EYES at bedtime  lactobacillus acidophilus 1 Tablet(s) Oral daily  pantoprazole    Tablet 40 milliGRAM(s) Oral before breakfast  levothyroxine 50 MICROGram(s) Oral daily  sodium chloride 0.9% lock flush 3 milliLiter(s) IV Push every 8 hours  enoxaparin Injectable 40 milliGRAM(s) SubCutaneous every 24 hours    Allergies:  peanuts (Unknown)  penicillin (Nausea)      FAMILY HISTORY:  No pertinent family history in first degree relatives      Vital Signs Last 24 Hrs  T(C): 36.1 (27 Jun 2017 14:28), Max: 37.1 (26 Jun 2017 20:51)  T(F): 97 (27 Jun 2017 14:28), Max: 98.7 (26 Jun 2017 20:51)  HR: 64 (27 Jun 2017 14:28) (62 - 71)  BP: 117/59 (27 Jun 2017 14:28) (107/57 - 130/68)  BP(mean): --  RR: 18 (27 Jun 2017 14:28) (16 - 18)  SpO2: 98% (27 Jun 2017 14:28) (98% - 100%)                          12.4   6.64  )-----------( 295      ( 27 Jun 2017 06:45 )             39.8       06-27    140  |  96<L>  |  15  ----------------------------<  70  4.1   |  28  |  0.80    Ca    10.2      27 Jun 2017 06:45  Phos  3.3     06-27  Mg     2.0     06-27    TPro  6.9  /  Alb  3.7  /  TBili  < 0.2<L>  /  DBili  x   /  AST  30  /  ALT  18  /  AlkPhos  102  06-26

## 2017-06-27 NOTE — H&P ADULT - PROBLEM SELECTOR PLAN 1
Admit to tele  check cbc, bmp, a1c, flp, tsh, trend CE  Strict I&Os  Fluid restriction   lasix 40 IV daily   cont losartan  cont aldactone  f/u MD note

## 2017-06-27 NOTE — ED ADULT NURSE REASSESSMENT NOTE - NS ED NURSE REASSESS COMMENT FT1
Pt received awake and alert sob noted with activity , pt received with 2L NC in place 02 sat 100 %. Pt currently denies chest pain. Pt noted  to be unsteady on her gait. assisted to bathroom. Pts skin intact. vs wnl. Pt tolerated breakfast well. Pt now transferred to cticu hold area room.
NSR on CM.

## 2017-06-28 NOTE — CONSULT NOTE ADULT - SUBJECTIVE AND OBJECTIVE BOX
Patient is a 77y old  Female who presents with a chief complaint of chest pain, abdominal pain (27 Jun 2017 14:57)      HPI:  76 y/o F with hx of asthma, CAD, CHF (EF; 21%), HLD, HTN, presents with chest pain x 1 day. Patient states chest pain is intermittent, non radiating, L sided. She states chest pain is unpredictable. Also complains of worsening of back and b/l leg pain x weeks. Patient also complains of SOB with exertion. Also c/o 3 pillow orthopnea. Denies fever, chills, cough, falls, LOC, melena, hematochezia, LE edema, diarrhea, or constipation   currently lying flat now, no visible distress      PAST MEDICAL & SURGICAL HISTORY:  Paroxysmal atrial fibrillation  GERD (gastroesophageal reflux disease)  DM (diabetes mellitus)  Asthma  MI (myocardial infarction)  CAD (coronary artery disease)  Lymphoma: S/P resection and chemotherapy in remission  HLD (hyperlipidemia)  CHF (congestive heart failure): On home oxygen 2L PRN  HTN (hypertension)  ICD (implantable cardioverter-defibrillator) in place  S/P coronary artery stent placement  S/P myomectomy  S/P appendectomy  S/P lymph node biopsy: Biopsy and resection      Review of Systems:   CONSTITUTIONAL: No fever, weight loss, or fatigue  EYES: No eye pain, visual disturbances, or discharge  ENMT:  No difficulty hearing, tinnitus, vertigo; No sinus or throat pain  NECK: No pain or stiffness  RESPIRATORY: No cough, wheezing, chills or hemoptysis; No shortness of breath  CARDIOVASCULAR: No chest pain, palpitations, dizziness, or leg swelling  GASTROINTESTINAL: No abdominal or epigastric pain. No nausea, vomiting, or hematemesis; No diarrhea or constipation. No melena or hematochezia.  GENITOURINARY: No dysuria, frequency, hematuria, or incontinence  NEUROLOGICAL: No headaches, memory loss, loss of strength, numbness, or tremors  SKIN: No itching, burning, rashes, or lesions   LYMPH NODES: No enlarged glands  ENDOCRINE: No heat or cold intolerance; No hair loss  MUSCULOSKELETAL: No joint pain or swelling; No muscle, back, or extremity pain  PSYCHIATRIC: No depression, anxiety, mood swings, or difficulty sleeping  HEME/LYMPH: No easy bruising, or bleeding gums  ALLERY AND IMMUNOLOGIC: No hives or eczema    Allergies    peanuts (Unknown)  penicillin (Nausea)    Intolerances        Social History: nonsmoker, no IVDA no ETOH    FAMILY HISTORY:  No pertinent family history in first degree relatives      MEDICATIONS  (STANDING):  spironolactone 12.5 milliGRAM(s) Oral daily  aspirin enteric coated 81 milliGRAM(s) Oral daily  losartan 25 milliGRAM(s) Oral daily  amiodarone    Tablet 400 milliGRAM(s) Oral daily  allopurinol 300 milliGRAM(s) Oral daily  atorvastatin 20 milliGRAM(s) Oral at bedtime  metoprolol succinate ER 25 milliGRAM(s) Oral daily  buDESOnide 160 MICROgram(s)/formoterol 4.5 MICROgram(s) Inhaler 2 Puff(s) Inhalation two times a day  tiotropium 18 MICROgram(s) Capsule 1 Capsule(s) Inhalation daily  furosemide   Injectable 40 milliGRAM(s) IV Push daily  dicyclomine 10 milliGRAM(s) Oral two times a day before meals  docusate sodium 100 milliGRAM(s) Oral three times a day  senna 2 Tablet(s) Oral at bedtime  sucralfate 1 Gram(s) Oral four times a day  latanoprost 0.005% Ophthalmic Solution 1 Drop(s) Both EYES at bedtime  lactobacillus acidophilus 1 Tablet(s) Oral daily  pantoprazole    Tablet 40 milliGRAM(s) Oral before breakfast  levothyroxine 50 MICROGram(s) Oral daily  sodium chloride 0.9% lock flush 3 milliLiter(s) IV Push every 8 hours  enoxaparin Injectable 40 milliGRAM(s) SubCutaneous every 24 hours  insulin lispro (HumaLOG) corrective regimen sliding scale   SubCutaneous three times a day before meals  insulin lispro (HumaLOG) corrective regimen sliding scale   SubCutaneous at bedtime  dextrose 5%. 1000 milliLiter(s) (50 mL/Hr) IV Continuous <Continuous>  dextrose 50% Injectable 12.5 Gram(s) IV Push once  dextrose 50% Injectable 25 Gram(s) IV Push once  dextrose 50% Injectable 25 Gram(s) IV Push once    MEDICATIONS  (PRN):  simethicone 80 milliGRAM(s) Chew every 6 hours PRN Upset Stomach  acetaminophen   Tablet. 650 milliGRAM(s) Oral every 6 hours PRN mild, moderate, severe pain  dextrose Gel 1 Dose(s) Oral once PRN Blood Glucose LESS THAN 70 milliGRAM(s)/deciliter  glucagon  Injectable 1 milliGRAM(s) IntraMuscular once PRN Glucose LESS THAN 70 milligrams/deciliter  polyethylene glycol 3350 17 Gram(s) Oral daily PRN Constipation      Vital Signs Last 24 Hrs  T(C): 36.4 (06-27-17 @ 21:52), Max: 36.7 (06-27-17 @ 19:00)  HR: 63 (06-27-17 @ 21:52) (63 - 64)  BP: 107/61 (06-27-17 @ 21:52) (107/61 - 117/59)  RR: 16 (06-27-17 @ 21:52) (16 - 18)  SpO2: 98% (06-27-17 @ 21:52) (98% - 98%)  CAPILLARY BLOOD GLUCOSE  107 (28 Jun 2017 11:36)  87 (28 Jun 2017 08:29)  111 (27 Jun 2017 22:48)        I&O's Summary    27 Jun 2017 07:01  -  28 Jun 2017 07:00  --------------------------------------------------------  IN: 0 mL / OUT: 700 mL / NET: -700 mL    28 Jun 2017 07:01  -  28 Jun 2017 12:03  --------------------------------------------------------  IN: 120 mL / OUT: 201 mL / NET: -81 mL        PHYSICAL EXAM:  GENERAL: NAD, well-developed  HEAD:  Atraumatic, Normocephalic  EYES: EOMI, PERRLA, conjunctiva and sclera clear  NECK: Supple, No JVD  CHEST/LUNG: Clear to auscultation bilaterally; No wheeze  HEART: Regular rate and rhythm; soft ejection systolic murmur best heard at left sternal border no rubs, or gallops  ABDOMEN: Soft, Nontender, Nondistended; Bowel sounds present  EXTREMITIES:  2+ Peripheral Pulses, No clubbing, cyanosis, or edema  PSYCH: AAOx3  NEUROLOGY: non-focal  SKIN: No rashes or lesions    LABS:                        10.3   5.33  )-----------( 294      ( 28 Jun 2017 07:16 )             37.5     06-28    138  |  97<L>  |  18  ----------------------------<  81  4.3   |  23  |  0.78    Ca    9.9      28 Jun 2017 07:16  Phos  3.3     06-27  Mg     2.0     06-28    TPro  6.9  /  Alb  3.7  /  TBili  < 0.2<L>  /  DBili  x   /  AST  30  /  ALT  18  /  AlkPhos  102  06-26      CARDIAC MARKERS ( 27 Jun 2017 06:45 )  x     / < 0.06 ng/mL / 41 u/L / 1.75 ng/mL / x      CARDIAC MARKERS ( 26 Jun 2017 22:30 )  x     / < 0.06 ng/mL / 60 u/L / 1.75 ng/mL / x              RADIOLOGY & ADDITIONAL TESTS:    Imaging Personally Reviewed:    Consultant(s) Notes Reviewed:      Care Discussed with Consultants/Other Providers:      LABS:                        10.3   5.33  )-----------( 294      ( 28 Jun 2017 07:16 )             37.5     06-28    138  |  97<L>  |  18  ----------------------------<  81  4.3   |  23  |  0.78    Ca    9.9      28 Jun 2017 07:16  Phos  3.3     06-27  Mg     2.0     06-28    TPro  6.9  /  Alb  3.7  /  TBili  < 0.2<L>  /  DBili  x   /  AST  30  /  ALT  18  /  AlkPhos  102  06-26      CARDIAC MARKERS ( 27 Jun 2017 06:45 )  x     / < 0.06 ng/mL / 41 u/L / 1.75 ng/mL / x      CARDIAC MARKERS ( 26 Jun 2017 22:30 )  x     / < 0.06 ng/mL / 60 u/L / 1.75 ng/mL / x              RADIOLOGY & ADDITIONAL TESTS:    Imaging Personally Reviewed:    Consultant(s) Notes Reviewed:      Care Discussed with Consultants/Other Providers:

## 2017-06-28 NOTE — CONSULT NOTE ADULT - SUBJECTIVE AND OBJECTIVE BOX
76 y/o F with hx of asthma, CAD, CHF (EF; 21%), HLD, HTN, T7, T10 compression Fx's Dx in april 2017 after fall, presents with chest pain x 1 day. Patient states chest pain is intermittent, non radiating, L sided. She cannot recall what makes the pain better or worse. Also complains of worsening of her back and b/l leg pain, with numbness  in BLE, mostly her feet x weeks.  No saddle anesthesia, no bowel or bladder incontinence.  She also complains of SOB with exertion. Endorses 3 pillow orthopnea. Denies fever, chills, cough, falls, LOC, melena, hematochezia, LE edema, diarrhea, or constipation.  Patient is able to ambulate with TLSO brace with assist prior to admission. Unable to ambulate with PT yesterday.    PAST MEDICAL & SURGICAL HISTORY:  Paroxysmal atrial fibrillation  GERD (gastroesophageal reflux disease)  DM (diabetes mellitus)  Asthma  MI (myocardial infarction)  CAD (coronary artery disease)  Lymphoma: S/P resection and chemotherapy in remission  HLD (hyperlipidemia)  CHF (congestive heart failure): On home oxygen 2L PRN  HTN (hypertension)  ICD (implantable cardioverter-defibrillator) in place  S/P coronary artery stent placement  S/P myomectomy  S/P appendectomy  S/P lymph node biopsy: Biopsy and resection    Allergies  peanuts (Unknown)  penicillin (Nausea)    MEDICATIONS  (STANDING):  spironolactone 12.5 milliGRAM(s) Oral daily  aspirin enteric coated 81 milliGRAM(s) Oral daily  losartan 25 milliGRAM(s) Oral daily  amiodarone    Tablet 400 milliGRAM(s) Oral daily  allopurinol 300 milliGRAM(s) Oral daily  atorvastatin 20 milliGRAM(s) Oral at bedtime  metoprolol succinate ER 25 milliGRAM(s) Oral daily  buDESOnide 160 MICROgram(s)/formoterol 4.5 MICROgram(s) Inhaler 2 Puff(s) Inhalation two times a day  tiotropium 18 MICROgram(s) Capsule 1 Capsule(s) Inhalation daily  dicyclomine 10 milliGRAM(s) Oral two times a day before meals  docusate sodium 100 milliGRAM(s) Oral three times a day  senna 2 Tablet(s) Oral at bedtime  sucralfate 1 Gram(s) Oral four times a day  latanoprost 0.005% Ophthalmic Solution 1 Drop(s) Both EYES at bedtime  lactobacillus acidophilus 1 Tablet(s) Oral daily  pantoprazole    Tablet 40 milliGRAM(s) Oral before breakfast  levothyroxine 50 MICROGram(s) Oral daily  sodium chloride 0.9% lock flush 3 milliLiter(s) IV Push every 8 hours  enoxaparin Injectable 40 milliGRAM(s) SubCutaneous every 24 hours  insulin lispro (HumaLOG) corrective regimen sliding scale   SubCutaneous three times a day before meals  insulin lispro (HumaLOG) corrective regimen sliding scale   SubCutaneous at bedtime  dextrose 5%. 1000 milliLiter(s) (50 mL/Hr) IV Continuous <Continuous>  dextrose 50% Injectable 12.5 Gram(s) IV Push once  dextrose 50% Injectable 25 Gram(s) IV Push once  dextrose 50% Injectable 25 Gram(s) IV Push once  furosemide    Tablet 40 milliGRAM(s) Oral daily    MEDICATIONS  (PRN):  simethicone 80 milliGRAM(s) Chew every 6 hours PRN Upset Stomach  acetaminophen   Tablet. 650 milliGRAM(s) Oral every 6 hours PRN mild, moderate, severe pain  dextrose Gel 1 Dose(s) Oral once PRN Blood Glucose LESS THAN 70 milliGRAM(s)/deciliter  glucagon  Injectable 1 milliGRAM(s) IntraMuscular once PRN Glucose LESS THAN 70 milligrams/deciliter  polyethylene glycol 3350 17 Gram(s) Oral daily PRN Constipation    Vital Signs Last 24 Hrs  T(C): 36.7 (28 Jun 2017 12:09), Max: 36.7 (27 Jun 2017 19:00)  T(F): 98 (28 Jun 2017 12:09), Max: 98 (27 Jun 2017 19:00)  HR: 70 (28 Jun 2017 12:09) (63 - 70)  BP: 105/57 (28 Jun 2017 12:09) (105/57 - 108/60)  BP(mean): --  RR: 18 (28 Jun 2017 12:09) (16 - 18)  SpO2: 97% (28 Jun 2017 12:09) (97% - 98%)    PHYSICAL EXAM:  AA&0 x3, speach clear, follows commands  Motor- strength BUE 5/5,  BLE 4-/5  Sensory intact to light touch but reports decreased sensation in both legs, no saddle anesthesia  thoracic spine- + bony tenderness in middle and lower thoracic region  No clonus    LABS:                        10.3   5.33  )-----------( 294      ( 28 Jun 2017 07:16 )             37.5     06-28    138  |  97<L>  |  18  ----------------------------<  81  4.3   |  23  |  0.78    Ca    9.9      28 Jun 2017 07:16  Phos  3.3     06-27  Mg     2.0     06-28    TPro  6.9  /  Alb  3.7  /  TBili  < 0.2<L>  /  DBili  x   /  AST  30  /  ALT  18  /  AlkPhos  102  06-26

## 2017-06-28 NOTE — DIETITIAN INITIAL EVALUATION ADULT. - NS AS NUTRI INTERV MEALS SNACK
Other (specify)/Diets modified for specific foods and ingredients/1. Suggest: PO diet rx: Mechanical Soft; DASH/TLC (cholesterol and sodium restricted); PO supplement: Ensure Enlive 8oz. 1x daily (will provide additional ~350 Kcal, ~ 20 gm Protein); Fluid Restriction per MD discretion;                 2. Encourage & assist Pt with meals; Monitor PO diet tolerance;                3. Suggest: Swallow Bedside Assessment &/or MBS if needed;           4. Monitor labs, weights, hydration status;

## 2017-06-28 NOTE — PROGRESS NOTE ADULT - SUBJECTIVE AND OBJECTIVE BOX
HPI:  76 y/o F with hx of asthma, CAD, CHF (EF; 21%), HLD, HTN, presents with chest pain x 1 day. Patient states chest pain is intermittent, non radiating, L sided. She cannot recall what makes the pain better or worse. Also complains of worsening of back and b/l leg pain x weeks. Patient also complains of SOB with exertion. Endorses 3 pillow orthopnea. Denies fever, chills, cough, falls, LOC, melena, hematochezia, LE edema, diarrhea, or constipation    On admission: comfortable, no complaints (27 Jun 2017 06:15)    Pt feels a little better, still gets SOB easily. Mild backache but none other active symptoms. Pt is trying to find info on her prior oncologist from her son but not successful so far.    Meds:  spironolactone 12.5 milliGRAM(s) Oral daily  aspirin enteric coated 81 milliGRAM(s) Oral daily  losartan 25 milliGRAM(s) Oral daily  amiodarone    Tablet 400 milliGRAM(s) Oral daily  allopurinol 300 milliGRAM(s) Oral daily  atorvastatin 20 milliGRAM(s) Oral at bedtime  metoprolol succinate ER 25 milliGRAM(s) Oral daily  buDESOnide 160 MICROgram(s)/formoterol 4.5 MICROgram(s) Inhaler 2 Puff(s) Inhalation two times a day  tiotropium 18 MICROgram(s) Capsule 1 Capsule(s) Inhalation daily  dicyclomine 10 milliGRAM(s) Oral two times a day before meals  docusate sodium 100 milliGRAM(s) Oral three times a day  senna 2 Tablet(s) Oral at bedtime  simethicone 80 milliGRAM(s) Chew every 6 hours PRN  sucralfate 1 Gram(s) Oral four times a day  latanoprost 0.005% Ophthalmic Solution 1 Drop(s) Both EYES at bedtime  lactobacillus acidophilus 1 Tablet(s) Oral daily  pantoprazole    Tablet 40 milliGRAM(s) Oral before breakfast  levothyroxine 50 MICROGram(s) Oral daily  sodium chloride 0.9% lock flush 3 milliLiter(s) IV Push every 8 hours  enoxaparin Injectable 40 milliGRAM(s) SubCutaneous every 24 hours  acetaminophen   Tablet. 650 milliGRAM(s) Oral every 6 hours PRN  insulin lispro (HumaLOG) corrective regimen sliding scale   SubCutaneous three times a day before meals  insulin lispro (HumaLOG) corrective regimen sliding scale   SubCutaneous at bedtime  dextrose 5%. 1000 milliLiter(s) IV Continuous <Continuous>  dextrose Gel 1 Dose(s) Oral once PRN  dextrose 50% Injectable 12.5 Gram(s) IV Push once  dextrose 50% Injectable 25 Gram(s) IV Push once  dextrose 50% Injectable 25 Gram(s) IV Push once  glucagon  Injectable 1 milliGRAM(s) IntraMuscular once PRN  polyethylene glycol 3350 17 Gram(s) Oral daily PRN  furosemide    Tablet 40 milliGRAM(s) Oral daily      Vital Signs Last 24 Hrs  T(C): 36.7 (28 Jun 2017 12:09), Max: 36.7 (27 Jun 2017 19:00)  T(F): 98 (28 Jun 2017 12:09), Max: 98 (27 Jun 2017 19:00)  HR: 70 (28 Jun 2017 12:09) (63 - 70)  BP: 105/57 (28 Jun 2017 12:09) (105/57 - 108/60)  BP(mean): --  RR: 18 (28 Jun 2017 12:09) (16 - 18)  SpO2: 97% (28 Jun 2017 12:09) (97% - 98%)                          10.3   5.33  )-----------( 294      ( 28 Jun 2017 07:16 )             37.5       06-28    138  |  97<L>  |  18  ----------------------------<  81  4.3   |  23  |  0.78    Ca    9.9      28 Jun 2017 07:16  Phos  3.3     06-27  Mg     2.0     06-28    TPro  6.9  /  Alb  3.7  /  TBili  < 0.2<L>  /  DBili  x   /  AST  30  /  ALT  18  /  AlkPhos  102  06-26

## 2017-06-28 NOTE — PROGRESS NOTE ADULT - SUBJECTIVE AND OBJECTIVE BOX
SUBJECTIVE: C/o Back pain and B/L LE pain/tingling.  SOB improved.    ACTIVE MEDICATIONS:  MEDICATIONS  (STANDING):  spironolactone 12.5 milliGRAM(s) Oral daily  aspirin enteric coated 81 milliGRAM(s) Oral daily  losartan 25 milliGRAM(s) Oral daily  amiodarone    Tablet 400 milliGRAM(s) Oral daily  allopurinol 300 milliGRAM(s) Oral daily  atorvastatin 20 milliGRAM(s) Oral at bedtime  metoprolol succinate ER 25 milliGRAM(s) Oral daily  buDESOnide 160 MICROgram(s)/formoterol 4.5 MICROgram(s) Inhaler 2 Puff(s) Inhalation two times a day  tiotropium 18 MICROgram(s) Capsule 1 Capsule(s) Inhalation daily  furosemide   Injectable 40 milliGRAM(s) IV Push daily  dicyclomine 10 milliGRAM(s) Oral two times a day before meals  docusate sodium 100 milliGRAM(s) Oral three times a day  senna 2 Tablet(s) Oral at bedtime  sucralfate 1 Gram(s) Oral four times a day  latanoprost 0.005% Ophthalmic Solution 1 Drop(s) Both EYES at bedtime  lactobacillus acidophilus 1 Tablet(s) Oral daily  pantoprazole    Tablet 40 milliGRAM(s) Oral before breakfast  levothyroxine 50 MICROGram(s) Oral daily  sodium chloride 0.9% lock flush 3 milliLiter(s) IV Push every 8 hours  enoxaparin Injectable 40 milliGRAM(s) SubCutaneous every 24 hours  insulin lispro (HumaLOG) corrective regimen sliding scale   SubCutaneous three times a day before meals  insulin lispro (HumaLOG) corrective regimen sliding scale   SubCutaneous at bedtime  dextrose 5%. 1000 milliLiter(s) (50 mL/Hr) IV Continuous <Continuous>  dextrose 50% Injectable 12.5 Gram(s) IV Push once  dextrose 50% Injectable 25 Gram(s) IV Push once  dextrose 50% Injectable 25 Gram(s) IV Push once    MEDICATIONS  (PRN):  simethicone 80 milliGRAM(s) Chew every 6 hours PRN Upset Stomach  acetaminophen   Tablet. 650 milliGRAM(s) Oral every 6 hours PRN mild, moderate, severe pain  dextrose Gel 1 Dose(s) Oral once PRN Blood Glucose LESS THAN 70 milliGRAM(s)/deciliter  glucagon  Injectable 1 milliGRAM(s) IntraMuscular once PRN Glucose LESS THAN 70 milligrams/deciliter  polyethylene glycol 3350 17 Gram(s) Oral daily PRN Constipation      LABS:                        10.3   5.33  )-----------( 294      ( 28 Jun 2017 07:16 )             37.5     Hemoglobin: 10.3 g/dL (06-28 @ 07:16)  Hemoglobin: 12.4 g/dL (06-27 @ 06:45)  Hemoglobin: 11.1 g/dL (06-26 @ 22:30)    06-28    138  |  97<L>  |  18  ----------------------------<  81  4.3   |  23  |  0.78    Ca    9.9      28 Jun 2017 07:16  Phos  3.3     06-27  Mg     2.0     06-28    TPro  6.9  /  Alb  3.7  /  TBili  < 0.2<L>  /  DBili  x   /  AST  30  /  ALT  18  /  AlkPhos  102  06-26    Creatinine Trend: 0.78<--, 0.80<--, 0.85<--     CARDIAC MARKERS ( 27 Jun 2017 06:45 )  x     / < 0.06 ng/mL / 41 u/L / 1.75 ng/mL / x      CARDIAC MARKERS ( 26 Jun 2017 22:30 )  x     / < 0.06 ng/mL / 60 u/L / 1.75 ng/mL / x        PHYSICAL EXAM  Vital Signs Last 24 Hrs  T(C): 36.7 (28 Jun 2017 12:09), Max: 36.7 (27 Jun 2017 19:00)  T(F): 98 (28 Jun 2017 12:09), Max: 98 (27 Jun 2017 19:00)  HR: 70 (28 Jun 2017 12:09) (63 - 70)  BP: 105/57 (28 Jun 2017 12:09) (105/57 - 117/59)  RR: 18 (28 Jun 2017 12:09) (16 - 18)  SpO2: 97% (28 Jun 2017 12:09) (97% - 98%)      HEENT: Normal Oral mucosa, PERRL, EOMI  Lymphatic: No obvious lymphadenopathy, No edema  Cardiovascular: Normal S1S2, No JVD, 1/6 ERICA, Peripheral pulses palpable 2+ B/L  Respiratory: Lungs clear to auscultation, normal effort  Gastrointestinal: Abdomen soft, ND, NT, +BS  Skin: Warm, dry, intact. No cyanosis, No rash.  Musculoskeletal: Normal ROM, normal strength  Psychiatric: Appropriate Mood and Affect      DIAGNOSTIC DATA  TELEMETRY: SR    < from: TTE with Doppler (w/Cont) (04.03.17 @ 16:18) >  CONCLUSIONS:  1. Mitral annular calcification, otherwise normal mitral  valve. Mild mitral regurgitation.  2. Moderately dilated left atrium.  LA volume index = 46  cc/m2.  3. Moderate left ventricular enlargement.  4. Severe global left ventricular systolic dysfunction.  Endocardial visualization enhanced with intravenous  injection of echo contrast (Definity).  5. The right ventricle is not well visualized; grossly  normal right ventricular systolic function.  A device wire  is noted in the right heart.  6. Estimated right ventricular systolic pressure equals 60  mm Hg, assuming right atrial pressure equals 10 mm Hg,  consistent with moderate pulmonary hypertension.  ------------------------------------------------------------------------  Confirmed on  4/3/2017 - 17:19:47 by Jean-Pierre Carver M.D.  ------------------------------------------------------------------------    < end of copied text >        ASSESSMENT AND PLAN:  Ms. Pulido is a very pleasant 77 year old female well known to our service with hypertension, dyslipidemia, CAD s/p LAD stent with only mild re-stenosis with minor luminal irregularities otherwise on cath 4/16, with known severe LV dysfunction , ICM s/p Medtronic BiV ICD, COPD, lymphoma previously on chemo with known spinal mets admitted with CP/SOB/LE pain.    -- CHF compensated.  Change to PO Lasix  --Orthospine consult. Known Thoracic compression fx, with TLSO brace at home but with worsening back pain and leg pain  --Neuro consult for leg pain/burning/numbness-- Assist with pain management  --Onc eval with Dr. Walsh pending - patient with known lymphoma and spinal mets (follows with outside onc Dr. Reyna [id number], [id number])  -- c/w Amio 400mg po qd for h/o VT - the patient has NO HISTORY OF AFIB  --PT eval    Dolores Rodriguez PA-C  [facility]  [address], [address]   [place] [address]  office [phone]  pager [phone] SUBJECTIVE: C/o Back pain and B/L LE pain/tingling.  SOB improved.    ACTIVE MEDICATIONS:  MEDICATIONS  (STANDING):  spironolactone 12.5 milliGRAM(s) Oral daily  aspirin enteric coated 81 milliGRAM(s) Oral daily  losartan 25 milliGRAM(s) Oral daily  amiodarone    Tablet 400 milliGRAM(s) Oral daily  allopurinol 300 milliGRAM(s) Oral daily  atorvastatin 20 milliGRAM(s) Oral at bedtime  metoprolol succinate ER 25 milliGRAM(s) Oral daily  buDESOnide 160 MICROgram(s)/formoterol 4.5 MICROgram(s) Inhaler 2 Puff(s) Inhalation two times a day  tiotropium 18 MICROgram(s) Capsule 1 Capsule(s) Inhalation daily  furosemide   Injectable 40 milliGRAM(s) IV Push daily  dicyclomine 10 milliGRAM(s) Oral two times a day before meals  docusate sodium 100 milliGRAM(s) Oral three times a day  senna 2 Tablet(s) Oral at bedtime  sucralfate 1 Gram(s) Oral four times a day  latanoprost 0.005% Ophthalmic Solution 1 Drop(s) Both EYES at bedtime  lactobacillus acidophilus 1 Tablet(s) Oral daily  pantoprazole    Tablet 40 milliGRAM(s) Oral before breakfast  levothyroxine 50 MICROGram(s) Oral daily  sodium chloride 0.9% lock flush 3 milliLiter(s) IV Push every 8 hours  enoxaparin Injectable 40 milliGRAM(s) SubCutaneous every 24 hours  insulin lispro (HumaLOG) corrective regimen sliding scale   SubCutaneous three times a day before meals  insulin lispro (HumaLOG) corrective regimen sliding scale   SubCutaneous at bedtime  dextrose 5%. 1000 milliLiter(s) (50 mL/Hr) IV Continuous <Continuous>  dextrose 50% Injectable 12.5 Gram(s) IV Push once  dextrose 50% Injectable 25 Gram(s) IV Push once  dextrose 50% Injectable 25 Gram(s) IV Push once    MEDICATIONS  (PRN):  simethicone 80 milliGRAM(s) Chew every 6 hours PRN Upset Stomach  acetaminophen   Tablet. 650 milliGRAM(s) Oral every 6 hours PRN mild, moderate, severe pain  dextrose Gel 1 Dose(s) Oral once PRN Blood Glucose LESS THAN 70 milliGRAM(s)/deciliter  glucagon  Injectable 1 milliGRAM(s) IntraMuscular once PRN Glucose LESS THAN 70 milligrams/deciliter  polyethylene glycol 3350 17 Gram(s) Oral daily PRN Constipation      LABS:                        10.3   5.33  )-----------( 294      ( 28 Jun 2017 07:16 )             37.5     Hemoglobin: 10.3 g/dL (06-28 @ 07:16)  Hemoglobin: 12.4 g/dL (06-27 @ 06:45)  Hemoglobin: 11.1 g/dL (06-26 @ 22:30)    06-28    138  |  97<L>  |  18  ----------------------------<  81  4.3   |  23  |  0.78    Ca    9.9      28 Jun 2017 07:16  Phos  3.3     06-27  Mg     2.0     06-28    TPro  6.9  /  Alb  3.7  /  TBili  < 0.2<L>  /  DBili  x   /  AST  30  /  ALT  18  /  AlkPhos  102  06-26    Creatinine Trend: 0.78<--, 0.80<--, 0.85<--     CARDIAC MARKERS ( 27 Jun 2017 06:45 )  x     / < 0.06 ng/mL / 41 u/L / 1.75 ng/mL / x      CARDIAC MARKERS ( 26 Jun 2017 22:30 )  x     / < 0.06 ng/mL / 60 u/L / 1.75 ng/mL / x        PHYSICAL EXAM  Vital Signs Last 24 Hrs  T(C): 36.7 (28 Jun 2017 12:09), Max: 36.7 (27 Jun 2017 19:00)  T(F): 98 (28 Jun 2017 12:09), Max: 98 (27 Jun 2017 19:00)  HR: 70 (28 Jun 2017 12:09) (63 - 70)  BP: 105/57 (28 Jun 2017 12:09) (105/57 - 117/59)  RR: 18 (28 Jun 2017 12:09) (16 - 18)  SpO2: 97% (28 Jun 2017 12:09) (97% - 98%)      HEENT: Normal Oral mucosa, PERRL, EOMI  Lymphatic: No obvious lymphadenopathy, No edema  Cardiovascular: Normal S1S2, No JVD, 1/6 ERICA, Peripheral pulses palpable 2+ B/L  Respiratory: Lungs clear to auscultation, normal effort  Gastrointestinal: Abdomen soft, ND, NT, +BS  Skin: Warm, dry, intact. No cyanosis, No rash.  Musculoskeletal: Normal ROM, normal strength  Psychiatric: Appropriate Mood and Affect      DIAGNOSTIC DATA  TELEMETRY: SR    < from: TTE with Doppler (w/Cont) (04.03.17 @ 16:18) >  CONCLUSIONS:  1. Mitral annular calcification, otherwise normal mitral  valve. Mild mitral regurgitation.  2. Moderately dilated left atrium.  LA volume index = 46  cc/m2.  3. Moderate left ventricular enlargement.  4. Severe global left ventricular systolic dysfunction.  Endocardial visualization enhanced with intravenous  injection of echo contrast (Definity).  5. The right ventricle is not well visualized; grossly  normal right ventricular systolic function.  A device wire  is noted in the right heart.  6. Estimated right ventricular systolic pressure equals 60  mm Hg, assuming right atrial pressure equals 10 mm Hg,  consistent with moderate pulmonary hypertension.  ------------------------------------------------------------------------  Confirmed on  4/3/2017 - 17:19:47 by Jean-Pierre Carver M.D.  ------------------------------------------------------------------------    < end of copied text >        ASSESSMENT AND PLAN:  Ms. Pulido is a very pleasant 77 year old female well known to our service with hypertension, dyslipidemia, CAD s/p LAD stent with only mild re-stenosis with minor luminal irregularities otherwise on cath 4/16, with known severe LV dysfunction , NICM s/p Medtronic BiV ICD, COPD, lymphoma previously on chemo with known spinal mets admitted with CP/SOB/LE pain.    -- CHF compensated.  Change to PO Lasix  --Orthospine consult. Known Thoracic compression fx, with TLSO brace at home but with worsening back pain and leg pain  --Neuro consult for leg pain/burning/numbness-- Assist with pain management  --Onc eval with Dr. Walsh pending - patient with known lymphoma and spinal mets (follows with outside onc Dr. Reyna [phone], [id number])  -- c/w Amio 400mg po qd for h/o VT - the patient has NO HISTORY OF AFIB  --PT eval    Dolores Rodriguez PA-C  [facility]  [address]   [place] [address]  office [phone]  pager [phone]

## 2017-06-28 NOTE — DIETITIAN INITIAL EVALUATION ADULT. - OTHER INFO
Nutrition Consult X Registered Dietitian. Pt 76 yo female appears alert, oriented. Per Pt her appetite not that good. Pt C/O swallowing difficulty "sometimes", but Pt declined altered consistency food options. No vomiting/diarrhea reported @ present, but Pt C/O constipation. Pt's body weight: ~122# reported. Pt also stated she had lost weight (during chemo) and later she gained her weight back. Food preferences discussed with Pt. Case discussed with MD & nurse. RDN remains available.

## 2017-06-28 NOTE — DIETITIAN INITIAL EVALUATION ADULT. - PERTINENT MEDS FT
Aspirin, Lovenox, Lipitor, Colace, Lasix, Insulin (Humalog), Lactobacillus Acidophilus, Protonix, Senna, Mylicon

## 2017-06-28 NOTE — DIETITIAN INITIAL EVALUATION ADULT. - DIET TYPE
regular/Ensure Enlive 8oz. 1x daily (will provide additional ~350 Kcal, ~ 20 gm Protein);/DASH/TLC (sodium and cholesterol restricted diet)/1000ml/supplement (specify)

## 2017-06-29 NOTE — PROGRESS NOTE ADULT - SUBJECTIVE AND OBJECTIVE BOX
SUBJECTIVE: C/o Back pain and B/L LE pain/tingling.  SOB improved.    ACTIVE MEDICATIONS:  MEDICATIONS  (STANDING):  spironolactone 12.5 milliGRAM(s) Oral daily  aspirin enteric coated 81 milliGRAM(s) Oral daily  losartan 25 milliGRAM(s) Oral daily  amiodarone    Tablet 400 milliGRAM(s) Oral daily  allopurinol 300 milliGRAM(s) Oral daily  atorvastatin 20 milliGRAM(s) Oral at bedtime  metoprolol succinate ER 25 milliGRAM(s) Oral daily  buDESOnide 160 MICROgram(s)/formoterol 4.5 MICROgram(s) Inhaler 2 Puff(s) Inhalation two times a day  tiotropium 18 MICROgram(s) Capsule 1 Capsule(s) Inhalation daily  dicyclomine 10 milliGRAM(s) Oral two times a day before meals  docusate sodium 100 milliGRAM(s) Oral three times a day  senna 2 Tablet(s) Oral at bedtime  sucralfate 1 Gram(s) Oral four times a day  latanoprost 0.005% Ophthalmic Solution 1 Drop(s) Both EYES at bedtime  lactobacillus acidophilus 1 Tablet(s) Oral daily  pantoprazole    Tablet 40 milliGRAM(s) Oral before breakfast  levothyroxine 50 MICROGram(s) Oral daily  sodium chloride 0.9% lock flush 3 milliLiter(s) IV Push every 8 hours  enoxaparin Injectable 40 milliGRAM(s) SubCutaneous every 24 hours  insulin lispro (HumaLOG) corrective regimen sliding scale   SubCutaneous three times a day before meals  insulin lispro (HumaLOG) corrective regimen sliding scale   SubCutaneous at bedtime  dextrose 5%. 1000 milliLiter(s) (50 mL/Hr) IV Continuous <Continuous>  dextrose 50% Injectable 12.5 Gram(s) IV Push once  dextrose 50% Injectable 25 Gram(s) IV Push once  dextrose 50% Injectable 25 Gram(s) IV Push once  furosemide    Tablet 40 milliGRAM(s) Oral daily  lidocaine   Patch 1 Patch Transdermal daily  gabapentin 100 milliGRAM(s) Oral three times a day    MEDICATIONS  (PRN):  simethicone 80 milliGRAM(s) Chew every 6 hours PRN Upset Stomach  acetaminophen   Tablet. 650 milliGRAM(s) Oral every 6 hours PRN mild, moderate, severe pain  dextrose Gel 1 Dose(s) Oral once PRN Blood Glucose LESS THAN 70 milliGRAM(s)/deciliter  glucagon  Injectable 1 milliGRAM(s) IntraMuscular once PRN Glucose LESS THAN 70 milligrams/deciliter  polyethylene glycol 3350 17 Gram(s) Oral daily PRN Constipation      LABS:                        12.1   6.38  )-----------( 280      ( 29 Jun 2017 08:27 )             38.0     Hemoglobin: 12.1 g/dL (06-29 @ 08:27)  Hemoglobin: 10.3 g/dL (06-28 @ 07:16)  Hemoglobin: 12.4 g/dL (06-27 @ 06:45)  Hemoglobin: 11.1 g/dL (06-26 @ 22:30)    06-29    140  |  97<L>  |  21  ----------------------------<  94  3.7   |  26  |  0.83    Ca    9.9      29 Jun 2017 08:27  Mg     2.0     06-28      Creatinine Trend: 0.83<--, 0.78<--, 0.80<--, 0.85<--     CARDIAC MARKERS ( 27 Jun 2017 06:45 )  x     / < 0.06 ng/mL / 41 u/L / 1.75 ng/mL / x      CARDIAC MARKERS ( 26 Jun 2017 22:30 )  x     / < 0.06 ng/mL / 60 u/L / 1.75 ng/mL / x        PHYSICAL EXAM  Vital Signs Last 24 Hrs  T(C): 36.9 (29 Jun 2017 05:08), Max: 36.9 (29 Jun 2017 05:08)  T(F): 98.4 (29 Jun 2017 05:08), Max: 98.4 (29 Jun 2017 05:08)  HR: 63 (29 Jun 2017 05:08) (63 - 72)  BP: 118/66 (29 Jun 2017 05:08) (108/58 - 118/66)  BP(mean): --  RR: 18 (29 Jun 2017 05:08) (18 - 18)  SpO2: 100% (29 Jun 2017 05:08) (99% - 100%)      HEENT: Normal Oral mucosa, PERRL, EOMI  Lymphatic: No obvious lymphadenopathy, No edema  Cardiovascular: Normal S1S2, No JVD, 1/6 ERICA, Peripheral pulses palpable 2+ B/L  Respiratory: Lungs clear to auscultation, normal effort  Gastrointestinal: Abdomen soft, ND, NT, +BS  Skin: Warm, dry, intact. No cyanosis, No rash.  Musculoskeletal: Normal ROM, normal strength  Psychiatric: Appropriate Mood and Affect      DIAGNOSTIC DATA  TELEMETRY: SR    < from: TTE with Doppler (w/Cont) (04.03.17 @ 16:18) >  CONCLUSIONS:  1. Mitral annular calcification, otherwise normal mitral  valve. Mild mitral regurgitation.  2. Moderately dilated left atrium.  LA volume index = 46  cc/m2.  3. Moderate left ventricular enlargement.  4. Severe global left ventricular systolic dysfunction.  Endocardial visualization enhanced with intravenous  injection of echo contrast (Definity).  5. The right ventricle is not well visualized; grossly  normal right ventricular systolic function.  A device wire  is noted in the right heart.  6. Estimated right ventricular systolic pressure equals 60  mm Hg, assuming right atrial pressure equals 10 mm Hg,  consistent with moderate pulmonary hypertension.  ------------------------------------------------------------------------  Confirmed on  4/3/2017 - 17:19:47 by Jean-Pierre Carver M.D.  ------------------------------------------------------------------------    < end of copied text >        ASSESSMENT AND PLAN:  Ms. Pulido is a very pleasant 77 year old female well known to our service with hypertension, dyslipidemia, CAD s/p LAD stent with only mild re-stenosis with minor luminal irregularities otherwise on cath 4/16, with known severe LV dysfunction , NICM s/p Medtronic BiV ICD, COPD, lymphoma previously on chemo with known spinal mets admitted with CP/SOB/LE pain.    -- CHF compensated.  Continue PO Lasix  --Appreciate Neurosurgery consult. Known Thoracic compression fx, with TLSO brace at home but with worsening back pain and leg pain-- CT Thoracic spine pending  --Neuro consult appreciated- start Neurontin  --Onc eval with Dr. Walsh - patient with known lymphoma and spinal mets (follows with outside onc Dr. Reyna [phone], [phone])  -- c/w Amio 400mg po qd for h/o VT/VF - the patient has NO HISTORY OF AFIB  --PT eval    Dolores Rodriguez PA-C  [facility]  [address], [address]   [place] [address]  office [phone]  pager [phone]

## 2017-06-29 NOTE — PROGRESS NOTE ADULT - SUBJECTIVE AND OBJECTIVE BOX
consult to be dictated, pt with hx recent thoracic fx, lymphoma p/w increasing LBP    Plan  1. CT C/T/LS spine to eval for interval change, no mRI second to AICD  2. start neurontin 100 tid and titrate as tolerated  3. PT

## 2017-06-29 NOTE — PROGRESS NOTE ADULT - SUBJECTIVE AND OBJECTIVE BOX
Patient is a 77y old  Female who presents with a chief complaint of chest pain, abdominal pain (27 Jun 2017 14:57)      SUBJECTIVE / OVERNIGHT EVENTS: No nausea, vomiting or diarrhea, no fever or chills, no dizziness or chest pain, no dysuria or hematuria, no jt pain or swelling    I am better    MEDICATIONS  (STANDING):  spironolactone 12.5 milliGRAM(s) Oral daily  aspirin enteric coated 81 milliGRAM(s) Oral daily  losartan 25 milliGRAM(s) Oral daily  amiodarone    Tablet 400 milliGRAM(s) Oral daily  allopurinol 300 milliGRAM(s) Oral daily  atorvastatin 20 milliGRAM(s) Oral at bedtime  metoprolol succinate ER 25 milliGRAM(s) Oral daily  buDESOnide 160 MICROgram(s)/formoterol 4.5 MICROgram(s) Inhaler 2 Puff(s) Inhalation two times a day  tiotropium 18 MICROgram(s) Capsule 1 Capsule(s) Inhalation daily  dicyclomine 10 milliGRAM(s) Oral two times a day before meals  docusate sodium 100 milliGRAM(s) Oral three times a day  senna 2 Tablet(s) Oral at bedtime  sucralfate 1 Gram(s) Oral four times a day  latanoprost 0.005% Ophthalmic Solution 1 Drop(s) Both EYES at bedtime  lactobacillus acidophilus 1 Tablet(s) Oral daily  pantoprazole    Tablet 40 milliGRAM(s) Oral before breakfast  levothyroxine 50 MICROGram(s) Oral daily  sodium chloride 0.9% lock flush 3 milliLiter(s) IV Push every 8 hours  enoxaparin Injectable 40 milliGRAM(s) SubCutaneous every 24 hours  insulin lispro (HumaLOG) corrective regimen sliding scale   SubCutaneous three times a day before meals  insulin lispro (HumaLOG) corrective regimen sliding scale   SubCutaneous at bedtime  dextrose 5%. 1000 milliLiter(s) (50 mL/Hr) IV Continuous <Continuous>  dextrose 50% Injectable 12.5 Gram(s) IV Push once  dextrose 50% Injectable 25 Gram(s) IV Push once  dextrose 50% Injectable 25 Gram(s) IV Push once  furosemide    Tablet 40 milliGRAM(s) Oral daily  lidocaine   Patch 1 Patch Transdermal daily  gabapentin 100 milliGRAM(s) Oral three times a day    MEDICATIONS  (PRN):  simethicone 80 milliGRAM(s) Chew every 6 hours PRN Upset Stomach  acetaminophen   Tablet. 650 milliGRAM(s) Oral every 6 hours PRN mild, moderate, severe pain  dextrose Gel 1 Dose(s) Oral once PRN Blood Glucose LESS THAN 70 milliGRAM(s)/deciliter  glucagon  Injectable 1 milliGRAM(s) IntraMuscular once PRN Glucose LESS THAN 70 milligrams/deciliter  polyethylene glycol 3350 17 Gram(s) Oral daily PRN Constipation      Vital Signs Last 24 Hrs  T(C): 36.3 (06-29-17 @ 13:37), Max: 36.9 (06-29-17 @ 05:08)  HR: 69 (06-29-17 @ 13:37) (63 - 72)  BP: 104/52 (06-29-17 @ 13:37) (104/52 - 118/66)  RR: 18 (06-29-17 @ 13:37) (18 - 18)  SpO2: 99% (06-29-17 @ 13:37) (99% - 100%)  CAPILLARY BLOOD GLUCOSE  123 (29 Jun 2017 11:44)  87 (29 Jun 2017 08:30)  119 (28 Jun 2017 22:24)  92 (28 Jun 2017 16:22)        I&O's Summary    28 Jun 2017 07:01  -  29 Jun 2017 07:00  --------------------------------------------------------  IN: 520 mL / OUT: 553 mL / NET: -33 mL        Patient is a 77y old  Female who presents with a chief complaint of chest pain, abdominal pain (27 Jun 2017 14:57)      SUBJECTIVE / OVERNIGHT EVENTS: No nausea, vomiting or diarrhea, no fever or chills, no dizziness or chest pain, no dysuria or hematuria, no jt pain or swelling    MEDICATIONS  (STANDING):  spironolactone 12.5 milliGRAM(s) Oral daily  aspirin enteric coated 81 milliGRAM(s) Oral daily  losartan 25 milliGRAM(s) Oral daily  amiodarone    Tablet 400 milliGRAM(s) Oral daily  allopurinol 300 milliGRAM(s) Oral daily  atorvastatin 20 milliGRAM(s) Oral at bedtime  metoprolol succinate ER 25 milliGRAM(s) Oral daily  buDESOnide 160 MICROgram(s)/formoterol 4.5 MICROgram(s) Inhaler 2 Puff(s) Inhalation two times a day  tiotropium 18 MICROgram(s) Capsule 1 Capsule(s) Inhalation daily  dicyclomine 10 milliGRAM(s) Oral two times a day before meals  docusate sodium 100 milliGRAM(s) Oral three times a day  senna 2 Tablet(s) Oral at bedtime  sucralfate 1 Gram(s) Oral four times a day  latanoprost 0.005% Ophthalmic Solution 1 Drop(s) Both EYES at bedtime  lactobacillus acidophilus 1 Tablet(s) Oral daily  pantoprazole    Tablet 40 milliGRAM(s) Oral before breakfast  levothyroxine 50 MICROGram(s) Oral daily  sodium chloride 0.9% lock flush 3 milliLiter(s) IV Push every 8 hours  enoxaparin Injectable 40 milliGRAM(s) SubCutaneous every 24 hours  insulin lispro (HumaLOG) corrective regimen sliding scale   SubCutaneous three times a day before meals  insulin lispro (HumaLOG) corrective regimen sliding scale   SubCutaneous at bedtime  dextrose 5%. 1000 milliLiter(s) (50 mL/Hr) IV Continuous <Continuous>  dextrose 50% Injectable 12.5 Gram(s) IV Push once  dextrose 50% Injectable 25 Gram(s) IV Push once  dextrose 50% Injectable 25 Gram(s) IV Push once  furosemide    Tablet 40 milliGRAM(s) Oral daily  lidocaine   Patch 1 Patch Transdermal daily  gabapentin 100 milliGRAM(s) Oral three times a day    MEDICATIONS  (PRN):  simethicone 80 milliGRAM(s) Chew every 6 hours PRN Upset Stomach  acetaminophen   Tablet. 650 milliGRAM(s) Oral every 6 hours PRN mild, moderate, severe pain  dextrose Gel 1 Dose(s) Oral once PRN Blood Glucose LESS THAN 70 milliGRAM(s)/deciliter  glucagon  Injectable 1 milliGRAM(s) IntraMuscular once PRN Glucose LESS THAN 70 milligrams/deciliter  polyethylene glycol 3350 17 Gram(s) Oral daily PRN Constipation      Vital Signs Last 24 Hrs  T(C): 36.3 (06-29-17 @ 13:37), Max: 36.9 (06-29-17 @ 05:08)  HR: 69 (06-29-17 @ 13:37) (63 - 72)  BP: 104/52 (06-29-17 @ 13:37) (104/52 - 118/66)  RR: 18 (06-29-17 @ 13:37) (18 - 18)  SpO2: 99% (06-29-17 @ 13:37) (99% - 100%)  CAPILLARY BLOOD GLUCOSE  123 (29 Jun 2017 11:44)  87 (29 Jun 2017 08:30)  119 (28 Jun 2017 22:24)  92 (28 Jun 2017 16:22)        I&O's Summary    28 Jun 2017 07:01  -  29 Jun 2017 07:00  --------------------------------------------------------  IN: 520 mL / OUT: 553 mL / NET: -33 mL      PHYSICAL EXAM:  GENERAL: NAD, well-developed  HEAD:  Atraumatic, Normocephalic  EYES: EOMI, PERRLA, conjunctiva and sclera clear  NECK: Supple, No JVD  CHEST/LUNG: Clear to auscultation bilaterally; No wheeze  HEART: Regular rate and rhythm; soft ejection systolic murmur best heard at left sternal border no rubs, or gallops  ABDOMEN: Soft, Nontender, Nondistended; Bowel sounds present  EXTREMITIES:  2+ Peripheral Pulses, No clubbing, cyanosis, or edema  PSYCH: AAOx3  NEUROLOGY: non-focal  SKIN: No rashes or lesions      LABS:                        12.1   6.38  )-----------( 280      ( 29 Jun 2017 08:27 )             38.0     06-29    140  |  97<L>  |  21  ----------------------------<  94  3.7   |  26  |  0.83    Ca    9.9      29 Jun 2017 08:27  Mg     2.0     06-28                  Consultant(s) Notes Reviewed:      Care Discussed with Consultants/Other Providers:    Contact Number, Dr Gray [phone]    LABS:                        12.1   6.38  )-----------( 280      ( 29 Jun 2017 08:27 )             38.0     06-29    140  |  97<L>  |  21  ----------------------------<  94  3.7   |  26  |  0.83    Ca    9.9      29 Jun 2017 08:27  Mg     2.0     06-28                  Consultant(s) Notes Reviewed:      Care Discussed with Consultants/Other Providers:    Contact Number, Dr Gray [phone]

## 2017-06-30 NOTE — PROGRESS NOTE ADULT - SUBJECTIVE AND OBJECTIVE BOX
Patient seen and examined  Chart Reviewed  No Acute Events - Continues to complain of pain in legs and abdomen  No new weakness    Physical Exam Unchanged

## 2017-06-30 NOTE — CONSULT NOTE ADULT - PROBLEM SELECTOR RECOMMENDATION 9
-now improved  -likely multifactorial with possibly related to low flow state from CHF as well as gas discomfort   -cardiology appreciated with diuresis  -simethicone q6h x 4 days  -senna/colace  -miralax prn  -pain control prn  -no gi objection to discharge planning
1. CT Thoracic spine w/o contrast  2. TLSO brace to be worn at all times when OOB  3. Case to be d/w attending  4. Pain meds PRN
A1C is 5.7 improved from 2 mo prior (6.5)  continue to monitor FS  no oral meds  FS acceptable for now

## 2017-06-30 NOTE — PROGRESS NOTE ADULT - SUBJECTIVE AND OBJECTIVE BOX
Patient is a 77y old  Female who presents with a chief complaint of chest pain, abdominal pain (27 Jun 2017 14:57)      SUBJECTIVE / OVERNIGHT EVENTS: No nausea, vomiting or diarrhea, no fever or chills, no dizziness or chest pain, no dysuria or hematuria, no jt pain or swelling      MEDICATIONS  (STANDING):  spironolactone 12.5 milliGRAM(s) Oral daily  aspirin enteric coated 81 milliGRAM(s) Oral daily  losartan 25 milliGRAM(s) Oral daily  amiodarone    Tablet 400 milliGRAM(s) Oral daily  allopurinol 300 milliGRAM(s) Oral daily  atorvastatin 20 milliGRAM(s) Oral at bedtime  metoprolol succinate ER 25 milliGRAM(s) Oral daily  buDESOnide 160 MICROgram(s)/formoterol 4.5 MICROgram(s) Inhaler 2 Puff(s) Inhalation two times a day  tiotropium 18 MICROgram(s) Capsule 1 Capsule(s) Inhalation daily  dicyclomine 10 milliGRAM(s) Oral two times a day before meals  docusate sodium 100 milliGRAM(s) Oral three times a day  senna 2 Tablet(s) Oral at bedtime  sucralfate 1 Gram(s) Oral four times a day  latanoprost 0.005% Ophthalmic Solution 1 Drop(s) Both EYES at bedtime  lactobacillus acidophilus 1 Tablet(s) Oral daily  pantoprazole    Tablet 40 milliGRAM(s) Oral before breakfast  levothyroxine 50 MICROGram(s) Oral daily  sodium chloride 0.9% lock flush 3 milliLiter(s) IV Push every 8 hours  enoxaparin Injectable 40 milliGRAM(s) SubCutaneous every 24 hours  insulin lispro (HumaLOG) corrective regimen sliding scale   SubCutaneous three times a day before meals  insulin lispro (HumaLOG) corrective regimen sliding scale   SubCutaneous at bedtime  dextrose 5%. 1000 milliLiter(s) (50 mL/Hr) IV Continuous <Continuous>  dextrose 50% Injectable 12.5 Gram(s) IV Push once  dextrose 50% Injectable 25 Gram(s) IV Push once  dextrose 50% Injectable 25 Gram(s) IV Push once  furosemide    Tablet 40 milliGRAM(s) Oral daily  lidocaine   Patch 1 Patch Transdermal daily  gabapentin 200 milliGRAM(s) Oral three times a day    MEDICATIONS  (PRN):  simethicone 80 milliGRAM(s) Chew every 6 hours PRN Upset Stomach  acetaminophen   Tablet. 650 milliGRAM(s) Oral every 6 hours PRN mild, moderate, severe pain  dextrose Gel 1 Dose(s) Oral once PRN Blood Glucose LESS THAN 70 milliGRAM(s)/deciliter  glucagon  Injectable 1 milliGRAM(s) IntraMuscular once PRN Glucose LESS THAN 70 milligrams/deciliter  polyethylene glycol 3350 17 Gram(s) Oral daily PRN Constipation      Vital Signs Last 24 Hrs  T(C): 36.6 (06-30-17 @ 05:04), Max: 37.1 (06-29-17 @ 21:09)  HR: 63 (06-30-17 @ 05:04) (63 - 69)  BP: 105/54 (06-30-17 @ 05:04) (104/52 - 112/65)  RR: 18 (06-30-17 @ 05:04) (18 - 18)  SpO2: 98% (06-30-17 @ 05:04) (98% - 99%)  CAPILLARY BLOOD GLUCOSE  105 (30 Jun 2017 11:22)  99 (30 Jun 2017 08:23)  98 (29 Jun 2017 21:12)  117 (29 Jun 2017 17:05)        I&O's Summary    29 Jun 2017 07:01  -  30 Jun 2017 07:00  --------------------------------------------------------  IN: 580 mL / OUT: 720 mL / NET: -140 mL    30 Jun 2017 07:01  -  30 Jun 2017 12:13  --------------------------------------------------------  IN: 210 mL / OUT: 200 mL / NET: 10 mL    PHYSICAL EXAM:  GENERAL: NAD, well-developed  HEAD:  Atraumatic, Normocephalic  EYES: EOMI, PERRLA, conjunctiva and sclera clear  NECK: Supple, No JVD  CHEST/LUNG: Clear to auscultation bilaterally; No wheeze  HEART: Regular rate and rhythm; soft ejection systolic murmur best heard at left sternal border no rubs, or gallops  ABDOMEN: Soft, Nontender, Nondistended; Bowel sounds present  EXTREMITIES:  2+ Peripheral Pulses, No clubbing, cyanosis, or edema  PSYCH: AAOx3  NEUROLOGY: non-focal  SKIN: No rashes or lesions      LABS:                        12.8   6.53  )-----------( 305      ( 30 Jun 2017 07:00 )             40.3     06-30    140  |  98  |  30<H>  ----------------------------<  96  4.1   |  27  |  0.93    Ca    10.2      30 Jun 2017 07:00  Mg     2.1     06-30                  Consultant(s) Notes Reviewed:      Care Discussed with Consultants/Other Providers:    Contact Number, Dr Gray [phone]

## 2017-06-30 NOTE — CONSULT NOTE ADULT - SUBJECTIVE AND OBJECTIVE BOX
Chief Complaint:  Patient is a 77y old  Female who presents with a chief complaint of chest pain, abdominal pain (2017 14:57)    Paroxysmal atrial fibrillation  GERD (gastroesophageal reflux disease)  DM (diabetes mellitus)  Asthma  MI (myocardial infarction)  CAD (coronary artery disease)  Lymphoma  HLD (hyperlipidemia)  CHF (congestive heart failure)  HTN (hypertension)  ICD (implantable cardioverter-defibrillator) in place  S/P coronary artery stent placement  S/P myomectomy  S/P appendectomy  S/P lymph node biopsy  H/O lymph node excision  H/O myomectomy  History of appendectomy     HPI:  76 y/o F with hx of asthma, CAD, CHF (EF; 21%), HLD, HTN, presents with chest pain x 1 day. Patient states chest pain is intermittent, non radiating, L sided. She cannot recall what makes the pain better or worse. Also complains of worsening of back and b/l leg pain x weeks. Patient also complains of SOB with exertion. Endorses 3 pillow orthopnea. Denies fever, chills, cough, falls, LOC, melena, hematochezia, LE edema, diarrhea, or constipation. Pt reports that she has intermittent abdominal discomfort LUQ and periumbilical that feels like a 'gas bubble' at times and radiates down into her "privates and then into her legs". Pt states the pain has been improving since admission. Denying n/v/d/c, melena, brbpr, weight changes, appetite changes, dysphagia or odynophagia. Pt had recent EGD with Dr. conner in march with gastric ulcer and presbyesophagus.     On admission: comfortable, no complaints (2017 06:15)      peanuts (Unknown)  penicillin (Nausea)      spironolactone 12.5 milliGRAM(s) Oral daily  aspirin enteric coated 81 milliGRAM(s) Oral daily  losartan 25 milliGRAM(s) Oral daily  amiodarone    Tablet 400 milliGRAM(s) Oral daily  allopurinol 300 milliGRAM(s) Oral daily  atorvastatin 20 milliGRAM(s) Oral at bedtime  metoprolol succinate ER 25 milliGRAM(s) Oral daily  buDESOnide 160 MICROgram(s)/formoterol 4.5 MICROgram(s) Inhaler 2 Puff(s) Inhalation two times a day  tiotropium 18 MICROgram(s) Capsule 1 Capsule(s) Inhalation daily  dicyclomine 10 milliGRAM(s) Oral two times a day before meals  docusate sodium 100 milliGRAM(s) Oral three times a day  senna 2 Tablet(s) Oral at bedtime  sucralfate 1 Gram(s) Oral four times a day  latanoprost 0.005% Ophthalmic Solution 1 Drop(s) Both EYES at bedtime  lactobacillus acidophilus 1 Tablet(s) Oral daily  levothyroxine 50 MICROGram(s) Oral daily  sodium chloride 0.9% lock flush 3 milliLiter(s) IV Push every 8 hours  enoxaparin Injectable 40 milliGRAM(s) SubCutaneous every 24 hours  acetaminophen   Tablet. 650 milliGRAM(s) Oral every 6 hours PRN  insulin lispro (HumaLOG) corrective regimen sliding scale   SubCutaneous three times a day before meals  insulin lispro (HumaLOG) corrective regimen sliding scale   SubCutaneous at bedtime  dextrose 5%. 1000 milliLiter(s) IV Continuous <Continuous>  dextrose Gel 1 Dose(s) Oral once PRN  dextrose 50% Injectable 12.5 Gram(s) IV Push once  dextrose 50% Injectable 25 Gram(s) IV Push once  dextrose 50% Injectable 25 Gram(s) IV Push once  glucagon  Injectable 1 milliGRAM(s) IntraMuscular once PRN  polyethylene glycol 3350 17 Gram(s) Oral daily PRN  furosemide    Tablet 40 milliGRAM(s) Oral daily  lidocaine   Patch 1 Patch Transdermal daily  gabapentin 200 milliGRAM(s) Oral three times a day  pantoprazole    Tablet 40 milliGRAM(s) Oral two times a day before meals  sucralfate suspension 1 Gram(s) Oral four times a day  simethicone 80 milliGRAM(s) Chew every 6 hours        FAMILY HISTORY:  No pertinent family history in first degree relatives        Review of Systems:    General:  No wt loss, fevers, chills, night sweats,fatigue,   Eyes:  Good vision, no reported pain  ENT:  No sore throat, pain, runny nose, dysphagia  CV:  No pain, palpitatioins, hypo/hypertension  Resp:  No dyspnea, cough, tachypnea, wheezing  :  No pain, bleeding, incontinence, nocturia  Muscle:  No pain, weakness  Neuro:  No weakness, tingling, memory problems  Psych:  No fatigue, insomnia, mood problems, depression  Endocrine:  No polyuria, polydypsia, cold/heat intolerance  Heme:  No petechiae, ecchymosis, easy bruisability  Skin:  No rash, tattoos, scars, edema    Relevant Family History:       Relevant Social History:       Physical Exam:    Vital Signs:  Vital Signs Last 24 Hrs  T(C): 36.6 (2017 05:04), Max: 37.1 (2017 21:09)  T(F): 97.9 (2017 05:04), Max: 98.7 (2017 21:09)  HR: 63 (2017 05:04) (63 - 69)  BP: 105/54 (2017 05:04) (104/52 - 112/65)  BP(mean): --  RR: 18 (2017 05:04) (18 - 18)  SpO2: 98% (2017 05:04) (98% - 99%)  Daily     Daily Weight in k.2 (2017 05:04)    General:  Appears stated age, well-groomed, well-nourished, no distress  HEENT:  NC/AT,  conjunctivae clear and pink, no thyromegaly, nodules, adenopathy, no JVD  Chest:  Full & symmetric excursion, no increased effort, breath sounds clear  Cardiovascular:  Regular rhythm, S1, S2, no murmur/rub/S3/S4, no abdominal bruit, no edema  Abdomen:  Soft, non-tender, non-distended, normoactive bowel sounds,  no masses ,no hepatosplenomeagaly, no signs of chronic liver disease  Extremities:  no cyanosis,clubbing or edema  Skin:  No rash/erythema/ecchymoses/petechiae/wounds/abscess/warm/dry  Neuro/Psych:  Alert, oriented, no asterixis, no tremor, no encephalopathy    Laboratory:                            12.8   6.53  )-----------( 305      ( 2017 07:00 )             40.3     06-30    140  |  98  |  30<H>  ----------------------------<  96  4.1   |  27  |  0.93    Ca    10.2      2017 07:00  Mg     2.1     06-30      Imaging:      < from: Upper Endoscopy (17 @ 09:48) >    [facility]  _______________________________________________________________________________  Patient Name: Meri Pulido          Procedure Date: 3/30/2017 9:48 AM  MRN: [id number]                     Account Number: [id number]  YOB: 1940               Admit Type: Inpatient  Room: [place] [address]                         Gender: Female  Attending MD: LUISA CONNER DO        _______________________________________________________________________________     Procedure:           Upper GI endoscopy  Indications:         Dysphagia  Providers:           LUISA CONNER DO  Medicines:           Monitored Anesthesia Care  Complications:       No immediate complications.  Procedure:           After obtaining informed consent, the endoscope was                        passed under direct vision. Throughout the procedure,                        the patient's blood pressure, pulse, and oxygen                        saturations were monitored continuously. The Colonoscope                        was introduced through the mouth, and advanced to the                        second part of duodenum. The upper GI endoscopy was                        accomplished without difficulty. The patient tolerated       the procedure well.                                                                                   Findings:       The examined esophagus was normal.       The Z-line was regular and was found 39 cm from the incisors.       One superficial gastric ulcer was found in the gastric antrum. Biopsies        were taken with a cold forceps for histology. Estimated blood loss: none.       The examined duodenum was normal.      Impression:          - Normal esophagus.                       - Z-line regular, 39 cm from the incisors.                       - Gastric ulcer. Biopsied.                       - Normal examined duodenum.  Recommendation:      - Return patient tohospital lobo for ongoing care.                       - Soft diet.                       - Use Protonix (pantoprazole) 40 mg PO BID and Carafate                        1g four times a day                       - Await pathology results.          - will discuss initiation of nitrate versus CCB for                        patients presbyesophagus with cardiology                                                                                   Attending Participation:       I personally performed the entire procedure.                                                                                     _________________  LUISA CONNER DO  3/30/2017 11:10:53 AM

## 2017-06-30 NOTE — CONSULT NOTE ADULT - ASSESSMENT
76 y/o F with hx of asthma, CAD, CHF (EF; 21%), HLD, HTN and Lymphoma presents with chest pain and abdominal pain for which she was diuresed and is now with improvement of both. Pt reports that her abdominal pain is associated with 'gas bubbles'
78 y/o F, with T7,T10 compression Fx's with worsening pain
77F with h/o lymphoma but no details avaialble and even the info on the oncologist not avaialbale, here with cardiac issues, will recommend:  - Rx as per medicine, cardiology and CCU  - obtain info on the details of NHL and its Rx, only then will be able to do more tests to evaluate EOD at this time  - pt told to call her family members in order to obtain info on the oncologist who has been treating her.
Patient is a 77y old  Female who presents with a chief complaint of chest pain, abdominal pain, now resolved

## 2017-07-01 NOTE — PROGRESS NOTE ADULT - SUBJECTIVE AND OBJECTIVE BOX
Patient seen and examined  Chart Reviewed  No acute events  Continues to C/O leg pain but improved with increase in Reyes and no major side effects

## 2017-07-01 NOTE — PROGRESS NOTE ADULT - SUBJECTIVE AND OBJECTIVE BOX
Patient is a 77y old  Female who presents with a chief complaint of chest pain, abdominal pain (27 Jun 2017 14:57)      SUBJECTIVE / OVERNIGHT EVENTS: No nausea, vomiting or diarrhea, no fever or chills, no dizziness or chest pain, no dysuria or hematuria, no jt pain or swelling  c/o leg pain chronic    MEDICATIONS  (STANDING):  spironolactone 12.5 milliGRAM(s) Oral daily  aspirin enteric coated 81 milliGRAM(s) Oral daily  losartan 25 milliGRAM(s) Oral daily  amiodarone    Tablet 400 milliGRAM(s) Oral daily  allopurinol 300 milliGRAM(s) Oral daily  atorvastatin 20 milliGRAM(s) Oral at bedtime  metoprolol succinate ER 25 milliGRAM(s) Oral daily  buDESOnide 160 MICROgram(s)/formoterol 4.5 MICROgram(s) Inhaler 2 Puff(s) Inhalation two times a day  tiotropium 18 MICROgram(s) Capsule 1 Capsule(s) Inhalation daily  dicyclomine 10 milliGRAM(s) Oral two times a day before meals  docusate sodium 100 milliGRAM(s) Oral three times a day  senna 2 Tablet(s) Oral at bedtime  sucralfate 1 Gram(s) Oral four times a day  latanoprost 0.005% Ophthalmic Solution 1 Drop(s) Both EYES at bedtime  lactobacillus acidophilus 1 Tablet(s) Oral daily  levothyroxine 50 MICROGram(s) Oral daily  sodium chloride 0.9% lock flush 3 milliLiter(s) IV Push every 8 hours  enoxaparin Injectable 40 milliGRAM(s) SubCutaneous every 24 hours  insulin lispro (HumaLOG) corrective regimen sliding scale   SubCutaneous three times a day before meals  insulin lispro (HumaLOG) corrective regimen sliding scale   SubCutaneous at bedtime  dextrose 5%. 1000 milliLiter(s) (50 mL/Hr) IV Continuous <Continuous>  dextrose 50% Injectable 12.5 Gram(s) IV Push once  dextrose 50% Injectable 25 Gram(s) IV Push once  dextrose 50% Injectable 25 Gram(s) IV Push once  furosemide    Tablet 40 milliGRAM(s) Oral daily  lidocaine   Patch 1 Patch Transdermal daily  gabapentin 200 milliGRAM(s) Oral three times a day  pantoprazole    Tablet 40 milliGRAM(s) Oral two times a day before meals  sucralfate suspension 1 Gram(s) Oral four times a day  simethicone 80 milliGRAM(s) Chew every 6 hours    MEDICATIONS  (PRN):  acetaminophen   Tablet. 650 milliGRAM(s) Oral every 6 hours PRN mild, moderate, severe pain  dextrose Gel 1 Dose(s) Oral once PRN Blood Glucose LESS THAN 70 milliGRAM(s)/deciliter  glucagon  Injectable 1 milliGRAM(s) IntraMuscular once PRN Glucose LESS THAN 70 milligrams/deciliter  polyethylene glycol 3350 17 Gram(s) Oral daily PRN Constipation      Vital Signs Last 24 Hrs  T(C): 36.8 (07-01-17 @ 04:56), Max: 36.8 (06-30-17 @ 20:15)  HR: 64 (07-01-17 @ 04:56) (64 - 72)  BP: 108/50 (07-01-17 @ 04:56) (91/42 - 108/50)  RR: 18 (07-01-17 @ 04:56) (17 - 18)  SpO2: 100% (07-01-17 @ 04:56) (98% - 100%)  CAPILLARY BLOOD GLUCOSE  88 (01 Jul 2017 08:44)  133 (30 Jun 2017 22:08)  121 (30 Jun 2017 17:38)  105 (30 Jun 2017 11:22)        I&O's Summary    30 Jun 2017 07:01  -  01 Jul 2017 07:00  --------------------------------------------------------  IN: 370 mL / OUT: 450 mL / NET: -80 mL    01 Jul 2017 07:01  -  01 Jul 2017 11:12  --------------------------------------------------------  IN: 160 mL / OUT: 0 mL / NET: 160 mL      PHYSICAL EXAM:  GENERAL: NAD, asthenic  HEAD:  Atraumatic, Normocephalic  EYES: EOMI, PERRLA, conjunctiva and sclera clear  NECK: Supple, No JVD  CHEST/LUNG: Clear to auscultation bilaterally; No wheeze  HEART: Regular rate and rhythm; soft ejection systolic murmur best heard at left sternal border no rubs, or gallops  ABDOMEN: Soft, Nontender, Nondistended; Bowel sounds present  EXTREMITIES:  2+ Peripheral Pulses, No clubbing, cyanosis, or edema  PSYCH: AAOx3  NEUROLOGY: non-focal some hyperesthesia bilaterally LE  SKIN: No rashes or lesions    LABS:                        12.5   8.02  )-----------( 273      ( 01 Jul 2017 06:00 )             39.5     07-01    140  |  98  |  38<H>  ----------------------------<  87  4.5   |  27  |  0.87    Ca    9.9      01 Jul 2017 06:00  Mg     2.2     07-01                  Consultant(s) Notes Reviewed:      Care Discussed with Consultants/Other Providers:    Contact Number, Dr Gray [phone]

## 2017-07-01 NOTE — PROGRESS NOTE ADULT - RESPIRATORY
Breath Sounds equal & clear to percussion & auscultation, no accessory muscle use
Breath Sounds equal & clear to percussion & auscultation, no accessory muscle use
detailed exam
detailed exam

## 2017-07-01 NOTE — PROGRESS NOTE ADULT - SUBJECTIVE AND OBJECTIVE BOX
INTERVAL HPI/OVERNIGHT EVENTS:    pt reports abd pain improved this morning  tolerating diet    MEDICATIONS  (STANDING):  spironolactone 12.5 milliGRAM(s) Oral daily  aspirin enteric coated 81 milliGRAM(s) Oral daily  losartan 25 milliGRAM(s) Oral daily  amiodarone    Tablet 400 milliGRAM(s) Oral daily  allopurinol 300 milliGRAM(s) Oral daily  atorvastatin 20 milliGRAM(s) Oral at bedtime  metoprolol succinate ER 25 milliGRAM(s) Oral daily  buDESOnide 160 MICROgram(s)/formoterol 4.5 MICROgram(s) Inhaler 2 Puff(s) Inhalation two times a day  tiotropium 18 MICROgram(s) Capsule 1 Capsule(s) Inhalation daily  dicyclomine 10 milliGRAM(s) Oral two times a day before meals  docusate sodium 100 milliGRAM(s) Oral three times a day  senna 2 Tablet(s) Oral at bedtime  sucralfate 1 Gram(s) Oral four times a day  latanoprost 0.005% Ophthalmic Solution 1 Drop(s) Both EYES at bedtime  lactobacillus acidophilus 1 Tablet(s) Oral daily  levothyroxine 50 MICROGram(s) Oral daily  sodium chloride 0.9% lock flush 3 milliLiter(s) IV Push every 8 hours  enoxaparin Injectable 40 milliGRAM(s) SubCutaneous every 24 hours  insulin lispro (HumaLOG) corrective regimen sliding scale   SubCutaneous three times a day before meals  insulin lispro (HumaLOG) corrective regimen sliding scale   SubCutaneous at bedtime  dextrose 5%. 1000 milliLiter(s) (50 mL/Hr) IV Continuous <Continuous>  dextrose 50% Injectable 12.5 Gram(s) IV Push once  dextrose 50% Injectable 25 Gram(s) IV Push once  dextrose 50% Injectable 25 Gram(s) IV Push once  furosemide    Tablet 40 milliGRAM(s) Oral daily  lidocaine   Patch 1 Patch Transdermal daily  gabapentin 200 milliGRAM(s) Oral three times a day  pantoprazole    Tablet 40 milliGRAM(s) Oral two times a day before meals  sucralfate suspension 1 Gram(s) Oral four times a day  simethicone 80 milliGRAM(s) Chew every 6 hours    MEDICATIONS  (PRN):  acetaminophen   Tablet. 650 milliGRAM(s) Oral every 6 hours PRN mild, moderate, severe pain  dextrose Gel 1 Dose(s) Oral once PRN Blood Glucose LESS THAN 70 milliGRAM(s)/deciliter  glucagon  Injectable 1 milliGRAM(s) IntraMuscular once PRN Glucose LESS THAN 70 milligrams/deciliter  polyethylene glycol 3350 17 Gram(s) Oral daily PRN Constipation      Allergies    peanuts (Unknown)  penicillin (Nausea)    Intolerances        Review of Systems:    General:  No wt loss, fevers, chills, night sweats,fatigue,   Eyes:  Good vision, no reported pain  ENT:  No sore throat, pain, runny nose, dysphagia  CV:  No pain, palpitatioins, hypo/hypertension  Resp:  No dyspnea, cough, tachypnea, wheezing  GI:  No pain, No nausea, No vomiting, No diarrhea, No constipatiion, No weight loss, No fever, No pruritis, No rectal bleeding, No tarry stools, No dysphagia,  :  No pain, bleeding, incontinence, nocturia  Muscle:  No pain, weakness  Neuro:  No weakness, tingling, memory problems  Psych:  No fatigue, insomnia, mood problems, depression  Endocrine:  No polyuria, polydypsia, cold/heat intolerance  Heme:  No petechiae, ecchymosis, easy bruisability  Skin:  No rash, tattoos, scars, edema      Vital Signs Last 24 Hrs  T(C): 36.8 (01 Jul 2017 04:56), Max: 36.8 (30 Jun 2017 20:15)  T(F): 98.2 (01 Jul 2017 04:56), Max: 98.2 (30 Jun 2017 20:15)  HR: 64 (01 Jul 2017 04:56) (64 - 72)  BP: 108/50 (01 Jul 2017 04:56) (91/42 - 108/50)  BP(mean): --  RR: 18 (01 Jul 2017 04:56) (17 - 18)  SpO2: 100% (01 Jul 2017 04:56) (98% - 100%)    PHYSICAL EXAM:    Constitutional: NAD, well-developed  HEENT: EOMI, throat clear  Neck: No LAD, supple  Respiratory: CTA and P  Cardiovascular: S1 and S2, RRR, no M  Gastrointestinal: BS+, soft, NT/ND, neg HSM,  Extremities: No peripheral edema, neg clubing, cyanosis  Vascular: 2+ peripheral pulses  Neurological: A/O x 3, no focal deficits  Psychiatric: Normal mood, normal affect  Skin: No rashes      LABS:                        12.5   8.02  )-----------( 273      ( 01 Jul 2017 06:00 )             39.5     07-01    140  |  98  |  38<H>  ----------------------------<  87  4.5   |  27  |  0.87    Ca    9.9      01 Jul 2017 06:00  Mg     2.2     07-01            RADIOLOGY & ADDITIONAL TESTS:

## 2017-07-01 NOTE — PROGRESS NOTE ADULT - SUBJECTIVE AND OBJECTIVE BOX
HPI:  78 y/o F with hx of asthma, CAD, CHF (EF; 21%), HLD, HTN, presents with chest pain x 1 day. Patient states chest pain is intermittent, non radiating, L sided. She cannot recall what makes the pain better or worse. Also complains of worsening of back and b/l leg pain x weeks. Patient also complains of SOB with exertion. Endorses 3 pillow orthopnea. Denies fever, chills, cough, falls, LOC, melena, hematochezia, LE edema, diarrhea, or constipation    On admission: comfortable, no complaints (27 Jun 2017 06:15).    Pt feels better, has mild stomach ache after drinking ensure, breathing more comfortably, no CP.    Meds:  spironolactone 12.5 milliGRAM(s) Oral daily  aspirin enteric coated 81 milliGRAM(s) Oral daily  losartan 25 milliGRAM(s) Oral daily  amiodarone    Tablet 400 milliGRAM(s) Oral daily  allopurinol 300 milliGRAM(s) Oral daily  atorvastatin 20 milliGRAM(s) Oral at bedtime  metoprolol succinate ER 25 milliGRAM(s) Oral daily  buDESOnide 160 MICROgram(s)/formoterol 4.5 MICROgram(s) Inhaler 2 Puff(s) Inhalation two times a day  tiotropium 18 MICROgram(s) Capsule 1 Capsule(s) Inhalation daily  dicyclomine 10 milliGRAM(s) Oral two times a day before meals  docusate sodium 100 milliGRAM(s) Oral three times a day  senna 2 Tablet(s) Oral at bedtime  sucralfate 1 Gram(s) Oral four times a day  latanoprost 0.005% Ophthalmic Solution 1 Drop(s) Both EYES at bedtime  lactobacillus acidophilus 1 Tablet(s) Oral daily  levothyroxine 50 MICROGram(s) Oral daily  sodium chloride 0.9% lock flush 3 milliLiter(s) IV Push every 8 hours  enoxaparin Injectable 40 milliGRAM(s) SubCutaneous every 24 hours  acetaminophen   Tablet. 650 milliGRAM(s) Oral every 6 hours PRN  insulin lispro (HumaLOG) corrective regimen sliding scale   SubCutaneous three times a day before meals  insulin lispro (HumaLOG) corrective regimen sliding scale   SubCutaneous at bedtime  dextrose 5%. 1000 milliLiter(s) IV Continuous <Continuous>  dextrose Gel 1 Dose(s) Oral once PRN  dextrose 50% Injectable 12.5 Gram(s) IV Push once  dextrose 50% Injectable 25 Gram(s) IV Push once  dextrose 50% Injectable 25 Gram(s) IV Push once  glucagon  Injectable 1 milliGRAM(s) IntraMuscular once PRN  polyethylene glycol 3350 17 Gram(s) Oral daily PRN  furosemide    Tablet 40 milliGRAM(s) Oral daily  lidocaine   Patch 1 Patch Transdermal daily  gabapentin 200 milliGRAM(s) Oral three times a day  pantoprazole    Tablet 40 milliGRAM(s) Oral two times a day before meals  sucralfate suspension 1 Gram(s) Oral four times a day  simethicone 80 milliGRAM(s) Chew every 6 hours      Vital Signs Last 24 Hrs  T(C): 36.8 (01 Jul 2017 04:56), Max: 36.8 (30 Jun 2017 20:15)  T(F): 98.2 (01 Jul 2017 04:56), Max: 98.2 (30 Jun 2017 20:15)  HR: 64 (01 Jul 2017 04:56) (64 - 72)  BP: 108/50 (01 Jul 2017 04:56) (91/42 - 108/50)  BP(mean): --  RR: 18 (01 Jul 2017 04:56) (17 - 18)  SpO2: 100% (01 Jul 2017 04:56) (98% - 100%)                          12.5   8.02  )-----------( 273      ( 01 Jul 2017 06:00 )             39.5       07-01    140  |  98  |  38<H>  ----------------------------<  87  4.5   |  27  |  0.87    Ca    9.9      01 Jul 2017 06:00  Mg     2.2     07-01

## 2017-07-01 NOTE — PROGRESS NOTE ADULT - NEUROLOGICAL
Alert & oriented; no sensory, motor or coordination deficits, normal reflexes
Alert & oriented; no sensory, motor or coordination deficits, normal reflexes
detailed exam

## 2017-07-01 NOTE — PROGRESS NOTE ADULT - SUBJECTIVE AND OBJECTIVE BOX
Subjective:   	no chest pain  SOB improving Back pain and B/L LE pain/tingling w/ improvement    MEDICATIONS:  MEDICATIONS  (STANDING):  spironolactone 12.5 milliGRAM(s) Oral daily  aspirin enteric coated 81 milliGRAM(s) Oral daily  losartan 25 milliGRAM(s) Oral daily  amiodarone    Tablet 400 milliGRAM(s) Oral daily  allopurinol 300 milliGRAM(s) Oral daily  atorvastatin 20 milliGRAM(s) Oral at bedtime  metoprolol succinate ER 25 milliGRAM(s) Oral daily  buDESOnide 160 MICROgram(s)/formoterol 4.5 MICROgram(s) Inhaler 2 Puff(s) Inhalation two times a day  tiotropium 18 MICROgram(s) Capsule 1 Capsule(s) Inhalation daily  dicyclomine 10 milliGRAM(s) Oral two times a day before meals  docusate sodium 100 milliGRAM(s) Oral three times a day  senna 2 Tablet(s) Oral at bedtime  sucralfate 1 Gram(s) Oral four times a day  latanoprost 0.005% Ophthalmic Solution 1 Drop(s) Both EYES at bedtime  lactobacillus acidophilus 1 Tablet(s) Oral daily  levothyroxine 50 MICROGram(s) Oral daily  sodium chloride 0.9% lock flush 3 milliLiter(s) IV Push every 8 hours  enoxaparin Injectable 40 milliGRAM(s) SubCutaneous every 24 hours  insulin lispro (HumaLOG) corrective regimen sliding scale   SubCutaneous three times a day before meals  insulin lispro (HumaLOG) corrective regimen sliding scale   SubCutaneous at bedtime  dextrose 5%. 1000 milliLiter(s) (50 mL/Hr) IV Continuous <Continuous>  dextrose 50% Injectable 12.5 Gram(s) IV Push once  dextrose 50% Injectable 25 Gram(s) IV Push once  dextrose 50% Injectable 25 Gram(s) IV Push once  furosemide    Tablet 40 milliGRAM(s) Oral daily  lidocaine   Patch 1 Patch Transdermal daily  gabapentin 200 milliGRAM(s) Oral three times a day  pantoprazole    Tablet 40 milliGRAM(s) Oral two times a day before meals  sucralfate suspension 1 Gram(s) Oral four times a day  simethicone 80 milliGRAM(s) Chew every 6 hours    MEDICATIONS  (PRN):  acetaminophen   Tablet. 650 milliGRAM(s) Oral every 6 hours PRN mild, moderate, severe pain  dextrose Gel 1 Dose(s) Oral once PRN Blood Glucose LESS THAN 70 milliGRAM(s)/deciliter  glucagon  Injectable 1 milliGRAM(s) IntraMuscular once PRN Glucose LESS THAN 70 milligrams/deciliter  polyethylene glycol 3350 17 Gram(s) Oral daily PRN Constipation      LABS:	 	    CARDIAC MARKERS:                                12.5   8.02  )-----------( 273      ( 01 Jul 2017 06:00 )             39.5     07-01    140  |  98  |  38<H>  ----------------------------<  87  4.5   |  27  |  0.87    Ca    9.9      01 Jul 2017 06:00  Mg     2.2     07-01      COAGS:       proBNP:   Lipid Profile:   HgA1c:   TSH:       PHYSICAL EXAM:  T(C): 36.8 (07-01-17 @ 04:56), Max: 36.8 (06-30-17 @ 20:15)  HR: 64 (07-01-17 @ 04:56) (64 - 72)  BP: 108/50 (07-01-17 @ 04:56) (91/42 - 108/50)  RR: 18 (07-01-17 @ 04:56) (17 - 18)  SpO2: 100% (07-01-17 @ 04:56) (98% - 100%)  Wt(kg): --  I&O's Summary    30 Jun 2017 07:01  -  01 Jul 2017 07:00  --------------------------------------------------------  IN: 370 mL / OUT: 450 mL / NET: -80 mL    01 Jul 2017 07:01  -  01 Jul 2017 12:32  --------------------------------------------------------  IN: 160 mL / OUT: 0 mL / NET: 160 mL          HEENT:   Normal oral mucosa,    Lymphatic: No obvious lymphadenopathy , no edema  Cardiovascular: Normal S1 S2 RRR    No JVD, 1/6 ERICA murmur, Peripheral pulses palpable 2+ bilaterally  Respiratory: Lungs clear to auscultation, 	  Gastrointestinal:  Soft, Non-tender, + BS	  Skin: No rashes,  No cyanosis, warm to touch  Extremities no edema, cyanosis, clubbing B/L LE's       TELEMETRY: 	 None    ECG:  	 paced   RADIOLOGY:   CT spine  < from: CT Lumbar Spine No Cont (06.29.17 @ 10:41) >  Cervical spine:    Loss of normal cervical lordosis is seen. This could be due to   positioning or muscle spasm.    The vertebral body height and alignment appear normal.    Anterior osteophytes are seen involving the C4, C5, C6 and C7 levels.   These findings are secondary to degenerative changes.    There are no acute fractures or dislocations seen.    Evaluation of the paraspinal soft tissues is limited due to lack of IV   contrast though grossly unremarkable    Thoracic spine:    Slight increased kyphosis is identified.    Pacemaker wires are seen.    Compression deformities are again seen involving the superior endplate of   T1 and T2-T3 and T4. These findings appear unchanged when compared with   the prior study. Anterior compression deformities are again seen   involving the T7 and T10 vertebral bodies. These findings appear   unchanged when compared with the prior study.    There is a Schmorl's node seen along the superior endplate of T10 which   appears unchanged. This is likely result of degenerative changes.    No acute fractures or dislocations are seen.    No abnormal lytic or blastic lesions are seen.    Tiny density is again seen along the superior endplate of T7. Please   correlate clinically. This finding appears unchanged. Evaluation of the   paraspinal soft tissues is limited due to lack of IV contrast. Congestive   changes are seen involving both lung bases. There is also linear density   seen involving the right lung base which was present prior study. This   could be compatible with underlying scarring. Please correlate   clinically. Dedicated imaging of the lungs with CT scan can be done if   clinically indicated.    Lumbar spine:     Reversal normal lumbar lordosis is seen. This could be due to positioning   or muscle spasm. The vertebral body height and alignment appear normal    Vacuum disc changes are seen involving the L5-S1 level which are   secondary to degenerative changes.    There are no acute fractures or dislocation seen.    Evaluation of the paraspinal soft tissues is limited due to lack of IV   contrast though grossly unremarkable.    Impression: Degenerative changes involving the cervical thoracic and   lumbar spine are identified as described above.    Stable compression deformities are identified as described above.    < end of copied text >    DIAGNOSTIC TESTING:  [ ] Echocardiogram: 4/3/17  .1 Mild mitral regurgitation.  .2 Moderately dilated left atrium.  LA volume index = 46  cc/m2.  3. Moderate left ventricular enlargement.  4. Severe global left ventricular systolic dysfunction.  Endocardial visualization enhanced with intravenous  injection of echo contrast (Definity).  5. The right ventricle is not well visualized; grossly  normal right ventricular systolic function.  A device wire  is noted in the right heart.  6. Estimated right ventricular systolic pressure equals 60  mm Hg, assuming right atrial pressure equals 10 mm Hg,  consistent with moderate pulmonary hypertension.      [ ]  Catheterization:  [ ] Stress Test:    OTHER: 	      ASSESSMENT/PLAN: 	77y Female with hypertension, dyslipidemia, CAD s/p LAD stent with only mild re-stenosis with minor luminal irregularities otherwise on cath 4/16, with known severe LV dysfunction , NICM s/p [employer] BiV ICD, COPD, lymphoma previously on chemo with known spinal mets admitted with CP/SOB/LE pain.    -- CHF compensated.  Continue PO Lasix  -- CT C/T/L spine as above   --Appreciate Neurosurgery consult. Known Thoracic compression fx, with TLSO brace at home but with worsening back pain and leg pain--   --Neuro input appreciated- started Neurontin OK to increase to 300 mg   --Onc eval with Dr. Walsh - patient with known lymphoma and spinal mets (follows with outside onc Dr. Reyna [phone], [phone])  -- c/w Amio 400mg po qd for h/o VT/VF - the patient has NO HISTORY OF AFIB  --PT recommended rehab   --F/U Social Work re; discharge planning

## 2017-07-02 NOTE — PROGRESS NOTE ADULT - SUBJECTIVE AND OBJECTIVE BOX
INTERVAL HPI/OVERNIGHT EVENTS:    pt reports mild abd cramps this morning, relieved after having a BM  denies n/v/d/c, melena or brbpr   tolerated breakfast well, "not very good food here"    MEDICATIONS  (STANDING):  spironolactone 12.5 milliGRAM(s) Oral daily  aspirin enteric coated 81 milliGRAM(s) Oral daily  losartan 25 milliGRAM(s) Oral daily  amiodarone    Tablet 400 milliGRAM(s) Oral daily  allopurinol 300 milliGRAM(s) Oral daily  atorvastatin 20 milliGRAM(s) Oral at bedtime  metoprolol succinate ER 25 milliGRAM(s) Oral daily  buDESOnide 160 MICROgram(s)/formoterol 4.5 MICROgram(s) Inhaler 2 Puff(s) Inhalation two times a day  tiotropium 18 MICROgram(s) Capsule 1 Capsule(s) Inhalation daily  dicyclomine 10 milliGRAM(s) Oral two times a day before meals  docusate sodium 100 milliGRAM(s) Oral three times a day  senna 2 Tablet(s) Oral at bedtime  sucralfate 1 Gram(s) Oral four times a day  latanoprost 0.005% Ophthalmic Solution 1 Drop(s) Both EYES at bedtime  lactobacillus acidophilus 1 Tablet(s) Oral daily  levothyroxine 50 MICROGram(s) Oral daily  sodium chloride 0.9% lock flush 3 milliLiter(s) IV Push every 8 hours  enoxaparin Injectable 40 milliGRAM(s) SubCutaneous every 24 hours  insulin lispro (HumaLOG) corrective regimen sliding scale   SubCutaneous three times a day before meals  insulin lispro (HumaLOG) corrective regimen sliding scale   SubCutaneous at bedtime  dextrose 5%. 1000 milliLiter(s) (50 mL/Hr) IV Continuous <Continuous>  dextrose 50% Injectable 12.5 Gram(s) IV Push once  dextrose 50% Injectable 25 Gram(s) IV Push once  dextrose 50% Injectable 25 Gram(s) IV Push once  furosemide    Tablet 40 milliGRAM(s) Oral daily  lidocaine   Patch 1 Patch Transdermal daily  pantoprazole    Tablet 40 milliGRAM(s) Oral two times a day before meals  sucralfate suspension 1 Gram(s) Oral four times a day  simethicone 80 milliGRAM(s) Chew every 6 hours  gabapentin 300 milliGRAM(s) Oral every 8 hours    MEDICATIONS  (PRN):  acetaminophen   Tablet. 650 milliGRAM(s) Oral every 6 hours PRN mild, moderate, severe pain  dextrose Gel 1 Dose(s) Oral once PRN Blood Glucose LESS THAN 70 milliGRAM(s)/deciliter  glucagon  Injectable 1 milliGRAM(s) IntraMuscular once PRN Glucose LESS THAN 70 milligrams/deciliter  polyethylene glycol 3350 17 Gram(s) Oral daily PRN Constipation      Allergies    peanuts (Unknown)  penicillin (Nausea)    Intolerances        Review of Systems:    General:  No wt loss, fevers, chills, night sweats,fatigue,   Eyes:  Good vision, no reported pain  ENT:  No sore throat, pain, runny nose, dysphagia  CV:  No pain, palpitatioins, hypo/hypertension  Resp:  No dyspnea, cough, tachypnea, wheezing  GI:  No pain, No nausea, No vomiting, No diarrhea, No constipatiion, No weight loss, No fever, No pruritis, No rectal bleeding, No tarry stools, No dysphagia,  :  No pain, bleeding, incontinence, nocturia  Muscle:  No pain, weakness  Neuro:  No weakness, tingling, memory problems  Psych:  No fatigue, insomnia, mood problems, depression  Endocrine:  No polyuria, polydypsia, cold/heat intolerance  Heme:  No petechiae, ecchymosis, easy bruisability  Skin:  No rash, tattoos, scars, edema      Vital Signs Last 24 Hrs  T(C): 36.6 (02 Jul 2017 06:27), Max: 36.7 (01 Jul 2017 21:42)  T(F): 97.8 (02 Jul 2017 06:27), Max: 98 (01 Jul 2017 21:42)  HR: 60 (02 Jul 2017 06:27) (60 - 92)  BP: 102/50 (02 Jul 2017 06:27) (102/50 - 105/55)  BP(mean): --  RR: 18 (02 Jul 2017 06:27) (18 - 18)  SpO2: 99% (02 Jul 2017 06:27) (96% - 99%)    PHYSICAL EXAM:    Constitutional: NAD, well-developed  HEENT: EOMI, throat clear  Neck: No LAD, supple  Respiratory: CTA and P  Cardiovascular: S1 and S2, RRR, no M  Gastrointestinal: BS+, soft, NT/ND, neg HSM,  Extremities: No peripheral edema, neg clubing, cyanosis  Vascular: 2+ peripheral pulses  Neurological: A/O x 3, no focal deficits  Psychiatric: Normal mood, normal affect  Skin: No rashes      LABS:                        12.1   8.47  )-----------( 255      ( 02 Jul 2017 06:30 )             38.6     07-02    139  |  97<L>  |  39<H>  ----------------------------<  80  4.7   |  25  |  0.95    Ca    9.8      02 Jul 2017 06:30  Mg     2.3     07-02            RADIOLOGY & ADDITIONAL TESTS:

## 2017-07-02 NOTE — PROGRESS NOTE ADULT - SUBJECTIVE AND OBJECTIVE BOX
Subjective:   	 no chest pain/ shortness of breath       MEDICATIONS:  MEDICATIONS  (STANDING):  spironolactone 12.5 milliGRAM(s) Oral daily  aspirin enteric coated 81 milliGRAM(s) Oral daily  losartan 25 milliGRAM(s) Oral daily  amiodarone    Tablet 400 milliGRAM(s) Oral daily  allopurinol 300 milliGRAM(s) Oral daily  atorvastatin 20 milliGRAM(s) Oral at bedtime  metoprolol succinate ER 25 milliGRAM(s) Oral daily  buDESOnide 160 MICROgram(s)/formoterol 4.5 MICROgram(s) Inhaler 2 Puff(s) Inhalation two times a day  tiotropium 18 MICROgram(s) Capsule 1 Capsule(s) Inhalation daily  dicyclomine 10 milliGRAM(s) Oral two times a day before meals  docusate sodium 100 milliGRAM(s) Oral three times a day  senna 2 Tablet(s) Oral at bedtime  sucralfate 1 Gram(s) Oral four times a day  latanoprost 0.005% Ophthalmic Solution 1 Drop(s) Both EYES at bedtime  lactobacillus acidophilus 1 Tablet(s) Oral daily  levothyroxine 50 MICROGram(s) Oral daily  sodium chloride 0.9% lock flush 3 milliLiter(s) IV Push every 8 hours  enoxaparin Injectable 40 milliGRAM(s) SubCutaneous every 24 hours  insulin lispro (HumaLOG) corrective regimen sliding scale   SubCutaneous three times a day before meals  insulin lispro (HumaLOG) corrective regimen sliding scale   SubCutaneous at bedtime  dextrose 5%. 1000 milliLiter(s) (50 mL/Hr) IV Continuous <Continuous>  dextrose 50% Injectable 12.5 Gram(s) IV Push once  dextrose 50% Injectable 25 Gram(s) IV Push once  dextrose 50% Injectable 25 Gram(s) IV Push once  furosemide    Tablet 40 milliGRAM(s) Oral daily  lidocaine   Patch 1 Patch Transdermal daily  pantoprazole    Tablet 40 milliGRAM(s) Oral two times a day before meals  sucralfate suspension 1 Gram(s) Oral four times a day  simethicone 80 milliGRAM(s) Chew every 6 hours  gabapentin 300 milliGRAM(s) Oral every 8 hours    MEDICATIONS  (PRN):  acetaminophen   Tablet. 650 milliGRAM(s) Oral every 6 hours PRN mild, moderate, severe pain  dextrose Gel 1 Dose(s) Oral once PRN Blood Glucose LESS THAN 70 milliGRAM(s)/deciliter  glucagon  Injectable 1 milliGRAM(s) IntraMuscular once PRN Glucose LESS THAN 70 milligrams/deciliter  polyethylene glycol 3350 17 Gram(s) Oral daily PRN Constipation      LABS:	 	    CARDIAC MARKERS:                                12.1   8.47  )-----------( 255      ( 02 Jul 2017 06:30 )             38.6     07-02    139  |  97<L>  |  39<H>  ----------------------------<  80  4.7   |  25  |  0.95    Ca    9.8      02 Jul 2017 06:30  Mg     2.3     07-02      COAGS:       proBNP:   Lipid Profile:   HgA1c:   TSH:       PHYSICAL EXAM:  T(C): 36.2 (07-02-17 @ 13:45), Max: 36.7 (07-01-17 @ 21:42)  HR: 67 (07-02-17 @ 13:45) (60 - 67)  BP: 103/49 (07-02-17 @ 13:45) (102/50 - 105/51)  RR: 18 (07-02-17 @ 13:45) (18 - 18)  SpO2: 100% (07-02-17 @ 13:45) (98% - 100%)  Wt(kg): --  I&O's Summary    01 Jul 2017 07:01  -  02 Jul 2017 07:00  --------------------------------------------------------  IN: 220 mL / OUT: 0 mL / NET: 220 mL    02 Jul 2017 07:01  -  02 Jul 2017 14:36  --------------------------------------------------------  IN: 180 mL / OUT: 0 mL / NET: 180 mL          HEENT:   Normal oral mucosa,    Lymphatic: No obvious lymphadenopathy , no edema  Cardiovascular: Normal S1 S2, RRR    No JVD,  Peripheral pulses palpable 2+ bilaterally  Respiratory: Lungs clear to auscultation, normal effort 	  Gastrointestinal:  Soft, Non-tender, + BS	  Skin: No rashes,  No cyanosis, warm to touch  Extremities no edema, cyanosis, clubbing B/L LE's       TELEMETRY: 	 none   ECG:  	paced  RADIOLOGY:   RADIOLOGY:   CT spine  < from: CT Lumbar Spine No Cont (06.29.17 @ 10:41) >  Cervical spine:    Loss of normal cervical lordosis is seen. This could be due to   positioning or muscle spasm.    The vertebral body height and alignment appear normal.    Anterior osteophytes are seen involving the C4, C5, C6 and C7 levels.   These findings are secondary to degenerative changes.    There are no acute fractures or dislocations seen.    Evaluation of the paraspinal soft tissues is limited due to lack of IV   contrast though grossly unremarkable    Thoracic spine:    Slight increased kyphosis is identified.    Pacemaker wires are seen.    Compression deformities are again seen involving the superior endplate of   T1 and T2-T3 and T4. These findings appear unchanged when compared with   the prior study. Anterior compression deformities are again seen   involving the T7 and T10 vertebral bodies. These findings appear   unchanged when compared with the prior study.    There is a Schmorl's node seen along the superior endplate of T10 which   appears unchanged. This is likely result of degenerative changes.    No acute fractures or dislocations are seen.    No abnormal lytic or blastic lesions are seen.    Tiny density is again seen along the superior endplate of T7. Please   correlate clinically. This finding appears unchanged. Evaluation of the   paraspinal soft tissues is limited due to lack of IV contrast. Congestive   changes are seen involving both lung bases. There is also linear density   seen involving the right lung base which was present prior study. This   could be compatible with underlying scarring. Please correlate   clinically. Dedicated imaging of the lungs with CT scan can be done if   clinically indicated.    Lumbar spine:     Reversal normal lumbar lordosis is seen. This could be due to positioning   or muscle spasm. The vertebral body height and alignment appear normal    Vacuum disc changes are seen involving the L5-S1 level which are   secondary to degenerative changes.    There are no acute fractures or dislocation seen.    Evaluation of the paraspinal soft tissues is limited due to lack of IV   contrast though grossly unremarkable.    Impression: Degenerative changes involving the cervical thoracic and   lumbar spine are identified as described above.    Stable compression deformities are identified as described above.        DIAGNOSTIC TESTING:  [ ] Echocardiogram:  4/3/17  .1 Mild mitral regurgitation.  .2 Moderately dilated left atrium.  LA volume index = 46  cc/m2.  3. Moderate left ventricular enlargement.  4. Severe global left ventricular systolic dysfunction.  Endocardial visualization enhanced with intravenous  injection of echo contrast (Definity).  5. The right ventricle is not well visualized; grossly  normal right ventricular systolic function.  A device wire  is noted in the right heart.  6. Estimated right ventricular systolic pressure equals 60  mm Hg, assuming right atrial pressure equals 10 mm Hg,  consistent with moderate pulmonary hypertension.    [ ]  Catheterization:  [ ] Stress Test:    OTHER: 	      ASSESSMENT/PLAN: 	77y Female with hypertension, dyslipidemia, CAD s/p LAD stent with only mild re-stenosis with minor luminal irregularities otherwise on cath 4/16, with known severe LV dysfunction , NICM s/p [employer] BiV ICD, COPD, lymphoma previously on chemo with known spinal mets admitted with CP/SOB/LE pain.    -- CHF compensated.  Continue PO Lasix  -- CT C/T/L spine as above   --Appreciate Neurosurgery consult. Known Thoracic compression fx, with TLSO brace at home but with worsening back pain and leg pain--   --Neuro input appreciated- started Neurontin OK to increase to 300 mg   --Onc eval with Dr. Walsh - patient with known lymphoma and spinal mets (follows with outside onc Dr. Reyna [phone], [phone])  -- c/w Amio 400mg po qd for h/o VT/VF - the patient has NO HISTORY OF AFIB  --PT recommended rehab   --F/U Social Work re; discharge planning

## 2017-07-02 NOTE — PROGRESS NOTE ADULT - SUBJECTIVE AND OBJECTIVE BOX
Patient is a 77y old  Female who presents with a chief complaint of chest pain, abdominal pain (27 Jun 2017 14:57)      SUBJECTIVE / OVERNIGHT EVENTS: No nausea, vomiting or diarrhea, no fever or chills, no dizziness or chest pain, no dysuria or hematuria, no jt pain or swelling    mild abdominal cramping this AM now resolved     MEDICATIONS  (STANDING):  spironolactone 12.5 milliGRAM(s) Oral daily  aspirin enteric coated 81 milliGRAM(s) Oral daily  losartan 25 milliGRAM(s) Oral daily  amiodarone    Tablet 400 milliGRAM(s) Oral daily  allopurinol 300 milliGRAM(s) Oral daily  atorvastatin 20 milliGRAM(s) Oral at bedtime  metoprolol succinate ER 25 milliGRAM(s) Oral daily  buDESOnide 160 MICROgram(s)/formoterol 4.5 MICROgram(s) Inhaler 2 Puff(s) Inhalation two times a day  tiotropium 18 MICROgram(s) Capsule 1 Capsule(s) Inhalation daily  dicyclomine 10 milliGRAM(s) Oral two times a day before meals  docusate sodium 100 milliGRAM(s) Oral three times a day  senna 2 Tablet(s) Oral at bedtime  sucralfate 1 Gram(s) Oral four times a day  latanoprost 0.005% Ophthalmic Solution 1 Drop(s) Both EYES at bedtime  lactobacillus acidophilus 1 Tablet(s) Oral daily  levothyroxine 50 MICROGram(s) Oral daily  sodium chloride 0.9% lock flush 3 milliLiter(s) IV Push every 8 hours  enoxaparin Injectable 40 milliGRAM(s) SubCutaneous every 24 hours  insulin lispro (HumaLOG) corrective regimen sliding scale   SubCutaneous three times a day before meals  insulin lispro (HumaLOG) corrective regimen sliding scale   SubCutaneous at bedtime  dextrose 5%. 1000 milliLiter(s) (50 mL/Hr) IV Continuous <Continuous>  dextrose 50% Injectable 12.5 Gram(s) IV Push once  dextrose 50% Injectable 25 Gram(s) IV Push once  dextrose 50% Injectable 25 Gram(s) IV Push once  furosemide    Tablet 40 milliGRAM(s) Oral daily  lidocaine   Patch 1 Patch Transdermal daily  pantoprazole    Tablet 40 milliGRAM(s) Oral two times a day before meals  sucralfate suspension 1 Gram(s) Oral four times a day  simethicone 80 milliGRAM(s) Chew every 6 hours  gabapentin 300 milliGRAM(s) Oral every 8 hours    MEDICATIONS  (PRN):  acetaminophen   Tablet. 650 milliGRAM(s) Oral every 6 hours PRN mild, moderate, severe pain  dextrose Gel 1 Dose(s) Oral once PRN Blood Glucose LESS THAN 70 milliGRAM(s)/deciliter  glucagon  Injectable 1 milliGRAM(s) IntraMuscular once PRN Glucose LESS THAN 70 milligrams/deciliter  polyethylene glycol 3350 17 Gram(s) Oral daily PRN Constipation      Vital Signs Last 24 Hrs  T(C): 36.6 (07-02-17 @ 06:27), Max: 36.7 (07-01-17 @ 21:42)  HR: 60 (07-02-17 @ 06:27) (60 - 92)  BP: 102/50 (07-02-17 @ 06:27) (102/50 - 105/55)  RR: 18 (07-02-17 @ 06:27) (18 - 18)  SpO2: 99% (07-02-17 @ 06:27) (96% - 99%)  CAPILLARY BLOOD GLUCOSE  88 (02 Jul 2017 08:46)  106 (01 Jul 2017 23:06)  98 (01 Jul 2017 17:22)  89 (01 Jul 2017 11:56)        I&O's Summary    01 Jul 2017 07:01  -  02 Jul 2017 07:00  --------------------------------------------------------  IN: 220 mL / OUT: 0 mL / NET: 220 mL    02 Jul 2017 07:01  -  02 Jul 2017 11:09  --------------------------------------------------------  IN: 180 mL / OUT: 0 mL / NET: 180 mL        PHYSICAL EXAM:  GENERAL: NAD, well-developed  HEAD:  Atraumatic, Normocephalic  EYES: EOMI, PERRLA, conjunctiva and sclera clear  NECK: Supple, No JVD  CHEST/LUNG: Clear to auscultation bilaterally; No wheeze  HEART: Regular rate and rhythm; No murmurs, rubs, or gallops  ABDOMEN: Soft, Nontender, Nondistended; Bowel sounds present  EXTREMITIES:  2+ Peripheral Pulses, No clubbing, cyanosis, or edema  PSYCH: AAOx3  NEUROLOGY: non-focal  SKIN: No rashes or lesions    LABS:                        12.1   8.47  )-----------( 255      ( 02 Jul 2017 06:30 )             38.6     07-02    139  |  97<L>  |  39<H>  ----------------------------<  80  4.7   |  25  |  0.95    Ca    9.8      02 Jul 2017 06:30  Mg     2.3     07-02                  Consultant(s) Notes Reviewed:      Care Discussed with Consultants/Other Providers:    Contact Number, Dr Gray [phone]

## 2017-07-03 NOTE — PROGRESS NOTE ADULT - SUBJECTIVE AND OBJECTIVE BOX
Patient is a 77y old  Female who presents with a chief complaint of chest pain, abdominal pain (27 Jun 2017 14:57)      SUBJECTIVE / OVERNIGHT EVENTS: No nausea, vomiting or diarrhea, no fever or chills, no dizziness or chest pain, no dysuria or hematuria, no jt pain or swelling    MEDICATIONS  (STANDING):  spironolactone 12.5 milliGRAM(s) Oral daily  aspirin enteric coated 81 milliGRAM(s) Oral daily  losartan 25 milliGRAM(s) Oral daily  amiodarone    Tablet 400 milliGRAM(s) Oral daily  allopurinol 300 milliGRAM(s) Oral daily  atorvastatin 20 milliGRAM(s) Oral at bedtime  metoprolol succinate ER 25 milliGRAM(s) Oral daily  buDESOnide 160 MICROgram(s)/formoterol 4.5 MICROgram(s) Inhaler 2 Puff(s) Inhalation two times a day  tiotropium 18 MICROgram(s) Capsule 1 Capsule(s) Inhalation daily  dicyclomine 10 milliGRAM(s) Oral two times a day before meals  docusate sodium 100 milliGRAM(s) Oral three times a day  senna 2 Tablet(s) Oral at bedtime  sucralfate 1 Gram(s) Oral four times a day  latanoprost 0.005% Ophthalmic Solution 1 Drop(s) Both EYES at bedtime  lactobacillus acidophilus 1 Tablet(s) Oral daily  levothyroxine 50 MICROGram(s) Oral daily  sodium chloride 0.9% lock flush 3 milliLiter(s) IV Push every 8 hours  enoxaparin Injectable 40 milliGRAM(s) SubCutaneous every 24 hours  insulin lispro (HumaLOG) corrective regimen sliding scale   SubCutaneous three times a day before meals  insulin lispro (HumaLOG) corrective regimen sliding scale   SubCutaneous at bedtime  dextrose 5%. 1000 milliLiter(s) (50 mL/Hr) IV Continuous <Continuous>  dextrose 50% Injectable 12.5 Gram(s) IV Push once  dextrose 50% Injectable 25 Gram(s) IV Push once  dextrose 50% Injectable 25 Gram(s) IV Push once  furosemide    Tablet 40 milliGRAM(s) Oral daily  lidocaine   Patch 1 Patch Transdermal daily  pantoprazole    Tablet 40 milliGRAM(s) Oral two times a day before meals  sucralfate suspension 1 Gram(s) Oral four times a day  simethicone 80 milliGRAM(s) Chew every 6 hours  gabapentin 300 milliGRAM(s) Oral every 8 hours    MEDICATIONS  (PRN):  acetaminophen   Tablet. 650 milliGRAM(s) Oral every 6 hours PRN mild, moderate, severe pain  dextrose Gel 1 Dose(s) Oral once PRN Blood Glucose LESS THAN 70 milliGRAM(s)/deciliter  glucagon  Injectable 1 milliGRAM(s) IntraMuscular once PRN Glucose LESS THAN 70 milligrams/deciliter  polyethylene glycol 3350 17 Gram(s) Oral daily PRN Constipation      Vital Signs Last 24 Hrs  T(C): 36.9 (07-03-17 @ 13:34), Max: 36.9 (07-03-17 @ 13:34)  HR: 70 (07-03-17 @ 13:34) (70 - 71)  BP: 96/53 (07-03-17 @ 13:34) (96/53 - 107/60)  RR: 18 (07-03-17 @ 13:34) (18 - 18)  SpO2: 100% (07-03-17 @ 13:34) (100% - 100%)  CAPILLARY BLOOD GLUCOSE  135 (03 Jul 2017 11:42)  90 (03 Jul 2017 08:20)  143 (02 Jul 2017 22:04)  116 (02 Jul 2017 17:14)        I&O's Summary    02 Jul 2017 07:01  -  03 Jul 2017 07:00  --------------------------------------------------------  IN: 780 mL / OUT: 50 mL / NET: 730 mL    03 Jul 2017 07:01  -  03 Jul 2017 15:05  --------------------------------------------------------  IN: 480 mL / OUT: 0 mL / NET: 480 mL        PHYSICAL EXAM:  GENERAL: NAD, well-developed  HEAD:  Atraumatic, Normocephalic  EYES: EOMI, PERRLA, conjunctiva and sclera clear  NECK: Supple, No JVD  CHEST/LUNG: Clear to auscultation bilaterally; No wheeze  HEART: Regular rate and rhythm; No murmurs, rubs, or gallops  ABDOMEN: Soft, Nontender, Nondistended; Bowel sounds present  EXTREMITIES:  2+ Peripheral Pulses, No clubbing, cyanosis, or edema  PSYCH: AAOx3  NEUROLOGY: non-focal  SKIN: No rashes or lesions    LABS:                        11.5   7.86  )-----------( 225      ( 03 Jul 2017 07:32 )             36.8     07-03    140  |  98  |  38<H>  ----------------------------<  83  4.6   |  22  |  0.85    Ca    9.5      03 Jul 2017 07:32  Mg     2.4     07-03                  Consultant(s) Notes Reviewed:      Care Discussed with Consultants/Other Providers:    Contact Number, Dr Gray [phone]

## 2017-07-03 NOTE — PROGRESS NOTE ADULT - SUBJECTIVE AND OBJECTIVE BOX
INTERVAL HPI/OVERNIGHT EVENTS:    pt with no abdominal complaints  tolerating diet  still with c/o numbness feeling in legs and 'private parts'    MEDICATIONS  (STANDING):  spironolactone 12.5 milliGRAM(s) Oral daily  aspirin enteric coated 81 milliGRAM(s) Oral daily  losartan 25 milliGRAM(s) Oral daily  amiodarone    Tablet 400 milliGRAM(s) Oral daily  allopurinol 300 milliGRAM(s) Oral daily  atorvastatin 20 milliGRAM(s) Oral at bedtime  metoprolol succinate ER 25 milliGRAM(s) Oral daily  buDESOnide 160 MICROgram(s)/formoterol 4.5 MICROgram(s) Inhaler 2 Puff(s) Inhalation two times a day  tiotropium 18 MICROgram(s) Capsule 1 Capsule(s) Inhalation daily  dicyclomine 10 milliGRAM(s) Oral two times a day before meals  docusate sodium 100 milliGRAM(s) Oral three times a day  senna 2 Tablet(s) Oral at bedtime  sucralfate 1 Gram(s) Oral four times a day  latanoprost 0.005% Ophthalmic Solution 1 Drop(s) Both EYES at bedtime  lactobacillus acidophilus 1 Tablet(s) Oral daily  levothyroxine 50 MICROGram(s) Oral daily  sodium chloride 0.9% lock flush 3 milliLiter(s) IV Push every 8 hours  enoxaparin Injectable 40 milliGRAM(s) SubCutaneous every 24 hours  insulin lispro (HumaLOG) corrective regimen sliding scale   SubCutaneous three times a day before meals  insulin lispro (HumaLOG) corrective regimen sliding scale   SubCutaneous at bedtime  dextrose 5%. 1000 milliLiter(s) (50 mL/Hr) IV Continuous <Continuous>  dextrose 50% Injectable 12.5 Gram(s) IV Push once  dextrose 50% Injectable 25 Gram(s) IV Push once  dextrose 50% Injectable 25 Gram(s) IV Push once  furosemide    Tablet 40 milliGRAM(s) Oral daily  lidocaine   Patch 1 Patch Transdermal daily  pantoprazole    Tablet 40 milliGRAM(s) Oral two times a day before meals  sucralfate suspension 1 Gram(s) Oral four times a day  simethicone 80 milliGRAM(s) Chew every 6 hours  gabapentin 300 milliGRAM(s) Oral every 8 hours    MEDICATIONS  (PRN):  acetaminophen   Tablet. 650 milliGRAM(s) Oral every 6 hours PRN mild, moderate, severe pain  dextrose Gel 1 Dose(s) Oral once PRN Blood Glucose LESS THAN 70 milliGRAM(s)/deciliter  glucagon  Injectable 1 milliGRAM(s) IntraMuscular once PRN Glucose LESS THAN 70 milligrams/deciliter  polyethylene glycol 3350 17 Gram(s) Oral daily PRN Constipation      Allergies    peanuts (Unknown)  penicillin (Nausea)    Intolerances        Review of Systems:    General:  No wt loss, fevers, chills, night sweats,fatigue,   Eyes:  Good vision, no reported pain  ENT:  No sore throat, pain, runny nose, dysphagia  CV:  No pain, palpitatioins, hypo/hypertension  Resp:  No dyspnea, cough, tachypnea, wheezing  GI:  No pain, No nausea, No vomiting, No diarrhea, No constipatiion, No weight loss, No fever, No pruritis, No rectal bleeding, No tarry stools, No dysphagia,  :  No pain, bleeding, incontinence, nocturia  Muscle:  No pain, weakness  Neuro:  No weakness, tingling, memory problems  Psych:  No fatigue, insomnia, mood problems, depression  Endocrine:  No polyuria, polydypsia, cold/heat intolerance  Heme:  No petechiae, ecchymosis, easy bruisability  Skin:  No rash, tattoos, scars, edema      Vital Signs Last 24 Hrs  T(C): 36.7 (02 Jul 2017 21:15), Max: 36.7 (02 Jul 2017 21:15)  T(F): 98 (02 Jul 2017 21:15), Max: 98 (02 Jul 2017 21:15)  HR: 71 (02 Jul 2017 21:15) (67 - 71)  BP: 107/60 (02 Jul 2017 21:15) (103/49 - 107/60)  BP(mean): --  RR: 18 (02 Jul 2017 21:15) (18 - 18)  SpO2: 100% (02 Jul 2017 21:15) (100% - 100%)    PHYSICAL EXAM:    Constitutional: NAD, well-developed  HEENT: EOMI, throat clear  Neck: No LAD, supple  Respiratory: CTA and P  Cardiovascular: S1 and S2, RRR, no M  Gastrointestinal: BS+, soft, NT/ND, neg HSM,  Extremities: No peripheral edema, neg clubing, cyanosis  Vascular: 2+ peripheral pulses  Neurological: A/O x 3, no focal deficits  Psychiatric: Normal mood, normal affect  Skin: No rashes      LABS:                        11.5   7.86  )-----------( 225      ( 03 Jul 2017 07:32 )             36.8     07-03    140  |  98  |  38<H>  ----------------------------<  83  4.6   |  22  |  0.85    Ca    9.5      03 Jul 2017 07:32  Mg     2.4     07-03            RADIOLOGY & ADDITIONAL TESTS:

## 2017-07-03 NOTE — PROGRESS NOTE ADULT - SUBJECTIVE AND OBJECTIVE BOX
Subjective: c/o ongoing abdominal pain.  C/o ongoing pain that "shoots down her legs".  No CP/No SOB      ACTIVE MEDICATIONS:  MEDICATIONS  (STANDING):  spironolactone 12.5 milliGRAM(s) Oral daily  aspirin enteric coated 81 milliGRAM(s) Oral daily  losartan 25 milliGRAM(s) Oral daily  amiodarone    Tablet 400 milliGRAM(s) Oral daily  allopurinol 300 milliGRAM(s) Oral daily  atorvastatin 20 milliGRAM(s) Oral at bedtime  metoprolol succinate ER 25 milliGRAM(s) Oral daily  buDESOnide 160 MICROgram(s)/formoterol 4.5 MICROgram(s) Inhaler 2 Puff(s) Inhalation two times a day  tiotropium 18 MICROgram(s) Capsule 1 Capsule(s) Inhalation daily  dicyclomine 10 milliGRAM(s) Oral two times a day before meals  docusate sodium 100 milliGRAM(s) Oral three times a day  senna 2 Tablet(s) Oral at bedtime  sucralfate 1 Gram(s) Oral four times a day  latanoprost 0.005% Ophthalmic Solution 1 Drop(s) Both EYES at bedtime  lactobacillus acidophilus 1 Tablet(s) Oral daily  levothyroxine 50 MICROGram(s) Oral daily  sodium chloride 0.9% lock flush 3 milliLiter(s) IV Push every 8 hours  enoxaparin Injectable 40 milliGRAM(s) SubCutaneous every 24 hours  insulin lispro (HumaLOG) corrective regimen sliding scale   SubCutaneous three times a day before meals  insulin lispro (HumaLOG) corrective regimen sliding scale   SubCutaneous at bedtime  dextrose 5%. 1000 milliLiter(s) (50 mL/Hr) IV Continuous <Continuous>  dextrose 50% Injectable 12.5 Gram(s) IV Push once  dextrose 50% Injectable 25 Gram(s) IV Push once  dextrose 50% Injectable 25 Gram(s) IV Push once  furosemide    Tablet 40 milliGRAM(s) Oral daily  lidocaine   Patch 1 Patch Transdermal daily  pantoprazole    Tablet 40 milliGRAM(s) Oral two times a day before meals  sucralfate suspension 1 Gram(s) Oral four times a day  simethicone 80 milliGRAM(s) Chew every 6 hours  gabapentin 300 milliGRAM(s) Oral every 8 hours    MEDICATIONS  (PRN):  acetaminophen   Tablet. 650 milliGRAM(s) Oral every 6 hours PRN mild, moderate, severe pain  dextrose Gel 1 Dose(s) Oral once PRN Blood Glucose LESS THAN 70 milliGRAM(s)/deciliter  glucagon  Injectable 1 milliGRAM(s) IntraMuscular once PRN Glucose LESS THAN 70 milligrams/deciliter  polyethylene glycol 3350 17 Gram(s) Oral daily PRN Constipation    LABS:                        11.5   7.86  )-----------( 225      ( 03 Jul 2017 07:32 )             36.8     Hemoglobin: 11.5 g/dL (07-03 @ 07:32)  Hemoglobin: 12.1 g/dL (07-02 @ 06:30)  Hemoglobin: 12.5 g/dL (07-01 @ 06:00)  Hemoglobin: 12.8 g/dL (06-30 @ 07:00)  Hemoglobin: 12.1 g/dL (06-29 @ 08:27)    07-03    140  |  98  |  38<H>  ----------------------------<  83  4.6   |  22  |  0.85    Ca    9.5      03 Jul 2017 07:32  Mg     2.4     07-03  Creatinine Trend: 0.85<--, 0.95<--, 0.87<--, 0.93<--, 0.83<--, 0.78<--     PHYSICAL EXAM  Vital Signs Last 24 Hrs  T(C): 36.7 (02 Jul 2017 21:15), Max: 36.7 (02 Jul 2017 21:15)  T(F): 98 (02 Jul 2017 21:15), Max: 98 (02 Jul 2017 21:15)  HR: 71 (02 Jul 2017 21:15) (67 - 71)  BP: 107/60 (02 Jul 2017 21:15) (103/49 - 107/60)  RR: 18 (02 Jul 2017 21:15) (18 - 18)  SpO2: 100% (02 Jul 2017 21:15) (100% - 100%)      HEENT:   Normal oral mucosa,    Lymphatic: No obvious lymphadenopathy , no edema  Cardiovascular: Normal S1 S2, RRR    No JVD,  Peripheral pulses palpable 2+ bilaterally  Respiratory: Lungs clear to auscultation, normal effort 	  Gastrointestinal:  Soft, Non-tender, + BS	  Skin: No rashes,  No cyanosis, warm to touch  Extremities no edema, cyanosis, clubbing B/L LE's       TELEMETRY: 	 none     RADIOLOGY:     < from: CT Cervical Spine No Cont (06.29.17 @ 10:41) >  Impression: Degenerative changes involving the cervical thoracic and   lumbar spine are identified as described above.    Stable compression deformities are identified as described above.    JOESPH PALMER M.D., ATTENDING RADIOLOGIST  This document has been electronically signed. Jun 29 2017 12:40PM    < end of copied text >    DIAGNOSTIC TESTING:    < from: TTE with Doppler (w/Cont) (04.03.17 @ 16:18) >  CONCLUSIONS:  1. Mitral annular calcification, otherwise normal mitral  valve. Mild mitral regurgitation.  2. Moderately dilated left atrium.  LA volume index = 46  cc/m2.  3. Moderate left ventricular enlargement.  4. Severe global left ventricular systolic dysfunction.  Endocardial visualization enhanced with intravenous  injection of echo contrast (Definity).  5. The right ventricle is not well visualized; grossly  normal right ventricular systolic function.  A device wire  is noted in the right heart.  6. Estimated right ventricular systolic pressure equals 60  mm Hg, assuming right atrial pressure equals 10 mm Hg,  consistent with moderate pulmonary hypertension.  ------------------------------------------------------------------------  Confirmed on  4/3/2017 - 17:19:47 by Jean-Pierre Carver M.D.    < end of copied text >        ASSESSMENT/PLAN: 	77y Female with hypertension, dyslipidemia, CAD s/p LAD stent with only mild re-stenosis with minor luminal irregularities otherwise on cath 4/16, with known severe LV dysfunction , NICM s/p Medtronic BiV ICD, COPD, lymphoma previously on chemo with known spinal mets and compression fractures admitted with CP/SOB/LE pain.    -- CHF compensated.  Continue PO Lasix  -- CT C/T/L spine as above-- Stable compression deformities  --Appreciate Neurosurgery consult. Known Thoracic compression fx, with TLSO brace at home but with worsening back pain and leg pain--   --Neuro input appreciated- started Neurontin and increased to 300 mg TID   --ongoing abdominal pain-- GI following  --c/w Amio 400mg po qd for h/o VT/VF - the patient has NO HISTORY OF AFIB  --PT recommended rehab at DC         --F/U GI/ HEME/NEURO re: if any further inpatient work up needed/ ok to start DC planning??    Dolores Rodriguez PA-C  [facility]  [address], [address]   [place] [address]  office [phone]  pager [phone]

## 2017-07-04 NOTE — PROGRESS NOTE ADULT - SUBJECTIVE AND OBJECTIVE BOX
Patient is a 77y old  Female who presents with a chief complaint of chest pain, abdominal pain (27 Jun 2017 14:57)      SUBJECTIVE / OVERNIGHT EVENTS: No nausea, vomiting or diarrhea, no fever or chills, no dizziness or chest pain, no dysuria or hematuria, no jt pain or swelling    MEDICATIONS  (STANDING):  spironolactone 12.5 milliGRAM(s) Oral daily  aspirin enteric coated 81 milliGRAM(s) Oral daily  losartan 25 milliGRAM(s) Oral daily  amiodarone    Tablet 400 milliGRAM(s) Oral daily  allopurinol 300 milliGRAM(s) Oral daily  atorvastatin 20 milliGRAM(s) Oral at bedtime  metoprolol succinate ER 25 milliGRAM(s) Oral daily  buDESOnide 160 MICROgram(s)/formoterol 4.5 MICROgram(s) Inhaler 2 Puff(s) Inhalation two times a day  tiotropium 18 MICROgram(s) Capsule 1 Capsule(s) Inhalation daily  dicyclomine 10 milliGRAM(s) Oral two times a day before meals  docusate sodium 100 milliGRAM(s) Oral three times a day  senna 2 Tablet(s) Oral at bedtime  sucralfate 1 Gram(s) Oral four times a day  latanoprost 0.005% Ophthalmic Solution 1 Drop(s) Both EYES at bedtime  lactobacillus acidophilus 1 Tablet(s) Oral daily  levothyroxine 50 MICROGram(s) Oral daily  sodium chloride 0.9% lock flush 3 milliLiter(s) IV Push every 8 hours  enoxaparin Injectable 40 milliGRAM(s) SubCutaneous every 24 hours  insulin lispro (HumaLOG) corrective regimen sliding scale   SubCutaneous three times a day before meals  insulin lispro (HumaLOG) corrective regimen sliding scale   SubCutaneous at bedtime  dextrose 5%. 1000 milliLiter(s) (50 mL/Hr) IV Continuous <Continuous>  dextrose 50% Injectable 12.5 Gram(s) IV Push once  dextrose 50% Injectable 25 Gram(s) IV Push once  dextrose 50% Injectable 25 Gram(s) IV Push once  furosemide    Tablet 40 milliGRAM(s) Oral daily  lidocaine   Patch 1 Patch Transdermal daily  pantoprazole    Tablet 40 milliGRAM(s) Oral two times a day before meals  sucralfate suspension 1 Gram(s) Oral four times a day  simethicone 80 milliGRAM(s) Chew every 6 hours  gabapentin 300 milliGRAM(s) Oral every 8 hours    MEDICATIONS  (PRN):  acetaminophen   Tablet. 650 milliGRAM(s) Oral every 6 hours PRN mild, moderate, severe pain  dextrose Gel 1 Dose(s) Oral once PRN Blood Glucose LESS THAN 70 milliGRAM(s)/deciliter  glucagon  Injectable 1 milliGRAM(s) IntraMuscular once PRN Glucose LESS THAN 70 milligrams/deciliter  polyethylene glycol 3350 17 Gram(s) Oral daily PRN Constipation      Vital Signs Last 24 Hrs  T(C): 36.7 (07-04-17 @ 05:41), Max: 36.9 (07-03-17 @ 13:34)  HR: 75 (07-04-17 @ 05:41) (70 - 75)  BP: 106/58 (07-04-17 @ 05:41) (96/53 - 106/58)  RR: 16 (07-04-17 @ 05:41) (16 - 18)  SpO2: 98% (07-04-17 @ 05:41) (98% - 100%)  CAPILLARY BLOOD GLUCOSE  102 (04 Jul 2017 08:16)  112 (03 Jul 2017 21:37)  100 (03 Jul 2017 17:16)  135 (03 Jul 2017 11:42)        I&O's Summary    03 Jul 2017 07:01  -  04 Jul 2017 07:00  --------------------------------------------------------  IN: 900 mL / OUT: 1000 mL / NET: -100 mL    04 Jul 2017 07:01  -  04 Jul 2017 10:39  --------------------------------------------------------  IN: 120 mL / OUT: 0 mL / NET: 120 mL    PHYSICAL EXAM:  GENERAL: NAD, well-developed  HEAD:  Atraumatic, Normocephalic  EYES: EOMI, PERRLA, conjunctiva and sclera clear  NECK: Supple, No JVD  CHEST/LUNG: Clear to auscultation bilaterally; No wheeze  HEART: Regular rate and rhythm; No murmurs, rubs, or gallops  ABDOMEN: Soft, Nontender, Nondistended; Bowel sounds present  EXTREMITIES:  2+ Peripheral Pulses, No clubbing, cyanosis, or edema  PSYCH: AAOx3  NEUROLOGY: non-focal  SKIN: No rashes or lesions    LABS:                        11.7   10.02 )-----------( 237      ( 04 Jul 2017 07:21 )             37.2     07-04    137  |  94<L>  |  37<H>  ----------------------------<  81  4.9   |  25  |  0.97    Ca    9.9      04 Jul 2017 07:21  Mg     2.3     07-04                  Consultant(s) Notes Reviewed:      Care Discussed with Consultants/Other Providers:    Contact Number, Dr Gray [phone]

## 2017-07-04 NOTE — PROGRESS NOTE ADULT - SUBJECTIVE AND OBJECTIVE BOX
Subjective: No CP/SOB.  Ongoing LE pain    ACTIVE MEDICATIONS:  MEDICATIONS  (STANDING):  spironolactone 12.5 milliGRAM(s) Oral daily  aspirin enteric coated 81 milliGRAM(s) Oral daily  losartan 25 milliGRAM(s) Oral daily  amiodarone    Tablet 400 milliGRAM(s) Oral daily  allopurinol 300 milliGRAM(s) Oral daily  atorvastatin 20 milliGRAM(s) Oral at bedtime  metoprolol succinate ER 25 milliGRAM(s) Oral daily  buDESOnide 160 MICROgram(s)/formoterol 4.5 MICROgram(s) Inhaler 2 Puff(s) Inhalation two times a day  tiotropium 18 MICROgram(s) Capsule 1 Capsule(s) Inhalation daily  dicyclomine 10 milliGRAM(s) Oral two times a day before meals  docusate sodium 100 milliGRAM(s) Oral three times a day  senna 2 Tablet(s) Oral at bedtime  sucralfate 1 Gram(s) Oral four times a day  latanoprost 0.005% Ophthalmic Solution 1 Drop(s) Both EYES at bedtime  lactobacillus acidophilus 1 Tablet(s) Oral daily  levothyroxine 50 MICROGram(s) Oral daily  sodium chloride 0.9% lock flush 3 milliLiter(s) IV Push every 8 hours  enoxaparin Injectable 40 milliGRAM(s) SubCutaneous every 24 hours  insulin lispro (HumaLOG) corrective regimen sliding scale   SubCutaneous three times a day before meals  insulin lispro (HumaLOG) corrective regimen sliding scale   SubCutaneous at bedtime  furosemide    Tablet 40 milliGRAM(s) Oral daily  lidocaine   Patch 1 Patch Transdermal daily  pantoprazole    Tablet 40 milliGRAM(s) Oral two times a day before meals  sucralfate suspension 1 Gram(s) Oral four times a day  simethicone 80 milliGRAM(s) Chew every 6 hours  gabapentin 300 milliGRAM(s) Oral every 8 hours    MEDICATIONS  (PRN):  acetaminophen   Tablet. 650 milliGRAM(s) Oral every 6 hours PRN mild, moderate, severe pain  dextrose Gel 1 Dose(s) Oral once PRN Blood Glucose LESS THAN 70 milliGRAM(s)/deciliter  glucagon  Injectable 1 milliGRAM(s) IntraMuscular once PRN Glucose LESS THAN 70 milligrams/deciliter  polyethylene glycol 3350 17 Gram(s) Oral daily PRN Constipation    LABS:                        11.7   10.02 )-----------( 237      ( 04 Jul 2017 07:21 )             37.2     Hemoglobin: 11.7 g/dL (07-04 @ 07:21)  Hemoglobin: 11.5 g/dL (07-03 @ 07:32)  Hemoglobin: 12.1 g/dL (07-02 @ 06:30)  Hemoglobin: 12.5 g/dL (07-01 @ 06:00)  Hemoglobin: 12.8 g/dL (06-30 @ 07:00)    07-04    137  |  94<L>  |  37<H>  ----------------------------<  81  4.9   |  25  |  0.97    Ca    9.9      04 Jul 2017 07:21  Mg     2.3     07-04  Creatinine Trend: 0.97<--, 0.85<--, 0.95<--, 0.87<--, 0.93<--, 0.83<--     PHYSICAL EXAM  Vital Signs Last 24 Hrs  T(C): 36.7 (04 Jul 2017 05:41), Max: 36.9 (03 Jul 2017 13:34)  T(F): 98 (04 Jul 2017 05:41), Max: 98.5 (03 Jul 2017 13:34)  HR: 75 (04 Jul 2017 05:41) (70 - 75)  BP: 106/58 (04 Jul 2017 05:41) (96/53 - 106/58)  RR: 16 (04 Jul 2017 05:41) (16 - 18)  SpO2: 98% (04 Jul 2017 05:41) (98% - 100%)    HEENT:   Normal oral mucosa,    Lymphatic: No obvious lymphadenopathy , no edema  Cardiovascular: Normal S1 S2, RRR    No JVD,  Peripheral pulses palpable 2+ bilaterally  Respiratory: Lungs clear to auscultation, normal effort 	  Gastrointestinal:  Soft, Non-tender, + BS	  Skin: No rashes,  No cyanosis, warm to touch  Extremities no edema, cyanosis, clubbing B/L LE's       TELEMETRY: 	 none     RADIOLOGY:     < from: CT Cervical Spine No Cont (06.29.17 @ 10:41) >  Impression: Degenerative changes involving the cervical thoracic and   lumbar spine are identified as described above.    Stable compression deformities are identified as described above.    JOESPH PALMER M.D., ATTENDING RADIOLOGIST  This document has been electronically signed. Jun 29 2017 12:40PM    < end of copied text >    DIAGNOSTIC TESTING:    < from: TTE with Doppler (w/Cont) (04.03.17 @ 16:18) >  CONCLUSIONS:  1. Mitral annular calcification, otherwise normal mitral  valve. Mild mitral regurgitation.  2. Moderately dilated left atrium.  LA volume index = 46  cc/m2.  3. Moderate left ventricular enlargement.  4. Severe global left ventricular systolic dysfunction.  Endocardial visualization enhanced with intravenous  injection of echo contrast (Definity).  5. The right ventricle is not well visualized; grossly  normal right ventricular systolic function.  A device wire  is noted in the right heart.  6. Estimated right ventricular systolic pressure equals 60  mm Hg, assuming right atrial pressure equals 10 mm Hg,  consistent with moderate pulmonary hypertension.  ------------------------------------------------------------------------  Confirmed on  4/3/2017 - 17:19:47 by Jean-Pierre Carver M.D.    < end of copied text >      ASSESSMENT/PLAN: 	77y Female with hypertension, dyslipidemia, CAD s/p LAD stent with only mild re-stenosis with minor luminal irregularities otherwise on cath 4/16, with known severe LV dysfunction , NICM s/p Medtronic BiV ICD, COPD, lymphoma previously on chemo with known spinal mets and compression fractures admitted with CP/SOB/LE pain.    -- CHF compensated.  Continue PO Lasix  -- CT C/T/L spine as above-- Stable compression deformities  --Appreciate Neurosurgery consult. Known Thoracic compression fx, with TLSO brace at home but with worsening back pain and leg pain--   --Neuro input appreciated- started Neurontin and increased to 300 mg TID-- ?increase further  --ongoing abdominal pain-- GI following  --c/w Amio 400mg po qd for h/o VT/VF - the patient has NO HISTORY OF AFIB  --PT recommended rehab at D/C--D/C when bed available    Dolores Rodriguez PA-C  [facility]  [address]   [place] [address]  office [phone]  pager [phone]

## 2017-07-04 NOTE — PROGRESS NOTE ADULT - SUBJECTIVE AND OBJECTIVE BOX
INTERVAL HPI/OVERNIGHT EVENTS: Pt s/e at bedside, no specific GI complaints, no nausea or vomiting    MEDICATIONS  (STANDING):  spironolactone 12.5 milliGRAM(s) Oral daily  aspirin enteric coated 81 milliGRAM(s) Oral daily  losartan 25 milliGRAM(s) Oral daily  amiodarone    Tablet 400 milliGRAM(s) Oral daily  allopurinol 300 milliGRAM(s) Oral daily  atorvastatin 20 milliGRAM(s) Oral at bedtime  metoprolol succinate ER 25 milliGRAM(s) Oral daily  buDESOnide 160 MICROgram(s)/formoterol 4.5 MICROgram(s) Inhaler 2 Puff(s) Inhalation two times a day  tiotropium 18 MICROgram(s) Capsule 1 Capsule(s) Inhalation daily  dicyclomine 10 milliGRAM(s) Oral two times a day before meals  docusate sodium 100 milliGRAM(s) Oral three times a day  senna 2 Tablet(s) Oral at bedtime  sucralfate 1 Gram(s) Oral four times a day  latanoprost 0.005% Ophthalmic Solution 1 Drop(s) Both EYES at bedtime  lactobacillus acidophilus 1 Tablet(s) Oral daily  levothyroxine 50 MICROGram(s) Oral daily  sodium chloride 0.9% lock flush 3 milliLiter(s) IV Push every 8 hours  enoxaparin Injectable 40 milliGRAM(s) SubCutaneous every 24 hours  insulin lispro (HumaLOG) corrective regimen sliding scale   SubCutaneous three times a day before meals  insulin lispro (HumaLOG) corrective regimen sliding scale   SubCutaneous at bedtime  dextrose 5%. 1000 milliLiter(s) (50 mL/Hr) IV Continuous <Continuous>  dextrose 50% Injectable 12.5 Gram(s) IV Push once  dextrose 50% Injectable 25 Gram(s) IV Push once  dextrose 50% Injectable 25 Gram(s) IV Push once  furosemide    Tablet 40 milliGRAM(s) Oral daily  lidocaine   Patch 1 Patch Transdermal daily  pantoprazole    Tablet 40 milliGRAM(s) Oral two times a day before meals  sucralfate suspension 1 Gram(s) Oral four times a day  simethicone 80 milliGRAM(s) Chew every 6 hours  gabapentin 300 milliGRAM(s) Oral every 8 hours    MEDICATIONS  (PRN):  acetaminophen   Tablet. 650 milliGRAM(s) Oral every 6 hours PRN mild, moderate, severe pain  dextrose Gel 1 Dose(s) Oral once PRN Blood Glucose LESS THAN 70 milliGRAM(s)/deciliter  glucagon  Injectable 1 milliGRAM(s) IntraMuscular once PRN Glucose LESS THAN 70 milligrams/deciliter  polyethylene glycol 3350 17 Gram(s) Oral daily PRN Constipation      Allergies    peanuts (Unknown)  penicillin (Nausea)    Intolerances        ROS:   General:  No wt loss, fevers, chills, night sweats, fatigue,   Eyes:  Good vision, no reported pain  ENT:  No sore throat, pain, runny nose, dysphagia  CV:  No pain, palpitations, hypo/hypertension  Resp:  No dyspnea, cough, tachypnea, wheezing  GI:  No pain, No nausea, No vomiting, No diarrhea, No constipation, No weight loss, No fever, No pruritis, No rectal bleeding, No tarry stools, No dysphagia,  :  No pain, bleeding, incontinence, nocturia  Muscle:  No pain, weakness  Neuro:  No weakness, tingling, memory problems  Psych:  No fatigue, insomnia, mood problems, depression  Endocrine:  No polyuria, polydipsia, cold/heat intolerance  Heme:  No petechiae, ecchymosis, easy bruisability  Skin:  No rash, tattoos, scars, edema      PHYSICAL EXAM:   Vital Signs:  Vital Signs Last 24 Hrs  T(C): 36.7 (2017 05:41), Max: 36.9 (2017 13:34)  T(F): 98 (2017 05:41), Max: 98.5 (2017 13:34)  HR: 75 (2017 05:41) (70 - 75)  BP: 106/58 (2017 05:41) (96/53 - 106/58)  BP(mean): --  RR: 16 (2017 05:41) (16 - 18)  SpO2: 98% (2017 05:41) (98% - 100%)  Daily     Daily Weight in k (2017 05:41)    GENERAL:  Appears stated age, well-groomed, well-nourished, no distress  HEENT:  NC/AT,  conjunctivae clear and pink, no thyromegaly, nodules, adenopathy, no JVD, sclera -anicteric  CHEST:  Full & symmetric excursion, no increased effort, breath sounds clear  HEART:  Regular rhythm, S1, S2, no murmur/rub/S3/S4, no abdominal bruit, no edema  ABDOMEN:  Soft, non-tender, non-distended, normoactive bowel sounds,  no masses ,no hepato-splenomegaly, no signs of chronic liver disease  EXTEREMITIES:  no cyanosis,clubbing or edema  SKIN:  No rash/erythema/ecchymoses/petechiae/wounds/abscess/warm/dry  NEURO:  Alert, oriented, no asterixis, no tremor, no encephalopathy      LABS:                        11.7   10.02 )-----------( 237      ( 2017 07:21 )             37.2     07-04    137  |  94<L>  |  37<H>  ----------------------------<  81  4.9   |  25  |  0.97    Ca    9.9      2017 07:21  Mg     2.3     07-04            RADIOLOGY & ADDITIONAL TESTS:

## 2017-07-05 NOTE — PROGRESS NOTE ADULT - SUBJECTIVE AND OBJECTIVE BOX
Patient is a 77y old  Female who presents with a chief complaint of chest pain, abdominal pain (27 Jun 2017 14:57)      SUBJECTIVE / OVERNIGHT EVENTS: No nausea, vomiting or diarrhea, no fever or chills, no dizziness or chest pain, no dysuria or hematuria, no jt pain or swelling    MEDICATIONS  (STANDING):  spironolactone 12.5 milliGRAM(s) Oral daily  aspirin enteric coated 81 milliGRAM(s) Oral daily  losartan 25 milliGRAM(s) Oral daily  amiodarone    Tablet 400 milliGRAM(s) Oral daily  allopurinol 300 milliGRAM(s) Oral daily  atorvastatin 20 milliGRAM(s) Oral at bedtime  metoprolol succinate ER 25 milliGRAM(s) Oral daily  buDESOnide 160 MICROgram(s)/formoterol 4.5 MICROgram(s) Inhaler 2 Puff(s) Inhalation two times a day  tiotropium 18 MICROgram(s) Capsule 1 Capsule(s) Inhalation daily  dicyclomine 10 milliGRAM(s) Oral two times a day before meals  docusate sodium 100 milliGRAM(s) Oral three times a day  senna 2 Tablet(s) Oral at bedtime  sucralfate 1 Gram(s) Oral four times a day  latanoprost 0.005% Ophthalmic Solution 1 Drop(s) Both EYES at bedtime  lactobacillus acidophilus 1 Tablet(s) Oral daily  levothyroxine 50 MICROGram(s) Oral daily  sodium chloride 0.9% lock flush 3 milliLiter(s) IV Push every 8 hours  enoxaparin Injectable 40 milliGRAM(s) SubCutaneous every 24 hours  insulin lispro (HumaLOG) corrective regimen sliding scale   SubCutaneous three times a day before meals  insulin lispro (HumaLOG) corrective regimen sliding scale   SubCutaneous at bedtime  dextrose 5%. 1000 milliLiter(s) (50 mL/Hr) IV Continuous <Continuous>  dextrose 50% Injectable 12.5 Gram(s) IV Push once  dextrose 50% Injectable 25 Gram(s) IV Push once  dextrose 50% Injectable 25 Gram(s) IV Push once  furosemide    Tablet 40 milliGRAM(s) Oral daily  lidocaine   Patch 1 Patch Transdermal daily  pantoprazole    Tablet 40 milliGRAM(s) Oral two times a day before meals  sucralfate suspension 1 Gram(s) Oral four times a day  gabapentin 300 milliGRAM(s) Oral every 8 hours    MEDICATIONS  (PRN):  acetaminophen   Tablet. 650 milliGRAM(s) Oral every 6 hours PRN mild, moderate, severe pain  dextrose Gel 1 Dose(s) Oral once PRN Blood Glucose LESS THAN 70 milliGRAM(s)/deciliter  glucagon  Injectable 1 milliGRAM(s) IntraMuscular once PRN Glucose LESS THAN 70 milligrams/deciliter  polyethylene glycol 3350 17 Gram(s) Oral daily PRN Constipation      Vital Signs Last 24 Hrs  T(C): 36.9 (07-05-17 @ 06:08), Max: 36.9 (07-05-17 @ 06:08)  HR: 66 (07-05-17 @ 06:08) (66 - 74)  BP: 104/54 (07-05-17 @ 06:08) (101/52 - 108/60)  RR: 16 (07-05-17 @ 06:08) (16 - 16)  SpO2: 97% (07-05-17 @ 06:08) (97% - 100%)  CAPILLARY BLOOD GLUCOSE  95 (05 Jul 2017 08:48)  111 (04 Jul 2017 22:47)  93 (04 Jul 2017 17:36)  123 (04 Jul 2017 11:40)        I&O's Summary    04 Jul 2017 07:01  -  05 Jul 2017 07:00  --------------------------------------------------------  IN: 320 mL / OUT: 800 mL / NET: -480 mL    05 Jul 2017 07:01  -  05 Jul 2017 09:47  --------------------------------------------------------  IN: 240 mL / OUT: 0 mL / NET: 240 mL        PHYSICAL EXAM:  GENERAL: NAD, well-developed  HEAD:  Atraumatic, Normocephalic  EYES: EOMI, PERRLA, conjunctiva and sclera clear  NECK: Supple, No JVD  CHEST/LUNG: Clear to auscultation bilaterally; No wheeze  HEART: Regular rate and rhythm; No murmurs, rubs, or gallops  ABDOMEN: Soft, Nontender, Nondistended; Bowel sounds present  EXTREMITIES:  2+ Peripheral Pulses, No clubbing, cyanosis, or edema  PSYCH: AAOx3  NEUROLOGY: non-focal  SKIN: No rashes or lesions    LABS:                        11.7   10.02 )-----------( 237      ( 04 Jul 2017 07:21 )             37.2     07-04    137  |  94<L>  |  37<H>  ----------------------------<  81  4.9   |  25  |  0.97    Ca    9.9      04 Jul 2017 07:21  Mg     2.3     07-04                  Consultant(s) Notes Reviewed:      Care Discussed with Consultants/Other Providers:    Contact Number, Dr Gray [phone]

## 2017-07-05 NOTE — PROGRESS NOTE ADULT - SUBJECTIVE AND OBJECTIVE BOX
Subjective: c/o mild SOB this am    ACTIVE MEDICATIONS:  MEDICATIONS  (STANDING):  spironolactone 12.5 milliGRAM(s) Oral daily  aspirin enteric coated 81 milliGRAM(s) Oral daily  losartan 25 milliGRAM(s) Oral daily  amiodarone    Tablet 400 milliGRAM(s) Oral daily  allopurinol 300 milliGRAM(s) Oral daily  atorvastatin 20 milliGRAM(s) Oral at bedtime  metoprolol succinate ER 25 milliGRAM(s) Oral daily  buDESOnide 160 MICROgram(s)/formoterol 4.5 MICROgram(s) Inhaler 2 Puff(s) Inhalation two times a day  tiotropium 18 MICROgram(s) Capsule 1 Capsule(s) Inhalation daily  dicyclomine 10 milliGRAM(s) Oral two times a day before meals  docusate sodium 100 milliGRAM(s) Oral three times a day  senna 2 Tablet(s) Oral at bedtime  sucralfate 1 Gram(s) Oral four times a day  latanoprost 0.005% Ophthalmic Solution 1 Drop(s) Both EYES at bedtime  lactobacillus acidophilus 1 Tablet(s) Oral daily  levothyroxine 50 MICROGram(s) Oral daily  sodium chloride 0.9% lock flush 3 milliLiter(s) IV Push every 8 hours  enoxaparin Injectable 40 milliGRAM(s) SubCutaneous every 24 hours  insulin lispro (HumaLOG) corrective regimen sliding scale   SubCutaneous three times a day before meals  insulin lispro (HumaLOG) corrective regimen sliding scale   SubCutaneous at bedtime  dextrose 5%. 1000 milliLiter(s) (50 mL/Hr) IV Continuous <Continuous>  dextrose 50% Injectable 12.5 Gram(s) IV Push once  dextrose 50% Injectable 25 Gram(s) IV Push once  dextrose 50% Injectable 25 Gram(s) IV Push once  lidocaine   Patch 1 Patch Transdermal daily  pantoprazole    Tablet 40 milliGRAM(s) Oral two times a day before meals  sucralfate suspension 1 Gram(s) Oral four times a day  gabapentin 300 milliGRAM(s) Oral every 8 hours  furosemide    Tablet 40 milliGRAM(s) Oral two times a day    MEDICATIONS  (PRN):  acetaminophen   Tablet. 650 milliGRAM(s) Oral every 6 hours PRN mild, moderate, severe pain  dextrose Gel 1 Dose(s) Oral once PRN Blood Glucose LESS THAN 70 milliGRAM(s)/deciliter  glucagon  Injectable 1 milliGRAM(s) IntraMuscular once PRN Glucose LESS THAN 70 milligrams/deciliter  polyethylene glycol 3350 17 Gram(s) Oral daily PRN Constipation      LABS:                        11.7   10.02 )-----------( 237      ( 04 Jul 2017 07:21 )             37.2     Hemoglobin: 11.7 g/dL (07-04 @ 07:21)  Hemoglobin: 11.5 g/dL (07-03 @ 07:32)  Hemoglobin: 12.1 g/dL (07-02 @ 06:30)  Hemoglobin: 12.5 g/dL (07-01 @ 06:00)    07-04    137  |  94<L>  |  37<H>  ----------------------------<  81  4.9   |  25  |  0.97    Ca    9.9      04 Jul 2017 07:21  Mg     2.3     07-04  Creatinine Trend: 0.97<--, 0.85<--, 0.95<--, 0.87<--, 0.93<--, 0.83<--     PHYSICAL EXAM  Vital Signs Last 24 Hrs  T(C): 36.9 (05 Jul 2017 06:08), Max: 36.9 (05 Jul 2017 06:08)  T(F): 98.4 (05 Jul 2017 06:08), Max: 98.4 (05 Jul 2017 06:08)  HR: 66 (05 Jul 2017 06:08) (66 - 74)  BP: 104/54 (05 Jul 2017 06:08) (101/52 - 108/60)  RR: 16 (05 Jul 2017 06:08) (16 - 16)  SpO2: 97% (05 Jul 2017 06:08) (97% - 100%)    HEENT:   Normal oral mucosa,    Lymphatic: No obvious lymphadenopathy , no edema  Cardiovascular: Normal S1 S2, RRR     +JVD,  Peripheral pulses palpable 2+ bilaterally  Respiratory: Lungs clear to auscultation, normal effort 	  Gastrointestinal:  Soft, Non-tender, + BS	  Skin: No rashes,  No cyanosis, warm to touch  Extremities no edema, cyanosis, clubbing B/L LE's     TELEMETRY: 	 none     RADIOLOGY:     < from: CT Cervical Spine No Cont (06.29.17 @ 10:41) >  Impression: Degenerative changes involving the cervical thoracic and   lumbar spine are identified as described above.    Stable compression deformities are identified as described above.    JOESPH PALMER M.D., ATTENDING RADIOLOGIST  This document has been electronically signed. Jun 29 2017 12:40PM    < end of copied text >    DIAGNOSTIC TESTING:    < from: TTE with Doppler (w/Cont) (04.03.17 @ 16:18) >  CONCLUSIONS:  1. Mitral annular calcification, otherwise normal mitral  valve. Mild mitral regurgitation.  2. Moderately dilated left atrium.  LA volume index = 46  cc/m2.  3. Moderate left ventricular enlargement.  4. Severe global left ventricular systolic dysfunction.  Endocardial visualization enhanced with intravenous  injection of echo contrast (Definity).  5. The right ventricle is not well visualized; grossly  normal right ventricular systolic function.  A device wire  is noted in the right heart.  6. Estimated right ventricular systolic pressure equals 60  mm Hg, assuming right atrial pressure equals 10 mm Hg,  consistent with moderate pulmonary hypertension.  ------------------------------------------------------------------------  Confirmed on  4/3/2017 - 17:19:47 by Jean-Pierre Carver M.D.    < end of copied text >      ASSESSMENT/PLAN: 	77y Female with hypertension, dyslipidemia, CAD s/p LAD stent with only mild re-stenosis with minor luminal irregularities otherwise on cath 4/16, with known severe LV dysfunction , NICM s/p Medtronic BiV ICD, COPD, lymphoma previously on chemo with known spinal mets and compression fractures admitted with CP/SOB/LE pain.    -- Mild acute on chronic systolic HF.  Continue PO Lasix but increase to 40 BID.  keep o>I  -- CT C/T/L spine as above-- Stable compression deformities  --Appreciate Neurosurgery consult. Known Thoracic compression fx, s/p CT T spine and fractures stable.  Continue brace.  --Neuro input appreciated- started Neurontin and increased to 300 mg TID-- ?increase further  --ongoing abdominal pain-- GI following.  Continue PPI, carafate, Maalox and Simethicone PRN  --c/w Amio 400mg po qd for h/o VT/VF - the patient has NO HISTORY OF AFIB  --PT recommended rehab at D/C--D/C when bed available    Dolores Rodriguez PA-C  [facility]  [address]   [place] [address]  office [phone]  pager [phone]

## 2017-07-05 NOTE — PROGRESS NOTE ADULT - SUBJECTIVE AND OBJECTIVE BOX
INTERVAL HPI/OVERNIGHT EVENTS:    denies n/v/d/c, abdominal pain, melena or brbpr   tolerating diet    MEDICATIONS  (STANDING):  spironolactone 12.5 milliGRAM(s) Oral daily  aspirin enteric coated 81 milliGRAM(s) Oral daily  losartan 25 milliGRAM(s) Oral daily  amiodarone    Tablet 400 milliGRAM(s) Oral daily  allopurinol 300 milliGRAM(s) Oral daily  atorvastatin 20 milliGRAM(s) Oral at bedtime  metoprolol succinate ER 25 milliGRAM(s) Oral daily  buDESOnide 160 MICROgram(s)/formoterol 4.5 MICROgram(s) Inhaler 2 Puff(s) Inhalation two times a day  tiotropium 18 MICROgram(s) Capsule 1 Capsule(s) Inhalation daily  dicyclomine 10 milliGRAM(s) Oral two times a day before meals  docusate sodium 100 milliGRAM(s) Oral three times a day  senna 2 Tablet(s) Oral at bedtime  sucralfate 1 Gram(s) Oral four times a day  latanoprost 0.005% Ophthalmic Solution 1 Drop(s) Both EYES at bedtime  lactobacillus acidophilus 1 Tablet(s) Oral daily  levothyroxine 50 MICROGram(s) Oral daily  sodium chloride 0.9% lock flush 3 milliLiter(s) IV Push every 8 hours  enoxaparin Injectable 40 milliGRAM(s) SubCutaneous every 24 hours  insulin lispro (HumaLOG) corrective regimen sliding scale   SubCutaneous three times a day before meals  insulin lispro (HumaLOG) corrective regimen sliding scale   SubCutaneous at bedtime  dextrose 5%. 1000 milliLiter(s) (50 mL/Hr) IV Continuous <Continuous>  dextrose 50% Injectable 12.5 Gram(s) IV Push once  dextrose 50% Injectable 25 Gram(s) IV Push once  dextrose 50% Injectable 25 Gram(s) IV Push once  furosemide    Tablet 40 milliGRAM(s) Oral daily  lidocaine   Patch 1 Patch Transdermal daily  pantoprazole    Tablet 40 milliGRAM(s) Oral two times a day before meals  sucralfate suspension 1 Gram(s) Oral four times a day  gabapentin 300 milliGRAM(s) Oral every 8 hours    MEDICATIONS  (PRN):  acetaminophen   Tablet. 650 milliGRAM(s) Oral every 6 hours PRN mild, moderate, severe pain  dextrose Gel 1 Dose(s) Oral once PRN Blood Glucose LESS THAN 70 milliGRAM(s)/deciliter  glucagon  Injectable 1 milliGRAM(s) IntraMuscular once PRN Glucose LESS THAN 70 milligrams/deciliter  polyethylene glycol 3350 17 Gram(s) Oral daily PRN Constipation      Allergies    peanuts (Unknown)  penicillin (Nausea)    Intolerances        Review of Systems:    General:  No wt loss, fevers, chills, night sweats,fatigue,   Eyes:  Good vision, no reported pain  ENT:  No sore throat, pain, runny nose, dysphagia  CV:  No pain, palpitatioins, hypo/hypertension  Resp:  No dyspnea, cough, tachypnea, wheezing  GI:  No pain, No nausea, No vomiting, No diarrhea, No constipatiion, No weight loss, No fever, No pruritis, No rectal bleeding, No tarry stools, No dysphagia,  :  No pain, bleeding, incontinence, nocturia  Muscle:  No pain, weakness  Neuro:  No weakness, tingling, memory problems  Psych:  No fatigue, insomnia, mood problems, depression  Endocrine:  No polyuria, polydypsia, cold/heat intolerance  Heme:  No petechiae, ecchymosis, easy bruisability  Skin:  No rash, tattoos, scars, edema      Vital Signs Last 24 Hrs  T(C): 36.9 (05 Jul 2017 06:08), Max: 36.9 (05 Jul 2017 06:08)  T(F): 98.4 (05 Jul 2017 06:08), Max: 98.4 (05 Jul 2017 06:08)  HR: 66 (05 Jul 2017 06:08) (66 - 74)  BP: 104/54 (05 Jul 2017 06:08) (101/52 - 108/60)  BP(mean): --  RR: 16 (05 Jul 2017 06:08) (16 - 16)  SpO2: 97% (05 Jul 2017 06:08) (97% - 100%)    PHYSICAL EXAM:    Constitutional: NAD, well-developed  HEENT: EOMI, throat clear  Neck: No LAD, supple  Respiratory: CTA and P  Cardiovascular: S1 and S2, RRR, no M  Gastrointestinal: BS+, soft, NT/ND, neg HSM,  Extremities: No peripheral edema, neg clubing, cyanosis  Vascular: 2+ peripheral pulses  Neurological: A/O x 3, no focal deficits  Psychiatric: Normal mood, normal affect  Skin: No rashes      LABS:                        11.7   10.02 )-----------( 237      ( 04 Jul 2017 07:21 )             37.2     07-04    137  |  94<L>  |  37<H>  ----------------------------<  81  4.9   |  25  |  0.97    Ca    9.9      04 Jul 2017 07:21  Mg     2.3     07-04            RADIOLOGY & ADDITIONAL TESTS:

## 2017-07-06 NOTE — DISCHARGE NOTE ADULT - MEDICATION SUMMARY - MEDICATIONS TO STOP TAKING
I will STOP taking the medications listed below when I get home from the hospital:    omeprazole 40 mg oral delayed release capsule  -- 1 cap(s) by mouth once a day    Percocet 5/325 oral tablet  -- 1 tab(s) by mouth every 6 hours, As Needed for severe pain (8-10)

## 2017-07-06 NOTE — DISCHARGE NOTE ADULT - MEDICATION SUMMARY - MEDICATIONS TO CHANGE
I will SWITCH the dose or number of times a day I take the medications listed below when I get home from the hospital:    lactobacillus acidophilus oral capsule  -- 1 cap(s) by mouth 3 times a day (with meals)

## 2017-07-06 NOTE — DISCHARGE NOTE ADULT - INSTRUCTIONS
low salt, low cholesterol diet Dysphagia 2 mechanical soft, thin liquids   Ensure Enlive 1 can per day

## 2017-07-06 NOTE — PROGRESS NOTE ADULT - PROBLEM SELECTOR PROBLEM 1
Abdominal pain, generalized
DM (diabetes mellitus)
Compression fracture of thoracic vertebra, sequela

## 2017-07-06 NOTE — PROGRESS NOTE ADULT - PROBLEM SELECTOR PROBLEM 3
Acute on chronic systolic heart failure
Acute on chronic systolic heart failure
HLD (hyperlipidemia)

## 2017-07-06 NOTE — DISCHARGE NOTE ADULT - PROVIDER TOKENS
TOKEN:'[id number]:'[id number]:[LAST:[Dr. Reyna],PHONE:[(   )    -],FAX:[(   )    -],ADDRESS:[Heme/ Onc]]

## 2017-07-06 NOTE — DISCHARGE NOTE ADULT - HOSPITAL COURSE
78 y/o F with hx of asthma, CAD, CHF (EF; 21%), HLD, HTN, presents with chest pain x 1 day. Patient states chest pain is intermittent, non radiating, L sided. She cannot recall what makes the pain better or worse. Also complains of worsening of back and b/l leg pain x weeks. Patient also complains of SOB with exertion. Endorses 3 pillow orthopnea. Denies fever, chills, cough, falls, LOC, melena, hematochezia, LE edema, diarrhea, or constipation  Echo, April, 2017: EF 20%. Moderate left ventricular enlargement. Severe global left ventricular systolic dysfunction. Moderate pulmonary hypertension.  ----------  EKG: V paced at 64 bpm  CE x2: Negative  CXR: Cardiomegaly without obvious CHF.  BNP: 3787     6/27 Cards note: Continue with IV diuresis. Heme-onc consult with Dr. Walsh. Monitor I&Os. Medicine consult with Dr. Gray.  6/27 Heme/Onc [place]: h/o lymphoma but no details avaialble and even the info on the oncologist not avaialbale, here with cardiac issues, will recommend: - Rx as per medicine, cardiology and CCU - obtain info on the details of NHL and its Rx, only then will be able to do more tests to evaluate EOD at this time - pt told to call her family members in order to obtain info on the oncologist who has been treating her.  6/28 - Neurosurg eval -  compression fracture of thoracic vertebae recommend CT T spine without contrast TLSO brace to be worn at all times when out of bed, pain meds as needed   6/29- Neurology Dr Lambert- Rec pain control with Lido patch, Neurontin, CT of entire spine.  6/29- CT Spine- Impression: Degenerative changes involving the cervical thoracic and lumbar spine are identified as described above.  6/30- Neuro rec- 1) Continue Gabapentin 100mg TID may increase as needed to 300mg TID, Continue Pain Meds, CT reviewed and will defer to medicine regarding need for CT Lungs, EMG/NCV as outpt.      7/1/17 > Neurology  -Compression fracture of thoracic vertebra, sequela.  May increase Gabapentin to 300mg Continue Pain Meds PT when able Continue medical workuo.     7/1/17 > Heme / Onc > Dr Walsh - 77F with h/o lymphoma but no details known, has gotten her oncologist's name and number finally, here with cardiac issues and improving, will recommend: called [phone] and left my cell number to obtain info on NHL Rx as per medicine, cardiology and CCU  obtain info on the details of NHL and its Rx, only then will be able to do more tests to evaluate EOD at this time  7/1/17 > Medicine - Patient is a 77y old  Female who presents with a chief complaint of chest pain, abdominal pain, now resolved DM (diabetes mellitus).  well controlled Monitor no oral meds needed A1c 5.7 close to normal.  Acute on chronic systolic heart failure.  Mx per cardiology will continue lasix as tolerated po Lasix now. HLD (hyperlipidemia) outpatient follow up with PCP.  HTN (hypertension). well contolled will continue to monitor.  Asthma no wheeze at present.  Leg pain, bilateral. likely neuropathy secondary to DM 2 with hyperglycemia on Neurontin 200 TID can increase dose to 300 TID if does not resolve.  7/1/17 > GI Dr Eaton > 78 y/o F with hx of asthma, CAD, CHF (EF; 21%), HLD, HTN and Lymphoma presents with chest pain and abdominal pain for which she was diuresed and is now with improvement of both. Pt reports that her abdominal pain is associated with 'gas bubbles'  Abdominal pain, generalized.-likely multifactorial with possibly related to low flow state from CHF as well as gas discomfort now improved  -simethicone q6h x 4 days, senna/colace miralax prn pain control prn no gi objection to discharge planning. H/O gastric ulcer., hgb stable with no overt gib ppi bid carafate qid maalox prn.   7/1/17 > Cardio 77y Female with hypertension, dyslipidemia, CAD s/p LAD stent with only mild re-stenosis with minor luminal irregularities otherwise on cath 4/16, with known severe LV dysfunction , NICM s/p Medtronic BiV ICD, COPD, lymphoma previously on chemo with known spinal mets admitted with CP/SOB/LE pain.CHF compensated.  Continue PO Lasix   Known Thoracic compression fx, with TLSO brace at home but with worsening back pain and leg pain-- Neuro input appreciated- started Neurontin OK to increase to 300 mg   --Onc eval with Dr. Walsh - patient with known lymphoma and spinal mets (follows with outside onc Dr. Reyna [phone], [phone])  -- c/w Amio 400mg po qd for h/o VT/VF - the patient has NO HISTORY OF AFIB Pt. appears euvolemic Continue with PO lasix, No further cv workup needed at this time DC planning..     7/2 CT spine:   Cervical spine: Loss of normal cervical lordosis is seen. This could be due to   positioning or muscle spasm.  The vertebral body height and alignment appear normal.  Anterior osteophytes are seen involving the C4, C5, C6 and C7 levels.   These findings are secondary to degenerative changes.  There are no acute fractures or dislocations seen.  Evaluation of the paraspinal soft tissues is limited due to lack of IV   contrast though grossly unremarkable    Thoracic spine: Slight increased kyphosis is identified.  Pacemaker wires are seen.  Compression deformities are again seen involving the superior endplate of   T1 and T2-T3 and T4. These findings appear unchanged when compared with   the prior study. Anterior compression deformities are again seen   involving the T7 and T10 vertebral bodies. These findings appear   unchanged when compared with the prior study.  There is a Schmorl's node seen along the superior endplate of T10 which   appears unchanged. This is likely result of degenerative changes.  No acute fractures or dislocations are seen.  No abnormal lytic or blastic lesions are seen.  Tiny density is again seen along the superior endplate of T7. Please   correlate clinically. This finding appears unchanged. Evaluation of the   paraspinal soft tissues is limited due to lack of IV contrast. Congestive   changes are seen involving both lung bases. There is also linear density   seen involving the right lung base which was present prior study. This   could be compatible with underlying scarring. Please correlate   clinically. Dedicated imaging of the lungs with CT scan can be done if   clinically indicated.    Lumbar spine:  Reversal normal lumbar lordosis is seen. This could be due to positioning   or muscle spasm. The vertebral body height and alignment appear normal  Vacuum disc changes are seen involving the L5-S1 level which are   secondary to degenerative changes.  There are no acute fractures or dislocation seen.  Evaluation of the paraspinal soft tissues is limited due to lack of IV   contrast though grossly unremarkable.  Impression: Degenerative changes involving the cervical thoracic and   lumbar spine are identified as described above.  Stable compression deformities are identified as described above.  Cardio: CHF compensated.  Continue PO Lasix  -- CT C/T/L spine as above-- Stable compression deformities  --Appreciate Neurosurgery consult. Known Thoracic compression fx, with TLSO brace at home but with worsening back pain and leg pain--   --Neuro input appreciated- started Neurontin and increased to 300 mg TID --ongoing abdominal pain-- GI following--c/w Amio 400mg po qd for h/o VT/VF - the patient has NO HISTORY OF AFIB--PT recommended rehab at [facility]--F/U GI/ HEME/NEURO   7/6 Patient stable for d/c to rehab today, no further inpt workup.

## 2017-07-06 NOTE — DISCHARGE NOTE ADULT - CARE PLAN
Principal Discharge DX:	Acute on chronic systolic congestive heart failure  Goal:	remain euvolemic  Instructions for follow-up, activity and diet:	continue cardiac medications as prescribed, daily weights, low salt diet, fluid restriction 1500ml per day, follow up with your Cardiologist in 1-2 weeks  Secondary Diagnosis:	Compression fracture of thoracic vertebra  Instructions for follow-up, activity and diet:	continue to wear brace at all times when out of bed  Secondary Diagnosis:	CAD (coronary artery disease)  Instructions for follow-up, activity and diet:	continue cardiac medications as prescribed, daily weights, low salt diet, fluid restriction 1500ml per day, follow up with your Cardiologist in 1-2 weeks  Secondary Diagnosis:	H/O gastric ulcer  Instructions for follow-up, activity and diet:	continue Protonix, carafate, and stool regimen  Secondary Diagnosis:	HTN (hypertension)  Instructions for follow-up, activity and diet:	low salt diet, continue antihypertensive medications  Secondary Diagnosis:	HLD (hyperlipidemia)  Instructions for follow-up, activity and diet:	low cholesterol, continue lipitor  Secondary Diagnosis:	Leg pain, bilateral  Instructions for follow-up, activity and diet:	continue Gabapentin for neuropathy

## 2017-07-06 NOTE — PROGRESS NOTE ADULT - PROBLEM SELECTOR PROBLEM 4
HLD (hyperlipidemia)
HLD (hyperlipidemia)
HTN (hypertension)

## 2017-07-06 NOTE — DOWNTIME INTERRUPTION NOTE - WHICH MANUAL FORMS INITIATED?
Due to sunrise interruptions; downtime initiated     please see paper chart for clinical documentation recorded during downtime

## 2017-07-06 NOTE — PROGRESS NOTE ADULT - ASSESSMENT
76 y/o F with hx of asthma, CAD, CHF (EF; 21%), HLD, HTN and Lymphoma presents with chest pain and abdominal pain for which she was diuresed and is now with improvement of both. Pt reports that her abdominal pain is associated with 'gas bubbles'
78 y/o F with hx of asthma, CAD, CHF (EF; 21%), HLD, HTN and Lymphoma presents with chest pain and abdominal pain for which she was diuresed and is now with improvement of both. Pt reports that her abdominal pain is associated with 'gas bubbles'
78 y/o F with hx of asthma, CAD, CHF (EF; 21%), HLD, HTN and Lymphoma presents with chest pain and abdominal pain for which she was diuresed and is now with improvement of both. Pt reports that her abdominal pain is associated with 'gas bubbles'
Patient is a 77y old  Female who presents with a chief complaint of chest pain, abdominal pain, now resolved
1) BL Lower Extremity Pain  2) Old Fxs seen on CTs    Plan  1) Continue Gabapentin 100mg TID may increase as needed to 300mg TID  2) Continue Pain Meds  3) PT  4) Continue medical workup  5) CT reviewed and will defer to medicine regarding need for CT Lungs  6) EMG/NCV as outpt
77F with h/o lymphoma but no details known, has gotten her oncologist's name and number finally, here with cardiac issues and improving, will recommend:  - called [phone] and left my cell number to obtain info on NHL  - Rx as per medicine, cardiology and CCU  - obtain info on the details of NHL and its Rx, only then will be able to do more tests to evaluate EOD at this time  - discussed in detail with ALTON ABDI
77F with h/o lymphoma but no details known and even the info on the oncologist not avaialable, here with cardiac issues, will recommend:  - Rx as per medicine, cardiology and CCU  - obtain info on the details of NHL and its Rx, only then will be able to do more tests to evaluate EOD at this time  - pt told to call her family members in order to obtain info on the oncologist who has been treating her- pt will try to reach her son later today  - discussed in detail with ALTON ABDI

## 2017-07-06 NOTE — PROGRESS NOTE ADULT - SUBJECTIVE AND OBJECTIVE BOX
Patient is a 77y old  Female who presents with a chief complaint of chest pain, abdominal pain (06 Jul 2017 11:59)      SUBJECTIVE / OVERNIGHT EVENTS: No nausea, vomiting or diarrhea, no fever or chills, no dizziness or chest pain, no dysuria or hematuria, no jt pain or swelling    MEDICATIONS  (STANDING):  spironolactone 12.5 milliGRAM(s) Oral daily  aspirin enteric coated 81 milliGRAM(s) Oral daily  losartan 25 milliGRAM(s) Oral daily  amiodarone    Tablet 400 milliGRAM(s) Oral daily  allopurinol 300 milliGRAM(s) Oral daily  atorvastatin 20 milliGRAM(s) Oral at bedtime  metoprolol succinate ER 25 milliGRAM(s) Oral daily  buDESOnide 160 MICROgram(s)/formoterol 4.5 MICROgram(s) Inhaler 2 Puff(s) Inhalation two times a day  tiotropium 18 MICROgram(s) Capsule 1 Capsule(s) Inhalation daily  dicyclomine 10 milliGRAM(s) Oral two times a day before meals  docusate sodium 100 milliGRAM(s) Oral three times a day  senna 2 Tablet(s) Oral at bedtime  sucralfate 1 Gram(s) Oral four times a day  latanoprost 0.005% Ophthalmic Solution 1 Drop(s) Both EYES at bedtime  lactobacillus acidophilus 1 Tablet(s) Oral daily  levothyroxine 50 MICROGram(s) Oral daily  sodium chloride 0.9% lock flush 3 milliLiter(s) IV Push every 8 hours  enoxaparin Injectable 40 milliGRAM(s) SubCutaneous every 24 hours  insulin lispro (HumaLOG) corrective regimen sliding scale   SubCutaneous three times a day before meals  insulin lispro (HumaLOG) corrective regimen sliding scale   SubCutaneous at bedtime  dextrose 5%. 1000 milliLiter(s) (50 mL/Hr) IV Continuous <Continuous>  dextrose 50% Injectable 12.5 Gram(s) IV Push once  dextrose 50% Injectable 25 Gram(s) IV Push once  dextrose 50% Injectable 25 Gram(s) IV Push once  lidocaine   Patch 1 Patch Transdermal daily  pantoprazole    Tablet 40 milliGRAM(s) Oral two times a day before meals  sucralfate suspension 1 Gram(s) Oral four times a day  gabapentin 300 milliGRAM(s) Oral every 8 hours  furosemide    Tablet 40 milliGRAM(s) Oral two times a day    MEDICATIONS  (PRN):  acetaminophen   Tablet. 650 milliGRAM(s) Oral every 6 hours PRN mild, moderate, severe pain  dextrose Gel 1 Dose(s) Oral once PRN Blood Glucose LESS THAN 70 milliGRAM(s)/deciliter  glucagon  Injectable 1 milliGRAM(s) IntraMuscular once PRN Glucose LESS THAN 70 milligrams/deciliter  polyethylene glycol 3350 17 Gram(s) Oral daily PRN Constipation      Vital Signs Last 24 Hrs  T(C): 37.2 (07-06-17 @ 06:24), Max: 37.2 (07-06-17 @ 06:24)  HR: 72 (07-06-17 @ 06:24) (72 - 72)  BP: 106/57 (07-06-17 @ 06:24) (106/54 - 106/57)  RR: 16 (07-06-17 @ 06:24) (16 - 16)  SpO2: 98% (07-06-17 @ 06:24) (98% - 98%)  CAPILLARY BLOOD GLUCOSE  128 (06 Jul 2017 11:29)  93 (06 Jul 2017 08:23)  107 (05 Jul 2017 22:38)  106 (05 Jul 2017 17:10)        I&O's Summary    05 Jul 2017 07:01  -  06 Jul 2017 07:00  --------------------------------------------------------  IN: 390 mL / OUT: 600 mL / NET: -210 mL    06 Jul 2017 07:01  -  06 Jul 2017 13:36  --------------------------------------------------------  IN: 240 mL / OUT: 0 mL / NET: 240 mL        PHYSICAL EXAM:  GENERAL: NAD, well-developed  HEAD:  Atraumatic, Normocephalic  EYES: EOMI, PERRLA, conjunctiva and sclera clear  NECK: Supple, No JVD  CHEST/LUNG: Clear to auscultation bilaterally; No wheeze  HEART: Regular rate and rhythm; No murmurs, rubs, or gallops  ABDOMEN: Soft, Nontender, Nondistended; Bowel sounds present  EXTREMITIES:  2+ Peripheral Pulses, No clubbing, cyanosis, or edema  PSYCH: AAOx3  NEUROLOGY: non-focal  SKIN: No rashes or lesions    LABS:                      Consultant(s) Notes Reviewed:      Care Discussed with Consultants/Other Providers:    Contact Number, Dr Gray [phone]

## 2017-07-06 NOTE — PROGRESS NOTE ADULT - PROBLEM SELECTOR PLAN 2
-hgb stable with no overt gib  -ppi bid  -carafate qid   -maalox prn
-hgb stable with no overt gib  -ppi bid  -carafate qid   -maalox prn
-hgb stable with no overt gib  -ppi bid  -carafate qid   -maalox prn.
Mx per cardiology  will continue lasix
Mx per cardiology  will continue lasix
Mx per cardiology  will continue lasix as tolerated
Mx per cardiology  will continue lasix as tolerated
Mx per cardiology  will continue lasix as tolerated  po Lasix now
Mx per cardiology  will continue lasix as tolerated  po Lasix now
no antibiotics needed
-hgb stable with no overt gib  -ppi bid  -carafate qid   -maalox prn.
no antibiotics needed

## 2017-07-06 NOTE — PROGRESS NOTE ADULT - PROBLEM SELECTOR PLAN 5
no wheeze at present
well contolled  will continue to monitor
well contolled  will continue to monitor

## 2017-07-06 NOTE — DISCHARGE NOTE ADULT - NS AS DC HF EDUCATION INSTRUCTIONS
Activities as tolerated/Monitor Weight Daily/Call Primary Care Provider for follow-up after discharge/Low salt diet/Report weight gain of 2 or more pounds in one day or 3 or more pounds in one week, worsening shortness of breath, fatigue, weakness, increased swelling of hands and feet to primary care provider

## 2017-07-06 NOTE — PROGRESS NOTE ADULT - PROBLEM SELECTOR PLAN 3
Mx per cardiology  will continue lasix as tolerated  po Lasix now
outpatient follow up with PCP
Mx per cardiology  will continue lasix as tolerated

## 2017-07-06 NOTE — DISCHARGE NOTE ADULT - PLAN OF CARE
continue to wear brace at all times when out of bed remain euvolemic continue cardiac medications as prescribed, daily weights, low salt diet, fluid restriction 1500ml per day, follow up with your Cardiologist in 1-2 weeks continue Protonix, carafate, and stool regimen low salt diet, continue antihypertensive medications low cholesterol, continue lipitor continue Gabapentin for neuropathy

## 2017-07-06 NOTE — PROGRESS NOTE ADULT - PROVIDER SPECIALTY LIST ADULT
Cardiology
Gastroenterology
Heme/Onc
Heme/Onc
Internal Medicine
Neurology
Neurology
Cardiology
Neurology

## 2017-07-06 NOTE — DISCHARGE NOTE ADULT - SECONDARY DIAGNOSIS.
Compression fracture of thoracic vertebra CAD (coronary artery disease) H/O gastric ulcer HTN (hypertension) HLD (hyperlipidemia) Leg pain, bilateral

## 2017-07-06 NOTE — PROGRESS NOTE ADULT - PROBLEM SELECTOR PLAN 1
-likely multifactorial with possibly related to low flow state from CHF as well as gas discomfort   -now improved  -cardiology appreciated with diuresis  -simethicone q6h x 4 days  -senna/colace  -miralax prn  -pain control prn  -dc per primary team
-now improved  -cardiology appreciated with diuresis  -simethicone prn  -senna/colace  -miralax prn  -pain control prn  -dc per primary team
-now improved  -cardiology appreciated with diuresis  -simethicone prn  -senna/colace  -miralax prn  -pain control prn  -dc per primary team
-now improved  -cardiology appreciated with diuresis  -simethicone q6h x 4 days then prn  -senna/colace  -miralax prn  -pain control prn  -dc per primary team
-now improved  -cardiology appreciated with diuresis  -simethicone q6h x 4 days then prn  -senna/colace  -miralax prn  -pain control prn  -dc per primary team
well controlled  Monitor  no oral meds needed
well controlled  Monitor  no oral meds needed  A1c 5.7  close to normal
well controlled  Monitor  no oral meds needed  A1c 5.7  close to normal
well controlled  continue to monitor FS
well controlled  continue to monitor FS
May increase Gabapentin to 300mg  Continue Pain Meds  PT when able  Continue medical workuo
-likely multifactorial with possibly related to low flow state from CHF as well as gas discomfort   -now improved  -cardiology appreciated with diuresis  -simethicone q6h x 4 days  -senna/colace  -miralax prn  -pain control prn  -no gi objection to discharge planning
well controlled  Monitor  no oral meds needed  A1c 5.7  close to normal

## 2017-07-06 NOTE — DISCHARGE NOTE ADULT - PATIENT PORTAL LINK FT
“You can access the Follow[facility] Patient Portal, offered by [facility], by registering with the following website: [email or web address]”

## 2017-07-06 NOTE — PROGRESS NOTE ADULT - SUBJECTIVE AND OBJECTIVE BOX
INTERVAL HPI/OVERNIGHT EVENTS:    pt reports improved abd pain  tolerating diet well    MEDICATIONS  (STANDING):  spironolactone 12.5 milliGRAM(s) Oral daily  aspirin enteric coated 81 milliGRAM(s) Oral daily  losartan 25 milliGRAM(s) Oral daily  amiodarone    Tablet 400 milliGRAM(s) Oral daily  allopurinol 300 milliGRAM(s) Oral daily  atorvastatin 20 milliGRAM(s) Oral at bedtime  metoprolol succinate ER 25 milliGRAM(s) Oral daily  buDESOnide 160 MICROgram(s)/formoterol 4.5 MICROgram(s) Inhaler 2 Puff(s) Inhalation two times a day  tiotropium 18 MICROgram(s) Capsule 1 Capsule(s) Inhalation daily  dicyclomine 10 milliGRAM(s) Oral two times a day before meals  docusate sodium 100 milliGRAM(s) Oral three times a day  senna 2 Tablet(s) Oral at bedtime  sucralfate 1 Gram(s) Oral four times a day  latanoprost 0.005% Ophthalmic Solution 1 Drop(s) Both EYES at bedtime  lactobacillus acidophilus 1 Tablet(s) Oral daily  levothyroxine 50 MICROGram(s) Oral daily  sodium chloride 0.9% lock flush 3 milliLiter(s) IV Push every 8 hours  enoxaparin Injectable 40 milliGRAM(s) SubCutaneous every 24 hours  insulin lispro (HumaLOG) corrective regimen sliding scale   SubCutaneous three times a day before meals  insulin lispro (HumaLOG) corrective regimen sliding scale   SubCutaneous at bedtime  dextrose 5%. 1000 milliLiter(s) (50 mL/Hr) IV Continuous <Continuous>  dextrose 50% Injectable 12.5 Gram(s) IV Push once  dextrose 50% Injectable 25 Gram(s) IV Push once  dextrose 50% Injectable 25 Gram(s) IV Push once  lidocaine   Patch 1 Patch Transdermal daily  pantoprazole    Tablet 40 milliGRAM(s) Oral two times a day before meals  sucralfate suspension 1 Gram(s) Oral four times a day  gabapentin 300 milliGRAM(s) Oral every 8 hours  furosemide    Tablet 40 milliGRAM(s) Oral two times a day    MEDICATIONS  (PRN):  acetaminophen   Tablet. 650 milliGRAM(s) Oral every 6 hours PRN mild, moderate, severe pain  dextrose Gel 1 Dose(s) Oral once PRN Blood Glucose LESS THAN 70 milliGRAM(s)/deciliter  glucagon  Injectable 1 milliGRAM(s) IntraMuscular once PRN Glucose LESS THAN 70 milligrams/deciliter  polyethylene glycol 3350 17 Gram(s) Oral daily PRN Constipation      Allergies    peanuts (Unknown)  penicillin (Nausea)    Intolerances        Review of Systems:    General:  No wt loss, fevers, chills, night sweats,fatigue,   Eyes:  Good vision, no reported pain  ENT:  No sore throat, pain, runny nose, dysphagia  CV:  No pain, palpitatioins, hypo/hypertension  Resp:  No dyspnea, cough, tachypnea, wheezing  GI:  No pain, No nausea, No vomiting, No diarrhea, No constipatiion, No weight loss, No fever, No pruritis, No rectal bleeding, No tarry stools, No dysphagia,  :  No pain, bleeding, incontinence, nocturia  Muscle:  No pain, weakness  Neuro:  No weakness, tingling, memory problems  Psych:  No fatigue, insomnia, mood problems, depression  Endocrine:  No polyuria, polydypsia, cold/heat intolerance  Heme:  No petechiae, ecchymosis, easy bruisability  Skin:  No rash, tattoos, scars, edema      Vital Signs Last 24 Hrs  T(C): 37.2 (06 Jul 2017 06:24), Max: 37.2 (06 Jul 2017 06:24)  T(F): 98.9 (06 Jul 2017 06:24), Max: 98.9 (06 Jul 2017 06:24)  HR: 72 (06 Jul 2017 06:24) (72 - 75)  BP: 106/57 (06 Jul 2017 06:24) (106/54 - 121/46)  BP(mean): --  RR: 16 (06 Jul 2017 06:24) (16 - 16)  SpO2: 98% (06 Jul 2017 06:24) (98% - 98%)    PHYSICAL EXAM:    Constitutional: NAD, well-developed  HEENT: EOMI, throat clear  Neck: No LAD, supple  Respiratory: CTA and P  Cardiovascular: S1 and S2, RRR, no M  Gastrointestinal: BS+, soft, NT/ND, neg HSM,  Extremities: No peripheral edema, neg clubing, cyanosis  Vascular: 2+ peripheral pulses  Neurological: A/O x 3, no focal deficits  Psychiatric: Normal mood, normal affect  Skin: No rashes      LABS:                RADIOLOGY & ADDITIONAL TESTS:

## 2017-07-06 NOTE — PROGRESS NOTE ADULT - PROBLEM SELECTOR PROBLEM 6
Asthma
Asthma
Leg pain, bilateral

## 2017-07-06 NOTE — PROGRESS NOTE ADULT - PROBLEM SELECTOR PROBLEM 2
H/O gastric ulcer
R/O UTI (urinary tract infection)
Acute on chronic systolic heart failure
H/O gastric ulcer
R/O UTI (urinary tract infection)

## 2017-07-06 NOTE — DISCHARGE NOTE ADULT - CARE PROVIDER_API CALL
Raymond Kinney (MD), Cardiovascular Disease  [address]  [place] [address]  Phone: [phone]  Fax: [phone]    Curtis Lambert (DO), Neurology  [address] [place] [address]  Phone: [phone]  Fax: [phone]    Brett Eaton (DO), Gastroenterology; Internal Medicine  [address] [place] [address]  Phone: [phone]  Fax: [phone]    Dr. Reyna,   Heme/ Onc  Phone: (   )    -  Fax: (   )    -

## 2017-07-06 NOTE — DISCHARGE NOTE ADULT - ADDITIONAL INSTRUCTIONS
follow up with your Cardiologist in 1-2 weeks  follow up with your outpatient Oncologist Dr. Reyna [phone]  follow up with Neurologist Dr. Lambert

## 2017-07-06 NOTE — PROGRESS NOTE ADULT - PROBLEM SELECTOR PLAN 6
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
no wheeze at present
no wheeze at present

## 2017-07-06 NOTE — PROGRESS NOTE ADULT - ATTENDING COMMENTS
Pt. seen and examined.  Agree with above.   - continue with PO lasix  - ortho spine and neuro follow up
Pt. seen and examined.  Agree with above.   -DC planning
Pt. seen and examined.  Agree with above.   -Pt. appears euvolemic  -Continue with PO lasix  -No further cv workup needed at this time  -DC planning
CARDIOLOGY ATTENDING    Patient seen and examined. Agree with above. Continue amio 400mg po daily for prior VT/VF and known NICM. No further cardiac workup needed.
CARDIOLOGY ATTENDING    Patient seen and examined. Agree with above. No further cardiac workup needed
CARDIOLOGY ATTENDING    Patient seen and examined. Agree with above. No further cardiac workup needed.
CARDIOLOGY ATTENDING    Patient seen and examined. Agree with above. No further cardiac workup needed. F/u GI regarding abdominal pain
Pt. seen and examined.  Agree with above.   -Pt. appears euvolemic  -Continue with PO lasix  -No further cv workup needed at this time  -DC planning
Pt. seen and examined.  Agree with above.   -Pt. appears euvolemic  -Continue with PO lasix  -No further cv workup needed at this time  -DC planning.
no medical contraindication for discharge

## 2017-07-06 NOTE — PROGRESS NOTE ADULT - SUBJECTIVE AND OBJECTIVE BOX
Subjective: SOB improved.  Ongoing LE tingling and pain.    ACTIVE MEDICATIONS:  MEDICATIONS  (STANDING):  spironolactone 12.5 milliGRAM(s) Oral daily  aspirin enteric coated 81 milliGRAM(s) Oral daily  losartan 25 milliGRAM(s) Oral daily  amiodarone    Tablet 400 milliGRAM(s) Oral daily  allopurinol 300 milliGRAM(s) Oral daily  atorvastatin 20 milliGRAM(s) Oral at bedtime  metoprolol succinate ER 25 milliGRAM(s) Oral daily  buDESOnide 160 MICROgram(s)/formoterol 4.5 MICROgram(s) Inhaler 2 Puff(s) Inhalation two times a day  tiotropium 18 MICROgram(s) Capsule 1 Capsule(s) Inhalation daily  dicyclomine 10 milliGRAM(s) Oral two times a day before meals  docusate sodium 100 milliGRAM(s) Oral three times a day  senna 2 Tablet(s) Oral at bedtime  sucralfate 1 Gram(s) Oral four times a day  latanoprost 0.005% Ophthalmic Solution 1 Drop(s) Both EYES at bedtime  lactobacillus acidophilus 1 Tablet(s) Oral daily  levothyroxine 50 MICROGram(s) Oral daily  sodium chloride 0.9% lock flush 3 milliLiter(s) IV Push every 8 hours  enoxaparin Injectable 40 milliGRAM(s) SubCutaneous every 24 hours  insulin lispro (HumaLOG) corrective regimen sliding scale   SubCutaneous three times a day before meals  insulin lispro (HumaLOG) corrective regimen sliding scale   SubCutaneous at bedtime  dextrose 5%. 1000 milliLiter(s) (50 mL/Hr) IV Continuous <Continuous>  dextrose 50% Injectable 12.5 Gram(s) IV Push once  dextrose 50% Injectable 25 Gram(s) IV Push once  dextrose 50% Injectable 25 Gram(s) IV Push once  lidocaine   Patch 1 Patch Transdermal daily  pantoprazole    Tablet 40 milliGRAM(s) Oral two times a day before meals  sucralfate suspension 1 Gram(s) Oral four times a day  gabapentin 300 milliGRAM(s) Oral every 8 hours  furosemide    Tablet 40 milliGRAM(s) Oral two times a day    MEDICATIONS  (PRN):  acetaminophen   Tablet. 650 milliGRAM(s) Oral every 6 hours PRN mild, moderate, severe pain  dextrose Gel 1 Dose(s) Oral once PRN Blood Glucose LESS THAN 70 milliGRAM(s)/deciliter  glucagon  Injectable 1 milliGRAM(s) IntraMuscular once PRN Glucose LESS THAN 70 milligrams/deciliter  polyethylene glycol 3350 17 Gram(s) Oral daily PRN Constipation      LABS:    Hemoglobin: 11.7 g/dL (07-04 @ 07:21)  Hemoglobin: 11.5 g/dL (07-03 @ 07:32)  Hemoglobin: 12.1 g/dL (07-02 @ 06:30)  Creatinine Trend: 0.97<--, 0.85<--, 0.95<--, 0.87<--, 0.93<--, 0.83<--     PHYSICAL EXAM  Vital Signs Last 24 Hrs  T(C): 37.2 (06 Jul 2017 06:24), Max: 37.2 (06 Jul 2017 06:24)  T(F): 98.9 (06 Jul 2017 06:24), Max: 98.9 (06 Jul 2017 06:24)  HR: 72 (06 Jul 2017 06:24) (72 - 75)  BP: 106/57 (06 Jul 2017 06:24) (106/54 - 121/46)  RR: 16 (06 Jul 2017 06:24) (16 - 16)  SpO2: 98% (06 Jul 2017 06:24) (98% - 98%)    HEENT:   Normal oral mucosa,    Lymphatic: No obvious lymphadenopathy , no edema  Cardiovascular: Normal S1 S2, RRR     +JVD,  Peripheral pulses palpable 2+ bilaterally  Respiratory: Lungs clear to auscultation, normal effort 	  Gastrointestinal:  Soft, Non-tender, + BS	  Skin: No rashes,  No cyanosis, warm to touch  Extremities no edema, cyanosis, clubbing B/L LE's     TELEMETRY: 	 none     RADIOLOGY:     < from: CT Cervical Spine No Cont (06.29.17 @ 10:41) >  Impression: Degenerative changes involving the cervical thoracic and   lumbar spine are identified as described above.    Stable compression deformities are identified as described above.    JOESPH PALMER M.D., ATTENDING RADIOLOGIST  This document has been electronically signed. Jun 29 2017 12:40PM    < end of copied text >    DIAGNOSTIC TESTING:    < from: TTE with Doppler (w/Cont) (04.03.17 @ 16:18) >  CONCLUSIONS:  1. Mitral annular calcification, otherwise normal mitral  valve. Mild mitral regurgitation.  2. Moderately dilated left atrium.  LA volume index = 46  cc/m2.  3. Moderate left ventricular enlargement.  4. Severe global left ventricular systolic dysfunction.  Endocardial visualization enhanced with intravenous  injection of echo contrast (Definity).  5. The right ventricle is not well visualized; grossly  normal right ventricular systolic function.  A device wire  is noted in the right heart.  6. Estimated right ventricular systolic pressure equals 60  mm Hg, assuming right atrial pressure equals 10 mm Hg,  consistent with moderate pulmonary hypertension.  ------------------------------------------------------------------------  Confirmed on  4/3/2017 - 17:19:47 by Jean-Pierre Carver M.D.    < end of copied text >      ASSESSMENT/PLAN: 	77y Female with hypertension, dyslipidemia, CAD s/p LAD stent with only mild re-stenosis with minor luminal irregularities otherwise on cath 4/16, with known severe LV dysfunction , NICM s/p [employer] BiV ICD, COPD, lymphoma previously on chemo with known spinal mets and compression fractures admitted with CP/SOB/LE pain.    -- Mild acute on chronic systolic HF--compensated.  Continue Lasix 40 BID.  keep o>I  -- CT C/T/L spine as above-- Stable compression deformities  --Appreciate Neurosurgery consult. Known Thoracic compression fx, s/p CT T spine and fractures stable.  Continue brace.  --Neuro input appreciated- started Neurontin and increased to 300 mg TID-- ?increase further  --ongoing abdominal pain-- GI following.  Continue PPI, carafate, Maalox and Simethicone PRN  --c/w Amio 400mg po qd for h/o VT/VF - the patient has NO HISTORY OF AFIB  --PT recommended rehab at D/C--D/C when bed available    Dolores Rodriguez PA-C  [facility]  [address]   [place] [address]  office [phone]  pager [phone]

## 2017-07-06 NOTE — DISCHARGE NOTE ADULT - MEDICATION SUMMARY - MEDICATIONS TO TAKE
I will START or STAY ON the medications listed below when I get home from the hospital:    spironolactone 25 mg oral tablet  -- 0.5 tab(s) by mouth once a day  -- Indication: For Acute on chronic systolic congestive heart failure    aspirin 81 mg oral delayed release tablet  -- 1 tab(s) by mouth once a day  -- Indication: For CAD (coronary artery disease)    acetaminophen 325 mg oral tablet  -- 2 tab(s) by mouth every 6 hours, As needed, mild, moderate, severe pain  -- Indication: For pain management     losartan 25 mg oral tablet  -- 1 tab(s) by mouth once a day  -- Indication: For Acute on chronic systolic congestive heart failure    amiodarone 200 mg oral tablet  -- 2 tab(s) by mouth once a day  -- Indication: For H/O VTach /vfib     gabapentin 300 mg oral capsule  -- 1 cap(s) by mouth every 8 hours  -- Indication: For pain management     allopurinol 300 mg oral tablet  -- 1 tab(s) by mouth once a day  -- Indication: For gout     atorvastatin 20 mg oral tablet  -- 1 tab(s) by mouth once a day (at bedtime)  -- Indication: For HLD (hyperlipidemia)    metoprolol succinate 25 mg oral tablet, extended release  -- 1 tab(s) by mouth once a day  -- Indication: For CAD (coronary artery disease)    budesonide-formoterol 160 mcg-4.5 mcg/inh inhalation aerosol  -- 2 puff(s) inhaled 2 times a day  -- Indication: For CoPD     tiotropium 18 mcg inhalation capsule  -- 1 cap(s) inhaled once a day  -- Indication: For CoPD    lidocaine 5% topical film  -- Apply on skin to affected area once a day 12 hrs on 12 hrs off   -- Indication: For pain management     furosemide 40 mg oral tablet  -- 1 tab(s) by mouth once a day  -- Indication: For Acute on chronic systolic congestive heart failure    dicyclomine 10 mg oral capsule  -- 1 cap(s) by mouth 2 times a day (before meals)  -- Indication: For IBS     docusate sodium 100 mg oral capsule  -- 1 cap(s) by mouth 3 times a day  -- Indication: For stool softner     senna oral tablet  -- 2 tab(s) by mouth once a day (at bedtime)  -- Indication: For stool softner     polyethylene glycol 3350 oral powder for reconstitution  -- 17 gram(s) by mouth once a day, As needed, Constipation  -- Indication: For Constipation     sucralfate 1 g oral tablet  -- 1 tab(s) by mouth 4 times a day  -- Indication: For gastric ulcer     latanoprost 0.005% ophthalmic solution  -- 1 drop(s) to each affected eye once a day (in the evening)  -- Indication: For glaucoma     lactobacillus acidophilus oral capsule  -- 1 tab(s) by mouth once a day  -- Indication: For probiotic     pantoprazole 40 mg oral delayed release tablet  -- 1 tab(s) by mouth 2 times a day (before meals)  -- Indication: For gastric ulcer     levothyroxine 50 mcg (0.05 mg) oral tablet  -- 1 tab(s) by mouth once a day  -- Indication: For Hypothyroid

## 2017-07-06 NOTE — PROGRESS NOTE ADULT - PROBLEM SELECTOR PLAN 4
well contolled  will continue to monitor
well controlled  will continue to monitor
outpatient follow up with PCP
outpatient follow up with PCP

## 2017-07-31 NOTE — ED ADULT NURSE NOTE - OBJECTIVE STATEMENT
p/t received awake and responsive c/o of not feeling well mild sob p/t denies any chest discomfort sent from NH for eval nad noted

## 2017-07-31 NOTE — ED PROVIDER NOTE - OBJECTIVE STATEMENT
77yof w/ CHF EF15% on high dose diuretics sent in from rehab for shortness of breath. Pt endorses increased shortness of breath for the past two days despite use of 2L oxygen. Denies any chest pain. Occasional non-productive cough. Also endorses bilateral foot and leg pain which is chronic. No trauma. No fevers, chills.

## 2017-07-31 NOTE — ED PROVIDER NOTE - ATTENDING CONTRIBUTION TO CARE
Attending note:   After face to face evaluation of this patient, I concur with above noted hx, pe, and care plan for this patient. +15% EF with dyspnea again overnight and today,   Lungs: poor air movement, no pedal edema; evaluation in progress

## 2017-07-31 NOTE — ED ADULT TRIAGE NOTE - CHIEF COMPLAINT QUOTE
p/t c/o of sore throat cough and chest congestion for few days sent from NH for eval sob p/t denies any chest pain

## 2017-07-31 NOTE — ED PROVIDER NOTE - PROGRESS NOTE DETAILS
pt signed out pending results of CTA - which was negative for PE.  Per sign out plan stable to go back to the NH.  Will arrange transport.  Pt otherwise remains asymptomatic at this time

## 2017-08-17 NOTE — ED ADULT NURSE NOTE - ED STAT RN HANDOFF DETAILS
Patient is in NAD, and has room available.  Report given to nurse on floor via phone.  Patient awaiting transportation.  Will continue to monitor patient closely. STEPHEN Villarreal R.N.

## 2017-08-17 NOTE — ED PROVIDER NOTE - OBJECTIVE STATEMENT
76 y/o F with h/o DM2, HTN, CAD, CHF 20%, lymphoma presents with complaint of lower extremity pain and SOB. Patient reports that both of these issues have been ongoing for months but the lower extremity pain is worse today. Pain was recently admitted for same complaints and was found to have stable compression fractures and degenerative changes in her T-spine and L-spine. Patient has not been able to walk secondary to the pain for months and is currently admitted to a rehab facility. Denies any LE edema, orthopnea or chest pain. Patient admits that her complaints have been chronic but has been unable to transfer care as all of her providers are in [place] and rehab facility is in [facility].

## 2017-08-17 NOTE — ED ADULT NURSE NOTE - OBJECTIVE STATEMENT
pt on bed aox3 from [facility] reports SOB at rest cough with white sputum since yesterday worsening today with chest pain non radiating, 8/10 pain scale sharp/pressure like, constant for a week, bilateral leg pain for months also reports left lower abdominal pain with constipation for 2  days, dizziness palpitation  denies Nausea vomiting dysuria on cm sinus rhythm with PVC

## 2017-08-17 NOTE — ED PROVIDER NOTE - MEDICAL DECISION MAKING DETAILS
76 y/o F with h/o DM2, hypothyroid, HTN, CAD, CHF (ED20%) presents with complaint of shortness of breath and lower extremity pain. Having difficulty coordinating care with neurologist as she is in rehab facility and not able to ambulate. Plan to admit for management of worsening medical conditions

## 2017-08-17 NOTE — ED PROVIDER NOTE - ATTENDING CONTRIBUTION TO CARE
Dr. Gaspar:  I have personally performed a face to face bedside history and physical examination of this patient. I have discussed the history, examination, review of systems, assessment and plan of management with the resident. I have reviewed the electronic medical record and amended it to reflect my history, review of systems, physical exam, assessment and plan.    77F h/o CHF (EF 20%), COPD, gastritis, unable to walk secondary to BLE pain, T/L-spine compression fractures, presents with SOB, BLE pain, and epigastric pain.      Exam:  - nad  - rrr  - ctab  - abd soft, ntnd  - no pedal edema    A/P  - consider CHF/COPD but Dr. Gaspar:  I have personally performed a face to face bedside history and physical examination of this patient. I have discussed the history, examination, review of systems, assessment and plan of management with the resident. I have reviewed the electronic medical record and amended it to reflect my history, review of systems, physical exam, assessment and plan.    77F h/o CHF (EF 20%), COPD, gastritis, unable to walk secondary to BLE pain, T/L-spine compression fractures, presents with SOB, BLE pain, and epigastric pain.      Exam:  - nad  - rrr  - ctab  - abd soft, ntnd  - no pedal edema    A/P  - consider CHF/COPD but unlikely given grossly normal exam, r/o PNA, r/o ACS  - cbc, cmp, trop, bnp  - cxr  - ekg Dr. Gaspar:  I have personally performed a face to face bedside history and physical examination of this patient. I have discussed the history, examination, review of systems, assessment and plan of management with the resident. I have reviewed the electronic medical record and amended it to reflect my history, review of systems, physical exam, assessment and plan.    77F h/o CHF (EF 20%), COPD, gastritis, lymphoma not currently on treatment, unable to walk secondary to BLE pain, T/L-spine compression fractures, presents with worsening SOB, BLE pain, and epigastric pain.      Exam:  - nad  - rrr  - ctab but mildly tachypnea   - abd soft, ntnd  - no pedal edema    A/P  - consider CHF/COPD but unlikely given grossly normal exam, r/o PNA, r/o ACS; likely disease progression  - cbc, cmp, trop, bnp  - cxr  - ekg

## 2017-08-18 NOTE — CONSULT NOTE ADULT - SUBJECTIVE AND OBJECTIVE BOX
HISTORY OF PRESENT ILLNESS: 78 y/o female well know to our office w/ hx HTN HLD Asthma cardiomyopathy w/ EF 15 % on echo 5/8/17, Medtronic  defibrillator w/ upgrade ~ 1 yr ago to Biventricular ICD  4/2016 CATH w/ patent stents Ventricular fibulation  Hypothyroidism, Lymphoma s/p resection and chemotherapy presents to [facility] ED from rehab center for generalized weakness and flare of chronic b/l LE neuropathic pain. Patient says 2 days ago she started to feel like the chronic pins/needles pain in her b/l LEs was worsening to the point where touching them or standing on them was unbearable. She doesn't feel there was a particular inciting event and that the pain came out of nowhere. The pains are sharp, radiate up and down her legs with no point of origin, pins/needles character, and 10/10. She was not getting relief from her home gabapentin doses.       PAST MEDICAL & SURGICAL HISTORY:  Paroxysmal atrial fibrillation  GERD (gastroesophageal reflux disease)  DM (diabetes mellitus)  Asthma  MI (myocardial infarction)  CAD (coronary artery disease)  Lymphoma: S/P resection and chemotherapy in remission  HLD (hyperlipidemia)  CHF (congestive heart failure): On home oxygen 2L PRN  HTN (hypertension)  ICD (implantable cardioverter-defibrillator) in place  S/P coronary artery stent placement  S/P myomectomy  S/P appendectomy  S/P lymph node biopsy: Biopsy and resection        PREVIOUS DIAGNOSTIC TESTING:    [x ] Echocardiogram: 5/8/17        EF 15 %         mod dilated LV w/ severe global systolic dysfunction, NL RV fx, Mild MR Sever L Atrial enlargement   [ ]  Catheterization: 4/2016  patent stents   [ ] Stress Test:  	    MEDICATIONS:  spironolactone 12.5 milliGRAM(s) Oral daily  aspirin enteric coated 81 milliGRAM(s) Oral daily  losartan 25 milliGRAM(s) Oral daily  metoprolol succinate ER 25 milliGRAM(s) Oral daily  furosemide    Tablet 40 milliGRAM(s) Oral daily  enoxaparin Injectable 40 milliGRAM(s) SubCutaneous daily  amiodarone    Tablet 400 milliGRAM(s) Oral daily      buDESOnide 160 MICROgram(s)/formoterol 4.5 MICROgram(s) Inhaler 2 Puff(s) Inhalation two times a day  tiotropium 18 MICROgram(s) Capsule 1 Capsule(s) Inhalation daily    oxyCODONE    5 mG/acetaminophen 325 mG 1 Tablet(s) Oral every 6 hours PRN  gabapentin 300 milliGRAM(s) Oral every 8 hours    dicyclomine 10 milliGRAM(s) Oral two times a day before meals  docusate sodium 100 milliGRAM(s) Oral three times a day  polyethylene glycol 3350 17 Gram(s) Oral daily PRN  senna 2 Tablet(s) Oral at bedtime  sucralfate 1 Gram(s) Oral four times a day  pantoprazole    Tablet 40 milliGRAM(s) Oral before breakfast    allopurinol 300 milliGRAM(s) Oral daily  atorvastatin 20 milliGRAM(s) Oral at bedtime  levothyroxine 50 MICROGram(s) Oral daily    latanoprost 0.005% Ophthalmic Solution 1 Drop(s) Both EYES at bedtime      Allergies    peanuts (Unknown)  penicillin (Nausea)    Intolerances        FAMILY HISTORY:  No pertinent family history in first degree relatives      SOCIAL HISTORY:    [ ] Non-smoker  [ ] Former Smoker  [ ] Current Smoker  [ ] Alcohol      REVIEW OF SYSTEMS:  [ ]chest pain  [  ]shortness of breath  [  ]palpitations  [  ]syncope  [ ]near syncope [  ]diplopia  [  ]altered mental status   [  ]fevers  [ ]chills [ ]nausea  [ ] vomiting  [ ]abdominal pain  [ ]melena  [ ]BRBPR  [  ]epistaxis  [  ]rash  [  ]lower extremity edema    [x] weakness   [x] dysuria   [x] pins/needle sensation LE's [x] decreased appetite       [x ] All others negative	  [ ] Unable to obtain    PHYSICAL EXAM:  T(C): 36.1 (08-18-17 @ 17:11), Max: 37.5 (08-18-17 @ 10:25)  HR: 73 (08-18-17 @ 17:11) (57 - 77)  BP: 119/56 (08-18-17 @ 17:11) (114/59 - 132/73)  RR: 17 (08-18-17 @ 17:11) (17 - 34)  SpO2: 100% (08-18-17 @ 17:11) (98% - 100%)  Wt(kg): --  I&O's Summary      Appearance: No signs of acute distress appears uncomfortable    HEENT:   Normal oral mucosa, PERRL, EOMI	  Lymphatic: No lymphadenopathy  Cardiovascular: Normal S1 S2 RRR , No JVD, soft systolic murmur, No edema  Respiratory: Lungs clear to auscultation	  Gastrointestinal:  Soft, Non-tender, + BS	  Extremities: , No clubbing, cyanosis or edema  Vascular: Peripheral pulses palpable 2+ bilaterally    TELEMETRY: none	    ECG:  	  RADIOLOGY:  OTHER:   cxr   Bilateral midlung linear subsegmental atelectasis. Clear remaining   visualized lungs. No pleural effusions or pneumothorax.    Stable left chest wall biventricular AICD and cardiomegaly.     Trachea midline.    Unremarkable osseous structures.     Left supraclavicular surgical clips again noted.  		  LABS:	 	    CARDIAC MARKERS:      CKMB: 1.42 ng/mL (08-17 @ 22:25)    CKMB Relative Index: 0.6 (08-17 @ 22:25)                            12.6   7.79  )-----------( 255      ( 17 Aug 2017 22:25 )             38.9     08-17    141  |  98  |  17  ----------------------------<  95  4.6   |  29  |  0.67    Ca    10.2      17 Aug 2017 22:25    TPro  7.3  /  Alb  3.9  /  TBili  < 0.2<L>  /  DBili  x   /  AST  32  /  ALT  22  /  AlkPhos  122<H>  08-17    proBNP: Serum Pro-Brain Natriuretic Peptide: 89.23 pg/mL (08-17 @ 22:25)    Lipid Profile:   HgA1c:   TSH:     ASSESSMENT/PLAN: HISTORY OF PRESENT ILLNESS: 76 y/o female well know to our office w/ hx HTN HLD Asthma cardiomyopathy w/ EF 15 % on echo 5/8/17, Medtronic  defibrillator w/ upgrade ~ 1 yr ago to Biventricular ICD  4/2016 CATH w/ patent stents Ventricular fibulation  Hypothyroidism, Lymphoma s/p resection and chemotherapy presents to [facility] ED from rehab center for generalized weakness and flare of chronic b/l LE neuropathic pain. Patient says 2 days ago she started to feel like the chronic pins/needles pain in her b/l LEs was worsening to the point where touching them or standing on them was unbearable. She doesn't feel there was a particular inciting event and that the pain came out of nowhere. The pains are sharp, radiate up and down her legs with no point of origin, pins/needles character, and 10/10. She was not getting relief from her home gabapentin doses.       PAST MEDICAL & SURGICAL HISTORY:  Paroxysmal atrial fibrillation  GERD (gastroesophageal reflux disease)  DM (diabetes mellitus)  Asthma  MI (myocardial infarction)  CAD (coronary artery disease)  Lymphoma: S/P resection and chemotherapy in remission  HLD (hyperlipidemia)  CHF (congestive heart failure): On home oxygen 2L PRN  HTN (hypertension)  ICD (implantable cardioverter-defibrillator) in place  S/P coronary artery stent placement  S/P myomectomy  S/P appendectomy  S/P lymph node biopsy: Biopsy and resection        PREVIOUS DIAGNOSTIC TESTING:    [x ] Echocardiogram: 5/8/17        EF 15 %         mod dilated LV w/ severe global systolic dysfunction, NL RV fx, Mild MR Sever L Atrial enlargement   [ ]  Catheterization: 4/2016  patent stents   [ ] Stress Test:  	    MEDICATIONS:  spironolactone 12.5 milliGRAM(s) Oral daily  aspirin enteric coated 81 milliGRAM(s) Oral daily  losartan 25 milliGRAM(s) Oral daily  metoprolol succinate ER 25 milliGRAM(s) Oral daily  furosemide    Tablet 40 milliGRAM(s) Oral daily  enoxaparin Injectable 40 milliGRAM(s) SubCutaneous daily  amiodarone    Tablet 400 milliGRAM(s) Oral daily      buDESOnide 160 MICROgram(s)/formoterol 4.5 MICROgram(s) Inhaler 2 Puff(s) Inhalation two times a day  tiotropium 18 MICROgram(s) Capsule 1 Capsule(s) Inhalation daily    oxyCODONE    5 mG/acetaminophen 325 mG 1 Tablet(s) Oral every 6 hours PRN  gabapentin 300 milliGRAM(s) Oral every 8 hours    dicyclomine 10 milliGRAM(s) Oral two times a day before meals  docusate sodium 100 milliGRAM(s) Oral three times a day  polyethylene glycol 3350 17 Gram(s) Oral daily PRN  senna 2 Tablet(s) Oral at bedtime  sucralfate 1 Gram(s) Oral four times a day  pantoprazole    Tablet 40 milliGRAM(s) Oral before breakfast    allopurinol 300 milliGRAM(s) Oral daily  atorvastatin 20 milliGRAM(s) Oral at bedtime  levothyroxine 50 MICROGram(s) Oral daily    latanoprost 0.005% Ophthalmic Solution 1 Drop(s) Both EYES at bedtime      Allergies    peanuts (Unknown)  penicillin (Nausea)    Intolerances        FAMILY HISTORY:  No pertinent family history in first degree relatives      SOCIAL HISTORY:    [ ] Non-smoker  [ ] Former Smoker  [ ] Current Smoker  [ ] Alcohol      REVIEW OF SYSTEMS:  [ ]chest pain  [  ]shortness of breath  [  ]palpitations  [  ]syncope  [ ]near syncope [  ]diplopia  [  ]altered mental status   [  ]fevers  [ ]chills [ ]nausea  [ ] vomiting  [ ]abdominal pain  [ ]melena  [ ]BRBPR  [  ]epistaxis  [  ]rash  [  ]lower extremity edema    [x] weakness   [x] dysuria   [x] pins/needle sensation LE's [x] decreased appetite       [x ] All others negative	  [ ] Unable to obtain    PHYSICAL EXAM:  T(C): 36.1 (08-18-17 @ 17:11), Max: 37.5 (08-18-17 @ 10:25)  HR: 73 (08-18-17 @ 17:11) (57 - 77)  BP: 119/56 (08-18-17 @ 17:11) (114/59 - 132/73)  RR: 17 (08-18-17 @ 17:11) (17 - 34)  SpO2: 100% (08-18-17 @ 17:11) (98% - 100%)  Wt(kg): --  I&O's Summary      Appearance: No signs of acute distress appears uncomfortable    HEENT:   Normal oral mucosa, PERRL, EOMI	  Lymphatic: No lymphadenopathy  Cardiovascular: Normal S1 S2 RRR , No JVD, soft systolic murmur, No edema  Respiratory: Lungs clear to auscultation	  Gastrointestinal:  Soft, Non-tender, + BS	  Extremities: , No clubbing, cyanosis or edema  Vascular: Peripheral pulses palpable 2+ bilaterally    TELEMETRY: none	    ECG:  	  RADIOLOGY:  OTHER:   cxr   Bilateral midlung linear subsegmental atelectasis. Clear remaining   visualized lungs. No pleural effusions or pneumothorax.    Stable left chest wall biventricular AICD and cardiomegaly.     Trachea midline.    Unremarkable osseous structures.     Left supraclavicular surgical clips again noted.  		  LABS:	 	    CARDIAC MARKERS:      CKMB: 1.42 ng/mL (08-17 @ 22:25)    CKMB Relative Index: 0.6 (08-17 @ 22:25)                            12.6   7.79  )-----------( 255      ( 17 Aug 2017 22:25 )             38.9     08-17    141  |  98  |  17  ----------------------------<  95  4.6   |  29  |  0.67    Ca    10.2      17 Aug 2017 22:25    TPro  7.3  /  Alb  3.9  /  TBili  < 0.2<L>  /  DBili  x   /  AST  32  /  ALT  22  /  AlkPhos  122<H>  08-17    proBNP: Serum Pro-Brain Natriuretic Peptide: 89.23 pg/mL (08-17 @ 22:25)    Lipid Profile:   HgA1c:   TSH:     ASSESSMENT/PLAN: 76 y/o female well know to our office w/ hx HTN HLD Asthma cardiomyopathy w/ EF 15 % on echo 5/8/17, Medtronic  defibrillator w/ upgrade ~ 1 yr ago to Biventricular ICD  4/2016 CATH w/ patent stents Ventricular fibulation  Hypothyroidism, Lymphoma s/p resection and chemotherapy presents to [facility] ED from rehab center for generalized weakness and flare of chronic b/l LE neuropathic pain. Patient says 2 days ago she started to feel like the chronic pins/needles pain in her b/l LEs was worsening to the point where touching them or standing on them was unbearable. She doesn't feel there was a particular inciting event and that the pain came out of nowhere. The pains are sharp, radiate up and down her legs with no point of origin, pins/needles character, and 10/10. She was not getting relief from her home gabapentin doses.     CE neg x 1  c/w home medications for cardiomyopathy   c/w Amio for hx of VFib shock

## 2017-08-18 NOTE — CONSULT NOTE ADULT - ATTENDING COMMENTS
Agree with above  -Continue with medical therapy of Cardiomyopathy    Nehal López MD  [facility]  [address], [address]  [place] [address]  office: [phone]  pager: [phone]

## 2017-08-18 NOTE — H&P ADULT - NSHPLABSRESULTS_GEN_ALL_CORE
CBC Full  -  ( 17 Aug 2017 22:25 )  WBC Count : 7.79 K/uL  Hemoglobin : 12.6 g/dL  Hematocrit : 38.9 %  Platelet Count - Automated : 255 K/uL  Mean Cell Volume : 86.8 fL  Mean Cell Hemoglobin : 28.1 pg  Mean Cell Hemoglobin Concentration : 32.4 %  Auto Neutrophil # : 5.85 K/uL  Auto Lymphocyte # : 1.11 K/uL  Auto Monocyte # : 0.72 K/uL  Auto Eosinophil # : 0.04 K/uL  Auto Basophil # : 0.04 K/uL  Auto Neutrophil % : 75.2 %  Auto Lymphocyte % : 14.2 %  Auto Monocyte % : 9.2 %  Auto Eosinophil % : 0.5 %  Auto Basophil % : 0.5 %    08-17    141  |  98  |  17  ----------------------------<  95  4.6   |  29  |  0.67    Ca    10.2      17 Aug 2017 22:25    TPro  7.3  /  Alb  3.9  /  TBili  < 0.2<L>  /  DBili  x   /  AST  32  /  ALT  22  /  AlkPhos  122<H>  08-17 08-17    141  |  98  |  17  ----------------------------<  95  4.6   |  29  |  0.67    Ca    10.2      17 Aug 2017 22:25    TPro  7.3  /  Alb  3.9  /  TBili  < 0.2<L>  /  DBili  x   /  AST  32  /  ALT  22  /  AlkPhos  122<H>  08-17

## 2017-08-18 NOTE — H&P ADULT - PROBLEM SELECTOR PLAN 1
Patient with chronic neuropathic pain on Gabapentin TID. Currently has pain flares of unclear etiology - ?humidity, ?infectious. She feels relief after taking percocet, however pain is still present.  - Will c/w Percocet 1 tab q4-6 PRN for now   - c/w Gabapentin  - PT   - If pain not improving with supportive therapy will consider pain management consult.

## 2017-08-18 NOTE — H&P ADULT - PROBLEM SELECTOR PLAN 2
Patient endorses symptoms of dysuria since yesterday. Her last UTI was "many years ago." Will check UA and treat if positive, perhaps this is an inciting factor for her neuropathic pain flare.

## 2017-08-18 NOTE — H&P ADULT - NSHPPHYSICALEXAM_GEN_ALL_CORE
Vital Signs Last 24 Hrs  T(C): 36.8 (18 Aug 2017 07:16), Max: 36.8 (18 Aug 2017 07:16)  T(F): 98.2 (18 Aug 2017 07:16), Max: 98.2 (18 Aug 2017 07:16)  HR: 72 (18 Aug 2017 07:16) (63 - 77)  BP: 132/73 (18 Aug 2017 07:16) (115/69 - 132/73)  BP(mean): --  RR: 18 (18 Aug 2017 07:16) (18 - 34)  SpO2: 98% (18 Aug 2017 07:16) (98% - 100%) Vital Signs Last 24 Hrs  T(C): 36.8 (18 Aug 2017 07:16), Max: 36.8 (18 Aug 2017 07:16)  T(F): 98.2 (18 Aug 2017 07:16), Max: 98.2 (18 Aug 2017 07:16)  HR: 72 (18 Aug 2017 07:16) (63 - 77)  BP: 132/73 (18 Aug 2017 07:16) (115/69 - 132/73)  BP(mean): --  RR: 18 (18 Aug 2017 07:16) (18 - 34)  SpO2: 98% (18 Aug 2017 07:16) (98% - 100%)    General: mildly uncomfortable 2/2 pain, elderly and frail appearing, low tone of voice but speaking in full sentences   HEENT: EOMI, PERRLA, no conjunctival pallor,  dry MM, no JVD, no thyromegaly, neck supple, trachea midline  CV: S1S2 RRR no MRG  Lungs: diffuse scant rales b/l, good air movement   Abdomen: soft NTND +BS   Extremities: No CCE +WWP  Skin/MSK: No rashes, preserved ROM on active & passive movement, diffuse tenderness b/l LEs   Neuro: AAOx3, no focal deficits (5/5 strength all extremities), no sensory deficits

## 2017-08-18 NOTE — H&P ADULT - NSHPREVIEWOFSYSTEMS_GEN_ALL_CORE
REVIEW OF SYSTEMS:    CONSTITUTIONAL: No fevers or chills, (+) WEAKNESS  EYES/ENT: No visual changes;  No vertigo or throat pain   NECK: No pain or stiffness  RESPIRATORY: No cough, wheezing, hemoptysis; No shortness of breath  CARDIOVASCULAR: No chest pain or palpitations  GASTROINTESTINAL: No abdominal or epigastric pain. No nausea, vomiting, or hematemesis; No diarrhea or constipation. No melena or hematochezia.  GENITOURINARY: No frequency or hematuria. (+) Dysuria   NEUROLOGICAL: No numbness, (+) PINS and NEEDLES pain sensation b/l LEs worsened with palpation, unable to ambulate  SKIN: No itching, burning, rashes, or lesions   All other review of systems is negative unless indicated above.

## 2017-08-18 NOTE — H&P ADULT - ASSESSMENT
77F hx of CAD s/p MI, sCHF (EF = 20%), pAFib, HTN, HLD, Hypothyroidism, Lymphoma s/p resection and chemotherapy presents to [facility] ED from rehab center for generalized weakness and flare of chronic b/l LE neuropathic pain.

## 2017-08-18 NOTE — H&P ADULT - PROBLEM SELECTOR PLAN 3
Last known EF=20%. Will c/w home CHF medications - Losartan, Furosemide, Aldactone, Metoprolol - no chest pain or dyspnea at this time

## 2017-08-18 NOTE — H&P ADULT - HISTORY OF PRESENT ILLNESS
77F hx of CAD s/p MI, sCHF (EF = 20%), pAFib, HTN, HLD, Hypothyroidism, Lymphoma s/p resection and chemotherapy presents to [facility] ED from rehab center for generalized weakness and flare of chronic b/l LE neuropathic pain. Patient says 2 days ago she started to feel like the chronic pins/needles pain in her b/l LEs was worsening to the point where touching them or standing on them was unbearable. She doesn't feel there was a particular inciting event and that the pain came out of nowhere. The pains are sharp, radiate up and down her legs with no point of origin, pins/needles character, and 10/10. She was not getting relief from her home gabapentin doses.     Patient was given 1 percocet in the ED. She feels better after the percocet but still feels she cannot move around due to pain. Denies preceding fevers/chills, diarrhea, new cough (has a chronic dry intermittent cough). The patient does feel however that she is having new dysuria however (always has frequent urination due to lasix)

## 2017-08-18 NOTE — H&P ADULT - NSHPSOCIALHISTORY_GEN_ALL_CORE
Family history reviewed with patient, it is non-contributory at this time    No alcohol drugs or tobacco use

## 2017-08-18 NOTE — H&P ADULT - PROBLEM SELECTOR PLAN 7
Hx of DM, not on oral hypoglycemics. Will check a1c and monitor serum glucose daily. Will place on DASH/Consistent Carbohydrate diet

## 2017-08-19 NOTE — PROGRESS NOTE ADULT - PROBLEM SELECTOR PLAN 2
UA + mod leuk esterase and WBC 25-50, will tx with 3 days of ceftriaxone, check urine cx UA + mod leuk esterase and WBC 25-50, will tx with macrobid 100 mg bid, check urine cx

## 2017-08-19 NOTE — PHYSICAL THERAPY INITIAL EVALUATION ADULT - PLANNED THERAPY INTERVENTIONS, PT EVAL
gait training/Pt left supine in bed in NAD; +02; call bell in reach; NHUNG Tirado present at bedside./strengthening/balance training/bed mobility training/transfer training

## 2017-08-19 NOTE — PHYSICAL THERAPY INITIAL EVALUATION ADULT - PHYSICAL ASSIST/NONPHYSICAL ASSIST: SUPINE/SIT, REHAB EVAL
1 person assist/verbal cues/Pt unable to attain sitting position, pt reported chest pain and right LE pain after transfer, NHUNG Tirado made aware

## 2017-08-19 NOTE — PROGRESS NOTE ADULT - SUBJECTIVE AND OBJECTIVE BOX
CHIEF COMPLAINT: Patient is a 77y old  female who presents with a chief complaint of Generalized weakness and pain (18 Aug 2017 08:40)      SUBJECTIVE / OVERNIGHT EVENTS:  Patient feels ok, c/o neuropathic pains in her feet b/l, denies chest pain or sob;    MEDICATIONS  (STANDING):  spironolactone 12.5 milliGRAM(s) Oral daily  aspirin enteric coated 81 milliGRAM(s) Oral daily  losartan 25 milliGRAM(s) Oral daily  gabapentin 300 milliGRAM(s) Oral every 8 hours  allopurinol 300 milliGRAM(s) Oral daily  atorvastatin 20 milliGRAM(s) Oral at bedtime  metoprolol succinate ER 25 milliGRAM(s) Oral daily  buDESOnide 160 MICROgram(s)/formoterol 4.5 MICROgram(s) Inhaler 2 Puff(s) Inhalation two times a day  tiotropium 18 MICROgram(s) Capsule 1 Capsule(s) Inhalation daily  furosemide    Tablet 40 milliGRAM(s) Oral daily  dicyclomine 10 milliGRAM(s) Oral two times a day before meals  docusate sodium 100 milliGRAM(s) Oral three times a day  senna 2 Tablet(s) Oral at bedtime  sucralfate 1 Gram(s) Oral four times a day  latanoprost 0.005% Ophthalmic Solution 1 Drop(s) Both EYES at bedtime  pantoprazole    Tablet 40 milliGRAM(s) Oral before breakfast  levothyroxine 50 MICROGram(s) Oral daily  enoxaparin Injectable 40 milliGRAM(s) SubCutaneous daily  amiodarone    Tablet 400 milliGRAM(s) Oral daily    MEDICATIONS  (PRN):  oxyCODONE    5 mG/acetaminophen 325 mG 1 Tablet(s) Oral every 6 hours PRN moderate or severe pain  polyethylene glycol 3350 17 Gram(s) Oral daily PRN Constipation      VITALS:  T(F): 97.5 (17 @ 13:40), Max: 98.2 (17 @ 01:47)  HR: 75 (17 @ 13:40) (63 - 75)  BP: 97/54 (17 @ 13:40) (97/54 - 119/56)  RR: 18 (17 @ 13:40) (17 - 19)  SpO2: 100% (17 @ 13:40)      CAPILLARY BLOOD GLUCOSE  107 ( @ 12:04)  96 ( @ 08:15)  93 ( @ 21:53)  111 ( @ 16:23)      PHYSICAL EXAM:  GENERAL: NAD, well-developed  HEAD:  Atraumatic, Normocephalic  EYES: EOMI, PERRLA, conjunctiva and sclera clear  NECK: Supple, No JVD  CHEST/LUNG: Clear to auscultation bilaterally; No wheeze  HEART: Regular rate and rhythm; No murmurs, rubs, or gallops  ABDOMEN: Soft, Nontender, Nondistended; Bowel sounds present  EXTREMITIES:  2+ Peripheral Pulses, No clubbing, cyanosis, or edema  PSYCH: AAOx3  NEUROLOGY: non-focal  SKIN: No rashes or lesions    LABS:              12.6                 140  | 29   | 17           7.18  >-----------< 233     ------------------------< 87                    39.9                 4.1  | 99   | 0.66                                         Ca 10.2  Mg x     Ph x           TPro  6.9  /  Alb  3.7      TBili  0.3  /  DBili  x         AST  27  /  ALT  20            AlkPhos  103        CARDIAC MARKERS  < 0.06 ng/mL / 223 u/L / 1.42 ng/mL      Urinalysis Basic - ( 19 Aug 2017 06:28 )    Color: YELLOW / Appearance: CLEAR / S.020 / pH: 6.0  Gluc: NEGATIVE / Ketone: NEGATIVE  / Bili: NEGATIVE / Urobili: NORMAL E.U.   Blood: NEGATIVE / Protein: 10 / Nitrite: NEGATIVE   Leuk Esterase: MODERATE / RBC: 0-2 / WBC 25-50   Sq Epi: MOD / Non Sq Epi: x / Bacteria: x        VB-17 @ 22:25  7.42/50/29/29/49.0/7.1        MICROBIOLOGY:    RADIOLOGY & ADDITIONAL TESTS:    Imaging Personally Reviewed:  CHIEF COMPLAINT: Patient is a 77y old  female who presents with a chief complaint of Generalized weakness and pain (18 Aug 2017 08:40)      SUBJECTIVE / OVERNIGHT EVENTS:    MEDICATIONS  (STANDING):  spironolactone 12.5 milliGRAM(s) Oral daily  aspirin enteric coated 81 milliGRAM(s) Oral daily  losartan 25 milliGRAM(s) Oral daily  gabapentin 300 milliGRAM(s) Oral every 8 hours  allopurinol 300 milliGRAM(s) Oral daily  atorvastatin 20 milliGRAM(s) Oral at bedtime  metoprolol succinate ER 25 milliGRAM(s) Oral daily  buDESOnide 160 MICROgram(s)/formoterol 4.5 MICROgram(s) Inhaler 2 Puff(s) Inhalation two times a day  tiotropium 18 MICROgram(s) Capsule 1 Capsule(s) Inhalation daily  furosemide    Tablet 40 milliGRAM(s) Oral daily  dicyclomine 10 milliGRAM(s) Oral two times a day before meals  docusate sodium 100 milliGRAM(s) Oral three times a day  senna 2 Tablet(s) Oral at bedtime  sucralfate 1 Gram(s) Oral four times a day  latanoprost 0.005% Ophthalmic Solution 1 Drop(s) Both EYES at bedtime  pantoprazole    Tablet 40 milliGRAM(s) Oral before breakfast  levothyroxine 50 MICROGram(s) Oral daily  enoxaparin Injectable 40 milliGRAM(s) SubCutaneous daily  amiodarone    Tablet 400 milliGRAM(s) Oral daily    MEDICATIONS  (PRN):  oxyCODONE    5 mG/acetaminophen 325 mG 1 Tablet(s) Oral every 6 hours PRN moderate or severe pain  polyethylene glycol 3350 17 Gram(s) Oral daily PRN Constipation      VITALS:  T(F): 97.5 (17 @ 13:40), Max: 98.2 (17 @ 01:47)  HR: 75 (17 @ 13:40) (63 - 75)  BP: 97/54 (17 @ 13:40) (97/54 - 119/56)  RR: 18 (17 @ 13:40) (17 - 19)  SpO2: 100% (17 @ 13:40)      CAPILLARY BLOOD GLUCOSE  107 ( @ 12:04)  96 ( @ 08:15)  93 ( @ 21:53)  111 ( @ 16:23)      PHYSICAL EXAM:  GENERAL: NAD, well-developed  HEAD:  Atraumatic, Normocephalic  EYES: EOMI, PERRLA, conjunctiva and sclera clear  NECK: Supple, No JVD  CHEST/LUNG: Clear to auscultation bilaterally; No wheeze  HEART: Regular rate and rhythm; No murmurs, rubs, or gallops  ABDOMEN: Soft, Nontender, Nondistended; Bowel sounds present  EXTREMITIES:  2+ Peripheral Pulses, No clubbing, cyanosis, or edema  PSYCH: AAOx3  NEUROLOGY: non-focal  SKIN: No rashes or lesions    LABS:              12.6                 140  | 29   | 17           7.18  >-----------< 233     ------------------------< 87                    39.9                 4.1  | 99   | 0.66                                         Ca 10.2  Mg x     Ph x           TPro  6.9  /  Alb  3.7      TBili  0.3  /  DBili  x         AST  27  /  ALT  20            AlkPhos  103        CARDIAC MARKERS  < 0.06 ng/mL / 223 u/L / 1.42 ng/mL      Urinalysis Basic - ( 19 Aug 2017 06:28 )    Color: YELLOW / Appearance: CLEAR / S.020 / pH: 6.0  Gluc: NEGATIVE / Ketone: NEGATIVE  / Bili: NEGATIVE / Urobili: NORMAL E.U.   Blood: NEGATIVE / Protein: 10 / Nitrite: NEGATIVE   Leuk Esterase: MODERATE / RBC: 0-2 / WBC 25-50   Sq Epi: MOD / Non Sq Epi: x / Bacteria: x        VB- @ 22:25  7.42/50/29/29/49.0/7.1        MICROBIOLOGY:          RADIOLOGY & ADDITIONAL TESTS:    Imaging Personally Reviewed:    [ ] Consultant(s) Notes Reviewed:  [ ] Care Discussed with Consultants/Other Providers:

## 2017-08-19 NOTE — PHYSICAL THERAPY INITIAL EVALUATION ADULT - ACTIVE RANGE OF MOTION EXAMINATION, REHAB EVAL
except sharla LE hip flex 0-60 degrees; sharla knee ext -25 degrees from neutral/bilateral upper extremity Active ROM was WFL (within functional limits)/bilateral  lower extremity Active ROM was WFL (within functional limits)

## 2017-08-19 NOTE — PROGRESS NOTE ADULT - PROBLEM SELECTOR PLAN 3
LVEF 20% with BiV-ICD, cardiology/EP consults appreciated  c/w cardiac meds- Losartan, Furosemide, Aldactone, Metoprolol LVEF 15% with BiV-ICD, cardiology/EP consults appreciated  c/w cardiac meds- Losartan, lasix, aldactone, lopressor

## 2017-08-19 NOTE — PHYSICAL THERAPY INITIAL EVALUATION ADULT - PERTINENT HX OF CURRENT PROBLEM, REHAB EVAL
77F hx of CAD s/p MI, CHF (EF = 20%), AFib, HTN, HLD, Hypothyroidism, Lymphoma s/p resection and chemotherapy presents to [facility] ED from rehab center for generalized weakness and flare of chronic b/l LE neuropathic pain. Patient started to feel like the chronic pins/needles pain in her b/l LEs was worsening to the point where touching them or standing on them was unbearable.

## 2017-08-19 NOTE — PROGRESS NOTE ADULT - SUBJECTIVE AND OBJECTIVE BOX
no palpitations, no syncope, no angina    oxyCODONE    5 mG/acetaminophen 325 mG 1 Tablet(s) Oral every 6 hours PRN  spironolactone 12.5 milliGRAM(s) Oral daily  aspirin enteric coated 81 milliGRAM(s) Oral daily  losartan 25 milliGRAM(s) Oral daily  gabapentin 300 milliGRAM(s) Oral every 8 hours  allopurinol 300 milliGRAM(s) Oral daily  atorvastatin 20 milliGRAM(s) Oral at bedtime  metoprolol succinate ER 25 milliGRAM(s) Oral daily  buDESOnide 160 MICROgram(s)/formoterol 4.5 MICROgram(s) Inhaler 2 Puff(s) Inhalation two times a day  tiotropium 18 MICROgram(s) Capsule 1 Capsule(s) Inhalation daily  furosemide    Tablet 40 milliGRAM(s) Oral daily  dicyclomine 10 milliGRAM(s) Oral two times a day before meals  docusate sodium 100 milliGRAM(s) Oral three times a day  polyethylene glycol 3350 17 Gram(s) Oral daily PRN  senna 2 Tablet(s) Oral at bedtime  sucralfate 1 Gram(s) Oral four times a day  latanoprost 0.005% Ophthalmic Solution 1 Drop(s) Both EYES at bedtime  pantoprazole    Tablet 40 milliGRAM(s) Oral before breakfast  levothyroxine 50 MICROGram(s) Oral daily  enoxaparin Injectable 40 milliGRAM(s) SubCutaneous daily  amiodarone    Tablet 400 milliGRAM(s) Oral daily                            12.6   7.18  )-----------( 233      ( 19 Aug 2017 06:24 )             39.9       08-19    140  |  99  |  17  ----------------------------<  87  4.1   |  29  |  0.66    Ca    10.2      19 Aug 2017 06:24    TPro  6.9  /  Alb  3.7  /  TBili  0.3  /  DBili  x   /  AST  27  /  ALT  20  /  AlkPhos  103  08-19      CARDIAC MARKERS ( 17 Aug 2017 22:25 )  x     / < 0.06 ng/mL / 223 u/L / 1.42 ng/mL / x            T(C): 36.7 (08-19-17 @ 05:16), Max: 37.5 (08-18-17 @ 10:25)  HR: 63 (08-19-17 @ 05:16) (57 - 76)  BP: 118/63 (08-19-17 @ 05:16) (109/55 - 122/68)  RR: 18 (08-19-17 @ 05:16) (17 - 18)  SpO2: 100% (08-19-17 @ 05:16) (98% - 100%)  Wt(kg): --    no JVD  RRR, no murmurs  CTAB  soft nt/nd  no c/c/e      A/P) 76 y/o female PMH NICM s/p medtronic BiV-ICD, VT managed with amio, hypothyroidism, lymphoma, admitted with neuropathy.    -continue current medical therapy for stable NICM and prior VT  -no further cardiac workup needed

## 2017-08-20 NOTE — PROGRESS NOTE ADULT - SUBJECTIVE AND OBJECTIVE BOX
CHIEF COMPLAINT: Patient is a 77y old  female who presents with a chief complaint of Generalized weakness and pain (18 Aug 2017 08:40)      SUBJECTIVE / OVERNIGHT EVENTS:  Pt denies chest pain/sob, still c/o neuropathic pains in her feet    MEDICATIONS  (STANDING):  spironolactone 12.5 milliGRAM(s) Oral daily  aspirin enteric coated 81 milliGRAM(s) Oral daily  losartan 25 milliGRAM(s) Oral daily  gabapentin 300 milliGRAM(s) Oral every 8 hours  allopurinol 300 milliGRAM(s) Oral daily  atorvastatin 20 milliGRAM(s) Oral at bedtime  metoprolol succinate ER 25 milliGRAM(s) Oral daily  buDESOnide 160 MICROgram(s)/formoterol 4.5 MICROgram(s) Inhaler 2 Puff(s) Inhalation two times a day  tiotropium 18 MICROgram(s) Capsule 1 Capsule(s) Inhalation daily  furosemide    Tablet 40 milliGRAM(s) Oral daily  dicyclomine 10 milliGRAM(s) Oral two times a day before meals  docusate sodium 100 milliGRAM(s) Oral three times a day  senna 2 Tablet(s) Oral at bedtime  sucralfate 1 Gram(s) Oral four times a day  latanoprost 0.005% Ophthalmic Solution 1 Drop(s) Both EYES at bedtime  pantoprazole    Tablet 40 milliGRAM(s) Oral before breakfast  levothyroxine 50 MICROGram(s) Oral daily  enoxaparin Injectable 40 milliGRAM(s) SubCutaneous daily  amiodarone    Tablet 400 milliGRAM(s) Oral daily  nitrofurantoin (MACROBID) 100 milliGRAM(s) Oral two times a day with meals  amitriptyline 25 milliGRAM(s) Oral at bedtime    MEDICATIONS  (PRN):  polyethylene glycol 3350 17 Gram(s) Oral daily PRN Constipation  acetaminophen   Tablet. 650 milliGRAM(s) Oral every 6 hours PRN Mild and Moderate pain  oxyCODONE    5 mG/acetaminophen 325 mG 1 Tablet(s) Oral every 6 hours PRN Severe Pain (7 - 10)      VITALS:  T(F): 98.1 (17 @ 10:52), Max: 99.5 (17 @ 21:12)  HR: 77 (17 @ 10:52) (62 - 77)  BP: 90/46 (17 @ 10:52) (90/46 - 120/53)  RR: 18 (17 @ 10:52) (18 - 18)  SpO2: 97% (17 @ 10:52)      CAPILLARY BLOOD GLUCOSE  113 ( @ 12:07)  81 ( @ 08:09)  107 ( @ 22:06)  104 ( @ 17:14)      PHYSICAL EXAM:  GENERAL: NAD, well-developed  HEAD:  Atraumatic, Normocephalic  EYES: EOMI, PERRLA, conjunctiva and sclera clear  NECK: Supple, No JVD  CHEST/LUNG: Clear to auscultation bilaterally; No wheeze  HEART: Regular rate and rhythm; No murmurs, rubs, or gallops  ABDOMEN: Soft, Nontender, Nondistended; Bowel sounds present  EXTREMITIES:  2+ Peripheral Pulses, No clubbing, cyanosis, or edema  PSYCH: AAOx3  NEUROLOGY: non-focal  SKIN: No rashes or lesions    LABS:              11.8                 138  | 27   | 14           6.08  >-----------< 224     ------------------------< 84                    36.7                 4.1  | 100  | 0.57                                         Ca 9.8   Mg x     Ph x           TPro  6.4  /  Alb  3.4      TBili  0.3  /  DBili  x         AST  27  /  ALT  18            AlkPhos  94            Urinalysis Basic - ( 19 Aug 2017 06:28 )    Color: YELLOW / Appearance: CLEAR / S.020 / pH: 6.0  Gluc: NEGATIVE / Ketone: NEGATIVE  / Bili: NEGATIVE / Urobili: NORMAL E.U.   Blood: NEGATIVE / Protein: 10 / Nitrite: NEGATIVE   Leuk Esterase: MODERATE / RBC: 0-2 / WBC 25-50   Sq Epi: MOD / Non Sq Epi: x / Bacteria: x            MICROBIOLOGY:          RADIOLOGY & ADDITIONAL TESTS:    Imaging Personally Reviewed:    [ ] Consultant(s) Notes Reviewed:  [ ] Care Discussed with Consultants/Other Providers:

## 2017-08-20 NOTE — PROGRESS NOTE ADULT - PROBLEM SELECTOR PLAN 3
LVEF 15% with BiV-ICD, cardiology/EP consults appreciated  c/w cardiac meds- Losartan, lasix, aldactone, lopressor

## 2017-08-20 NOTE — PROGRESS NOTE ADULT - PROBLEM SELECTOR PLAN 1
-c/w neurontin 300 mg tid for chronic neuropathic pain  -percocet prn  -add amitriptyline 25 mg hs for neuropathic pain -c/w neurontin 300 mg tid for chronic neuropathic pain  -percocet prn  -add amitriptyline 25 mg hs for neuropathic pain  -seen by PT, d/c plan to rehab

## 2017-08-20 NOTE — PROGRESS NOTE ADULT - PROBLEM SELECTOR PLAN 2
UA + mod leuk esterase and WBC 25-50, will tx with macrobid 100 mg bid, check urine cx UA + mod leuk esterase and WBC 25-50, c/w macrobid 100 mg bid, f/u cx

## 2017-08-20 NOTE — PROGRESS NOTE ADULT - ATTENDING COMMENTS
Agree with above.  Continue with medical therapy of NICM.     Nehal López MD  [facility]  [address], [address]  [place] [address]  office: [phone]  pager: [phone]

## 2017-08-21 NOTE — PROGRESS NOTE ADULT - PROBLEM SELECTOR PLAN 3
Initial UA on presentation noted to have + mod leuk esterase and WBC 25-50, but no urine cx sent. Patient empirically started on macrobid 100 mg bid for suspected UTI. Given persistent complaint of dysuria, unclear if pt has UTI that is impartially being treated or has baseline dysuria.  - repeat UA and urine cx  - continue macrobid for now

## 2017-08-21 NOTE — PROGRESS NOTE ADULT - SUBJECTIVE AND OBJECTIVE BOX
Subjective  Reports Chest pain yesterday, now resolved.  C/o ongoing back pain and leg weakness, unchanged.      MEDICATIONS  (STANDING):  spironolactone 12.5 milliGRAM(s) Oral daily  aspirin enteric coated 81 milliGRAM(s) Oral daily  losartan 25 milliGRAM(s) Oral daily  gabapentin 300 milliGRAM(s) Oral every 8 hours  allopurinol 300 milliGRAM(s) Oral daily  atorvastatin 20 milliGRAM(s) Oral at bedtime  metoprolol succinate ER 25 milliGRAM(s) Oral daily  buDESOnide 160 MICROgram(s)/formoterol 4.5 MICROgram(s) Inhaler 2 Puff(s) Inhalation two times a day  tiotropium 18 MICROgram(s) Capsule 1 Capsule(s) Inhalation daily  furosemide    Tablet 40 milliGRAM(s) Oral daily  dicyclomine 10 milliGRAM(s) Oral two times a day before meals  docusate sodium 100 milliGRAM(s) Oral three times a day  senna 2 Tablet(s) Oral at bedtime  sucralfate 1 Gram(s) Oral four times a day  latanoprost 0.005% Ophthalmic Solution 1 Drop(s) Both EYES at bedtime  pantoprazole    Tablet 40 milliGRAM(s) Oral before breakfast  levothyroxine 50 MICROGram(s) Oral daily  enoxaparin Injectable 40 milliGRAM(s) SubCutaneous daily  amiodarone    Tablet 400 milliGRAM(s) Oral daily  nitrofurantoin (MACROBID) 100 milliGRAM(s) Oral two times a day with meals  amitriptyline 25 milliGRAM(s) Oral at bedtime    MEDICATIONS  (PRN):  polyethylene glycol 3350 17 Gram(s) Oral daily PRN Constipation  acetaminophen   Tablet. 650 milliGRAM(s) Oral every 6 hours PRN Mild and Moderate pain  oxyCODONE    5 mG/acetaminophen 325 mG 1 Tablet(s) Oral every 6 hours PRN Severe Pain (7 - 10)      LABS:                        11.6   6.40  )-----------( 228      ( 21 Aug 2017 06:13 )             37.0     Hemoglobin: 11.6 g/dL (08-21 @ 06:13)  Hemoglobin: 11.8 g/dL (08-20 @ 06:44)  Hemoglobin: 12.6 g/dL (08-19 @ 06:24)  Hemoglobin: 12.6 g/dL (08-17 @ 22:25)    08-21    140  |  101  |  15  ----------------------------<  81  3.9   |  28  |  0.59    Ca    10.0      21 Aug 2017 06:13    TPro  6.5  /  Alb  3.5  /  TBili  0.3  /  DBili  x   /  AST  28  /  ALT  18  /  AlkPhos  89  08-21    Creatinine Trend: 0.59<--, 0.57<--, 0.66<--, 0.67<--, 0.76<--  Troponin T, Serum (08.17.17 @ 22:25)    Troponin T, Serum: < 0.06     PHYSICAL EXAM  Vital Signs Last 24 Hrs  T(C): 36.8 (21 Aug 2017 09:51), Max: 36.8 (21 Aug 2017 09:51)  T(F): 98.3 (21 Aug 2017 09:51), Max: 98.3 (21 Aug 2017 09:51)  HR: 69 (21 Aug 2017 09:51) (69 - 77)  BP: 104/63 (21 Aug 2017 09:51) (90/46 - 111/61)  RR: 17 (21 Aug 2017 09:51) (17 - 19)  SpO2: 100% (21 Aug 2017 09:51) (96% - 100%)    Cardiovascular: Normal S1 S2 RRR , No JVD, soft systolic murmur, No edema  Respiratory: Lungs clear to auscultation	  Gastrointestinal:  Soft, Non-tender, + BS	  Extremities: , No clubbing, cyanosis or edema    DIAGNOSTIC DATA:    < from: TTE with Doppler (w/Cont) (04.03.17 @ 16:18) >  CONCLUSIONS:  1. Mitral annular calcification, otherwise normal mitral  valve. Mild mitral regurgitation.  2. Moderately dilated left atrium.  LA volume index = 46  cc/m2.  3. Moderate left ventricular enlargement.  4. Severe global left ventricular systolic dysfunction.  Endocardial visualization enhanced with intravenous  injection of echo contrast (Definity).  5. The right ventricle is not well visualized; grossly  normal right ventricular systolic function.  A device wire  is noted in the right heart.  6. Estimated right ventricular systolic pressure equals 60  mm Hg, assuming right atrial pressure equals 10 mm Hg,  consistent with moderate pulmonary hypertension.  ------------------------------------------------------------------------  Confirmed on  4/3/2017 - 17:19:47 by Jean-Pierre Carver M.D.    < end of copied text >    < from: Cardiac Cath Lab - Adult (04.26.16 @ 13:12) >  VENTRICLES: No LV gram was performed; however, a recent echocardiogram  demonstrated an EF of 19 %.  CORONARY VESSELS: The coronary circulation is right dominant.  LM:   --  LM: Normal.  LAD:   --  Proximal LAD: There was a mulovmgq05 % stenosis. There was mild  restenosis in proximal LAD stent.  --  Mid LAD: Angiography showed minor luminal irregularities with no flow  limiting lesions.  --  Distal LAD: Angiography showed minor luminal irregularities with no  flow limiting lesions.  --  D1: Angiography showed minor luminal irregularities with no flow  limiting lesions.  CX:   --  Circumflex: Normal.  RI:   --  Ramus intermedius: Angiography showed minor luminal  irregularities with no flow limiting lesions.  RCA:   --  Proximal RCA: Normal.  --  Mid RCA: Angiography showed mild atherosclerosis with no flow limiting  lesions.  --  Distal RCA: Angiography showed minor luminal irregularities with no  flow limiting lesions.  --  RPDA: Angiography showed minor luminal irregularities with no flow  limiting lesions.  --  RPLS: Angiography showed minor luminal irregularities with no flow  limiting lesions.  COMPLICATIONS: There were no complications.  DIAGNOSTIC RECOMMENDATIONS: Medical management and aggressive risk factor  modification is recommended.  Prepared and signed by  Nehal López M.D.  Signed 04/27/2016 13:13:20    < end of copied text >      Assessment and Plan: 77y Female with hypertension, dyslipidemia, CAD s/p LAD stent with only mild re-stenosis with minor luminal irregularities otherwise on cath 4/16, with known severe LV dysfunction , NICM s/p Medtronic BiV ICD, VT on Amio, COPD, lymphoma previously on chemo with known spinal mets and compression fractures admitted with acute on chronic LE weakness/pain.    --Cont Medical management of known NICM  --Not in decompensated CHF  --Recent TTE noted  --No further ischemic work up needed  --f/u Dr Hussein on 9/21 at 11:15am as previously scheduled  --pain management per primary team    Dolores Rodriguez PA-C  [facility]  [address], [address]   [place] [address]  office [phone]  pager [phone]

## 2017-08-21 NOTE — PROGRESS NOTE ADULT - PROBLEM SELECTOR PLAN 2
- HOLD neurontin 300 mg tid for chronic neuropathic pain due to change in mental status  - HOLD percocet prn  - d/c amitriptyline 25 mg hs for neuropathic pain

## 2017-08-21 NOTE — PROGRESS NOTE ADULT - PROBLEM SELECTOR PLAN 1
Patient lethargic and mental status waxing and waning. FSG WNL. Suspect medication induced delirium since recently started on amitriptyline. However, since with some abd pain and reports of dysuria. Will reassess for UTI  - D/C amitriptyline  - HOLD gabapentin until patient is more alert  - obtain repeat UA and urine cx

## 2017-08-21 NOTE — PROGRESS NOTE ADULT - PROBLEM SELECTOR PLAN 4
Appears to be compensated. Hx of LVEF 15% with BiV-ICD. Evaluated by cardiology, appreciate recs.  c/w cardiac meds- Losartan, lasix, aldactone, lopressor

## 2017-08-21 NOTE — PROGRESS NOTE ADULT - SUBJECTIVE AND OBJECTIVE BOX
CC: Generalized weakness and pain (18 Aug 2017 08:40)      SUBJECTIVE / OVERNIGHT EVENTS:  Patient is complaining of tingling in her feet. Patient also comments on being very tired. Also complaining of burning with urination, but has difficulty mentioning when symptoms began.  As per nursing staff, patient's mental status waxes and wanes with periods of confusion, which is new.    MEDICATIONS  (STANDING):  spironolactone 12.5 milliGRAM(s) Oral daily  aspirin enteric coated 81 milliGRAM(s) Oral daily  losartan 25 milliGRAM(s) Oral daily  allopurinol 300 milliGRAM(s) Oral daily  atorvastatin 20 milliGRAM(s) Oral at bedtime  metoprolol succinate ER 25 milliGRAM(s) Oral daily  buDESOnide 160 MICROgram(s)/formoterol 4.5 MICROgram(s) Inhaler 2 Puff(s) Inhalation two times a day  tiotropium 18 MICROgram(s) Capsule 1 Capsule(s) Inhalation daily  furosemide    Tablet 40 milliGRAM(s) Oral daily  dicyclomine 10 milliGRAM(s) Oral two times a day before meals  docusate sodium 100 milliGRAM(s) Oral three times a day  senna 2 Tablet(s) Oral at bedtime  sucralfate 1 Gram(s) Oral four times a day  latanoprost 0.005% Ophthalmic Solution 1 Drop(s) Both EYES at bedtime  pantoprazole    Tablet 40 milliGRAM(s) Oral before breakfast  levothyroxine 50 MICROGram(s) Oral daily  enoxaparin Injectable 40 milliGRAM(s) SubCutaneous daily  amiodarone    Tablet 400 milliGRAM(s) Oral daily  nitrofurantoin (MACROBID) 100 milliGRAM(s) Oral two times a day with meals    MEDICATIONS  (PRN):  polyethylene glycol 3350 17 Gram(s) Oral daily PRN Constipation  acetaminophen   Tablet. 650 milliGRAM(s) Oral every 6 hours PRN Mild and Moderate pain  oxyCODONE    5 mG/acetaminophen 325 mG 1 Tablet(s) Oral every 6 hours PRN Severe Pain (7 - 10)      Vital Signs Last 24 Hrs  T(C): 36.8 (21 Aug 2017 09:51), Max: 36.8 (21 Aug 2017 09:51)  T(F): 98.3 (21 Aug 2017 09:51), Max: 98.3 (21 Aug 2017 09:51)  HR: 68 (21 Aug 2017 15:09) (68 - 72)  BP: 99/53 (21 Aug 2017 15:09) (99/53 - 111/61)  BP(mean): --  RR: 17 (21 Aug 2017 15:09) (17 - 18)  SpO2: 100% (21 Aug 2017 15:09) (96% - 100%)  CAPILLARY BLOOD GLUCOSE  105 (21 Aug 2017 12:13)  80 (21 Aug 2017 08:21)  110 (20 Aug 2017 21:45)  94 (20 Aug 2017 16:45)      PHYSICAL EXAM:  GENERAL: Elderly woman in NAD. Lethargic. Answers questions but intermittently falls asleep while talking and mumbling.  EYES: EOMI, conjunctiva and sclera clear  CHEST/LUNG: Clear to auscultation bilaterally; No wheeze  HEART: Regular rate and rhythm; No murmurs, rubs, or gallops  ABDOMEN: Soft, Mild diffuse tenderness to palpation, Nondistended; Bowel sounds present  EXTREMITIES:  2+ Peripheral Pulses, No clubbing, cyanosis, or edema  NEUROLOGY: Non-focal. Not ambulatory at baseline.      LABS:                        11.6   6.40  )-----------( 228      ( 21 Aug 2017 06:13 )             37.0     08-21    140  |  101  |  15  ----------------------------<  81  3.9   |  28  |  0.59    Ca    10.0      21 Aug 2017 06:13    TPro  6.5  /  Alb  3.5  /  TBili  0.3  /  DBili  x   /  AST  28  /  ALT  18  /  AlkPhos  89  08-21      Urinalysis Basic - ( 21 Aug 2017 12:13 )    Color: YELLOW / Appearance: CLEAR / S.013 / pH: 6.0  Gluc: NEGATIVE / Ketone: NEGATIVE  / Bili: NEGATIVE / Urobili: NORMAL E.U.   Blood: NEGATIVE / Protein: NEGATIVE / Nitrite: NEGATIVE   Leuk Esterase: NEGATIVE / RBC: 0-2 / WBC 0-2   Sq Epi: OCC / Non Sq Epi: x / Bacteria: x      Consultant(s) Notes Reviewed:  Yes  Care Discussed with Consultants/Other Providers: Yes

## 2017-08-22 NOTE — PROVIDER CONTACT NOTE (OTHER) - SITUATION
Pt says she cannot give me a pain scale but c/o pain to BL legs. ADS Made aware. Pending tylenol order.
Diastolic BP less than 60mmHg
pt admitted with bl le weakness

## 2017-08-22 NOTE — CHART NOTE - NSCHARTNOTEFT_GEN_A_CORE
At length discussion with patient's son, Gavin Pulido @ [phone] regarding patient's care. Mr. Pulido was updated on patient's progress of UTI treated being completed today. Chronic neuropathic pain addressed with trial of new medication; cymbalta. Mr. Pulido made aware that discharge will likely occur over the next few days as patient will return to rehab. Mr. Pulido insisted that oncology to be consulted for his mother's hx of lymphoma and neurology be consulted for chronic neuropathic pain. Explained to Mr. Pulido that there are no acute issues regarding his mother's lymphoma and he should follow up as outpatient and re-explained the treatment plan for neuropathy pain that was changed by medical attending Dr. Albert.     After reviewing patient's chart, pt was recently discharged July 2017, and was evaluated by neurology and oncology and included with discharge paperwork was contact information for outpatient follow up.

## 2017-08-22 NOTE — DISCHARGE NOTE ADULT - MEDICATION SUMMARY - MEDICATIONS TO STOP TAKING
I will STOP taking the medications listed below when I get home from the hospital:    gabapentin 300 mg oral capsule  -- 1 cap(s) by mouth every 8 hours    lidocaine 5% topical film  -- Apply on skin to affected area once a day 12 hrs on 12 hrs off

## 2017-08-22 NOTE — PROGRESS NOTE ADULT - SUBJECTIVE AND OBJECTIVE BOX
Subjective No CP/SOB. C/o ongoing back pain and leg weakness, unchanged.      MEDICATIONS  (STANDING):  spironolactone 12.5 milliGRAM(s) Oral daily  aspirin enteric coated 81 milliGRAM(s) Oral daily  losartan 25 milliGRAM(s) Oral daily  allopurinol 300 milliGRAM(s) Oral daily  atorvastatin 20 milliGRAM(s) Oral at bedtime  metoprolol succinate ER 25 milliGRAM(s) Oral daily  buDESOnide 160 MICROgram(s)/formoterol 4.5 MICROgram(s) Inhaler 2 Puff(s) Inhalation two times a day  tiotropium 18 MICROgram(s) Capsule 1 Capsule(s) Inhalation daily  furosemide    Tablet 40 milliGRAM(s) Oral daily  dicyclomine 10 milliGRAM(s) Oral two times a day before meals  docusate sodium 100 milliGRAM(s) Oral three times a day  senna 2 Tablet(s) Oral at bedtime  sucralfate 1 Gram(s) Oral four times a day  latanoprost 0.005% Ophthalmic Solution 1 Drop(s) Both EYES at bedtime  pantoprazole    Tablet 40 milliGRAM(s) Oral before breakfast  levothyroxine 50 MICROGram(s) Oral daily  enoxaparin Injectable 40 milliGRAM(s) SubCutaneous daily  amiodarone    Tablet 400 milliGRAM(s) Oral daily  nitrofurantoin (MACROBID) 100 milliGRAM(s) Oral two times a day with meals    MEDICATIONS  (PRN):  polyethylene glycol 3350 17 Gram(s) Oral daily PRN Constipation  acetaminophen   Tablet. 650 milliGRAM(s) Oral every 6 hours PRN Mild and Moderate pain  oxyCODONE    5 mG/acetaminophen 325 mG 1 Tablet(s) Oral every 6 hours PRN Severe Pain (7 - 10)      LABS:                        11.7   6.35  )-----------( 228      ( 22 Aug 2017 06:41 )             36.3     Hemoglobin: 11.7 g/dL (08-22 @ 06:41)  Hemoglobin: 11.6 g/dL (08-21 @ 06:13)  Hemoglobin: 11.8 g/dL (08-20 @ 06:44)  Hemoglobin: 12.6 g/dL (08-19 @ 06:24)  Hemoglobin: 12.6 g/dL (08-17 @ 22:25)    08-22    139  |  99  |  16  ----------------------------<  84  3.7   |  27  |  0.58    Ca    9.6      22 Aug 2017 06:41    TPro  6.4  /  Alb  3.4  /  TBili  0.3  /  DBili  x   /  AST  25  /  ALT  17  /  AlkPhos  98  08-22    Creatinine Trend: 0.58<--, 0.59<--, 0.57<--, 0.66<--, 0.67<--, 0.76<--     PHYSICAL EXAM  Vital Signs Last 24 Hrs  T(C): 36.6 (22 Aug 2017 10:03), Max: 36.9 (21 Aug 2017 17:57)  T(F): 97.9 (22 Aug 2017 10:03), Max: 98.5 (21 Aug 2017 17:57)  HR: 64 (22 Aug 2017 10:03) (62 - 68)  BP: 92/53 (22 Aug 2017 10:03) (92/53 - 113/48)  BP(mean): --  RR: 17 (22 Aug 2017 10:03) (16 - 17)  SpO2: 100% (22 Aug 2017 10:03) (98% - 100%)    Cardiovascular: Normal S1 S2 RRR , No JVD, soft systolic murmur, No edema  Respiratory: Lungs clear to auscultation	  Gastrointestinal:  Soft, Non-tender, + BS	  Extremities: , No clubbing, cyanosis or edema    DIAGNOSTIC DATA:    < from: TTE with Doppler (w/Cont) (04.03.17 @ 16:18) >  CONCLUSIONS:  1. Mitral annular calcification, otherwise normal mitral  valve. Mild mitral regurgitation.  2. Moderately dilated left atrium.  LA volume index = 46  cc/m2.  3. Moderate left ventricular enlargement.  4. Severe global left ventricular systolic dysfunction.  Endocardial visualization enhanced with intravenous  injection of echo contrast (Definity).  5. The right ventricle is not well visualized; grossly  normal right ventricular systolic function.  A device wire  is noted in the right heart.  6. Estimated right ventricular systolic pressure equals 60  mm Hg, assuming right atrial pressure equals 10 mm Hg,  consistent with moderate pulmonary hypertension.  ------------------------------------------------------------------------  Confirmed on  4/3/2017 - 17:19:47 by Jean-Pierre Carver M.D.    < end of copied text >    < from: Cardiac Cath Lab - Adult (04.26.16 @ 13:12) >  VENTRICLES: No LV gram was performed; however, a recent echocardiogram  demonstrated an EF of 19 %.  CORONARY VESSELS: The coronary circulation is right dominant.  LM:   --  LM: Normal.  LAD:   --  Proximal LAD: There was a [id number] % stenosis. There was mild  restenosis in proximal LAD stent.  --  Mid LAD: Angiography showed minor luminal irregularities with no flow  limiting lesions.  --  Distal LAD: Angiography showed minor luminal irregularities with no  flow limiting lesions.  --  D1: Angiography showed minor luminal irregularities with no flow  limiting lesions.  CX:   --  Circumflex: Normal.  RI:   --  Ramus intermedius: Angiography showed minor luminal  irregularities with no flow limiting lesions.  RCA:   --  Proximal RCA: Normal.  --  Mid RCA: Angiography showed mild atherosclerosis with no flow limiting  lesions.  --  Distal RCA: Angiography showed minor luminal irregularities with no  flow limiting lesions.  --  RPDA: Angiography showed minor luminal irregularities with no flow  limiting lesions.  --  RPLS: Angiography showed minor luminal irregularities with no flow  limiting lesions.  COMPLICATIONS: There were no complications.  DIAGNOSTIC RECOMMENDATIONS: Medical management and aggressive risk factor  modification is recommended.  Prepared and signed by  Nehal López M.D.  Signed 04/27/2016 13:13:20    < end of copied text >      Assessment and Plan: 77y Female with hypertension, dyslipidemia, CAD s/p LAD stent with only mild re-stenosis with minor luminal irregularities otherwise on cath 4/16, with known severe LV dysfunction , NICM s/p [employer] BiV ICD, VT on Amio, COPD, lymphoma previously on chemo with known spinal mets and compression fractures admitted with acute on chronic LE weakness/pain. ?UTI    --Cont Medical management of known NICM  --Not in decompensated CHF  --Recent TTE noted  --No further ischemic work up needed  --f/u Dr Hussein on 9/21 at 11:15am as previously scheduled  --pain management per primary team    Dolores Rodriguez PA-C  [facility]  [address], [address]   [place] [address]  office [phone]  pager [phone]

## 2017-08-22 NOTE — PROGRESS NOTE ADULT - PROBLEM SELECTOR PLAN 2
Unclear etiology. Likely multifactorial - diabetic neuropathy +/- chemo-induced since pt has hx of chemotherapy for lymphoma.   - trial of SNRI with duloxetine 60mg daily to see if helps with pain  - will do trial of standing Tylenol 975mg TID for the next day or two

## 2017-08-22 NOTE — DISCHARGE NOTE ADULT - PROVIDER TOKENS
TOKEN:'[id number]',TOKEN:'[id number]' TOKEN:'[id number]:'[id number]:'[id number]'

## 2017-08-22 NOTE — PROVIDER CONTACT NOTE (OTHER) - REASON
Diastolic BP less than 60mmHg
tingling sensation
Pt says she cannot give me a pain scale but c/o pain to BL legs. ADS Made aware. Pending tylenol order.

## 2017-08-22 NOTE — PROGRESS NOTE ADULT - SUBJECTIVE AND OBJECTIVE BOX
CC:  Generalized weakness and pain (18 Aug 2017 08:40)    SUBJECTIVE / OVERNIGHT EVENTS:  Patient states she feels more awake today and less confused. Complaining of numbness and burning of her legs bilaterally.    MEDICATIONS  (STANDING):  spironolactone 12.5 milliGRAM(s) Oral daily  aspirin enteric coated 81 milliGRAM(s) Oral daily  losartan 25 milliGRAM(s) Oral daily  allopurinol 300 milliGRAM(s) Oral daily  atorvastatin 20 milliGRAM(s) Oral at bedtime  metoprolol succinate ER 25 milliGRAM(s) Oral daily  buDESOnide 160 MICROgram(s)/formoterol 4.5 MICROgram(s) Inhaler 2 Puff(s) Inhalation two times a day  tiotropium 18 MICROgram(s) Capsule 1 Capsule(s) Inhalation daily  furosemide    Tablet 40 milliGRAM(s) Oral daily  dicyclomine 10 milliGRAM(s) Oral two times a day before meals  docusate sodium 100 milliGRAM(s) Oral three times a day  senna 2 Tablet(s) Oral at bedtime  sucralfate 1 Gram(s) Oral four times a day  latanoprost 0.005% Ophthalmic Solution 1 Drop(s) Both EYES at bedtime  pantoprazole    Tablet 40 milliGRAM(s) Oral before breakfast  levothyroxine 50 MICROGram(s) Oral daily  enoxaparin Injectable 40 milliGRAM(s) SubCutaneous daily  amiodarone    Tablet 400 milliGRAM(s) Oral daily  DULoxetine 60 milliGRAM(s) Oral daily    MEDICATIONS  (PRN):  polyethylene glycol 3350 17 Gram(s) Oral daily PRN Constipation  acetaminophen   Tablet. 650 milliGRAM(s) Oral every 6 hours PRN Mild and Moderate pain  oxyCODONE    5 mG/acetaminophen 325 mG 1 Tablet(s) Oral every 6 hours PRN Severe Pain (7 - 10)      Vital Signs Last 24 Hrs  T(C): 36.6 (22 Aug 2017 10:03), Max: 36.9 (21 Aug 2017 17:57)  T(F): 97.9 (22 Aug 2017 10:03), Max: 98.5 (21 Aug 2017 17:57)  HR: 64 (22 Aug 2017 10:03) (62 - 68)  BP: 92/53 (22 Aug 2017 10:03) (92/53 - 113/48)  BP(mean): --  RR: 17 (22 Aug 2017 10:03) (16 - 17)  SpO2: 100% (22 Aug 2017 10:03) (98% - 100%)  CAPILLARY BLOOD GLUCOSE  122 (22 Aug 2017 12:28)  106 (22 Aug 2017 08:28)  108 (21 Aug 2017 21:15)  132 (21 Aug 2017 17:07)      PHYSICAL EXAM:  GENERAL: Elderly woman in NAD. Lethargic. Answers questions but intermittently falls asleep while talking and mumbling.  EYES: EOMI, conjunctiva and sclera clear  CHEST/LUNG: Clear to auscultation bilaterally; No wheeze  HEART: Regular rate and rhythm; No murmurs, rubs, or gallops  ABDOMEN: Soft, Mild diffuse tenderness to palpation, Nondistended; Bowel sounds present  EXTREMITIES:  2+ Peripheral Pulses, No clubbing, cyanosis, or edema  NEUROLOGY: Sensation to light touch intact b/l. Resting tremor of R hand. Patient unable to lift LE b/l against gravity    LABS:                        11.7   6.35  )-----------( 228      ( 22 Aug 2017 06:41 )             36.3     -    139  |  99  |  16  ----------------------------<  84  3.7   |  27  |  0.58    Ca    9.6      22 Aug 2017 06:41    TPro  6.4  /  Alb  3.4  /  TBili  0.3  /  DBili  x   /  AST  25  /  ALT  17  /  AlkPhos  98            Urinalysis Basic - ( 21 Aug 2017 12:13 )    Color: YELLOW / Appearance: CLEAR / S.013 / pH: 6.0  Gluc: NEGATIVE / Ketone: NEGATIVE  / Bili: NEGATIVE / Urobili: NORMAL E.U.   Blood: NEGATIVE / Protein: NEGATIVE / Nitrite: NEGATIVE   Leuk Esterase: NEGATIVE / RBC: 0-2 / WBC 0-2   Sq Epi: OCC / Non Sq Epi: x / Bacteria: x        RADIOLOGY & ADDITIONAL TESTS:    Imaging Personally Reviewed:  Consultant(s) Notes Reviewed:    Care Discussed with Consultants/Other Providers:

## 2017-08-22 NOTE — DISCHARGE NOTE ADULT - PATIENT PORTAL LINK FT
“You can access the Follow[employer] Patient Portal, offered by [facility], by registering with the following website: [email or web address]”

## 2017-08-22 NOTE — DISCHARGE NOTE ADULT - HOSPITAL COURSE
77F hx of CAD s/p MI, sCHF (EF = 20%), pAFib, HTN, HLD, Hypothyroidism, Lymphoma s/p resection and chemotherapy presents to [facility] ED from rehab center for generalized weakness and flare of chronic b/l LE neuropathic pain.    Problem/Plan - 1:  ·  Problem: Disorientation.  Plan: Resolved. Suspect medication induced delirium with the start of amitriptyline and gabapentin together. Patient appears to be at baseline mentation today. UA/urine cx negative.    - no further w/u for now.  - d/c gabapentin and will avoid TCA use.     Problem/Plan - 2:  ·  Problem: Neuropathic pain.  Plan: Unclear etiology. Likely multifactorial - diabetic neuropathy +/- chemo-induced since pt has hx of chemotherapy for lymphoma.   - trial of SNRI with duloxetine 60mg daily to see if helps with pain  - will do trial of standing Tylenol 975mg TID for the next day or two.     Problem/Plan - 3:  ·  Problem: Dysuria.  Plan: Initial UA on presentation noted to have + mod leuk esterase and WBC 25-50, but no urine cx sent. Patient empirically started on macrobid 100 mg bid for suspected UTI. Repeat UA/urine cx negative  - will discontinue macrobid.     Problem/Plan - 4:  ·  Problem: Chronic systolic congestive heart failure.  Plan: Appears to be compensated. Hx of LVEF 15% with BiV-ICD. Evaluated by cardiology, appreciate recs.  c/w cardiac meds- Losartan, lasix, aldactone, lopressor.     Problem/Plan - 5:  ·  Problem: Paroxysmal atrial fibrillation.  Plan: Not on AC. c/w amiodarone for h/o VT and Vfib shock as per EP.     Problem/Plan - 6:  Problem: CAD (coronary artery disease). Plan: c/w ASA/statin.    Problem/Plan - 7:  ·  Problem: HTN (hypertension).  Plan: BP controlled on losartan/lopressor.     Problem/Plan - 8:  ·  Problem: DM (diabetes mellitus).  Plan: Hx of DM, not on oral hypoglycemics. Hb a1c 5.7% as of 6/2017   - pt to f/u as an outpatient.     Problem/Plan - 9:  ·  Problem: Prophylactic measure.  Plan: Lovenox 40 mg SQ qD for DVT ppx    8/22 - Oncologist Dr. Walsh evaluated this patient last [facility] admission in July 2017 however did not offer nor comment on any interventions and deferred it to patient's oncologist Dr. Reyna ([facility] oncology). Last admission, patient was also told to follow up neurologist Dr. Lambert but never had the chance to go for follow up since she got admitted this time. Discussed at length with son and he expressed he would like to switch oncologist to Dr. Walsh - which we had informed him we can give him the name and telephone number for him to make that arrangement. 77F hx of CAD s/p MI, sCHF (EF = 20%), pAFib, HTN, HLD, Hypothyroidism, Lymphoma s/p resection and chemotherapy presents to [facility] ED from rehab center for generalized weakness and flare of chronic b/l LE neuropathic pain.    Problem/Plan - 1:  ·  Problem: Disorientation.  Plan: Resolved. Suspect medication induced delirium with the start of amitriptyline and gabapentin together. Patient appears to be at baseline mentation today. UA/urine cx negative.    - no further w/u for now.  - d/c gabapentin and will avoid TCA use.     Problem/Plan - 2:  ·  Problem: Neuropathic pain.  Plan: Unclear etiology. Likely multifactorial - diabetic neuropathy +/- chemo-induced since pt has hx of chemotherapy for lymphoma.   - trial of SNRI with duloxetine 60mg daily to see if helps with pain  - will do trial of standing Tylenol 975mg TID for the next day or two.     Problem/Plan - 3:  ·  Problem: Dysuria.  Plan: Initial UA on presentation noted to have + mod leuk esterase and WBC 25-50, but no urine cx sent. Patient empirically started on macrobid 100 mg bid for suspected UTI. Repeat UA/urine cx negative  - will discontinue macrobid.     Problem/Plan - 4:  ·  Problem: Chronic systolic congestive heart failure.  Plan: Appears to be compensated. Hx of LVEF 15% with BiV-ICD. Evaluated by cardiology, appreciate recs.  c/w cardiac meds- Losartan, lasix, aldactone, lopressor.     Problem/Plan - 5:  ·  Problem: Paroxysmal atrial fibrillation.  Plan: Not on AC. c/w amiodarone for h/o VT and Vfib shock as per EP.     Problem/Plan - 6:  Problem: CAD (coronary artery disease). Plan: c/w ASA/statin.    Problem/Plan - 7:  ·  Problem: HTN (hypertension).  Plan: BP controlled on losartan/lopressor.     Problem/Plan - 8:  ·  Problem: DM (diabetes mellitus).  Plan: Hx of DM, not on oral hypoglycemics. Hb a1c 5.7% as of 6/2017   - pt to f/u as an outpatient.     Problem/Plan - 9:  ·  Problem: Prophylactic measure.  Plan: Lovenox 40 mg SQ qD for DVT ppx    8/22 - Oncologist Dr. Walsh evaluated this patient last [facility] admission in July 2017 however did not offer nor comment on any interventions and deferred it to patient's oncologist Dr. Reyna ([facility] oncology). Last admission, patient was also told to follow up neurologist Dr. Lambert but never had the chance to go for follow up since she got admitted this time. Discussed at length with son and he expressed he would like to switch oncologist to Dr. Walsh - which we had informed him. Son was given the contact information and address of Dr. Walsh and Dr. Lambert to make appointments. 77F hx of CAD s/p MI, sCHF (EF = 20%), pAFib, HTN, HLD, Hypothyroidism, Lymphoma s/p resection and chemotherapy presents to [facility] ED from rehab center for generalized weakness and flare of chronic b/l LE neuropathic pain.     Disorientation.   -Resolved. Possibly 2/2 medication induced delirium with the start of amitriptyline and gabapentin together.   -gabapentin d/c'd     Neuropathic pain  -Unclear etiology. Likely multifactorial - diabetic neuropathy +/- chemo-induced since pt has hx of chemotherapy for lymphoma.   - trial of SNRI with duloxetine 60mg daily to see if helps with pain      Dysuria  -Patient empirically started on macrobid 100 mg bid for suspected UTI. Repeat UA/urine cx negative  -  macrobid d/c'd.        Chronic systolic congestive heart failure  -Appears to be compensated. Hx of LVEF 15% with BiV-ICD. Evaluated by cardiology, appreciate recs.  c/w cardiac meds- Losartan, lasix, aldactone, lopressor.        Paroxysmal atrial fibrillation  - c/w amiodarone for h/o VT and Vfib shock as per EP.      CAD (coronary artery disease).  -c/w ASA/statin.      HTN (hypertension).  -BP controlled on losartan/lopressor.        DM (diabetes mellitus).     Hx of DM, not on oral hypoglycemics. Hb a1c 5.7% as of 6/2017   - pt to f/u as an outpatient.       8/22 - Oncologist Dr. Walsh evaluated this patient last [facility] admission in July 2017 however did not offer nor comment on any interventions and deferred it to patient's oncologist Dr. Reyna ([facility] oncology). Last admission, patient was also told to follow up neurologist Dr. Lambert but never had the chance to go for follow up since she got admitted this time. Discussed at length with son and he expressed he would like to switch oncologist to Dr. Walsh - which we had informed him. Son was given the contact information and address of Dr. Walsh and Dr. Lambert to make appointments.

## 2017-08-22 NOTE — DISCHARGE NOTE ADULT - PLAN OF CARE
Continue current pain regimen (readjusted pain medication regimen this admission) Please follow up with neurologist who evaluated you last admission. Call to schedule appointment upon discharge. Past medical history - no elevated white blood cells noted, no fevers noted Patient's Son requesting to switch oncologist --- PLEASE CALL ONCOLOGIST DR. ROBERTO BYNUM - CALL [phone] TO SCHEDULE AN APPOINTMENT. Son was told to make this arrangement in regards to carrying over medical information to transition over to this current oncologist. Resolved - completed antibiotic at this admission Please follow up with your PMD upon discharge. Call to schedule appointment. Please follow up with your PMD upon discharge to repeat and monitor HgA1c. Call to schedule appointment.

## 2017-08-22 NOTE — DISCHARGE NOTE ADULT - CARE PLAN
Principal Discharge DX:	Neuropathic pain  Goal:	Continue current pain regimen (readjusted pain medication regimen this admission)  Instructions for follow-up, activity and diet:	Please follow up with neurologist who evaluated you last admission. Call to schedule appointment upon discharge.  Secondary Diagnosis:	Lymphoma  Goal:	Past medical history - no elevated white blood cells noted, no fevers noted  Instructions for follow-up, activity and diet:	Patient's Son requesting to switch oncologist --- PLEASE CALL ONCOLOGIST DR. ROBERTO BYNUM - CALL [phone] TO SCHEDULE AN APPOINTMENT. Son was told to make this arrangement in regards to carrying over medical information to transition over to this current oncologist.  Secondary Diagnosis:	Dysuria  Goal:	Resolved - completed antibiotic at this admission  Instructions for follow-up, activity and diet:	Please follow up with your PMD upon discharge. Call to schedule appointment.  Secondary Diagnosis:	DM (diabetes mellitus)  Instructions for follow-up, activity and diet:	Please follow up with your PMD upon discharge to repeat and monitor HgA1c. Call to schedule appointment. Principal Discharge DX:	Neuropathic pain  Goal:	Continue current pain regimen (readjusted pain medication regimen this admission)  Instructions for follow-up, activity and diet:	Please follow up with neurologist who evaluated you last admission. Call to schedule appointment upon discharge.  Secondary Diagnosis:	Lymphoma  Goal:	Past medical history - no elevated white blood cells noted, no fevers noted  Instructions for follow-up, activity and diet:	Patient's Son requesting to switch oncologist --- PLEASE CALL ONCOLOGIST DR. ROEBRTO BYNUM - CALL [phone] TO SCHEDULE AN APPOINTMENT. Son was told to make this arrangement in regards to carrying over medical information to transition over to this current oncologist.  Secondary Diagnosis:	Dysuria  Goal:	Resolved - completed antibiotic at this admission  Instructions for follow-up, activity and diet:	Please follow up with your PMD upon discharge. Call to schedule appointment.  Secondary Diagnosis:	DM (diabetes mellitus)  Instructions for follow-up, activity and diet:	Please follow up with your PMD upon discharge to repeat and monitor HgA1c. Call to schedule appointment. Principal Discharge DX:	Neuropathic pain  Goal:	Continue current pain regimen (readjusted pain medication regimen this admission)  Instructions for follow-up, activity and diet:	Please follow up with neurologist who evaluated you last admission. Call to schedule appointment upon discharge.  Secondary Diagnosis:	Lymphoma  Goal:	Past medical history - no elevated white blood cells noted, no fevers noted  Instructions for follow-up, activity and diet:	Patient's Son requesting to switch oncologist --- PLEASE CALL ONCOLOGIST DR. ROBERTO BYNUM - CALL [phone] TO SCHEDULE AN APPOINTMENT. Son was told to make this arrangement in regards to carrying over medical information to transition over to this current oncologist.  Secondary Diagnosis:	Dysuria  Goal:	Resolved - completed antibiotic at this admission  Instructions for follow-up, activity and diet:	Please follow up with your PMD upon discharge. Call to schedule appointment.  Secondary Diagnosis:	DM (diabetes mellitus)  Instructions for follow-up, activity and diet:	Please follow up with your PMD upon discharge to repeat and monitor HgA1c. Call to schedule appointment. Principal Discharge DX:	Neuropathic pain  Goal:	Continue current pain regimen (readjusted pain medication regimen this admission)  Instructions for follow-up, activity and diet:	Please follow up with neurologist who evaluated you last admission. Call to schedule appointment upon discharge.  Secondary Diagnosis:	Lymphoma  Goal:	Past medical history - no elevated white blood cells noted, no fevers noted  Instructions for follow-up, activity and diet:	Patient's Son requesting to switch oncologist --- PLEASE CALL ONCOLOGIST DR. ROBERTO BYNUM - CALL [phone] TO SCHEDULE AN APPOINTMENT. Son was told to make this arrangement in regards to carrying over medical information to transition over to this current oncologist.  Secondary Diagnosis:	Dysuria  Goal:	Resolved - completed antibiotic at this admission  Instructions for follow-up, activity and diet:	Please follow up with your PMD upon discharge. Call to schedule appointment.  Secondary Diagnosis:	DM (diabetes mellitus)  Instructions for follow-up, activity and diet:	Please follow up with your PMD upon discharge to repeat and monitor HgA1c. Call to schedule appointment. Principal Discharge DX:	Neuropathic pain  Goal:	Continue current pain regimen (readjusted pain medication regimen this admission)  Instructions for follow-up, activity and diet:	Please follow up with neurologist who evaluated you last admission. Call to schedule appointment upon discharge.  Secondary Diagnosis:	Lymphoma  Goal:	Past medical history - no elevated white blood cells noted, no fevers noted  Instructions for follow-up, activity and diet:	Patient's Son requesting to switch oncologist --- PLEASE CALL ONCOLOGIST DR. ROBERTO BYNUM - CALL [phone] TO SCHEDULE AN APPOINTMENT. Son was told to make this arrangement in regards to carrying over medical information to transition over to this current oncologist.  Secondary Diagnosis:	Dysuria  Goal:	Resolved - completed antibiotic at this admission  Instructions for follow-up, activity and diet:	Please follow up with your PMD upon discharge. Call to schedule appointment.  Secondary Diagnosis:	DM (diabetes mellitus)  Instructions for follow-up, activity and diet:	Please follow up with your PMD upon discharge to repeat and monitor HgA1c. Call to schedule appointment.

## 2017-08-22 NOTE — DISCHARGE NOTE ADULT - ADDITIONAL INSTRUCTIONS
Dr. Hussein 9/21 cardiology appointment Continue to hold gabapentin.  Dr. Hussein 9/21 cardiology appointment

## 2017-08-22 NOTE — PROVIDER CONTACT NOTE (OTHER) - ASSESSMENT
pt complaining of bl foot tingling sensation.
No distress noted. Pt appears to be resting comfortably.
Pt is A&0X4, VS stable, denies sob, lightheadedness and other discomfort.

## 2017-08-22 NOTE — DISCHARGE NOTE ADULT - MEDICATION SUMMARY - MEDICATIONS TO TAKE
I will START or STAY ON the medications listed below when I get home from the hospital:    spironolactone 25 mg oral tablet  -- 0.5 tab(s) by mouth once a day  -- Indication: For Chronic systolic congestive heart failure    acetaminophen 325 mg oral tablet  -- 2 tab(s) by mouth every 6 hours, As needed, Mild and Moderate pain  -- Indication: For Pain    aspirin 81 mg oral delayed release tablet  -- 1 tab(s) by mouth once a day  -- Indication: For CAD (coronary artery disease)    losartan 25 mg oral tablet  -- 1 tab(s) by mouth once a day  -- Indication: For HTN (hypertension)    amiodarone 200 mg oral tablet  -- 2 tab(s) by mouth once a day  -- Indication: For Afib    DULoxetine 60 mg oral delayed release capsule  -- 1 cap(s) by mouth once a day  -- Indication: For Neuropathic pain    allopurinol 300 mg oral tablet  -- 1 tab(s) by mouth once a day  -- Indication: For Gout    atorvastatin 20 mg oral tablet  -- 1 tab(s) by mouth once a day (at bedtime)  -- Indication: For Hyperlipidemia    metoprolol succinate 25 mg oral tablet, extended release  -- 1 tab(s) by mouth once a day  -- Indication: For HTN (hypertension)    tiotropium 18 mcg inhalation capsule  -- 1 cap(s) inhaled once a day  -- Indication: For COPD    budesonide-formoterol 160 mcg-4.5 mcg/inh inhalation aerosol  -- 2 puff(s) inhaled 2 times a day  -- Indication: For COPD    furosemide 40 mg oral tablet  -- 1 tab(s) by mouth once a day  -- Indication: For HTN (hypertension)    dicyclomine 10 mg oral capsule  -- 1 cap(s) by mouth 2 times a day (before meals)  -- Indication: For Antispasmodic    senna oral tablet  -- 2 tab(s) by mouth once a day (at bedtime)  -- Indication: For Constipation    docusate sodium 100 mg oral capsule  -- 1 cap(s) by mouth 3 times a day  -- Indication: For Constipation    polyethylene glycol 3350 oral powder for reconstitution  -- 17 gram(s) by mouth once a day, As needed, Constipation  -- Indication: For Constipation    sucralfate 1 g oral tablet  -- 1 tab(s) by mouth 4 times a day  -- Indication: For Prophylactic measure    latanoprost 0.005% ophthalmic solution  -- 1 drop(s) to each affected eye once a day (at bedtime)  -- Indication: For Glaucoma    pantoprazole 40 mg oral delayed release tablet  -- 1 tab(s) by mouth once a day (before a meal)  -- Indication: For Gerd    levothyroxine 50 mcg (0.05 mg) oral tablet  -- 1 tab(s) by mouth once a day  -- Indication: For Hypothyroidism

## 2017-08-22 NOTE — PROGRESS NOTE ADULT - PROBLEM SELECTOR PLAN 9
Lovenox 40 mg SQ qD for DVT ppx    DISPO: Will f/u with SW and CM regarding rehab placement as recommended by PT.
Lovenox 40 mg SQ qD for DVT ppx

## 2017-08-22 NOTE — DISCHARGE NOTE ADULT - CARE PROVIDER_API CALL
Peter Walsh (MBBS), Hematology; Medical Oncology  [address]  [place] [address]  Phone: [phone]  Fax: [phone]    Curtis Lambert (DO), Neurology  [address] [place] [address]  Phone: [phone]  Fax: [phone] Peter Walsh (ISAC), Hematology; Medical Oncology  [address] [facility] [address]  [place] [address]  Phone: [phone]  Fax: [phone]    Curtis Lambert (DO), Neurology  [address] [place] [address]  Phone: [phone]  Fax: [phone]    Wilfrid Hussein), Cardiology  [address]  [place] [address]  Phone: [phone]  Fax: [phone]

## 2017-08-22 NOTE — PROGRESS NOTE ADULT - PROBLEM SELECTOR PLAN 1
Resolved. Suspect medication induced delirium with the start of amitriptyline and gabapentin together. Patient appears to be at baseline mentation today. UA/urine cx negative.    - no further w/u for now.  - d/c gabapentin and will avoid TCA use

## 2017-08-23 NOTE — PROGRESS NOTE ADULT - PROBLEM SELECTOR PLAN 1
Unclear precipitating factor. Just started on Cymbalta for neuropathic pain but does not commonly cause GI upset  - give Maalox PRN   - will avoid Zofran since pt noted to have prolonged QTc on most recent EKG.  - if worsens, will consider stopping Cymbalta since only new med coinciding with new complaint of nausea.

## 2017-08-23 NOTE — PROGRESS NOTE ADULT - SUBJECTIVE AND OBJECTIVE BOX
CC: Generalized weakness and pain     SUBJECTIVE / OVERNIGHT EVENTS:  Patient seen and evaluated at beside this morning. Patient complaining of nausea but no vomiting. Just spitting up clear phlegm. Still complaining of LE pain bilaterally.    MEDICATIONS  (STANDING):  spironolactone 12.5 milliGRAM(s) Oral daily  aspirin enteric coated 81 milliGRAM(s) Oral daily  losartan 25 milliGRAM(s) Oral daily  allopurinol 300 milliGRAM(s) Oral daily  atorvastatin 20 milliGRAM(s) Oral at bedtime  metoprolol succinate ER 25 milliGRAM(s) Oral daily  buDESOnide 160 MICROgram(s)/formoterol 4.5 MICROgram(s) Inhaler 2 Puff(s) Inhalation two times a day  tiotropium 18 MICROgram(s) Capsule 1 Capsule(s) Inhalation daily  furosemide    Tablet 40 milliGRAM(s) Oral daily  dicyclomine 10 milliGRAM(s) Oral two times a day before meals  docusate sodium 100 milliGRAM(s) Oral three times a day  senna 2 Tablet(s) Oral at bedtime  sucralfate 1 Gram(s) Oral four times a day  latanoprost 0.005% Ophthalmic Solution 1 Drop(s) Both EYES at bedtime  pantoprazole    Tablet 40 milliGRAM(s) Oral before breakfast  levothyroxine 50 MICROGram(s) Oral daily  enoxaparin Injectable 40 milliGRAM(s) SubCutaneous daily  amiodarone    Tablet 400 milliGRAM(s) Oral daily  DULoxetine 60 milliGRAM(s) Oral daily    MEDICATIONS  (PRN):  polyethylene glycol 3350 17 Gram(s) Oral daily PRN Constipation  acetaminophen   Tablet. 650 milliGRAM(s) Oral every 6 hours PRN Mild and Moderate pain      Vital Signs Last 24 Hrs  T(C): 36.8 (23 Aug 2017 10:18), Max: 37.1 (23 Aug 2017 01:47)  T(F): 98.2 (23 Aug 2017 10:18), Max: 98.8 (23 Aug 2017 01:47)  HR: 66 (23 Aug 2017 10:18) (63 - 69)  BP: 112/58 (23 Aug 2017 10:18) (110/65 - 138/59)  BP(mean): --  RR: 17 (23 Aug 2017 10:18) (16 - 18)  SpO2: 100% (23 Aug 2017 10:18) (100% - 100%)  CAPILLARY BLOOD GLUCOSE  99 (23 Aug 2017 12:37)  107 (22 Aug 2017 22:23)  81 (22 Aug 2017 17:08)      PHYSICAL EXAM:  GENERAL: Elderly woman in NAD. Appears uncomfortable due to ongoing nausea. Answers questions appropriately  EYES: EOMI, conjunctiva and sclera clear  CHEST/LUNG: Clear to auscultation bilaterally; No wheeze  HEART: Regular rate and rhythm; No murmurs, rubs, or gallops  ABDOMEN: Soft, generalized TTP, Nondistended; Bowel sounds present  EXTREMITIES:  2+ Peripheral Pulses, No clubbing, cyanosis, or edema  NEUROLOGY: Sensation to light touch intact b/l. Resting tremor of R hand. Patient unable to lift LE b/l against gravity        LABS:                        11.7   6.35  )-----------( 228      ( 22 Aug 2017 06:41 )             36.3     08-22    139  |  99  |  16  ----------------------------<  84  3.7   |  27  |  0.58    Ca    9.6      22 Aug 2017 06:41    TPro  6.4  /  Alb  3.4  /  TBili  0.3  /  DBili  x   /  AST  25  /  ALT  17  /  AlkPhos  98  08-22

## 2017-08-23 NOTE — PROGRESS NOTE ADULT - PROBLEM SELECTOR PLAN 8
Hx of DM, not on oral hypoglycemics. Will check a1c and monitor serum glucose daily. Will place on DASH/Consistent Carbohydrate diet
Hx of DM, not on oral hypoglycemics. Hb a1c 5.7% as of 6/2017   - pt to f/u as an outpatient
Lovenox 40 mg SQ qD for DVT ppx
Lovenox 40 mg SQ qD for DVT ppx
Lovenox 40 mg SQ qD for DVT ppx    DISPO: Will f/u with SW and CM regarding rehab placement as recommended by PT.

## 2017-08-23 NOTE — PROGRESS NOTE ADULT - PROBLEM SELECTOR PLAN 2
Unclear etiology. Likely multifactorial - diabetic neuropathy +/- chemo-induced since pt has hx of chemotherapy for lymphoma.   - continue trial of SNRI with duloxetine 60mg daily to see if helps with pain. Will continue to monitor for possible adverse effects.  - will do trial of standing Tylenol 975mg TID for the next day or two

## 2017-08-23 NOTE — PROGRESS NOTE ADULT - SUBJECTIVE AND OBJECTIVE BOX
Subjective No CP/SOB. C/o ongoing back pain and leg weakness, unchanged.      MEDICATIONS  (STANDING):  spironolactone 12.5 milliGRAM(s) Oral daily  aspirin enteric coated 81 milliGRAM(s) Oral daily  losartan 25 milliGRAM(s) Oral daily  allopurinol 300 milliGRAM(s) Oral daily  atorvastatin 20 milliGRAM(s) Oral at bedtime  metoprolol succinate ER 25 milliGRAM(s) Oral daily  buDESOnide 160 MICROgram(s)/formoterol 4.5 MICROgram(s) Inhaler 2 Puff(s) Inhalation two times a day  tiotropium 18 MICROgram(s) Capsule 1 Capsule(s) Inhalation daily  furosemide    Tablet 40 milliGRAM(s) Oral daily  dicyclomine 10 milliGRAM(s) Oral two times a day before meals  docusate sodium 100 milliGRAM(s) Oral three times a day  senna 2 Tablet(s) Oral at bedtime  sucralfate 1 Gram(s) Oral four times a day  latanoprost 0.005% Ophthalmic Solution 1 Drop(s) Both EYES at bedtime  pantoprazole    Tablet 40 milliGRAM(s) Oral before breakfast  levothyroxine 50 MICROGram(s) Oral daily  enoxaparin Injectable 40 milliGRAM(s) SubCutaneous daily  amiodarone    Tablet 400 milliGRAM(s) Oral daily  DULoxetine 60 milliGRAM(s) Oral daily  aluminum hydroxide/magnesium hydroxide/simethicone Suspension 30 milliLiter(s) Oral once    LABS:                        11.7   6.35  )-----------( 228      ( 22 Aug 2017 06:41 )             36.3     Hemoglobin: 11.7 g/dL (08-22 @ 06:41)  Hemoglobin: 11.6 g/dL (08-21 @ 06:13)  Hemoglobin: 11.8 g/dL (08-20 @ 06:44)  Hemoglobin: 12.6 g/dL (08-19 @ 06:24)    08-22    139  |  99  |  16  ----------------------------<  84  3.7   |  27  |  0.58    Ca    9.6      22 Aug 2017 06:41    TPro  6.4  /  Alb  3.4  /  TBili  0.3  /  DBili  x   /  AST  25  /  ALT  17  /  AlkPhos  98  08-22    Creatinine Trend: 0.58<--, 0.59<--, 0.57<--, 0.66<--, 0.67<--, 0.76<--    PHYSICAL EXAM  Vital Signs Last 24 Hrs  T(C): 36.8 (23 Aug 2017 10:18), Max: 37.1 (23 Aug 2017 01:47)  T(F): 98.2 (23 Aug 2017 10:18), Max: 98.8 (23 Aug 2017 01:47)  HR: 66 (23 Aug 2017 10:18) (63 - 69)  BP: 112/58 (23 Aug 2017 10:18) (110/65 - 138/59)  RR: 17 (23 Aug 2017 10:18) (16 - 18)  SpO2: 100% (23 Aug 2017 10:18) (100% - 100%)    Cardiovascular: Normal S1 S2 RRR , No JVD, soft systolic murmur, No edema  Respiratory: Lungs clear to auscultation	  Gastrointestinal:  Soft, Non-tender, + BS	  Extremities: , No clubbing, cyanosis or edema    DIAGNOSTIC DATA:    < from: TTE with Doppler (w/Cont) (04.03.17 @ 16:18) >  CONCLUSIONS:  1. Mitral annular calcification, otherwise normal mitral  valve. Mild mitral regurgitation.  2. Moderately dilated left atrium.  LA volume index = 46  cc/m2.  3. Moderate left ventricular enlargement.  4. Severe global left ventricular systolic dysfunction.  Endocardial visualization enhanced with intravenous  injection of echo contrast (Definity).  5. The right ventricle is not well visualized; grossly  normal right ventricular systolic function.  A device wire  is noted in the right heart.  6. Estimated right ventricular systolic pressure equals 60  mm Hg, assuming right atrial pressure equals 10 mm Hg,  consistent with moderate pulmonary hypertension.  ------------------------------------------------------------------------  Confirmed on  4/3/2017 - 17:19:47 by Jean-Pierre Carver M.D.    < end of copied text >    < from: Cardiac Cath Lab - Adult (04.26.16 @ 13:12) >  VENTRICLES: No LV gram was performed; however, a recent echocardiogram  demonstrated an EF of 19 %.  CORONARY VESSELS: The coronary circulation is right dominant.  LM:   --  LM: Normal.  LAD:   --  Proximal LAD: There was a wndcyeen47 % stenosis. There was mild  restenosis in proximal LAD stent.  --  Mid LAD: Angiography showed minor luminal irregularities with no flow  limiting lesions.  --  Distal LAD: Angiography showed minor luminal irregularities with no  flow limiting lesions.  --  D1: Angiography showed minor luminal irregularities with no flow  limiting lesions.  CX:   --  Circumflex: Normal.  RI:   --  Ramus intermedius: Angiography showed minor luminal  irregularities with no flow limiting lesions.  RCA:   --  Proximal RCA: Normal.  --  Mid RCA: Angiography showed mild atherosclerosis with no flow limiting  lesions.  --  Distal RCA: Angiography showed minor luminal irregularities with no  flow limiting lesions.  --  RPDA: Angiography showed minor luminal irregularities with no flow  limiting lesions.  --  RPLS: Angiography showed minor luminal irregularities with no flow  limiting lesions.  COMPLICATIONS: There were no complications.  DIAGNOSTIC RECOMMENDATIONS: Medical management and aggressive risk factor  modification is recommended.  Prepared and signed by  Nehal López M.D.  Signed 04/27/2016 13:13:20    < end of copied text >      Assessment and Plan: 77y Female with hypertension, dyslipidemia, CAD s/p LAD stent with only mild re-stenosis with minor luminal irregularities otherwise on cath 4/16, with known severe LV dysfunction , NICM s/p [employer] BiV ICD, VT on Amio, COPD, lymphoma previously on chemo with known spinal mets and compression fractures admitted with acute on chronic LE weakness/pain. ?UTI    --Cont Medical management of known NICM  --Not in decompensated CHF  --Recent TTE noted  --No further ischemic work up needed  --f/u Dr Hussein on 9/21 at 11:15am as previously scheduled  --pain management per primary team    Dolores Rodriguez PA-C  [facility]  [address], [address]   [place] [address]  office [phone]  pager [phone]

## 2017-08-23 NOTE — PROGRESS NOTE ADULT - ATTENDING COMMENTS
Agree with above.  No further cv workup needed at this time.    Nehal López MD  [facility]  [address], [address]  [place] [address]  office: [phone]  pager: [phone]

## 2017-09-19 NOTE — ED PROVIDER NOTE - CARE PLAN
Principal Discharge DX:	UTI (urinary tract infection)  Goal:	Treatment with antibiotics  Instructions for follow-up, activity and diet:	Please take your antibiotics as directed. Ciprofloxacin 250 BID for 5 days. First dose was given. If any fevers or chills please call your doctor or report to the ED. Please call for urine culture results [phone]  Secondary Diagnosis:	Abdominal pain Principal Discharge DX:	UTI (urinary tract infection)  Goal:	Treatment with antibiotics  Instructions for follow-up, activity and diet:	Please take your antibiotics as directed. Ciprofloxacin 250 BID for 7 days. First dose was given. If any fevers or chills please call your doctor or report to the ED. Please call for urine culture results [phone] in two days.  Secondary Diagnosis:	Abdominal pain

## 2017-09-19 NOTE — ED PROVIDER NOTE - MEDICAL DECISION MAKING DETAILS
Patient poor historian but states she has had abdominal pain for few days now. May be ischemic bowel, may be perforation, may be diverticultiis or appendicitis. Will do CTA abdomen, basic labs, lipase, VBG, coags, fluids, ua, ucx, likely admit.

## 2017-09-19 NOTE — ED ADULT NURSE NOTE - CHIEF COMPLAINT QUOTE
sent by Oncology office (1st consult vist)  for c/o lethargy, lightheaded, generalized abdominal pain since this A.M., with no N/V/D, or fever.  PMH: Lymphoma with chemo tx a few months ago in [place].

## 2017-09-19 NOTE — ED PROVIDER NOTE - NS ED ROS FT
GENERAL: No fever or chills, //             EYES: no change in vision, //             HEENT: no trouble swallowing or speaking, //             CARDIAC: no chest pain, //              PULMONARY: no cough or SOB, //                  SKIN: no rashes,  //            NEURO: no headache,  //             MSK: No joint pain otherwise as HPI or negative. ~Earl Chanel M.D., Ph.D. -Resident

## 2017-09-19 NOTE — ED PROVIDER NOTE - ATTENDING CONTRIBUTION TO CARE
AJM: Pt seen with resident and agree with above note. 77 female history of CHF, CAD s/p AICD, DM, HLD, HTN, lymphoma s/p resection and chemo in remission, Afib here for abdominal pain. Patient is here with her friend who patient requests stays in the room. Patient is very poor historian. She states she has had abdominal pain for a few days now, severe, nonradiating, states it is whole stomach, worse when she moves. Also endorsing dysuria for a few days. + ttp to lower abdomen. Will send labs, ua, ct a/p. dc if unremarkable. no chest pain or sob.

## 2017-09-19 NOTE — ED ADULT NURSE REASSESSMENT NOTE - NS ED NURSE REASSESS COMMENT FT1
night shift - pt resting in bed, c/o 8/10 abd pain, MD Herzog made aware, awaiting med orders and urine sample for a UA for pt.  Vital signs stable. Pt. awaiting dispo.

## 2017-09-19 NOTE — ED ADULT TRIAGE NOTE - CHIEF COMPLAINT QUOTE
sent by Oncology office (1st consult vist)  for c/o lethargy, lightheaded, generalized abdominal pain since this A.M., with no N/V/D, or fever.  PMH: Lymphoma with chemo tx a few months ago in [place].

## 2017-09-19 NOTE — ED PROVIDER NOTE - PLAN OF CARE
Treatment with antibiotics Please take your antibiotics as directed. Ciprofloxacin 250 BID for 5 days. First dose was given. If any fevers or chills please call your doctor or report to the ED. Please call for urine culture results [phone] Please take your antibiotics as directed. Ciprofloxacin 250 BID for 7 days. First dose was given. If any fevers or chills please call your doctor or report to the ED. Please call for urine culture results [phone] in two days.

## 2017-09-19 NOTE — ED PROVIDER NOTE - PHYSICAL EXAMINATION
Gen: NAD, AOx3, appears uncomfortable in stretcher //            Head: NCAT //            HEENT: EOMI, oral mucosa dry, normal conjunctiva //            Lung: CTAB, no respiratory distress, no wheezes/rhonchi/rales B/L, speaking in full sentences. //            CV: RRR, no murmurs, rubs or gallops //            Abd: soft, diffusely tender, right CVA tenderness, no guarding, //            MSK: no visible deformities //            Neuro: Gross tremor involving jawa and hands, states it is baseline for months. Bilateral Lower Extremity weakness //            Skin: Warm, well perfused, no rash //            Psych: normal affect. ~Earl Chanel M.D., Ph.D. -Resident

## 2017-09-19 NOTE — ED PROVIDER NOTE - OBJECTIVE STATEMENT
77 female history of CHF, CAD s/p AICD, DM, HLD, HTN, lymphoma s/p resection and chemo in remission, Afib here for abdominal pain. Patient is here with her friend who patient requests stays in the room. Patient is very poor historian. She states she has had abdominal pain for a few days now, severe, nonradiating, states it is whole stomach, worse when she moves. Also endorsing dysuria for a few days that she is still taking antibiotics for. Denies hematoschezia, melena, diarrhea/constipation, nausea/vomiting.

## 2017-09-19 NOTE — ED ADULT NURSE NOTE - OBJECTIVE STATEMENT
p/t is a 77y olf female received awake and responsive with c/o of diffuse abd discomfort nausea vomiting since last night, p/t denies any vomiting @ present no further c/o noted will continue to monitor

## 2017-09-22 NOTE — ED POST DISCHARGE NOTE - RESULT SUMMARY
UCX: pseudomonas aeruginosa >100,000 and Gram neg ivan to be IDed >100,000 both prelim results. Note states pt to be treated with Cipro

## 2017-11-06 NOTE — ED PROVIDER NOTE - CARE PLAN
Principal Discharge DX:	Chronic pain Principal Discharge DX:	Chronic pain  Instructions for follow-up, activity and diet:	1) Follow up with your doctor in 1-2 week.   2) Return to the ER immediately for new or worsening symptoms including severe fevers or uncontrolled pain.   3) Take Keflex 500mg two times a day for 5 days.   4) Take Tylenol 650mg every 6 hours for your pain. Or take your percocet as prescribed.

## 2017-11-06 NOTE — ED PROVIDER NOTE - PLAN OF CARE
1) Follow up with your doctor in 1-2 week.   2) Return to the ER immediately for new or worsening symptoms including severe fevers or uncontrolled pain.   3) Take Keflex 500mg two times a day for 5 days.   4) Take Tylenol 650mg every 6 hours for your pain. Or take your percocet as prescribed.

## 2017-11-06 NOTE — ED ADULT NURSE NOTE - OBJECTIVE STATEMENT
pt received to 16, aox2, pt c/o bodyaches and SOB x a few days.  pt has a stage 2 pressure ulcer to sacrum approx half dollar size, labs sent, MD at bedside, blood cultures x 2 sent.  awaiting further orders, will monitor

## 2017-11-06 NOTE — ED PROVIDER NOTE - PROGRESS NOTE DETAILS
Rec'd signout on this pt from Dr. Coronado: 78yo F here with w/ chronic abd and leg pain. Awaiting UA to r/o UTI, then discharge.  -Dr. Patel

## 2017-11-06 NOTE — ED PROVIDER NOTE - OBJECTIVE STATEMENT
77yoF hx of lymphoma in remission, chf, pw chronic leg pain and lower back pain. per ye she has had worsening pain over the last 3-6 months. Does not like to take percocet at nursing home because it makes her too tired. per family, she looked weaker but feels the same. denies fevers, chills, nausea, vomiting, diarrhea, abd pain, chest pain, sob or other issues. 77yoF hx of lymphoma in remission, chf, pw chronic leg pain and lower back pain. per ye she has had worsening pain over the last 3-6 months. Does not like to take percocet at nursing home because it makes her too tired. per family, she looked weaker but feels the same. denies fevers, chills, nausea, vomiting, diarrhea, abd pain, chest pain, sob or other issues..

## 2017-11-06 NOTE — ED PROVIDER NOTE - ATTENDING CONTRIBUTION TO CARE
Attending note:   After face to face evaluation of this patient, I concur with above noted hx, pe, and care plan for this patient. +Chronic abd pain and leg pain, patient prescribed percocet, not administered at NH.   Evaluation in progress

## 2017-11-06 NOTE — ED PROVIDER NOTE - MEDICAL DECISION MAKING DETAILS
77yoF hx of CHF, CAD, lymphoma in remission pw pain, chronic. appears at baseline per nursing facility and family. will send labs, cxr, ekk, give iv tylenol and reass.

## 2017-11-09 NOTE — ED POST DISCHARGE NOTE - RESULT SUMMARY
UCX: Pseudomonas to be IDed 50,000 - 99,000.  GNR to be IDed 10,000 - 49,000.  Pt discharged on Keflex 500mg BID x 5 days.  Results prelim.  Follow results to final.

## 2017-11-25 NOTE — H&P ADULT - PROBLEM SELECTOR PLAN 6
VTE: Lovenox  Diet: NPO until mental status improves  Code: no advanced directive in chart    JULIEN Franks MD-PGY1  Internal Medicine Department  Pager: [phone] pt was found to have hypernatremia  - will trend BMP and treat with free water as appropriate Pt has unstagable decubitus ulcer on physical exam.   - will consult wound care

## 2017-11-25 NOTE — ED PROVIDER NOTE - OBJECTIVE STATEMENT
77yof w/ CAD, MI, CHF, HTN, DM sent from [facility] for AMS. Per transfer documents, pt lethargic and not eating. Conversive at baseline but today non-verbal. Noted to be tachycardic in 120s at NH. No advanced directives in chart.

## 2017-11-25 NOTE — H&P ADULT - HISTORY OF PRESENT ILLNESS
77yof w/ CAD, MI, CHF, HTN, sent from [facility] for AMS. Per transfer documents, pt lethargic and not eating. Conversive at baseline but today non-verbal. Noted to be tachycardic in 120s and hypotensive at nursing home and with EMS. At the ED, she was non verbal, but responsive to her name. She had not urinated and had a 1L output on strait cath. When knutson was inserted, cloudy urine was seen in bag and line.    She was previously admitted to the hospital and found to have pseudomonas and CRE, however, due to few colony growth, antibiotics were dc'd. She was found to have AMS at the nursing home and BIBEMS with tachycardia, fever, and hypotension. In the ED, she was given 2L of fluid, however, she was also found to have a cardiomyopathy confirmed by ED echo. Pt was started on cefepime and vancomycin. Pt was admitted to medicine. 77yof w/ CAD, MI, Systolic CH, s/p AICD, HTN, sent from [facility] for AMS. Per transfer documents, pt lethargic and not eating. Conversive at baseline but today non-verbal. Noted to be tachycardic in 120s and hypotensive at nursing home and with EMS. At the ED, she was non verbal, but responsive to her name. She had not urinated and had a 1L output on straight cath. When knutson was inserted, cloudy urine was seen in bag.    She had recent admission for UTI and also was previously seen in the ED and found to have pseudomonas and CRE, however, due to few colony count, antibiotics were dc'd. She was found to have AMS at the nursing home and BIBEMS with tachycardia, fever, and hypotension. In the ED, she was given 2L of fluid, however, she was also found to have a cardiomyopathy confirmed by ED echo. Pt was started on cefepime and vancomycin. Pt was admitted to medicine.

## 2017-11-25 NOTE — H&P ADULT - PROBLEM SELECTOR PLAN 8
Pt found to have transaminitis.   -will trend CMP - will hold oral medications until AMS improves  - will do EKG

## 2017-11-25 NOTE — H&P ADULT - PROBLEM SELECTOR PLAN 1
Pt found to be febrile, tachycardic, and hypotensive with previously positive urine cultures and cloudy urine on strait cath. Pt was empirically given 2L of fluid in ED, however, we will be cautious with further fluid boluses due to low EF.  - pt started on Cefepime (day 1) and Vancomycin(day1) (previous clxs shows sensitivity to cefepime)  - will monitor pt's hemodynamic status closely Pt found to be febrile, tachycardic, and hypotensive with previously positive urine cultures and cloudy urine on straight cath. Pt was empirically given 2L of fluid in ED, however, we will be cautious with further fluid boluses due to low EF.  - pt started on Cefepime (day 1) and Vancomycin(day1) (previous clxs shows sensitivity to cefepime)  - will monitor pt's hemodynamic status closely  f/u cultures

## 2017-11-25 NOTE — ED PROVIDER NOTE - MEDICAL DECISION MAKING DETAILS
77yof w/ AMS, febrile, tachycardic, sepsis of unknown source. Cautious hydration due to poor EF, pan-culture, empiric vanc/cefepime. No evidence of head trauma, moving all extremities, can hold on head imaging.

## 2017-11-25 NOTE — H&P ADULT - PROBLEM SELECTOR PLAN 7
- will repleat and follow up BMP Pt has a history of CHF with a previous TTE showing EF of 20% with medtronic AICD. Pt is unable to take any pills at the moment 2/2 AMS.   - will hold home medications until hemodynamics improve  - if AMS persists, will consider switching medications to IV

## 2017-11-25 NOTE — H&P ADULT - PROBLEM SELECTOR PLAN 5
Pt has stage 4 decubitus ulcer on physical exam.   - will consult wound care Pt found to have transaminitis.   -will trend CMP

## 2017-11-25 NOTE — H&P ADULT - ATTENDING COMMENTS
Pt was seen and examined at bedside, agree with H&P done by HS, edited as appropriate, briefly this is a 77yof w/ CAD, MI, Systolic CHF/s/p AICD, HTN, sent from [facility] admitted with acute encephalopathy likely secondary to sepsis 2/2 UTI.  Exam: as above  Plan: cont with cefepime and vanco for now, f/u cultures, consider ID consult in am, monitor hemodynamics closely, hold antihypertensives given hypotension, replete K, trend Na level, would care consult, f/u ekg, GOC discussion, dvt ppx, plan discussed with HS.

## 2017-11-25 NOTE — H&P ADULT - PROBLEM SELECTOR PLAN 9
-wound care consult made VTE: Lovenox  Diet: NPO until mental status improves  Code: no advanced directive in chart    JULIEN Franks MD-PGY1  Internal Medicine Department  Pager: [phone]

## 2017-11-25 NOTE — H&P ADULT - PROBLEM SELECTOR PLAN 3
Pt has a history of CHF with a previous TTE showing EF of 20% with medtronic AICD. Pt is unable to take any pills at the moment 2/2 AMS.   - will hold home medications until hemodynamics improve  - if AMS persists, will consider switching medications to IV pt was found to have hypernatremia  - will trend BMP and treat with free water as appropriate

## 2017-11-25 NOTE — H&P ADULT - PROBLEM SELECTOR PLAN 10
VTE: Lovenox  Diet: NPO until mental status improves  Code: no advanced directive in chart    JULIEN Franks MD-PGY1  Internal Medicine Department  Pager: [phone]

## 2017-11-25 NOTE — ED ADULT NURSE NOTE - OBJECTIVE STATEMENT
Pt to bed 17. Lethargic, non verbal currently. Pt febrile. Stage 4 noted to sacrum. Wound cleaned and dressed. IVL placed. Bloods drawn. Pt hooked up to CM. Zee catheter placed. Cloudy urine noted. Safety maintained. Will continue to monitor.

## 2017-11-25 NOTE — ED PROVIDER NOTE - ATTENDING CONTRIBUTION TO CARE
LOcurto  pt arrives from nursing facility  altered febrile    pt with clear lungs  no accessory muscle use  abd soft  card mild tachycardia  no gallop heard sacral decub noted  does not appear infected  recent urine cx  pseudomonas  and resistant e coli  IV started  full 2 liter bolus not given empirically due to baseline cardiomyopathy- confirmed by bedside ECHO  c/w severe dysfunction with remodeling  pt responded to 1 liter  withBP rising to 110s systolic  HR  90s  sat 100 on NC oxygen    given broad spectrum antibiotics

## 2017-11-25 NOTE — H&P ADULT - NSHPPHYSICALEXAM_GEN_ALL_CORE
GENERAL: elderly female, frail in appaerance  HEAD:  Atraumatic, Normocephalic  EYES: EOMI, PERRLA, conjunctiva and sclera clear  NECK: Supple, No JVD  CHEST/LUNG: Clear to auscultation bilaterally; No wheeze  HEART: Regular rate and rhythm; No murmurs, rubs, or gallops  ABDOMEN: Pt has mild reaction to suprapubic manipulation. Soft, Nontender, Nondistended; Bowel sounds present  EXTREMITIES:  2+ Peripheral Pulses, No clubbing, cyanosis, or edema  PSYCH: pt non verbal, but responsive to name  NEUROLOGY: non-focal  SKIN: decubitus ulcer on back GENERAL: elderly female, frail in appaerance  HEAD:  Atraumatic, Normocephalic  EYES: unable to assess as pt does not follow commands well, sclera clear  NECK: Supple, No JVD  CHEST/LUNG: Clear to auscultation bilaterally; No wheeze  HEART: Regular rate and rhythm; No murmurs, rubs, or gallops  ABDOMEN: Grimaces upon palpation of the entire abdomen, Nondistended  EXTREMITIES:  2+ Peripheral Pulses, No clubbing, cyanosis, or edema  PSYCH: pt non verbal, but responsive to name  NEUROLOGY: arousable to verbal stimuli, nods head when asked if her name was Ashok Jerez  SKIN: decubitus ulcer on back, unstageable

## 2017-11-25 NOTE — H&P ADULT - PROBLEM SELECTOR PLAN 2
Most likely 2/2 sepsis. Pt was BIBEMS due to AMS. She is verbal at baseline, however, pt stopped talking yesterday. Pt looks at you if you call her name and reacts to pain, however, does not respond to questions.   - pt will be treated with abx and fluid  - will monitor mental status and hemodynamics closely, expected to improve as sepsis is treated Most likely 2/2 sepsis. Pt was BIBEMS due to AMS. She is verbal at baseline, however, pt is nonverbal since yesterday. Pt looks at you if you call her name and reacts to pain, however, does not respond to questions.   - pt will be treated with abx and ivfluid  - will monitor mental status and hemodynamics closely, expected to improve as sepsis is treated

## 2017-11-25 NOTE — H&P ADULT - PROBLEM SELECTOR PROBLEM 5
Decubitus ulcer of sacral region, stage 4 Decubitus ulcer of sacral region, unstageable Transaminitis

## 2017-11-26 NOTE — PROGRESS NOTE ADULT - ATTENDING COMMENTS
77yof w/ CAD, MI, Systolic CHF/s/p AICD, HTN, sent from [facility] for AMS due to sepsis 2/2 UTI  -Severe sepsis due to UTI now with pseudomonas bacteremia. Continue cefepime, can discontinue vancomycin. Consider ID consult  -continue cautious d5w IVF for sepsis and hypernatremia given low EF   -appreciate cards recs

## 2017-11-26 NOTE — CONSULT NOTE ADULT - SUBJECTIVE AND OBJECTIVE BOX
HISTORY OF PRESENT ILLNESS: 78 y/o F w/ HX HTN, HLD, systolic chf,  Asthma, cardiomyopathy w/ EF 15% on echo of 5/8/17 Biventricular ICD ~ 1 yr ago    sent from [facility] for AMS. Per transfer documents, pt lethargic and not eating. Conversive at baseline but today non-verbal.   Noted to be tachycardic in 120s and hypotensive at nursing home and with EMS.   in the  ED, she was non verbal, but responsive to her name. She had not urinated and had a 1L output on straight cath. When Zee was inserted, cloudy urine was seen in bag.    She had recent admission for UTI and also was previously seen in the ED and found to have pseudomonas and CRE, however, due to few colony count, antibiotics were dc'd. She was found to have AMS at the nursing home and BIBEMS with tachycardia, fever, and hypotension. In the ED, she was given 2L of fluid, however, she was also found to have a cardiomyopathy confirmed by ED echo. Pt was started on cefepime and vancomycin.    PAST MEDICAL & SURGICAL HISTORY:  Paroxysmal atrial fibrillation  GERD (gastroesophageal reflux disease)  DM (diabetes mellitus)  Asthma  MI (myocardial infarction)  CAD (coronary artery disease)  Lymphoma: S/P resection and chemotherapy in remission  HLD (hyperlipidemia)  CHF (congestive heart failure): On home oxygen 2L PRN  HTN (hypertension)  ICD (implantable cardioverter-defibrillator) in place  S/P coronary artery stent placement  S/P myomectomy  S/P appendectomy  S/P lymph node biopsy: Biopsy and resection        PREVIOUS DIAGNOSTIC TESTING:    [ ] Echocardiogram: 5/8/17 in office   EF 15%   Mod DLV w/ severe global LVS dysfx NL RV fx, Mild MR, Severe LAE  [ ]  Catheterization:  < from: Cardiac Cath Lab - Adult (04.26.16 @ 13:12) >  VENTRICLES: No LV gram was performed; however, a recent echocardiogram  demonstrated an EF of 19 %.  CORONARY VESSELS: The coronary circulation is right dominant.  LM:   --  LM: Normal.  LAD:   --  Proximal LAD: There was a [id number] % stenosis. There was mild  restenosis in proximal LAD stent.  --  Mid LAD: Angiography showed minor luminal irregularities with no flow  limiting lesions.  --  Distal LAD: Angiography showed minor luminal irregularities with no  flow limiting lesions.  --  D1: Angiography showed minor luminal irregularities with no flow  limiting lesions.  CX:   --  Circumflex: Normal.  RI:   --  Ramus intermedius: Angiography showed minor luminal  irregularities with no flow limiting lesions.  RCA:   --  Proximal RCA: Normal.  --  Mid RCA: Angiography showed mild atherosclerosis with no flow limiting  lesions.  --  Distal RCA: Angiography showed minor luminal irregularities with no  flow limiting lesions.  --  RPDA: Angiography showed minor luminal irregularities with no flow  limiting lesions.  --  RPLS: Angiography showed minor luminal irregularities with no flow  limiting lesions.    < end of copied text >    [ ] Stress Test:  	    MEDICATIONS:  enoxaparin Injectable 40 milliGRAM(s) SubCutaneous daily    cefepime  IVPB 2000 milliGRAM(s) IV Intermittent every 12 hours  vancomycin  IVPB 750 milliGRAM(s) IV Intermittent every 12 hours    buDESOnide 160 MICROgram(s)/formoterol 4.5 MICROgram(s) Inhaler 2 Puff(s) Inhalation two times a day  tiotropium 18 MICROgram(s) Capsule 1 Capsule(s) Inhalation daily        levothyroxine Injectable 25 MICROGram(s) IV Push daily    collagenase Ointment 1 Application(s) Topical two times a day  dextrose 5%. 1000 milliLiter(s) IV Continuous <Continuous>  latanoprost 0.005% Ophthalmic Solution 1 Drop(s) Both EYES at bedtime      Allergies    peanuts (Unknown)  penicillin (Nausea)    Intolerances        FAMILY HISTORY:  No pertinent family history in first degree relatives      SOCIAL HISTORY:    [x ] Non-smoker  [ ] Former Smoker  [ ] Current Smoker  [ ] Alcohol      REVIEW OF SYSTEMS:  [ ]chest pain  [  ]shortness of breath  [  ]palpitations  [  ]syncope  [ ]near syncope [  ]diplopia  [  ]altered mental status   [  ]fevers  [ ]chills [ ]nausea  [ ] vomiting  [ ]abdominal pain  [ ]melena  [ ]BRBPR  [  ]epistaxis  [  ]rash  [  ]lower extremity edema          [ ] All others negative	  [ x] Unable to obtain    PHYSICAL EXAM:  T(C): 36.8 (11-26-17 @ 11:54), Max: 39.7 (11-25-17 @ 16:12)  HR: 90 (11-26-17 @ 11:54) (78 - 99)  BP: 118/61 (11-26-17 @ 11:54) (90/47 - 118/61)  RR: 17 (11-26-17 @ 11:54) (17 - 22)  SpO2: 100% (11-26-17 @ 11:54) (79% - 100%)  Wt(kg): --  I&O's Summary    25 Nov 2017 07:01  -  26 Nov 2017 07:00  --------------------------------------------------------  IN: 0 mL / OUT: 700 mL / NET: -700 mL        Appearance:  frail with no Signs of acute distress   HEENT:   Normal oral mucosa, PERRL, 	  Lymphatic: No lymphadenopathy  Cardiovascular: Normal S1 S2, No JVD, No murmurs, No edema  Respiratory: decreased breasth sounds BL LL's   Gastrointestinal:  Soft, Non-tender, + BS	  Skin: No rashes, No ecchymoses, No cyanosis	  Extremities:  No clubbing, cyanosis or edema  Vascular: Peripheral pulses palpable 2+ bilaterally    TELEMETRY: 	    ECG:  	  RADIOLOGY:  CXR   clear lungs no effusions   OTHER:   + UA 	  	  LABS:	 	    CARDIAC MARKERS:      CKMB: 2.09 ng/mL (11-25 @ 15:45)                              9.6    20.43 )-----------( 302      ( 26 Nov 2017 05:57 )             32.7     11-26    150<H>  |  114<H>  |  25<H>  ----------------------------<  102<H>  3.3<L>   |  25  |  0.50    Ca    8.2<L>      26 Nov 2017 05:57  Phos  2.7     11-26  Mg     1.7     11-26    TPro  5.7<L>  /  Alb  2.2<L>  /  TBili  0.4  /  DBili  x   /  AST  69<H>  /  ALT  50<H>  /  AlkPhos  112  11-26    proBNP:   Lipid Profile:   HgA1c:   TSH:     ASSESSMENT/PLAN:  78 y/o F w/ HX HTN, HLD, systolic chf,  Asthma, cardiomyopathy w/ EF 15% on echo of 5/8/17 Biventricular ICD ~ 1 yr ago    sent from [facility] for AMS. Per transfer documents, pt lethargic and not eating. Conversive at baseline but today non-verbal.     Septic 2/2 UTI   ABX as per primary team   tachycardia 2/2 underlying infection   hold antihypertensives until BP can support   on IV meds at present time decreased swallowing ability   interrogate ICD   Pt with  EF 15 % on recent Echo be extremely conservative with IVF's-- if required monitor closely for volume overload HISTORY OF PRESENT ILLNESS: 78 y/o F w/ HX HTN, HLD, systolic chf,  Asthma, cardiomyopathy w/ EF 15% on echo of 5/8/17 Biventricular ICD ~ 1 yr ago    sent from [facility] for AMS. Per transfer documents, pt lethargic and not eating. Conversive at baseline but today non-verbal.   Noted to be tachycardic in 120s and hypotensive at nursing home and with EMS.   in the  ED, she was non verbal, but responsive to her name. She had not urinated and had a 1L output on straight cath. When Zee was inserted, cloudy urine was seen in bag.    She had recent admission for UTI and also was previously seen in the ED and found to have pseudomonas and CRE, however, due to few colony count, antibiotics were dc'd. She was found to have AMS at the nursing home and BIBEMS with tachycardia, fever, and hypotension. In the ED, she was given 2L of fluid, however, she was also found to have a cardiomyopathy confirmed by ED echo. Pt was started on cefepime and vancomycin.    PAST MEDICAL & SURGICAL HISTORY:  Paroxysmal atrial fibrillation  GERD (gastroesophageal reflux disease)  DM (diabetes mellitus)  Asthma  MI (myocardial infarction)  CAD (coronary artery disease)  Lymphoma: S/P resection and chemotherapy in remission  HLD (hyperlipidemia)  CHF (congestive heart failure): On home oxygen 2L PRN  HTN (hypertension)  ICD (implantable cardioverter-defibrillator) in place  S/P coronary artery stent placement  S/P myomectomy  S/P appendectomy  S/P lymph node biopsy: Biopsy and resection        PREVIOUS DIAGNOSTIC TESTING:    [ ] Echocardiogram: 5/8/17 in office   EF 15%   Mod DLV w/ severe global LVS dysfx NL RV fx, Mild MR, Severe LAE  [ ]  Catheterization:  < from: Cardiac Cath Lab - Adult (04.26.16 @ 13:12) >  VENTRICLES: No LV gram was performed; however, a recent echocardiogram  demonstrated an EF of 19 %.  CORONARY VESSELS: The coronary circulation is right dominant.  LM:   --  LM: Normal.  LAD:   --  Proximal LAD: There was a [id number] % stenosis. There was mild  restenosis in proximal LAD stent.  --  Mid LAD: Angiography showed minor luminal irregularities with no flow  limiting lesions.  --  Distal LAD: Angiography showed minor luminal irregularities with no  flow limiting lesions.  --  D1: Angiography showed minor luminal irregularities with no flow  limiting lesions.  CX:   --  Circumflex: Normal.  RI:   --  Ramus intermedius: Angiography showed minor luminal  irregularities with no flow limiting lesions.  RCA:   --  Proximal RCA: Normal.  --  Mid RCA: Angiography showed mild atherosclerosis with no flow limiting  lesions.  --  Distal RCA: Angiography showed minor luminal irregularities with no  flow limiting lesions.  --  RPDA: Angiography showed minor luminal irregularities with no flow  limiting lesions.  --  RPLS: Angiography showed minor luminal irregularities with no flow  limiting lesions.    < end of copied text >    [ ] Stress Test:  	    MEDICATIONS:  enoxaparin Injectable 40 milliGRAM(s) SubCutaneous daily    cefepime  IVPB 2000 milliGRAM(s) IV Intermittent every 12 hours  vancomycin  IVPB 750 milliGRAM(s) IV Intermittent every 12 hours    buDESOnide 160 MICROgram(s)/formoterol 4.5 MICROgram(s) Inhaler 2 Puff(s) Inhalation two times a day  tiotropium 18 MICROgram(s) Capsule 1 Capsule(s) Inhalation daily        levothyroxine Injectable 25 MICROGram(s) IV Push daily    collagenase Ointment 1 Application(s) Topical two times a day  dextrose 5%. 1000 milliLiter(s) IV Continuous <Continuous>  latanoprost 0.005% Ophthalmic Solution 1 Drop(s) Both EYES at bedtime      Allergies    peanuts (Unknown)  penicillin (Nausea)    Intolerances        FAMILY HISTORY:  No pertinent family history in first degree relatives      SOCIAL HISTORY:    [x ] Non-smoker  [ ] Former Smoker  [ ] Current Smoker  [ ] Alcohol      REVIEW OF SYSTEMS:  [ ]chest pain  [  ]shortness of breath  [  ]palpitations  [  ]syncope  [ ]near syncope [  ]diplopia  [  ]altered mental status   [  ]fevers  [ ]chills [ ]nausea  [ ] vomiting  [ ]abdominal pain  [ ]melena  [ ]BRBPR  [  ]epistaxis  [  ]rash  [  ]lower extremity edema          [ ] All others negative	  [ x] Unable to obtain    PHYSICAL EXAM:  T(C): 36.8 (11-26-17 @ 11:54), Max: 39.7 (11-25-17 @ 16:12)  HR: 90 (11-26-17 @ 11:54) (78 - 99)  BP: 118/61 (11-26-17 @ 11:54) (90/47 - 118/61)  RR: 17 (11-26-17 @ 11:54) (17 - 22)  SpO2: 100% (11-26-17 @ 11:54) (79% - 100%)  Wt(kg): --  I&O's Summary    25 Nov 2017 07:01  -  26 Nov 2017 07:00  --------------------------------------------------------  IN: 0 mL / OUT: 700 mL / NET: -700 mL        Appearance:  frail with no Signs of acute distress   HEENT:   Normal oral mucosa, PERRL, 	  Lymphatic: No lymphadenopathy  Cardiovascular: Normal S1 S2, No JVD, No murmurs, No edema  Respiratory: decreased breasth sounds BL LL's   Gastrointestinal:  Soft, Non-tender, + BS	  Skin: No rashes, No ecchymoses, No cyanosis	  Extremities:  No clubbing, cyanosis or edema  Vascular: Peripheral pulses palpable 2+ bilaterally    TELEMETRY: 	    ECG:  	NSR non specific intraventricular block   RADIOLOGY:  CXR   clear lungs no effusions   OTHER:   + UA 	  	  LABS:	 	    CARDIAC MARKERS:      CKMB: 2.09 ng/mL (11-25 @ 15:45)                              9.6    20.43 )-----------( 302      ( 26 Nov 2017 05:57 )             32.7     11-26    150<H>  |  114<H>  |  25<H>  ----------------------------<  102<H>  3.3<L>   |  25  |  0.50    Ca    8.2<L>      26 Nov 2017 05:57  Phos  2.7     11-26  Mg     1.7     11-26    TPro  5.7<L>  /  Alb  2.2<L>  /  TBili  0.4  /  DBili  x   /  AST  69<H>  /  ALT  50<H>  /  AlkPhos  112  11-26    proBNP:   Lipid Profile:   HgA1c:   TSH:     ASSESSMENT/PLAN:  78 y/o F w/ HX HTN, HLD, systolic chf,  Asthma, cardiomyopathy w/ EF 15% on echo of 5/8/17 Biventricular ICD ~ 1 yr ago    sent from [facility] for AMS. Per transfer documents, pt lethargic and not eating. Conversive at baseline but today non-verbal.     Septic 2/2 UTI   ABX as per primary team   tachycardia 2/2 underlying infection   hold antihypertensives until BP can support   on IV meds at present time decreased swallowing ability   interrogate ICD   Pt with  EF 15 % on recent Echo be extremely conservative with IVF's-- if required monitor closely for volume overload

## 2017-11-26 NOTE — PROGRESS NOTE ADULT - PROBLEM SELECTOR PLAN 7
Pt has a history of CHF with a previous TTE showing EF of 20% with medtronic AICD. Pt is unable to take any pills at the moment 2/2 AMS.   - will hold home medications until hemodynamics improve  - if AMS persists, will consider switching medications to IV

## 2017-11-26 NOTE — PROGRESS NOTE ADULT - PROBLEM SELECTOR PLAN 2
Pt improved and is now responding "yes" and "no". Still minimally verbal  - pt will be treated with abx and ivfluid  - will monitor mental status and hemodynamics closely, expected to improve as sepsis is treated

## 2017-11-26 NOTE — PROGRESS NOTE ADULT - ASSESSMENT
77yof w/ CAD, MI, Systolic CHF/s/p AICD, HTN, sent from [facility] for AMS due to sepsis 2/2 UTI.

## 2017-11-26 NOTE — PROGRESS NOTE ADULT - PROBLEM SELECTOR PLAN 9
VTE: Lovenox  Diet: NPO until mental status improves  Code: no advanced directive in chart    JULIEN Franks [id number]  Internal Medicine Department  Pager: [phone]

## 2017-11-26 NOTE — PROGRESS NOTE ADULT - SUBJECTIVE AND OBJECTIVE BOX
HUMA MARSH  77y  Female      Patient is a 77y old  Female who presents with a chief complaint of AMS (2017 18:22)      INTERVAL HPI/OVERNIGHT EVENTS:  -pt improving overnight. pt now understanding what is said to her and responds "yes" and "no"    Medications:  acetaminophen  IVPB. 1000 milliGRAM(s) IV Intermittent once  buDESOnide 160 MICROgram(s)/formoterol 4.5 MICROgram(s) Inhaler 2 Puff(s) Inhalation two times a day  cefepime  IVPB 2000 milliGRAM(s) IV Intermittent every 12 hours  collagenase Ointment 1 Application(s) Topical two times a day  enoxaparin Injectable 40 milliGRAM(s) SubCutaneous daily  latanoprost 0.005% Ophthalmic Solution 1 Drop(s) Both EYES at bedtime  levothyroxine Injectable 25 MICROGram(s) IV Push daily  tiotropium 18 MICROgram(s) Capsule 1 Capsule(s) Inhalation daily  vancomycin  IVPB 750 milliGRAM(s) IV Intermittent every 12 hours      REVIEW OF SYSTEMS:  ROS limited due to non verbal pt  Pt reports having stomach ache and back pain. Denies headache, chest pain, SOB, or palpitations    T(C): 37.6 (17 @ 08:00), Max: 39.7 (17 @ 16:12)  HR: 91 (17 @ 08:00) (78 - 99)  BP: 106/60 (17 @ 08:00) (90/47 - 106/83)  RR: 17 (17 @ 08:00) (17 - 22)  SpO2: 100% (17 @ 08:00) (79% - 100%)  Wt(kg): --Vital Signs Last 24 Hrs  T(C): 37.6 (2017 08:00), Max: 39.7 (2017 16:12)  T(F): 99.6 (2017 08:00), Max: 103.5 (2017 16:12)  HR: 91 (2017 08:00) (78 - 99)  BP: 106/60 (2017 08:00) (90/47 - 106/83)  BP(mean): --  RR: 17 (2017 08:00) (17 - 22)  SpO2: 100% (2017 08:00) (79% - 100%)    PHYSICAL EXAM:  GENERAL: elderly female, frail in appearance  HEAD:  Atraumatic, Normocephalic  EYES: unable to assess as pt does not follow commands well, sclera clear  NECK: Supple, No JVD  CHEST/LUNG: Clear to auscultation bilaterally; No wheeze  HEART: Regular rate and rhythm; No murmurs, rubs, or gallops  ABDOMEN: Grimaces upon palpation of the entire abdomen, Nondistended  EXTREMITIES:  2+ Peripheral Pulses, No clubbing, cyanosis, or edema  PSYCH: pt non verbal, but responsive to name  NEUROLOGY: recognizes her name, however, is unable to say anything else  SKIN: decubitus ulcer on back, unstageable    Consultant(s) Notes Reviewed:  [x ] YES  [ ] NO  Care Discussed with Consultants/Other Providers [ x] YES  [ ] NO    LABS:                        9.6    20.43 )-----------( 302      ( 2017 05:57 )             32.7         150<H>  |  114<H>  |  25<H>  ----------------------------<  102<H>  3.3<L>   |  25  |  0.50    Ca    8.2<L>      2017 05:57  Phos  2.7       Mg     1.7         TPro  5.7<L>  /  Alb  2.2<L>  /  TBili  0.4  /  DBili  x   /  AST  69<H>  /  ALT  50<H>  /  AlkPhos  112        Urinalysis Basic - ( 2017 15:45 )    Color: YELLOW / Appearance: HAZY / S.016 / pH: 6.5  Gluc: NEGATIVE / Ketone: NEGATIVE  / Bili: NEGATIVE / Urobili: NORMAL E.U.   Blood: SMALL / Protein: 30 / Nitrite: NEGATIVE   Leuk Esterase: LARGE / RBC: 10-25 / WBC >50   Sq Epi: OCC / Non Sq Epi: x / Bacteria: MOD    Urinalysis Basic - ( 2017 15:45 )    Color: YELLOW / Appearance: HAZY / S.016 / pH: 6.5  Gluc: NEGATIVE / Ketone: NEGATIVE  / Bili: NEGATIVE / Urobili: NORMAL E.U.   Blood: SMALL / Protein: 30 / Nitrite: NEGATIVE   Leuk Esterase: LARGE / RBC: 10-25 / WBC >50   Sq Epi: OCC / Non Sq Epi: x / Bacteria: MOD        RADIOLOGY & ADDITIONAL TESTS:  < from: Xray Chest 1 View AP -PORTABLE-Routine (17 @ 20:45) >  IMPRESSION:   Cardiomegaly with clear lungs.    < end of copied text >    HEALTH ISSUES - PROBLEM Dx:  Transaminitis: Transaminitis  Hypokalemia: Hypokalemia  Hypernatremia: Hypernatremia  Decubitus ulcer of sacral region, unstageable: Decubitus ulcer of sacral region, unstageable  Need for prophylactic measure: Need for prophylactic measure  Decubitus ulcer of sacral region, stage 4: Decubitus ulcer of sacral region, stage 4  CAD (coronary artery disease): CAD (coronary artery disease)  CHF (congestive heart failure): CHF (congestive heart failure)  Delirium due to another medical condition, acute, hypoactive: Delirium due to another medical condition, acute, hypoactive  Sepsis due to urinary tract infection: Sepsis due to urinary tract infection

## 2017-11-27 NOTE — CONSULT NOTE ADULT - SUBJECTIVE AND OBJECTIVE BOX
HPI:  76yo F with PMH of CAD, MI, CHF, s/p AICD, HTN, unstageable decubitus sacral ulcer, and recent admission for CRE and Pseudomonas UTI (17) who presented from [facility] on  for altered mentation. On presentation in the ED, she was she was non verbal, though responsive to name, with fever (Tmax 103), leukocytosis (20.77), and 1L output of cloudy urine via catheterization. Cefepime and vancomycin were initiated in the ED. Pt now with gram negative urine and blood cxs.    Today, the patient is oriented to person and place, not to time, requiring considerable energy to concentrate and answer questions. Hx and ROS are therefore limited, but she does endorse F/C, dysuria, nausea without vomiting, abdominal pain, SOB, and intermittent HA. She is unsure how long she has had these sxs or if any contacts have had similar sxs. She denies CP, dizziness, vision changes, diarrhea.      PAST MEDICAL & SURGICAL HISTORY:  Paroxysmal atrial fibrillation  GERD (gastroesophageal reflux disease)  DM (diabetes mellitus)  Asthma  MI (myocardial infarction)  CAD (coronary artery disease)  Lymphoma: S/P resection and chemotherapy in remission  HLD (hyperlipidemia)  CHF (congestive heart failure): On home oxygen 2L PRN  HTN (hypertension)  ICD (implantable cardioverter-defibrillator) in place  S/P coronary artery stent placement  S/P myomectomy  S/P appendectomy  S/P lymph node biopsy: Biopsy and resection      Allergies  peanuts (Unknown)  penicillin (Nausea)        ANTIMICROBIALS:  cefepime  IVPB 2000 every 12 hours      OTHER MEDS: MEDICATIONS  (STANDING):  buDESOnide 160 MICROgram(s)/formoterol 4.5 MICROgram(s) Inhaler 2 two times a day  enoxaparin Injectable 40 daily  levothyroxine Injectable 25 daily  tiotropium 18 MICROgram(s) Capsule 1 daily      SOCIAL HISTORY:  [ ] etoh [ ] tobacco [ ] former smoker [ ] IVDU    FAMILY HISTORY:  No pertinent family history in first degree relatives      REVIEW OF SYSTEMS  [  ] ROS unobtainable because:    [  ] All other systems negative except as noted below:	    Constitutional:  [ X ] fever [ ] weight loss  Skin:  [ ] rash [ ] phlebitis	  Eyes: [ ] icterus [ ] inflammation	  ENMT: [ ] discharge [ ] thrush [ ] ulcers [ ] exudates  Respiratory: [ X ] dyspnea [ ] hemoptysis [ ] cough [ ] sputum	  Cardiovascular:  [ ] chest pain [ ] palpitations [ ] edema	  Gastrointestinal:  [ X ] nausea [ ] vomiting [ ] diarrhea [ ] constipation [ ] pain	  Genitourinary:  [ X ] dysuria [ ] frequency [ ] hematuria [ ] discharge [ ] flank pain  Musculoskeletal:  [ ] myalgias [ ] arthralgias [ ] arthritis	  Neurological:  [ X ] headache [ ] seizures	  Psychiatric:  [ ] anxiety [ ] depression	  Hematology/Lymphatics:  [ ] lymphadenopathy  Endocrine:  [ ] adrenal [ ] thyroid  Allergic/Immunologic:	 [ ] transplant [ ] seasonal    Vital Signs Last 24 Hrs  T(F): 98.9 (17 @ 05:47), Max: 103.5 (17 @ 16:12)    Vital Signs Last 24 Hrs  HR: 94 (17 @ 05:47) (90 - 99)  BP: 122/55 (17 @ 05:47) (105/68 - 125/75)  RR: 16 (17 @ 05:47)  SpO2: 100% (17 @ 05:47) (98% - 100%)  Wt(kg): --    PHYSICAL EXAM  GENERAL: NAD  HEENT:  AT/NC; EOMI, PERRLA, conjunctiva and sclera clear; hearing grossly intact  NECK: Supple, non tender  CHEST/LUNG: CTAB, no wheezes, ronchi, rales  HEART: Normal rate, regular rhythm; No murmurs, rubs, or gallopsd  : ?CVA tenderness B/L -- endorses TTP with majority of PE (B/L LE/UE, abd)  ABDOMEN: Soft, diffusely TTP, Nondistended, normoactive BS    MSK/EXTREMITIES:  Grossly normal strength, movement, tone, and ROM; Palpable peripheral pulses; No clubbing or cyanosis  PSYCH: AAOx2 -- person and place, not time  NEUROLOGY: Non focal                                     9.7    16.48 )-----------( 305      ( 2017 06:38 )             30.2           147<H>  |  113<H>  |  19  ----------------------------<  92  4.2   |  23  |  0.44<L>    Ca    8.6      2017 06:38  Phos  2.4       Mg     1.7         TPro  5.5<L>  /  Alb  2.2<L>  /  TBili  0.3  /  DBili  x   /  AST  52<H>  /  ALT  39<H>  /  AlkPhos  112        Urinalysis Basic - ( 2017 15:45 )    Color: YELLOW / Appearance: HAZY / S.016 / pH: 6.5  Gluc: NEGATIVE / Ketone: NEGATIVE  / Bili: NEGATIVE / Urobili: NORMAL E.U.   Blood: SMALL / Protein: 30 / Nitrite: NEGATIVE   Leuk Esterase: LARGE / RBC: 10-25 / WBC >50   Sq Epi: OCC / Non Sq Epi: x / Bacteria: MOD        MICROBIOLOGY:  URINE CATHETER  17 --  --  --      BLOOD PERIPHERAL  17 --  --  BLOOD CULTURE PCR  Gram Neg Bear To Be Identified      URINE CATHETER  17 --  --  Pseudomonas aeruginosa  Escherichia coli CRE      BLOOD PERIPHERAL  17 --  --  --        RADIOLOGY:    < from: Xray Chest 1 View AP -PORTABLE-Routine (17 @ 19:42) >    EXAM:  RAD CHEST PORTABLE ROUTINE        PROCEDURE DATE:  2017         INTERPRETATION:  CLINICAL INFORMATION: Sepsis.    EXAM: Frontal radiograph of the chest.    COMPARISON: Chest radiograph from 2017.    FINDINGS:  Cardiac size cannotbe determined on this AP projection. Left-sided AICD.    The lungs are clear. No pneumothorax or pleural effusions.    General demineralization of the bones.    IMPRESSION: Clear lungs.    WILL MOTT M.D., RADIOLOGY RESIDENT  This document has been electronically signed.  DANIEL THOMPSON M.D., ATTENDING RADIOLOGIST  This document has been electronically signed. 2017 10:17AM      < end of copied text > HPI:  78yo F with PMH of CAD, MI, CHF, s/p AICD, HTN, unstageable decubitus sacral ulcer, and recent ED visit for CRE and Pseudomonas UTI (17, sent home on 7 day course of Cipro, CRE assessed as contaminant) who presented from [facility] on  for altered mentation. On presentation in the ED, she was she was non verbal, though responsive to name, with fever (Tmax 103), leukocytosis (20.77), and 1L output of cloudy urine via catheterization. Cefepime and vancomycin were initiated in the ED. Pt now with gram negative urine and blood cxs.    Today, the patient is oriented to person and place, not to time, requiring considerable energy to concentrate and answer questions. Hx and ROS are therefore limited, but she does endorse F/C, dysuria, nausea without vomiting, abdominal pain, SOB, and intermittent HA. She is unsure how long she has had these sxs or if any contacts have had similar sxs. She denies CP, dizziness, vision changes, diarrhea.      PAST MEDICAL & SURGICAL HISTORY:  Paroxysmal atrial fibrillation  GERD (gastroesophageal reflux disease)  DM (diabetes mellitus)  Asthma  MI (myocardial infarction)  CAD (coronary artery disease)  Lymphoma: S/P resection and chemotherapy in remission  HLD (hyperlipidemia)  CHF (congestive heart failure): On home oxygen 2L PRN  HTN (hypertension)  ICD (implantable cardioverter-defibrillator) in place  S/P coronary artery stent placement  S/P myomectomy  S/P appendectomy  S/P lymph node biopsy: Biopsy and resection      Allergies  peanuts (Unknown)  penicillin (Nausea)        ANTIMICROBIALS:  cefepime  IVPB 2000 every 12 hours      OTHER MEDS: MEDICATIONS  (STANDING):  buDESOnide 160 MICROgram(s)/formoterol 4.5 MICROgram(s) Inhaler 2 two times a day  enoxaparin Injectable 40 daily  levothyroxine Injectable 25 daily  tiotropium 18 MICROgram(s) Capsule 1 daily      SOCIAL HISTORY:  [ ] etoh [ ] tobacco [ ] former smoker [ ] IVDU    FAMILY HISTORY:  No pertinent family history in first degree relatives      REVIEW OF SYSTEMS  [  ] ROS unobtainable because:    [  ] All other systems negative except as noted below:	    Constitutional:  [ X ] fever [ ] weight loss  Skin:  [ ] rash [ ] phlebitis	  Eyes: [ ] icterus [ ] inflammation	  ENMT: [ ] discharge [ ] thrush [ ] ulcers [ ] exudates  Respiratory: [ X ] dyspnea [ ] hemoptysis [ ] cough [ ] sputum	  Cardiovascular:  [ ] chest pain [ ] palpitations [ ] edema	  Gastrointestinal:  [ X ] nausea [ ] vomiting [ ] diarrhea [ ] constipation [ ] pain	  Genitourinary:  [ X ] dysuria [ ] frequency [ ] hematuria [ ] discharge [ ] flank pain  Musculoskeletal:  [ ] myalgias [ ] arthralgias [ ] arthritis	  Neurological:  [ X ] headache [ ] seizures	  Psychiatric:  [ ] anxiety [ ] depression	  Hematology/Lymphatics:  [ ] lymphadenopathy  Endocrine:  [ ] adrenal [ ] thyroid  Allergic/Immunologic:	 [ ] transplant [ ] seasonal    Vital Signs Last 24 Hrs  T(F): 98.9 (17 @ 05:47), Max: 103.5 (17 @ 16:12)    Vital Signs Last 24 Hrs  HR: 94 (17 @ 05:47) (90 - 99)  BP: 122/55 (17 @ 05:47) (105/68 - 125/75)  RR: 16 (17 @ 05:47)  SpO2: 100% (17 @ 05:47) (98% - 100%)  Wt(kg): --    PHYSICAL EXAM  GENERAL: NAD  HEENT:  AT/NC; EOMI, PERRLA, conjunctiva and sclera clear; hearing grossly intact  NECK: Supple, non tender  CHEST/LUNG: CTAB, no wheezes, ronchi, rales  HEART: Normal rate, regular rhythm; No murmurs, rubs, or gallopsd  : ?CVA tenderness B/L -- endorses TTP with majority of PE (B/L LE/UE, abd)  ABDOMEN: Soft, diffusely TTP, Nondistended, normoactive BS    MSK/EXTREMITIES:  Grossly normal strength, movement, tone, and ROM; Palpable peripheral pulses; No clubbing or cyanosis  PSYCH: AAOx2 -- person and place, not time  NEUROLOGY: Non focal                                     9.7    16.48 )-----------( 305      ( 2017 06:38 )             30.2           147<H>  |  113<H>  |  19  ----------------------------<  92  4.2   |  23  |  0.44<L>    Ca    8.6      2017 06:38  Phos  2.4       Mg     1.7         TPro  5.5<L>  /  Alb  2.2<L>  /  TBili  0.3  /  DBili  x   /  AST  52<H>  /  ALT  39<H>  /  AlkPhos  112        Urinalysis Basic - ( 2017 15:45 )    Color: YELLOW / Appearance: HAZY / S.016 / pH: 6.5  Gluc: NEGATIVE / Ketone: NEGATIVE  / Bili: NEGATIVE / Urobili: NORMAL E.U.   Blood: SMALL / Protein: 30 / Nitrite: NEGATIVE   Leuk Esterase: LARGE / RBC: 10-25 / WBC >50   Sq Epi: OCC / Non Sq Epi: x / Bacteria: MOD        MICROBIOLOGY:  URINE CATHETER  17 --  --  --      BLOOD PERIPHERAL  17 --  --  BLOOD CULTURE PCR  Gram Neg Bear To Be Identified      URINE CATHETER  17 --  --  Pseudomonas aeruginosa  Escherichia coli CRE      BLOOD PERIPHERAL  17 --  --  --        RADIOLOGY:    < from: Xray Chest 1 View AP -PORTABLE-Routine (17 @ 19:42) >    EXAM:  RAD CHEST PORTABLE ROUTINE        PROCEDURE DATE:  2017         INTERPRETATION:  CLINICAL INFORMATION: Sepsis.    EXAM: Frontal radiograph of the chest.    COMPARISON: Chest radiograph from 2017.    FINDINGS:  Cardiac size cannotbe determined on this AP projection. Left-sided AICD.    The lungs are clear. No pneumothorax or pleural effusions.    General demineralization of the bones.    IMPRESSION: Clear lungs.    WILL MOTT M.D., RADIOLOGY RESIDENT  This document has been electronically signed.  DANIEL THOMPSON M.D., ATTENDING RADIOLOGIST  This document has been electronically signed. 2017 10:17AM      < end of copied text > HPI:  78yo F with PMH of CAD, MI, CHF, s/p AICD, HTN, unstageable decubitus sacral ulcer, and recent ED visit for CRE and Pseudomonas UTI (17, sent home on 7 day course of Cipro, CRE assessed as contaminant) who presented from [facility] on  for altered mentation. On presentation in the ED, she was she was non verbal, though responsive to name, with fever (Tmax 103), leukocytosis (20.77), and 1L output of cloudy urine via catheterization. Cefepime and vancomycin were initiated in the ED. Pt now with gram negative urine and blood cxs.    Today, the patient is oriented to person and place, not to time, requiring considerable energy to concentrate and answer questions. Hx and ROS are therefore limited, but she does endorse F/C, dysuria, nausea without vomiting, abdominal pain, SOB, and intermittent HA. She is unsure how long she has had these sxs or if any contacts have had similar sxs. She denies CP, dizziness, vision changes, diarrhea.      PAST MEDICAL & SURGICAL HISTORY:  Paroxysmal atrial fibrillation  GERD (gastroesophageal reflux disease)  DM (diabetes mellitus)  Asthma  MI (myocardial infarction)  CAD (coronary artery disease)  Lymphoma: S/P resection and chemotherapy in remission  HLD (hyperlipidemia)  CHF (congestive heart failure): On home oxygen 2L PRN  HTN (hypertension)  ICD (implantable cardioverter-defibrillator) in place  S/P coronary artery stent placement  S/P myomectomy  S/P appendectomy  S/P lymph node biopsy: Biopsy and resection      Allergies  peanuts (Unknown)  penicillin (Nausea)        ANTIMICROBIALS:  cefepime  IVPB 2000 every 12 hours      OTHER MEDS: MEDICATIONS  (STANDING):  buDESOnide 160 MICROgram(s)/formoterol 4.5 MICROgram(s) Inhaler 2 two times a day  enoxaparin Injectable 40 daily  levothyroxine Injectable 25 daily  tiotropium 18 MICROgram(s) Capsule 1 daily      SOCIAL HISTORY:  [ ] etoh [ ] tobacco [ ] former smoker [ ] IVDU    FAMILY HISTORY:  No pertinent family history in first degree relatives      REVIEW OF SYSTEMS  [  ] ROS unobtainable because:    [  ] All other systems negative except as noted below:	    Constitutional:  [ X ] fever [ ] weight loss  Skin:  [ ] rash [ ] phlebitis	  Eyes: [ ] icterus [ ] inflammation	  ENMT: [ ] discharge [ ] thrush [ ] ulcers [ ] exudates  Respiratory: [ X ] dyspnea [ ] hemoptysis [ ] cough [ ] sputum	  Cardiovascular:  [ ] chest pain [ ] palpitations [ ] edema	  Gastrointestinal:  [ X ] nausea [ ] vomiting [ ] diarrhea [ ] constipation [ ] pain	  Genitourinary:  [ X ] dysuria [ ] frequency [ ] hematuria [ ] discharge [ ] flank pain  Musculoskeletal:  [ ] myalgias [ ] arthralgias [ ] arthritis	  Neurological:  [ X ] headache [ ] seizures	  Psychiatric:  [ ] anxiety [ ] depression	  Hematology/Lymphatics:  [ ] lymphadenopathy  Endocrine:  [ ] adrenal [ ] thyroid  Allergic/Immunologic:	 [ ] transplant [ ] seasonal    Vital Signs Last 24 Hrs  T(F): 98.9 (17 @ 05:47), Max: 103.5 (17 @ 16:12)    Vital Signs Last 24 Hrs  HR: 94 (17 @ 05:47) (90 - 99)  BP: 122/55 (17 @ 05:47) (105/68 - 125/75)  RR: 16 (17 @ 05:47)  SpO2: 100% (17 @ 05:47) (98% - 100%)  Wt(kg): --    PHYSICAL EXAM  GENERAL: ill appearing  HEENT:  AT/NC; EOMI, PERRLA, conjunctiva and sclera clear; hearing grossly intact  NECK: Supple, non tender  CHEST/LUNG: CTAB, no wheezes, ronchi, rales  HEART: Normal rate, regular rhythm; No murmurs, rubs, or gallops  : ?CVA tenderness B/L -- endorses TTP with majority of PE (B/L LE/UE, abd)  ABDOMEN: Soft, diffusely TTP, Nondistended, normoactive BS    MSK/EXTREMITIES:  Grossly normal strength, movement, tone, and ROM; Palpable peripheral pulses; No clubbing or cyanosis  left knee with min effusion pain on ROM  PSYCH: AAOx2 -- person and place, not time  NEUROLOGY: Non focal   BACK: stage 2-3 sacral ulcer approx 6 cm no necrosis or surrounding erythema                                    9.7    16.48 )-----------( 305      ( 2017 06:38 )             30.2           147<H>  |  113<H>  |  19  ----------------------------<  92  4.2   |  23  |  0.44<L>    Ca    8.6      2017 06:38  Phos  2.4       Mg     1.7         TPro  5.5<L>  /  Alb  2.2<L>  /  TBili  0.3  /  DBili  x   /  AST  52<H>  /  ALT  39<H>  /  AlkPhos  112        Urinalysis Basic - ( 2017 15:45 )    Color: YELLOW / Appearance: HAZY / S.016 / pH: 6.5  Gluc: NEGATIVE / Ketone: NEGATIVE  / Bili: NEGATIVE / Urobili: NORMAL E.U.   Blood: SMALL / Protein: 30 / Nitrite: NEGATIVE   Leuk Esterase: LARGE / RBC: 10-25 / WBC >50   Sq Epi: OCC / Non Sq Epi: x / Bacteria: MOD        MICROBIOLOGY:  URINE CATHETER  17 --  --  --      BLOOD PERIPHERAL  17 --  --  BLOOD CULTURE PCR  Gram Neg Bear To Be Identified      URINE CATHETER  17 --  --  Pseudomonas aeruginosa  Escherichia coli CRE      BLOOD PERIPHERAL  17 --  --  --        RADIOLOGY:    < from: Xray Chest 1 View AP -PORTABLE-Routine (17 @ 19:42) >    EXAM:  RAD CHEST PORTABLE ROUTINE        PROCEDURE DATE:  2017         INTERPRETATION:  CLINICAL INFORMATION: Sepsis.    EXAM: Frontal radiograph of the chest.    COMPARISON: Chest radiograph from 2017.    FINDINGS:  Cardiac size cannotbe determined on this AP projection. Left-sided AICD.    The lungs are clear. No pneumothorax or pleural effusions.    General demineralization of the bones.    IMPRESSION: Clear lungs.    WILL MOTT M.D., RADIOLOGY RESIDENT  This document has been electronically signed.  DANIEL THOMPSON M.D., ATTENDING RADIOLOGIST  This document has been electronically signed. 2017 10:17AM      < end of copied text >

## 2017-11-27 NOTE — PROGRESS NOTE ADULT - ASSESSMENT
77yof w/ CAD, MI, Systolic CHF/s/p AICD, HTN, sent from [facility] for AMS due to sepsis 2/2 UTI.

## 2017-11-27 NOTE — PROGRESS NOTE ADULT - SUBJECTIVE AND OBJECTIVE BOX
SUBJECTIVE: c/o pain in right leg, same as her "usual" pain. No CP or SOB    MEDICATIONS  (STANDING):  buDESOnide 160 MICROgram(s)/formoterol 4.5 MICROgram(s) Inhaler 2 Puff(s) Inhalation two times a day  cefepime  IVPB 2000 milliGRAM(s) IV Intermittent every 12 hours  collagenase Ointment 1 Application(s) Topical two times a day  dextrose 5%. 1000 milliLiter(s) (75 mL/Hr) IV Continuous <Continuous>  enoxaparin Injectable 40 milliGRAM(s) SubCutaneous daily  latanoprost 0.005% Ophthalmic Solution 1 Drop(s) Both EYES at bedtime  levothyroxine Injectable 25 MICROGram(s) IV Push daily  tiotropium 18 MICROgram(s) Capsule 1 Capsule(s) Inhalation daily    LABS:                        9.7    16.48 )-----------( 305      ( 27 Nov 2017 06:38 )             30.2     147<H>  |  113<H>  |  19  ----------------------------<  92  4.2   |  23  |  0.44<L>    Ca    8.6      27 Nov 2017 06:38  Phos  2.4     11-27  Mg     1.7     11-27    TPro  5.5<L>  /  Alb  2.2<L>  /  TBili  0.3  /  DBili  x   /  AST  52<H>  /  ALT  39<H>  /  AlkPhos  112  11-27    CARDIAC MARKERS ( 25 Nov 2017 15:45 )  x     / < 0.06 ng/mL / 173 u/L / 2.09 ng/mL / x        PHYSICAL EXAM:  Vital Signs Last 24 Hrs  T(C): 37.2 (27 Nov 2017 05:47), Max: 37.2 (27 Nov 2017 05:47)  T(F): 98.9 (27 Nov 2017 05:47), Max: 98.9 (27 Nov 2017 05:47)  HR: 94 (27 Nov 2017 05:47) (90 - 99)  BP: 122/55 (27 Nov 2017 05:47) (105/68 - 125/75)  RR: 16 (27 Nov 2017 05:47) (16 - 18)  SpO2: 100% (27 Nov 2017 05:47) (98% - 100%)    Cardiovascular:  S1S2 RRR, No JVD, 1/6 ERICA   Respiratory: Lungs clear to auscultation, normal effort  Gastrointestinal: Abdomen soft, ND, NT, +BS  Skin: Warm, dry, intact. No rash.  Ext: no C/C/E B/L LE    DIAGNOSTIC DATA    < from: TTE with Doppler (w/Cont) (04.03.17 @ 16:18) >  CONCLUSIONS:  1. Mitral annular calcification, otherwise normal mitral  valve. Mild mitral regurgitation.  2. Moderately dilated left atrium.  LA volume index = 46  cc/m2.  3. Moderate left ventricular enlargement.  4. Severe global left ventricular systolic dysfunction.  Endocardial visualization enhanced with intravenous  injection of echo contrast (Definity).  5. The right ventricle is not well visualized; grossly  normal right ventricular systolic function.  A device wire  is noted in the right heart.  6. Estimated right ventricular systolic pressure equals 60  mm Hg, assuming right atrial pressure equals 10 mm Hg,  consistent with moderate pulmonary hypertension.  ------------------------------------------------------------------------  Confirmed on  4/3/2017 - 17:19:47 by Jean-Pierre Carver M.D.    < end of copied text >    < from: Cardiac Cath Lab - Adult (04.26.16 @ 13:12) >  VENTRICLES: No LV gram was performed; however, a recent echocardiogram  demonstrated an EF of 19 %.  CORONARY VESSELS: The coronary circulation is right dominant.  LM:   --  LM: Normal.  LAD:   --  Proximal LAD: There was a [id number] % stenosis. There was mild  restenosis in proximal LAD stent.  --  Mid LAD: Angiography showed minor luminal irregularities with no flow  limiting lesions.  --  Distal LAD: Angiography showed minor luminal irregularities with no  flow limiting lesions.  --  D1: Angiography showed minor luminal irregularities with no flow  limiting lesions.  CX:   --  Circumflex: Normal.  RI:   --  Ramus intermedius: Angiography showed minor luminal  irregularities with no flow limiting lesions.  RCA:   --  Proximal RCA: Normal.  --  Mid RCA: Angiography showed mild atherosclerosis with no flow limiting  lesions.  --  Distal RCA: Angiography showed minor luminal irregularities with no  flow limiting lesions.  --  RPDA: Angiography showed minor luminal irregularities with no flow  limiting lesions.  --  RPLS: Angiography showed minor luminal irregularities with no flow  limiting lesions.  COMPLICATIONS: There were no complications.  DIAGNOSTIC RECOMMENDATIONS: Medical management and aggressive risk factor  modification is recommended.  Prepared and signed by  Nehal López M.D.  Signed 04/27/2016 13:13:20    < end of copied text >      ASSESSMENT AND PLAN:    77y Female with hypertension, dyslipidemia, CAD s/p LAD stent with only mild re-stenosis with minor luminal irregularities otherwise on cath 4/16, with known severe LV dysfunction , NICM s/p [employer] BiV ICD, VT on Amio, COPD, lymphoma previously on chemo with known spinal mets and compression fractures admitted with lethargy due to UTI    --ABX per primary team  --Not in clinical CHF.  Monitor volume status  --Interrogate ICD  --Pain Management  --Cardiac meds on hold due to Hypotension on admit.  (asa 81 qd, Toprol xl 25 qd, cozaar 25 qd, Amio 400 QD, Lipitor 20 QD, Lasix 40 qd & Aldactone 12.5 qd)    Dolores Rodriguez PA-C  [facility]  [address], [address]   [place] [address]  office [phone]  pager [phone]

## 2017-11-27 NOTE — CONSULT NOTE ADULT - ASSESSMENT
78yo F with PMH of CAD, MI, HFrEF (EF 15%), s/p biventricular AICD, HTN, unstageable decubitus sacral ulcer, and recent admission for CRE and Pseudomonas UTI (11/07-09) who p/w altered mentation; found to have fever (Tmax 103), leukocytosis (20.77), and cloudy urine; now with gram negative bacteremia, likely 2/2 gram negative UTI.   ABX day 3: Cefepime     PROBLEM: Gram negative bacteremia  - Leukocytosis improving, afebrile (last F 11/25), mentation improving   - Would continue cefepime for now as previous admission CRE/Pseudomonas Cxs were sensitive; change PRN per sensitivities or if pt becomes febrile or WBC trends up.  - Daily blood cxs until negative.  - Would culture sacral ulcer if not already done. If pt does not improve, would consider MRI to look for osteomyelitis.    Will discuss with fellow and attending.      Anthony Ramos MD  PGY-1 | Internal Medicine  [phone] / [phone] 78yo F with PMH of CAD, MI, HFrEF (EF 15%), s/p biventricular AICD, HTN, unstageable decubitus sacral ulcer, andecent ED visit for CRE and Pseudomonas UTI (11/07/17, sent home on 7 day course of Cipro, CRE assessed as contaminant) who p/w altered mentation; found to have fever (Tmax 103), leukocytosis (20.77), and cloudy urine; now with gram negative bacteremia, likely 2/2 gram negative UTI.   ABX day 3: Cefepime     PROBLEM: Gram negative bacteremia  - Leukocytosis improving, afebrile (last F 11/25), mentation improving   - Would continue cefepime for now as previous admission CRE/Pseudomonas Cxs were sensitive; change PRN per sensitivities or if pt becomes febrile or WBC trends up.  - Daily blood cxs until negative.  - Would culture sacral ulcer if not already done. If pt does not improve, would consider MRI to look for osteomyelitis.    Will discuss with fellow and attending.      Anthony Ramos MD  PGY-1 | Internal Medicine  [phone] / [phone] 78yo F with PMH of CAD, MI, HFrEF (EF 15%), s/p biventricular AICD, HTN, unstageable decubitus sacral ulcer, andecent ED visit for CRE and Pseudomonas UTI (11/07/17, sent home on 7 day course of Cipro, CRE assessed as contaminant) who p/w altered mentation; found to have fever (Tmax 103), leukocytosis (20.77), and cloudy urine; now with gram negative bacteremia, likely 2/2 gram negative UTI.   ABX day 3: Cefepime     PROBLEM: Gram negative bacteremia  - Leukocytosis improving, afebrile (last F 11/25), mentation improving   - Would continue cefepime for now as previous admission CRE/Pseudomonas Cxs were sensitive; change PRN per sensitivities or if pt becomes febrile or WBC trends up.  - Would get TTE to r/o AICD nidus  - Would get renal u/s  - Daily blood cxs until negative.  - No need for sacral ulcer cx at this point, as source is most likely UTI    Discussed with fellow and attending.      Anthony Ramos MD  PGY-1 | Internal Medicine  [phone] / [phone]

## 2017-11-27 NOTE — CONSULT NOTE ADULT - SUBJECTIVE AND OBJECTIVE BOX
please see full dictation to follow  stage 2/3 mixed pressure ulcer 7x8.5x.6, small amount of muscle seen superiorly 2cm patch  continue santyl foam, offloading  will follow

## 2017-11-27 NOTE — PROGRESS NOTE ADULT - SUBJECTIVE AND OBJECTIVE BOX
HUMA MARSH  77y  Female      Patient is a 77y old  Female who presents with a chief complaint of AMS (2017 18:22)      INTERVAL HPI/OVERNIGHT EVENTS:  - pt was having leg pain o/n. given toradol and improved  - Hbg went back up to 9.7 after yesterday's dip  - pt had diarrhea, c. diff negative  - pt's FS went down to 68, given 1 amp D50 and improved    Medications:  buDESOnide 160 MICROgram(s)/formoterol 4.5 MICROgram(s) Inhaler 2 Puff(s) Inhalation two times a day  cefepime  IVPB 2000 milliGRAM(s) IV Intermittent every 12 hours  collagenase Ointment 1 Application(s) Topical two times a day  dextrose 5%. 1000 milliLiter(s) IV Continuous <Continuous>  enoxaparin Injectable 40 milliGRAM(s) SubCutaneous daily  latanoprost 0.005% Ophthalmic Solution 1 Drop(s) Both EYES at bedtime  levothyroxine Injectable 25 MICROGram(s) IV Push daily  tiotropium 18 MICROgram(s) Capsule 1 Capsule(s) Inhalation daily      REVIEW OF SYSTEMS:  pt still speaking to a limited capacity.   CONSTITUTIONAL: feels tired and weak  ENMT: pt reports that she's like to try and swallow  RESPIRATORY: reports feeling a bit short of breath  CARDIOVASCULAR: reports slight occasional chest pain  GASTROINTESTINAL: says her stomach hurts and points to her abdomen  NEUROLOGICAL: pt still a bit confused  ENDOCRINE: pt says she feels cold  MUSCULOSKELETAL: pt complains of back pain     T(C): 37.2 (17 @ 05:47), Max: 37.7 (17 @ 09:04)  HR: 94 (17 @ 05:47) (90 - 99)  BP: 122/55 (17 @ 05:47) (105/68 - 125/75)  RR: 16 (17 @ 05:47) (16 - 18)  SpO2: 100% (17 @ 05:47) (98% - 100%)  Wt(kg): --Vital Signs Last 24 Hrs  T(C): 37.2 (2017 05:47), Max: 37.7 (2017 09:04)  T(F): 98.9 (2017 05:47), Max: 99.9 (2017 09:04)  HR: 94 (2017 05:47) (90 - 99)  BP: 122/55 (2017 05:47) (105/68 - 125/75)  BP(mean): --  RR: 16 (2017 05:47) (16 - 18)  SpO2: 100% (2017 05:47) (98% - 100%)    PHYSICAL EXAM:  GENERAL: elderly female, frail in appearance  HEAD:  Atraumatic, Normocephalic  EYES: VALDO, EOMI, clear sclara  NECK: Supple, No JVD  CHEST/LUNG: Clear to auscultation bilaterally; No wheeze  HEART: Regular rate and rhythm; No murmurs, rubs, or gallops  ABDOMEN: Grimaces upon palpation of the entire abdomen, worse on suprapubic area, Nondistended  EXTREMITIES:  2+ Peripheral Pulses, No clubbing, cyanosis, or edema  PSYCH: pt speaking in short sentences today, still limited speach,. AAOx1  NEUROLOGY: recognizes her name, however, is unable to say anything else  SKIN: decubitus ulcer on back, unstageable    Consultant(s) Notes Reviewed:  [x ] YES  [ ] NO  Care Discussed with Consultants/Other Providers [ x] YES  [ ] NO    LABS:                        9.7    16.48 )-----------( 305      ( 2017 06:38 )             30.2     11-    147<H>  |  113<H>  |  19  ----------------------------<  92  4.2   |  23  |  0.44<L>    Ca    8.6      2017 06:38  Phos  2.4     11-  Mg     1.7     -    TPro  5.7<L>  /  Alb  2.2<L>  /  TBili  0.4  /  DBili  x   /  AST  69<H>  /  ALT  50<H>  /  AlkPhos  112  11-      Urinalysis Basic - ( 2017 15:45 )    Color: YELLOW / Appearance: HAZY / S.016 / pH: 6.5  Gluc: NEGATIVE / Ketone: NEGATIVE  / Bili: NEGATIVE / Urobili: NORMAL E.U.   Blood: SMALL / Protein: 30 / Nitrite: NEGATIVE   Leuk Esterase: LARGE / RBC: 10-25 / WBC >50   Sq Epi: OCC / Non Sq Epi: x / Bacteria: MOD      CAPILLARY BLOOD GLUCOSE      POCT Blood Glucose.: 83 mg/dL (2017 06:08)  POCT Blood Glucose.: 205 mg/dL (2017 23:29)  POCT Blood Glucose.: 68 mg/dL (2017 22:55)        Urinalysis Basic - ( 2017 15:45 )    Color: YELLOW / Appearance: HAZY / S.016 / pH: 6.5  Gluc: NEGATIVE / Ketone: NEGATIVE  / Bili: NEGATIVE / Urobili: NORMAL E.U.   Blood: SMALL / Protein: 30 / Nitrite: NEGATIVE   Leuk Esterase: LARGE / RBC: 10-25 / WBC >50   Sq Epi: OCC / Non Sq Epi: x / Bacteria: MOD    HEALTH ISSUES - PROBLEM Dx:  Transaminitis: Transaminitis  Hypokalemia: Hypokalemia  Hypernatremia: Hypernatremia  Decubitus ulcer of sacral region, unstageable: Decubitus ulcer of sacral region, unstageable  Need for prophylactic measure: Need for prophylactic measure  Decubitus ulcer of sacral region, stage 4: Decubitus ulcer of sacral region, stage 4  CAD (coronary artery disease): CAD (coronary artery disease)  CHF (congestive heart failure): CHF (congestive heart failure)  Delirium due to another medical condition, acute, hypoactive: Delirium due to another medical condition, acute, hypoactive  Sepsis due to urinary tract infection: Sepsis due to urinary tract infection

## 2017-11-27 NOTE — CONSULT NOTE ADULT - ATTENDING COMMENTS
Agree with above.   -Check AICD interrogation  -Monitor fluid status closely on IVF  -heart failure medications on hold due to low BP    Nehal López MD  [facility]  [address] [place] [address], [address]  [place] [address]  office: [phone]  pager: [phone]
Pseudomonas sepsis in 78 yo woman with CAD, AICD, stage 2-3 sacral decubitus, recent pseudomonas UTI 11/7 treated with cipro.  Source likely  tract.  Concern for seeding AICD.  Doubt related to sacral decubitus.    Suggest: TTE to look for vegetation                renal ultrasound               repeat blood cultures to assess for clearance of bacteremia               agree with cefepime 2 gm iv q 12 hours

## 2017-11-27 NOTE — ADVANCED PRACTICE NURSE CONSULT - REASON FOR CONSULT
Patient appropriately seen by Wound MD with topical recommendations, see consult note.    Please contact Wound Care Service Line if we can be of further assistance (ext [phone]).

## 2017-11-27 NOTE — PROGRESS NOTE ADULT - ATTENDING COMMENTS
Patient seen and examined with team.  Agree with the above A/P by Dr Horner  c/w antibiotics for Pseudomonas and E coli and f/u blood and Urine culture.  Id input appreciated.

## 2017-11-27 NOTE — PROGRESS NOTE ADULT - ATTENDING COMMENTS
Agree with above.   -f/u Cx  -Abx per primary team  -Interrogate JOSE ANTONIO López MD  [facility]  [address], [address]  [place] [address]  office: [phone]  pager: [phone]

## 2017-11-27 NOTE — CHART NOTE - NSCHARTNOTEFT_GEN_A_CORE
ELECTROPHYSIOLOGY    Device Interrogation Performed                                  Date/Time:     11/27/2017      4:30pm  :            PiniOn                            Model:       Viva Quad XT  CRT-D                         Mode:                  DDD                                     Rate:     60/130     Atrial Lead:  P wave amplitude:           1.3               mv            Impedance                       399                Ohms  Threshold                    1.0                      V @         0.5       ms      Ventricular Lead: RV  R wave amplitude           8,9                mv  Impedance                  342                     Ohms  Threshold                    0.5                     V @        0.5         ms      Ventricular Lead: LV  amplitude                                         mv  Impedance              437                         Ohms  Threshold               0.5                          V @          0.5      ms                                  Battery Status:                     Good                Underlying Rhythm:             Normal sinus rhythm 90 b/min with 1st degree AVB    Events/Observation:            None.   Optivol fluid level greater than threshold since mid November.     Impression/Plan:  Normal ICD function.   Normal sensing and pacing via iterative testing. Good battery status. Excellent threshold capture.  No reprogramming.

## 2017-11-27 NOTE — PROGRESS NOTE ADULT - PROBLEM SELECTOR PLAN 2
Pt is speaking in short sentences, but still limited in speech  - c/w with abx and ivfluid conservatively  - will monitor mental status and hemodynamics closely, expected to improve as sepsis is treated

## 2017-11-27 NOTE — PROGRESS NOTE ADULT - PROBLEM SELECTOR PLAN 9
VTE: Lovenox  Diet: NPO until mental status improves  Code: no advanced directive in chart    JULIEN Franks MD-PGY1  Internal Medicine Department  Pager: [phone]

## 2017-11-28 NOTE — PROGRESS NOTE ADULT - PROBLEM SELECTOR PLAN 9
- will hold oral medications until AMS improves  - EKG normal VTE: Lovenox  Diet: NPO until mental status improves  Code: no advanced directive in chart    JULIEN Franks MD-PGY1  Internal Medicine Department  Pager: [phone]

## 2017-11-28 NOTE — PROGRESS NOTE ADULT - PROBLEM SELECTOR PLAN 2
Pt was found to have pseudomonas growing in the blood. ID thinks that it Pt was found to have pseudomonas growing in the blood. ID wants to rule out AICD vegetation vs pyelo   -ordered TTE and mita U/s  -will continue to draw blood clxs daily until it clears  -will continue cefepime  -appreciate ID recs

## 2017-11-28 NOTE — DIETITIAN INITIAL EVALUATION ADULT. - PROBLEM SELECTOR PLAN 1
Pt found to be febrile, tachycardic, and hypotensive with previously positive urine cultures and cloudy urine on straight cath. Pt was empirically given 2L of fluid in ED, however, we will be cautious with further fluid boluses due to low EF.  - pt started on Cefepime (day 1) and Vancomycin(day1) (previous clxs shows sensitivity to cefepime)  - will monitor pt's hemodynamic status closely  f/u cultures

## 2017-11-28 NOTE — PROGRESS NOTE ADULT - SUBJECTIVE AND OBJECTIVE BOX
SUBJECTIVE: No CP or SOB    MEDICATIONS  (STANDING):  buDESOnide 160 MICROgram(s)/formoterol 4.5 MICROgram(s) Inhaler 2 Puff(s) Inhalation two times a day  cefepime  IVPB 2000 milliGRAM(s) IV Intermittent every 12 hours  collagenase Ointment 1 Application(s) Topical two times a day  enoxaparin Injectable 40 milliGRAM(s) SubCutaneous daily  latanoprost 0.005% Ophthalmic Solution 1 Drop(s) Both EYES at bedtime  levothyroxine Injectable 25 MICROGram(s) IV Push daily  tiotropium 18 MICROgram(s) Capsule 1 Capsule(s) Inhalation daily    LABS:                        9.9    11.84 )-----------( 362      ( 28 Nov 2017 07:32 )             32.0     146<H>  |  111<H>  |  16  ----------------------------<  101<H>  5.0   |  21<L>  |  0.41<L>    Ca    8.8      28 Nov 2017 07:32  Phos  2.8     11-28  Mg     1.8     11-28    TPro  6.0  /  Alb  2.3<L>  /  TBili  0.3  /  DBili  x   /  AST  44<H>  /  ALT  36<H>  /  AlkPhos  102  11-28    Creatinine Trend: 0.41<--, 0.44<--, 0.44<--, 0.50<--, 0.46<--, 0.75<--     PHYSICAL EXAM  Vital Signs Last 24 Hrs  T(C): 36.1 (28 Nov 2017 13:29), Max: 37 (28 Nov 2017 02:20)  T(F): 97 (28 Nov 2017 13:29), Max: 98.6 (28 Nov 2017 02:20)  HR: 88 (28 Nov 2017 13:29) (88 - 96)  BP: 141/77 (28 Nov 2017 13:29) (130/75 - 141/77)  RR: 24 (28 Nov 2017 13:29) (17 - 24)  SpO2: 100% (28 Nov 2017 13:29) (99% - 100%)    Cardiovascular:  S1S2 RRR, No JVD, 1/6 ERICA   Respiratory: Lungs clear to auscultation, normal effort  Gastrointestinal: Abdomen soft, ND, NT, +BS  Skin: Warm, dry, intact. No rash.  Ext: no C/C/E B/L LE    DIAGNOSTIC DATA    < from: TTE with Doppler (w/Cont) (04.03.17 @ 16:18) >  CONCLUSIONS:  1. Mitral annular calcification, otherwise normal mitral  valve. Mild mitral regurgitation.  2. Moderately dilated left atrium.  LA volume index = 46  cc/m2.  3. Moderate left ventricular enlargement.  4. Severe global left ventricular systolic dysfunction.  Endocardial visualization enhanced with intravenous  injection of echo contrast (Definity).  5. The right ventricle is not well visualized; grossly  normal right ventricular systolic function.  A device wire  is noted in the right heart.  6. Estimated right ventricular systolic pressure equals 60  mm Hg, assuming right atrial pressure equals 10 mm Hg,  consistent with moderate pulmonary hypertension.  ------------------------------------------------------------------------  Confirmed on  4/3/2017 - 17:19:47 by Jean-Pierre Carver M.D.    < end of copied text >    < from: Cardiac Cath Lab - Adult (04.26.16 @ 13:12) >  VENTRICLES: No LV gram was performed; however, a recent echocardiogram  demonstrated an EF of 19 %.  CORONARY VESSELS: The coronary circulation is right dominant.  LM:   --  LM: Normal.  LAD:   --  Proximal LAD: There was a [id number] % stenosis. There was mild  restenosis in proximal LAD stent.  --  Mid LAD: Angiography showed minor luminal irregularities with no flow  limiting lesions.  --  Distal LAD: Angiography showed minor luminal irregularities with no  flow limiting lesions.  --  D1: Angiography showed minor luminal irregularities with no flow  limiting lesions.  CX:   --  Circumflex: Normal.  RI:   --  Ramus intermedius: Angiography showed minor luminal  irregularities with no flow limiting lesions.  RCA:   --  Proximal RCA: Normal.  --  Mid RCA: Angiography showed mild atherosclerosis with no flow limiting  lesions.  --  Distal RCA: Angiography showed minor luminal irregularities with no  flow limiting lesions.  --  RPDA: Angiography showed minor luminal irregularities with no flow  limiting lesions.  --  RPLS: Angiography showed minor luminal irregularities with no flow  limiting lesions.  COMPLICATIONS: There were no complications.  DIAGNOSTIC RECOMMENDATIONS: Medical management and aggressive risk factor  modification is recommended.  Prepared and signed by  Nehal López M.D.  Signed 04/27/2016 13:13:20    < end of copied text >      ASSESSMENT AND PLAN:    77y Female with hypertension, dyslipidemia, CAD s/p LAD stent with only mild re-stenosis with minor luminal irregularities otherwise on cath 4/16, with known severe LV dysfunction , NICM s/p [employer]onic BiV ICD, VT on Amio, COPD, lymphoma previously on chemo with known spinal mets and compression fractures admitted with lethargy due to GNR bacteremia, likely urinary source    --ABX per primary team/ID  --Not in clinical CHF.  Monitor volume status  --Check TTE per ID  --Interrogate ICD  --Pain Management  --Cardiac meds on hold due to Hypotension on admit.  (asa 81 qd, Toprol xl 25 qd, cozaar 25 qd, Amio 400 QD, Lipitor 20 QD, Lasix 40 qd & Aldactone 12.5 qd)  RESUME as BP tolerates    Dolores Rodriguez PA-C  [facility]  [address], [address]   [place] [address]  office [phone]  pager [phone]

## 2017-11-28 NOTE — PROGRESS NOTE ADULT - ATTENDING COMMENTS
Pseudomonas sepsis in 78 yo woman with AICD, stage 2-3 sacral decubitus recent pseud UTI. with  and altered mental status improved today.  Bacteremia cleared.  Delia source UTI. concern for seeding AICD.  would continue cefepime iv 2 gm q 12 hours  TTE

## 2017-11-28 NOTE — PROGRESS NOTE ADULT - ASSESSMENT
77yof w/ CAD, MI, Systolic CHF/s/p AICD, HTN, sent from [facility] for AMS due to sepsis 2/2 UTI.

## 2017-11-28 NOTE — PROGRESS NOTE ADULT - ATTENDING COMMENTS
Patient seen and examined with team.  Id concerned about sacral decube. f/u cultures . C/w cefepime.  TTE ordered to asses source or infection. Patient seen and examined with team.  Id concerned about sacral decube. f/u cultures . C/w cefepime.  TTE ordered to asses source or infection.  Speech and Swallow rec pureed diet- Can utilize Tylenol and Celebrex for pain along with Lidoderm patch Patient seen and examined with team.  Id concerned about sacral decube. f/u cultures . C/w cefepime.  TTE ordered to asses source or infection.  Speech and Swallow rec pureed diet- Can utilize Tylenol and Celebrex for pain along with Lidoderm patch.  Severe protein arleen mal nutrition- start ensure puddings 3x/day

## 2017-11-28 NOTE — PROGRESS NOTE ADULT - ATTENDING COMMENTS
Patient seen and examined.  Agree with above.   -Check TTE  -Interrogate JOSE ANTONIO López MD  [facility]  [address] [facility] [facility] [address] [facility], [address]  [place] [address]  office: [phone]  pager: [phone]

## 2017-11-28 NOTE — DIETITIAN INITIAL EVALUATION ADULT. - OTHER INFO
Pt. currently NPO, pending swallow evaluation.  Pt. denies nausea/vomiting/diarrhea/constipation at this time.  Food allergy to peanuts.  Reports decreased appetite with weight loss, although she cannot specify period of time over which this has occurred.  No noted edema.  Sacrum unstageable pressure ulcer.

## 2017-11-28 NOTE — PROGRESS NOTE ADULT - SUBJECTIVE AND OBJECTIVE BOX
PATIENT: HUMA MARSH   AGE: 78yo   GENDER: Female  WEIGHT:   ALLERGIES: peanuts (Unknown)  penicillin (Nausea)    CHIEF COMPLAINT:   AMS (25 Nov 2017 18:22)    INTERVAL HPI / OVERNIGHT EVENTS:   Pt still tachypneic and having difficulty concentrating (A&O to person only this morning), though she is more alert today. She does endorse SOB, subjective F/C, and continued abd pain. Still difficult to tell if flank pain is CVA tenderness or referred pain from sacral wound. She denies N/V, HA, dizziness today.    MEDICATIONS  buDESOnide 160 MICROgram(s)/formoterol 4.5 MICROgram(s) Inhaler 2 Puff(s) Inhalation two times a day  cefepime  IVPB 2000 milliGRAM(s) IV Intermittent every 12 hours  collagenase Ointment 1 Application(s) Topical two times a day  dextrose 5%. 1000 milliLiter(s) (75 mL/Hr) IV Continuous <Continuous>  dextrose 5%. 1000 milliLiter(s) (40 mL/Hr) IV Continuous <Continuous>  enoxaparin Injectable 40 milliGRAM(s) SubCutaneous daily  latanoprost 0.005% Ophthalmic Solution 1 Drop(s) Both EYES at bedtime  levothyroxine Injectable 25 MICROGram(s) IV Push daily  tiotropium 18 MICROgram(s) Capsule 1 Capsule(s) Inhalation daily      VITAL SIGNS (last 24 hrs):  T(C): 36.5 (11-28-17 @ 09:40), Max: 37 (11-28-17 @ 02:20)  T(F): 97.7 (11-28-17 @ 09:40), Max: 98.6 (11-28-17 @ 02:20)  HR: 93 (11-28-17 @ 09:40) (91 - 96)  BP: 136/78 (11-28-17 @ 09:40) (130/75 - 136/78)  BP(mean): --  RR: 18 (11-28-17 @ 09:40) (17 - 18)  SpO2: 100% (11-28-17 @ 09:40) (99% - 100%)    PHYSICAL EXAM:  GENERAL: More alert today, NAD  HEENT:  AT/NC; EOMI, PERRLA, conjunctiva and sclera clear; hearing grossly intact  NECK: Supple, non tender  CHEST/LUNG: CTAB, no wheezes, ronchi, rales  HEART: Normal rate, regular rhythm; No murmurs, rubs, or gallops  : Still with ?CVA tenderness B/L  ABDOMEN: Soft, diffusely TTP without guarding or rebound tenderness, nondistended, normoactive BS    MSK/EXTREMITIES: Still with B/L knee pain (left>right) on passive ROM, left knee with min effusion  PSYCH: AAOx1 to person only today, difficulty concentrating  NEUROLOGY: Non focal   BACK: stage 2-3 sacral ulcer approx 6 cm no necrosis or surrounding erythema      LABS:                         9.9    11.84 >-----< 362           ( 11-28-17 @ 07:32 )             32.0       146  |  111  |   16  ----------------------< 101    (11-28-17 @ 07:32)     5.0  |  21  |  0.41    Anion Gap: --    Ca   8.8   (11-28-17 @ 07:32)  Mg   1.8   (11-28-17 @ 07:32)  Phos 2.8   (11-28-17 @ 07:32)       TP 8.8     |  AST 2.3    -------------------------     Alb x     |  ALT x               (11-28-17 @ 07:32)  -------------------------     T-bili 2.3  |  AlkPh 102    D-bili x            CAPILLARY BLOOD GLUCOSE  POCT Blood Glucose.: 81 mg/dL (11-28-17 @ 11:34)  POCT Blood Glucose.: 93 mg/dL (11-28-17 @ 05:46)  POCT Blood Glucose.: 90 mg/dL (11-28-17 @ 00:24)  POCT Blood Glucose.: 84 mg/dL (11-27-17 @ 18:21)  POCT Blood Glucose.: 76 mg/dL (11-27-17 @ 16:01)      I/O's SUMMARY:    11-27-17 @ 07:01  -  11-28-17 @ 07:00  --------------------------------------------------------  IN: 0 mL / OUT: 800 mL / NET: -800 mL        MICROBIOLOGY:    Culture - Wound with Gram Stain (collected 11-27-17 @ 17:25)  Source: OTHER  Preliminary Report (11-28-17 @ 07:33):    CULTURE IN PROGRESS, FURTHER REPORT TO FOLLOW.    Culture - Urine (collected 11-25-17 @ 17:23)  Source: URINE CATHETER  Final Report (11-28-17 @ 12:18):    COLONY COUNT: > = 100,000 CFU/ML  Organism: Pseudomonas aeruginosa (11-28-17 @ 12:18)  Organism: Pseudomonas aeruginosa (11-28-17 @ 12:18)    Culture - Blood (collected 11-25-17 @ 16:40)  Source: BLOOD VENOUS  Preliminary Report (11-27-17 @ 15:15):    PSA^Pseudomonas aeruginosa  Organism: Pseudomonas aeruginosa (11-28-17 @ 12:30)  Organism: Pseudomonas aeruginosa (11-28-17 @ 12:30)    Culture - Blood (collected 11-25-17 @ 16:40)  Source: BLOOD PERIPHERAL  Preliminary Report (11-26-17 @ 14:38):    ***Blood Panel PCR results on this specimen are available    approximately 3 hours after the Gram stain result***    Gram stain, PCR, and/or culture results may not always    correspond due to difference in methodologies    ------------------------------------------------------------    This PCR assay was performed using [employer].  The    following targets are tested for:  Enterococcus, vancomycin    resistant enterococci, Listeria monocytogenes,  coagulase    negative staphylococci, S. aureus, methicillin resistant S.    aureus, Streptococcus agalactiae (Group B), S. pneumoniae,    S. pyogenes (Group A), Acinetobacter baumannii, Enterobacter    cloacae, E. coli, Klebsiella oxytoca, K. pneumoniae, Proteus    sp., Serratia marcescens, Haemophilus influenzae, Neisseria    meningitidis, Pseudomonas aeruginosa, Candida albicans, C.    glabrata, C. krusei, C. parapsilosis, C. tropicalis and the    KPC resistance gene.  Final Report (11-28-17 @ 12:31):    SEE PREVIOUS CULTURE:[id number] COLLECTED 11/25/17    RECEIVED 11/25/17   FOR MAGDALENA  Organism: BLOOD CULTURE PCR  ***Blood Panel PCR results on this specimen are available  approximately 3 hours after the Gram stain result***  Gram stain, PCR, and/or culture results may not always  correspond due to difference in methodologies  ------------------------------------------------------------  This PCR assay was performed using [employer].  The  following targets are tested for:  Enterococcus, vancomycin  resistant enterococci, Listeria monocytogenes,  coagulase  negative staphylococci, S. aureus, methicillin resistant S.  aureus, Streptococcus agalactiae (Group B), S. pneumoniae,  S. pyogenes (Group A), Acinetobacter baumannii, Enterobacter  cloacae, E. coli, Klebsiella oxytoca, K. pneumoniae, Proteus  sp., Serratia marcescens, Haemophilus influenzae, Neisseria  meningitidis, Pseudomonas aeruginosa, Candida albicans, C.  glabrata, C. krusei, C. parapsilosis, C. tropicalis and the  KPC resistance gene. (11-28-17 @ 12:31)  Organism: BLOOD CULTURE PCR  Pseudomonas aeruginosa (11-27-17 @ 15:14)  Organism: Pseudomonas aeruginosa (11-27-17 @ 15:14)        RADIOLOGY & ADDITIONAL TESTS:

## 2017-11-28 NOTE — DIETITIAN INITIAL EVALUATION ADULT. - PROBLEM SELECTOR PLAN 2
Most likely 2/2 sepsis. Pt was BIBEMS due to AMS. She is verbal at baseline, however, pt is nonverbal since yesterday. Pt looks at you if you call her name and reacts to pain, however, does not respond to questions.   - pt will be treated with abx and ivfluid  - will monitor mental status and hemodynamics closely, expected to improve as sepsis is treated

## 2017-11-28 NOTE — PROVIDER CONTACT NOTE (MEDICATION) - ASSESSMENT
patient laying comfortably in bed. due for meds  Lasix, spironolactone, Lopressor, and losartan at the same times.

## 2017-11-28 NOTE — DIETITIAN INITIAL EVALUATION ADULT. - NS AS NUTRI INTERV COLLABORAT
1) Advance diet to most appropriate consistency, as based on SLP recommendations upon completion of swallow evaluation.                 2)If/when diet advanced, add Glucerna Shake 8oz PO 2x daily & include low sodium restriction.                      3)Add Multivitamin for micronutrient coverage                        4)Obtain daily weights                  5)RDN remains available.  Uma Knight RDN, CD/N  pager [phone]

## 2017-11-28 NOTE — PROGRESS NOTE ADULT - SUBJECTIVE AND OBJECTIVE BOX
HUMA MARSH  77y  Female      Patient is a 77y old  Female who presents with a chief complaint of AMS (25 Nov 2017 18:22)      INTERVAL HPI/OVERNIGHT EVENTS:  -pt had TOV yesterday and did not void for 8 hours. Strait cath had aprx 800cc's as per nurse. pt reports that she has chronic knutson, so it was put back in  -wound c;x grew gram + cocci in pairs, we appreciate ID recs  -     Medications:  buDESOnide 160 MICROgram(s)/formoterol 4.5 MICROgram(s) Inhaler 2 Puff(s) Inhalation two times a day  cefepime  IVPB 2000 milliGRAM(s) IV Intermittent every 12 hours  collagenase Ointment 1 Application(s) Topical two times a day  dextrose 5%. 1000 milliLiter(s) IV Continuous <Continuous>  dextrose 5%. 1000 milliLiter(s) IV Continuous <Continuous>  enoxaparin Injectable 40 milliGRAM(s) SubCutaneous daily  latanoprost 0.005% Ophthalmic Solution 1 Drop(s) Both EYES at bedtime  levothyroxine Injectable 25 MICROGram(s) IV Push daily  tiotropium 18 MICROgram(s) Capsule 1 Capsule(s) Inhalation daily      REVIEW OF SYSTEMS:  CONSTITUTIONAL: No fever, weight loss, or fatigue  EYES: No eye pain, visual disturbances, or discharge  ENMT:  No difficulty hearing, tinnitus, vertigo; No sinus or throat pain  NECK: No pain or stiffness  BREASTS: No pain, masses, or nipple discharge  RESPIRATORY: No cough, wheezing, chills or hemoptysis; No shortness of breath  CARDIOVASCULAR: No chest pain, palpitations, dizziness, or leg swelling  GASTROINTESTINAL: No abdominal or epigastric pain. No nausea, vomiting, or hematemesis; No diarrhea or constipation. No melena or hematochezia.  GENITOURINARY: No dysuria, frequency, hematuria, or incontinence  NEUROLOGICAL: No headaches, memory loss, loss of strength, numbness, or tremors  SKIN: No itching, burning, rashes, or lesions   LYMPH NODES: No enlarged glands  ENDOCRINE: No heat or cold intolerance; No hair loss  MUSCULOSKELETAL: No joint pain or swelling; No muscle, back, or extremity pain  PSYCHIATRIC: No depression, anxiety, mood swings, or difficulty sleeping  HEME/LYMPH: No easy bruising, or bleeding gums  ALLERY AND IMMUNOLOGIC: No hives or eczema    T(C): 36.7 (11-28-17 @ 05:45), Max: 37 (11-28-17 @ 02:20)  HR: 91 (11-28-17 @ 05:45) (91 - 96)  BP: 133/85 (11-28-17 @ 05:45) (130/75 - 133/85)  RR: 18 (11-28-17 @ 05:45) (17 - 18)  SpO2: 100% (11-28-17 @ 05:45) (99% - 100%)  Wt(kg): --Vital Signs Last 24 Hrs  T(C): 36.7 (28 Nov 2017 05:45), Max: 37 (28 Nov 2017 02:20)  T(F): 98.1 (28 Nov 2017 05:45), Max: 98.6 (28 Nov 2017 02:20)  HR: 91 (28 Nov 2017 05:45) (91 - 96)  BP: 133/85 (28 Nov 2017 05:45) (130/75 - 133/85)  BP(mean): --  RR: 18 (28 Nov 2017 05:45) (17 - 18)  SpO2: 100% (28 Nov 2017 05:45) (99% - 100%)    PHYSICAL EXAM:  GENERAL: NAD, well-groomed, well-developed  HEAD:  Atraumatic, Normocephalic  EYES: EOMI, PERRLA, conjunctiva and sclera clear  ENMT: No tonsillar erythema, exudates, or enlargement; Moist mucous membranes, Good dentition, No lesions  NECK: Supple, No JVD, Normal thyroid  NERVOUS SYSTEM:  Alert & Oriented X3, Good concentration; Motor Strength 5/5 B/L upper and lower extremities; DTRs 2+ intact and symmetric  CHEST/LUNG: Clear to percussion bilaterally; No rales, rhonchi, wheezing, or rubs  HEART: Regular rate and rhythm; No murmurs, rubs, or gallops  ABDOMEN: Soft, Nontender, Nondistended; Bowel sounds present  EXTREMITIES:  2+ Peripheral Pulses, No clubbing, cyanosis, or edema  LYMPH: No lymphadenopathy noted  SKIN: No rashes or lesions    Consultant(s) Notes Reviewed:  [x ] YES  [ ] NO  Care Discussed with Consultants/Other Providers [ x] YES  [ ] NO    LABS:                        9.7    16.48 )-----------( 305      ( 27 Nov 2017 06:38 )             30.2     11-27    147<H>  |  113<H>  |  19  ----------------------------<  92  4.2   |  23  |  0.44<L>    Ca    8.6      27 Nov 2017 06:38  Phos  2.4     11-27  Mg     1.7     11-27    TPro  5.5<L>  /  Alb  2.2<L>  /  TBili  0.3  /  DBili  x   /  AST  52<H>  /  ALT  39<H>  /  AlkPhos  112  11-27        CAPILLARY BLOOD GLUCOSE      POCT Blood Glucose.: 93 mg/dL (28 Nov 2017 05:46)  POCT Blood Glucose.: 90 mg/dL (28 Nov 2017 00:24)  POCT Blood Glucose.: 84 mg/dL (27 Nov 2017 18:21)  POCT Blood Glucose.: 76 mg/dL (27 Nov 2017 16:01)            RADIOLOGY & ADDITIONAL TESTS:    Imaging Personally Reviewed:  [ ] YES  [ ] NO    HEALTH ISSUES - PROBLEM Dx:  Transaminitis: Transaminitis  Hypokalemia: Hypokalemia  Hypernatremia: Hypernatremia  Decubitus ulcer of sacral region, unstageable: Decubitus ulcer of sacral region, unstageable  Need for prophylactic measure: Need for prophylactic measure  Decubitus ulcer of sacral region, stage 4: Decubitus ulcer of sacral region, stage 4  CAD (coronary artery disease): CAD (coronary artery disease)  CHF (congestive heart failure): CHF (congestive heart failure)  Delirium due to another medical condition, acute, hypoactive: Delirium due to another medical condition, acute, hypoactive  Sepsis due to urinary tract infection: Sepsis due to urinary tract infection MARSH HUMA  77y  Female      Patient is a 77y old  Female who presents with a chief complaint of AMS (25 Nov 2017 18:22)      INTERVAL HPI/OVERNIGHT EVENTS:  -pt had TOV yesterday and did not void for 8 hours. Strait cath had aprx 800cc's as per nurse. pt reports that she has chronic knutson, so it was put back in  -wound clx grew gram + cocci in pairs, we appreciate ID recs  -will do a TTE and renal ultrasound as per ID for source eval   - will follow up s/s today and start diet    Medications:  buDESOnide 160 MICROgram(s)/formoterol 4.5 MICROgram(s) Inhaler 2 Puff(s) Inhalation two times a day  cefepime  IVPB 2000 milliGRAM(s) IV Intermittent every 12 hours  collagenase Ointment 1 Application(s) Topical two times a day  dextrose 5%. 1000 milliLiter(s) IV Continuous <Continuous>  dextrose 5%. 1000 milliLiter(s) IV Continuous <Continuous>  enoxaparin Injectable 40 milliGRAM(s) SubCutaneous daily  latanoprost 0.005% Ophthalmic Solution 1 Drop(s) Both EYES at bedtime  levothyroxine Injectable 25 MICROGram(s) IV Push daily  tiotropium 18 MICROgram(s) Capsule 1 Capsule(s) Inhalation daily      REVIEW OF SYSTEMS:  pt still speaking to a limited capacity.   CONSTITUTIONAL: feels tired and weak  ENMT: pt reports that she's like to try and swallow  RESPIRATORY: reports no SOB  CARDIOVASCULAR: reports slight occasional chest pain  GASTROINTESTINAL: says her stomach hurts and points to her abdomen  NEUROLOGICAL: pt still a bit confused  ENDOCRINE: pt says she feels cold  MUSCULOSKELETAL: pt complains of back pain     T(C): 36.7 (11-28-17 @ 05:45), Max: 37 (11-28-17 @ 02:20)  HR: 91 (11-28-17 @ 05:45) (91 - 96)  BP: 133/85 (11-28-17 @ 05:45) (130/75 - 133/85)  RR: 18 (11-28-17 @ 05:45) (17 - 18)  SpO2: 100% (11-28-17 @ 05:45) (99% - 100%)  Wt(kg): --Vital Signs Last 24 Hrs  T(C): 36.7 (28 Nov 2017 05:45), Max: 37 (28 Nov 2017 02:20)  T(F): 98.1 (28 Nov 2017 05:45), Max: 98.6 (28 Nov 2017 02:20)  HR: 91 (28 Nov 2017 05:45) (91 - 96)  BP: 133/85 (28 Nov 2017 05:45) (130/75 - 133/85)  BP(mean): --  RR: 18 (28 Nov 2017 05:45) (17 - 18)  SpO2: 100% (28 Nov 2017 05:45) (99% - 100%)    PHYSICAL EXAM:  GENERAL: elderly female, frail in appearance  HEAD:  Atraumatic, Normocephalic  EYES: VALDO, EOMI, clear sclara  NECK: Supple, No JVD  CHEST/LUNG: Clear to auscultation bilaterally; No wheeze  HEART: Regular rate and rhythm; No murmurs, rubs, or gallops  ABDOMEN: Grimaces upon palpation of the entire abdomen, worse on suprapubic area, Nondistended  EXTREMITIES:  2+ Peripheral Pulses, No clubbing, cyanosis, or edema  PSYCH: pt speaking in short sentences today, still limited speach,. AAOx1  NEUROLOGY: recognizes her name, however, is unable to say anything else  SKIN: decubitus ulcer on back    Consultant(s) Notes Reviewed:  [x ] YES  [ ] NO  Care Discussed with Consultants/Other Providers [ x] YES  [ ] NO    LABS:                        9.7    16.48 )-----------( 305      ( 27 Nov 2017 06:38 )             30.2     11-27    147<H>  |  113<H>  |  19  ----------------------------<  92  4.2   |  23  |  0.44<L>    Ca    8.6      27 Nov 2017 06:38  Phos  2.4     11-27  Mg     1.7     11-27    TPro  5.5<L>  /  Alb  2.2<L>  /  TBili  0.3  /  DBili  x   /  AST  52<H>  /  ALT  39<H>  /  AlkPhos  112  11-27        CAPILLARY BLOOD GLUCOSE  POCT Blood Glucose.: 93 mg/dL (28 Nov 2017 05:46)  POCT Blood Glucose.: 90 mg/dL (28 Nov 2017 00:24)  POCT Blood Glucose.: 84 mg/dL (27 Nov 2017 18:21)  POCT Blood Glucose.: 76 mg/dL (27 Nov 2017 16:01)      HEALTH ISSUES - PROBLEM Dx:  Transaminitis: Transaminitis  Hypokalemia: Hypokalemia  Hypernatremia: Hypernatremia  Decubitus ulcer of sacral region, unstageable: Decubitus ulcer of sacral region, unstageable  Need for prophylactic measure: Need for prophylactic measure  Decubitus ulcer of sacral region, stage 4: Decubitus ulcer of sacral region, stage 4  CAD (coronary artery disease): CAD (coronary artery disease)  CHF (congestive heart failure): CHF (congestive heart failure)  Delirium due to another medical condition, acute, hypoactive: Delirium due to another medical condition, acute, hypoactive  Sepsis due to urinary tract infection: Sepsis due to urinary tract infection

## 2017-11-28 NOTE — PROGRESS NOTE ADULT - PROBLEM SELECTOR PLAN 7
Pt has unstagable decubitus ulcer on physical exam.   - wound care consulted will consider starting home meds again if s/s passed. if not, will consider IV medications

## 2017-11-28 NOTE — PROGRESS NOTE ADULT - PROBLEM SELECTOR PLAN 8
Pt has a history of CHF with a previous TTE showing EF of 20% with [employer] AICD. Pt is unable to take any pills at the moment 2/2 AMS.   - will hold home medications until hemodynamics improve  - if AMS persists, will consider switching medications to IV - will hold oral medications until s/s eval  - EKG normal

## 2017-11-28 NOTE — PROGRESS NOTE ADULT - PROBLEM SELECTOR PLAN 5
pt still hypokalemic this AM  -will continue to replete K  - will check EKG transaminitis improving  -will trend CMP

## 2017-11-28 NOTE — PROVIDER CONTACT NOTE (MEDICATION) - ACTION/TREATMENT ORDERED:
MD aware. Okay to give lopressor and losartan and give lasix and spironolactone at later time. Will continue to monitor.

## 2017-11-28 NOTE — PROGRESS NOTE ADULT - PROBLEM SELECTOR PLAN 10
VTE: Lovenox  Diet: NPO until mental status improves  Code: no advanced directive in chart    JULIEN Franks [id number]  Internal Medicine Department  Pager: [phone]

## 2017-11-28 NOTE — SWALLOW BEDSIDE ASSESSMENT ADULT - SWALLOW EVAL: DIAGNOSIS
Patient presents with functional oral and pharyngeal stage swallow mechanism characterized by adequate oral containment, adequate bolus manipulation and transfer of the bolus with adequate oral clearance post swallow. There is laryngeal elevation upon palpation, initiation of the pharyngeal swallow. There were no overt signs of impaired airway protection.

## 2017-11-28 NOTE — PROGRESS NOTE ADULT - PROBLEM SELECTOR PLAN 6
Pt found to have transaminitis.   -will trend CMP Pt has decubitus ulcer on physical exam.   - we appreciate wound care recs  - gram + cocci in pairs grew on clx

## 2017-11-28 NOTE — DIETITIAN INITIAL EVALUATION ADULT. - PROBLEM SELECTOR PLAN 9
VTE: Lovenox  Diet: NPO until mental status improves  Code: no advanced directive in chart    JULIEN Franks MD-[id number]  Internal Medicine Department  Pager: [phone]

## 2017-11-28 NOTE — SWALLOW BEDSIDE ASSESSMENT ADULT - SWALLOW EVAL: RECOMMENDED FEEDING/EATING TECHNIQUES
position upright (90 degrees)/maintain upright posture during/after eating for 30 mins/oral hygiene/small sips/bites

## 2017-11-28 NOTE — SWALLOW BEDSIDE ASSESSMENT ADULT - COMMENTS
Patient is a 76 y/o f w/ CAD, MI, Systolic CHF/s/p AICD, HTN, sent from [facility] for AMS due to sepsis 2/2 UTI.    Patient seen at bedside, alert and oriented x2. Patient is able to follow commands and express needs. Patient offers c/o pain in her legs and back (nursing made aware for pain management).

## 2017-11-29 NOTE — PROGRESS NOTE ADULT - PROBLEM SELECTOR PLAN 2
Pt was found to have pseudomonas growing in the blood. ID wants to rule out AICD vegetation. renal U/S negative for hydronephrosis  -ordered TTE   -will continue to draw blood clxs daily until it clears  -will continue cefepime  -appreciate ID recs

## 2017-11-29 NOTE — PROGRESS NOTE ADULT - PROBLEM SELECTOR PLAN 6
Pt has decubitus ulcer on physical exam.   - will follow wound care recs  - gram + cocci in pairs grew on clx

## 2017-11-29 NOTE — PROGRESS NOTE ADULT - SUBJECTIVE AND OBJECTIVE BOX
SUBJECTIVE: No CP or SOB.  c/o abdominal pain and feeling like she "needs to have a BM"    MEDICATIONS  (STANDING):  amiodarone    Tablet 400 milliGRAM(s) Oral daily  aspirin  chewable 81 milliGRAM(s) Oral daily  atorvastatin 20 milliGRAM(s) Oral at bedtime  buDESOnide 160 MICROgram(s)/formoterol 4.5 MICROgram(s) Inhaler 2 Puff(s) Inhalation two times a day  cefepime  IVPB 2000 milliGRAM(s) IV Intermittent every 12 hours  collagenase Ointment 1 Application(s) Topical two times a day  dextrose 5%. 1000 milliLiter(s) (75 mL/Hr) IV Continuous <Continuous>  dextrose 5%. 1000 milliLiter(s) (40 mL/Hr) IV Continuous <Continuous>  enoxaparin Injectable 40 milliGRAM(s) SubCutaneous daily  furosemide    Tablet 40 milliGRAM(s) Oral daily  latanoprost 0.005% Ophthalmic Solution 1 Drop(s) Both EYES at bedtime  levothyroxine Injectable 25 MICROGram(s) IV Push daily  losartan 25 milliGRAM(s) Oral daily  metoprolol succinate ER 25 milliGRAM(s) Oral daily  spironolactone 12.5 milliGRAM(s) Oral daily  tiotropium 18 MICROgram(s) Capsule 1 Capsule(s) Inhalation daily    LABS:                        10.0   11.39 )-----------( 348      ( 29 Nov 2017 05:10 )             30.8     140  |  104  |  12  ----------------------------<  81  3.7   |  23  |  0.38<L>    Ca    8.6      29 Nov 2017 05:10  Phos  2.4     11-29  Mg     1.7     11-29    TPro  5.6<L>  /  Alb  2.4<L>  /  TBili  0.3  /  DBili  x   /  AST  39<H>  /  ALT  35<H>  /  AlkPhos  113  11-29    Creatinine Trend: 0.38<--, 0.41<--, 0.44<--, 0.44<--, 0.50<--, 0.46<--    PHYSICAL EXAM  Vital Signs Last 24 Hrs  T(C): 36.9 (29 Nov 2017 05:23), Max: 36.9 (29 Nov 2017 05:23)  T(F): 98.5 (29 Nov 2017 05:23), Max: 98.5 (29 Nov 2017 05:23)  HR: 84 (29 Nov 2017 05:23) (81 - 93)  BP: 131/72 (29 Nov 2017 05:23) (109/63 - 141/77)  RR: 20 (29 Nov 2017 05:23) (18 - 24)  SpO2: 100% (29 Nov 2017 05:23) (100% - 100%)    Cardiovascular:  S1S2 RRR, No JVD, 1/6 ERICA   Respiratory: Lungs clear to auscultation, normal effort  Gastrointestinal: Abdomen soft, ND, NT, +BS  Skin: Warm, dry, intact. No rash.  Ext: no C/C/E B/L LE    DIAGNOSTIC DATA    < from: TTE with Doppler (w/Cont) (04.03.17 @ 16:18) >  CONCLUSIONS:  1. Mitral annular calcification, otherwise normal mitral  valve. Mild mitral regurgitation.  2. Moderately dilated left atrium.  LA volume index = 46  cc/m2.  3. Moderate left ventricular enlargement.  4. Severe global left ventricular systolic dysfunction.  Endocardial visualization enhanced with intravenous  injection of echo contrast (Definity).  5. The right ventricle is not well visualized; grossly  normal right ventricular systolic function.  A device wire  is noted in the right heart.  6. Estimated right ventricular systolic pressure equals 60  mm Hg, assuming right atrial pressure equals 10 mm Hg,  consistent with moderate pulmonary hypertension.  ------------------------------------------------------------------------  Confirmed on  4/3/2017 - 17:19:47 by Jean-Pierre Carver M.D.    < end of copied text >    < from: Cardiac Cath Lab - Adult (04.26.16 @ 13:12) >  VENTRICLES: No LV gram was performed; however, a recent echocardiogram  demonstrated an EF of 19 %.  CORONARY VESSELS: The coronary circulation is right dominant.  LM:   --  LM: Normal.  LAD:   --  Proximal LAD: There was a [id number] % stenosis. There was mild  restenosis in proximal LAD stent.  --  Mid LAD: Angiography showed minor luminal irregularities with no flow  limiting lesions.  --  Distal LAD: Angiography showed minor luminal irregularities with no  flow limiting lesions.  --  D1: Angiography showed minor luminal irregularities with no flow  limiting lesions.  CX:   --  Circumflex: Normal.  RI:   --  Ramus intermedius: Angiography showed minor luminal  irregularities with no flow limiting lesions.  RCA:   --  Proximal RCA: Normal.  --  Mid RCA: Angiography showed mild atherosclerosis with no flow limiting  lesions.  --  Distal RCA: Angiography showed minor luminal irregularities with no  flow limiting lesions.  --  RPDA: Angiography showed minor luminal irregularities with no flow  limiting lesions.  --  RPLS: Angiography showed minor luminal irregularities with no flow  limiting lesions.  COMPLICATIONS: There were no complications.  DIAGNOSTIC RECOMMENDATIONS: Medical management and aggressive risk factor  modification is recommended.  Prepared and signed by  Nehal López M.D.  Signed 04/27/2016 13:13:20    < end of copied text >      ASSESSMENT AND PLAN:    77y Female with hypertension, dyslipidemia, CAD s/p LAD stent with only mild re-stenosis with minor luminal irregularities otherwise on cath 4/16, with known severe LV dysfunction , NICM s/p Medtronic BiV ICD, VT on Amio, COPD, lymphoma previously on chemo with known spinal mets and compression fractures admitted with lethargy due to GNR bacteremia, likely urinary source    --ABX per primary team/ID  --Not in clinical CHF.  Monitor volume status  --Check TTE per ID  --Interrogate ICD  --Pain Management  --Cardiac meds resumed.    Dolores Rodriguez PA-C  [facility]  [address], [address]   [place] [address]  office [phone]  pager [phone]

## 2017-11-29 NOTE — PROGRESS NOTE ADULT - SUBJECTIVE AND OBJECTIVE BOX
PATIENT: HUMA MARSH   AGE: 78yo   GENDER: Female  WEIGHT:   ALLERGIES: peanuts (Unknown)  penicillin (Nausea)    CHIEF COMPLAINT:   AMS (25 Nov 2017 18:22)    INTERVAL HPI / OVERNIGHT EVENTS:   c/o suprapubic pain.      MEDICATIONS  MEDICATIONS  (STANDING):  acetaminophen    Suspension. 650 every 6 hours PRN  acetaminophen    Suspension. 325 every 6 hours PRN  amiodarone    Tablet 400 daily  aspirin  chewable 81 daily  atorvastatin 20 at bedtime  buDESOnide 160 MICROgram(s)/formoterol 4.5 MICROgram(s) Inhaler 2 two times a day  enoxaparin Injectable 40 daily  furosemide    Tablet 40 daily  levothyroxine Injectable 25 daily  losartan 25 daily  metoprolol succinate ER 25 daily  spironolactone 12.5 daily  tiotropium 18 MICROgram(s) Capsule 1 daily        VITAL SIGNS (last 24 hrs):  Vital Signs Last 24 Hrs  T(F): 98.7 (11-29-17 @ 12:58), Max: 98.7 (11-29-17 @ 12:58)  HR: 81 (11-29-17 @ 12:58)  BP: 110/66 (11-29-17 @ 12:58)  RR: 20 (11-29-17 @ 12:58)  SpO2: 100% (11-29-17 @ 12:58) (100% - 100%)    PHYSICAL EXAM:  GENERAL: comfortable    HEENT:  AT/NC; EOMI, PERRLA, conjunctiva and sclera clear; hearing grossly intact  NECK: Supple, non tender  CHEST/LUNG: CTAB, no wheezes, ronchi, rales  HEART: Normal rate, regular rhythm; No murmurs, rubs, or gallops  AICD left chest wall   : suprapubic tender  ABDOMEN: Soft, diffusely TTP without guarding or rebound tenderness, nondistended, normoactive BS    MSK/EXTREMITIES: Still with B/L knee pain (left>right) on passive ROM, left knee with min effusion  PSYCH: AAOx1 to person only today,  limited verbal responses  NEUROLOGY: Non focal   BACK: stage 2-3 sacral ulcer approx 6 cm no necrosis or surrounding erythema      LABS:                         9.9    11.84 >-----< 362           ( 11-28-17 @ 07:32 )             32.0       146  |  111  |   16  ----------------------< 101    (11-28-17 @ 07:32)     5.0  |  21  |  0.41    Anion Gap: --    Ca   8.8   (11-28-17 @ 07:32)  Mg   1.8   (11-28-17 @ 07:32)  Phos 2.8   (11-28-17 @ 07:32)       TP 8.8     |  AST 2.3    -------------------------     Alb x     |  ALT x               (11-28-17 @ 07:32)  -------------------------     T-bili 2.3  |  AlkPh 102    D-bili x            CAPILLARY BLOOD GLUCOSE  POCT Blood Glucose.: 81 mg/dL (11-28-17 @ 11:34)  POCT Blood Glucose.: 93 mg/dL (11-28-17 @ 05:46)  POCT Blood Glucose.: 90 mg/dL (11-28-17 @ 00:24)  POCT Blood Glucose.: 84 mg/dL (11-27-17 @ 18:21)  POCT Blood Glucose.: 76 mg/dL (11-27-17 @ 16:01)      I/O's SUMMARY:    11-27-17 @ 07:01  -  11-28-17 @ 07:00  --------------------------------------------------------  IN: 0 mL / OUT: 800 mL / NET: -800 mL        MICROBIOLOGY:    Culture - Wound with Gram Stain (collected 11-27-17 @ 17:25)  Source: OTHER  Preliminary Report (11-28-17 @ 07:33):    CULTURE IN PROGRESS, FURTHER REPORT TO FOLLOW.    Culture - Urine (collected 11-25-17 @ 17:23)  Source: URINE CATHETER  Final Report (11-28-17 @ 12:18):    COLONY COUNT: > = 100,000 CFU/ML  Organism: Pseudomonas aeruginosa (11-28-17 @ 12:18)  Organism: Pseudomonas aeruginosa (11-28-17 @ 12:18)    Culture - Blood (collected 11-25-17 @ 16:40)  Source: BLOOD VENOUS  Preliminary Report (11-27-17 @ 15:15):    PSA^Pseudomonas aeruginosa  Organism: Pseudomonas aeruginosa (11-28-17 @ 12:30)  Organism: Pseudomonas aeruginosa (11-28-17 @ 12:30)    Culture - Blood (collected 11-25-17 @ 16:40)  Source: BLOOD PERIPHERAL  Preliminary Report (11-26-17 @ 14:38):    ***Blood Panel PCR results on this specimen are available    approximately 3 hours after the Gram stain result***    Gram stain, PCR, and/or culture results may not always    correspond due to difference in methodologies    ------------------------------------------------------------    This PCR assay was performed using leemail.  The    following targets are tested for:  Enterococcus, vancomycin    resistant enterococci, Listeria monocytogenes,  coagulase    negative staphylococci, S. aureus, methicillin resistant S.    aureus, Streptococcus agalactiae (Group B), S. pneumoniae,    S. pyogenes (Group A), Acinetobacter baumannii, Enterobacter    cloacae, E. coli, Klebsiella oxytoca, K. pneumoniae, Proteus    sp., Serratia marcescens, Haemophilus influenzae, Neisseria    meningitidis, Pseudomonas aeruginosa, Candida albicans, C.    glabrata, C. krusei, C. parapsilosis, C. tropicalis and the    KPC resistance gene.  Final Report (11-28-17 @ 12:31):    SEE PREVIOUS CULTURE:[id number] COLLECTED 11/25/17    RECEIVED 11/25/17   FOR MAGDALENA  Organism: BLOOD CULTURE PCR  ***Blood Panel PCR results on this specimen are available  approximately 3 hours after the Gram stain result***  Gram stain, PCR, and/or culture results may not always  correspond due to difference in methodologies  ------------------------------------------------------------  This PCR assay was performed using leemail.  The  following targets are tested for:  Enterococcus, vancomycin  resistant enterococci, Listeria monocytogenes,  coagulase  negative staphylococci, S. aureus, methicillin resistant S.  aureus, Streptococcus agalactiae (Group B), S. pneumoniae,  S. pyogenes (Group A), Acinetobacter baumannii, Enterobacter  cloacae, E. coli, Klebsiella oxytoca, K. pneumoniae, Proteus  sp., Serratia marcescens, Haemophilus influenzae, Neisseria  meningitidis, Pseudomonas aeruginosa, Candida albicans, C.  glabrata, C. krusei, C. parapsilosis, C. tropicalis and the  KPC resistance gene. (11-28-17 @ 12:31)  Organism: BLOOD CULTURE PCR  Pseudomonas aeruginosa (11-27-17 @ 15:14)  Organism: Pseudomonas aeruginosa (11-27-17 @ 15:14)        RADIOLOGY & ADDITIONAL TESTS:  < from: Xray Chest 1 View AP/PA (11.29.17 @ 11:22) >    There is a left chest wall AICD present. The lungs are clear. There is no   evidence of pneumothorax or pleural effusion. The cardiomediastinal   silhouette is stable in size. Degenerative changes of the osseous   structures.    IMPRESSION:  No acute pulmonary disease.    < end of copied text >  < from: US Kidney and Bladder (11.28.17 @ 09:53) >    FINDINGS:    Right kidney:  10.5 cm. No renal collection, hydronephrosis or calculi.    Left kidney:  9.8 cm. No renal collection, hydronephrosis or calculi.    Urinary bladder: Contracted around a Zee catheter.    IMPRESSION:     Normal renal ultrasound.    < end of copied text >

## 2017-11-29 NOTE — PROGRESS NOTE ADULT - ASSESSMENT
77yof w/ CAD, MI, Systolic CHF/s/p AICD, HTN, sent from [facility] for AMS due to sepsis 2/2 UTI.

## 2017-11-29 NOTE — PROGRESS NOTE ADULT - ASSESSMENT
76yo F with PMH of CAD, MI, HFrEF (EF 15%), s/p biventricular AICD, HTN, unstageable decubitus sacral ulcer, andecent ED visit for CRE and Pseudomonas UTI (11/07/17, sent home on 7 day course of Cipro, CRE assessed as contaminant) who p/w altered mentation; found to have fever (Tmax 103), leukocytosis (20.77), and cloudy urine; now with Pseudomonas bacteremia, most likely 2/2 pseudomonas UTI.   ABX day 5: Cefepime     PROBLEM: Gram negative bacteremia  - Leukocytosis continuing to improve, afebrile (last F 11/25), no longer tachycardic, more alert   - 11/25 B/UCxs growing Pseudomonas aeruginosa, sensitive to cefepime  - Would continue cefepime, as leukocytosis and clinical status improving.  anticipate duration 14 days  - Renal u/s without evidence of hydronephrosis or renal calculi  - Would get TTE to r/o AICD seeding   - Daily blood cxs until negative.  - No need for sacral ulcer cx at this point, as source is most likely UTI

## 2017-11-29 NOTE — PROGRESS NOTE ADULT - ATTENDING COMMENTS
Patient seen and examined with team.  Alert and states her name but otherwise does not wish to talk. Patient seen and examined with team.  Alert and states her name but otherwise does not wish to talk.  Improving Sepsis sec to UTI.- c/w cefepime- f/u cultures  Sacral decub  Dysphagia.

## 2017-11-29 NOTE — PROGRESS NOTE ADULT - PROBLEM SELECTOR PLAN 9
VTE: Lovenox  Diet: Pureed with supplimental ensure pudding  Code: no advanced directive in chart    JULIEN Franks MD-[id number]  Internal Medicine Department  Pager: [phone]

## 2017-11-29 NOTE — PROGRESS NOTE ADULT - SUBJECTIVE AND OBJECTIVE BOX
HUMA MARSH  77y  Female      Patient is a 77y old  Female who presents with a chief complaint of AMS (25 Nov 2017 18:22)      INTERVAL HPI/OVERNIGHT EVENTS:  -pt was less vocal this AM  -nurse reports orange liquid diarrheafor 2 nights. c.diff negative  -pt denies fevers, chills, cough, SOB, chest pain    Medications:  acetaminophen    Suspension. 650 milliGRAM(s) Oral every 6 hours PRN  acetaminophen    Suspension. 325 milliGRAM(s) Oral every 6 hours PRN  amiodarone    Tablet 400 milliGRAM(s) Oral daily  aspirin  chewable 81 milliGRAM(s) Oral daily  atorvastatin 20 milliGRAM(s) Oral at bedtime  buDESOnide 160 MICROgram(s)/formoterol 4.5 MICROgram(s) Inhaler 2 Puff(s) Inhalation two times a day  cefepime  IVPB 2000 milliGRAM(s) IV Intermittent every 12 hours  collagenase Ointment 1 Application(s) Topical two times a day  dextrose 5%. 1000 milliLiter(s) IV Continuous <Continuous>  enoxaparin Injectable 40 milliGRAM(s) SubCutaneous daily  furosemide    Tablet 40 milliGRAM(s) Oral daily  latanoprost 0.005% Ophthalmic Solution 1 Drop(s) Both EYES at bedtime  levothyroxine Injectable 25 MICROGram(s) IV Push daily  losartan 25 milliGRAM(s) Oral daily  metoprolol succinate ER 25 milliGRAM(s) Oral daily  spironolactone 12.5 milliGRAM(s) Oral daily  tiotropium 18 MICROgram(s) Capsule 1 Capsule(s) Inhalation daily    T(C): 37.1 (11-29-17 @ 12:58), Max: 37.1 (11-29-17 @ 12:58)  HR: 81 (11-29-17 @ 12:58) (81 - 88)  BP: 110/66 (11-29-17 @ 12:58) (109/63 - 141/77)  RR: 20 (11-29-17 @ 12:58) (20 - 24)  SpO2: 100% (11-29-17 @ 12:58) (100% - 100%)  Wt(kg): --Vital Signs Last 24 Hrs  T(C): 37.1 (29 Nov 2017 12:58), Max: 37.1 (29 Nov 2017 12:58)  T(F): 98.7 (29 Nov 2017 12:58), Max: 98.7 (29 Nov 2017 12:58)  HR: 81 (29 Nov 2017 12:58) (81 - 88)  BP: 110/66 (29 Nov 2017 12:58) (109/63 - 141/77)  BP(mean): --  RR: 20 (29 Nov 2017 12:58) (20 - 24)  SpO2: 100% (29 Nov 2017 12:58) (100% - 100%)    PHYSICAL EXAM:  GENERAL: elderly female, frail in appearance  HEAD:  Atraumatic, Normocephalic  EYES: VALDO, EOMI, clear sclara  NECK: Supple, No JVD  CHEST/LUNG: Clear to auscultation bilaterally; No wheeze  HEART: Regular rate and rhythm; No murmurs, rubs, or gallops  ABDOMEN: Grimaces upon palpation of the entire abdomen, worse on suprapubic area, Nondistended  EXTREMITIES:  2+ Peripheral Pulses, No clubbing, cyanosis, or edema  PSYCH: pt not speaking today  NEUROLOGY: recognizes her name, however, is unable to say anything else  SKIN: decubitus ulcer on back    Consultant(s) Notes Reviewed:  [x ] YES  [ ] NO  Care Discussed with Consultants/Other Providers [ x] YES  [ ] NO    LABS:                        10.0   11.39 )-----------( 348      ( 29 Nov 2017 05:10 )             30.8     11-29    140  |  104  |  12  ----------------------------<  81  3.7   |  23  |  0.38<L>    Ca    8.6      29 Nov 2017 05:10  Phos  2.4     11-29  Mg     1.7     11-29    TPro  5.6<L>  /  Alb  2.4<L>  /  TBili  0.3  /  DBili  x   /  AST  39<H>  /  ALT  35<H>  /  AlkPhos  113  11-29        CAPILLARY BLOOD GLUCOSE  POCT Blood Glucose.: 87 mg/dL (29 Nov 2017 12:12)  POCT Blood Glucose.: 90 mg/dL (29 Nov 2017 05:30)  POCT Blood Glucose.: 108 mg/dL (28 Nov 2017 22:17)  POCT Blood Glucose.: 98 mg/dL (28 Nov 2017 17:25)    < from: US Kidney and Bladder (11.28.17 @ 09:53) >  FINDINGS:    Right kidney:  10.5 cm. No renal collection, hydronephrosis or calculi.    Left kidney:  9.8 cm. No renal collection, hydronephrosis or calculi.    Urinary bladder: Contracted around a Zee catheter.    IMPRESSION:     Normal renal ultrasound.    < end of copied text >      HEALTH ISSUES - PROBLEM Dx:  Bacteremia: Bacteremia  Transaminitis: Transaminitis  Hypokalemia: Hypokalemia  Hypernatremia: Hypernatremia  Decubitus ulcer of sacral region, unstageable: Decubitus ulcer of sacral region, unstageable  Need for prophylactic measure: Need for prophylactic measure  Decubitus ulcer of sacral region, stage 4: Decubitus ulcer of sacral region, stage 4  CAD (coronary artery disease): CAD (coronary artery disease)  CHF (congestive heart failure): CHF (congestive heart failure)  Delirium due to another medical condition, acute, hypoactive: Delirium due to another medical condition, acute, hypoactive  Sepsis due to urinary tract infection: Sepsis due to urinary tract infection

## 2017-11-29 NOTE — PROGRESS NOTE ADULT - ATTENDING COMMENTS
Patient seen and examined with team.  Alert and states her name but otherwise does not wish to talk. Agree with above.  -ARASH per ID  -check device interrogation    Nehal López MD  [facility]  [address], [address]  [place] [address]  office: [phone]  pager: [phone]

## 2017-11-30 NOTE — PROGRESS NOTE ADULT - SUBJECTIVE AND OBJECTIVE BOX
SUBJECTIVE: No CP or SOB.      MEDICATIONS  (STANDING):  amiodarone    Tablet 400 milliGRAM(s) Oral daily  aspirin  chewable 81 milliGRAM(s) Oral daily  atorvastatin 20 milliGRAM(s) Oral at bedtime  buDESOnide 160 MICROgram(s)/formoterol 4.5 MICROgram(s) Inhaler 2 Puff(s) Inhalation two times a day  cefepime  IVPB 2000 milliGRAM(s) IV Intermittent every 12 hours  collagenase Ointment 1 Application(s) Topical two times a day  dextrose 5%. 1000 milliLiter(s) (40 mL/Hr) IV Continuous <Continuous>  enoxaparin Injectable 40 milliGRAM(s) SubCutaneous daily  furosemide    Tablet 40 milliGRAM(s) Oral daily  latanoprost 0.005% Ophthalmic Solution 1 Drop(s) Both EYES at bedtime  levothyroxine Injectable 25 MICROGram(s) IV Push daily  losartan 25 milliGRAM(s) Oral daily  metoprolol succinate ER 25 milliGRAM(s) Oral daily  spironolactone 12.5 milliGRAM(s) Oral daily  tiotropium 18 MICROgram(s) Capsule 1 Capsule(s) Inhalation daily      LABS:                        11.1   12.77 )-----------( 353      ( 30 Nov 2017 09:38 )             34.0     138  |  103  |  13  ----------------------------<  94  4.2   |  20<L>  |  0.45<L>    Ca    8.4      30 Nov 2017 06:50  Phos  3.6     11-30  Mg     1.7     11-30    TPro  5.8<L>  /  Alb  2.2<L>  /  TBili  0.3  /  DBili  x   /  AST  59<H>  /  ALT  38<H>  /  AlkPhos  115  11-30    Creatinine Trend: 0.45<--, 0.38<--, 0.41<--, 0.44<--, 0.44<--, 0.50<--     PHYSICAL EXAM  Vital Signs Last 24 Hrs  T(C): 36.6 (30 Nov 2017 12:55), Max: 37.2 (29 Nov 2017 20:53)  T(F): 97.8 (30 Nov 2017 12:55), Max: 98.9 (29 Nov 2017 20:53)  HR: 79 (30 Nov 2017 12:55) (77 - 79)  BP: 106/62 (30 Nov 2017 12:55) (106/62 - 127/65)  RR: 20 (30 Nov 2017 12:55) (18 - 20)  SpO2: 98% (30 Nov 2017 12:55) (98% - 100%)    Cardiovascular:  S1S2 RRR, No JVD, 1/6 ERICA   Respiratory: Lungs clear to auscultation, normal effort  Gastrointestinal: Abdomen soft, ND, NT, +BS  Skin: Warm, dry, intact. No rash.  Ext: no C/C/E B/L LE    DIAGNOSTIC DATA    < from: TTE with Doppler (w/Cont) (04.03.17 @ 16:18) >  CONCLUSIONS:  1. Mitral annular calcification, otherwise normal mitral  valve. Mild mitral regurgitation.  2. Moderately dilated left atrium.  LA volume index = 46  cc/m2.  3. Moderate left ventricular enlargement.  4. Severe global left ventricular systolic dysfunction.  Endocardial visualization enhanced with intravenous  injection of echo contrast (Definity).  5. The right ventricle is not well visualized; grossly  normal right ventricular systolic function.  A device wire  is noted in the right heart.  6. Estimated right ventricular systolic pressure equals 60  mm Hg, assuming right atrial pressure equals 10 mm Hg,  consistent with moderate pulmonary hypertension.  ------------------------------------------------------------------------  Confirmed on  4/3/2017 - 17:19:47 by Jean-Pierre Carver M.D.    < end of copied text >    < from: Cardiac Cath Lab - Adult (04.26.16 @ 13:12) >  VENTRICLES: No LV gram was performed; however, a recent echocardiogram  demonstrated an EF of 19 %.  CORONARY VESSELS: The coronary circulation is right dominant.  LM:   --  LM: Normal.  LAD:   --  Proximal LAD: There was a [id number] % stenosis. There was mild  restenosis in proximal LAD stent.  --  Mid LAD: Angiography showed minor luminal irregularities with no flow  limiting lesions.  --  Distal LAD: Angiography showed minor luminal irregularities with no  flow limiting lesions.  --  D1: Angiography showed minor luminal irregularities with no flow  limiting lesions.  CX:   --  Circumflex: Normal.  RI:   --  Ramus intermedius: Angiography showed minor luminal  irregularities with no flow limiting lesions.  RCA:   --  Proximal RCA: Normal.  --  Mid RCA: Angiography showed mild atherosclerosis with no flow limiting  lesions.  --  Distal RCA: Angiography showed minor luminal irregularities with no  flow limiting lesions.  --  RPDA: Angiography showed minor luminal irregularities with no flow  limiting lesions.  --  RPLS: Angiography showed minor luminal irregularities with no flow  limiting lesions.  COMPLICATIONS: There were no complications.  DIAGNOSTIC RECOMMENDATIONS: Medical management and aggressive risk factor  modification is recommended.  Prepared and signed by  Nehal López M.D.  Signed 04/27/2016 13:13:20    < end of copied text >      ASSESSMENT AND PLAN:    77y Female with hypertension, dyslipidemia, CAD s/p LAD stent with only mild re-stenosis with minor luminal irregularities otherwise on cath 4/16, with known severe LV dysfunction , NICM s/p Medtronic BiV ICD, VT on Amio, COPD, lymphoma previously on chemo with known spinal mets and compression fractures admitted with lethargy due to GNR bacteremia, likely urinary source    --ABX per primary team/ID  --Not in clinical CHF.  Monitor volume status  --Check TTE per ID  --Interrogate ICD  --Pain Management  --Cardiac meds resumed.    Dolores Rodriguez PA-C  [facility]  [address], [address]   [place] [address]  office [phone]  pager [phone]

## 2017-11-30 NOTE — PROGRESS NOTE ADULT - SUBJECTIVE AND OBJECTIVE BOX
HUMA MARSH  77y  Female      Patient is a 77y old  Female who presents with a chief complaint of AMS (25 Nov 2017 18:22)      INTERVAL HPI/OVERNIGHT EVENTS:  -pt more vocal this AM  -pt still complaining of abdominal pain    Medications:  acetaminophen    Suspension. 650 milliGRAM(s) Oral every 6 hours PRN  acetaminophen    Suspension. 325 milliGRAM(s) Oral every 6 hours PRN  amiodarone    Tablet 400 milliGRAM(s) Oral daily  aspirin  chewable 81 milliGRAM(s) Oral daily  atorvastatin 20 milliGRAM(s) Oral at bedtime  buDESOnide 160 MICROgram(s)/formoterol 4.5 MICROgram(s) Inhaler 2 Puff(s) Inhalation two times a day  cefepime  IVPB 2000 milliGRAM(s) IV Intermittent every 12 hours  collagenase Ointment 1 Application(s) Topical two times a day  dextrose 5%. 1000 milliLiter(s) IV Continuous <Continuous>  enoxaparin Injectable 40 milliGRAM(s) SubCutaneous daily  furosemide    Tablet 40 milliGRAM(s) Oral daily  latanoprost 0.005% Ophthalmic Solution 1 Drop(s) Both EYES at bedtime  levothyroxine Injectable 25 MICROGram(s) IV Push daily  losartan 25 milliGRAM(s) Oral daily  metoprolol succinate ER 25 milliGRAM(s) Oral daily  spironolactone 12.5 milliGRAM(s) Oral daily  tiotropium 18 MICROgram(s) Capsule 1 Capsule(s) Inhalation daily      REVIEW OF SYSTEMS:  pt still speaking to a limited capacity.   CONSTITUTIONAL: feels tired and weak  ENMT: pt reports that she's like to try and swallow  RESPIRATORY: reports no SOB  CARDIOVASCULAR: reports slight occasional chest pain  GASTROINTESTINAL: says her stomach hurts and points to her abdomen  NEUROLOGICAL: pt still a bit confused  ENDOCRINE: pt says she feels cold  MUSCULOSKELETAL: pt complains of back pain     T(C): 36.7 (11-30-17 @ 05:32), Max: 37.2 (11-29-17 @ 20:53)  HR: 77 (11-30-17 @ 05:32) (77 - 81)  BP: 127/65 (11-30-17 @ 05:32) (110/66 - 127/65)  RR: 18 (11-30-17 @ 05:32) (18 - 20)  SpO2: 98% (11-30-17 @ 05:32) (98% - 100%)  Wt(kg): --Vital Signs Last 24 Hrs  T(C): 36.7 (30 Nov 2017 05:32), Max: 37.2 (29 Nov 2017 20:53)  T(F): 98.1 (30 Nov 2017 05:32), Max: 98.9 (29 Nov 2017 20:53)  HR: 77 (30 Nov 2017 05:32) (77 - 81)  BP: 127/65 (30 Nov 2017 05:32) (110/66 - 127/65)  BP(mean): --  RR: 18 (30 Nov 2017 05:32) (18 - 20)  SpO2: 98% (30 Nov 2017 05:32) (98% - 100%)    PHYSICAL EXAM:  GENERAL: elderly female, frail in appearance  HEAD:  Atraumatic, Normocephalic  EYES: VALDO, EOMI, clear sclara  NECK: Supple, No JVD  CHEST/LUNG: Clear to auscultation bilaterally; No wheeze  HEART: Regular rate and rhythm; No murmurs, rubs, or gallops  ABDOMEN: Grimaces upon palpation of the entire abdomen, worse on suprapubic area, Nondistended  EXTREMITIES:  2+ Peripheral Pulses, No clubbing, cyanosis, or edema  PSYCH: pt speaking in short sentences today, still limited speach,. AAOx1  NEUROLOGY: recognizes her name, however, is unable to say anything else  SKIN: decubitus ulcer on back    Consultant(s) Notes Reviewed:  [x ] YES  [ ] NO  Care Discussed with Consultants/Other Providers [ x] YES  [ ] NO    LABS:                        10.0   11.39 )-----------( 348      ( 29 Nov 2017 05:10 )             30.8     11-29    140  |  104  |  12  ----------------------------<  81  3.7   |  23  |  0.38<L>    Ca    8.6      29 Nov 2017 05:10  Phos  2.4     11-29  Mg     1.7     11-29    TPro  5.6<L>  /  Alb  2.4<L>  /  TBili  0.3  /  DBili  x   /  AST  39<H>  /  ALT  35<H>  /  AlkPhos  113  11-29        CAPILLARY BLOOD GLUCOSE  POCT Blood Glucose.: 103 mg/dL (30 Nov 2017 08:17)  POCT Blood Glucose.: 104 mg/dL (30 Nov 2017 05:39)  POCT Blood Glucose.: 95 mg/dL (30 Nov 2017 01:03)  POCT Blood Glucose.: 82 mg/dL (29 Nov 2017 17:22)  POCT Blood Glucose.: 87 mg/dL (29 Nov 2017 12:12)      HEALTH ISSUES - PROBLEM Dx:  Bacteremia: Bacteremia  Transaminitis: Transaminitis  Hypokalemia: Hypokalemia  Hypernatremia: Hypernatremia  Decubitus ulcer of sacral region, unstageable: Decubitus ulcer of sacral region, unstageable  Need for prophylactic measure: Need for prophylactic measure  Decubitus ulcer of sacral region, stage 4: Decubitus ulcer of sacral region, stage 4  CAD (coronary artery disease): CAD (coronary artery disease)  CHF (congestive heart failure): CHF (congestive heart failure)  Delirium due to another medical condition, acute, hypoactive: Delirium due to another medical condition, acute, hypoactive  Sepsis due to urinary tract infection: Sepsis due to urinary tract infection

## 2017-11-30 NOTE — PROGRESS NOTE ADULT - ATTENDING COMMENTS
Patient seen and examined with team.  Agree with Above A/p  by Dr Franks.  Id f/u appreciated- TTE pending.

## 2017-11-30 NOTE — PROGRESS NOTE ADULT - ASSESSMENT
76yo F with PMH of CAD, MI, HFrEF (EF 15%), s/p biventricular AICD, HTN, stage 2-3 decubitus sacral ulcer, recent ED visit for CRE and Pseudomonas UTI (11/07/17, sent home on 7 day course of Cipro, CRE assessed as contaminant) who p/w altered mentation; found to have fever (Tmax 103), leukocytosis (20.77), and cloudy urine; now with Pseudomonas bacteremia, most likely 2/2 pseudomonas UTI.   ABX day 6: Cefepime     PROBLEM: Gram negative bacteremia  - Leukocytosis continuing to improve, afebrile (last F 11/25), no longer tachycardic, more alert   - 11/25 B/UCxs growing Pseudomonas aeruginosa, sensitive to cefepime  - Would continue cefepime, as leukocytosis and clinical status improving.  anticipate duration 14 days  - Renal u/s without evidence of hydronephrosis or renal calculi  - Would get TTE to r/o AICD seeding

## 2017-11-30 NOTE — PROGRESS NOTE ADULT - ASSESSMENT
77yof w/ CAD, MI, Systolic CHF/s/p AICD, HTN, sent from [facility] for AMS due to sepsis 2/2 UTI.

## 2017-11-30 NOTE — PROGRESS NOTE ADULT - PROBLEM SELECTOR PLAN 9
VTE: Lovenox  Diet: Pureed with supplimental ensure pudding  Code: no advanced directive in chart    JULIEN Franks MD-PGY1  Internal Medicine Department  Pager: [phone]

## 2017-11-30 NOTE — PROGRESS NOTE ADULT - ATTENDING COMMENTS
Agree with above.   -Echo per ID  -continue with abx per primary team    Nehal López MD  [facility]  [address], [id number]  [place] [address]  office: [phone]  pager: [phone]

## 2017-11-30 NOTE — PROGRESS NOTE ADULT - SUBJECTIVE AND OBJECTIVE BOX
PATIENT: HUMA MARSH   AGE: 76yo   GENDER: Female  WEIGHT:   ALLERGIES: peanuts (Unknown)  penicillin (Nausea)    CHIEF COMPLAINT:   AMS (25 Nov 2017 18:22)    INTERVAL HPI / OVERNIGHT EVENTS:  still c/o abdominal pain    MEDICATIONS  MEDICATIONS  (STANDING):  acetaminophen    Suspension. 650 every 6 hours PRN  acetaminophen    Suspension. 325 every 6 hours PRN  amiodarone    Tablet 400 daily  aspirin  chewable 81 daily  atorvastatin 20 at bedtime  buDESOnide 160 MICROgram(s)/formoterol 4.5 MICROgram(s) Inhaler 2 two times a day  enoxaparin Injectable 40 daily  furosemide    Tablet 40 daily  levothyroxine Injectable 25 daily  losartan 25 daily  metoprolol succinate ER 25 daily  spironolactone 12.5 daily  tiotropium 18 MICROgram(s) Capsule 1 daily    Vital Signs Last 24 Hrs  T(F): 97.8 (11-30-17 @ 12:55), Max: 98.9 (11-29-17 @ 20:53)  HR: 79 (11-30-17 @ 12:55)  BP: 106/62 (11-30-17 @ 12:55)  RR: 20 (11-30-17 @ 12:55)  SpO2: 98% (11-30-17 @ 12:55) (98% - 100%)    PHYSICAL EXAM:  GENERAL: comfortable    HEENT:  AT/NC; EOMI, PERRLA, conjunctiva and sclera clear; hearing grossly intact  NECK: Supple, non tender  CHEST/LUNG: clear ant  nasalO2  HEART: Normal rate, regular rhythm; No murmurs, rubs, or gallops  AICD left chest wall   : suprapubic tender  ABDOMEN: Soft, tender to palp, bs+  MSK/EXTREMITIES:  less leg pain  PSYCH:   limited verbal responses  NEUROLOGY: Non focal   BACK: stage 2-3 sacral ulcer approx 6 cm no necrosis or surrounding erythema      LABS:                        11.1   12.77 )-----------( 353      ( 30 Nov 2017 09:38 )             34.0 11-30    138  |  103  |  13  ----------------------------<  94  4.2   |  20  |  0.45  Ca    8.4      30 Nov 2017 06:50Phos  3.6     11-30Mg     1.7     11-30  TPro  5.8  /  Alb  2.2  /  TBili  0.3  /  DBili  x   /  AST  59  /  ALT  38  /  AlkPhos  115  11-30            MICROBIOLOGY:    Culture - Blood in AM (11.29.17 @ 06:35)    Culture - Blood:   NO ORGANISMS ISOLATED  NO ORGANISMS ISOLATED AT 24 HOURS    Specimen Source: BLOOD        Culture - Urine (collected 11-25-17 @ 17:23)  Source: URINE CATHETER  Final Report (11-28-17 @ 12:18):    COLONY COUNT: > = 100,000 CFU/ML  Organism: Pseudomonas aeruginosa (11-28-17 @ 12:18)  Organism: Pseudomonas aeruginosa (11-28-17 @ 12:18)    Culture - Blood (collected 11-25-17 @ 16:40)  Source: BLOOD VENOUS  Preliminary Report (11-27-17 @ 15:15):    PSA^Pseudomonas aeruginosa  Organism: Pseudomonas aeruginosa (11-28-17 @ 12:30)  Organism: Pseudomonas aeruginosa (11-28-17 @ 12:30)    Culture - Blood (collected 11-25-17 @ 16:40)  Source: BLOOD PERIPHERAL  Preliminary Report (11-26-17 @ 14:38):    ***Blood Panel PCR results on this specimen are available    approximately 3 hours after the Gram stain result***    Gram stain, PCR, and/or culture results may not always    correspond due to difference in methodologies    ------------------------------------------------------------    This PCR assay was performed using [employer] [place].  The    following targets are tested for:  Enterococcus, vancomycin    resistant enterococci, Listeria monocytogenes,  coagulase    negative staphylococci, S. aureus, methicillin resistant S.    aureus, Streptococcus agalactiae (Group B), S. pneumoniae,    S. pyogenes (Group A), Acinetobacter baumannii, Enterobacter    cloacae, E. coli, Klebsiella oxytoca, K. pneumoniae, Proteus    sp., Serratia marcescens, Haemophilus influenzae, Neisseria    meningitidis, Pseudomonas aeruginosa, Candida albicans, C.    glabrata, C. krusei, C. parapsilosis, C. tropicalis and the    KPC resistance gene.  Final Report (11-28-17 @ 12:31):    SEE PREVIOUS CULTURE:[id number] COLLECTED 11/25/17    RECEIVED 11/25/17   FOR MAGDALENA  Organism: BLOOD CULTURE PCR  ***Blood Panel PCR results on this specimen are available  approximately 3 hours after the Gram stain result***  Gram stain, PCR, and/or culture results may not always  correspond due to difference in methodologies  ------------------------------------------------------------          RADIOLOGY & ADDITIONAL TESTS:  < from: Xray Chest 1 View AP/PA (11.29.17 @ 11:22) >    There is a left chest wall AICD present. The lungs are clear. There is no   evidence of pneumothorax or pleural effusion. The cardiomediastinal   silhouette is stable in size. Degenerative changes of the osseous   structures.    IMPRESSION:  No acute pulmonary disease.    < end of copied text >  < from: US Kidney and Bladder (11.28.17 @ 09:53) >    FINDINGS:    Right kidney:  10.5 cm. No renal collection, hydronephrosis or calculi.    Left kidney:  9.8 cm. No renal collection, hydronephrosis or calculi.    Urinary bladder: Contracted around a Zee catheter.    IMPRESSION:     Normal renal ultrasound.    < end of copied text >

## 2017-12-01 NOTE — CHART NOTE - NSCHARTNOTEFT_GEN_A_CORE
ELECTROPHYSIOLOGY  Device Interrogation Performed                                  Date/Time:  12/1/2017  10:40 am  :         [employer]     CRT-D              Model:               VivaQuad                     Mode:       DDD                      Rate:     60    Atrial Lead:  P wave amplitude:               1.5           mv          Impedence:        399           Ohms      Threshold:        1.5        V@    0.4         ms      Ventricular Lead(s):  RV Lead: R wave amplitude:              12.4            mv          Impedence: 437                   Ohms      Threshold:      1.0          V@      0.4       ms   LV Lead:  R wave amplitude:                          mv          Impedence:       456            Ohms      Threshold:       1.125         V@    0.5         ms     Battery Status:                     Good                  Underlying Rhythm:    Sinus rhythm at 60    Events/Observation: No high ventricular rate events;  No high atrial rate events since last session on 11/26/2017     Impression/Plan: p/w bacteremia; possible Optivol fluid accumulation since 11/26/2017-ongoing  Normal sensing and pacing via iterative testing.  Excellent threshold capture.  No reprogramming.

## 2017-12-01 NOTE — PROGRESS NOTE ADULT - PROBLEM SELECTOR PLAN 6
pt srtarted on her home heart medications. pt goes slightly hypotensive in the PM.   -hold parameters set for BP meds

## 2017-12-01 NOTE — PROGRESS NOTE ADULT - PROBLEM SELECTOR PLAN 9
VTE: Lovenox  Diet: Pureed with supplimental ensure pudding  Code: no advanced directive in chart    JULIEN Frakns MD-PGY1  Internal Medicine Department  Pager: [phone]

## 2017-12-01 NOTE — PROGRESS NOTE ADULT - PROBLEM SELECTOR PLAN 2
Pt was found to have pseudomonas growing in the blood. ID wants to rule out AICD vegetation. last positive blood clx was on 11/27  -ordered TTE   -will continue cefepime  -appreciate ID recs

## 2017-12-01 NOTE — PROGRESS NOTE ADULT - ATTENDING COMMENTS
Patient seen and examined.  Agree with A/P by team   Speaks one word at a time when prompted.  Echo today to det duration of ABX. Patient seen and examined.  Agree with A/P by team   Speaks one word at a time when prompted.  ID- low BP- ?? sepsis- panculture. f/u blood cultures.  Echo today to det duration of ABX.  [facility] - Aic int- No abn. Patient seen and examined.  Agree with A/P by team   Speaks one word at a time when prompted.  ID- low BP- ?? sepsis- panculture. f/u blood cultures.  Echo today to det duration of ABX.  [facility] - Aic int- No abn.  Callewd son Leonardo at [phone] - left meassage mother in hop stable please call nursing station. Patient seen and examined.  Agree with A/P by team   Speaks one word at a time when prompted.  ID- low BP- ?? sepsis- panculture. f/u blood cultures.  Echo today to det duration of ABX.  [facility] - AICD int- No abn.  Functional qud with severe protein arleen malnutrition and failure to thrive.  Called son Leonardo at [phone] - left message mother in hop stable please call nursing station.  Need GOC discussion with family- patient is full code Patient seen and examined.  Agree with A/P by team   Speaks one word at a time when prompted. Alert , ??? oriented???  ID- low BP- ?? sepsis- panculture. f/u blood cultures.  Echo today to det duration of ABX.  [facility] - AICD int- No abn.  Functional qud with severe protein arleen malnutrition and failure to thrive.  Called son Leonardo at [phone] - left message mother in hop stable please call nursing station.  Need GOC discussion with family- patient is full code

## 2017-12-01 NOTE — PROGRESS NOTE ADULT - ASSESSMENT
78yo F with PMH of CAD, MI, HFrEF (EF 15%), s/p biventricular AICD, HTN, stage 2-3 decubitus sacral ulcer, recent ED visit for CRE and Pseudomonas UTI (11/07/17, sent home on 7 day course of Cipro, CRE assessed as contaminant) who p/w altered mentation; found to have fever (Tmax 103), leukocytosis (20.77), and cloudy urine; now with Pseudomonas bacteremia, most likely 2/2 pseudomonas UTI.   ABX day 7: Cefepime     PROBLEM: Gram negative bacteremia  - Leukocytosis continuing to improve, afebrile (last F 11/25), no longer tachycardic, more alert   - 11/25 B/UCxs growing Pseudomonas aeruginosa, sensitive to cefepime  - Would continue cefepime, as leukocytosis and clinical status improving.  anticipate duration 14 days  - Renal u/s without evidence of hydronephrosis or renal calculi  - Would get TTE to r/o AICD seeding 76yo F with PMH of CAD, MI, HFrEF (EF 15%), s/p biventricular AICD, HTN, stage 2-3 decubitus sacral ulcer, recent ED visit for CRE and Pseudomonas UTI (11/07/17, sent home on 7 day course of Cipro, CRE assessed as contaminant) who p/w altered mentation; found to have fever (Tmax 103), leukocytosis (20.77), and cloudy urine; now with Pseudomonas bacteremia, most likely 2/2 pseudomonas UTI. BP low today ?med related.  ?sepsis   ABX day 7: Cefepime     PROBLEM: Gram negative bacteremia  - Leukocytosis continuing to improve, afebrile (last F 11/25), no longer tachycardic, more alert   - 11/25 B/UCxs growing Pseudomonas aeruginosa, sensitive to cefepime  - Would continue cefepime, as leukocytosis downtrending.  anticipate duration 14 days  - Renal u/s without evidence of hydronephrosis or renal calculi  - Would get TTE to r/o AICD seeding     - would add vanco 1 gm daily to cover MRSA isolated from decubitus   -follow repeat blood cultures      ID service available for questions over weekend.

## 2017-12-01 NOTE — PROGRESS NOTE ADULT - SUBJECTIVE AND OBJECTIVE BOX
PATIENT: HUMA MARSH   AGE: 78yo   GENDER: Female  WEIGHT:   ALLERGIES: peanuts (Unknown)  penicillin (Nausea)    CHIEF COMPLAINT:   AMS (25 Nov 2017 18:22)    INTERVAL HPI / OVERNIGHT EVENTS:  more interactive today.  waves to me.  still limited verbal response.  indicates abd pain better.                          10.2   13.73 )-----------( 328      ( 01 Dec 2017 07:45 )             31.8 12-01    136  |  99  |  12  ----------------------------<  92  3.4   |  25  |  0.42  Ca    8.3      01 Dec 2017 07:45Phos  2.1     12-01Mg     1.7     12-01  TPro  5.8  /  Alb  2.2  /  TBili  0.3  /  DBili  x   /  AST  59  /  ALT  38  /  AlkPhos  115  11-30    Vital Signs Last 24 Hrs  T(F): 97.7 (12-01-17 @ 12:49), Max: 99.3 (11-30-17 @ 21:38)  HR: 80 (12-01-17 @ 12:49)  BP: 95/60 (12-01-17 @ 12:49)  RR: 20 (12-01-17 @ 12:49)  SpO2: 100% (12-01-17 @ 12:49) (100% - 100%)    PHYSICAL EXAM:  GENERAL: comfortable    HEENT:  AT/NC; EOMI, PERRLA, conjunctiva and sclera clear; hearing grossly intact  NECK: Supple, non tender  CHEST/LUNG: clear ant  nasalO2  HEART: Normal rate, regular rhythm; No murmurs, rubs, or gallops  AICD left chest wall   :  no tenderness suprapubic  ABDOMEN: Soft, non tender to palp, bs+  MSK/EXTREMITIES:  not tender  PSYCH:   limited verbal responses  NEUROLOGY: Non focal   BACK: stage 2-3 sacral ulcer       LABS:                        11.1   12.77 )-----------( 353      ( 30 Nov 2017 09:38 )             34.0 11-30    138  |  103  |  13  ----------------------------<  94  4.2   |  20  |  0.45  Ca    8.4      30 Nov 2017 06:50Phos  3.6     11-30Mg     1.7     11-30  TPro  5.8  /  Alb  2.2  /  TBili  0.3  /  DBili  x   /  AST  59  /  ALT  38  /  AlkPhos  115  11-30            MICROBIOLOGY:    Culture - Blood in AM (11.29.17 @ 06:35)    Culture - Blood:   NO ORGANISMS ISOLATED  NO ORGANISMS ISOLATED AT 24 HOURS    Specimen Source: BLOOD        Culture - Urine (collected 11-25-17 @ 17:23)  Source: URINE CATHETER  Final Report (11-28-17 @ 12:18):    COLONY COUNT: > = 100,000 CFU/ML  Organism: Pseudomonas aeruginosa (11-28-17 @ 12:18)  Organism: Pseudomonas aeruginosa (11-28-17 @ 12:18)    Culture - Blood (collected 11-25-17 @ 16:40)  Source: BLOOD VENOUS  Preliminary Report (11-27-17 @ 15:15):    PSA^Pseudomonas aeruginosa  Organism: Pseudomonas aeruginosa (11-28-17 @ 12:30)  Organism: Pseudomonas aeruginosa (11-28-17 @ 12:30)    Culture - Blood (collected 11-25-17 @ 16:40)  Source: BLOOD PERIPHERAL  Preliminary Report (11-26-17 @ 14:38):    ***Blood Panel PCR results on this specimen are available    approximately 3 hours after the Gram stain result***    Gram stain, PCR, and/or culture results may not always    correspond due to difference in methodologies    ------------------------------------------------------------    This PCR assay was performed using [employer].  The    following targets are tested for:  Enterococcus, vancomycin    resistant enterococci, Listeria monocytogenes,  coagulase    negative staphylococci, S. aureus, methicillin resistant S.    aureus, Streptococcus agalactiae (Group B), S. pneumoniae,    S. pyogenes (Group A), Acinetobacter baumannii, Enterobacter    cloacae, E. coli, Klebsiella oxytoca, K. pneumoniae, Proteus    sp., Serratia marcescens, Haemophilus influenzae, Neisseria    meningitidis, Pseudomonas aeruginosa, Candida albicans, C.    glabrata, C. krusei, C. parapsilosis, C. tropicalis and the    KPC resistance gene.  Final Report (11-28-17 @ 12:31):    SEE PREVIOUS CULTURE:[id number] COLLECTED 11/25/17    RECEIVED 11/25/17   FOR MAGDALENA  Organism: BLOOD CULTURE PCR  ***Blood Panel PCR results on this specimen are available  approximately 3 hours after the Gram stain result***  Gram stain, PCR, and/or culture results may not always  correspond due to difference in methodologies  ------------------------------------------------------------          RADIOLOGY & ADDITIONAL TESTS:  < from: Xray Chest 1 View AP/PA (11.29.17 @ 11:22) >    There is a left chest wall AICD present. The lungs are clear. There is no   evidence of pneumothorax or pleural effusion. The cardiomediastinal   silhouette is stable in size. Degenerative changes of the osseous   structures.    IMPRESSION:  No acute pulmonary disease.    < end of copied text >  < from: US Kidney and Bladder (11.28.17 @ 09:53) >    FINDINGS:    Right kidney:  10.5 cm. No renal collection, hydronephrosis or calculi.    Left kidney:  9.8 cm. No renal collection, hydronephrosis or calculi.    Urinary bladder: Contracted around a Zee catheter.    IMPRESSION:     Normal renal ultrasound.    < end of copied text >

## 2017-12-01 NOTE — PROGRESS NOTE ADULT - SUBJECTIVE AND OBJECTIVE BOX
SUBJECTIVE: No CP or SOB.    noted events overnight         MEDICATIONS  (STANDING):  amiodarone    Tablet 400 milliGRAM(s) Oral daily  aspirin  chewable 81 milliGRAM(s) Oral daily  atorvastatin 20 milliGRAM(s) Oral at bedtime  buDESOnide 160 MICROgram(s)/formoterol 4.5 MICROgram(s) Inhaler 2 Puff(s) Inhalation two times a day  cefepime  IVPB 2000 milliGRAM(s) IV Intermittent every 12 hours  collagenase Ointment 1 Application(s) Topical two times a day  enoxaparin Injectable 40 milliGRAM(s) SubCutaneous daily  latanoprost 0.005% Ophthalmic Solution 1 Drop(s) Both EYES at bedtime  levothyroxine Injectable 25 MICROGram(s) IV Push daily  metoprolol succinate ER 25 milliGRAM(s) Oral daily  potassium acid phosphate/sodium acid phosphate tablet (K-PHOS No. 2) 1 Tablet(s) Oral four times a day with meals  potassium chloride    Tablet ER 20 milliEquivalent(s) Oral every 2 hours  sodium chloride 0.9%. 1000 milliLiter(s) (75 mL/Hr) IV Continuous <Continuous>  spironolactone 12.5 milliGRAM(s) Oral daily  tiotropium 18 MICROgram(s) Capsule 1 Capsule(s) Inhalation daily    MEDICATIONS  (PRN):  acetaminophen    Suspension. 650 milliGRAM(s) Oral every 6 hours PRN Severe Pain (7 - 10)  acetaminophen    Suspension. 325 milliGRAM(s) Oral every 6 hours PRN Moderate Pain (4 - 6)      LABS:                        10.2   13.73 )-----------( 328      ( 01 Dec 2017 07:45 )             31.8     Hemoglobin: 10.2 g/dL (12-01 @ 07:45)  Hemoglobin: 11.1 g/dL (11-30 @ 09:38)  Hemoglobin: 10.0 g/dL (11-29 @ 05:10)  Hemoglobin: 9.9 g/dL (11-28 @ 07:32)  Hemoglobin: 9.7 g/dL (11-27 @ 06:38)    12-01    136  |  99  |  12  ----------------------------<  92  3.4<L>   |  25  |  0.42<L>    Ca    8.3<L>      01 Dec 2017 07:45  Phos  2.1     12-01  Mg     1.7     12-01    TPro  5.8<L>  /  Alb  2.2<L>  /  TBili  0.3  /  DBili  x   /  AST  59<H>  /  ALT  38<H>  /  AlkPhos  115  11-30    Creatinine Trend: 0.42<--, 0.45<--, 0.38<--, 0.41<--, 0.44<--, 0.44<--       PHYSICAL EXAM  Vital Signs Last 24 Hrs  T(C): 36.5 (01 Dec 2017 12:49), Max: 37.4 (30 Nov 2017 21:38)  T(F): 97.7 (01 Dec 2017 12:49), Max: 99.3 (30 Nov 2017 21:38)  HR: 80 (01 Dec 2017 12:49) (73 - 83)  BP: 95/60 (01 Dec 2017 12:49) (91/50 - 103/50)  BP(mean): --  RR: 20 (01 Dec 2017 12:49) (18 - 20)  SpO2: 100% (01 Dec 2017 12:49) (100% - 100%)    Cardiovascular:  S1S2 RRR, No JVD, 1/6 ERICA   Respiratory: Lungs clear to auscultation, normal effort  Gastrointestinal: Abdomen soft, ND, NT, +BS  Skin: Warm, dry, intact. No rash.  Ext: no C/C/E B/L LE    DIAGNOSTIC DATA    < from: TTE with Doppler (w/Cont) (04.03.17 @ 16:18) >  CONCLUSIONS:  1. Mitral annular calcification, otherwise normal mitral  valve. Mild mitral regurgitation.  2. Moderately dilated left atrium.  LA volume index = 46  cc/m2.  3. Moderate left ventricular enlargement.  4. Severe global left ventricular systolic dysfunction.  Endocardial visualization enhanced with intravenous  injection of echo contrast (Definity).  5. The right ventricle is not well visualized; grossly  normal right ventricular systolic function.  A device wire  is noted in the right heart.  6. Estimated right ventricular systolic pressure equals 60  mm Hg, assuming right atrial pressure equals 10 mm Hg,  consistent with moderate pulmonary hypertension.  ------------------------------------------------------------------------  Confirmed on  4/3/2017 - 17:19:47 by Jean-Pierre Carver M.D.    < end of copied text >    < from: Cardiac Cath Lab - Adult (04.26.16 @ 13:12) >  VENTRICLES: No LV gram was performed; however, a recent echocardiogram  demonstrated an EF of 19 %.  CORONARY VESSELS: The coronary circulation is right dominant.  LM:   --  LM: Normal.  LAD:   --  Proximal LAD: There was a easxtehc69 % stenosis. There was mild  restenosis in proximal LAD stent.  --  Mid LAD: Angiography showed minor luminal irregularities with no flow  limiting lesions.  --  Distal LAD: Angiography showed minor luminal irregularities with no  flow limiting lesions.  --  D1: Angiography showed minor luminal irregularities with no flow  limiting lesions.  CX:   --  Circumflex: Normal.  RI:   --  Ramus intermedius: Angiography showed minor luminal  irregularities with no flow limiting lesions.  RCA:   --  Proximal RCA: Normal.  --  Mid RCA: Angiography showed mild atherosclerosis with no flow limiting  lesions.  --  Distal RCA: Angiography showed minor luminal irregularities with no  flow limiting lesions.  --  RPDA: Angiography showed minor luminal irregularities with no flow  limiting lesions.  --  RPLS: Angiography showed minor luminal irregularities with no flow  limiting lesions.  COMPLICATIONS: There were no complications.  DIAGNOSTIC RECOMMENDATIONS: Medical management and aggressive risk factor  modification is recommended.  Prepared and signed by  Nehal López M.D.  Signed 04/27/2016 13:13:20    < end of copied text >    Interrogation   Normal sensing and pacing via iterative testing.  Excellent threshold capture.  No reprogramming.        ASSESSMENT AND PLAN:    77y Female with hypertension, dyslipidemia, CAD s/p LAD stent with only mild re-stenosis with minor luminal irregularities otherwise on cath 4/16, with known severe LV dysfunction , NICM s/p Medtronic BiV ICD, VT on Amio, COPD, lymphoma previously on chemo with known spinal mets and compression fractures admitted with lethargy due to GNR bacteremia, likely urinary source    --ABX per primary team/ID  --Not in clinical CHF.  Monitor volume status  --Check TTE per ID  -- ICD interrogated  Nl sensing & pacing   --Pain Management  --Cardiac meds resumed.

## 2017-12-01 NOTE — PROGRESS NOTE ADULT - ASSESSMENT
77yof w/ CAD, MI, Systolic CHF/s/p AICD, HTN, sent from [facility] for AMS due to sepsis 2/2 UTI.

## 2017-12-01 NOTE — PROGRESS NOTE ADULT - SUBJECTIVE AND OBJECTIVE BOX
SALMA HUMA  77y  Female      Patient is a 77y old  Female who presents with a chief complaint of AMS (25 Nov 2017 18:22)      INTERVAL HPI/OVERNIGHT EVENTS:  -o/n, pt was complaining of chest pain. EKG was normal and pain resolved on its own  -pt continue to wax and wane in mental status. she has episodes where she becomes non verbal and other times when she speaks in full sentences  -pt still awaiting TTE to evaluate AICD leads. Echo says she will get it today    Medications:  acetaminophen    Suspension. 650 milliGRAM(s) Oral every 6 hours PRN  acetaminophen    Suspension. 325 milliGRAM(s) Oral every 6 hours PRN  amiodarone    Tablet 400 milliGRAM(s) Oral daily  aspirin  chewable 81 milliGRAM(s) Oral daily  atorvastatin 20 milliGRAM(s) Oral at bedtime  buDESOnide 160 MICROgram(s)/formoterol 4.5 MICROgram(s) Inhaler 2 Puff(s) Inhalation two times a day  cefepime  IVPB 2000 milliGRAM(s) IV Intermittent every 12 hours  collagenase Ointment 1 Application(s) Topical two times a day  enoxaparin Injectable 40 milliGRAM(s) SubCutaneous daily  furosemide    Tablet 40 milliGRAM(s) Oral daily  latanoprost 0.005% Ophthalmic Solution 1 Drop(s) Both EYES at bedtime  levothyroxine Injectable 25 MICROGram(s) IV Push daily  losartan 25 milliGRAM(s) Oral daily  metoprolol succinate ER 25 milliGRAM(s) Oral daily  sodium chloride 0.9% Bolus 500 milliLiter(s) IV Bolus once  spironolactone 12.5 milliGRAM(s) Oral daily  tiotropium 18 MICROgram(s) Capsule 1 Capsule(s) Inhalation daily      REVIEW OF SYSTEMS:  ROS limited as pt is nonverbal today. pt able to follow basic commands and responds to her name    T(C): 37.2 (12-01-17 @ 05:39), Max: 37.4 (11-30-17 @ 21:38)  HR: 73 (12-01-17 @ 05:39) (73 - 83)  BP: 103/50 (12-01-17 @ 05:39) (91/50 - 106/62)  RR: 18 (12-01-17 @ 05:39) (18 - 20)  SpO2: 100% (12-01-17 @ 05:39) (98% - 100%)  Wt(kg): --Vital Signs Last 24 Hrs  T(C): 37.2 (01 Dec 2017 05:39), Max: 37.4 (30 Nov 2017 21:38)  T(F): 98.9 (01 Dec 2017 05:39), Max: 99.3 (30 Nov 2017 21:38)  HR: 73 (01 Dec 2017 05:39) (73 - 83)  BP: 103/50 (01 Dec 2017 05:39) (91/50 - 106/62)  BP(mean): --  RR: 18 (01 Dec 2017 05:39) (18 - 20)  SpO2: 100% (01 Dec 2017 05:39) (98% - 100%)    PHYSICAL EXAM:  GENERAL: elderly female, frail in appearance  HEAD:  Atraumatic, Normocephalic  EYES: VALDO, EOMI, clear sclara  NECK: Supple, No JVD  CHEST/LUNG: Clear to auscultation bilaterally; No wheeze  HEART: Regular rate and rhythm; No murmurs, rubs, or gallops  ABDOMEN: Grimaces upon palpation of the entire abdomen, worse on suprapubic area, Nondistended  EXTREMITIES:  2+ Peripheral Pulses, No clubbing, cyanosis, or edema  PSYCH: pt not speaking this AM, but was speaking earlier when she complained of chest pain.   NEUROLOGY: recognizes her name, however, is unable to say anything else  SKIN: decubitus ulcer on back    Consultant(s) Notes Reviewed:  [x ] YES  [ ] NO  Care Discussed with Consultants/Other Providers [ x] YES  [ ] NO    LABS:                        11.1   12.77 )-----------( 353      ( 30 Nov 2017 09:38 )             34.0     11-30    138  |  103  |  13  ----------------------------<  94  4.2   |  20<L>  |  0.45<L>    Ca    8.4      30 Nov 2017 06:50  Phos  3.6     11-30  Mg     1.7     11-30    TPro  5.8<L>  /  Alb  2.2<L>  /  TBili  0.3  /  DBili  x   /  AST  59<H>  /  ALT  38<H>  /  AlkPhos  115  11-30        CAPILLARY BLOOD GLUCOSE  POCT Blood Glucose.: 109 mg/dL (30 Nov 2017 23:06)  POCT Blood Glucose.: 89 mg/dL (30 Nov 2017 17:28)  POCT Blood Glucose.: 82 mg/dL (30 Nov 2017 12:37)  POCT Blood Glucose.: 103 mg/dL (30 Nov 2017 08:17)    HEALTH ISSUES - PROBLEM Dx:  Bacteremia: Bacteremia  Transaminitis: Transaminitis  Hypokalemia: Hypokalemia  Hypernatremia: Hypernatremia  Decubitus ulcer of sacral region, unstageable: Decubitus ulcer of sacral region, unstageable  Need for prophylactic measure: Need for prophylactic measure  Decubitus ulcer of sacral region, stage 4: Decubitus ulcer of sacral region, stage 4  CAD (coronary artery disease): CAD (coronary artery disease)  CHF (congestive heart failure): CHF (congestive heart failure)  Delirium due to another medical condition, acute, hypoactive: Delirium due to another medical condition, acute, hypoactive  Sepsis due to urinary tract infection: Sepsis due to urinary tract infection

## 2017-12-01 NOTE — PROGRESS NOTE ADULT - ATTENDING COMMENTS
Patient seen and examined, agree with above assessment and plan as transcribed above.    - f/u echo    Raymond Kinney MD, [facility]  [facility], [facility]  [address] [place] [address]  PHONE:  [phone]  BEEPER : [phone]

## 2017-12-01 NOTE — PROGRESS NOTE ADULT - PROBLEM SELECTOR PLAN 4
slightly increased, etiology is unclear. will consider causes if it continues to trend upwards  -will trend CMP

## 2017-12-02 NOTE — PROGRESS NOTE ADULT - PROBLEM SELECTOR PLAN 8
Resolved VTE: Lovenox  Diet: Pureed with supplimental ensure pudding  Code: no advanced directive in chart

## 2017-12-02 NOTE — PROGRESS NOTE ADULT - SUBJECTIVE AND OBJECTIVE BOX
SUBJECTIVE: no chest pain reports she feels "OK"       MEDICATIONS  (STANDING):  amiodarone    Tablet 400 milliGRAM(s) Oral daily  aspirin  chewable 81 milliGRAM(s) Oral daily  atorvastatin 20 milliGRAM(s) Oral at bedtime  buDESOnide 160 MICROgram(s)/formoterol 4.5 MICROgram(s) Inhaler 2 Puff(s) Inhalation two times a day  cefepime  IVPB 2000 milliGRAM(s) IV Intermittent every 12 hours  collagenase Ointment 1 Application(s) Topical two times a day  enoxaparin Injectable 40 milliGRAM(s) SubCutaneous daily  latanoprost 0.005% Ophthalmic Solution 1 Drop(s) Both EYES at bedtime  levothyroxine Injectable 25 MICROGram(s) IV Push daily  metoprolol succinate ER 25 milliGRAM(s) Oral daily  spironolactone 12.5 milliGRAM(s) Oral daily  tiotropium 18 MICROgram(s) Capsule 1 Capsule(s) Inhalation daily  vancomycin  IVPB      vancomycin  IVPB 750 milliGRAM(s) IV Intermittent every 24 hours    MEDICATIONS  (PRN):  acetaminophen    Suspension. 650 milliGRAM(s) Oral every 6 hours PRN Severe Pain (7 - 10)  acetaminophen    Suspension. 325 milliGRAM(s) Oral every 6 hours PRN Moderate Pain (4 - 6)      LABS:                        10.2   14.04 )-----------( 342      ( 02 Dec 2017 07:05 )             32.6     Hemoglobin: 10.2 g/dL (12-02 @ 07:05)  Hemoglobin: 10.2 g/dL (12-01 @ 07:45)  Hemoglobin: 11.1 g/dL (11-30 @ 09:38)  Hemoglobin: 10.0 g/dL (11-29 @ 05:10)  Hemoglobin: 9.9 g/dL (11-28 @ 07:32)    12-02    139  |  103  |  11  ----------------------------<  114<H>  3.3<L>   |  22  |  0.36<L>    Ca    8.6      02 Dec 2017 07:05  Phos  1.7     12-02  Mg     2.1     12-02      Creatinine Trend: 0.36<--, 0.42<--, 0.45<--, 0.38<--, 0.41<--, 0.44<--           PHYSICAL EXAM  Vital Signs Last 24 Hrs  T(C): 37.2 (02 Dec 2017 04:30), Max: 37.2 (02 Dec 2017 04:30)  T(F): 98.9 (02 Dec 2017 04:30), Max: 98.9 (02 Dec 2017 04:30)  HR: 78 (02 Dec 2017 04:30) (65 - 80)  BP: 132/61 (02 Dec 2017 04:30) (95/60 - 132/61)  BP(mean): --  RR: 19 (02 Dec 2017 04:30) (19 - 20)  SpO2: 100% (02 Dec 2017 04:30) (95% - 100%)    Cardiovascular:  S1S2 RRR, No JVD, 1/6 ERICA   Respiratory: Lungs clear to auscultation, normal effort  Gastrointestinal: Abdomen soft, ND, NT, +BS  Skin: Warm, dry, intact. No rash.  Ext: no C/C/E B/L LE    DIAGNOSTIC DATA    < from: TTE with Doppler (w/Cont) (04.03.17 @ 16:18) >  CONCLUSIONS:  1. Mitral annular calcification, otherwise normal mitral  valve. Mild mitral regurgitation.  2. Moderately dilated left atrium.  LA volume index = 46  cc/m2.  3. Moderate left ventricular enlargement.  4. Severe global left ventricular systolic dysfunction.  Endocardial visualization enhanced with intravenous  injection of echo contrast (Definity).  5. The right ventricle is not well visualized; grossly  normal right ventricular systolic function.  A device wire  is noted in the right heart.  6. Estimated right ventricular systolic pressure equals 60  mm Hg, assuming right atrial pressure equals 10 mm Hg,  consistent with moderate pulmonary hypertension.  ------------------------------------------------------------------------  Confirmed on  4/3/2017 - 17:19:47 by Jean-Pierre Carver M.D.    < end of copied text >    < from: Cardiac Cath Lab - Adult (04.26.16 @ 13:12) >  VENTRICLES: No LV gram was performed; however, a recent echocardiogram  demonstrated an EF of 19 %.  CORONARY VESSELS: The coronary circulation is right dominant.  LM:   --  LM: Normal.  LAD:   --  Proximal LAD: There was a [id number] % stenosis. There was mild  restenosis in proximal LAD stent.  --  Mid LAD: Angiography showed minor luminal irregularities with no flow  limiting lesions.  --  Distal LAD: Angiography showed minor luminal irregularities with no  flow limiting lesions.  --  D1: Angiography showed minor luminal irregularities with no flow  limiting lesions.  CX:   --  Circumflex: Normal.  RI:   --  Ramus intermedius: Angiography showed minor luminal  irregularities with no flow limiting lesions.  RCA:   --  Proximal RCA: Normal.  --  Mid RCA: Angiography showed mild atherosclerosis with no flow limiting  lesions.  --  Distal RCA: Angiography showed minor luminal irregularities with no  flow limiting lesions.  --  RPDA: Angiography showed minor luminal irregularities with no flow  limiting lesions.  --  RPLS: Angiography showed minor luminal irregularities with no flow  limiting lesions.  COMPLICATIONS: There were no complications.  DIAGNOSTIC RECOMMENDATIONS: Medical management and aggressive risk factor  modification is recommended.  Prepared and signed by  Nehal López M.D.  Signed 04/27/2016 13:13:20    < end of copied text >  ECHO   < from: Transthoracic Echocardiogram (12.01.17 @ 16:53) >  CONCLUSIONS:  1. Normal mitral valve. Minimal mitral regurgitation.  2. Mild left ventricular enlargement.  3. Severe global left ventricular systolic dysfunction.  4. The right ventricle is not well visualized. A device  wire is noted in the right heart.    < end of copied text >    Interrogation   Normal sensing and pacing via iterative testing.  Excellent threshold capture.  No reprogramming.      ASSESSMENT AND PLAN:    77y Female with hypertension, dyslipidemia, CAD s/p LAD stent with only mild re-stenosis with minor luminal irregularities otherwise on cath 4/16, with known severe LV dysfunction , NICM s/p Medtronic BiV ICD, VT on Amio, COPD, lymphoma previously on chemo with known spinal mets and compression fractures admitted with lethargy due to GNR bacteremia, likely urinary source    --ABX per primary team/ID  --Not in clinical CHF.  Monitor volume status  -- TTE no notation of vegetations   -- ICD interrogation NL sensing & pacing   --Pain Management  --Cardiac meds resumed.

## 2017-12-02 NOTE — PROGRESS NOTE ADULT - PROBLEM SELECTOR PLAN 4
slightly increased, etiology is unclear. will consider causes if it continues to trend upwards  -will trend CMP Pt has decubitus ulcer on physical exam. MRSA has grown on cultures.   -f/u ID recs about MRSA clx Pt has decubitus ulcer on physical exam. MRSA has grown on cultures.   - + MRSA - c/w vanc 1 g daily per ID

## 2017-12-02 NOTE — PROGRESS NOTE ADULT - ASSESSMENT
77yof w/ CAD, MI, Systolic CHF/s/p AICD, HTN, sent from [facility] for AMS due to sepsis 2/2 UTI.

## 2017-12-02 NOTE — PROGRESS NOTE ADULT - PROBLEM SELECTOR PLAN 6
pt srtarted on her home heart medications. pt goes slightly hypotensive in the PM.   -hold parameters set for BP meds - continue with home medications  - EKG normal

## 2017-12-02 NOTE — PROGRESS NOTE ADULT - SUBJECTIVE AND OBJECTIVE BOX
Patient is a 77y old  Female who presents with a chief complaint of AMS (25 Nov 2017 18:22)       INTERVAL HPI/OVERNIGHT EVENTS:    MEDICATIONS  (STANDING):  amiodarone    Tablet 400 milliGRAM(s) Oral daily  aspirin  chewable 81 milliGRAM(s) Oral daily  atorvastatin 20 milliGRAM(s) Oral at bedtime  buDESOnide 160 MICROgram(s)/formoterol 4.5 MICROgram(s) Inhaler 2 Puff(s) Inhalation two times a day  cefepime  IVPB 2000 milliGRAM(s) IV Intermittent every 12 hours  collagenase Ointment 1 Application(s) Topical two times a day  enoxaparin Injectable 40 milliGRAM(s) SubCutaneous daily  latanoprost 0.005% Ophthalmic Solution 1 Drop(s) Both EYES at bedtime  levothyroxine Injectable 25 MICROGram(s) IV Push daily  metoprolol succinate ER 25 milliGRAM(s) Oral daily  potassium acid phosphate/sodium acid phosphate tablet (K-PHOS No. 2) 1 Tablet(s) Oral four times a day with meals  sodium chloride 0.9%. 1000 milliLiter(s) (75 mL/Hr) IV Continuous <Continuous>  spironolactone 12.5 milliGRAM(s) Oral daily  tiotropium 18 MICROgram(s) Capsule 1 Capsule(s) Inhalation daily  vancomycin  IVPB      vancomycin  IVPB 750 milliGRAM(s) IV Intermittent every 24 hours    MEDICATIONS  (PRN):  acetaminophen    Suspension. 650 milliGRAM(s) Oral every 6 hours PRN Severe Pain (7 - 10)  acetaminophen    Suspension. 325 milliGRAM(s) Oral every 6 hours PRN Moderate Pain (4 - 6)      Allergies    peanuts (Unknown)  penicillin (Nausea)    Intolerances        REVIEW OF SYSTEMS:  CONSTITUTIONAL: No fever, weight loss, or fatigue  EYES: No eye pain, visual disturbances, or discharge  ENMT:  No difficulty hearing, tinnitus, vertigo; No sinus or throat pain  RESPIRATORY: No cough, wheezing, chills or hemoptysis; No shortness of breath  CARDIOVASCULAR: No chest pain, palpitations, dizziness, or leg swelling  GASTROINTESTINAL: No abdominal or epigastric pain. No nausea, vomiting, or hematemesis; No diarrhea or constipation. No melena or hematochezia.  GENITOURINARY: No dysuria, frequency, hematuria, or incontinence  NEUROLOGICAL: No headaches, loss of strength, numbness, or tremors  SKIN: No itching, burning, rashes, or lesions   MUSCULOSKELETAL: No joint pain or swelling;   PSYCHIATRIC: Denies depression, anxiety  HEME/LYMPH: No easy bruising, or bleeding gums    Vital Signs Last 24 Hrs  T(C): 37.2 (02 Dec 2017 04:30), Max: 37.2 (02 Dec 2017 04:30)  T(F): 98.9 (02 Dec 2017 04:30), Max: 98.9 (02 Dec 2017 04:30)  HR: 78 (02 Dec 2017 04:30) (65 - 80)  BP: 132/61 (02 Dec 2017 04:30) (95/60 - 132/61)  BP(mean): --  RR: 19 (02 Dec 2017 04:30) (19 - 20)  SpO2: 100% (02 Dec 2017 04:30) (95% - 100%)    PHYSICAL EXAM:  GENERAL: NAD, well-groomed, well-developed  HEAD:  Atraumatic, Normocephalic  EYES: EOMI, PERRLA, conjunctiva and sclera clear  ENMT: No tonsillar erythema, exudates, or enlargement; Moist mucous membranes, Good dentition  NECK: Supple, No JVD  NERVOUS SYSTEM: AOX3, motor and sensation grossly intact in b/l UE and b/l LE  PSYCHIATRIC: Appropriate affect and mood  CHEST/LUNG: Clear to auscultation bilaterally; No rales, rhonchi, wheezing, or rubs  HEART: Regular rate and rhythm; No murmurs, rubs, or gallops. No LE edema  ABDOMEN: Soft, Nontender, Nondistended; Bowel sounds present  EXTREMITIES:  2+ Peripheral Pulses, No clubbing, cyanosis  SKIN: No rashes or lesions    LABS:      Ca    8.3        01 Dec 2017 07:45        CAPILLARY BLOOD GLUCOSE      POCT Blood Glucose.: 104 mg/dL (01 Dec 2017 22:14)  POCT Blood Glucose.: 76 mg/dL (01 Dec 2017 18:29)  POCT Blood Glucose.: 100 mg/dL (01 Dec 2017 12:22)  POCT Blood Glucose.: 115 mg/dL (01 Dec 2017 08:15)    BLOOD CULTURE  11-30 @ 10:28 --    NO ORGANISMS ISOLATED  NO ORGANISMS ISOLATED AT 24 HOURS  --  11-29 @ 06:35 --    NO ORGANISMS ISOLATED  NO ORGANISMS ISOLATED AT 72 HRS.  --    RADIOLOGY & ADDITIONAL TESTS:    Imaging Personally Reviewed:  [ ] YES     Consultant(s) Notes Reviewed:      Care Discussed with Consultants/Other Providers: Patient is a 77y old  Female who presents with a chief complaint of AMS (25 Nov 2017 18:22)       INTERVAL HPI/OVERNIGHT EVENTS:  No events overnight. No pain.    MEDICATIONS  (STANDING):  amiodarone    Tablet 400 milliGRAM(s) Oral daily  aspirin  chewable 81 milliGRAM(s) Oral daily  atorvastatin 20 milliGRAM(s) Oral at bedtime  buDESOnide 160 MICROgram(s)/formoterol 4.5 MICROgram(s) Inhaler 2 Puff(s) Inhalation two times a day  cefepime  IVPB 2000 milliGRAM(s) IV Intermittent every 12 hours  collagenase Ointment 1 Application(s) Topical two times a day  enoxaparin Injectable 40 milliGRAM(s) SubCutaneous daily  latanoprost 0.005% Ophthalmic Solution 1 Drop(s) Both EYES at bedtime  levothyroxine Injectable 25 MICROGram(s) IV Push daily  metoprolol succinate ER 25 milliGRAM(s) Oral daily  potassium acid phosphate/sodium acid phosphate tablet (K-PHOS No. 2) 1 Tablet(s) Oral four times a day with meals  sodium chloride 0.9%. 1000 milliLiter(s) (75 mL/Hr) IV Continuous <Continuous>  spironolactone 12.5 milliGRAM(s) Oral daily  tiotropium 18 MICROgram(s) Capsule 1 Capsule(s) Inhalation daily  vancomycin  IVPB      vancomycin  IVPB 750 milliGRAM(s) IV Intermittent every 24 hours    MEDICATIONS  (PRN):  acetaminophen    Suspension. 650 milliGRAM(s) Oral every 6 hours PRN Severe Pain (7 - 10)  acetaminophen    Suspension. 325 milliGRAM(s) Oral every 6 hours PRN Moderate Pain (4 - 6)      Allergies    peanuts (Unknown)  penicillin (Nausea)    Intolerances        REVIEW OF SYSTEMS:  CONSTITUTIONAL: No fever, weight loss, or fatigue  EYES: No eye pain, visual disturbances, or discharge  ENMT:  No difficulty hearing, tinnitus, vertigo; No sinus or throat pain  RESPIRATORY: No cough, wheezing, chills or hemoptysis; No shortness of breath  CARDIOVASCULAR: No chest pain, palpitations, dizziness, or leg swelling  GASTROINTESTINAL: +mild abdominal pain.  No nausea, vomiting, or hematemesis; No diarrhea or constipation. No melena or hematochezia.  GENITOURINARY: No dysuria, frequency, hematuria, or incontinence  NEUROLOGICAL: No headaches, loss of strength, numbness, or tremors  SKIN: No itching, burning, rashes, or lesions   MUSCULOSKELETAL: No joint pain or swelling;   PSYCHIATRIC: Denies depression, anxiety  HEME/LYMPH: No easy bruising, or bleeding gums    Vital Signs Last 24 Hrs  T(C): 37.2 (02 Dec 2017 04:30), Max: 37.2 (02 Dec 2017 04:30)  T(F): 98.9 (02 Dec 2017 04:30), Max: 98.9 (02 Dec 2017 04:30)  HR: 78 (02 Dec 2017 04:30) (65 - 80)  BP: 132/61 (02 Dec 2017 04:30) (95/60 - 132/61)  BP(mean): --  RR: 19 (02 Dec 2017 04:30) (19 - 20)  SpO2: 100% (02 Dec 2017 04:30) (95% - 100%)    PHYSICAL EXAM:  GENERAL: NAD, well-groomed, well-developed  HEAD:  Atraumatic, Normocephalic  EYES: EOMI, PERRLA, conjunctiva and sclera clear  ENMT: No tonsillar erythema, exudates, or enlargement; Moist mucous membranes, Good dentition  NECK: Supple, No JVD  NERVOUS SYSTEM: AOX1. moving all extremities.  CHEST/LUNG: Clear to auscultation bilaterally; No rales, rhonchi, wheezing, or rubs  HEART: Regular rate and rhythm; No murmurs, rubs, or gallops. No LE edema  ABDOMEN: Soft, mildly tender.  EXTREMITIES:  2+ Peripheral Pulses, No clubbing, cyanosis  SKIN: +lipoma on abdomen.    LABS:      Ca    8.3        01 Dec 2017 07:45        CAPILLARY BLOOD GLUCOSE      POCT Blood Glucose.: 104 mg/dL (01 Dec 2017 22:14)  POCT Blood Glucose.: 76 mg/dL (01 Dec 2017 18:29)  POCT Blood Glucose.: 100 mg/dL (01 Dec 2017 12:22)  POCT Blood Glucose.: 115 mg/dL (01 Dec 2017 08:15)    BLOOD CULTURE  11-30 @ 10:28 --    NO ORGANISMS ISOLATED  NO ORGANISMS ISOLATED AT 24 HOURS  --  11-29 @ 06:35 --    NO ORGANISMS ISOLATED  NO ORGANISMS ISOLATED AT 72 HRS.  --    RADIOLOGY & ADDITIONAL TESTS:    Imaging Personally Reviewed:  [ ] YES     Consultant(s) Notes Reviewed:      Care Discussed with Consultants/Other Providers: Patient is a 77y old  Female who presents with a chief complaint of AMS (25 Nov 2017 18:22)     INTERVAL HPI/OVERNIGHT EVENTS:  No events overnight. No pain.    MEDICATIONS  (STANDING):  amiodarone    Tablet 400 milliGRAM(s) Oral daily  aspirin  chewable 81 milliGRAM(s) Oral daily  atorvastatin 20 milliGRAM(s) Oral at bedtime  buDESOnide 160 MICROgram(s)/formoterol 4.5 MICROgram(s) Inhaler 2 Puff(s) Inhalation two times a day  cefepime  IVPB 2000 milliGRAM(s) IV Intermittent every 12 hours  collagenase Ointment 1 Application(s) Topical two times a day  enoxaparin Injectable 40 milliGRAM(s) SubCutaneous daily  latanoprost 0.005% Ophthalmic Solution 1 Drop(s) Both EYES at bedtime  levothyroxine Injectable 25 MICROGram(s) IV Push daily  metoprolol succinate ER 25 milliGRAM(s) Oral daily  potassium acid phosphate/sodium acid phosphate tablet (K-PHOS No. 2) 1 Tablet(s) Oral four times a day with meals  sodium chloride 0.9%. 1000 milliLiter(s) (75 mL/Hr) IV Continuous <Continuous>  spironolactone 12.5 milliGRAM(s) Oral daily  tiotropium 18 MICROgram(s) Capsule 1 Capsule(s) Inhalation daily  vancomycin  IVPB      vancomycin  IVPB 750 milliGRAM(s) IV Intermittent every 24 hours    MEDICATIONS  (PRN):  acetaminophen    Suspension. 650 milliGRAM(s) Oral every 6 hours PRN Severe Pain (7 - 10)  acetaminophen    Suspension. 325 milliGRAM(s) Oral every 6 hours PRN Moderate Pain (4 - 6)      Allergies    peanuts (Unknown)  penicillin (Nausea)    Intolerances        REVIEW OF SYSTEMS:  CONSTITUTIONAL: No fever, weight loss, or fatigue  EYES: No eye pain, visual disturbances, or discharge  ENMT:  No difficulty hearing, tinnitus, vertigo; No sinus or throat pain  RESPIRATORY: No cough, wheezing, chills or hemoptysis; No shortness of breath  CARDIOVASCULAR: No chest pain, palpitations, dizziness, or leg swelling  GASTROINTESTINAL: +mild abdominal pain.  No nausea, vomiting, or hematemesis; No diarrhea or constipation. No melena or hematochezia.  GENITOURINARY: No dysuria, frequency, hematuria, or incontinence  NEUROLOGICAL: No headaches, loss of strength, numbness, or tremors  SKIN: No itching, burning, rashes, or lesions   MUSCULOSKELETAL: No joint pain or swelling;   PSYCHIATRIC: Denies depression, anxiety  HEME/LYMPH: No easy bruising, or bleeding gums    Vital Signs Last 24 Hrs  T(C): 37.2 (02 Dec 2017 04:30), Max: 37.2 (02 Dec 2017 04:30)  T(F): 98.9 (02 Dec 2017 04:30), Max: 98.9 (02 Dec 2017 04:30)  HR: 78 (02 Dec 2017 04:30) (65 - 80)  BP: 132/61 (02 Dec 2017 04:30) (95/60 - 132/61)  BP(mean): --  RR: 19 (02 Dec 2017 04:30) (19 - 20)  SpO2: 100% (02 Dec 2017 04:30) (95% - 100%)    PHYSICAL EXAM:  GENERAL: NAD, well-groomed, well-developed  HEAD:  Atraumatic, Normocephalic  EYES: EOMI, PERRLA, conjunctiva and sclera clear  ENMT: No tonsillar erythema, exudates, or enlargement; Moist mucous membranes, Good dentition  NECK: Supple, No JVD  NERVOUS SYSTEM: AOX1. moving all extremities.  CHEST/LUNG: Clear to auscultation bilaterally; No rales, rhonchi, wheezing, or rubs  HEART: Regular rate and rhythm; No murmurs, rubs, or gallops. No LE edema  ABDOMEN: Soft, mildly tender.  EXTREMITIES:  2+ Peripheral Pulses, No clubbing, cyanosis  SKIN: +lipoma on abdomen.    LABS:      Ca    8.3        01 Dec 2017 07:45        CAPILLARY BLOOD GLUCOSE      POCT Blood Glucose.: 104 mg/dL (01 Dec 2017 22:14)  POCT Blood Glucose.: 76 mg/dL (01 Dec 2017 18:29)  POCT Blood Glucose.: 100 mg/dL (01 Dec 2017 12:22)  POCT Blood Glucose.: 115 mg/dL (01 Dec 2017 08:15)    BLOOD CULTURE  11-30 @ 10:28 --    NO ORGANISMS ISOLATED  NO ORGANISMS ISOLATED AT 24 HOURS  --  11-29 @ 06:35 --    NO ORGANISMS ISOLATED  NO ORGANISMS ISOLATED AT 72 HRS.  --    RADIOLOGY & ADDITIONAL TESTS:    Imaging Personally Reviewed:  [ ] YES     Consultant(s) Notes Reviewed:      Care Discussed with Consultants/Other Providers:

## 2017-12-02 NOTE — PROGRESS NOTE ADULT - PROBLEM SELECTOR PLAN 5
Pt has decubitus ulcer on physical exam. MRSA has grown on cultures.   -f/u ID recs about MRSA clx pt srtarted on her home heart medications. pt goes slightly hypotensive in the PM.   -hold parameters set for BP meds

## 2017-12-02 NOTE — PROGRESS NOTE ADULT - ATTENDING COMMENTS
patient seen and examined by bedside by myself, case d/w resident, agree with the above finding a no acute distress noted plan   will c/w Abx   cardiology f/u noted patient seen and examined by bedside by myself, case d/w resident, agree with the above finding and  plan   will c/w Abx , f/u cx  cardiology f/u noted : TTE no notation of vegetations , ICD interrogation NL sensing & pacing     Functional qud with severe protein arleen malnutrition and failure to thrive.: c/w supportive care, encouragement and assistance with feeding

## 2017-12-03 NOTE — PROGRESS NOTE ADULT - PROBLEM SELECTOR PLAN 4
Pt has decubitus ulcer on physical exam. MRSA has grown on cultures.   - + MRSA - c/w vanc 1 g daily per ID

## 2017-12-03 NOTE — PROGRESS NOTE ADULT - ATTENDING COMMENTS
Agree with above assessment and plan as outlined above.    - Cont Abx per primary team'    Raymond Kinney MD, FACC  [facility], [facility]  [address] [place] [address]  PHONE:  [phone]  BEEPER : [phone]

## 2017-12-03 NOTE — PROGRESS NOTE ADULT - SUBJECTIVE AND OBJECTIVE BOX
SUBJECTIVE: no chest pain reports she feels "OK"       MEDICATIONS  (STANDING):  amiodarone    Tablet 400 milliGRAM(s) Oral daily  aspirin  chewable 81 milliGRAM(s) Oral daily  atorvastatin 20 milliGRAM(s) Oral at bedtime  buDESOnide 160 MICROgram(s)/formoterol 4.5 MICROgram(s) Inhaler 2 Puff(s) Inhalation two times a day  cefepime  IVPB 2000 milliGRAM(s) IV Intermittent every 12 hours  collagenase Ointment 1 Application(s) Topical two times a day  enoxaparin Injectable 40 milliGRAM(s) SubCutaneous daily  latanoprost 0.005% Ophthalmic Solution 1 Drop(s) Both EYES at bedtime  levothyroxine Injectable 25 MICROGram(s) IV Push daily  metoprolol succinate ER 25 milliGRAM(s) Oral daily  sodium phosphate IVPB 15 milliMole(s) IV Intermittent once  spironolactone 12.5 milliGRAM(s) Oral daily  tiotropium 18 MICROgram(s) Capsule 1 Capsule(s) Inhalation daily  vancomycin  IVPB      vancomycin  IVPB 750 milliGRAM(s) IV Intermittent every 24 hours    MEDICATIONS  (PRN):  acetaminophen    Suspension. 650 milliGRAM(s) Oral every 6 hours PRN Severe Pain (7 - 10)  acetaminophen    Suspension. 325 milliGRAM(s) Oral every 6 hours PRN Moderate Pain (4 - 6)      LABS:                        10.6   12.74 )-----------( 388      ( 03 Dec 2017 05:20 )             33.3     Hemoglobin: 10.6 g/dL (12-03 @ 05:20)  Hemoglobin: 10.2 g/dL (12-02 @ 07:05)  Hemoglobin: 10.2 g/dL (12-01 @ 07:45)  Hemoglobin: 11.1 g/dL (11-30 @ 09:38)  Hemoglobin: 10.0 g/dL (11-29 @ 05:10)    12-03    138  |  102  |  8   ----------------------------<  66<L>  4.7   |  25  |  0.36<L>    Ca    8.9      03 Dec 2017 05:20  Phos  1.9     12-03  Mg     2.1     12-03      Creatinine Trend: 0.36<--, 0.36<--, 0.42<--, 0.45<--, 0.38<--, 0.41<--     PHYSICAL EXAM  Vital Signs Last 24 Hrs  T(C): 36.7 (03 Dec 2017 05:18), Max: 37.1 (02 Dec 2017 15:53)  T(F): 98.1 (03 Dec 2017 05:18), Max: 98.7 (02 Dec 2017 15:53)  HR: 85 (03 Dec 2017 05:18) (84 - 86)  BP: 129/65 (03 Dec 2017 05:18) (123/60 - 131/79)  BP(mean): --  RR: 20 (03 Dec 2017 05:18) (19 - 20)  SpO2: 100% (03 Dec 2017 05:18) (98% - 100%)      Cardiovascular:  S1S2 RRR, No JVD, 1/6 ERICA   Respiratory: decreased breath sounds B/L LL's otherwise CTA   Gastrointestinal: Abdomen soft, ND, NT, +BS  Skin: Warm, dry, intact. No rash.  Ext: no C/C/E B/L LE    DIAGNOSTIC DATA    < from: TTE with Doppler (w/Cont) (04.03.17 @ 16:18) >  CONCLUSIONS:  1. Mitral annular calcification, otherwise normal mitral  valve. Mild mitral regurgitation.  2. Moderately dilated left atrium.  LA volume index = 46  cc/m2.  3. Moderate left ventricular enlargement.  4. Severe global left ventricular systolic dysfunction.  Endocardial visualization enhanced with intravenous  injection of echo contrast (Definity).  5. The right ventricle is not well visualized; grossly  normal right ventricular systolic function.  A device wire  is noted in the right heart.  6. Estimated right ventricular systolic pressure equals 60  mm Hg, assuming right atrial pressure equals 10 mm Hg,  consistent with moderate pulmonary hypertension.  ------------------------------------------------------------------------  Confirmed on  4/3/2017 - 17:19:47 by Jean-Pierre Carver M.D.    < end of copied text >    < from: Cardiac Cath Lab - Adult (04.26.16 @ 13:12) >  VENTRICLES: No LV gram was performed; however, a recent echocardiogram  demonstrated an EF of 19 %.  CORONARY VESSELS: The coronary circulation is right dominant.  LM:   --  LM: Normal.  LAD:   --  Proximal LAD: There was a [id number] % stenosis. There was mild  restenosis in proximal LAD stent.  --  Mid LAD: Angiography showed minor luminal irregularities with no flow  limiting lesions.  --  Distal LAD: Angiography showed minor luminal irregularities with no  flow limiting lesions.  --  D1: Angiography showed minor luminal irregularities with no flow  limiting lesions.  CX:   --  Circumflex: Normal.  RI:   --  Ramus intermedius: Angiography showed minor luminal  irregularities with no flow limiting lesions.  RCA:   --  Proximal RCA: Normal.  --  Mid RCA: Angiography showed mild atherosclerosis with no flow limiting  lesions.  --  Distal RCA: Angiography showed minor luminal irregularities with no  flow limiting lesions.  --  RPDA: Angiography showed minor luminal irregularities with no flow  limiting lesions.  --  RPLS: Angiography showed minor luminal irregularities with no flow  limiting lesions.  COMPLICATIONS: There were no complications.  DIAGNOSTIC RECOMMENDATIONS: Medical management and aggressive risk factor  modification is recommended.  Prepared and signed by  Nehal López M.D.  Signed 04/27/2016 13:13:20    < end of copied text >  ECHO   < from: Transthoracic Echocardiogram (12.01.17 @ 16:53) >  CONCLUSIONS:  1. Normal mitral valve. Minimal mitral regurgitation.  2. Mild left ventricular enlargement.  3. Severe global left ventricular systolic dysfunction.  4. The right ventricle is not well visualized. A device  wire is noted in the right heart.    < end of copied text >    Interrogation   Normal sensing and pacing via iterative testing.  Excellent threshold capture.  No reprogramming.      ASSESSMENT AND PLAN:    77y Female with hypertension, dyslipidemia, CAD s/p LAD stent with only mild re-stenosis with minor luminal irregularities otherwise on cath 4/16, with known severe LV dysfunction , NICM s/p Medtronic BiV ICD, VT on Amio, COPD, lymphoma previously on chemo with known spinal mets and compression fractures admitted with lethargy due to GNR bacteremia, likely urinary source    --ABX per primary team/ID  --Not in clinical CHF.  Monitor volume status  -- TTE no notation of vegetations   -- ICD interrogation NL sensing & pacing   --Pain Management  --Cardiac meds resumed.  --f/u Dr Hussein post discharge SUBJECTIVE: no chest pain reports she feels "OK"       MEDICATIONS  (STANDING):  amiodarone    Tablet 400 milliGRAM(s) Oral daily  aspirin  chewable 81 milliGRAM(s) Oral daily  atorvastatin 20 milliGRAM(s) Oral at bedtime  buDESOnide 160 MICROgram(s)/formoterol 4.5 MICROgram(s) Inhaler 2 Puff(s) Inhalation two times a day  cefepime  IVPB 2000 milliGRAM(s) IV Intermittent every 12 hours  collagenase Ointment 1 Application(s) Topical two times a day  enoxaparin Injectable 40 milliGRAM(s) SubCutaneous daily  latanoprost 0.005% Ophthalmic Solution 1 Drop(s) Both EYES at bedtime  levothyroxine Injectable 25 MICROGram(s) IV Push daily  metoprolol succinate ER 25 milliGRAM(s) Oral daily  sodium phosphate IVPB 15 milliMole(s) IV Intermittent once  spironolactone 12.5 milliGRAM(s) Oral daily  tiotropium 18 MICROgram(s) Capsule 1 Capsule(s) Inhalation daily  vancomycin  IVPB      vancomycin  IVPB 750 milliGRAM(s) IV Intermittent every 24 hours    MEDICATIONS  (PRN):  acetaminophen    Suspension. 650 milliGRAM(s) Oral every 6 hours PRN Severe Pain (7 - 10)  acetaminophen    Suspension. 325 milliGRAM(s) Oral every 6 hours PRN Moderate Pain (4 - 6)      LABS:                        10.6   12.74 )-----------( 388      ( 03 Dec 2017 05:20 )             33.3     Hemoglobin: 10.6 g/dL (12-03 @ 05:20)  Hemoglobin: 10.2 g/dL (12-02 @ 07:05)  Hemoglobin: 10.2 g/dL (12-01 @ 07:45)  Hemoglobin: 11.1 g/dL (11-30 @ 09:38)  Hemoglobin: 10.0 g/dL (11-29 @ 05:10)    12-03    138  |  102  |  8   ----------------------------<  66<L>  4.7   |  25  |  0.36<L>    Ca    8.9      03 Dec 2017 05:20  Phos  1.9     12-03  Mg     2.1     12-03      Creatinine Trend: 0.36<--, 0.36<--, 0.42<--, 0.45<--, 0.38<--, 0.41<--     PHYSICAL EXAM  Vital Signs Last 24 Hrs  T(C): 36.7 (03 Dec 2017 05:18), Max: 37.1 (02 Dec 2017 15:53)  T(F): 98.1 (03 Dec 2017 05:18), Max: 98.7 (02 Dec 2017 15:53)  HR: 85 (03 Dec 2017 05:18) (84 - 86)  BP: 129/65 (03 Dec 2017 05:18) (123/60 - 131/79)  BP(mean): --  RR: 20 (03 Dec 2017 05:18) (19 - 20)  SpO2: 100% (03 Dec 2017 05:18) (98% - 100%)      Cardiovascular:  S1S2 RRR, No JVD, 1/6 ERICA   Respiratory: decreased breath sounds B/L LL's otherwise CTA   Gastrointestinal: Abdomen soft, ND, NT, +BS  Skin: Warm, dry, intact. No rash.  Ext: no C/C/E B/L LE    DIAGNOSTIC DATA    < from: TTE with Doppler (w/Cont) (04.03.17 @ 16:18) >  CONCLUSIONS:  1. Mitral annular calcification, otherwise normal mitral  valve. Mild mitral regurgitation.  2. Moderately dilated left atrium.  LA volume index = 46  cc/m2.  3. Moderate left ventricular enlargement.  4. Severe global left ventricular systolic dysfunction.  Endocardial visualization enhanced with intravenous  injection of echo contrast (Definity).  5. The right ventricle is not well visualized; grossly  normal right ventricular systolic function.  A device wire  is noted in the right heart.  6. Estimated right ventricular systolic pressure equals 60  mm Hg, assuming right atrial pressure equals 10 mm Hg,  consistent with moderate pulmonary hypertension.  ------------------------------------------------------------------------  Confirmed on  4/3/2017 - 17:19:47 by Jean-Pierre Carver M.D.    < end of copied text >    < from: Cardiac Cath Lab - Adult (04.26.16 @ 13:12) >  VENTRICLES: No LV gram was performed; however, a recent echocardiogram  demonstrated an EF of 19 %.  CORONARY VESSELS: The coronary circulation is right dominant.  LM:   --  LM: Normal.  LAD:   --  Proximal LAD: There was a [id number] % stenosis. There was mild  restenosis in proximal LAD stent.  --  Mid LAD: Angiography showed minor luminal irregularities with no flow  limiting lesions.  --  Distal LAD: Angiography showed minor luminal irregularities with no  flow limiting lesions.  --  D1: Angiography showed minor luminal irregularities with no flow  limiting lesions.  CX:   --  Circumflex: Normal.  RI:   --  Ramus intermedius: Angiography showed minor luminal  irregularities with no flow limiting lesions.  RCA:   --  Proximal RCA: Normal.  --  Mid RCA: Angiography showed mild atherosclerosis with no flow limiting  lesions.  --  Distal RCA: Angiography showed minor luminal irregularities with no  flow limiting lesions.  --  RPDA: Angiography showed minor luminal irregularities with no flow  limiting lesions.  --  RPLS: Angiography showed minor luminal irregularities with no flow  limiting lesions.  COMPLICATIONS: There were no complications.  DIAGNOSTIC RECOMMENDATIONS: Medical management and aggressive risk factor  modification is recommended.  Prepared and signed by  Nehal López M.D.  Signed 04/27/2016 13:13:20    < end of copied text >  ECHO   < from: Transthoracic Echocardiogram (12.01.17 @ 16:53) >  CONCLUSIONS:  1. Normal mitral valve. Minimal mitral regurgitation.  2. Mild left ventricular enlargement.  3. Severe global left ventricular systolic dysfunction.  4. The right ventricle is not well visualized. A device  wire is noted in the right heart.    < end of copied text >    Interrogation   Normal sensing and pacing via iterative testing.  Excellent threshold capture.  No reprogramming.      ASSESSMENT AND PLAN:    77y Female with hypertension, dyslipidemia, CAD s/p LAD stent with only mild re-stenosis with minor luminal irregularities otherwise on cath 4/16, with known severe LV dysfunction , NICM s/p Medtronic BiV ICD, VT on Amio, COPD, lymphoma previously on chemo with known spinal mets and compression fractures admitted with lethargy due to GNR bacteremia, likely urinary source    --ABX per primary team/ID  --Not in clinical CHF.  Monitor volume status  -- TTE no notation of vegetations   -- ICD interrogation NL sensing & pacing   --Pain Management  --hypophosphatemia      tx as per primary team   --Cardiac meds resumed.  --f/u Dr Hussein post discharge

## 2017-12-03 NOTE — PROGRESS NOTE ADULT - PROBLEM SELECTOR PLAN 8
VTE: Lovenox  Diet: Pureed with supplimental ensure pudding  Code: no advanced directive in chart    JULIEN Franks MD-[id number]  Internal Medicine Department  Pager: [phone]

## 2017-12-03 NOTE — PROGRESS NOTE ADULT - ASSESSMENT
77yof w/ CAD, MI, Systolic CHF/s/p AICD, HTN, sent from [facility] for AMS due to sepsis 2/2 UTI.

## 2017-12-03 NOTE — PROGRESS NOTE ADULT - ATTENDING COMMENTS
patient seen and examined by bedside by myself, case d/w resident, agree with the above finding and  plan   will c/w Abx , f/u cx  cardiology f/u noted : TTE no notation of vegetations , ICD interrogation NL sensing & pacing   Hypophosphatemia : supplemented , will repeat BMP in am   Functional quad with severe protein arleen malnutrition and failure to thrive.: c/w supportive care, encouragement and assistance with feeding  skin care as per protocol

## 2017-12-03 NOTE — PROGRESS NOTE ADULT - SUBJECTIVE AND OBJECTIVE BOX
SALMA HUMA  77y  Female      Patient is a 77y old  Female who presents with a chief complaint of AMS (25 Nov 2017 18:22)      INTERVAL HPI/OVERNIGHT EVENTS:  - no acute events o/n  - pt continues to be selectively verbal  - complains of her baseline abdominal pain. denies chest pain, palpitations, or SOB    Medications:  acetaminophen    Suspension. 650 milliGRAM(s) Oral every 6 hours PRN  acetaminophen    Suspension. 325 milliGRAM(s) Oral every 6 hours PRN  amiodarone    Tablet 400 milliGRAM(s) Oral daily  aspirin  chewable 81 milliGRAM(s) Oral daily  atorvastatin 20 milliGRAM(s) Oral at bedtime  buDESOnide 160 MICROgram(s)/formoterol 4.5 MICROgram(s) Inhaler 2 Puff(s) Inhalation two times a day  cefepime  IVPB 2000 milliGRAM(s) IV Intermittent every 12 hours  collagenase Ointment 1 Application(s) Topical two times a day  enoxaparin Injectable 40 milliGRAM(s) SubCutaneous daily  latanoprost 0.005% Ophthalmic Solution 1 Drop(s) Both EYES at bedtime  levothyroxine Injectable 25 MICROGram(s) IV Push daily  metoprolol succinate ER 25 milliGRAM(s) Oral daily  sodium phosphate IVPB 15 milliMole(s) IV Intermittent once  sodium phosphate IVPB 15 milliMole(s) IV Intermittent once  spironolactone 12.5 milliGRAM(s) Oral daily  tiotropium 18 MICROgram(s) Capsule 1 Capsule(s) Inhalation daily  vancomycin  IVPB      vancomycin  IVPB 750 milliGRAM(s) IV Intermittent every 24 hours    T(C): 36.7 (12-03-17 @ 05:18), Max: 37.1 (12-02-17 @ 15:53)  HR: 85 (12-03-17 @ 05:18) (84 - 86)  BP: 129/65 (12-03-17 @ 05:18) (123/60 - 131/79)  RR: 20 (12-03-17 @ 05:18) (19 - 20)  SpO2: 100% (12-03-17 @ 05:18) (98% - 100%)  Wt(kg): --Vital Signs Last 24 Hrs  T(C): 36.7 (03 Dec 2017 05:18), Max: 37.1 (02 Dec 2017 15:53)  T(F): 98.1 (03 Dec 2017 05:18), Max: 98.7 (02 Dec 2017 15:53)  HR: 85 (03 Dec 2017 05:18) (84 - 86)  BP: 129/65 (03 Dec 2017 05:18) (123/60 - 131/79)  BP(mean): --  RR: 20 (03 Dec 2017 05:18) (19 - 20)  SpO2: 100% (03 Dec 2017 05:18) (98% - 100%)    PHYSICAL EXAM:  CONSTITUTIONAL: No fever, weight loss, or fatigue  EYES: No eye pain, visual disturbances, or discharge  ENMT:  No difficulty hearing, tinnitus, vertigo; No sinus or throat pain  RESPIRATORY: No cough, wheezing, chills or hemoptysis; No shortness of breath  CARDIOVASCULAR: No chest pain, palpitations, dizziness, or leg swelling  GASTROINTESTINAL: +mild abdominal pain.  No nausea, vomiting, or hematemesis; No diarrhea or constipation. No melena or hematochezia.  GENITOURINARY: No dysuria, frequency, hematuria, or incontinence  NEUROLOGICAL: No headaches, loss of strength, numbness, or tremors  SKIN: No itching, burning, rashes, or lesions   MUSCULOSKELETAL: No joint pain or swelling;   PSYCHIATRIC: Denies depression, anxiety  HEME/LYMPH: No easy bruising, or bleeding gums    Consultant(s) Notes Reviewed:  [x ] YES  [ ] NO  Care Discussed with Consultants/Other Providers [ x] YES  [ ] NO    LABS:                        10.6   12.74 )-----------( 388      ( 03 Dec 2017 05:20 )             33.3     12-03    138  |  102  |  8   ----------------------------<  66<L>  4.7   |  25  |  0.36<L>    Ca    8.9      03 Dec 2017 05:20  Phos  1.9     12-03  Mg     2.1     12-03      CAPILLARY BLOOD GLUCOSE  POCT Blood Glucose.: 88 mg/dL (03 Dec 2017 08:22)  POCT Blood Glucose.: 89 mg/dL (03 Dec 2017 01:29)  POCT Blood Glucose.: 78 mg/dL (02 Dec 2017 22:48)  POCT Blood Glucose.: 78 mg/dL (02 Dec 2017 17:08)  POCT Blood Glucose.: 87 mg/dL (02 Dec 2017 12:43)      HEALTH ISSUES - PROBLEM Dx:  Bacteremia: Bacteremia  Transaminitis: Transaminitis  Hypokalemia: Hypokalemia  Hypernatremia: Hypernatremia  Decubitus ulcer of sacral region, unstageable: Decubitus ulcer of sacral region, unstageable  Need for prophylactic measure: Need for prophylactic measure  Decubitus ulcer of sacral region, stage 4: Decubitus ulcer of sacral region, stage 4  CAD (coronary artery disease): CAD (coronary artery disease)  CHF (congestive heart failure): CHF (congestive heart failure)  Delirium due to another medical condition, acute, hypoactive: Delirium due to another medical condition, acute, hypoactive  Sepsis due to urinary tract infection: Sepsis due to urinary tract infection

## 2017-12-04 NOTE — PROGRESS NOTE ADULT - PROBLEM SELECTOR PLAN 2
TTE shows no vegetations on AICD leads. Likely urinary in nature.   -will continue cefepime  -appreciate ID recs

## 2017-12-04 NOTE — PROGRESS NOTE ADULT - ATTENDING COMMENTS
Medicine attending.  Poor PO intake. Start Remeron. Psych consult for ?/ marinol??  Agree with above A/P by team.  Patient seen with Mormonism friend Penny at bedside- she notes patient has 2 daughters and son Leonardo- explain - not able to reach children and asked her please to have children call us.  Called son Leonardo at [phone]- No answer again. left message for family to call NS  Called [facility] at [phone]- left message on answer machine for family to call NS.  If not able to reach anyone, will place NGT. Medicine attending.  Poor PO intake. Start Remeron. Psych consult for ?/ marinol??  Agree with above A/P by team.  Patient seen with Confucianist friend Penny at bedside- she notes patient has 2 daughters and son Leonardo- explain - not able to reach children and asked her please to have children call us.  Called miguel Patterson at [phone]- No answer again. left message for family to call NS  Called house at [phone]- left message on answer machine for family to call NS.  If not able to reach anyone, will place NGT.    Call received from miguel Patterson at nursing station.  He is in [place] and will be back Tomorrow night and will come in on Wednesday to see mother and further discuss GOC.  He stated we are to "do everything for his mother. When informed that a code may involve breaking of ribs- we are to still "go ahead".  Patient is FULL CODE at this time. She will go when "God takes her."  Asked about PEG- feeding tube- he notes we are to feed her slowly " she drinks her ENSURES"  He will be in to address artificial nutrition on Wednesday 12/6/17.

## 2017-12-04 NOTE — CHART NOTE - NSCHARTNOTEFT_GEN_A_CORE
NUTRITION FOLLOW-UP:  Met w RN and spoke w physician re: Pt.'s nutritional status.  Per nursing, Pt. has been pocketing food for prolonged periods of time.  Has persistently poor PO intake (<25% of meals).  S/p swallow evaluation where pureed foods w thin liquids was recommended.  Consider Ensure Enlive 8oz PO 1x daily  & Prosource 1x daily to help meet nutrient needs & aid with wound healing.  D/C Ensure Pudding as Pt. refusing to consume.  Of note, Pt. receiving IVF Dextrose to help maintain blood glucose levels. No stated/noted nausea/vomiting/diarrhea/constipation at this time. Would also consider alternate means of nutrition (as/if consistent w Pt./family wishes) and possible palliative consult to establish goals of care.      Pt. remains severely malnourished.    Weight:  111lbs (50.7kg) on 12/4/17.    Edema:  None noted.    Skin:  Stage III sacral pressure ulcer.      Pertinent Medications: MEDICATIONS  (STANDING):  amiodarone    Tablet 400 milliGRAM(s) Oral daily  aspirin  chewable 81 milliGRAM(s) Oral daily  atorvastatin 20 milliGRAM(s) Oral at bedtime  buDESOnide 160 MICROgram(s)/formoterol 4.5 MICROgram(s) Inhaler 2 Puff(s) Inhalation two times a day  cefepime  IVPB 2000 milliGRAM(s) IV Intermittent every 12 hours  collagenase Ointment 1 Application(s) Topical two times a day  dextrose 5%. 1000 milliLiter(s) (75 mL/Hr) IV Continuous <Continuous>  enoxaparin Injectable 40 milliGRAM(s) SubCutaneous daily  latanoprost 0.005% Ophthalmic Solution 1 Drop(s) Both EYES at bedtime  levothyroxine Injectable 25 MICROGram(s) IV Push daily  metoprolol succinate ER 25 milliGRAM(s) Oral daily  spironolactone 12.5 milliGRAM(s) Oral daily  tiotropium 18 MICROgram(s) Capsule 1 Capsule(s) Inhalation daily  vancomycin  IVPB      vancomycin  IVPB 750 milliGRAM(s) IV Intermittent every 24 hours    MEDICATIONS  (PRN):  acetaminophen    Suspension. 650 milliGRAM(s) Oral every 6 hours PRN Severe Pain (7 - 10)  acetaminophen    Suspension. 325 milliGRAM(s) Oral every 6 hours PRN Moderate Pain (4 - 6)    Pertinent Labs:  12-04 Na137 mmol/L Glu 90 mg/dL K+ 4.3 mmol/L Cr  0.43 mg/dL<L> BUN 8 mg/dL Phos 2.8 mg/dL Alb 2.8 g/dL<L> PAB n/a     CAPILLARY BLOOD GLUCOSE      POCT Blood Glucose.: 73 mg/dL (04 Dec 2017 08:24)  POCT Blood Glucose.: 92 mg/dL (03 Dec 2017 22:47)  POCT Blood Glucose.: 95 mg/dL (03 Dec 2017 17:26)  POCT Blood Glucose.: 74 mg/dL (03 Dec 2017 12:30)        Current Diet:  Pureed w Ensure Pudding 4oz PO 3x daily             PLAN/RECOMMENDATIONS:    1) Change diet to pureed w Ensure Enlive 8oz PO 1x daily & Prosource 1 packet (30mL) PO 1x daily.  D/C Ensure pudding.  2) Consider alternate means of nutrition, as well as palliative consult.  3) Obtain daily weights.  4) RDN remains available and will f/u PRN.          Uma Chu RDN, CDN pager [phone]

## 2017-12-04 NOTE — PROGRESS NOTE ADULT - ATTENDING COMMENTS
Agree with above.   -TTE with no vegetations  -f/u ID to see if endocarditis is suspected to see if ARASH is needed      Nehal López MD  [facility]  [address] [place] [address], [id number]  [place] [address]  office: [phone]  pager: [phone]

## 2017-12-04 NOTE — PROGRESS NOTE ADULT - SUBJECTIVE AND OBJECTIVE BOX
PATIENT: HUMA MARSH   AGE: 76yo   GENDER: Female  WEIGHT:   ALLERGIES: peanuts (Unknown)  penicillin (Nausea)    CHIEF COMPLAINT:   AMS (25 Nov 2017 18:22)    INTERVAL HPI / OVERNIGHT EVENTS:  TTE done.  No complaint today but appears weak and limited verbal responses.  off nasal O2               cefepime  IVPB 2000 every 12 hours  vancomycin  IVPB    vancomycin  IVPB 750 every 24 hours      Vital Signs Last 24 Hrs  T(F): 97.3 (12-04-17 @ 06:08), Max: 99.1 (12-03-17 @ 21:11)  HR: 82 (12-04-17 @ 06:08)  BP: 115/55 (12-04-17 @ 06:08)  RR: 18 (12-04-17 @ 06:08)  SpO2: 100% (12-04-17 @ 06:08) (100% - 100%)    PHYSICAL EXAM:  GENERAL: comfortable    HEENT:  AT/NC; EOMI, PERRLA, conjunctiva and sclera clear  NECK: Supple, non tender  CHEST/LUNG: clear ant    HEART: S1S2 AICD  :  no tenderness suprapubic  ABDOMEN: Soft, non tender to palp, bs+  MSK/EXTREMITIES:  not tender  PSYCH:   limited verbal responses  NEUROLOGY: Non focal   BACK: stage 2-3 sacral ulcer       LABS:                               11.2   9.07  )-----------( 434      ( 04 Dec 2017 07:15 )             36.1 12-04    137  |  99  |  8   ----------------------------<  90  4.3   |  27  |  0.43  Ca    8.9      04 Dec 2017 07:15Phos  2.8     12-04Mg     1.9     12-04  TPro  6.4  /  Alb  2.8  /  TBili  0.2  /  DBili  x   /  AST  81  /  ALT  53  /  AlkPhos  140  12-04          MICROBIOLOGY:    blood culture 11/30 no growth    Culture - Blood in AM (11.29.17 @ 06:35)    Culture - Blood:   NO ORGANISMS ISOLATED  NO ORGANISMS ISOLATED AT 24 HOURS    Specimen Source: BLOOD        Culture - Urine (collected 11-25-17 @ 17:23)  Source: URINE CATHETER  Final Report (11-28-17 @ 12:18):    COLONY COUNT: > = 100,000 CFU/ML  Organism: Pseudomonas aeruginosa (11-28-17 @ 12:18)  Organism: Pseudomonas aeruginosa (11-28-17 @ 12:18)    Culture - Blood (collected 11-25-17 @ 16:40)  Source: BLOOD VENOUS  Preliminary Report (11-27-17 @ 15:15):    PSA^Pseudomonas aeruginosa  Organism: Pseudomonas aeruginosa (11-28-17 @ 12:30)  Organism: Pseudomonas aeruginosa (11-28-17 @ 12:30)    Culture - Blood (collected 11-25-17 @ 16:40)  Source: BLOOD PERIPHERAL  Preliminary Report (11-26-17 @ 14:38):    ***Blood Panel PCR results on this specimen are available    approximately 3 hours after the Gram stain result***    Gram stain, PCR, and/or culture results may not always    correspond due to difference in methodologies    ------------------------------------------------------------    This PCR assay was performed using [employer].  The    following targets are tested for:  Enterococcus, vancomycin    resistant enterococci, Listeria monocytogenes,  coagulase    negative staphylococci, S. aureus, methicillin resistant S.    aureus, Streptococcus agalactiae (Group B), S. pneumoniae,    S. pyogenes (Group A), Acinetobacter baumannii, Enterobacter    cloacae, E. coli, Klebsiella oxytoca, K. pneumoniae, Proteus    sp., Serratia marcescens, Haemophilus influenzae, Neisseria    meningitidis, Pseudomonas aeruginosa, Candida albicans, C.    glabrata, C. krusei, C. parapsilosis, C. tropicalis and the    KPC resistance gene.  Final Report (11-28-17 @ 12:31):    SEE PREVIOUS CULTURE:[id number] COLLECTED 11/25/17    RECEIVED 11/25/17   FOR MAGDALENA  Organism: BLOOD CULTURE PCR  ***Blood Panel PCR results on this specimen are available  approximately 3 hours after the Gram stain result***  Gram stain, PCR, and/or culture results may not always  correspond due to difference in methodologies  ------------------------------------------------------------    < from: Transthoracic Echocardiogram (12.01.17 @ 16:53) >  OBSERVATIONS:  Mitral Valve: Normal mitral valve. Minimal mitral  regurgitation.  Aortic Root: Normal aortic root.  Aortic Valve: Aortic valve not well visualized.  Left Atrium: Normal left atrium.  LA volume index = 33  cc/m2.  Left Ventricle: Severe global left ventricular systolic  dysfunction. Mild left ventricular enlargement.  Right Heart: Normal right atrium. The right ventricle is  not well visualized. A device wire is noted in theright  heart. Normal tricuspid valve. Minimal tricuspid  regurgitation. Pulmonic valve not well visualized.  Pericardium/PleuraNormal pericardium with no pericardial  effusion.  ------------------------------------------------------------------------  CONCLUSIONS:  1. Normal mitral valve. Minimal mitral regurgitation.  2. Mild left ventricular enlargement.  3. Severe global left ventricular systolic dysfunction.  4. The right ventricle is not well visualized. A device  wire is noted in the right heart.  ------------------------------------------------------------------------  Confirmed on  12/1/2017 - 18:24:53 by TAJ Hyatt    < end of copied text >        RADIOLOGY & ADDITIONAL TESTS:  < from: Xray Chest 1 View AP/PA (11.29.17 @ 11:22) >    There is a left chest wall AICD present. The lungs are clear. There is no   evidence of pneumothorax or pleural effusion. The cardiomediastinal   silhouette is stable in size. Degenerative changes of the osseous   structures.    IMPRESSION:  No acute pulmonary disease.    < end of copied text >  < from: US Kidney and Bladder (11.28.17 @ 09:53) >    FINDINGS:    Right kidney:  10.5 cm. No renal collection, hydronephrosis or calculi.    Left kidney:  9.8 cm. No renal collection, hydronephrosis or calculi.    Urinary bladder: Contracted around a Zee catheter.    IMPRESSION:     Normal renal ultrasound.    < end of copied text >

## 2017-12-04 NOTE — PROGRESS NOTE ADULT - ASSESSMENT
77yof w/ CAD, MI, Systolic CHF/s/p AICD, HTN, sent from [facility] for AMS due to sepsis 2/2 UTI.

## 2017-12-04 NOTE — PROGRESS NOTE ADULT - PROBLEM SELECTOR PLAN 7
Pt bed bound and unable to move arms well at baseline. She is completely dependent and long term resident in nursing home.   -will speak to SW about dispo back to nursing home when abx complete

## 2017-12-04 NOTE — PROGRESS NOTE ADULT - ASSESSMENT
76yo F with PMH of CAD, MI, HFrEF (EF 15%), s/p biventricular AICD, HTN, stage 2-3 decubitus sacral ulcer, recent ED visit for CRE and Pseudomonas UTI (11/07/17, sent home on 7 day course of Cipro, CRE assessed as contaminant) who p/w altered mentation; found to have fever (Tmax 103), leukocytosis (20.77), and cloudy urine; now with Pseudomonas bacteremia, most likely 2/2 pseudomonas UTI. TTE no vegetation.  ABX day 10: Cefepime   ABX day 4: Vanco    suggest : complete 4 more days of antibiotics.                no need for prolonged antibiotics for stage 2-3 sacral ulcer.                would get surveillance blood cultures 5 days after complete antibiotics to assure clearance.

## 2017-12-04 NOTE — PROGRESS NOTE ADULT - SUBJECTIVE AND OBJECTIVE BOX
HUMA MARSH  77y  Female      Patient is a 77y old  Female who presents with a chief complaint of AMS (25 Nov 2017 18:22)      INTERVAL HPI/OVERNIGHT EVENTS:  -no acute events overnight  -pt selectively verbal. refuses to talk this AM  -no guarding on palpation over suprapubic area     Medications:  acetaminophen    Suspension. 650 milliGRAM(s) Oral every 6 hours PRN  acetaminophen    Suspension. 325 milliGRAM(s) Oral every 6 hours PRN  amiodarone    Tablet 400 milliGRAM(s) Oral daily  aspirin  chewable 81 milliGRAM(s) Oral daily  atorvastatin 20 milliGRAM(s) Oral at bedtime  buDESOnide 160 MICROgram(s)/formoterol 4.5 MICROgram(s) Inhaler 2 Puff(s) Inhalation two times a day  cefepime  IVPB 2000 milliGRAM(s) IV Intermittent every 12 hours  collagenase Ointment 1 Application(s) Topical two times a day  dextrose 5%. 1000 milliLiter(s) IV Continuous <Continuous>  enoxaparin Injectable 40 milliGRAM(s) SubCutaneous daily  latanoprost 0.005% Ophthalmic Solution 1 Drop(s) Both EYES at bedtime  levothyroxine Injectable 25 MICROGram(s) IV Push daily  metoprolol succinate ER 25 milliGRAM(s) Oral daily  spironolactone 12.5 milliGRAM(s) Oral daily  tiotropium 18 MICROgram(s) Capsule 1 Capsule(s) Inhalation daily  vancomycin  IVPB      vancomycin  IVPB 750 milliGRAM(s) IV Intermittent every 24 hours      REVIEW OF SYSTEMS:  pt non verbal today    T(C): 36.3 (12-04-17 @ 06:08), Max: 37.3 (12-03-17 @ 21:11)  HR: 82 (12-04-17 @ 06:08) (80 - 82)  BP: 115/55 (12-04-17 @ 06:08) (100/52 - 115/55)  RR: 18 (12-04-17 @ 06:08) (18 - 19)  SpO2: 100% (12-04-17 @ 06:08) (100% - 100%)  Wt(kg): --Vital Signs Last 24 Hrs  T(C): 36.3 (04 Dec 2017 06:08), Max: 37.3 (03 Dec 2017 21:11)  T(F): 97.3 (04 Dec 2017 06:08), Max: 99.1 (03 Dec 2017 21:11)  HR: 82 (04 Dec 2017 06:08) (80 - 82)  BP: 115/55 (04 Dec 2017 06:08) (100/52 - 115/55)  BP(mean): --  RR: 18 (04 Dec 2017 06:08) (18 - 19)  SpO2: 100% (04 Dec 2017 06:08) (100% - 100%)    PHYSICAL EXAM:  GENERAL: elderly old woman, appearing better, but still frail  HEAD:  Atraumatic, Normocephalic  EYES: EOMI, PERRLA, conjunctiva and sclera clear  ENMT: No tonsillar erythema, exudates, or enlargement; Moist mucous membranes, Good dentition  NECK: Supple, No JVD  NERVOUS SYSTEM:  non verbal. moving all extremities.  CHEST/LUNG: Clear to auscultation bilaterally; No rales, rhonchi, wheezing, or rubs  HEART: Regular rate and rhythm; No murmurs, rubs, or gallops. No LE edema  ABDOMEN: Soft, mildly tender.  EXTREMITIES:  2+ Peripheral Pulses, No clubbing, cyanosis    Consultant(s) Notes Reviewed:  [x ] YES  [ ] NO  Care Discussed with Consultants/Other Providers [ x] YES  [ ] NO    LABS:                        11.2   9.07  )-----------( 434      ( 04 Dec 2017 07:15 )             36.1     12-03    138  |  102  |  8   ----------------------------<  66<L>  4.7   |  25  |  0.36<L>    Ca    8.9      03 Dec 2017 05:20  Phos  1.9     12-03  Mg     2.1     12-03          CAPILLARY BLOOD GLUCOSE  POCT Blood Glucose.: 73 mg/dL (04 Dec 2017 08:24)  POCT Blood Glucose.: 92 mg/dL (03 Dec 2017 22:47)  POCT Blood Glucose.: 95 mg/dL (03 Dec 2017 17:26)  POCT Blood Glucose.: 74 mg/dL (03 Dec 2017 12:30)      HEALTH ISSUES - PROBLEM Dx:  Bacteremia: Bacteremia  Transaminitis: Transaminitis  Hypokalemia: Hypokalemia  Hypernatremia: Hypernatremia  Decubitus ulcer of sacral region, unstageable: Decubitus ulcer of sacral region, unstageable  Need for prophylactic measure: Need for prophylactic measure  Decubitus ulcer of sacral region, stage 4: Decubitus ulcer of sacral region, stage 4  CAD (coronary artery disease): CAD (coronary artery disease)  CHF (congestive heart failure): CHF (congestive heart failure)  Delirium due to another medical condition, acute, hypoactive: Delirium due to another medical condition, acute, hypoactive  Sepsis due to urinary tract infection: Sepsis due to urinary tract infection

## 2017-12-04 NOTE — PROGRESS NOTE ADULT - PROBLEM SELECTOR PLAN 8
VTE: Lovenox  Diet: Pureed with supplimental ensure pudding  Code: no advanced directive in chart    JULIEN Franks MD-PGY1  Internal Medicine Department  Pager: [phone]

## 2017-12-04 NOTE — PROGRESS NOTE ADULT - SUBJECTIVE AND OBJECTIVE BOX
SUBJECTIVE: No CP or SOB       MEDICATIONS  (STANDING):  amiodarone    Tablet 400 milliGRAM(s) Oral daily  aspirin  chewable 81 milliGRAM(s) Oral daily  atorvastatin 20 milliGRAM(s) Oral at bedtime  buDESOnide 160 MICROgram(s)/formoterol 4.5 MICROgram(s) Inhaler 2 Puff(s) Inhalation two times a day  cefepime  IVPB 2000 milliGRAM(s) IV Intermittent every 12 hours  collagenase Ointment 1 Application(s) Topical two times a day  dextrose 5%. 1000 milliLiter(s) (75 mL/Hr) IV Continuous <Continuous>  enoxaparin Injectable 40 milliGRAM(s) SubCutaneous daily  latanoprost 0.005% Ophthalmic Solution 1 Drop(s) Both EYES at bedtime  levothyroxine Injectable 25 MICROGram(s) IV Push daily  metoprolol succinate ER 25 milliGRAM(s) Oral daily  mirtazapine 7.5 milliGRAM(s) Oral at bedtime  multivitamin 1 Tablet(s) Oral daily  spironolactone 12.5 milliGRAM(s) Oral daily  tiotropium 18 MICROgram(s) Capsule 1 Capsule(s) Inhalation daily   vancomycin  IVPB 750 milliGRAM(s) IV Intermittent every 24 hours    LABS:                        11.2   9.07  )-----------( 434      ( 04 Dec 2017 07:15 )             36.1     137  |  99  |  8   ----------------------------<  90  4.3   |  27  |  0.43<L>    Ca    8.9      04 Dec 2017 07:15  Phos  2.8     12-04  Mg     1.9     12-04    TPro  6.4  /  Alb  2.8<L>  /  TBili  0.2  /  DBili  x   /  AST  81<H>  /  ALT  53<H>  /  AlkPhos  140<H>  12-04     PHYSICAL EXAM  Vital Signs Last 24 Hrs  T(C): 36.3 (04 Dec 2017 06:08), Max: 37.3 (03 Dec 2017 21:11)  T(F): 97.3 (04 Dec 2017 06:08), Max: 99.1 (03 Dec 2017 21:11)  HR: 82 (04 Dec 2017 06:08) (80 - 82)  BP: 115/55 (04 Dec 2017 06:08) (100/52 - 115/55)  RR: 18 (04 Dec 2017 06:08) (18 - 19)  SpO2: 100% (04 Dec 2017 06:08) (100% - 100%)    Cardiovascular:  S1S2 RRR, No JVD, 1/6 ERICA   Respiratory: decreased breath sounds B/L LL's otherwise CTA   Gastrointestinal: Abdomen soft, ND, NT, +BS  Skin: Warm, dry, intact. No rash.  Ext: no C/C/E B/L LE    DIAGNOSTIC DATA    < from: TTE with Doppler (w/Cont) (04.03.17 @ 16:18) >  CONCLUSIONS:  1. Mitral annular calcification, otherwise normal mitral  valve. Mild mitral regurgitation.  2. Moderately dilated left atrium.  LA volume index = 46  cc/m2.  3. Moderate left ventricular enlargement.  4. Severe global left ventricular systolic dysfunction.  Endocardial visualization enhanced with intravenous  injection of echo contrast (Definity).  5. The right ventricle is not well visualized; grossly  normal right ventricular systolic function.  A device wire  is noted in the right heart.  6. Estimated right ventricular systolic pressure equals 60  mm Hg, assuming right atrial pressure equals 10 mm Hg,  consistent with moderate pulmonary hypertension.  ------------------------------------------------------------------------  Confirmed on  4/3/2017 - 17:19:47 by Jean-Pierre Carver M.D.    < end of copied text >    < from: Cardiac Cath Lab - Adult (04.26.16 @ 13:12) >  VENTRICLES: No LV gram was performed; however, a recent echocardiogram  demonstrated an EF of 19 %.  CORONARY VESSELS: The coronary circulation is right dominant.  LM:   --  LM: Normal.  LAD:   --  Proximal LAD: There was a [id number] % stenosis. There was mild  restenosis in proximal LAD stent.  --  Mid LAD: Angiography showed minor luminal irregularities with no flow  limiting lesions.  --  Distal LAD: Angiography showed minor luminal irregularities with no  flow limiting lesions.  --  D1: Angiography showed minor luminal irregularities with no flow  limiting lesions.  CX:   --  Circumflex: Normal.  RI:   --  Ramus intermedius: Angiography showed minor luminal  irregularities with no flow limiting lesions.  RCA:   --  Proximal RCA: Normal.  --  Mid RCA: Angiography showed mild atherosclerosis with no flow limiting  lesions.  --  Distal RCA: Angiography showed minor luminal irregularities with no  flow limiting lesions.  --  RPDA: Angiography showed minor luminal irregularities with no flow  limiting lesions.  --  RPLS: Angiography showed minor luminal irregularities with no flow  limiting lesions.  COMPLICATIONS: There were no complications.  DIAGNOSTIC RECOMMENDATIONS: Medical management and aggressive risk factor  modification is recommended.  Prepared and signed by  Nehal López M.D.  Signed 04/27/2016 13:13:20    < end of copied text >    < from: Transthoracic Echocardiogram (12.01.17 @ 16:53) >  CONCLUSIONS:  1. Normal mitral valve. Minimal mitral regurgitation.  2. Mild left ventricular enlargement.  3. Severe global left ventricular systolic dysfunction.  4. The right ventricle is not well visualized. A device  wire is noted in the right heart.  ------------------------------------------------------------------------  Confirmed on  12/1/2017 - 18:24:53 by Carli Encarnacion M.D. RPVI    < end of copied text >      Interrogation   Normal sensing and pacing via iterative testing.  Excellent threshold capture.  No reprogramming.      ASSESSMENT AND PLAN:    77y Female with hypertension, dyslipidemia, CAD s/p LAD stent with only mild re-stenosis with minor luminal irregularities otherwise on cath 4/16, with known severe LV dysfunction , NICM s/p Medtronic BiV ICD, VT on Amio, COPD, lymphoma previously on chemo with known spinal mets and compression fractures admitted with lethargy due to GNR bacteremia (pseudomonas), felt to be urinary source, and now sacral decub wound culture +MRSA    --ABX per primary team/ID  --TTE noted above.  ?need for ARASH--f/u with ID  --?Pts Goals of care   --Not in clinical CHF.  Monitor volume status  --Pain Management  --f/u Dr Hussein post discharge     [facility]  [address], [address]   [place] [address]  office [phone]  pager [phone]

## 2017-12-04 NOTE — PROGRESS NOTE ADULT - PROBLEM SELECTOR PLAN 5
pt started on her home heart medications. pt goes slightly hypotensive in the PM.   -hold parameters set for BP meds

## 2017-12-05 NOTE — PROGRESS NOTE ADULT - ATTENDING COMMENTS
Agree with above.   -Continue with medical management of cad  -f/u id    Nehal López MD  [facility]  [address], [address]  [place] [address]  office: [phone]  pager: [phone]

## 2017-12-05 NOTE — PROGRESS NOTE ADULT - SUBJECTIVE AND OBJECTIVE BOX
SUBJECTIVE: No CP or SOB    MEDICATIONS  (STANDING):  amiodarone    Tablet 400 milliGRAM(s) Oral daily  aspirin  chewable 81 milliGRAM(s) Oral daily  atorvastatin 20 milliGRAM(s) Oral at bedtime  buDESOnide 160 MICROgram(s)/formoterol 4.5 MICROgram(s) Inhaler 2 Puff(s) Inhalation two times a day  cefepime  IVPB 2000 milliGRAM(s) IV Intermittent every 12 hours  collagenase Ointment 1 Application(s) Topical two times a day  dextrose 5%. 1000 milliLiter(s) (75 mL/Hr) IV Continuous <Continuous>  dextrose 50% Injectable 50 milliLiter(s) IV Push once  enoxaparin Injectable 40 milliGRAM(s) SubCutaneous daily  latanoprost 0.005% Ophthalmic Solution 1 Drop(s) Both EYES at bedtime  levothyroxine Injectable 25 MICROGram(s) IV Push daily  metoprolol succinate ER 25 milliGRAM(s) Oral daily  mirtazapine 7.5 milliGRAM(s) Oral at bedtime  multivitamin 1 Tablet(s) Oral daily  sodium phosphate IVPB 15 milliMole(s) IV Intermittent once  spironolactone 12.5 milliGRAM(s) Oral daily  tiotropium 18 MICROgram(s) Capsule 1 Capsule(s) Inhalation daily  vancomycin  IVPB 1000 milliGRAM(s) IV Intermittent every 12 hours    LABS:                        10.2   12.29 )-----------( 378      ( 05 Dec 2017 06:15 )             32.2     138  |  100  |  9   ----------------------------<  79  3.9   |  26  |  0.35<L>    Ca    8.6      05 Dec 2017 06:15  Phos  1.9     12-05  Mg     1.8     12-05    TPro  6.4  /  Alb  2.8<L>  /  TBili  0.2  /  DBili  x   /  AST  81<H>  /  ALT  53<H>  /  AlkPhos  140<H>  12-04    PHYSICAL EXAM  Vital Signs Last 24 Hrs  T(C): 36.8 (05 Dec 2017 05:43), Max: 36.8 (05 Dec 2017 05:43)  T(F): 98.3 (05 Dec 2017 05:43), Max: 98.3 (05 Dec 2017 05:43)  HR: 80 (05 Dec 2017 05:43) (80 - 98)  BP: 104/50 (05 Dec 2017 05:43) (98/49 - 104/50)  RR: 20 (05 Dec 2017 05:43) (20 - 20)  SpO2: 100% (05 Dec 2017 05:43) (100% - 100%)     Cardiovascular:  S1S2 RRR, No JVD, 1/6 ERICA   Respiratory: decreased breath sounds B/L LL's otherwise CTA   Gastrointestinal: Abdomen soft, ND, NT, +BS  Skin: Warm, dry, intact. No rash.  Ext: no C/C/E B/L LE    DIAGNOSTIC DATA    < from: TTE with Doppler (w/Cont) (04.03.17 @ 16:18) >  CONCLUSIONS:  1. Mitral annular calcification, otherwise normal mitral  valve. Mild mitral regurgitation.  2. Moderately dilated left atrium.  LA volume index = 46  cc/m2.  3. Moderate left ventricular enlargement.  4. Severe global left ventricular systolic dysfunction.  Endocardial visualization enhanced with intravenous  injection of echo contrast (Definity).  5. The right ventricle is not well visualized; grossly  normal right ventricular systolic function.  A device wire  is noted in the right heart.  6. Estimated right ventricular systolic pressure equals 60  mm Hg, assuming right atrial pressure equals 10 mm Hg,  consistent with moderate pulmonary hypertension.  ------------------------------------------------------------------------  Confirmed on  4/3/2017 - 17:19:47 by Jean-Pierre Carver M.D.    < end of copied text >    < from: Cardiac Cath Lab - Adult (04.26.16 @ 13:12) >  VENTRICLES: No LV gram was performed; however, a recent echocardiogram  demonstrated an EF of 19 %.  CORONARY VESSELS: The coronary circulation is right dominant.  LM:   --  LM: Normal.  LAD:   --  Proximal LAD: There was a [id number] % stenosis. There was mild  restenosis in proximal LAD stent.  --  Mid LAD: Angiography showed minor luminal irregularities with no flow  limiting lesions.  --  Distal LAD: Angiography showed minor luminal irregularities with no  flow limiting lesions.  --  D1: Angiography showed minor luminal irregularities with no flow  limiting lesions.  CX:   --  Circumflex: Normal.  RI:   --  Ramus intermedius: Angiography showed minor luminal  irregularities with no flow limiting lesions.  RCA:   --  Proximal RCA: Normal.  --  Mid RCA: Angiography showed mild atherosclerosis with no flow limiting  lesions.  --  Distal RCA: Angiography showed minor luminal irregularities with no  flow limiting lesions.  --  RPDA: Angiography showed minor luminal irregularities with no flow  limiting lesions.  --  RPLS: Angiography showed minor luminal irregularities with no flow  limiting lesions.  COMPLICATIONS: There were no complications.  DIAGNOSTIC RECOMMENDATIONS: Medical management and aggressive risk factor  modification is recommended.  Prepared and signed by  Nehal López M.D.  Signed 04/27/2016 13:13:20    < end of copied text >    < from: Transthoracic Echocardiogram (12.01.17 @ 16:53) >  CONCLUSIONS:  1. Normal mitral valve. Minimal mitral regurgitation.  2. Mild left ventricular enlargement.  3. Severe global left ventricular systolic dysfunction.  4. The right ventricle is not well visualized. A device  wire is noted in the right heart.  ------------------------------------------------------------------------  Confirmed on  12/1/2017 - 18:24:53 by Carli Encarnacion M.D. RPVI    < end of copied text >      Interrogation   Normal sensing and pacing via iterative testing.  Excellent threshold capture.  No reprogramming.      ASSESSMENT AND PLAN:    77y Female with hypertension, dyslipidemia, CAD s/p LAD stent with only mild re-stenosis with minor luminal irregularities otherwise on cath 4/16, with known severe LV dysfunction , NICM s/p Medtronic BiV ICD, VT on Amio, COPD, lymphoma previously on chemo with known spinal mets and compression fractures admitted with lethargy due to GNR bacteremia (pseudomonas), felt to be urinary source, and now sacral decub wound culture +MRSA    --ABX per primary team/ID x 4 more days then surveillance blood cx  --TTE noted above.    --?Pts Goals of care   --Not in clinical CHF.  Monitor volume status  --Pain Management  --f/u Dr Hussien post discharge     Dolores Rodriguez PA-C  [facility]  [address], [address]   [place] [address]  office [phone]  pager [phone]

## 2017-12-05 NOTE — PROGRESS NOTE ADULT - PROBLEM SELECTOR PLAN 8
VTE: Lovenox  Diet: Pureed with supplimental ensure pudding  Code: proxy says full code over the phone, will reiterate when he arrives at bedside    JULIEN Franks MD-PGY1  Internal Medicine Department  Pager: [phone]

## 2017-12-05 NOTE — PROGRESS NOTE ADULT - ATTENDING COMMENTS
Patient seen and examined. Agree with A/P by team.  Patient is drinking  her Ensures.  No need for NGT.  77yof w/ CAD, MI, Systolic CHF/s/p AICD, HTN, sent from [facility] for AMS due to sepsis 2/2 UTI with and MRSA in sacral wound. Patient seen and examined. Agree with A/P by team.  Patient is drinking  her Ensures.  No need for NGT.  77yof w/ CAD, MI, Systolic CHF/s/p AICD, HTN, sent from [facility] for AMS due to sepsis 2/2 UTI sec to E coli and Psuedomonas Ar and  with MRSA in sacral wound. treated with Cefepime and Vanco.   Patient with Resolved Sepsis- VSS  patient is Full code.  D/c planning back to NH on Friday 12/8/17 ????

## 2017-12-05 NOTE — PROGRESS NOTE ADULT - PROBLEM SELECTOR PLAN 2
TTE shows no vegetations on AICD leads. Cards is suggesting ARASH to further examine leads more clearly, however, blood clxs have been negative since 11/27. ID suggesting f/u blood clx 5 days post abx completion  -bclx 5 days after abx completion at NH  -will continue cefepime  -appreciate ID recs

## 2017-12-05 NOTE — PROGRESS NOTE ADULT - PROBLEM SELECTOR PLAN 7
Pt bed bound and unable to move arms well at baseline. She is completely dependent and long term resident in nursing home.   -pt to be discharged back to NH on Friday after completion of abx

## 2017-12-05 NOTE — PROGRESS NOTE ADULT - SUBJECTIVE AND OBJECTIVE BOX
HUMA MARSH  77y  Female      Patient is a 77y old  Female who presents with a chief complaint of AMS (25 Nov 2017 18:22)      INTERVAL HPI/OVERNIGHT EVENTS:  -spoke with family yesterday: Proxy (son- Gavin) reiterated that pt is full code. Daughter in law said that pt eats when she is there. She apparently drank 2 whole bottles of ensure.   -Nurse o/n says that pt has been drinking Ensure overnight. Nutrition was concerned about pt not eating and suggested NG tube if this continued. However, now that pt is agreeable to liquid diet, we will monitor her nutrition status closely.  -Pt minimally verbal, but indicated that her abd pain is improved    Medications:  acetaminophen    Suspension. 650 milliGRAM(s) Oral every 6 hours PRN  acetaminophen    Suspension. 325 milliGRAM(s) Oral every 6 hours PRN  amiodarone    Tablet 400 milliGRAM(s) Oral daily  aspirin  chewable 81 milliGRAM(s) Oral daily  atorvastatin 20 milliGRAM(s) Oral at bedtime  buDESOnide 160 MICROgram(s)/formoterol 4.5 MICROgram(s) Inhaler 2 Puff(s) Inhalation two times a day  cefepime  IVPB 2000 milliGRAM(s) IV Intermittent every 12 hours  collagenase Ointment 1 Application(s) Topical two times a day  dextrose 5%. 1000 milliLiter(s) IV Continuous <Continuous>  enoxaparin Injectable 40 milliGRAM(s) SubCutaneous daily  latanoprost 0.005% Ophthalmic Solution 1 Drop(s) Both EYES at bedtime  levothyroxine Injectable 25 MICROGram(s) IV Push daily  metoprolol succinate ER 25 milliGRAM(s) Oral daily  mirtazapine 7.5 milliGRAM(s) Oral at bedtime  multivitamin 1 Tablet(s) Oral daily  spironolactone 12.5 milliGRAM(s) Oral daily  tiotropium 18 MICROgram(s) Capsule 1 Capsule(s) Inhalation daily  vancomycin  IVPB 1000 milliGRAM(s) IV Intermittent every 12 hours      REVIEW OF SYSTEMS:  Limited due to AMS    T(C): 36.8 (12-05-17 @ 05:43), Max: 36.8 (12-05-17 @ 05:43)  HR: 80 (12-05-17 @ 05:43) (80 - 98)  BP: 104/50 (12-05-17 @ 05:43) (98/49 - 104/50)  RR: 20 (12-05-17 @ 05:43) (20 - 20)  SpO2: 100% (12-05-17 @ 05:43) (100% - 100%)  Wt(kg): --Vital Signs Last 24 Hrs  T(C): 36.8 (05 Dec 2017 05:43), Max: 36.8 (05 Dec 2017 05:43)  T(F): 98.3 (05 Dec 2017 05:43), Max: 98.3 (05 Dec 2017 05:43)  HR: 80 (05 Dec 2017 05:43) (80 - 98)  BP: 104/50 (05 Dec 2017 05:43) (98/49 - 104/50)  BP(mean): --  RR: 20 (05 Dec 2017 05:43) (20 - 20)  SpO2: 100% (05 Dec 2017 05:43) (100% - 100%)    PHYSICAL EXAM:  ENERAL: elderly old woman, appearing better, but still frail  HEAD:  Atraumatic, Normocephalic  EYES: EOMI, PERRLA, conjunctiva and sclera clear  ENMT: No tonsillar erythema, exudates, or enlargement; Moist mucous membranes, Good dentition  NECK: Supple, No JVD  NERVOUS SYSTEM:  non verbal. moving upper extremities  CHEST/LUNG: Clear to auscultation bilaterally; No rales, rhonchi, wheezing, or rubs  HEART: Regular rate and rhythm; No murmurs, rubs, or gallops. No LE edema  ABDOMEN: Soft, mildly tender.  EXTREMITIES:  2+ Peripheral Pulses, No clubbing, cyanosis    Consultant(s) Notes Reviewed:  [x ] YES  [ ] NO  Care Discussed with Consultants/Other Providers [ x] YES  [ ] NO    LABS:                        10.2   12.29 )-----------( 378      ( 05 Dec 2017 06:15 )             32.2     12-04    137  |  99  |  8   ----------------------------<  90  4.3   |  27  |  0.43<L>    Ca    8.9      04 Dec 2017 07:15  Phos  2.8     12-04  Mg     1.9     12-04    TPro  6.4  /  Alb  2.8<L>  /  TBili  0.2  /  DBili  x   /  AST  81<H>  /  ALT  53<H>  /  AlkPhos  140<H>  12-04    CAPILLARY BLOOD GLUCOSE  POCT Blood Glucose.: 80 mg/dL (04 Dec 2017 22:56)  POCT Blood Glucose.: 111 mg/dL (04 Dec 2017 18:02)  POCT Blood Glucose.: 89 mg/dL (04 Dec 2017 11:52)  POCT Blood Glucose.: 73 mg/dL (04 Dec 2017 08:24)    HEALTH ISSUES - PROBLEM Dx:  Functional quadriplegia: Functional quadriplegia  Bacteremia: Bacteremia  Transaminitis: Transaminitis  Hypokalemia: Hypokalemia  Hypernatremia: Hypernatremia  Decubitus ulcer of sacral region, unstageable: Decubitus ulcer of sacral region, unstageable  Need for prophylactic measure: Need for prophylactic measure  Decubitus ulcer of sacral region, stage 4: Decubitus ulcer of sacral region, stage 4  CAD (coronary artery disease): CAD (coronary artery disease)  CHF (congestive heart failure): CHF (congestive heart failure)  Delirium due to another medical condition, acute, hypoactive: Delirium due to another medical condition, acute, hypoactive  Sepsis due to urinary tract infection: Sepsis due to urinary tract infection

## 2017-12-06 NOTE — PROGRESS NOTE ADULT - PROBLEM SELECTOR PLAN 4
Pt has decubitus ulcer on physical exam. MRSA has grown on cultures.   -c/w vanc 1 g daily per ID- held this AM due to high Vanc trough

## 2017-12-06 NOTE — PROGRESS NOTE ADULT - ATTENDING COMMENTS
Patient seen and examined.  Agree with above A/P by team Dr Franks.  77yof w/ CAD, MI, Systolic CHF/s/p AICD, HTN, sent from [facility] for AMS due to sepsis 2/2 UTI.  patient with Zee and UTI treated with Cefepime to end on 12/8/17.  D/c planning back to NH 12/8/17  patient tolerating oral Ensures.

## 2017-12-06 NOTE — PROGRESS NOTE ADULT - PROBLEM SELECTOR PLAN 2
ID suggesting f/u blood clx 5 days post abx completion  -bclx 5 days after abx completion at NH  -will continue cefepime

## 2017-12-06 NOTE — PROGRESS NOTE ADULT - PROBLEM SELECTOR PLAN 8
VTE: Lovenox  Diet: Pureed with supplimental ensure pudding  Code: proxy says full code over the phone, will reiterate when he arrives at bedside    JULIEN Franks MD-[id number]  Internal Medicine Department  Pager: [phone]

## 2017-12-06 NOTE — PROGRESS NOTE ADULT - SUBJECTIVE AND OBJECTIVE BOX
SUBJECTIVE: Lethargic but arousable, denies pain      MEDICATIONS  (STANDING):  amiodarone    Tablet 400 milliGRAM(s) Oral daily  aspirin  chewable 81 milliGRAM(s) Oral daily  atorvastatin 20 milliGRAM(s) Oral at bedtime  buDESOnide 160 MICROgram(s)/formoterol 4.5 MICROgram(s) Inhaler 2 Puff(s) Inhalation two times a day  cefepime  IVPB 2000 milliGRAM(s) IV Intermittent every 12 hours  collagenase Ointment 1 Application(s) Topical two times a day  dextrose 5%. 1000 milliLiter(s) (100 mL/Hr) IV Continuous <Continuous>  enoxaparin Injectable 40 milliGRAM(s) SubCutaneous daily  latanoprost 0.005% Ophthalmic Solution 1 Drop(s) Both EYES at bedtime  levothyroxine 50 MICROGram(s) Oral daily  metoprolol succinate ER 25 milliGRAM(s) Oral daily  mirtazapine 7.5 milliGRAM(s) Oral at bedtime  multivitamin 1 Tablet(s) Oral daily  spironolactone 12.5 milliGRAM(s) Oral daily  tiotropium 18 MICROgram(s) Capsule 1 Capsule(s) Inhalation daily  vancomycin  IVPB 1000 milliGRAM(s) IV Intermittent every 12 hours    MEDICATIONS  (PRN):  acetaminophen    Suspension. 650 milliGRAM(s) Oral every 6 hours PRN Severe Pain (7 - 10)  acetaminophen    Suspension. 325 milliGRAM(s) Oral every 6 hours PRN Moderate Pain (4 - 6)      LABS:                        10.9   14.50 )-----------( 364      ( 06 Dec 2017 06:00 )             33.5     133<L>  |  97<L>  |  11  ----------------------------<  92  4.7   |  23  |  0.47<L>    Ca    8.7      06 Dec 2017 06:00  Phos  2.6     12-06  Mg     1.9     12-06      PHYSICAL EXAM  Vital Signs Last 24 Hrs  T(C): 36.3 (06 Dec 2017 05:38), Max: 36.6 (05 Dec 2017 12:53)  T(F): 97.4 (06 Dec 2017 05:38), Max: 97.9 (05 Dec 2017 12:53)  HR: 80 (06 Dec 2017 05:38) (80 - 89)  BP: 110/63 (06 Dec 2017 05:38) (107/58 - 119/69)  RR: 19 (06 Dec 2017 05:38) (18 - 19)  SpO2: 100% (06 Dec 2017 05:38) (100% - 100%)    Cardiovascular:  S1S2 RRR, No JVD, 1/6 ERICA   Respiratory: decreased breath sounds B/L LL's otherwise CTA   Gastrointestinal: Abdomen soft, ND, NT, +BS  Skin: Warm, dry, intact. No rash.  Ext: no C/C/E B/L LE    DIAGNOSTIC DATA    < from: TTE with Doppler (w/Cont) (04.03.17 @ 16:18) >  CONCLUSIONS:  1. Mitral annular calcification, otherwise normal mitral  valve. Mild mitral regurgitation.  2. Moderately dilated left atrium.  LA volume index = 46  cc/m2.  3. Moderate left ventricular enlargement.  4. Severe global left ventricular systolic dysfunction.  Endocardial visualization enhanced with intravenous  injection of echo contrast (Definity).  5. The right ventricle is not well visualized; grossly  normal right ventricular systolic function.  A device wire  is noted in the right heart.  6. Estimated right ventricular systolic pressure equals 60  mm Hg, assuming right atrial pressure equals 10 mm Hg,  consistent with moderate pulmonary hypertension.  ------------------------------------------------------------------------  Confirmed on  4/3/2017 - 17:19:47 by Jean-Pierre Carver M.D.    < end of copied text >    < from: Cardiac Cath Lab - Adult (04.26.16 @ 13:12) >  VENTRICLES: No LV gram was performed; however, a recent echocardiogram  demonstrated an EF of 19 %.  CORONARY VESSELS: The coronary circulation is right dominant.  LM:   --  LM: Normal.  LAD:   --  Proximal LAD: There was a [id number] % stenosis. There was mild  restenosis in proximal LAD stent.  --  Mid LAD: Angiography showed minor luminal irregularities with no flow  limiting lesions.  --  Distal LAD: Angiography showed minor luminal irregularities with no  flow limiting lesions.  --  D1: Angiography showed minor luminal irregularities with no flow  limiting lesions.  CX:   --  Circumflex: Normal.  RI:   --  Ramus intermedius: Angiography showed minor luminal  irregularities with no flow limiting lesions.  RCA:   --  Proximal RCA: Normal.  --  Mid RCA: Angiography showed mild atherosclerosis with no flow limiting  lesions.  --  Distal RCA: Angiography showed minor luminal irregularities with no  flow limiting lesions.  --  RPDA: Angiography showed minor luminal irregularities with no flow  limiting lesions.  --  RPLS: Angiography showed minor luminal irregularities with no flow  limiting lesions.  COMPLICATIONS: There were no complications.  DIAGNOSTIC RECOMMENDATIONS: Medical management and aggressive risk factor  modification is recommended.  Prepared and signed by  Nehal López M.D.  Signed 04/27/2016 13:13:20    < end of copied text >    < from: Transthoracic Echocardiogram (12.01.17 @ 16:53) >  CONCLUSIONS:  1. Normal mitral valve. Minimal mitral regurgitation.  2. Mild left ventricular enlargement.  3. Severe global left ventricular systolic dysfunction.  4. The right ventricle is not well visualized. A device  wire is noted in the right heart.  ------------------------------------------------------------------------  Confirmed on  12/1/2017 - 18:24:53 by Carli Encarnacion M.D. RPVI    < end of copied text >      Interrogation   Normal sensing and pacing via iterative testing.  Excellent threshold capture.  No reprogramming.      ASSESSMENT AND PLAN:    77y Female with hypertension, dyslipidemia, CAD s/p LAD stent with only mild re-stenosis with minor luminal irregularities otherwise on cath 4/16, with known severe LV dysfunction , NICM s/p [employer]onic BiV ICD, VT on Amio, COPD, lymphoma previously on chemo with known spinal mets and compression fractures admitted with lethargy due to GNR bacteremia (pseudomonas), felt to be urinary source, and now sacral decub wound culture +MRSA    --ABX per primary team/ID x 4 more days then surveillance blood cx  --TTE noted above.    --Pt is a full code per family.  DC planning back to SNF when stable.  --Not in clinical CHF.  Monitor volume status.  --Pain Management  --f/u Dr Hussein post discharge     Dolores Rodriguez PA-C  [facility]  [address], [address]   [place] [address]  office [phone]  pager [phone]

## 2017-12-06 NOTE — PROVIDER CONTACT NOTE (CRITICAL VALUE NOTIFICATION) - RECOMMENDATIONS
Hold 6am dose of Vancomycin
continue antibiotics
order appropriate antibiotics and continue to keep patient on contact precautions

## 2017-12-06 NOTE — PROGRESS NOTE ADULT - SUBJECTIVE AND OBJECTIVE BOX
SALMA HUMA  77y  Female      Patient is a 77y old  Female who presents with a chief complaint of AMS (25 Nov 2017 18:22)      INTERVAL HPI/OVERNIGHT EVENTS:  -pt had no acute events o/n  -pt minimally verbal, but reports that her abdominal pain has improved. Denies chest pain, SOB, cough, or dyruira    Medications:  acetaminophen    Suspension. 650 milliGRAM(s) Oral every 6 hours PRN  acetaminophen    Suspension. 325 milliGRAM(s) Oral every 6 hours PRN  amiodarone    Tablet 400 milliGRAM(s) Oral daily  aspirin  chewable 81 milliGRAM(s) Oral daily  atorvastatin 20 milliGRAM(s) Oral at bedtime  buDESOnide 160 MICROgram(s)/formoterol 4.5 MICROgram(s) Inhaler 2 Puff(s) Inhalation two times a day  cefepime  IVPB 2000 milliGRAM(s) IV Intermittent every 12 hours  collagenase Ointment 1 Application(s) Topical two times a day  dextrose 5%. 1000 milliLiter(s) IV Continuous <Continuous>  enoxaparin Injectable 40 milliGRAM(s) SubCutaneous daily  latanoprost 0.005% Ophthalmic Solution 1 Drop(s) Both EYES at bedtime  levothyroxine 50 MICROGram(s) Oral daily  metoprolol succinate ER 25 milliGRAM(s) Oral daily  mirtazapine 7.5 milliGRAM(s) Oral at bedtime  multivitamin 1 Tablet(s) Oral daily  spironolactone 12.5 milliGRAM(s) Oral daily  tiotropium 18 MICROgram(s) Capsule 1 Capsule(s) Inhalation daily  vancomycin  IVPB 1000 milliGRAM(s) IV Intermittent every 12 hours    T(C): 36.3 (12-06-17 @ 05:38), Max: 36.6 (12-05-17 @ 12:53)  HR: 80 (12-06-17 @ 05:38) (80 - 89)  BP: 110/63 (12-06-17 @ 05:38) (107/58 - 119/69)  RR: 19 (12-06-17 @ 05:38) (18 - 19)  SpO2: 100% (12-06-17 @ 05:38) (100% - 100%)  Wt(kg): --Vital Signs Last 24 Hrs  T(C): 36.3 (06 Dec 2017 05:38), Max: 36.6 (05 Dec 2017 12:53)  T(F): 97.4 (06 Dec 2017 05:38), Max: 97.9 (05 Dec 2017 12:53)  HR: 80 (06 Dec 2017 05:38) (80 - 89)  BP: 110/63 (06 Dec 2017 05:38) (107/58 - 119/69)  BP(mean): --  RR: 19 (06 Dec 2017 05:38) (18 - 19)  SpO2: 100% (06 Dec 2017 05:38) (100% - 100%)    PHYSICAL EXAM:  ENERAL: elderly old woman, appearing better, but still frail  HEAD:  Atraumatic, Normocephalic  EYES: EOMI, PERRLA, conjunctiva and sclera clear  ENMT: No tonsillar erythema, exudates, or enlargement; Moist mucous membranes, Good dentition  NECK: Supple, No JVD  NERVOUS SYSTEM:  non verbal. moving upper extremities  CHEST/LUNG: Clear to auscultation bilaterally; No rales, rhonchi, wheezing, or rubs  HEART: Regular rate and rhythm; No murmurs, rubs, or gallops. No LE edema  ABDOMEN: Soft, mildly tender.  EXTREMITIES:  2+ Peripheral Pulses, No clubbing, cyanosis    Consultant(s) Notes Reviewed:  [x ] YES  [ ] NO  Care Discussed with Consultants/Other Providers [ x] YES  [ ] NO    LABS:                        10.9   14.50 )-----------( 364      ( 06 Dec 2017 06:00 )             33.5     12-05    138  |  100  |  9   ----------------------------<  79  3.9   |  26  |  0.35<L>    Ca    8.6      05 Dec 2017 06:15  Phos  1.9     12-05  Mg     1.8     12-05          CAPILLARY BLOOD GLUCOSE  POCT Blood Glucose.: 99 mg/dL (05 Dec 2017 22:31)  POCT Blood Glucose.: 96 mg/dL (05 Dec 2017 17:53)  POCT Blood Glucose.: 96 mg/dL (05 Dec 2017 12:35)  POCT Blood Glucose.: 86 mg/dL (05 Dec 2017 09:24)  POCT Blood Glucose.: 61 mg/dL (05 Dec 2017 08:42)    HEALTH ISSUES - PROBLEM Dx:  Functional quadriplegia: Functional quadriplegia  Bacteremia: Bacteremia  Transaminitis: Transaminitis  Hypokalemia: Hypokalemia  Hypernatremia: Hypernatremia  Decubitus ulcer of sacral region, unstageable: Decubitus ulcer of sacral region, unstageable  Need for prophylactic measure: Need for prophylactic measure  Decubitus ulcer of sacral region, stage 4: Decubitus ulcer of sacral region, stage 4  CAD (coronary artery disease): CAD (coronary artery disease)  CHF (congestive heart failure): CHF (congestive heart failure)  Delirium due to another medical condition, acute, hypoactive: Delirium due to another medical condition, acute, hypoactive  Sepsis due to urinary tract infection: Sepsis due to urinary tract infection

## 2017-12-06 NOTE — PROVIDER CONTACT NOTE (CRITICAL VALUE NOTIFICATION) - TEST AND RESULT REPORTED:
Blood cultures- gram negative rods after 20 hours incubation
wound culture + MRSA from 11/27
Vanc - 26.1

## 2017-12-06 NOTE — PROVIDER CONTACT NOTE (CRITICAL VALUE NOTIFICATION) - ASSESSMENT
Pt stable, no signs of acute distress. Pt resting comfortably.
afebrile, Vitals stable
wound culture + MRSA from 11/27

## 2017-12-06 NOTE — PROGRESS NOTE ADULT - ASSESSMENT
78yo F with PMH of CAD, MI, HFrEF (EF 15%), s/p biventricular AICD, HTN, stage 2-3 decubitus sacral ulcer, recent ED visit for CRE and Pseudomonas UTI (11/07/17, sent home on 7 day course of Cipro, CRE assessed as contaminant) who p/w altered mentation; found to have fever (Tmax 103), leukocytosis (20.77), and cloudy urine; now with Pseudomonas bacteremia, most likely 2/2 pseudomonas UTI. TTE no vegetation.  ABX day 12: Cefepime   ABX day 6: Vanco    suggest : complete cefepime 12/8                                no need for prolonged antibiotics for stage 2-3 sacral ulcer.                  would get surveillance blood cultures 5 days after complete antibiotics to assure clearance.

## 2017-12-06 NOTE — PROGRESS NOTE ADULT - ATTENDING COMMENTS
Patient seen and examined.  Agree with above.   -Continue with medical management of cad and cardiomyopathy  -f/u id    Nehal López MD  [facility]  [address], [address]  [place] [address]  office: [phone]  pager: [phone]

## 2017-12-06 NOTE — PROGRESS NOTE ADULT - ASSESSMENT
77yof w/ CAD, MI, Systolic CHF/s/p AICD, HTN, sent from [facility] for AMS due to sepsis 2/2 UTI.

## 2017-12-06 NOTE — PROVIDER CONTACT NOTE (CRITICAL VALUE NOTIFICATION) - BACKGROUND
admitted with UTI.
76 y/o female admitted from nursing home for sepsis 2/2 UTI, history of DM, CHF, MI, HTN
patient admitted with UTI

## 2017-12-06 NOTE — PROGRESS NOTE ADULT - SUBJECTIVE AND OBJECTIVE BOX
PATIENT: HUMA MARSH   AGE: 78yo   GENDER: Female  WEIGHT:   ALLERGIES: peanuts (Unknown)  penicillin (Nausea)    CHIEF COMPLAINT:   AMS (25 Nov 2017 18:22)    INTERVAL HPI / OVERNIGHT EVENTS:  no complaint.  appears comfortable off nasal O2               cefepime  IVPB 2000 every 12 hours      MEDICATIONS  (STANDING):  acetaminophen    Suspension. 650 every 6 hours PRN  acetaminophen    Suspension. 325 every 6 hours PRN  amiodarone    Tablet 400 daily  aspirin  chewable 81 daily  atorvastatin 20 at bedtime  buDESOnide 160 MICROgram(s)/formoterol 4.5 MICROgram(s) Inhaler 2 two times a day  enoxaparin Injectable 40 daily  levothyroxine 50 daily  metoprolol succinate ER 25 daily  mirtazapine 7.5 at bedtime  spironolactone 12.5 daily  tiotropium 18 MICROgram(s) Capsule 1 daily    Vital Signs Last 24 Hrs  T(F): 99 (12-06-17 @ 12:45), Max: 99 (12-06-17 @ 12:45)  HR: 86 (12-06-17 @ 12:45)  BP: 102/53 (12-06-17 @ 12:45)  RR: 19 (12-06-17 @ 12:45)  SpO2: 94% (12-06-17 @ 12:45) (94% - 100%)    PHYSICAL EXAM:  GENERAL: comfortable    HEENT: awakens to name  NECK: Supple  CHEST/LUNG: clear ant    HEART: S1S2 AICD  :  no knutson  ABDOMEN: Soft,  bs+  PSYCH:   limited verbal responses  NEUROLOGY: Non focal   BACK: unable to assess      LABS:                                          10.9   14.50 )-----------( 364      ( 06 Dec 2017 06:00 )             33.5 12-06    133  |  97  |  11  ----------------------------<  92  4.7   |  23  |  0.47  Ca    8.7      06 Dec 2017 06:00Phos  2.6     12-06Mg     1.9     12-06            MICROBIOLOGY:    blood culture 11/30 no growth    Culture - Blood in AM (11.29.17 @ 06:35)    Culture - Blood:   NO ORGANISMS ISOLATED  NO ORGANISMS ISOLATED AT 24 HOURS    Specimen Source: BLOOD        Culture - Urine (collected 11-25-17 @ 17:23)  Source: URINE CATHETER  Final Report (11-28-17 @ 12:18):    COLONY COUNT: > = 100,000 CFU/ML  Organism: Pseudomonas aeruginosa (11-28-17 @ 12:18)  Organism: Pseudomonas aeruginosa (11-28-17 @ 12:18)    Culture - Blood (collected 11-25-17 @ 16:40)  Source: BLOOD VENOUS  Preliminary Report (11-27-17 @ 15:15):    PSA^Pseudomonas aeruginosa  Organism: Pseudomonas aeruginosa (11-28-17 @ 12:30)  Organism: Pseudomonas aeruginosa (11-28-17 @ 12:30)    Culture - Blood (collected 11-25-17 @ 16:40)  Source: BLOOD PERIPHERAL  Preliminary Report (11-26-17 @ 14:38):          < from: Transthoracic Echocardiogram (12.01.17 @ 16:53) >  OBSERVATIONS:  Mitral Valve: Normal mitral valve. Minimal mitral  regurgitation.  Aortic Root: Normal aortic root.  Aortic Valve: Aortic valve not well visualized.  Left Atrium: Normal left atrium.  LA volume index = 33  cc/m2.  Left Ventricle: Severe global left ventricular systolic  dysfunction. Mild left ventricular enlargement.  Right Heart: Normal right atrium. The right ventricle is  not well visualized. A device wire is noted in theright  heart. Normal tricuspid valve. Minimal tricuspid  regurgitation. Pulmonic valve not well visualized.  Pericardium/PleuraNormal pericardium with no pericardial  effusion.  ------------------------------------------------------------------------  CONCLUSIONS:  1. Normal mitral valve. Minimal mitral regurgitation.  2. Mild left ventricular enlargement.  3. Severe global left ventricular systolic dysfunction.  4. The right ventricle is not well visualized. A device  wire is noted in the right heart.  ------------------------------------------------------------------------  Confirmed on  12/1/2017 - 18:24:53 by Badewattie Alysha,  M.D. RPVI    < end of copied text >        RADIOLOGY & ADDITIONAL TESTS:  < from: Xray Chest 1 View AP/PA (11.29.17 @ 11:22) >    There is a left chest wall AICD present. The lungs are clear. There is no   evidence of pneumothorax or pleural effusion. The cardiomediastinal   silhouette is stable in size. Degenerative changes of the osseous   structures.    IMPRESSION:  No acute pulmonary disease.    < end of copied text >  < from: US Kidney and Bladder (11.28.17 @ 09:53) >    FINDINGS:    Right kidney:  10.5 cm. No renal collection, hydronephrosis or calculi.    Left kidney:  9.8 cm. No renal collection, hydronephrosis or calculi.    Urinary bladder: Contracted around a Knutson catheter.    IMPRESSION:     Normal renal ultrasound.    < end of copied text >

## 2017-12-07 NOTE — PROGRESS NOTE ADULT - ATTENDING COMMENTS
Patient seen and examined.  Agree with above A/P by Dr aCrreno.  77yof w/ CAD, MI, Systolic CHF/s/p AICD, HTN, sent from [facility] for AMS due to sepsis 2/2 UTI in setting of Chronic Zee and sacral decub.  Sepsis resolved.  Patient is a functional quad.  Patient to complete antibiotics 12/8 and return to NH 12/8

## 2017-12-07 NOTE — PROGRESS NOTE ADULT - ATTENDING COMMENTS
Agree with above.   -f/u ID  -continue with medical management of cad.    Nehal López MD  [facility]  [address], [address]  [place] [address]  office: [phone]  pager: [phone]

## 2017-12-07 NOTE — ADVANCED PRACTICE NURSE CONSULT - ASSESSMENT
General: A&Ox0, patient unable to follow commands, bedbound, incontinent of stool, generalized flaccidity, right side of chest wall with ecchymosis. Patient with indwelling catheter. Skin warm. Left great toe nail yellow and thickened. Hypopigmentation of right knee and right medial 1st toe, left dorsal foot, left dorsal second toe, midline abdomen scar.     Incontinence related dermatitis in sacral fold as evident by hypopigmentation and moist. No suspected candidiasis.     Sacrococcygeal extending to bilateral buttocks Unsteagable pressure injury complicated by Incontinence related  dermatitis measuring 5cmx8.5cmx1.6cm Measurements taking while patient laying in left side, unable to determine true depth due to necrotic tissue (in natural position wound is not able to be visualized partially). Tissue type appears as 75% yellow, moist firmly attach slough, 25% dark maroon discoloration from 8-9 o'clock in the wound bed, scattered areas of epidermis and hypopigmentation at wound edges. Irregular borders. Small serosanguinous drainage. No odor. Periwound skin with hypopigmentation and hyperpigmentation. No induration, no erythema, no increased warmth. Goals of care: enzymatic debridement, wick urine/stool from wound, protect periwound skin, monitor for tissue type changes. General: A&Ox0, patient unable to follow commands, bedbound, incontinent of stool, generalized flaccidity, right side of chest wall with ecchymosis. Patient with indwelling catheter. Skin warm. Left great toe nail yellow and thickened. Hypopigmentation of right knee and right medial 1st toe, left dorsal foot, left dorsal second toe, midline abdomen scar.     Incontinence related dermatitis in sacral fold as evident by hypopigmentation and moist. No suspected candidiasis.     Sacrococcygeal extending to bilateral buttocks Unsteagable pressure injury complicated by Incontinence related  dermatitis measuring 5cmx8.5cmx1.6cm Measurements taking while patient laying in left side, unable to determine true depth due to necrotic tissue (in natural position wound is not able to be visualized partially). Tissue type appears as 75% yellow, moist firmly attach slough, 25% dark maroon discoloration from 8-9 o'clock in the wound bed, scattered areas of epidermis and hypopigmentation at wound edges. Irregular borders. Small serosanguinous drainage. No odor. Periwound skin with hypopigmentation and hyperpigmentation. No induration, no erythema, no increased warmth. Goals of care: enzymatic debridement, wick urine/stool from wound, protect periwound skin, monitor for tissue type changes.     Findings discussed with Dr. Franks from medical team 4

## 2017-12-07 NOTE — PROGRESS NOTE ADULT - ASSESSMENT
77yof w/ CAD, MI, Systolic CHF/s/p AICD, HTN, sent from [facility] for AMS due to sepsis 2/2 UTI.

## 2017-12-07 NOTE — ADVANCED PRACTICE NURSE CONSULT - RECOMMEDATIONS
Continue with Nutritional Recommendations    Will Recommend Tortoise device for assistance of turning patient     Sacral extending to bilateral buttocks: Clean with Saf-Clens. Pat dry. Apply Liquid barrier film to periwound skin. Apply collagenase, nickel thick, Cover with foam with borders (Continue current topical therapy).     Sacral fold: Clean with skin cleanser, pat dry, apply Critic-Aid Moisture barrier cream, apply twice a day or with every incontinence episode.     Apply daily Sween 24 Moisturizer to bilateral legs.     Continue low air loss bed therapy, continue heel elevation with Z-flex fluidized positioning boots, continue to turn & reposition q2h with Z-dionisio fluidized positioning device, soft pillow between bony prominences, continue moisture management with barrier creams & single breathable pad, continue measures to decrease friction/shear/pressure. Continue with nutritional support as per dietary/orders.    Please contact Wound Care Service Line if we can be of further assistance (ext [phone]). Continue with Nutritional Recommendations    Recommend reconsult Wound care MD ALATORRE Wound MD [place] ([phone]) for revaluation of sacral decub    Will Recommend Tortoise device for assistance of turning patient     Sacral extending to bilateral buttocks: Clean with Saf-Clens. Pat dry. Apply Liquid barrier film to periwound skin. Apply collagenase, nickel thick, Cover with foam with borders (Continue current topical therapy).     Sacral fold: Clean with skin cleanser, pat dry, apply Critic-Aid Moisture barrier cream, apply twice a day or with every incontinence episode.     Apply daily Sween 24 Moisturizer to bilateral legs.     Continue low air loss bed therapy, continue heel elevation with Z-flex fluidized positioning boots, continue to turn & reposition q2h with Z-dionisio fluidized positioning device, soft pillow between bony prominences, continue moisture management with barrier creams & single breathable pad, continue measures to decrease friction/shear/pressure. Continue with nutritional support as per dietary/orders.    Please contact Wound Care Service Line if we can be of further assistance (ext [phone]).

## 2017-12-07 NOTE — PROGRESS NOTE ADULT - SUBJECTIVE AND OBJECTIVE BOX
SUBJECTIVE:  Alert reports feeling OK denies any chest pain     MEDICATIONS  (STANDING):  amiodarone    Tablet 400 milliGRAM(s) Oral daily  aspirin  chewable 81 milliGRAM(s) Oral daily  atorvastatin 20 milliGRAM(s) Oral at bedtime  buDESOnide 160 MICROgram(s)/formoterol 4.5 MICROgram(s) Inhaler 2 Puff(s) Inhalation two times a day  cefepime  IVPB 2000 milliGRAM(s) IV Intermittent every 12 hours  collagenase Ointment 1 Application(s) Topical two times a day  dextrose 5%. 1000 milliLiter(s) (100 mL/Hr) IV Continuous <Continuous>  enoxaparin Injectable 40 milliGRAM(s) SubCutaneous daily  latanoprost 0.005% Ophthalmic Solution 1 Drop(s) Both EYES at bedtime  levothyroxine 50 MICROGram(s) Oral daily  metoprolol succinate ER 25 milliGRAM(s) Oral daily  mirtazapine 7.5 milliGRAM(s) Oral at bedtime  multivitamin 1 Tablet(s) Oral daily  potassium acid phosphate/sodium acid phosphate tablet (K-PHOS No. 2) 1 Tablet(s) Oral three times a day with meals  spironolactone 12.5 milliGRAM(s) Oral daily  tiotropium 18 MICROgram(s) Capsule 1 Capsule(s) Inhalation daily  vancomycin  IVPB 1000 milliGRAM(s) IV Intermittent every 12 hours    MEDICATIONS  (PRN):  acetaminophen    Suspension. 650 milliGRAM(s) Oral every 6 hours PRN Severe Pain (7 - 10)  acetaminophen    Suspension. 325 milliGRAM(s) Oral every 6 hours PRN Moderate Pain (4 - 6)      LABS:                        9.2    11.62 )-----------( 349      ( 07 Dec 2017 07:25 )             29.1     Hemoglobin: 9.2 g/dL (12-07 @ 07:25)  Hemoglobin: 10.9 g/dL (12-06 @ 06:00)  Hemoglobin: 10.2 g/dL (12-05 @ 06:15)  Hemoglobin: 11.2 g/dL (12-04 @ 07:15)  Hemoglobin: 10.6 g/dL (12-03 @ 05:20)    12-07    140  |  103  |  13  ----------------------------<  87  3.9   |  28  |  0.42<L>    Ca    8.5      07 Dec 2017 07:25  Phos  2.3     12-07  Mg     1.9     12-07      Creatinine Trend: 0.42<--, 0.47<--, 0.35<--, 0.43<--, 0.36<--, 0.36<--           PHYSICAL EXAM  Vital Signs Last 24 Hrs  T(C): 36.8 (07 Dec 2017 05:41), Max: 37.2 (06 Dec 2017 12:45)  T(F): 98.3 (07 Dec 2017 05:41), Max: 99 (06 Dec 2017 12:45)  HR: 87 (07 Dec 2017 06:57) (80 - 87)  BP: 98/48 (07 Dec 2017 06:57) (90/50 - 139/53)  BP(mean): --  RR: 18 (07 Dec 2017 06:57) (18 - 19)  SpO2: 100% (07 Dec 2017 06:57) (94% - 100%)    Cardiovascular:  S1S2 RRR, No JVD, 1/6 ERICA   Respiratory: decreased breath sounds B/L LL's   Gastrointestinal: Abdomen soft, ND, NT, +BS  Skin: Warm, dry, intact. No rash.  Ext: no C/C/E B/L LE    DIAGNOSTIC DATA    < from: TTE with Doppler (w/Cont) (04.03.17 @ 16:18) >  CONCLUSIONS:  1. Mitral annular calcification, otherwise normal mitral  valve. Mild mitral regurgitation.  2. Moderately dilated left atrium.  LA volume index = 46  cc/m2.  3. Moderate left ventricular enlargement.  4. Severe global left ventricular systolic dysfunction.  Endocardial visualization enhanced with intravenous  injection of echo contrast (Definity).  5. The right ventricle is not well visualized; grossly  normal right ventricular systolic function.  A device wire  is noted in the right heart.  6. Estimated right ventricular systolic pressure equals 60  mm Hg, assuming right atrial pressure equals 10 mm Hg,  consistent with moderate pulmonary hypertension.  ------------------------------------------------------------------------  Confirmed on  4/3/2017 - 17:19:47 by Jean-Pierre Carver M.D.    < end of copied text >    < from: Cardiac Cath Lab - Adult (04.26.16 @ 13:12) >  VENTRICLES: No LV gram was performed; however, a recent echocardiogram  demonstrated an EF of 19 %.  CORONARY VESSELS: The coronary circulation is right dominant.  LM:   --  LM: Normal.  LAD:   --  Proximal LAD: There was a [id number] % stenosis. There was mild  restenosis in proximal LAD stent.  --  Mid LAD: Angiography showed minor luminal irregularities with no flow  limiting lesions.  --  Distal LAD: Angiography showed minor luminal irregularities with no  flow limiting lesions.  --  D1: Angiography showed minor luminal irregularities with no flow  limiting lesions.  CX:   --  Circumflex: Normal.  RI:   --  Ramus intermedius: Angiography showed minor luminal  irregularities with no flow limiting lesions.  RCA:   --  Proximal RCA: Normal.  --  Mid RCA: Angiography showed mild atherosclerosis with no flow limiting  lesions.  --  Distal RCA: Angiography showed minor luminal irregularities with no  flow limiting lesions.  --  RPDA: Angiography showed minor luminal irregularities with no flow  limiting lesions.  --  RPLS: Angiography showed minor luminal irregularities with no flow  limiting lesions.  COMPLICATIONS: There were no complications.  DIAGNOSTIC RECOMMENDATIONS: Medical management and aggressive risk factor  modification is recommended.  Prepared and signed by  Nehal López M.D.  Signed 04/27/2016 13:13:20    < end of copied text >    < from: Transthoracic Echocardiogram (12.01.17 @ 16:53) >  CONCLUSIONS:  1. Normal mitral valve. Minimal mitral regurgitation.  2. Mild left ventricular enlargement.  3. Severe global left ventricular systolic dysfunction.  4. The right ventricle is not well visualized. A device  wire is noted in the right heart.  ------------------------------------------------------------------------  Confirmed on  12/1/2017 - 18:24:53 by Carli Encarnacion M.D. RPVI    < end of copied text >      Interrogation   Normal sensing and pacing via iterative testing.  Excellent threshold capture.  No reprogramming.      ASSESSMENT AND PLAN:    77y Female with hypertension, dyslipidemia, CAD s/p LAD stent with only mild re-stenosis with minor luminal irregularities otherwise on cath 4/16, with known severe LV dysfunction , NICM s/p [employer] BiV ICD, VT on Amio, COPD, lymphoma previously on chemo with known spinal mets and compression fractures admitted with lethargy due to GNR bacteremia (pseudomonas), felt to be urinary source, and now sacral decub wound culture +MRSA    --ABX per primary team/ID - Cefepime till 12/8 then surveillance blood cx  --TTE noted above.    --Pt is a full code per family.  DC planning back to SNF when stable.  --Not in clinical CHF.  Monitor volume status.  --Pain Management as per primary team   --f/u Dr Hussein post discharge

## 2017-12-07 NOTE — PROGRESS NOTE ADULT - SUBJECTIVE AND OBJECTIVE BOX
HUMA MARSH  77y  Female      Patient is a 77y old  Female who presents with a chief complaint of AMS (25 Nov 2017 18:22)      INTERVAL HPI/OVERNIGHT EVENTS:  -pt was hypotensive o/n into the 80's. 500cc bolus of NS was given and pt's BP increased back into the 90's. Lungs sounded clear to ascultation  -pt has limited speech, but denies chest pain, palpitations, SOB, pain in abdomen, pain with urination.    Medications:  acetaminophen    Suspension. 650 milliGRAM(s) Oral every 6 hours PRN  acetaminophen    Suspension. 325 milliGRAM(s) Oral every 6 hours PRN  amiodarone    Tablet 400 milliGRAM(s) Oral daily  aspirin  chewable 81 milliGRAM(s) Oral daily  atorvastatin 20 milliGRAM(s) Oral at bedtime  buDESOnide 160 MICROgram(s)/formoterol 4.5 MICROgram(s) Inhaler 2 Puff(s) Inhalation two times a day  cefepime  IVPB 2000 milliGRAM(s) IV Intermittent every 12 hours  collagenase Ointment 1 Application(s) Topical two times a day  dextrose 5%. 1000 milliLiter(s) IV Continuous <Continuous>  enoxaparin Injectable 40 milliGRAM(s) SubCutaneous daily  latanoprost 0.005% Ophthalmic Solution 1 Drop(s) Both EYES at bedtime  levothyroxine 50 MICROGram(s) Oral daily  metoprolol succinate ER 25 milliGRAM(s) Oral daily  mirtazapine 7.5 milliGRAM(s) Oral at bedtime  multivitamin 1 Tablet(s) Oral daily  spironolactone 12.5 milliGRAM(s) Oral daily  tiotropium 18 MICROgram(s) Capsule 1 Capsule(s) Inhalation daily  vancomycin  IVPB 1000 milliGRAM(s) IV Intermittent every 12 hours      T(C): 36.8 (12-07-17 @ 05:41), Max: 37.2 (12-06-17 @ 12:45)  HR: 87 (12-07-17 @ 06:57) (80 - 87)  BP: 98/48 (12-07-17 @ 06:57) (90/50 - 139/53)  RR: 18 (12-07-17 @ 06:57) (18 - 19)  SpO2: 100% (12-07-17 @ 06:57) (94% - 100%)  Wt(kg): --Vital Signs Last 24 Hrs  T(C): 36.8 (07 Dec 2017 05:41), Max: 37.2 (06 Dec 2017 12:45)  T(F): 98.3 (07 Dec 2017 05:41), Max: 99 (06 Dec 2017 12:45)  HR: 87 (07 Dec 2017 06:57) (80 - 87)  BP: 98/48 (07 Dec 2017 06:57) (90/50 - 139/53)  BP(mean): --  RR: 18 (07 Dec 2017 06:57) (18 - 19)  SpO2: 100% (07 Dec 2017 06:57) (94% - 100%)    PHYSICAL EXAM:  ENERAL: elderly old woman, appearing better, but still frail  HEAD:  Atraumatic, Normocephalic  EYES: EOMI, PERRLA, conjunctiva and sclera clear  ENMT: No tonsillar erythema, exudates, or enlargement  NECK: Supple, No JVD  NERVOUS SYSTEM:  non verbal. moving upper extremities  CHEST/LUNG: Clear to auscultation bilaterally; No rales, rhonchi, wheezing, or rubs  HEART: Regular rate and rhythm; No murmurs, rubs, or gallops. No LE edema  ABDOMEN: Soft, mildly tender.  EXTREMITIES:  2+ Peripheral Pulses, No clubbing, cyanosis    Consultant(s) Notes Reviewed:  [x ] YES  [ ] NO  Care Discussed with Consultants/Other Providers [ x] YES  [ ] NO    LABS:                        10.9   14.50 )-----------( 364      ( 06 Dec 2017 06:00 )             33.5     12-06    133<L>  |  97<L>  |  11  ----------------------------<  92  4.7   |  23  |  0.47<L>    Ca    8.7      06 Dec 2017 06:00  Phos  2.6     12-06  Mg     1.9     12-06          CAPILLARY BLOOD GLUCOSE  POCT Blood Glucose.: 133 mg/dL (06 Dec 2017 22:49)  POCT Blood Glucose.: 97 mg/dL (06 Dec 2017 17:47)  POCT Blood Glucose.: 95 mg/dL (06 Dec 2017 12:48)      HEALTH ISSUES - PROBLEM Dx:  Functional quadriplegia: Functional quadriplegia  Bacteremia: Bacteremia  Transaminitis: Transaminitis  Hypokalemia: Hypokalemia  Hypernatremia: Hypernatremia  Decubitus ulcer of sacral region, unstageable: Decubitus ulcer of sacral region, unstageable  Need for prophylactic measure: Need for prophylactic measure  Decubitus ulcer of sacral region, stage 4: Decubitus ulcer of sacral region, stage 4  CAD (coronary artery disease): CAD (coronary artery disease)  CHF (congestive heart failure): CHF (congestive heart failure)  Delirium due to another medical condition, acute, hypoactive: Delirium due to another medical condition, acute, hypoactive  Sepsis due to urinary tract infection: Sepsis due to urinary tract infection

## 2017-12-07 NOTE — ADVANCED PRACTICE NURSE CONSULT - REASON FOR CONSULT
Patient seen on skin care rounds after wound care referral received for assessment of skin impairment and recommendations of topical management. Chart reviewed: Serum pre albumin 22, WBC trending down from 14.50 on 11/6 to 11.62 on 11/7, Matt 10-16, BMI 20.3kg/m2, Wound cultures positive for MRSA  (11/27). Patient non verbal. Patient H/O of CAD, MI, Systolic CH, s/p AICD, and  HTN. Patient transfer from [facility]  for AMS and unable to speak, at baseline patient conversive. Patient admitted with Urinary tract infection and AMS. Patient has being seeing by Nutrition services for malnutrition alert, ID for bacteremia, cardiology for cardiomyopathy and Wound care  for sacral decub.

## 2017-12-08 NOTE — PROGRESS NOTE ADULT - PROBLEM SELECTOR PLAN 8
VTE: Lovenox  Diet: Pureed with supplimental ensure pudding  Code: proxy says full code over the phone, will reiterate when he arrives at bedside    JULIEN Franks MD-[id number]  Internal Medicine Department  Pager: [phone]

## 2017-12-08 NOTE — PROGRESS NOTE ADULT - SUBJECTIVE AND OBJECTIVE BOX
MARSH HUMA  77y  Female      Patient is a 77y old  Female who presents with a chief complaint of AMS (25 Nov 2017 18:22)      INTERVAL HPI/OVERNIGHT EVENTS:  -nurse was concerned for an episode of shaking. we examined pt and found her shaking was baseline  -no acute events o/n  -pt denies chest pain, SOB, abd pain, n/v    Medications:  acetaminophen    Suspension. 650 milliGRAM(s) Oral every 6 hours PRN  acetaminophen    Suspension. 325 milliGRAM(s) Oral every 6 hours PRN  amiodarone    Tablet 400 milliGRAM(s) Oral daily  aspirin  chewable 81 milliGRAM(s) Oral daily  atorvastatin 20 milliGRAM(s) Oral at bedtime  buDESOnide 160 MICROgram(s)/formoterol 4.5 MICROgram(s) Inhaler 2 Puff(s) Inhalation two times a day  cefepime  IVPB 2000 milliGRAM(s) IV Intermittent every 12 hours  collagenase Ointment 1 Application(s) Topical two times a day  dextrose 5%. 1000 milliLiter(s) IV Continuous <Continuous>  enoxaparin Injectable 40 milliGRAM(s) SubCutaneous daily  latanoprost 0.005% Ophthalmic Solution 1 Drop(s) Both EYES at bedtime  levothyroxine 50 MICROGram(s) Oral daily  metoprolol succinate ER 25 milliGRAM(s) Oral daily  mirtazapine 7.5 milliGRAM(s) Oral at bedtime  multivitamin 1 Tablet(s) Oral daily  spironolactone 12.5 milliGRAM(s) Oral daily  tiotropium 18 MICROgram(s) Capsule 1 Capsule(s) Inhalation daily  vancomycin  IVPB 1000 milliGRAM(s) IV Intermittent every 24 hours  vancomycin  IVPB        T(C): 37.2 (12-08-17 @ 05:06), Max: 37.6 (12-07-17 @ 20:52)  HR: 91 (12-08-17 @ 05:06) (81 - 91)  BP: 107/61 (12-08-17 @ 05:06) (107/61 - 123/64)  RR: 17 (12-08-17 @ 05:06) (17 - 18)  SpO2: 96% (12-08-17 @ 05:06) (92% - 100%)  Wt(kg): --Vital Signs Last 24 Hrs  T(C): 37.2 (08 Dec 2017 05:06), Max: 37.6 (07 Dec 2017 20:52)  T(F): 98.9 (08 Dec 2017 05:06), Max: 99.7 (07 Dec 2017 20:52)  HR: 91 (08 Dec 2017 05:06) (81 - 91)  BP: 107/61 (08 Dec 2017 05:06) (107/61 - 123/64)  BP(mean): --  RR: 17 (08 Dec 2017 05:06) (17 - 18)  SpO2: 96% (08 Dec 2017 05:06) (92% - 100%)    PHYSICAL EXAM:  ENERAL: elderly old woman, appearing better, but still frail  HEAD:  Atraumatic, Normocephalic  EYES: EOMI, PERRLA, conjunctiva and sclera clear  ENMT: No tonsillar erythema, exudates, or enlargement  NECK: Supple, No JVD  NERVOUS SYSTEM:  non verbal. moving upper extremities  CHEST/LUNG: Clear to auscultation bilaterally; No rales, rhonchi, wheezing, or rubs  HEART: Regular rate and rhythm; No murmurs, rubs, or gallops. No LE edema  ABDOMEN: Soft, mildly tender.  EXTREMITIES:  2+ Peripheral Pulses, No clubbing, cyanosis    Consultant(s) Notes Reviewed:  [x ] YES  [ ] NO  Care Discussed with Consultants/Other Providers [ x] YES  [ ] NO    LABS:                        9.2    11.62 )-----------( 349      ( 07 Dec 2017 07:25 )             29.1     12-07    140  |  103  |  13  ----------------------------<  87  3.9   |  28  |  0.42<L>    Ca    8.5      07 Dec 2017 07:25  Phos  2.3     12-07  Mg     1.9     12-07          CAPILLARY BLOOD GLUCOSE  POCT Blood Glucose.: 223 mg/dL (08 Dec 2017 08:26)  POCT Blood Glucose.: 183 mg/dL (07 Dec 2017 22:25)  POCT Blood Glucose.: 159 mg/dL (07 Dec 2017 21:43)  POCT Blood Glucose.: 100 mg/dL (07 Dec 2017 17:19)  POCT Blood Glucose.: 112 mg/dL (07 Dec 2017 12:19)      HEALTH ISSUES - PROBLEM Dx:  Functional quadriplegia: Functional quadriplegia  Bacteremia: Bacteremia  Transaminitis: Transaminitis  Hypokalemia: Hypokalemia  Hypernatremia: Hypernatremia  Decubitus ulcer of sacral region, unstageable: Decubitus ulcer of sacral region, unstageable  Need for prophylactic measure: Need for prophylactic measure  Decubitus ulcer of sacral region, stage 4: Decubitus ulcer of sacral region, stage 4  CAD (coronary artery disease): CAD (coronary artery disease)  CHF (congestive heart failure): CHF (congestive heart failure)  Delirium due to another medical condition, acute, hypoactive: Delirium due to another medical condition, acute, hypoactive  Sepsis due to urinary tract infection: Sepsis due to urinary tract infection

## 2017-12-08 NOTE — PROGRESS NOTE ADULT - PROBLEM SELECTOR PLAN 4
Pt has decubitus ulcer on physical exam. MRSA has grown on cultures.   -c/w vanc .75g daily per ID, will finish today.

## 2017-12-08 NOTE — PROGRESS NOTE ADULT - ASSESSMENT
77yof w/ CAD, MI, Systolic CHF/s/p AICD, HTN, sent from [facility] for AMS due to sepsis 2/2 UTI.

## 2017-12-08 NOTE — PROGRESS NOTE ADULT - ATTENDING COMMENTS
Patient seen and examined.  Agree with a/P by Dr Franks.  77yof w/ CAD, MI, Systolic CHF/s/p AICD, HTN, sent from [facility] for AMS due to sepsis 2/2 UTI.  Resolved Sepsis.  Alert and drinking Ensure.  Optimize to return to NH today.

## 2017-12-08 NOTE — PROGRESS NOTE ADULT - ATTENDING COMMENTS
Agree with above.   -Continue with medical therapy of cad and cardiomyopathy  -f/u ID    Nehal López MD  [facility]  [address], [id number]  [place] [address]  office: [phone]  pager: [phone]

## 2017-12-08 NOTE — PROGRESS NOTE ADULT - SUBJECTIVE AND OBJECTIVE BOX
SUBJECTIVE:  Alert reports feeling OK denies any chest pain     MEDICATIONS  (STANDING):  amiodarone    Tablet 400 milliGRAM(s) Oral daily  aspirin  chewable 81 milliGRAM(s) Oral daily  atorvastatin 20 milliGRAM(s) Oral at bedtime  buDESOnide 160 MICROgram(s)/formoterol 4.5 MICROgram(s) Inhaler 2 Puff(s) Inhalation two times a day  cefepime  IVPB 2000 milliGRAM(s) IV Intermittent every 12 hours  collagenase Ointment 1 Application(s) Topical two times a day  dextrose 5%. 1000 milliLiter(s) (100 mL/Hr) IV Continuous <Continuous>  enoxaparin Injectable 40 milliGRAM(s) SubCutaneous daily  latanoprost 0.005% Ophthalmic Solution 1 Drop(s) Both EYES at bedtime  levothyroxine 50 MICROGram(s) Oral daily  metoprolol succinate ER 25 milliGRAM(s) Oral daily  mirtazapine 7.5 milliGRAM(s) Oral at bedtime  multivitamin 1 Tablet(s) Oral daily  spironolactone 12.5 milliGRAM(s) Oral daily  tiotropium 18 MICROgram(s) Capsule 1 Capsule(s) Inhalation daily  vancomycin  IVPB 1000 milliGRAM(s) IV Intermittent every 24 hours  vancomycin  IVPB        MEDICATIONS  (PRN):  acetaminophen    Suspension. 650 milliGRAM(s) Oral every 6 hours PRN Severe Pain (7 - 10)  acetaminophen    Suspension. 325 milliGRAM(s) Oral every 6 hours PRN Moderate Pain (4 - 6)      LABS:                        9.2    11.62 )-----------( 349      ( 07 Dec 2017 07:25 )             29.1     Hemoglobin: 9.2 g/dL (12-07 @ 07:25)  Hemoglobin: 10.9 g/dL (12-06 @ 06:00)  Hemoglobin: 10.2 g/dL (12-05 @ 06:15)  Hemoglobin: 11.2 g/dL (12-04 @ 07:15)    12-07    140  |  103  |  13  ----------------------------<  87  3.9   |  28  |  0.42<L>    Ca    8.5      07 Dec 2017 07:25  Phos  2.3     12-07  Mg     1.9     12-07      Creatinine Trend: 0.42<--, 0.47<--, 0.35<--, 0.43<--, 0.36<--, 0.36<--           PHYSICAL EXAM  Vital Signs Last 24 Hrs  T(C): 36.4 (08 Dec 2017 11:00), Max: 37.6 (07 Dec 2017 20:52)  T(F): 97.6 (08 Dec 2017 11:00), Max: 99.7 (07 Dec 2017 20:52)  HR: 85 (08 Dec 2017 11:00) (81 - 91)  BP: 110/60 (08 Dec 2017 11:00) (107/61 - 123/64)  BP(mean): --  RR: 23 (08 Dec 2017 11:00) (17 - 23)  SpO2: 100% (08 Dec 2017 11:00) (92% - 100%)      Cardiovascular:  S1S2 RRR, No JVD, 1/6 ERICA   Respiratory: decreased breath sounds B/L LL's   Gastrointestinal: Abdomen soft, ND, NT, +BS  Skin: Warm, dry, intact. No rash.  Ext: no C/C/E B/L LE    DIAGNOSTIC DATA    < from: TTE with Doppler (w/Cont) (04.03.17 @ 16:18) >  CONCLUSIONS:  1. Mitral annular calcification, otherwise normal mitral  valve. Mild mitral regurgitation.  2. Moderately dilated left atrium.  LA volume index = 46  cc/m2.  3. Moderate left ventricular enlargement.  4. Severe global left ventricular systolic dysfunction.  Endocardial visualization enhanced with intravenous  injection of echo contrast (Definity).  5. The right ventricle is not well visualized; grossly  normal right ventricular systolic function.  A device wire  is noted in the right heart.  6. Estimated right ventricular systolic pressure equals 60  mm Hg, assuming right atrial pressure equals 10 mm Hg,  consistent with moderate pulmonary hypertension.  ------------------------------------------------------------------------  Confirmed on  4/3/2017 - 17:19:47 by Jean-Pierre Carver M.D.    < end of copied text >    < from: Cardiac Cath Lab - Adult (04.26.16 @ 13:12) >  VENTRICLES: No LV gram was performed; however, a recent echocardiogram  demonstrated an EF of 19 %.  CORONARY VESSELS: The coronary circulation is right dominant.  LM:   --  LM: Normal.  LAD:   --  Proximal LAD: There was a epcumcng99 % stenosis. There was mild  restenosis in proximal LAD stent.  --  Mid LAD: Angiography showed minor luminal irregularities with no flow  limiting lesions.  --  Distal LAD: Angiography showed minor luminal irregularities with no  flow limiting lesions.  --  D1: Angiography showed minor luminal irregularities with no flow  limiting lesions.  CX:   --  Circumflex: Normal.  RI:   --  Ramus intermedius: Angiography showed minor luminal  irregularities with no flow limiting lesions.  RCA:   --  Proximal RCA: Normal.  --  Mid RCA: Angiography showed mild atherosclerosis with no flow limiting  lesions.  --  Distal RCA: Angiography showed minor luminal irregularities with no  flow limiting lesions.  --  RPDA: Angiography showed minor luminal irregularities with no flow  limiting lesions.  --  RPLS: Angiography showed minor luminal irregularities with no flow  limiting lesions.  COMPLICATIONS: There were no complications.  DIAGNOSTIC RECOMMENDATIONS: Medical management and aggressive risk factor  modification is recommended.  Prepared and signed by  Nehal López M.D.  Signed 04/27/2016 13:13:20    < end of copied text >    < from: Transthoracic Echocardiogram (12.01.17 @ 16:53) >  CONCLUSIONS:  1. Normal mitral valve. Minimal mitral regurgitation.  2. Mild left ventricular enlargement.  3. Severe global left ventricular systolic dysfunction.  4. The right ventricle is not well visualized. A device  wire is noted in the right heart.  ------------------------------------------------------------------------  Confirmed on  12/1/2017 - 18:24:53 by Carli Encarnacion M.D. RPVI    < end of copied text >      Interrogation   Normal sensing and pacing via iterative testing.  Excellent threshold capture.  No reprogramming.      ASSESSMENT AND PLAN:    77y Female with hypertension, dyslipidemia, CAD s/p LAD stent with only mild re-stenosis with minor luminal irregularities otherwise on cath 4/16, with known severe LV dysfunction , NICM s/p [employer]onic BiV ICD, VT on Amio, COPD, lymphoma previously on chemo with known spinal mets and compression fractures admitted with lethargy due to GNR bacteremia (pseudomonas), felt to be urinary source, and now sacral decub wound culture +MRSA    --ABX per primary team/ID - Cefepime till 12/8 then surveillance blood cx  --TTE noted above.    --Pt is a full code per family.  DC planning back to SNF when stable.  --Not in clinical CHF.  Monitor volume status.  --Pain Management as per primary team   --f/u Dr Hussein post discharge

## 2017-12-09 NOTE — DISCHARGE NOTE ADULT - HOSPITAL COURSE
HPI: 77yof w/ CAD, MI, Systolic CH, s/p AICD, HTN, sent from [facility] for AMS. Per transfer documents, pt lethargic and not eating. Conversive at baseline but today non-verbal. Noted to be tachycardic in 120s and hypotensive at nursing home and with EMS. At the ED, she was non verbal, but responsive to her name. She had not urinated and had a 1L output on straight cath. When knutson was inserted, cloudy urine was seen in bag.    She had recent admission for UTI and also was previously seen in the ED and found to have pseudomonas and CRE, however, due to few colony count, antibiotics were dc'd. She was found to have AMS at the nursing home and BIBEMS with tachycardia, fever, and hypotension. In the ED, she was given 2L of fluid, however, she was also found to have a cardiomyopathy confirmed by ED echo. Pt was started on cefepime and vancomycin. Pt was admitted to medicine.    Hospital Course:  Pt was admitted to medicine and started on Cefepime and Vanc due to previous sensitivities on urine cultures. Pt AMS improved after a few days and she passed s/s eval, so was started on oral feeds. Pt did not eat much as far as solid foods, however, she prefers Ensure shakes. Pt pockets medication and food in mouth, however, she does eat when family is around. We encouraged family to feed her as much as they could when they are around and pt's feeding improved. Pt grew pseudomonas in blood and we continued with Cefepime and dc'd vanc. Swab of decubitus ulcer came back positive for MRSA and therefore, pt was continued on contact isolations and started on Vanc. TTE returned negative, and cardiology suggested that if infx does not clear, to follow up with ARASH. Blood clxs negative since 11/30. Pt completed 14 course of Cefepime, however, she spiked a fever 2 days after completion. We continued Cefepime and Vanc for the time being until repeat blood cultures returned negative. HPI: 77yof w/ CAD, MI, Systolic CH, s/p AICD, HTN, sent from [facility] for AMS. Per transfer documents, pt lethargic and not eating. Conversive at baseline but today non-verbal. Noted to be tachycardic in 120s and hypotensive at nursing home and with EMS. At the ED, she was non verbal, but responsive to her name. She had not urinated and had a 1L output on straight cath. When knutson was inserted, cloudy urine was seen in bag.    She had recent admission for UTI and also was previously seen in the ED and found to have pseudomonas and CRE, however, due to few colony count, antibiotics were dc'd. She was found to have AMS at the nursing home and BIBEMS with tachycardia, fever, and hypotension. In the ED, she was given 2L of fluid, however, she was also found to have a cardiomyopathy confirmed by ED echo. Pt was started on cefepime and vancomycin. Pt was admitted to medicine.    Hospital Course:  Pt was admitted to medicine and started on Cefepime and Vanc due to previous sensitivities on urine cultures. Pt AMS improved after a few days and she passed s/s eval, so was started on oral feeds. Pt did not eat much as far as solid foods, however, she prefers Ensure shakes. Pt pockets medication and food in mouth, however, she does eat when family is around. We encouraged family to feed her as much as they could when they are around and pt's feeding improved. Pt grew pseudomonas in blood and we continued with Cefepime and dc'd vanc. Swab of decubitus ulcer came back positive for MRSA and therefore, pt was continued on contact isolations and started on Vanc. TTE returned negative, and cardiology suggested that if infx does not clear, to follow up with ARASH. Blood clxs negative since 11/30. Pt completed 14 course of Cefepime, however, she spiked a fever 2 days after completion. We continued Cefepime and Vanc for the time being until repeat blood cultures returned negative.      medicine attending addendum- this pleasant 78 yo F with copd, h/o lymphoma, mets to spine, severe LV dysfunction with AICD Bivent, CAD with stents (cath 2016- medical tx), p/w sepsis from  source-> bacteremia. her course was complicated by stage 3-4 sacral decub with MRSA.  she remains afebrile but will require wound care.  her poor state leave her at high risk for readmission in the next 30 days. HPI: 77yof w/ CAD, MI, Systolic CH, s/p AICD, HTN, sent from [facility] for AMS. Per transfer documents, pt lethargic and not eating. Conversive at baseline but today non-verbal. Noted to be tachycardic in 120s and hypotensive at nursing home and with EMS. At the ED, she was non verbal, but responsive to her name. She had not urinated and had a 1L output on straight cath. When knutson was inserted, cloudy urine was seen in bag.    She had recent admission for UTI and also was previously seen in the ED and found to have pseudomonas and CRE, however, due to few colony count, antibiotics were dc'd. She was found to have AMS at the nursing home and BIBEMS with tachycardia, fever, and hypotension. In the ED, she was given 2L of fluid, however, she was also found to have a cardiomyopathy confirmed by ED echo. Pt was started on cefepime and vancomycin. Pt was admitted to medicine.    Hospital Course:  Pt was admitted to medicine and started on Cefepime and Vanc due to previous sensitivities on urine cultures. Pt AMS improved after a few days and she passed s/s eval, so was started on oral feeds. Pt did not eat much as far as solid foods, however, she prefers Ensure shakes. Pt pockets medication and food in mouth, however, she does eat when family is around. We encouraged family to feed her as much as they could when they are around and pt's feeding improved. Pt grew pseudomonas in blood and we continued with Cefepime and dc'd vanc. Swab of decubitus ulcer came back positive for MRSA and therefore, pt was continued on contact isolations and started on Vanc. TTE returned negative, and cardiology suggested that if infx does not clear, to follow up with ARASH. Blood clxs negative since 11/30. Pt completed 14 course of Cefepime, however, she spiked a fever 2 days after completion.   However, she then had no fevers for the next few days and repeat blood cultures were negative so ID was in agreement with stopping antibiotics and discharging back to nursing home.      medicine attending addendum- this pleasant 78 yo F with copd, h/o lymphoma, mets to spine, severe LV dysfunction with AICD Bivent, CAD with stents (cath 2016- medical tx), p/w sepsis from  source-> bacteremia. her course was complicated by stage 3-4 sacral decub with MRSA.  she remains afebrile but will require wound care.  her poor state leave her at high risk for readmission in the next 30 days.

## 2017-12-09 NOTE — PROGRESS NOTE ADULT - ATTENDING COMMENTS
Patient seen and examined.  Agree with above.   -continue with asa for cad  -continue with medical management of cardiomyopathy  -f/u ID    Nehal López MD  [facility]  [address] [place] [address], [id number]  [place] [address]  office: [phone]  pager: [phone]

## 2017-12-09 NOTE — DISCHARGE NOTE ADULT - PROVIDER TOKENS
FREE:[LAST:[NURSING HOME],PHONE:[(   )    -],FAX:[(   )    -],ADDRESS:[FOLLOW UP WITH THE DOCTOR AT YOUR NURSING HOME]]

## 2017-12-09 NOTE — PROGRESS NOTE ADULT - PROBLEM SELECTOR PLAN 2
ID suggesting f/u blood clx 5 days post abx completion  -bclx 5 days after abx completion at NH  -abx completed

## 2017-12-09 NOTE — PROVIDER CONTACT NOTE (OTHER) - ASSESSMENT
Pt is A&O xO. Resting comfortably. BP 95/50. HR 83. No acute distress noted. s/p 1L bolus.
Patient's fingerstick of 68. Patient has foul-smelling liquid stool.
/50, HR 73. No acute distress noted.
/56, HR 65. No acute distress noted.
/630, HR 86. No acute distress noted.
BP 91/50, HR 83. Pt is c/o chest pain.
BP 92/47, HR 82. No acute distress noted.
Bladder scan done and showed 248cc, patient NPO, D5W@40cc/hr was started
Patient has a history of diabetes. Patient currently NPO.
Patient in moderate leg pain.
Patient is alert and calm .
Patient is alert calm and still confused.
Patient is calm, confused  and is not speaking at this time.
Patient is still confused, calm and  nonverbal.
Pt comfortable, pt stable. No signs of acute distress.
Pt is A&O xO. Resting comfortably. /59. HR 91. No acute distress noted.
Pt is A&O xO. Resting comfortably. BP 98/61. HR 80. No acute distress noted. s/p 1L bolus.
Pt. confused, in no apparent pain. Vital signs as per flowsheet.
monitor urine output
pt appears comfortable, no asymptomatic.

## 2017-12-09 NOTE — PROGRESS NOTE ADULT - SUBJECTIVE AND OBJECTIVE BOX
HUMA MARSH  77y  Female      Patient is a 77y old  Female who presents with a chief complaint of AMS (2017 18:22)      INTERVAL HPI/OVERNIGHT EVENTS:  -pt spiked fever o/n of 101.4. urine and blood clxs were sent. fever resolved with acetaminophen IV  -pt reports feeling better, still with abd discomfort. denies n/v, chest pain, sob. She reports that she does not want to eat  -pt still minimally verbal and requires "yes" and "no" questions    Medications:  acetaminophen    Suspension. 650 milliGRAM(s) Oral every 6 hours PRN  acetaminophen    Suspension. 325 milliGRAM(s) Oral every 6 hours PRN  acetaminophen   Tablet 650 milliGRAM(s) Oral every 6 hours PRN  acetaminophen  Suppository 325 milliGRAM(s) Rectal every 6 hours PRN  amiodarone    Tablet 400 milliGRAM(s) Oral daily  aspirin  chewable 81 milliGRAM(s) Oral daily  atorvastatin 20 milliGRAM(s) Oral at bedtime  buDESOnide 160 MICROgram(s)/formoterol 4.5 MICROgram(s) Inhaler 2 Puff(s) Inhalation two times a day  cefepime  IVPB 2000 milliGRAM(s) IV Intermittent every 12 hours  collagenase Ointment 1 Application(s) Topical two times a day  dextrose 5%. 1000 milliLiter(s) IV Continuous <Continuous>  enoxaparin Injectable 40 milliGRAM(s) SubCutaneous daily  latanoprost 0.005% Ophthalmic Solution 1 Drop(s) Both EYES at bedtime  levothyroxine 50 MICROGram(s) Oral daily  metoprolol succinate ER 25 milliGRAM(s) Oral daily  mirtazapine 7.5 milliGRAM(s) Oral at bedtime  multivitamin 1 Tablet(s) Oral daily  spironolactone 12.5 milliGRAM(s) Oral daily  tiotropium 18 MICROgram(s) Capsule 1 Capsule(s) Inhalation daily  vancomycin  IVPB 1000 milliGRAM(s) IV Intermittent every 24 hours  vancomycin  IVPB        T(C): 36.9 (17 @ 05:29), Max: 38.6 (17 @ 17:29)  HR: 82 (17 @ 05:29) (76 - 96)  BP: 124/59 (17 @ 05:29) (106/52 - 139/72)  RR: 18 (17 @ 05:29) (18 - 23)  SpO2: 97% (17 @ 05:29) (95% - 100%)  Wt(kg): --Vital Signs Last 24 Hrs  T(C): 36.9 (09 Dec 2017 05:29), Max: 38.6 (08 Dec 2017 17:29)  T(F): 98.4 (09 Dec 2017 05:29), Max: 101.4 (08 Dec 2017 17:29)  HR: 82 (09 Dec 2017 05:29) (76 - 96)  BP: 124/59 (09 Dec 2017 05:29) (106/52 - 139/72)  BP(mean): --  RR: 18 (09 Dec 2017 05:29) (18 - 23)  SpO2: 97% (09 Dec 2017 05:29) (95% - 100%)    PHYSICAL EXAM:  ENERAL: elderly old woman, appearing better, but still frail  HEAD:  Atraumatic, Normocephalic  EYES: EOMI, PERRLA, conjunctiva and sclera clear  ENMT: No tonsillar erythema, exudates, or enlargement  NECK: Supple, No JVD  NERVOUS SYSTEM:  non verbal. moving upper extremities  CHEST/LUNG: Clear to auscultation bilaterally; No rales, rhonchi, wheezing, or rubs  HEART: Regular rate and rhythm; No murmurs, rubs, or gallops. No LE edema  ABDOMEN: Soft, mildly tender.  EXTREMITIES:  2+ Peripheral Pulses, No clubbing, cyanosis    Consultant(s) Notes Reviewed:  [x ] YES  [ ] NO  Care Discussed with Consultants/Other Providers [ x] YES  [ ] NO    Labs:  CBC has not returned, will f/u for white count    Urinalysis Basic - ( 08 Dec 2017 17:49 )    Color: COLORL / Appearance: HAZY / S.005 / pH: 6.5  Gluc: NEGATIVE / Ketone: NEGATIVE  / Bili: NEGATIVE / Urobili: NORMAL mg/dL   Blood: SMALL / Protein: 20 mg/dL / Nitrite: NEGATIVE   Leuk Esterase: LARGE / RBC: >50 / WBC >50   Sq Epi: x / Non Sq Epi: x / Bacteria: FEW      CAPILLARY BLOOD GLUCOSE  POCT Blood Glucose.: 133 mg/dL (08 Dec 2017 12:03)    Urinalysis Basic - ( 08 Dec 2017 17:49 )  Color: COLORL / Appearance: HAZY / S.005 / pH: 6.5  Gluc: NEGATIVE / Ketone: NEGATIVE  / Bili: NEGATIVE / Urobili: NORMAL mg/dL   Blood: SMALL / Protein: 20 mg/dL / Nitrite: NEGATIVE   Leuk Esterase: LARGE / RBC: >50 / WBC >50   Sq Epi: x / Non Sq Epi: x / Bacteria: FEW    HEALTH ISSUES - PROBLEM Dx:  Functional quadriplegia: Functional quadriplegia  Bacteremia: Bacteremia  Transaminitis: Transaminitis  Hypokalemia: Hypokalemia  Hypernatremia: Hypernatremia  Decubitus ulcer of sacral region, unstageable: Decubitus ulcer of sacral region, unstageable  Need for prophylactic measure: Need for prophylactic measure  Decubitus ulcer of sacral region, stage 4: Decubitus ulcer of sacral region, stage 4  CAD (coronary artery disease): CAD (coronary artery disease)  CHF (congestive heart failure): CHF (congestive heart failure)  Delirium due to another medical condition, acute, hypoactive: Delirium due to another medical condition, acute, hypoactive  Sepsis due to urinary tract infection: Sepsis due to urinary tract infection

## 2017-12-09 NOTE — DISCHARGE NOTE ADULT - MEDICATION SUMMARY - MEDICATIONS TO STOP TAKING
I will STOP taking the medications listed below when I get home from the hospital:    Keflex 500 mg oral capsule  -- 1 cap(s) by mouth 2 times a day MDD:2 tabs  -- Finish all this medication unless otherwise directed by prescriber. I will STOP taking the medications listed below when I get home from the hospital:    losartan 25 mg oral tablet  -- 1 tab(s) by mouth once a day    DULoxetine 60 mg oral delayed release capsule  -- 1 cap(s) by mouth once a day    allopurinol 300 mg oral tablet  -- 1 tab(s) by mouth once a day    furosemide 40 mg oral tablet  -- 1 tab(s) by mouth once a day    dicyclomine 10 mg oral capsule  -- 1 cap(s) by mouth 2 times a day (before meals)    sucralfate 1 g oral tablet  -- 1 tab(s) by mouth 4 times a day    Keflex 500 mg oral capsule  -- 1 cap(s) by mouth 2 times a day MDD:2 tabs  -- Finish all this medication unless otherwise directed by prescriber.

## 2017-12-09 NOTE — DISCHARGE NOTE ADULT - PLAN OF CARE
Follow up with your Primary Care Physician within 1 week of discharge. You had an infection and required antibiotic treatment.  You no longer need antibiotics as your most recent blood culture shows no bacteria.  If you have fevers after being discharged you may require more antibiotics and may need a ARASH to look at a source of infection in your heart. You have an ulcer that requires close follow up. You had an infection in your blood and urine and required antibiotic treatment.  You no longer need antibiotics as your most recent blood culture shows no bacteria.  If you have fevers after being discharged you may require more antibiotics and may need a ARASH to look at a source of infection in your heart. You have an ulcer that requires close follow up.  You grew MRSA from this wound in the past so it must be monitored as a possible source for future infection.

## 2017-12-09 NOTE — PROGRESS NOTE ADULT - PROBLEM SELECTOR PLAN 8
VTE: Lovenox  Diet: Pureed with supplimental ensure pudding  Code: proxy says full code over    JULIEN Franks [id number]  Internal Medicine Department  Pager: [phone]

## 2017-12-09 NOTE — PROGRESS NOTE ADULT - ATTENDING COMMENTS
Patient seen and examined in AM  77yof w/ CAD, MI, Systolic CHF/s/p AICD, HTN, sent from [facility] for AMS due to sepsis 2/2 UTI.  Pt has recurrent fever. result ID. c/w abx  Pt hemodynamically stable

## 2017-12-09 NOTE — PROGRESS NOTE ADULT - SUBJECTIVE AND OBJECTIVE BOX
SUBJECTIVE:  Alert reports feeling OK denies any chest pain       MEDICATIONS  (STANDING):  amiodarone    Tablet 400 milliGRAM(s) Oral daily  aspirin  chewable 81 milliGRAM(s) Oral daily  atorvastatin 20 milliGRAM(s) Oral at bedtime  buDESOnide 160 MICROgram(s)/formoterol 4.5 MICROgram(s) Inhaler 2 Puff(s) Inhalation two times a day  cefepime  IVPB 2000 milliGRAM(s) IV Intermittent every 12 hours  collagenase Ointment 1 Application(s) Topical two times a day  dextrose 5%. 1000 milliLiter(s) (100 mL/Hr) IV Continuous <Continuous>  enoxaparin Injectable 40 milliGRAM(s) SubCutaneous daily  latanoprost 0.005% Ophthalmic Solution 1 Drop(s) Both EYES at bedtime  levothyroxine 50 MICROGram(s) Oral daily  metoprolol succinate ER 25 milliGRAM(s) Oral daily  mirtazapine 7.5 milliGRAM(s) Oral at bedtime  multivitamin 1 Tablet(s) Oral daily  spironolactone 12.5 milliGRAM(s) Oral daily  tiotropium 18 MICROgram(s) Capsule 1 Capsule(s) Inhalation daily  vancomycin  IVPB 1000 milliGRAM(s) IV Intermittent every 24 hours  vancomycin  IVPB        MEDICATIONS  (PRN):  acetaminophen    Suspension. 650 milliGRAM(s) Oral every 6 hours PRN Severe Pain (7 - 10)  acetaminophen    Suspension. 325 milliGRAM(s) Oral every 6 hours PRN Moderate Pain (4 - 6)  acetaminophen   Tablet 650 milliGRAM(s) Oral every 6 hours PRN For Temp greater than 38 C (100.4 F)  acetaminophen  Suppository 325 milliGRAM(s) Rectal every 6 hours PRN For Temp greater than 38 C (100.4 F)      LABS:    Hemoglobin: 9.2 g/dL (12-07 @ 07:25)  Hemoglobin: 10.9 g/dL (12-06 @ 06:00)  Hemoglobin: 10.2 g/dL (12-05 @ 06:15)          Creatinine Trend: 0.42<--, 0.47<--, 0.35<--, 0.43<--, 0.36<--, 0.36<--           PHYSICAL EXAM  Vital Signs Last 24 Hrs  T(C): 36.9 (09 Dec 2017 05:29), Max: 38.6 (08 Dec 2017 17:29)  T(F): 98.4 (09 Dec 2017 05:29), Max: 101.4 (08 Dec 2017 17:29)  HR: 82 (09 Dec 2017 05:29) (76 - 96)  BP: 124/59 (09 Dec 2017 05:29) (106/52 - 139/72)  BP(mean): --  RR: 18 (09 Dec 2017 05:29) (18 - 20)  SpO2: 97% (09 Dec 2017 05:29) (95% - 98%)    Cardiovascular:  S1S2 RRR, No JVD, 1/6 ERICA   Respiratory: decreased breath sounds B/L LL's   Gastrointestinal: Abdomen soft, ND, NT, +BS  Skin: Warm, dry, intact. No rash.  Ext: no C/C/E B/L LE    DIAGNOSTIC DATA    < from: TTE with Doppler (w/Cont) (04.03.17 @ 16:18) >  CONCLUSIONS:  1. Mitral annular calcification, otherwise normal mitral  valve. Mild mitral regurgitation.  2. Moderately dilated left atrium.  LA volume index = 46  cc/m2.  3. Moderate left ventricular enlargement.  4. Severe global left ventricular systolic dysfunction.  Endocardial visualization enhanced with intravenous  injection of echo contrast (Definity).  5. The right ventricle is not well visualized; grossly  normal right ventricular systolic function.  A device wire  is noted in the right heart.  6. Estimated right ventricular systolic pressure equals 60  mm Hg, assuming right atrial pressure equals 10 mm Hg,  consistent with moderate pulmonary hypertension.  ------------------------------------------------------------------------  Confirmed on  4/3/2017 - 17:19:47 by Jean-Pierre Carver M.D.    < end of copied text >    < from: Cardiac Cath Lab - Adult (04.26.16 @ 13:12) >  VENTRICLES: No LV gram was performed; however, a recent echocardiogram  demonstrated an EF of 19 %.  CORONARY VESSELS: The coronary circulation is right dominant.  LM:   --  LM: Normal.  LAD:   --  Proximal LAD: There was a [id number] % stenosis. There was mild  restenosis in proximal LAD stent.  --  Mid LAD: Angiography showed minor luminal irregularities with no flow  limiting lesions.  --  Distal LAD: Angiography showed minor luminal irregularities with no  flow limiting lesions.  --  D1: Angiography showed minor luminal irregularities with no flow  limiting lesions.  CX:   --  Circumflex: Normal.  RI:   --  Ramus intermedius: Angiography showed minor luminal  irregularities with no flow limiting lesions.  RCA:   --  Proximal RCA: Normal.  --  Mid RCA: Angiography showed mild atherosclerosis with no flow limiting  lesions.  --  Distal RCA: Angiography showed minor luminal irregularities with no  flow limiting lesions.  --  RPDA: Angiography showed minor luminal irregularities with no flow  limiting lesions.  --  RPLS: Angiography showed minor luminal irregularities with no flow  limiting lesions.  COMPLICATIONS: There were no complications.  DIAGNOSTIC RECOMMENDATIONS: Medical management and aggressive risk factor  modification is recommended.  Prepared and signed by  Nehal López M.D.  Signed 04/27/2016 13:13:20    < end of copied text >    < from: Transthoracic Echocardiogram (12.01.17 @ 16:53) >  CONCLUSIONS:  1. Normal mitral valve. Minimal mitral regurgitation.  2. Mild left ventricular enlargement.  3. Severe global left ventricular systolic dysfunction.  4. The right ventricle is not well visualized. A device  wire is noted in the right heart.  ------------------------------------------------------------------------  Confirmed on  12/1/2017 - 18:24:53 by Carli Encarnacion M.D. RPVI    < end of copied text >      Interrogation   Normal sensing and pacing via iterative testing.  Excellent threshold capture.  No reprogramming.      ASSESSMENT AND PLAN:    77y Female with hypertension, dyslipidemia, CAD s/p LAD stent with only mild re-stenosis with minor luminal irregularities otherwise on cath 4/16, with known severe LV dysfunction , NICM s/p [employer] BiV ICD, VT on Amio, COPD, lymphoma previously on chemo with known spinal mets and compression fractures admitted with lethargy due to GNR bacteremia (pseudomonas), felt to be urinary source, and now sacral decub wound culture +MRSA    --ABX per primary team/ID - on vanco f/u Bld cxs   --TTE noted above.    --Pt is a full code per family.  DC planning back to SNF when stable.  --Not in clinical CHF.  Monitor volume status.  --cont medical management of CAD   --Pain Management as per primary team   --f/u Dr Hussein post discharge

## 2017-12-09 NOTE — DISCHARGE NOTE ADULT - MEDICATION SUMMARY - MEDICATIONS TO TAKE
I will START or STAY ON the medications listed below when I get home from the hospital:    spironolactone 25 mg oral tablet  -- 0.5 tab(s) by mouth once a day  -- Indication: For CHF (congestive heart failure)    acetaminophen 325 mg oral tablet  -- 2 tab(s) by mouth every 6 hours, As needed, Mild and Moderate pain  -- Indication: For pain    aspirin 81 mg oral delayed release tablet  -- 1 tab(s) by mouth once a day  -- Indication: For CAD (coronary artery disease)    losartan 25 mg oral tablet  -- 1 tab(s) by mouth once a day  -- Indication: For HTN    amiodarone 200 mg oral tablet  -- 2 tab(s) by mouth once a day  -- Indication: For CAD (coronary artery disease)    DULoxetine 60 mg oral delayed release capsule  -- 1 cap(s) by mouth once a day  -- Indication: For Depression    mirtazapine 7.5 mg oral tablet  -- 1 tab(s) by mouth once a day (at bedtime)  -- Indication: For Depression    allopurinol 300 mg oral tablet  -- 1 tab(s) by mouth once a day  -- Indication: For Pain    atorvastatin 20 mg oral tablet  -- 1 tab(s) by mouth once a day (at bedtime)  -- Indication: For CAD (coronary artery disease)    metoprolol succinate 25 mg oral tablet, extended release  -- 1 tab(s) by mouth once a day  -- Indication: For CAD (coronary artery disease)    tiotropium 18 mcg inhalation capsule  -- 1 cap(s) inhaled once a day  -- Indication: For Respiratory distress    budesonide-formoterol 160 mcg-4.5 mcg/inh inhalation aerosol  -- 2 puff(s) inhaled 2 times a day  -- Indication: For Respiratory distress    furosemide 40 mg oral tablet  -- 1 tab(s) by mouth once a day  -- Indication: For CHF (congestive heart failure)    dicyclomine 10 mg oral capsule  -- 1 cap(s) by mouth 2 times a day (before meals)  -- Indication: For Constipation    senna oral tablet  -- 2 tab(s) by mouth once a day (at bedtime)  -- Indication: For Constipation    docusate sodium 100 mg oral capsule  -- 1 cap(s) by mouth 3 times a day  -- Indication: For Constipation    polyethylene glycol 3350 oral powder for reconstitution  -- 17 gram(s) by mouth once a day, As needed, Constipation  -- Indication: For Constipation    sucralfate 1 g oral tablet  -- 1 tab(s) by mouth 4 times a day  -- Indication: For Need for prophylactic measure    latanoprost 0.005% ophthalmic solution  -- 1 drop(s) to each affected eye once a day (at bedtime)  -- Indication: For Need for prophylactic measure    pantoprazole 40 mg oral delayed release tablet  -- 1 tab(s) by mouth once a day (before a meal)  -- Indication: For Need for prophylactic measure    levothyroxine 50 mcg (0.05 mg) oral tablet  -- 1 tab(s) by mouth once a day  -- Indication: For Hypothyroid    Multiple Vitamins oral tablet  -- 1 tab(s) by mouth once a day  -- Indication: For Need for prophylactic measure I will START or STAY ON the medications listed below when I get home from the hospital:    spironolactone 25 mg oral tablet  -- 0.5 tab(s) by mouth once a day  -- Indication: For CHF (congestive heart failure)    aspirin 81 mg oral delayed release tablet  -- 1 tab(s) by mouth once a day  -- Indication: For CAD (coronary artery disease)    acetaminophen 325 mg oral tablet  -- 2 tab(s) by mouth every 6 hours, As needed, Mild and Moderate pain  -- Indication: For pain    amiodarone 200 mg oral tablet  -- 2 tab(s) by mouth once a day  -- Indication: For CAD (coronary artery disease)    mirtazapine 7.5 mg oral tablet  -- 1 tab(s) by mouth once a day (at bedtime)  -- Indication: For Depression    atorvastatin 20 mg oral tablet  -- 1 tab(s) by mouth once a day (at bedtime)  -- Indication: For CAD (coronary artery disease)    metoprolol succinate 25 mg oral tablet, extended release  -- 1 tab(s) by mouth once a day  -- Indication: For CAD (coronary artery disease)    tiotropium 18 mcg inhalation capsule  -- 1 cap(s) inhaled once a day  -- Indication: For Respiratory distress    budesonide-formoterol 160 mcg-4.5 mcg/inh inhalation aerosol  -- 2 puff(s) inhaled 2 times a day  -- Indication: For Respiratory distress    senna oral tablet  -- 2 tab(s) by mouth once a day (at bedtime)  -- Indication: For Constipation    docusate sodium 100 mg oral capsule  -- 1 cap(s) by mouth 3 times a day  -- Indication: For Constipation    polyethylene glycol 3350 oral powder for reconstitution  -- 17 gram(s) by mouth once a day, As needed, Constipation  -- Indication: For Constipation    latanoprost 0.005% ophthalmic solution  -- 1 drop(s) to each affected eye once a day (at bedtime)  -- Indication: For Need for prophylactic measure    pantoprazole 40 mg oral delayed release tablet  -- 1 tab(s) by mouth once a day (before a meal)  -- Indication: For Need for prophylactic measure    levothyroxine 50 mcg (0.05 mg) oral tablet  -- 1 tab(s) by mouth once a day  -- Indication: For Hypothyroid    Multiple Vitamins oral tablet  -- 1 tab(s) by mouth once a day  -- Indication: For Need for prophylactic measure

## 2017-12-09 NOTE — PROVIDER CONTACT NOTE (OTHER) - RECOMMENDATIONS
MD notified and aware.
Consider ordering dextrose IV push. Consider checking for C. Diff.
Come to assess pt.
Continue to monitor.
Does patient need fingerstick every 6 hours?
MD aware and notified.
MD notified and aware.
Midodrine,
Provider notified.
continue to monitor
straight cath x 1

## 2017-12-09 NOTE — PROVIDER CONTACT NOTE (OTHER) - SITUATION
Patient's fingerstick of 68. Patient has foul-smelling liquid stool.
BP - 90/50
BP is 98/48
Bladder scan 358
DBP low
Low DBP
No void s/p knutson removal
Patient has a history of diabetes. Patient currently NPO.
Patient in moderate leg pain.
Patient with bp 98/49  with rechecked bp 100/60.She has iv fluids infusing.
Provider to be contacted for DBP>60 Patient with bp 100/52 w/o symptoms.
Provider to be notified for  diastolic bp<60. Patient's bp 103/50. She has iv fluids infusing.
Provider to be notified for diastolic bp <60.Patient's bp109/58.She has iv fluids infusing.
SBP low
pt. upper extremities are shaking
SBP and DBP low

## 2017-12-09 NOTE — PROVIDER CONTACT NOTE (OTHER) - NAME OF MD/NP/PA/DO NOTIFIED:
Michael Ruff
DR Reece
Dr Guerra
Dr. Franks
Dr. Ruff
Dr. Steward #[id number]
EFREM SCHULTZ
LIOR NEWBERRY
MD Franks
MD Guerra
Michael Ruff
Michael Ruff
TARA SHANKS

## 2017-12-09 NOTE — PROVIDER CONTACT NOTE (OTHER) - ACTION/TREATMENT ORDERED:
MD aware. No interventions at this time. Continue to monitor.
C. Diff sample ordered. Dextrose IV push ordered.
 aware . Increased iv fluid rate.
500cc NS bolus.
Continue to monitor.
Ketorolac ordered.
MD aware. No interventions at this time.
MD aware. No interventions at this time. Continue to monitor.
MD aware. No interventions at this time. Continue to monitor.
MD aware. Recheck BP.
MD will come to assess pt.
MD yee. MD ordered EKG.
Md aware. No new orders.
Md aware. No new orders.
Md is aware. No new orders
Perform fingerstick every six hours.
Will continue to monitor and bladder scan at 10pm if no void as per Dr. Steward.
straight cath performed

## 2017-12-09 NOTE — PROVIDER CONTACT NOTE (OTHER) - REASON
BP - 90/50
BP - 98/48
Bladder scan 358
Bp 98/49
DBP low
Diastolic bp50
Low DBP
No void s/p knutson removal
Patient has a history of diabetes. Patient currently NPO.
Patient in moderate leg pain.
SBP low
bp 100/52
diastolic bp<60 at 58
pt. upper extremities are shaking
SBP and DBP low
Patient's fingerstick of 68. Patient has foul-smelling liquid stool.

## 2017-12-09 NOTE — DISCHARGE NOTE ADULT - CARE PLAN
Principal Discharge DX:	Bacteremia  Goal:	Follow up with your Primary Care Physician within 1 week of discharge.  Instructions for follow-up, activity and diet:	You had an infection and required antibiotic treatment.  You no longer need antibiotics as your most recent blood culture shows no bacteria.  If you have fevers after being discharged you may require more antibiotics and may need a ARASH to look at a source of infection in your heart.  Secondary Diagnosis:	Decubitus ulcer of sacral region, stage 4  Goal:	Follow up with your Primary Care Physician within 1 week of discharge.  Instructions for follow-up, activity and diet:	You have an ulcer that requires close follow up. Principal Discharge DX:	Bacteremia  Goal:	Follow up with your Primary Care Physician within 1 week of discharge.  Instructions for follow-up, activity and diet:	You had an infection in your blood and urine and required antibiotic treatment.  You no longer need antibiotics as your most recent blood culture shows no bacteria.  If you have fevers after being discharged you may require more antibiotics and may need a ARASH to look at a source of infection in your heart.  Secondary Diagnosis:	Decubitus ulcer of sacral region, stage 4  Goal:	Follow up with your Primary Care Physician within 1 week of discharge.  Instructions for follow-up, activity and diet:	You have an ulcer that requires close follow up.  You grew MRSA from this wound in the past so it must be monitored as a possible source for future infection.

## 2017-12-09 NOTE — PROVIDER CONTACT NOTE (OTHER) - DATE AND TIME:
26-Nov-2017 22:55
01-Dec-2016 21:45
01-Dec-2017 06:00
02-Dec-2017 21:30
03-Dec-2017
05-Dec-2017
07-Dec-2017 05:41
07-Dec-2017 07:00
07-Dec-2017 18:15
08-Dec-2017
09-Dec-2017
26-Nov-2017 00:55
26-Nov-2017 06:00
26-Nov-2017 20:04
26-Nov-2017 22:35
27-Nov-2017 18:00
27-Nov-2017 23:00
30-Nov-2017 22:45
30-Nov-2017 22:45
25-Nov-2017 21:00

## 2017-12-09 NOTE — PROVIDER CONTACT NOTE (OTHER) - BACKGROUND
Patient admitted for urinary tract infection.
78 y/o female admitted with sepsis 2/2 UTI, history of DM
Patient admitted for urinary tract infection.
Patient admitted for urinary tract infection.
Patient admitted with sepsis
Patient admitted with sepsis  has a hx of hypertension and is on bp meds.
Patient is admitted with sepsis has cardiac disease and is on bp meds and diuretics.
Patient was admitted with sepsis ,UTI and is on bp and diuretic meds for HTN/ CHF history.
Pt admitted for sepsis and UTI.
Pt admitted for sepsis and UTI.
Pt. admitted for sepsis secondary to UTI
UTI
UTI,  PMH CHF, CAD, HTN, GERD, DM, Lymphoma, Asthma, MI, HLD.
UTI, PMH CHF, AIC, CAD, HTN, DM, GERD, Lymphoma, Asthma, CHF, MI, HLD
UTI, Sepsis
UTI. Hx of CHF, CAD, HTN, GERD, Lymphoma, Asthma, HLD.
admitted with sepsis/uti
admitted with uti
pt admitted with uti. knutson catherer discontinued at 9 am. pt not voiding
Pt admitted for sepsis and UTI.

## 2017-12-09 NOTE — PROGRESS NOTE ADULT - ASSESSMENT
77yof w/ CAD, MI, Systolic CHF/s/p AICD, HTN, sent from [facility] for AMS due to sepsis 2/2 UTI.

## 2017-12-10 NOTE — PROGRESS NOTE ADULT - SUBJECTIVE AND OBJECTIVE BOX
HUMA MARSH  77y  Female      Patient is a 77y old  Female who presents with a chief complaint of AMS (2017 18:22)      INTERVAL HPI/OVERNIGHT EVENTS:    No acute events overnight.     MEDICATIONS  (STANDING):  amiodarone    Tablet 400 milliGRAM(s) Oral daily  aspirin  chewable 81 milliGRAM(s) Oral daily  atorvastatin 20 milliGRAM(s) Oral at bedtime  buDESOnide 160 MICROgram(s)/formoterol 4.5 MICROgram(s) Inhaler 2 Puff(s) Inhalation two times a day  cefepime  IVPB 2000 milliGRAM(s) IV Intermittent every 12 hours  collagenase Ointment 1 Application(s) Topical two times a day  dextrose 5%. 1000 milliLiter(s) (100 mL/Hr) IV Continuous <Continuous>  enoxaparin Injectable 40 milliGRAM(s) SubCutaneous daily  latanoprost 0.005% Ophthalmic Solution 1 Drop(s) Both EYES at bedtime  levothyroxine 50 MICROGram(s) Oral daily  metoprolol succinate ER 25 milliGRAM(s) Oral daily  mirtazapine 7.5 milliGRAM(s) Oral at bedtime  multivitamin 1 Tablet(s) Oral daily  spironolactone 12.5 milliGRAM(s) Oral daily  tiotropium 18 MICROgram(s) Capsule 1 Capsule(s) Inhalation daily  vancomycin  IVPB 1000 milliGRAM(s) IV Intermittent every 24 hours  vancomycin  IVPB        MEDICATIONS  (PRN):  acetaminophen    Suspension. 650 milliGRAM(s) Oral every 6 hours PRN Severe Pain (7 - 10)  acetaminophen    Suspension. 325 milliGRAM(s) Oral every 6 hours PRN Moderate Pain (4 - 6)  acetaminophen   Tablet 650 milliGRAM(s) Oral every 6 hours PRN For Temp greater than 38 C (100.4 F)  acetaminophen  Suppository 325 milliGRAM(s) Rectal every 6 hours PRN For Temp greater than 38 C (100.4 F)      Vital Signs Last 24 Hrs  T(C): 36.9 (10 Dec 2017 05:44), Max: 37.1 (09 Dec 2017 20:40)  T(F): 98.4 (10 Dec 2017 05:44), Max: 98.7 (09 Dec 2017 20:40)  HR: 77 (10 Dec 2017 05:44) (76 - 83)  BP: 114/65 (10 Dec 2017 05:44) (103/50 - 114/65)  BP(mean): --  ABP: --  ABP(mean): --  RR: 19 (10 Dec 2017 05:44) (19 - 19)  SpO2: 94% (10 Dec 2017 05:44) (94% - 99%)    PHYSICAL EXAM:  ENERAL: elderly old woman, appearing better, but still frail  HEAD:  Atraumatic, Normocephalic  EYES: EOMI, PERRLA, conjunctiva and sclera clear  ENMT: No tonsillar erythema, exudates, or enlargement  NECK: Supple, No JVD  NERVOUS SYSTEM:  non verbal. moving upper extremities  CHEST/LUNG: Clear to auscultation bilaterally; No rales, rhonchi, wheezing, or rubs  HEART: Regular rate and rhythm; No murmurs, rubs, or gallops. No LE edema  ABDOMEN: Soft, mildly tender.  EXTREMITIES:  2+ Peripheral Pulses, No clubbing, cyanosis    Consultant(s) Notes Reviewed:  [x ] YES  [ ] NO  Care Discussed with Consultants/Other Providers [ x] YES  [ ] NO    Labs:                          9.1    12.11 )-----------( 363      ( 09 Dec 2017 11:15 )             28.1         Urinalysis Basic - ( 08 Dec 2017 17:49 )    Color: COLORL / Appearance: HAZY / S.005 / pH: 6.5  Gluc: NEGATIVE / Ketone: NEGATIVE  / Bili: NEGATIVE / Urobili: NORMAL mg/dL   Blood: SMALL / Protein: 20 mg/dL / Nitrite: NEGATIVE   Leuk Esterase: LARGE / RBC: >50 / WBC >50   Sq Epi: x / Non Sq Epi: x / Bacteria: FEW      CAPILLARY BLOOD GLUCOSE  POCT Blood Glucose.: 133 mg/dL (08 Dec 2017 12:03)    Urinalysis Basic - ( 08 Dec 2017 17:49 )  Color: COLORL / Appearance: HAZY / S.005 / pH: 6.5  Gluc: NEGATIVE / Ketone: NEGATIVE  / Bili: NEGATIVE / Urobili: NORMAL mg/dL   Blood: SMALL / Protein: 20 mg/dL / Nitrite: NEGATIVE   Leuk Esterase: LARGE / RBC: >50 / WBC >50   Sq Epi: x / Non Sq Epi: x / Bacteria: FEW

## 2017-12-10 NOTE — PROGRESS NOTE ADULT - SUBJECTIVE AND OBJECTIVE BOX
SUBJECTIVE:  Alert denies any chest pain or shortness of breath     MEDICATIONS  (STANDING):  amiodarone    Tablet 400 milliGRAM(s) Oral daily  aspirin  chewable 81 milliGRAM(s) Oral daily  atorvastatin 20 milliGRAM(s) Oral at bedtime  buDESOnide 160 MICROgram(s)/formoterol 4.5 MICROgram(s) Inhaler 2 Puff(s) Inhalation two times a day  cefepime  IVPB 2000 milliGRAM(s) IV Intermittent every 12 hours  collagenase Ointment 1 Application(s) Topical two times a day  dextrose 5%. 1000 milliLiter(s) (100 mL/Hr) IV Continuous <Continuous>  enoxaparin Injectable 40 milliGRAM(s) SubCutaneous daily  latanoprost 0.005% Ophthalmic Solution 1 Drop(s) Both EYES at bedtime  levothyroxine 50 MICROGram(s) Oral daily  metoprolol succinate ER 25 milliGRAM(s) Oral daily  mirtazapine 7.5 milliGRAM(s) Oral at bedtime  multivitamin 1 Tablet(s) Oral daily  spironolactone 12.5 milliGRAM(s) Oral daily  tiotropium 18 MICROgram(s) Capsule 1 Capsule(s) Inhalation daily  vancomycin  IVPB 1000 milliGRAM(s) IV Intermittent every 24 hours  vancomycin  IVPB        MEDICATIONS  (PRN):  acetaminophen    Suspension. 650 milliGRAM(s) Oral every 6 hours PRN Severe Pain (7 - 10)  acetaminophen    Suspension. 325 milliGRAM(s) Oral every 6 hours PRN Moderate Pain (4 - 6)  acetaminophen   Tablet 650 milliGRAM(s) Oral every 6 hours PRN For Temp greater than 38 C (100.4 F)  acetaminophen  Suppository 325 milliGRAM(s) Rectal every 6 hours PRN For Temp greater than 38 C (100.4 F)      LABS:                        8.7    9.51  )-----------( 356      ( 10 Dec 2017 06:52 )             27.8     Hemoglobin: 8.7 g/dL (12-10 @ 06:52)  Hemoglobin: 9.1 g/dL (12-09 @ 11:15)  Hemoglobin: 9.2 g/dL (12-07 @ 07:25)  Hemoglobin: 10.9 g/dL (12-06 @ 06:00)    12-10    134<L>  |  98  |  11  ----------------------------<  88  4.2   |  29  |  0.38<L>    Ca    8.5      10 Dec 2017 06:52  Phos  1.7     12-10  Mg     2.0     12-10      Creatinine Trend: 0.38<--, 0.42<--, 0.47<--, 0.35<--, 0.43<--, 0.36<--       PHYSICAL EXAM  Vital Signs Last 24 Hrs  T(C): 36.9 (10 Dec 2017 05:44), Max: 37.1 (09 Dec 2017 20:40)  T(F): 98.4 (10 Dec 2017 05:44), Max: 98.7 (09 Dec 2017 20:40)  HR: 77 (10 Dec 2017 05:44) (76 - 83)  BP: 114/65 (10 Dec 2017 05:44) (103/50 - 114/65)  BP(mean): --  RR: 19 (10 Dec 2017 05:44) (19 - 19)  SpO2: 94% (10 Dec 2017 05:44) (94% - 99%)      Cardiovascular:  S1S2 RRR, No JVD, 1/6 ERICA   Respiratory: decreased breath sounds B/L LL's   Gastrointestinal: Abdomen soft, ND, NT, +BS  Skin: Warm, dry, intact. No rash.  Ext: no C/C/E B/L LE    DIAGNOSTIC DATA    < from: TTE with Doppler (w/Cont) (04.03.17 @ 16:18) >  CONCLUSIONS:  1. Mitral annular calcification, otherwise normal mitral  valve. Mild mitral regurgitation.  2. Moderately dilated left atrium.  LA volume index = 46  cc/m2.  3. Moderate left ventricular enlargement.  4. Severe global left ventricular systolic dysfunction.  Endocardial visualization enhanced with intravenous  injection of echo contrast (Definity).  5. The right ventricle is not well visualized; grossly  normal right ventricular systolic function.  A device wire  is noted in the right heart.  6. Estimated right ventricular systolic pressure equals 60  mm Hg, assuming right atrial pressure equals 10 mm Hg,  consistent with moderate pulmonary hypertension.  ------------------------------------------------------------------------  Confirmed on  4/3/2017 - 17:19:47 by Jean-Pierre Carver M.D.    < end of copied text >    < from: Cardiac Cath Lab - Adult (04.26.16 @ 13:12) >  VENTRICLES: No LV gram was performed; however, a recent echocardiogram  demonstrated an EF of 19 %.  CORONARY VESSELS: The coronary circulation is right dominant.  LM:   --  LM: Normal.  LAD:   --  Proximal LAD: There was a [id number] % stenosis. There was mild  restenosis in proximal LAD stent.  --  Mid LAD: Angiography showed minor luminal irregularities with no flow  limiting lesions.  --  Distal LAD: Angiography showed minor luminal irregularities with no  flow limiting lesions.  --  D1: Angiography showed minor luminal irregularities with no flow  limiting lesions.  CX:   --  Circumflex: Normal.  RI:   --  Ramus intermedius: Angiography showed minor luminal  irregularities with no flow limiting lesions.  RCA:   --  Proximal RCA: Normal.  --  Mid RCA: Angiography showed mild atherosclerosis with no flow limiting  lesions.  --  Distal RCA: Angiography showed minor luminal irregularities with no  flow limiting lesions.  --  RPDA: Angiography showed minor luminal irregularities with no flow  limiting lesions.  --  RPLS: Angiography showed minor luminal irregularities with no flow  limiting lesions.  COMPLICATIONS: There were no complications.  DIAGNOSTIC RECOMMENDATIONS: Medical management and aggressive risk factor  modification is recommended.  Prepared and signed by  Nehal López M.D.  Signed 04/27/2016 13:13:20    < end of copied text >    < from: Transthoracic Echocardiogram (12.01.17 @ 16:53) >  CONCLUSIONS:  1. Normal mitral valve. Minimal mitral regurgitation.  2. Mild left ventricular enlargement.  3. Severe global left ventricular systolic dysfunction.  4. The right ventricle is not well visualized. A device  wire is noted in the right heart.  ------------------------------------------------------------------------  Confirmed on  12/1/2017 - 18:24:53 by Carli Encarnacion M.D. RPVI    < end of copied text >      Interrogation   Normal sensing and pacing via iterative testing.  Excellent threshold capture.  No reprogramming.      ASSESSMENT AND PLAN:    77y Female with hypertension, dyslipidemia, CAD s/p LAD stent with only mild re-stenosis with minor luminal irregularities otherwise on cath 4/16, with known severe LV dysfunction , NICM s/p [employer] BiV ICD, VT on Amio, COPD, lymphoma previously on chemo with known spinal mets and compression fractures admitted with lethargy due to GNR bacteremia (pseudomonas), felt to be urinary source, and now sacral decub wound culture +MRSA    --ABX per primary team/ID - on vanco f/u Bld cxs   --TTE noted above.    --Pt is a full code per family.  DC planning back to SNF when stable.  --Not in clinical CHF.  Monitor volume status.  --cont medical management of CAD        c/x ASA due to hx stent   --Pain Management as per primary team   --hypophosphatemia 12/10      supplement as per primary team   --f/u Dr Hussein post discharge

## 2017-12-10 NOTE — PROGRESS NOTE ADULT - ATTENDING COMMENTS
Patient seen and examined in AM  77yof w/ CAD, MI, Systolic CHF/s/p AICD, HTN, sent from [facility] for AMS due to sepsis 2/2 UTI.  Pt has recurrent fever. result ID. c/w abx. f/u blood / urine cx  Pt hemodynamically stable

## 2017-12-10 NOTE — PROGRESS NOTE ADULT - ATTENDING COMMENTS
Agree with above.   -Continue with asa and metoprolol for cad    Nehal López MD  [facility]  [address], [address]  [place] [address]  office: [phone]  pager: [phone]

## 2017-12-10 NOTE — PROGRESS NOTE ADULT - ASSESSMENT
77yof w/ CAD, MI, Systolic CHF/s/p AICD, HTN, sent from [facility] for AMS due to sepsis 2/2 UTI.

## 2017-12-10 NOTE — PROGRESS NOTE ADULT - PROBLEM SELECTOR PLAN 8
VTE: Lovenox  Diet: Pureed with supplimental ensure pudding  Code: proxy says full code over    JULIEN Franks MD-PGY1  Internal Medicine Department  Pager: [phone]

## 2017-12-11 NOTE — PROGRESS NOTE ADULT - PROBLEM SELECTOR PLAN 3
improving slowly  - will continue to trend BMP
pt improving. Still only responding yes and no.  - c/w with abx and fluid boluses as needed conservatively  - will monitor mental status and hemodynamics closely, expected to improve as sepsis is treated
improving slowly  - will continue to trend BMP
pt completely non verbal today. It does not appear to be a decline to mental status as pt is selective as when she speaks. She can be prompted to say her name if told that she may have to go down for a head scan if she is unable to say her name.   - c/w with abx and ivfluid conservatively  - will monitor mental status and hemodynamics closely, expected to improve as sepsis is treated
pt improving. Still only responding yes and no.  - c/w with abx and fluid boluses as needed conservatively  - will monitor mental status and hemodynamics closely, expected to improve as sepsis is treated
pt improving. Still only responding yes and no. this appears to be baseline  - will monitor mental status and hemodynamics closely, expected to improve as sepsis is treated
pt non vocal today, but responds 'yes' and 'no'  - c/w with abx and ivfluid conservatively  - will monitor mental status and hemodynamics closely, expected to improve as sepsis is treated
pt completely non verbal today. It does not appear to be a decline to mental status as pt is selective as when she speaks. She can be prompted to say her name if told that she may have to go down for a head scan if she is unable to say her name.   - c/w with abx and ivfluid conservatively  - will monitor mental status and hemodynamics closely, expected to improve as sepsis is treated
pt completely non verbal today. It does not appear to be a decline to mental status as pt is selective as when she speaks. She can be prompted to say her name if told that she may have to go down for a head scan if she is unable to say her name.   - c/w with abx and ivfluid conservatively  - will monitor mental status and hemodynamics closely, expected to improve as sepsis is treated
pt non vocal today, but responds 'yes' and 'no'  - c/w with abx and ivfluid conservatively  - will monitor mental status and hemodynamics closely, expected to improve as sepsis is treated
Pt is speaking in short sentences, but still limited in speech  - c/w with abx and ivfluid conservatively  - will monitor mental status and hemodynamics closely, expected to improve as sepsis is treated

## 2017-12-11 NOTE — PROGRESS NOTE ADULT - PROBLEM SELECTOR PLAN 1
ID suggests abx until Friday. blood clxs have been negative since the 11/27  - c/w Cefepime (day 13/14) (previous clxs shows sensitivity to cefepime)  - will monitor pt's hemodynamic status closely
Pt is no longer hypothermic, tachycardic. Pt's BP have remaine stable in the high 90's, low 100's  - pt started on Cefepime (day 2) and Vancomycin (day 2) (previous clxs shows sensitivity to cefepime)  - will monitor pt's hemodynamic status closely  - f/u cultures
ID suggests abx until Friday. blood clxs have been negative since the 11/27  - c/w Cefepime (day 11/14) (previous clxs shows sensitivity to cefepime)  - will monitor pt's hemodynamic status closely
ID suggests abx until Friday. blood clxs have been negative since the 11/27  - c/w Cefepime (day 12/14) (previous clxs shows sensitivity to cefepime)  - will monitor pt's hemodynamic status closely
Pt no longer septic  - c/w Cefepime (day 5) (previous clxs shows sensitivity to cefepime)  - dc'd Vancomycin (day 2)   - will monitor pt's hemodynamic status closely  - f/u cultures
Pt's BP has improved and now in the 120's  - c/w Cefepime (day 3) (previous clxs shows sensitivity to cefepime)  - dc'd Vancomycin (day 2)   - will monitor pt's hemodynamic status closely  - f/u cultures
Pt's decubitus ulcer found to be growing MRSA. will discuss with ID today about treatment options. blood clxs have been negative since the 11/27  - c/w Cefepime (day 10/14) (previous clxs shows sensitivity to cefepime)  - will monitor pt's hemodynamic status closely
Pt's decubitus ulcer found to be growing MRSA. will discuss with ID today about treatment options. blood clxs have been negative since the 11/27  - c/w Cefepime (day 7/14) (previous clxs shows sensitivity to cefepime)  - dc'd Vancomycin (day 2)   - will monitor pt's hemodynamic status closely
blood clxs have been negative since the 11/27  - c/w Cefepime (day 14/14) (previous clxs shows sensitivity to cefepime)  - will monitor pt's hemodynamic status closely
pt completed abx. Clinically stable. No fever last 24 hours   -Repeat Blood cx NGTD
pt completed abx. Clinically stable. No fever last 24 hours   -Repeat Blood cx NGTD
pt completed abx. however, she did spike fever o/n. Sent for stat CBC in AM to evaluate for change in white count. blood clxs and urine clxs sent  -f/u WBC  -f/u blood and urine clxs
Pt's decubitus ulcer found to be growing MRSA. will discuss with ID today about treatment options. blood clxs have been negative since the 11/27  - c/w Cefepime (day 9/14) (previous clxs shows sensitivity to cefepime)  - will monitor pt's hemodynamic status closely
Pt's decubitus ulcer found to be growing MRSA. will discuss with ID today about treatment options. blood clxs have been negative since the 11/27  - c/w Cefepime (day 8/14) (previous clxs shows sensitivity to cefepime)  - will monitor pt's hemodynamic status closely
Pt no longer septic  - c/w Cefepime (day 6) (previous clxs shows sensitivity to cefepime)  - dc'd Vancomycin (day 2)   - will monitor pt's hemodynamic status closely  - f/u cultures
Pt no longer septic  - c/w Cefepime (day 4) (previous clxs shows sensitivity to cefepime)  - dc'd Vancomycin (day 2)   - will monitor pt's hemodynamic status closely  - f/u cultures

## 2017-12-11 NOTE — PROGRESS NOTE ADULT - ATTENDING COMMENTS
Agree with above.   -Continue with ASA for history of CAD      Nehal López MD  [facility]  [address], [address]  [place] [address]  office: [phone]  pager: [phone]

## 2017-12-11 NOTE — PROGRESS NOTE ADULT - SUBJECTIVE AND OBJECTIVE BOX
Venancio Lamas MD  Cell: [phone]  Pager: [phone]    S:  No events overnight.  Pt non-verbal but nods her head no to all questions other than nodding it yes to abdominal pain.    VITAL SIGNS:  Vital Signs Last 24 Hrs  T(C): 36.9 (11 Dec 2017 05:50), Max: 37.3 (10 Dec 2017 13:30)  T(F): 98.4 (11 Dec 2017 05:50), Max: 99.1 (10 Dec 2017 13:30)  HR: 84 (11 Dec 2017 05:50) (77 - 84)  BP: 102/59 (11 Dec 2017 05:50) (102/59 - 129/59)  BP(mean): --  RR: 18 (11 Dec 2017 05:50) (18 - 19)  SpO2: 100% (11 Dec 2017 05:50) (94% - 100%)      PHYSICAL EXAM:   ENERAL: elderly old woman, frail  HEAD:  Atraumatic, Normocephalic  EYES: EOMI, conjunctiva and sclera clear  ENMT: No tonsillar erythema, exudates, or enlargement  NECK: Supple, No JVD  NERVOUS SYSTEM:  non verbal. moving upper extremities  CHEST/LUNG: Clear to auscultation bilaterally; No rales, rhonchi, wheezing, or rubs  HEART: Regular rate and rhythm; No murmurs, rubs, or gallops. No LE edema  ABDOMEN: Soft, mildly tender.  EXTREMITIES:  2+ Peripheral Pulses, No clubbing, cyanosis                            8.7    9.51  )-----------( 356      ( 10 Dec 2017 06:52 )             27.8     12-10    134<L>  |  98  |  11  ----------------------------<  88  4.2   |  29  |  0.38<L>    Ca    8.5      10 Dec 2017 06:52  Phos  1.7     12-10  Mg     2.0     12-10        MEDICATIONS  (STANDING):  amiodarone    Tablet 400 milliGRAM(s) Oral daily  aspirin  chewable 81 milliGRAM(s) Oral daily  atorvastatin 20 milliGRAM(s) Oral at bedtime  buDESOnide 160 MICROgram(s)/formoterol 4.5 MICROgram(s) Inhaler 2 Puff(s) Inhalation two times a day  cefepime  IVPB 2000 milliGRAM(s) IV Intermittent every 12 hours  collagenase Ointment 1 Application(s) Topical two times a day  dextrose 5%. 1000 milliLiter(s) (100 mL/Hr) IV Continuous <Continuous>  enoxaparin Injectable 40 milliGRAM(s) SubCutaneous daily  influenza   Vaccine 0.5 milliLiter(s) IntraMuscular once  latanoprost 0.005% Ophthalmic Solution 1 Drop(s) Both EYES at bedtime  levothyroxine 50 MICROGram(s) Oral daily  metoprolol succinate ER 25 milliGRAM(s) Oral daily  mirtazapine 7.5 milliGRAM(s) Oral at bedtime  multivitamin 1 Tablet(s) Oral daily  spironolactone 12.5 milliGRAM(s) Oral daily  tiotropium 18 MICROgram(s) Capsule 1 Capsule(s) Inhalation daily  vancomycin  IVPB 1000 milliGRAM(s) IV Intermittent every 24 hours  vancomycin  IVPB

## 2017-12-11 NOTE — PROGRESS NOTE ADULT - PROBLEM SELECTOR PROBLEM 1
Sepsis due to urinary tract infection

## 2017-12-11 NOTE — PROGRESS NOTE ADULT - ASSESSMENT
77yof w/ CAD, MI, Systolic CHF/s/p AICD, HTN, sent from [facility] for AMS due to sepsis 2/2 UTI.

## 2017-12-11 NOTE — PROGRESS NOTE ADULT - PROBLEM SELECTOR PLAN 8
VTE: Lovenox  Diet: Pureed with supplimental ensure pudding  Code: proxy says full code over    JULIEN Franks MD-PGY1  Internal Medicine Department  Pager: [phone]

## 2017-12-11 NOTE — PROGRESS NOTE ADULT - PROBLEM SELECTOR PROBLEM 2
Bacteremia
Delirium due to another medical condition, acute, hypoactive
Bacteremia
Delirium due to another medical condition, acute, hypoactive
Bacteremia

## 2017-12-11 NOTE — PROGRESS NOTE ADULT - PROBLEM SELECTOR PROBLEM 3
Delirium due to another medical condition, acute, hypoactive
Hypernatremia
Delirium due to another medical condition, acute, hypoactive
Hypernatremia
Delirium due to another medical condition, acute, hypoactive

## 2017-12-11 NOTE — PROGRESS NOTE ADULT - SUBJECTIVE AND OBJECTIVE BOX
PATIENT: HUMA MARSH   AGE: 78yo   GENDER: Female  WEIGHT:   ALLERGIES: peanuts (Unknown)  penicillin (Nausea)    CHIEF COMPLAINT:   AMS (25 Nov 2017 18:22)    INTERVAL HPI / OVERNIGHT EVENTS:  evaluated sacral decub with nursing staff.                 cefepime  IVPB 2000 every 12 hours  vancomycin  IVPB 1000 every 24 hours  vancomycin  IVPB                              8.7    9.51  )-----------( 356      ( 10 Dec 2017 06:52 )             27.8 12-10    134  |  98  |  11  ----------------------------<  88  4.2   |  29  |  0.38  Ca    8.5      10 Dec 2017 06:52Phos  1.7     12-10Mg     2.0     12-10      Vital Signs Last 24 Hrs  T(F): 97.8 (12-11-17 @ 13:12), Max: 98.4 (12-10-17 @ 22:28)  HR: 86 (12-11-17 @ 13:12)  BP: 104/53 (12-11-17 @ 13:12)  RR: 17 (12-11-17 @ 13:12)  SpO2: 97% (12-11-17 @ 13:12) (97% - 100%)    PHYSICAL EXAM:  GENERAL: non toxic   HEENT: awakens to name  NECK: Supple  CHEST/LUNG: clear ant    HEART: S1S2 AICD  :  no knutson  ABDOMEN: Soft,  bs+  PSYCH:   limited verbal responses  "no, yes"  NEUROLOGY: Non focal   BACK: 6 cm stage 3 decub no purulence or surrounding erythema      LABS:                                                     8.7    9.51  )-----------( 356      ( 10 Dec 2017 06:52 )             27.8 12-10    134  |  98  |  11  ----------------------------<  88  4.2   |  29  |  0.38  Ca    8.5      10 Dec 2017 06:52Phos  1.7     12-10Mg     2.0     12-10            MICROBIOLOGY:  Culture - Blood (12.08.17 @ 20:09)    Culture - Blood:   NO ORGANISMS ISOLATED  NO ORGANISMS ISOLATED AT 48 HRS.    Specimen Source: BLOOD PERIPHERAL     x 2 no growth    urine candid    blood culture 11/30 no growth    Culture - Blood in AM (11.29.17 @ 06:35)    Culture - Blood:   NO ORGANISMS ISOLATED  NO ORGANISMS ISOLATED AT 24 HOURS    Specimen Source: BLOOD        Culture - Urine (collected 11-25-17 @ 17:23)  Source: URINE CATHETER  Final Report (11-28-17 @ 12:18):    COLONY COUNT: > = 100,000 CFU/ML  Organism: Pseudomonas aeruginosa (11-28-17 @ 12:18)  Organism: Pseudomonas aeruginosa (11-28-17 @ 12:18)    Culture - Blood (collected 11-25-17 @ 16:40)  Source: BLOOD VENOUS  Preliminary Report (11-27-17 @ 15:15):    PSA^Pseudomonas aeruginosa  Organism: Pseudomonas aeruginosa (11-28-17 @ 12:30)  Organism: Pseudomonas aeruginosa (11-28-17 @ 12:30)    Culture - Blood (collected 11-25-17 @ 16:40)  Source: BLOOD PERIPHERAL  Preliminary Report (11-26-17 @ 14:38):          < from: Transthoracic Echocardiogram (12.01.17 @ 16:53) >  OBSERVATIONS:  Mitral Valve: Normal mitral valve. Minimal mitral  regurgitation.  Aortic Root: Normal aortic root.  Aortic Valve: Aortic valve not well visualized.  Left Atrium: Normal left atrium.  LA volume index = 33  cc/m2.  Left Ventricle: Severe global left ventricular systolic  dysfunction. Mild left ventricular enlargement.  Right Heart: Normal right atrium. The right ventricle is  not well visualized. A device wire is noted in theright  heart. Normal tricuspid valve. Minimal tricuspid  regurgitation. Pulmonic valve not well visualized.  Pericardium/PleuraNormal pericardium with no pericardial  effusion.  ------------------------------------------------------------------------  CONCLUSIONS:  1. Normal mitral valve. Minimal mitral regurgitation.  2. Mild left ventricular enlargement.  3. Severe global left ventricular systolic dysfunction.  4. The right ventricle is not well visualized. A device  wire is noted in the right heart.  ------------------------------------------------------------------------  Confirmed on  12/1/2017 - 18:24:53 by Carli Encarnacion M.D. RPVI    < end of copied text >        RADIOLOGY & ADDITIONAL TESTS:  < from: Xray Chest 1 View AP/PA (11.29.17 @ 11:22) >    There is a left chest wall AICD present. The lungs are clear. There is no   evidence of pneumothorax or pleural effusion. The cardiomediastinal   silhouette is stable in size. Degenerative changes of the osseous   structures.    IMPRESSION:  No acute pulmonary disease.    < end of copied text >  < from: US Kidney and Bladder (11.28.17 @ 09:53) >    FINDINGS:    Right kidney:  10.5 cm. No renal collection, hydronephrosis or calculi.    Left kidney:  9.8 cm. No renal collection, hydronephrosis or calculi.    Urinary bladder: Contracted around a Knutson catheter.    IMPRESSION:     Normal renal ultrasound.    < end of copied text >

## 2017-12-11 NOTE — PROGRESS NOTE ADULT - SUBJECTIVE AND OBJECTIVE BOX
SUBJECTIVE: No pain or SOB.     MEDICATIONS  (STANDING):  amiodarone    Tablet 400 milliGRAM(s) Oral daily  aspirin  chewable 81 milliGRAM(s) Oral daily  atorvastatin 20 milliGRAM(s) Oral at bedtime  buDESOnide 160 MICROgram(s)/formoterol 4.5 MICROgram(s) Inhaler 2 Puff(s) Inhalation two times a day  cefepime  IVPB 2000 milliGRAM(s) IV Intermittent every 12 hours  collagenase Ointment 1 Application(s) Topical two times a day  dextrose 5%. 1000 milliLiter(s) (100 mL/Hr) IV Continuous <Continuous>  enoxaparin Injectable 40 milliGRAM(s) SubCutaneous daily  influenza   Vaccine 0.5 milliLiter(s) IntraMuscular once  latanoprost 0.005% Ophthalmic Solution 1 Drop(s) Both EYES at bedtime  levothyroxine 50 MICROGram(s) Oral daily  metoprolol succinate ER 25 milliGRAM(s) Oral daily  mirtazapine 7.5 milliGRAM(s) Oral at bedtime  multivitamin 1 Tablet(s) Oral daily  spironolactone 12.5 milliGRAM(s) Oral daily  tiotropium 18 MICROgram(s) Capsule 1 Capsule(s) Inhalation daily  vancomycin  IVPB 1000 milliGRAM(s) IV Intermittent every 24 hours        LABS:                        8.7    9.51  )-----------( 356      ( 10 Dec 2017 06:52 )             27.8     134<L>  |  98  |  11  ----------------------------<  88  4.2   |  29  |  0.38<L>    Ca    8.5      10 Dec 2017 06:52  Phos  1.7     12-10  Mg     2.0     12-10     PHYSICAL EXAM  Vital Signs Last 24 Hrs  T(C): 36.9 (11 Dec 2017 05:50), Max: 37.3 (10 Dec 2017 13:30)  T(F): 98.4 (11 Dec 2017 05:50), Max: 99.1 (10 Dec 2017 13:30)  HR: 84 (11 Dec 2017 05:50) (77 - 84)  BP: 102/59 (11 Dec 2017 05:50) (102/59 - 129/59)  RR: 18 (11 Dec 2017 05:50) (18 - 19)  SpO2: 100% (11 Dec 2017 05:50) (94% - 100%)    Cardiovascular:  S1S2 RRR, No JVD, 1/6 ERICA   Respiratory: decreased breath sounds B/L LL's   Gastrointestinal: Abdomen soft, ND, NT, +BS  Skin: Warm, dry, intact. No rash.  Ext: no C/C/E B/L LE    DIAGNOSTIC DATA    < from: TTE with Doppler (w/Cont) (04.03.17 @ 16:18) >  CONCLUSIONS:  1. Mitral annular calcification, otherwise normal mitral  valve. Mild mitral regurgitation.  2. Moderately dilated left atrium.  LA volume index = 46  cc/m2.  3. Moderate left ventricular enlargement.  4. Severe global left ventricular systolic dysfunction.  Endocardial visualization enhanced with intravenous  injection of echo contrast (Definity).  5. The right ventricle is not well visualized; grossly  normal right ventricular systolic function.  A device wire  is noted in the right heart.  6. Estimated right ventricular systolic pressure equals 60  mm Hg, assuming right atrial pressure equals 10 mm Hg,  consistent with moderate pulmonary hypertension.  ------------------------------------------------------------------------  Confirmed on  4/3/2017 - 17:19:47 by Jean-Pierre Carver M.D.    < end of copied text >    < from: Cardiac Cath Lab - Adult (04.26.16 @ 13:12) >  VENTRICLES: No LV gram was performed; however, a recent echocardiogram  demonstrated an EF of 19 %.  CORONARY VESSELS: The coronary circulation is right dominant.  LM:   --  LM: Normal.  LAD:   --  Proximal LAD: There was a zqlppoli31 % stenosis. There was mild  restenosis in proximal LAD stent.  --  Mid LAD: Angiography showed minor luminal irregularities with no flow  limiting lesions.  --  Distal LAD: Angiography showed minor luminal irregularities with no  flow limiting lesions.  --  D1: Angiography showed minor luminal irregularities with no flow  limiting lesions.  CX:   --  Circumflex: Normal.  RI:   --  Ramus intermedius: Angiography showed minor luminal  irregularities with no flow limiting lesions.  RCA:   --  Proximal RCA: Normal.  --  Mid RCA: Angiography showed mild atherosclerosis with no flow limiting  lesions.  --  Distal RCA: Angiography showed minor luminal irregularities with no  flow limiting lesions.  --  RPDA: Angiography showed minor luminal irregularities with no flow  limiting lesions.  --  RPLS: Angiography showed minor luminal irregularities with no flow  limiting lesions.  COMPLICATIONS: There were no complications.  DIAGNOSTIC RECOMMENDATIONS: Medical management and aggressive risk factor  modification is recommended.  Prepared and signed by  Nehal López M.D.  Signed 04/27/2016 13:13:20    < end of copied text >    < from: Transthoracic Echocardiogram (12.01.17 @ 16:53) >  CONCLUSIONS:  1. Normal mitral valve. Minimal mitral regurgitation.  2. Mild left ventricular enlargement.  3. Severe global left ventricular systolic dysfunction.  4. The right ventricle is not well visualized. A device  wire is noted in the right heart.  ------------------------------------------------------------------------  Confirmed on  12/1/2017 - 18:24:53 by Carli Encarnacion M.D. RPVI    < end of copied text >      Interrogation   Normal sensing and pacing via iterative testing.  Excellent threshold capture.  No reprogramming.      ASSESSMENT AND PLAN:    77y Female with hypertension, dyslipidemia, CAD s/p LAD stent with only mild re-stenosis with minor luminal irregularities otherwise on cath 4/16, with known severe LV dysfunction , NICM s/p [employer] BiV ICD, VT on Amio, COPD, lymphoma previously on chemo with known spinal mets and compression fractures admitted with lethargy due to GNR bacteremia (pseudomonas), felt to be urinary source, and now sacral decub wound culture +MRSA    --ABX per primary team/ID   --TTE noted above.    --Pt is a full code per family.  DC planning back to SNF when stable.  --Not in clinical CHF.  Monitor volume status.  --cont medical management of CAD   --Pain Management as per primary team   --f/u Dr Hussein post discharge      Dolores Rodriguez PA-C  [facility]  [address], [address]   [place] [address]  office [phone]  pager [phone]

## 2017-12-11 NOTE — PROGRESS NOTE ADULT - ASSESSMENT
76yo F with PMH of CAD, MI, HFrEF (EF 15%), s/p biventricular AICD, lymphoma, stage 2-3 decubitus sacral ulcer,  who p/w altered mentation; found to have fever (Tmax 103), leukocytosis (20.77), and cloudy urine;  with Pseudomonas bacteremia 11/28, most likely 2/2 pseudomonas UTI. TTE no vegetation.  spiked fever 12/8; repeat blood cultures no growth x 48 hours.  ABX day 16: Cefepime   ABX day 10: Vanco    suggest : complete vanco and cefepime                                 no need for prolonged antibiotics for stage 3 sacral ulcer.                  would get surveillance blood cultures 5 days after complete antibiotics to assure clearance.

## 2017-12-12 NOTE — ED ADULT TRIAGE NOTE - CHIEF COMPLAINT QUOTE
pt non verbal, non ambulatory with MRSA in a sacral ulcer and "infection of the urine", at [facility], son states pt c/o of CP by nodding her head. IV placed by nursing home #22 L hand with d5 1/2 NS infusing.

## 2017-12-13 NOTE — H&P ADULT - PROBLEM SELECTOR PLAN 7
- family meeting tomorrow at 3pm  - pt having trouble swallowing PO meds, will continue to evaluate and attempt to give

## 2017-12-13 NOTE — ED PROVIDER NOTE - PHYSICAL EXAMINATION
PHYSICAL EXAM:  GENERAL: mild to moderate distress, cachectic   HEAD:  Atraumatic, Normocephalic  EYES: EOMI, PERRLA, conjunctiva and sclera clear  NECK: Supple, No JVD  CHEST/LUNG: Poor inspiratory effort; No wheeze  HEART: Regular rate and rhythm; No murmurs, rubs, or gallops  ABDOMEN: Soft, Nontender, Nondistended; Bowel sounds present  GENITOURINARY: knutson in place; diarrhea noted.   BACK: stage 3-4 sacral decub ulcer with surrounding feces   EXTREMITIES:  2+ Peripheral Pulses, No clubbing, cyanosis, or edema  PSYCH: AAOx3  NEUROLOGY: non-focal  SKIN: No rashes or lesions

## 2017-12-13 NOTE — H&P ADULT - PROBLEM SELECTOR PLAN 2
- likely toxic metabolic encephalopathy  - will clarify pts baseline with son as per chart review she was able to converse normally prior to last admission

## 2017-12-13 NOTE — H&P ADULT - ASSESSMENT
78 yo F w/ CAD, MI, Systolic CH, s/p AICD, HTN, sent from nursing home for fever after just being discharge for AMS with tachycardia, fever, hypotension and a previous admission for UTI with pseudomonas and CRE. 78 yo F w/ CAD, MI, Systolic CH, s/p AICD, HTN, lymphoma previously on chemo with known spinal mets and compression fractures sent from nursing home for fever after just being discharge for AMS with tachycardia, fever, hypotension and a previous admission for UTI with pseudomonas and CRE.

## 2017-12-13 NOTE — ED ADULT NURSE REASSESSMENT NOTE - FACIAL SYMMETRY
Md mullins aware, present on ED arrival,  per family patient appears at baseline/ASYMMETRICAL/right facial droop

## 2017-12-13 NOTE — ED PROVIDER NOTE - CHIEF COMPLAINT
The patient is a 77y Female complaining of The patient is a 77y Female sent from nursing home with fever

## 2017-12-13 NOTE — ED ADULT NURSE NOTE - CHIEF COMPLAINT QUOTE
pt non verbal, non ambulatory with MRSA in a sacral ulcer and "infection of the urine", at [facility], son states pt c/o of CP by nodding her head. IV placed by nursing home #22 L hand with d5 1/2 NS infusing.

## 2017-12-13 NOTE — H&P ADULT - NSHPLABSRESULTS_GEN_ALL_CORE
9.0    15.14 )-----------( 414      ( 13 Dec 2017 03:00 )             28.4     12-13    137  |  97<L>  |  17  ----------------------------<  87  4.6   |  26  |  0.51    Ca    8.7      13 Dec 2017 03:00    TPro  5.9<L>  /  Alb  2.5<L>  /  TBili  0.4  /  DBili  x   /  AST  69<H>  /  ALT  36<H>  /  AlkPhos  97  12-13

## 2017-12-13 NOTE — H&P ADULT - PROBLEM SELECTOR PLAN 5
- family meeting tomorrow at 3pm  - pt having trouble swallowing PO meds, will continue to evaluate and attempt to give - Pt bed bound and unable to move arms well at baseline. She is completely dependent and long term resident in nursing home

## 2017-12-13 NOTE — ED PROVIDER NOTE - ATTENDING CONTRIBUTION TO CARE
I was physically present for the E/M service provided. I agree with above history, physical, and plan which I have reviewed and edited where appropriate. I was physically present for the key portions of the service provided.    69F PMH of systolic CHF, recent Urosepsis, indwelling knutson from NH p/w fever. Discharged on cephalosporin. +Diarrhea. Urine cloudy. +Sinus tachycardia. Normotensive. Non-verbal. Abdomen soft. No grimace to palpation. Lungs CTA with poor effort. Pan-culture. CXR. UA. Broad spectrum abx. Replace knutson.

## 2017-12-13 NOTE — H&P ADULT - HISTORY OF PRESENT ILLNESS
77 yof w/ CAD, MI, Systolic CH, s/p AICD, HTN, sent from NH for fever      She had previous admissions for UTI with pseudomonas and CRE and AMS with tachycardia, fever, hypotension.  Prior to last admission she was able to converse at baseline, however since then has been non-verbal.  She was started on cefepime and vanco based on previous sensitivities and when knutson was inserted, cloudy urine was seen in bag.    Pt AMS improved after a few days and she passed s/s eval, so was started on oral feeds. Pt did not eat much as far as solid foods, however, she prefers Ensure shakes. Pt pockets medication and food in mouth, however, she does eat when family is around. We encouraged family to feed her as much as they could when they are around and pt's feeding improved. Pt grew pseudomonas in blood and we continued with Cefepime and dc'd vanc. Swab of decubitus ulcer came back positive for MRSA and therefore, pt was continued on contact isolations and started on Vanc. TTE returned negative, and cardiology suggested that if infx does not clear, to follow up with [facility]. Blood clxs negative since 11/30. Pt completed 14 course of Cefepime, however, she spiked a fever 2 days after completion.   However, she then had no fevers for the next few days and repeat blood cultures were negative so ID was in agreement with stopping antibiotics and discharging back to nursing home.      medicine attending addendum- this pleasant 78 yo F with copd, h/o lymphoma, mets to spine, severe LV dysfunction with AICD Bivent, CAD with stents (cath 2016- medical tx), p/w sepsis from  source-> bacteremia. her course was complicated by stage 3-4 sacral decub with MRSA.  she remains afebrile but will require wound care.  her poor state leave her at high risk for readmission in the next 30 days. 76 yo f w/ CAD, MI, Systolic CH, s/p AICD, HTN, sent from NH for fever after just being discharged with hospitalization due to AMS with tachycardia, fever, hypotension.  She also had previous admission for UTI with pseudomonas and CRE.  Prior to last admission she was able to converse at baseline, however since then has been non-verbal.  She recieved cefepime and vanco during last admission based on previous sensitivities.  Pt AMS improved after a few days (however she still was hardly speaking) and she passed s/s eval, so was started on oral feeds and was able to take PO meds when she was given them with ensure. Pt grew pseudomonas in blood and we continued with Cefepime and dc'd vanc. Swab of decubitus ulcer came back positive for MRSA and therefore, pt was continued on contact isolations and started on Vanc. TTE returned negative, and cardiology suggested that if infx does not clear, to follow up with [facility]. Blood clxs negative since 11/30. Pt completed 14 course of Cefepime, however, she spiked a fever 2 days after completion.   However, she then had no fevers for the next few days and repeat blood cultures were negative so ID was in agreement with stopping antibiotics and discharging back to nursing home.      medicine attending addendum- this pleasant 76 yo F with copd, h/o lymphoma, mets to spine, severe LV dysfunction with AICD Bivent, CAD with stents (cath 2016- medical tx), p/w sepsis from  source-> bacteremia. her course was complicated by stage 3-4 sacral decub with MRSA.  she remains afebrile but will require wound care.  her poor state leave her at high risk for readmission in the next 30 days. 76 yo f w/ CAD, MI, Systolic CH, s/p AICD, HTN, lymphoma previously on chemo with known spinal mets and compression fractures sent from NH for fever after just being discharged with hospitalization due to AMS with tachycardia, fever, hypotension.  She also had previous admission for UTI with pseudomonas and CRE.  Prior to last admission she was able to converse at baseline, however since then has been non-verbal.  She recieved cefepime and vanco during last admission based on previous sensitivities.  Pt AMS improved after a few days (however she still was hardly speaking) and she passed s/s eval, so was started on oral feeds and was able to take PO meds when she was given them with ensure. Pt grew pseudomonas in blood and we continued with Cefepime and dc'd vanc. Swab of decubitus ulcer came back positive for MRSA and therefore, pt was continued on contact isolations and started on Vanc. TTE returned negative. Blood cxs negative since 11/30. Pt completed 14 course of Cefepime, however, she spiked a fever 2 days after completion.   However, she then had no fevers for the next few days and repeat blood cultures were negative so ID was in agreement with stopping antibiotics and discharging back to nursing home. 76 yo f w/ CAD, MI, Systolic CH, s/p AICD, HTN, lymphoma previously on chemo with known spinal mets and compression fractures sent from NH for fever after just being discharged with hospitalization due to AMS with tachycardia, fever, hypotension.  She also had previous admission for UTI with pseudomonas and CRE.  Prior to last admission she was able to converse at baseline, however since then has been non-verbal.  She recieved cefepime and vanco during last admission based on previous sensitivities.  Pt AMS improved after a few days (however she still was hardly speaking) and she passed s/s eval, so was started on oral feeds and was able to take PO meds when she was given them with ensure. Pt grew pseudomonas in blood and we continued with Cefepime and dc'd vanc. Swab of decubitus ulcer came back positive for MRSA and therefore, pt was continued on contact isolations and started on Vanc. TTE returned negative.  Pt completed 14 course of Cefepime, however, she spiked a fever 2 days after completion.   However, she then had no fevers for the next few days and repeat blood cultures were negative so ID was in agreement with stopping antibiotics and discharging back to nursing home.  Currently the patient is not speaking but does nod her head yes or no to questioning.  Only complaint is back pain where ulcer is located.

## 2017-12-13 NOTE — ED ADULT NURSE NOTE - OBJECTIVE STATEMENT
Pt recd alert and awake. Follows commands and nods appropriately to answer questions. Arrives with chronic indwelling Zee from NH. Recently admitted for urosepsis. Present on arrival: Stage 4 Sacral decub aprox 5x5cm - cleaned and covered with mepilex. Skin on heels D+I- no breakdown. Sent for fever from NH. Pt is minimally verbal at baseline x a few months according to family members at bedside. 22G IV in place on arrival in left hand placed in NH. VS as noted. Cardiac monitor in place. Will continue to monitor.

## 2017-12-13 NOTE — H&P ADULT - PROBLEM SELECTOR PLAN 1
- decub ulcer worsened since last admission - decub ulcer worsened since last admission and previously tested positive for MRSA; wound care consult placed and they will order cx when wound examined per nursing staff  - on vanc and cefepime; vanc trough ordered  - will consider Xray for possible osteo  - CXR clear, pt clearly an aspiration risk and currently unable to swallow meds  - UA with large leuk est but no nitrites; recurrence of UTI as source of infection less likely  - if pt continues to be febrile may consider [facility]  - pt not eating so on D5 at 60 and will reassess tomorrow, finger sticks Q6  - currently no diarrhea - decub ulcer worsened since last admission and previously tested positive for MRSA; wound care consult placed and they will order cx when wound examined per nursing staff  - on vanc and cefepime; vanc trough ordered  - will consider Xray for possible osteo  - CXR clear, pt clearly an aspiration risk and currently unable to swallow meds  - UA with large leuk est but no nitrites; recurrence of UTI as source of infection less likely  - if pt continues to be febrile may consider [facility]  - pt not eating so on D5 @ 60 and will reassess tomorrow, finger sticks Q6  - currently no diarrhea - decub ulcer worsened since last admission and previously tested positive for MRSA; wound care consult placed and they will order cx when wound examined per nursing staff  - on vanc and cefepime; vanc trough ordered  - blood and urine cx ordered  - will consider Xray for possible osteo  - CXR clear, pt clearly an aspiration risk and currently unable to swallow meds  - UA with large leuk est but no nitrites; recurrence of UTI as source of infection less likely  - if pt continues to be febrile may consider [facility]  - pt not eating so on D5 @ 60 and will reassess tomorrow, finger sticks Q6  - currently no diarrhea

## 2017-12-13 NOTE — H&P ADULT - NSHPREVIEWOFSYSTEMS_GEN_ALL_CORE
unable to assess as pt is non-verbal.  She is able to nod her head yes or no to questioning and says that she has pain at the site of her ulcer but otherwise no pain.

## 2017-12-13 NOTE — ED ADULT NURSE REASSESSMENT NOTE - NS ED NURSE REASSESS COMMENT FT1
report given to floor rn patient put in for transport. patient cleaned, decubiti dressing changed. resting comfortably and in no distress. respirations are even and unlabored. spo2 100% on ra, will ctm closely for remainder of stay in ed.

## 2017-12-13 NOTE — ED PROVIDER NOTE - PROGRESS NOTE DETAILS
Son at bedside. He would like to know the patient's long term prognosis in light of her Lymphoma and progressive decline over the past month. He is upset that no one has yet discussed her lymphoma status with him. He does not know the name of her oncologist. He is also concerned "that no one has found a reason she keeps getting infections". Will recommend Palliative care consult inpatient as it appears family needs support with patient's recent rapid health decline and multiple hospitalization. DAVIDE. BP 94/43. ordered another 500cc bolus. However the previous bolus had not yet finished yet. Patient has a small IV on the hand. Unable to access another IV since patient is hard to stick. Called hospitalist, Dr. Morrison. Text paged MAR. Rabia Meyer

## 2017-12-13 NOTE — H&P ADULT - NSHPPHYSICALEXAM_GEN_ALL_CORE
GENERAL: mild to moderate distress, cachectic   	HEAD:  Atraumatic, Normocephalic  	EYES: EOMI, conjunctiva and sclera clear  	NECK: Supple, No JVD  	CHEST/LUNG: Poor inspiratory effort; No wheeze  	HEART: Regular rate and rhythm; No murmurs, rubs, or gallops  	ABDOMEN: Soft, Nontender, Nondistended; Bowel sounds present  	GENITOURINARY: knutson in place; diarrhea noted.   	BACK: stage 3-4 sacral decub ulcer worse since last admission  	EXTREMITIES:  2+ Peripheral Pulses, No clubbing, cyanosis, or edema  	PSYCH: AAOx3  	NEUROLOGY: non-focal

## 2017-12-13 NOTE — ED PROVIDER NOTE - OBJECTIVE STATEMENT
77yof w/ CAD, MI, Systolic CH, s/p AICD, HTN, h/o lymphoma s/p chemo, on remission, chronic indwelling Zee, recent admit for sepsis 2/2 UTI (hx of pseudomonas in the urine, CRE), sacral decub (hx of MRSA) sent from [facility] for fever. Patient is a poor historian and minimally verbal.   Patient noted to have diarrhea on arrival

## 2017-12-13 NOTE — H&P ADULT - ATTENDING COMMENTS
this was a failed dc. she returned within 48 hours with recurrent temp.  the etiology of the temp is most likely the worsening sacral decub which appears grossly infected.    she is poorly responsive and we will arrange a family meeting to discuss GOC while we hydrate her, provide wound care and abx's.

## 2017-12-13 NOTE — CONSULT NOTE ADULT - SUBJECTIVE AND OBJECTIVE BOX
HISTORY OF PRESENT ILLNESS:    77y Female with hypertension, dyslipidemia, CAD s/p LAD stent with only mild re-stenosis with minor luminal irregularities otherwise on cath 4/16, with known severe LV dysfunction , NICM s/p Medtronic BiV ICD, VT on Amio, COPD, lymphoma previously on chemo with known spinal mets and compression fractures recent admit with GNR bacteremia (pseudomonas), felt to be urinary source, and sacral decub wound culture +MRSA, s/p ABX, now admitted from SNF with fever and AMS/lethargy.  Currently not answering my questions, but appears comfortable.      PAST MEDICAL & SURGICAL HISTORY:  Paroxysmal atrial fibrillation  GERD (gastroesophageal reflux disease)  DM (diabetes mellitus)  Asthma  MI (myocardial infarction)  CAD (coronary artery disease)  Lymphoma: S/P resection and chemotherapy in remission  HLD (hyperlipidemia)  CHF (congestive heart failure): On home oxygen 2L PRN  HTN (hypertension)  ICD (implantable cardioverter-defibrillator) in place  S/P coronary artery stent placement  S/P myomectomy  S/P appendectomy  S/P lymph node biopsy: Biopsy and resection      FAMILY HISTORY:  No pertinent family history in first degree relatives      SOCIAL HISTORY:    (x ) Non-smoker ( ) Smoker ( ) Alcohol Abuse ( ) IVDA    Allergies    peanuts (Unknown)  penicillin (Nausea)    MEDICATIONS  (STANDING):  amiodarone    Tablet 400 milliGRAM(s) Oral daily  aspirin enteric coated 81 milliGRAM(s) Oral daily  atorvastatin 20 milliGRAM(s) Oral at bedtime  buDESOnide 160 MICROgram(s)/formoterol 4.5 MICROgram(s) Inhaler 2 Puff(s) Inhalation two times a day  cefepime  IVPB 2000 milliGRAM(s) IV Intermittent every 12 hours  collagenase Ointment 1 Application(s) Topical two times a day  enoxaparin Injectable 40 milliGRAM(s) SubCutaneous daily  latanoprost 0.005% Ophthalmic Solution 1 Drop(s) Both EYES at bedtime  metoprolol succinate ER 25 milliGRAM(s) Oral daily  mirtazapine 7.5 milliGRAM(s) Oral at bedtime  multivitamin 1 Tablet(s) Oral daily  tiotropium 18 MICROgram(s) Capsule 1 Capsule(s) Inhalation daily  vancomycin  IVPB 750 milliGRAM(s) IV Intermittent every 12 hours    REVIEW OF SYSTEMS:     [ ] All others negative	  [ x] Unable to obtain    PHYSICAL EXAM:  Vital Signs Last 24 Hrs  T(C): 36.7 (13 Dec 2017 12:53), Max: 38.3 (12 Dec 2017 23:55)  T(F): 98.1 (13 Dec 2017 12:53), Max: 101 (12 Dec 2017 23:55)  HR: 88 (13 Dec 2017 12:53) (70 - 103)  BP: 105/53 (13 Dec 2017 12:53) (94/41 - 116/61)  RR: 17 (13 Dec 2017 12:53) (17 - 18)  SpO2: 96% (13 Dec 2017 12:53) (95% - 100%)    Cardiovascular:  S1 S2 RRR, No JVD, No murmurs  Respiratory: Lungs CTA B/L, No wheeze, rales, or rhonchi	  Gastrointestinal:  Soft, Non-tender, + BS	  Skin: No rashes, No ecchymoses	  Extremities: No clubbing, cyanosis or edema    DATA:	      ECG:  SR A sense V paced	    PREVIOUS DIAGNOSTIC TESTING:      < from: TTE with Doppler (w/Cont) (04.03.17 @ 16:18) >  CONCLUSIONS:  1. Mitral annular calcification, otherwise normal mitral  valve. Mild mitral regurgitation.  2. Moderately dilated left atrium.  LA volume index = 46  cc/m2.  3. Moderate left ventricular enlargement.  4. Severe global left ventricular systolic dysfunction.  Endocardial visualization enhanced with intravenous  injection of echo contrast (Definity).  5. The right ventricle is not well visualized; grossly  normal right ventricular systolic function.  A device wire  is noted in the right heart.  6. Estimated right ventricular systolic pressure equals 60  mm Hg, assuming right atrial pressure equals 10 mm Hg,  consistent with moderate pulmonary hypertension.  ------------------------------------------------------------------------  Confirmed on  4/3/2017 - 17:19:47 by Jean-Pierre Carver M.D.    < end of copied text >    < from: Cardiac Cath Lab - Adult (04.26.16 @ 13:12) >  VENTRICLES: No LV gram was performed; however, a recent echocardiogram  demonstrated an EF of 19 %.  CORONARY VESSELS: The coronary circulation is right dominant.  LM:   --  LM: Normal.  LAD:   --  Proximal LAD: There was a [id number] % stenosis. There was mild  restenosis in proximal LAD stent.  --  Mid LAD: Angiography showed minor luminal irregularities with no flow  limiting lesions.  --  Distal LAD: Angiography showed minor luminal irregularities with no  flow limiting lesions.  --  D1: Angiography showed minor luminal irregularities with no flow  limiting lesions.  CX:   --  Circumflex: Normal.  RI:   --  Ramus intermedius: Angiography showed minor luminal  irregularities with no flow limiting lesions.  RCA:   --  Proximal RCA: Normal.  --  Mid RCA: Angiography showed mild atherosclerosis with no flow limiting  lesions.  --  Distal RCA: Angiography showed minor luminal irregularities with no  flow limiting lesions.  --  RPDA: Angiography showed minor luminal irregularities with no flow  limiting lesions.  --  RPLS: Angiography showed minor luminal irregularities with no flow  limiting lesions.  COMPLICATIONS: There were no complications.  DIAGNOSTIC RECOMMENDATIONS: Medical management and aggressive risk factor  modification is recommended.  Prepared and signed by  Nehal López M.D.  Signed 04/27/2016 13:13:20    < end of copied text >    < from: Transthoracic Echocardiogram (12.01.17 @ 16:53) >  CONCLUSIONS:  1. Normal mitral valve. Minimal mitral regurgitation.  2. Mild left ventricular enlargement.  3. Severe global left ventricular systolic dysfunction.  4. The right ventricle is not well visualized. A device  wire is noted in the right heart.  ------------------------------------------------------------------------  Confirmed on  12/1/2017 - 18:24:53 by Carli Encarnacion M.D. RPVI    < end of copied text       LABS:                      9.0    15.14 )-----------( 414      ( 13 Dec 2017 03:00 )             28.4     137  |  97<L>  |  17  ----------------------------<  87  4.6   |  26  |  0.51    Ca    8.7      13 Dec 2017 03:00    TPro  5.9<L>  /  Alb  2.5<L>  /  TBili  0.4  /  DBili  x   /  AST  69<H>  /  ALT  36<H>  /  AlkPhos  97  12-13    ASSESSMENT/PLAN: 	  77y Female with hypertension, dyslipidemia, CAD s/p LAD stent with only mild re-stenosis with minor luminal irregularities otherwise on cath 4/16, with known severe LV dysfunction , NICM s/p Medtronic BiV ICD, VT on Amio, COPD, lymphoma previously on chemo with known spinal mets and compression fractures recent admit with GNR bacteremia (pseudomonas), felt to be urinary source, and sacral decub wound culture +MRSA, s/p ABX, now admitted from SNF with fever and AMS/lethargy.  Currently not answering my questions, but appears comfortable.    --Not in decomp CHF  --Abx per primary team  --recent TTE noted  --will f/u blood cx    Dolores Rodriguez PA-C  [facility]  [address], [address]   [place] [address]  office [phone]  pager [phone]

## 2017-12-13 NOTE — ED PROVIDER NOTE - MEDICAL DECISION MAKING DETAILS
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 Will send sepsis workup. Start vanco and cefepime. If concerning for aspiration pneumonia, will add anaerobic coverage. Patient has an allergy to PCN (unknown reaction).

## 2017-12-13 NOTE — ED ADULT NURSE REASSESSMENT NOTE - NS ED NURSE REASSESS COMMENT FT1
report recd from daytime rn. patient awake and alert in stretcher. able to nod head yes and no when asked simple questions. family at bedside. previous rn unable to obtain labs, [id number] noted to left hand, +flush, medicated per orders. MD obtained labwork via arterial stick, unable to obtain VBG, cancelled by md mullins. no acute distress noted. respirations are even and unlabored. knutson draining clear yellow urine, normal sinus per monitor. will ctm closely.

## 2017-12-14 NOTE — PHYSICAL THERAPY INITIAL EVALUATION ADULT - PASSIVE RANGE OF MOTION EXAMINATION, REHAB EVAL
Passive Range of Motion of bilateral Upper Extremity limited due to patient resisting throughout/no Passive ROM deficits were identified

## 2017-12-14 NOTE — PROGRESS NOTE ADULT - SUBJECTIVE AND OBJECTIVE BOX
Subjective:   	 non verbal today appears comfortable     MEDICATIONS:  MEDICATIONS  (STANDING):  amiodarone    Tablet 400 milliGRAM(s) Oral daily  buDESOnide 160 MICROgram(s)/formoterol 4.5 MICROgram(s) Inhaler 2 Puff(s) Inhalation two times a day  cefepime  IVPB 2000 milliGRAM(s) IV Intermittent every 12 hours  collagenase Ointment 1 Application(s) Topical two times a day  dextrose 5%. 1000 milliLiter(s) (60 mL/Hr) IV Continuous <Continuous>  enoxaparin Injectable 40 milliGRAM(s) SubCutaneous daily  latanoprost 0.005% Ophthalmic Solution 1 Drop(s) Both EYES at bedtime  levothyroxine Injectable 25 MICROGram(s) IV Push daily  metoprolol succinate ER 25 milliGRAM(s) Oral daily  mirtazapine 7.5 milliGRAM(s) Oral at bedtime  multivitamin 1 Tablet(s) Oral daily  tiotropium 18 MICROgram(s) Capsule 1 Capsule(s) Inhalation daily  vancomycin  IVPB 750 milliGRAM(s) IV Intermittent every 12 hours    MEDICATIONS  (PRN):      LABS:	 	    CARDIAC MARKERS:                                9.0    15.14 )-----------( 414      ( 13 Dec 2017 03:00 )             28.4     12-13    137  |  97<L>  |  17  ----------------------------<  87  4.6   |  26  |  0.51    Ca    8.7      13 Dec 2017 03:00    TPro  5.9<L>  /  Alb  2.5<L>  /  TBili  0.4  /  DBili  x   /  AST  69<H>  /  ALT  36<H>  /  AlkPhos  97  12-13    COAGS:       proBNP:   Lipid Profile:   HgA1c:   TSH:       PHYSICAL EXAM:  T(C): 36.5 (12-14-17 @ 13:09), Max: 37.2 (12-14-17 @ 05:59)  HR: 89 (12-14-17 @ 13:09) (89 - 93)  BP: 104/60 (12-14-17 @ 13:09) (98/54 - 104/60)  RR: 17 (12-14-17 @ 13:09) (17 - 18)  SpO2: 100% (12-14-17 @ 13:09) (96% - 100%)  Wt(kg): --  I&O's Summary    13 Dec 2017 07:01  -  14 Dec 2017 07:00  --------------------------------------------------------  IN: 100 mL / OUT: 450 mL / NET: -350 mL      Cardiovascular: Normal S1 S2,     No JVD, 1/6 ERICA murmur, Peripheral pulses palpable 2+ bilaterally  Respiratory: Lungs clear to auscultation, normal effort 	  Gastrointestinal:  Soft, Non-tender, + BS	  Extremities no edema, cyanosis, clubbing B/L LE's       TELEMETRY: 	    ECG:  	SR A sense V paced	  RADIOLOGY:     PREVIOUS DIAGNOSTIC TESTING:      < from: TTE with Doppler (w/Cont) (04.03.17 @ 16:18) >  CONCLUSIONS:  1. Mitral annular calcification, otherwise normal mitral  valve. Mild mitral regurgitation.  2. Moderately dilated left atrium.  LA volume index = 46  cc/m2.  3. Moderate left ventricular enlargement.  4. Severe global left ventricular systolic dysfunction.  Endocardial visualization enhanced with intravenous  injection of echo contrast (Definity).  5. The right ventricle is not well visualized; grossly  normal right ventricular systolic function.  A device wire  is noted in the right heart.  6. Estimated right ventricular systolic pressure equals 60  mm Hg, assuming right atrial pressure equals 10 mm Hg,  consistent with moderate pulmonary hypertension.  ------------------------------------------------------------------------  Confirmed on  4/3/2017 - 17:19:47 by Jean-Pierre Carver M.D.    < end of copied text >    < from: Cardiac Cath Lab - Adult (04.26.16 @ 13:12) >  VENTRICLES: No LV gram was performed; however, a recent echocardiogram  demonstrated an EF of 19 %.  CORONARY VESSELS: The coronary circulation is right dominant.  LM:   --  LM: Normal.  LAD:   --  Proximal LAD: There was a [id number] % stenosis. There was mild  restenosis in proximal LAD stent.  --  Mid LAD: Angiography showed minor luminal irregularities with no flow  limiting lesions.  --  Distal LAD: Angiography showed minor luminal irregularities with no  flow limiting lesions.  --  D1: Angiography showed minor luminal irregularities with no flow  limiting lesions.  CX:   --  Circumflex: Normal.  RI:   --  Ramus intermedius: Angiography showed minor luminal  irregularities with no flow limiting lesions.  RCA:   --  Proximal RCA: Normal.  --  Mid RCA: Angiography showed mild atherosclerosis with no flow limiting  lesions.  --  Distal RCA: Angiography showed minor luminal irregularities with no  flow limiting lesions.  --  RPDA: Angiography showed minor luminal irregularities with no flow  limiting lesions.  --  RPLS: Angiography showed minor luminal irregularities with no flow  limiting lesions.  COMPLICATIONS: There were no complications.  DIAGNOSTIC RECOMMENDATIONS: Medical management and aggressive risk factor  modification is recommended.  Prepared and signed by  Nehal López M.D.  Signed 04/27/2016 13:13:20    < end of copied text >    < from: Transthoracic Echocardiogram (12.01.17 @ 16:53) >  CONCLUSIONS:  1. Normal mitral valve. Minimal mitral regurgitation.  2. Mild left ventricular enlargement.  3. Severe global left ventricular systolic dysfunction.  4. The right ventricle is not well visualized. A device  wire is noted in the right heart.  ------------------------------------------------------------------------  Confirmed on  12/1/2017 - 18:24:53 by Carli Encarnacion M.D. RPVI    < end of copied text           DIAGNOSTIC TESTING:  [ ] Echocardiogram:  [ ]  Catheterization:  [ ] Stress Test:    OTHER: 	      ASSESSMENT/PLAN: 	77y Female with hypertension, dyslipidemia, CAD s/p LAD stent with only mild re-stenosis with minor luminal irregularities otherwise on cath 4/16, with known severe LV dysfunction , NICM s/p Medtronic BiV ICD, VT on Amio, COPD, lymphoma previously on chemo with known spinal mets and compression fractures recent admit with GNR bacteremia (pseudomonas), felt to be urinary source, and sacral decub wound culture +MRSA, s/p ABX, now admitted from SNF with fever and AMS/lethargy.    Currently not answering my questions, but appears comfortable.    --Not in decomp CHF  --Abx per primary team  --recent TTE noted  --will f/u blood cx  --c/w medical  management of CAD   ---to f/u Dr Hussein post discharge

## 2017-12-14 NOTE — PROGRESS NOTE ADULT - SUBJECTIVE AND OBJECTIVE BOX
Venancio Lamas MD  Cell: [phone]  Pager: [phone]    S:  Patient only nodding head to questions.  Expresses she has pain at ulcer site.    VITAL SIGNS:  Vital Signs Last 24 Hrs  T(C): 37.2 (14 Dec 2017 05:59), Max: 37.2 (14 Dec 2017 05:59)  T(F): 99 (14 Dec 2017 05:59), Max: 99 (14 Dec 2017 05:59)  HR: 93 (14 Dec 2017 05:59) (88 - 93)  BP: 103/50 (14 Dec 2017 05:59) (98/54 - 105/53)  BP(mean): --  RR: 18 (14 Dec 2017 05:59) (17 - 18)  SpO2: 97% (14 Dec 2017 05:59) (96% - 97%)      PHYSICAL EXAM:               GENERAL: mild to moderate distress, cachectic   		HEAD:  Atraumatic, Normocephalic  		EYES: EOMI, conjunctiva and sclera clear  		NECK: Supple, No JVD  		CHEST/LUNG: Poor inspiratory effort; No wheeze  		HEART: Regular rate and rhythm; No murmurs, rubs, or gallops  		ABDOMEN: Soft, Nontender, Nondistended; Bowel sounds present  		GENITOURINARY: knutson in place  		BACK: stage 3-4 sacral decub ulcer worse since last admission  		EXTREMITIES:  2+ Peripheral Pulses, No clubbing, cyanosis, or edema  		PSYCH: AAOx3  	NEUROLOGY: non-focal                          9.0    15.14 )-----------( 414      ( 13 Dec 2017 03:00 )             28.4     12-13    137  |  97<L>  |  17  ----------------------------<  87  4.6   |  26  |  0.51    Ca    8.7      13 Dec 2017 03:00    TPro  5.9<L>  /  Alb  2.5<L>  /  TBili  0.4  /  DBili  x   /  AST  69<H>  /  ALT  36<H>  /  AlkPhos  97  12-13      CAPILLARY BLOOD GLUCOSE    POCT Blood Glucose.: 103 mg/dL (14 Dec 2017 06:11)  POCT Blood Glucose.: 102 mg/dL (13 Dec 2017 23:28)  POCT Blood Glucose.: 84 mg/dL (13 Dec 2017 17:12)  POCT Blood Glucose.: 78 mg/dL (13 Dec 2017 12:07)      MEDICATIONS  (STANDING):  amiodarone    Tablet 400 milliGRAM(s) Oral daily  buDESOnide 160 MICROgram(s)/formoterol 4.5 MICROgram(s) Inhaler 2 Puff(s) Inhalation two times a day  cefepime  IVPB 2000 milliGRAM(s) IV Intermittent every 12 hours  collagenase Ointment 1 Application(s) Topical two times a day  dextrose 5%. 1000 milliLiter(s) (60 mL/Hr) IV Continuous <Continuous>  enoxaparin Injectable 40 milliGRAM(s) SubCutaneous daily  latanoprost 0.005% Ophthalmic Solution 1 Drop(s) Both EYES at bedtime  levothyroxine Injectable 25 MICROGram(s) IV Push daily  metoprolol succinate ER 25 milliGRAM(s) Oral daily  mirtazapine 7.5 milliGRAM(s) Oral at bedtime  multivitamin 1 Tablet(s) Oral daily  tiotropium 18 MICROgram(s) Capsule 1 Capsule(s) Inhalation daily  vancomycin  IVPB 750 milliGRAM(s) IV Intermittent every 12 hours

## 2017-12-14 NOTE — PHYSICAL THERAPY INITIAL EVALUATION ADULT - PERTINENT HX OF CURRENT PROBLEM, REHAB EVAL
78 yo f w/ CAD, MI, Systolic CH, s/p AICD, HTN, lymphoma previously on chemo with known spinal mets and compression fractures sent from NH for fever after just being discharged with hospitalization due to AMS with tachycardia, fever, hypotension.

## 2017-12-14 NOTE — ADVANCED PRACTICE NURSE CONSULT - REASON FOR CONSULT
Patient seen on skin care rounds after wound care referral received for assessment of skin impairment and recommendations of topical management. Chart reviewed: Serum pre albumin 2.5, WBC 15.41, Matt 11, BMI 20.2kg/m2. Patient non verbal, nods head to communicate and able to follow commands (squeeze hands). Patient H/O of CAD, MI, Systolic CH, s/p AICD, and  HTN. Patient admitted with sepsis. Known to [facility] service line from previous admission.

## 2017-12-14 NOTE — PROGRESS NOTE ADULT - PROBLEM SELECTOR PLAN 2
- likely toxic metabolic encephalopathy  - will clarify pts baseline with son as per chart review she was able to converse normally prior to last admission - likely toxic metabolic encephalopathy  - pts was able to converse normally prior to last admission  - CT head ordered

## 2017-12-14 NOTE — PROGRESS NOTE ADULT - ATTENDING COMMENTS
more awake and alert today, still non-verbal.  cards input appreciated.    imp- infected sacral decub- will check xray, esr, surgery eval for debridement, possible OM.

## 2017-12-14 NOTE — PROGRESS NOTE ADULT - PROBLEM SELECTOR PLAN 5
- Pt bed bound and unable to move arms well at baseline. She is completely dependent and long term resident in nursing home

## 2017-12-14 NOTE — PROGRESS NOTE ADULT - PROBLEM SELECTOR PLAN 1
- decub ulcer worsened since last admission and previously tested positive for MRSA; wound care consult placed and they will order cx when wound examined per nursing staff  - on vanc and cefepime; vanc trough ordered  - blood and urine cx ordered  - will consider Xray for possible osteo  - CXR clear, pt clearly an aspiration risk and currently unable to swallow meds  - UA with large leuk est but no nitrites; recurrence of UTI as source of infection less likely  - if pt continues to be febrile may consider ARASH  - pt not eating so on D5 @ 60 and will reassess tomorrow, finger sticks Q6  - currently no diarrhea - decub ulcer worsened since last admission and previously tested positive for MRSA; wound care nurse saw pt and wound care dr will come for debridement before re-testing for MRSA  - on vanc and cefepime; vanc trough ordered  - blood and urine cx ordered  - Xray for possible osteo; ESR ordered  - CXR clear, pt clearly an aspiration risk and currently unable to swallow meds  - UA with large leuk est but no nitrites; recurrence of UTI as source of infection less likely  - if pt continues to be febrile may consider ARASH  - finger sticks Q6; will consider giving more D5 if pt not eating  - currently no diarrhea

## 2017-12-14 NOTE — PROGRESS NOTE ADULT - PROBLEM SELECTOR PLAN 7
- family meeting tomorrow at 3pm  - pt having trouble swallowing PO meds, will continue to evaluate and attempt to give - family meeting on 12/14: full code  - family to discuss feeding tube, risks explained

## 2017-12-14 NOTE — PROGRESS NOTE ADULT - ATTENDING COMMENTS
Agree with above.   -Continue with medical management of cad  -f/u id    Nehal López MD  [facility]  [address], [address]  [place] [address]  office: [phone]  pager: [phone]

## 2017-12-14 NOTE — ADVANCED PRACTICE NURSE CONSULT - RECOMMEDATIONS
Continue with Nutritional Recommendations    Recommend reconsult Wound care MD ALATORRE Wound MD [place] ([phone]) debridement of sacrum injury.    Will Recommend Tortoise device for assistance of turning patient     Sacral extending to bilateral buttocks: Clean with Saf-Clens. Pat dry. Apply Liquid barrier film to periwound skin. Apply collagenase, (1) 9 gram tube, nickel thickness, Cover with foam with borders. Change daily.    Apply daily Sween 24 Moisturizer to bilateral legs.     Continue low air loss bed therapy, continue heel elevation with z-flex boots, continue to turn & reposition q2h,, continue moisture management with barrier ointment & single breathable pad, continue measures to decrease friction/shear/pressure.     Please call wound care service line is further assistance is needed ([phone]).

## 2017-12-14 NOTE — ADVANCED PRACTICE NURSE CONSULT - ASSESSMENT
General: A&Ox0, able to follow commands, bedbound, incontinent of stool, indwelling urethral catheter, generalized rigidness, right side of chest wall with ecchymosis. Skin warm. Left great toe nail yellow and thickened. Hypopigmentation of right knee and right medial 1st toe, left dorsal foot, midline abdomen scar. Blanchable erythema noted on B/L ears (anterior aspect of pinna). Serous blister of left 2nd toe, dorsal aspect. Hyper- and hypopigmentation of B/L feet, scattered areas.    Incontinence related dermatitis in sacral fold as evident by hypopigmentation and moist. No suspected candidiasis.     Sacrococcygeal extending to bilateral buttocks Unsteagable pressure injury complicated by Incontinence related  dermatitis, entire area measures [id number] with mixed tissue type of denuded epidermis/purple/maroon discoloration and slough, within entire area open/deeper ulceration of sacrum [id number], (+) probe to bone, irregular borders. Undermining present of sacrum open area from 7-4 that range 0.5cm-1.5cm with 1.5cm between 1 and 3 o'clock. Wound base of deeper area 100% tan/yellow, moist firmly attach slough. Scant serous-purulent drainage, malodorous. Entire area tissue type with scattered areas of epidermis and purple/maroon discoloration between 6-9 o'clock that measures [id number], (+) induration beneath purple/maroon discoloration. Periwound skin with hypopigmentation and hyperpigmentation. No induration, blanchable erythema from 11- 3 o'clock that extends 2-5cm with 5cm at 12 and 2 o'clock, no increased warmth. Goals of care: enzymatic debridement, wick urine/stool from wound, protect periwound skin, monitor for tissue type. Patient lying on left side during measurements and assessment. Unable to visualize injury when patient is in natural anatomical position, within sacral fold. General: A&Ox0, able to follow commands, bedbound, incontinent of stool, indwelling urethral catheter, generalized rigidness, right side of chest wall with ecchymosis. Skin warm. Left great toe nail yellow and thickened. Hypopigmentation of right knee and right medial 1st toe, left dorsal foot, midline abdomen scar. Blanchable erythema noted on B/L ears (anterior aspect of pinna). Serous blister of left 2nd toe, dorsal aspect. Hyper- and hypopigmentation of B/L feet, scattered areas.    Incontinence related dermatitis in sacral fold as evident by hypopigmentation and moist. No suspected candidiasis.     Sacrococcygeal extending to bilateral buttocks Unsteagable pressure injury complicated by Incontinence related  dermatitis, entire area measures [id number] with mixed tissue type of denuded epidermis/purple/maroon discoloration and slough, within entire area open/deeper ulceration of sacrum [id number] to determine complete anatomical depth due to necrotic tissue, (+) probe to bone with cotton tip applicator/bone not visible on wound base, irregular borders. Undermining present of sacrum open area from 7-4 that ranges 0.5cm-1.5cm with 1.5cm between 1 and 3 o'clock. Wound base of deeper area 100% tan/yellow, moist firmly attach slough. Scant serous-purulent drainage, malodorous. Entire area tissue type with scattered areas of epidermis within entire area and purple/maroon discoloration between 6-9 o'clock that measures 4atf1pqv6.2cm, (+) induration beneath purple/maroon discoloration. Periwound skin with hypopigmentation and hyperpigmentation. No induration, blanchable erythema from 11- 3 o'clock that extends 2-5cm with 5cm at 12 and 2 o'clock, no increased warmth. Goals of care: enzymatic debridement, wick urine/stool from wound, protect periwound skin, monitor for tissue type. Patient lying on left side during measurements and assessment. Unable to visualize injury when patient is in natural anatomical position, within sacral fold.

## 2017-12-14 NOTE — PROGRESS NOTE ADULT - ASSESSMENT
78 yo F w/ CAD, MI, Systolic CH, s/p AICD, HTN, lymphoma previously on chemo with known spinal mets and compression fractures sent from nursing home for fever after just being discharge for AMS with tachycardia, fever, hypotension and a previous admission for UTI with pseudomonas and CRE.

## 2017-12-15 NOTE — PROGRESS NOTE ADULT - ATTENDING COMMENTS
patient seen, examined and discussed with resident.  awake, non-verbal.  surg-wound care note appreciated.    imp- infected sacral decub that will require sharp debridement. c/w wound care and abx's, offload shearing forces.  HFrEF remains well compensated.    we had a family meeting yesterday and learned that she was talking until 1 month ago- we suspect she may have had a cva (there is slight facial droop also started 1 month ago). in addition her lymphoma may not have been treated completely- she could not tolerate the chemo and had 2 of 10 cycles as per son.    we will get a ct head and track down the prior records (not in our system)

## 2017-12-15 NOTE — PROGRESS NOTE ADULT - PROBLEM SELECTOR PLAN 6
- lovenox for DVT ppx  - Diet: pureed with ensure - lovenox for DVT ppx  - IV tylenol for pain  - Diet: pureed with ensure

## 2017-12-15 NOTE — PROGRESS NOTE ADULT - PROBLEM SELECTOR PLAN 7
- family meeting on 12/14: full code  - family to discuss feeding tube, risks explained - family meeting on 12/14: full code  - family to discuss feeding tube, risks explained  - PT recs: extended care facility

## 2017-12-15 NOTE — PROGRESS NOTE ADULT - SUBJECTIVE AND OBJECTIVE BOX
Venancio Lamas MD  Cell: [phone]  Pager: [phone]    S:  Patient only nodding head to questions.  Expresses she has pain at ulcer site.    VITAL SIGNS:  Vital Signs Last 24 Hrs  T(C): 37.4 (15 Dec 2017 04:34), Max: 37.4 (15 Dec 2017 04:34)  T(F): 99.3 (15 Dec 2017 04:34), Max: 99.3 (15 Dec 2017 04:34)  HR: 90 (15 Dec 2017 04:34) (89 - 90)  BP: 106/56 (15 Dec 2017 04:34) (104/60 - 126/61)  BP(mean): --  RR: 18 (15 Dec 2017 04:34) (17 - 18)  SpO2: 98% (15 Dec 2017 04:34) (98% - 100%)      PHYSICAL EXAM:               GENERAL: cachectic, NAD		              HEAD:  Atraumatic, Normocephalic  		EYES: EOMI, conjunctiva and sclera clear  		NECK: Supple, No JVD  		CHEST/LUNG: Poor inspiratory effort; No wheeze  		HEART: Regular rate and rhythm; No murmurs, rubs, or gallops  		ABDOMEN: Soft, Nontender, Nondistended; Bowel sounds present  		GENITOURINARY: knutson in place  		BACK: stage 3-4 sacral decub ulcer worse since last admission  		EXTREMITIES:  No clubbing, cyanosis, or edema  		PSYCH: AAOx3  	NEUROLOGY: general weakness, unable to assess thoroughly for focal              	deficits                          8.7    13.69 )-----------( 385      ( 15 Dec 2017 05:00 )             27.7     12-15    132<L>  |  96<L>  |  10  ----------------------------<  78  4.4   |  27  |  0.38<L>    Ca    8.5      15 Dec 2017 05:00  Phos  2.3     12-15  Mg     1.8     12-15    TPro  5.6<L>  /  Alb  2.3<L>  /  TBili  0.4  /  DBili  x   /  AST  57<H>  /  ALT  38<H>  /  AlkPhos  90  12-15      CAPILLARY BLOOD GLUCOSE  POCT Blood Glucose.: 91 mg/dL (15 Dec 2017 05:54)  POCT Blood Glucose.: 105 mg/dL (14 Dec 2017 23:46)  POCT Blood Glucose.: 98 mg/dL (14 Dec 2017 18:01)  POCT Blood Glucose.: 113 mg/dL (14 Dec 2017 12:20)      MEDICATIONS  (STANDING):  amiodarone    Tablet 400 milliGRAM(s) Oral daily  buDESOnide 160 MICROgram(s)/formoterol 4.5 MICROgram(s) Inhaler 2 Puff(s) Inhalation two times a day  cefepime  IVPB 2000 milliGRAM(s) IV Intermittent every 12 hours  collagenase Ointment 1 Application(s) Topical two times a day  Dakins Solution - 1/2 Strength 1 Application(s) Topical two times a day  dextrose 5%. 1000 milliLiter(s) (60 mL/Hr) IV Continuous <Continuous>  enoxaparin Injectable 40 milliGRAM(s) SubCutaneous daily  latanoprost 0.005% Ophthalmic Solution 1 Drop(s) Both EYES at bedtime  levothyroxine Injectable 25 MICROGram(s) IV Push daily  metoprolol succinate ER 25 milliGRAM(s) Oral daily  mirtazapine 7.5 milliGRAM(s) Oral at bedtime  multivitamin 1 Tablet(s) Oral daily  sodium phosphate IVPB 15 milliMole(s) IV Intermittent once  tiotropium 18 MICROgram(s) Capsule 1 Capsule(s) Inhalation daily  vancomycin  IVPB 500 milliGRAM(s) IV Intermittent every 12 hours

## 2017-12-15 NOTE — PROGRESS NOTE ADULT - PROBLEM SELECTOR PLAN 1
- decub ulcer worsened since last admission and previously tested positive for MRSA; wound care nurse saw pt and wound care dr will come for debridement before re-testing for MRSA  - on vanc and cefepime (day 3); vanc trough ordered  - blood and urine cx ordered  - Xray for possible osteo; ESR ordered  - CXR clear, pt clearly an aspiration risk and currently unable to swallow meds  - UA with large leuk est but no nitrites; recurrence of UTI as source of infection less likely  - if pt continues to be febrile may consider ARASH  - finger sticks Q6; will consider giving more D5 if pt not eating  - currently no diarrhea - decub ulcer worsened since last admission and previously tested positive for MRSA; wound care nurse and wound care  saw wound, will do debridement before re-testing for MRSA  - on vanc and cefepime (day 3); vanc trough ordered  - blood cx NGTD, urine cx grew candida  - Xray for possible osteo; ESR elevated to 101  - CXR clear, pt clearly an aspiration risk and currently unable to swallow meds  - if pt continues to be febrile may consider ARASH  - finger sticks Q6; will consider giving more D5 if pt not eating  - currently no diarrhea

## 2017-12-15 NOTE — PROGRESS NOTE ADULT - PROBLEM SELECTOR PLAN 2
- likely toxic metabolic encephalopathy  - pts was able to converse normally prior to last admission  - CT head ordered

## 2017-12-15 NOTE — PROGRESS NOTE ADULT - ATTENDING COMMENTS
Patient seen and examined.  Agree with above.   -Continue with medical management of cad and cardiomyopathy  -monitor BCx - will need ARASH if BCx with gram positive bacteremia    Nehal López MD  [facility]  [address], [id number]  [place] [address]  office: [phone]  pager: [phone]

## 2017-12-15 NOTE — PROGRESS NOTE ADULT - SUBJECTIVE AND OBJECTIVE BOX
Subjective:   	 non verbal today lethargic  appears comfortable   MEDICATIONS  (STANDING):  amiodarone    Tablet 400 milliGRAM(s) Oral daily  buDESOnide 160 MICROgram(s)/formoterol 4.5 MICROgram(s) Inhaler 2 Puff(s) Inhalation two times a day  cefepime  IVPB 2000 milliGRAM(s) IV Intermittent every 12 hours  collagenase Ointment 1 Application(s) Topical two times a day  Dakins Solution - 1/2 Strength 1 Application(s) Topical two times a day  dextrose 5%. 1000 milliLiter(s) (60 mL/Hr) IV Continuous <Continuous>  enoxaparin Injectable 40 milliGRAM(s) SubCutaneous daily  latanoprost 0.005% Ophthalmic Solution 1 Drop(s) Both EYES at bedtime  levothyroxine Injectable 25 MICROGram(s) IV Push daily  metoprolol succinate ER 25 milliGRAM(s) Oral daily  mirtazapine 7.5 milliGRAM(s) Oral at bedtime  multivitamin 1 Tablet(s) Oral daily  sodium chloride 0.9% Bolus 250 milliLiter(s) IV Bolus once  tiotropium 18 MICROgram(s) Capsule 1 Capsule(s) Inhalation daily  vancomycin  IVPB 500 milliGRAM(s) IV Intermittent every 12 hours    MEDICATIONS  (PRN):  acetaminophen   Tablet. 650 milliGRAM(s) Oral every 6 hours PRN pain      LABS:                        8.7    13.69 )-----------( 385      ( 15 Dec 2017 05:00 )             27.7     Hemoglobin: 8.7 g/dL (12-15 @ 05:00)  Hemoglobin: 9.0 g/dL (12-13 @ 03:00)    12-15    132<L>  |  96<L>  |  10  ----------------------------<  78  4.4   |  27  |  0.38<L>    Ca    8.5      15 Dec 2017 05:00  Phos  2.3     12-15  Mg     1.8     12-15    TPro  5.6<L>  /  Alb  2.3<L>  /  TBili  0.4  /  DBili  x   /  AST  57<H>  /  ALT  38<H>  /  AlkPhos  90  12-15    Creatinine Trend: 0.38<--, 0.51<--, 0.38<--, 0.42<--, 0.47<--, 0.35<--           PHYSICAL EXAM  Vital Signs Last 24 Hrs  T(C): 36.5 (15 Dec 2017 11:29), Max: 37.4 (15 Dec 2017 04:34)  T(F): 97.7 (15 Dec 2017 11:29), Max: 99.3 (15 Dec 2017 04:34)  HR: 88 (15 Dec 2017 12:08) (82 - 90)  BP: 89/51 (15 Dec 2017 12:08) (89/51 - 126/61)  BP(mean): --  RR: 16 (15 Dec 2017 12:08) (16 - 18)  SpO2: 100% (15 Dec 2017 12:08) (98% - 100%)      Cardiovascular: Normal S1 S2,     No JVD, 1/6 ERICA murmur, Peripheral pulses palpable 2+ bilaterally  Respiratory: Lungs clear to auscultation, normal effort 	  Gastrointestinal:  Soft, Non-tender, + BS	  Extremities no edema, cyanosis, clubbing B/L LE's       TELEMETRY: 	    ECG:  	SR A sense V paced	  RADIOLOGY:       PREVIOUS DIAGNOSTIC TESTING:      < from: TTE with Doppler (w/Cont) (04.03.17 @ 16:18) >  CONCLUSIONS:  1. Mitral annular calcification, otherwise normal mitral  valve. Mild mitral regurgitation.  2. Moderately dilated left atrium.  LA volume index = 46  cc/m2.  3. Moderate left ventricular enlargement.  4. Severe global left ventricular systolic dysfunction.  Endocardial visualization enhanced with intravenous  injection of echo contrast (Definity).  5. The right ventricle is not well visualized; grossly  normal right ventricular systolic function.  A device wire  is noted in the right heart.  6. Estimated right ventricular systolic pressure equals 60  mm Hg, assuming right atrial pressure equals 10 mm Hg,  consistent with moderate pulmonary hypertension.  ------------------------------------------------------------------------  Confirmed on  4/3/2017 - 17:19:47 by Jean-Pierre Carver M.D.    < end of copied text >    < from: Cardiac Cath Lab - Adult (04.26.16 @ 13:12) >  VENTRICLES: No LV gram was performed; however, a recent echocardiogram  demonstrated an EF of 19 %.  CORONARY VESSELS: The coronary circulation is right dominant.  LM:   --  LM: Normal.  LAD:   --  Proximal LAD: There was a legcfafk51 % stenosis. There was mild  restenosis in proximal LAD stent.  --  Mid LAD: Angiography showed minor luminal irregularities with no flow  limiting lesions.  --  Distal LAD: Angiography showed minor luminal irregularities with no  flow limiting lesions.  --  D1: Angiography showed minor luminal irregularities with no flow  limiting lesions.  CX:   --  Circumflex: Normal.  RI:   --  Ramus intermedius: Angiography showed minor luminal  irregularities with no flow limiting lesions.  RCA:   --  Proximal RCA: Normal.  --  Mid RCA: Angiography showed mild atherosclerosis with no flow limiting  lesions.  --  Distal RCA: Angiography showed minor luminal irregularities with no  flow limiting lesions.  --  RPDA: Angiography showed minor luminal irregularities with no flow  limiting lesions.  --  RPLS: Angiography showed minor luminal irregularities with no flow  limiting lesions.  COMPLICATIONS: There were no complications.  DIAGNOSTIC RECOMMENDATIONS: Medical management and aggressive risk factor  modification is recommended.  Prepared and signed by  Nehal López M.D.  Signed 04/27/2016 13:13:20    < end of copied text >    < from: Transthoracic Echocardiogram (12.01.17 @ 16:53) >  CONCLUSIONS:  1. Normal mitral valve. Minimal mitral regurgitation.  2. Mild left ventricular enlargement.  3. Severe global left ventricular systolic dysfunction.  4. The right ventricle is not well visualized. A device  wire is noted in the right heart.  ------------------------------------------------------------------------  Confirmed on  12/1/2017 - 18:24:53 by Carli Encarnacion M.D. RPVI    < end of copied text           DIAGNOSTIC TESTING:  [ ] Echocardiogram:  [ ]  Catheterization:  [ ] Stress Test:    OTHER: 	      ASSESSMENT/PLAN: 	77y Female with hypertension, dyslipidemia, CAD s/p LAD stent with only mild re-stenosis with minor luminal irregularities otherwise on cath 4/16, with known severe LV dysfunction , NICM s/p [employer] BiV ICD, VT on Amio, COPD, lymphoma previously on chemo with known spinal mets and compression fractures recent admit with GNR bacteremia (pseudomonas), felt to be urinary source, and sacral decub wound culture +MRSA, s/p ABX, now admitted from SNF with fever and AMS/lethargy.    Currently not answering my questions, but appears comfortable.    --Not in decomp CHF  --Abx per primary team  --recent TTE noted  - blood cx neg @ 48 hrs x 2   --f/u lumbarsacral x ray   --f/u wound recommendations   --c/w medical  management of CAD   --f/u w/ primary team for goals of care   ---to f/u Dr Hussein post discharge

## 2017-12-16 NOTE — PROGRESS NOTE ADULT - SUBJECTIVE AND OBJECTIVE BOX
Venancio Lamas MD  Cell: [phone]  Pager: [phone]    S:  Patient only nodding head to questions.  Expresses she has pain at ulcer site.    VITAL SIGNS:  Vital Signs Last 24 Hrs  T(C): 36.8 (16 Dec 2017 05:40), Max: 37.1 (15 Dec 2017 19:15)  T(F): 98.3 (16 Dec 2017 05:40), Max: 98.7 (15 Dec 2017 19:15)  HR: 92 (16 Dec 2017 05:40) (82 - 93)  BP: 99/55 (16 Dec 2017 05:40) (89/51 - 123/68)  BP(mean): --  RR: 20 (16 Dec 2017 05:40) (16 - 20)  SpO2: 99% (16 Dec 2017 05:40) (94% - 100%)      PHYSICAL EXAM:               GENERAL: cachectic, NAD		              HEAD:  Atraumatic, Normocephalic  		EYES: EOMI, conjunctiva and sclera clear  		NECK: Supple, No JVD  		CHEST/LUNG: Poor inspiratory effort; No wheeze  		HEART: Regular rate and rhythm; No murmurs, rubs, or gallops  		ABDOMEN: Soft, Nontender, Nondistended; Bowel sounds present  		GENITOURINARY: knutson in place  		BACK: stage 3-4 sacral decub ulcer worse since last admission  		EXTREMITIES:  No clubbing, cyanosis, or edema  		PSYCH: AAOx3  	NEUROLOGY: general weakness, unable to assess thoroughly for focal              	deficits                          8.3    13.66 )-----------( 366      ( 16 Dec 2017 05:45 )             26.4     12-16    133<L>  |  98  |  11  ----------------------------<  91  3.6   |  24  |  0.39<L>    Ca    8.3<L>      16 Dec 2017 05:45  Phos  1.7     12-16  Mg     1.8     12-16    TPro  5.3<L>  /  Alb  2.2<L>  /  TBili  0.3  /  DBili  x   /  AST  51<H>  /  ALT  37<H>  /  AlkPhos  95  12-16      CAPILLARY BLOOD GLUCOSE    POCT Blood Glucose.: 110 mg/dL (16 Dec 2017 05:48)  POCT Blood Glucose.: 130 mg/dL (15 Dec 2017 23:43)  POCT Blood Glucose.: 87 mg/dL (15 Dec 2017 18:12)  POCT Blood Glucose.: 100 mg/dL (15 Dec 2017 12:00)      MEDICATIONS  (STANDING):  amiodarone    Tablet 400 milliGRAM(s) Oral daily  buDESOnide 160 MICROgram(s)/formoterol 4.5 MICROgram(s) Inhaler 2 Puff(s) Inhalation two times a day  cefepime  IVPB 2000 milliGRAM(s) IV Intermittent every 12 hours  collagenase Ointment 1 Application(s) Topical two times a day  Dakins Solution - 1/2 Strength 1 Application(s) Topical two times a day  dextrose 5%. 1000 milliLiter(s) (60 mL/Hr) IV Continuous <Continuous>  enoxaparin Injectable 40 milliGRAM(s) SubCutaneous daily  latanoprost 0.005% Ophthalmic Solution 1 Drop(s) Both EYES at bedtime  levothyroxine Injectable 25 MICROGram(s) IV Push daily  metoprolol succinate ER 25 milliGRAM(s) Oral daily  mirtazapine 7.5 milliGRAM(s) Oral at bedtime  multivitamin 1 Tablet(s) Oral daily  tiotropium 18 MICROgram(s) Capsule 1 Capsule(s) Inhalation daily  vancomycin  IVPB 1000 milliGRAM(s) IV Intermittent every 24 hours Venancio Lamas MD  Cell: [phone]  Pager: [phone]    S:  Patient only nodding head to questions.  Expresses she has pain at ulcer site.    VITAL SIGNS:  Vital Signs Last 24 Hrs  T(C): 36.8 (16 Dec 2017 05:40), Max: 37.1 (15 Dec 2017 19:15)  T(F): 98.3 (16 Dec 2017 05:40), Max: 98.7 (15 Dec 2017 19:15)  HR: 92 (16 Dec 2017 05:40) (82 - 93)  BP: 99/55 (16 Dec 2017 05:40) (89/51 - 123/68)  BP(mean): --  RR: 20 (16 Dec 2017 05:40) (16 - 20)  SpO2: 99% (16 Dec 2017 05:40) (94% - 100%)      PHYSICAL EXAM:               GENERAL: cachectic, NAD		              HEAD:  Atraumatic, Normocephalic  		EYES: EOMI, conjunctiva and sclera clear  		NECK: Supple, No JVD  		CHEST/LUNG: Poor inspiratory effort; No wheeze  		HEART: Regular rate and rhythm; No murmurs, rubs, or gallops  		ABDOMEN: Soft, Nontender, Nondistended; Bowel sounds present  		GENITOURINARY: knutson in place  		BACK: stage 3-4 sacral decub ulcer worse since last admission  		EXTREMITIES:  No clubbing, cyanosis, or edema  		PSYCH: AAOx3  	NEUROLOGY: R facial droop, general weakness, unable to assess 	thoroughly for focal deficits                          8.3    13.66 )-----------( 366      ( 16 Dec 2017 05:45 )             26.4     12-16    133<L>  |  98  |  11  ----------------------------<  91  3.6   |  24  |  0.39<L>    Ca    8.3<L>      16 Dec 2017 05:45  Phos  1.7     12-16  Mg     1.8     12-16    TPro  5.3<L>  /  Alb  2.2<L>  /  TBili  0.3  /  DBili  x   /  AST  51<H>  /  ALT  37<H>  /  AlkPhos  95  12-16      CAPILLARY BLOOD GLUCOSE    POCT Blood Glucose.: 110 mg/dL (16 Dec 2017 05:48)  POCT Blood Glucose.: 130 mg/dL (15 Dec 2017 23:43)  POCT Blood Glucose.: 87 mg/dL (15 Dec 2017 18:12)  POCT Blood Glucose.: 100 mg/dL (15 Dec 2017 12:00)      MEDICATIONS  (STANDING):  amiodarone    Tablet 400 milliGRAM(s) Oral daily  buDESOnide 160 MICROgram(s)/formoterol 4.5 MICROgram(s) Inhaler 2 Puff(s) Inhalation two times a day  cefepime  IVPB 2000 milliGRAM(s) IV Intermittent every 12 hours  collagenase Ointment 1 Application(s) Topical two times a day  Dakins Solution - 1/2 Strength 1 Application(s) Topical two times a day  dextrose 5%. 1000 milliLiter(s) (60 mL/Hr) IV Continuous <Continuous>  enoxaparin Injectable 40 milliGRAM(s) SubCutaneous daily  latanoprost 0.005% Ophthalmic Solution 1 Drop(s) Both EYES at bedtime  levothyroxine Injectable 25 MICROGram(s) IV Push daily  metoprolol succinate ER 25 milliGRAM(s) Oral daily  mirtazapine 7.5 milliGRAM(s) Oral at bedtime  multivitamin 1 Tablet(s) Oral daily  tiotropium 18 MICROgram(s) Capsule 1 Capsule(s) Inhalation daily  vancomycin  IVPB 1000 milliGRAM(s) IV Intermittent every 24 hours

## 2017-12-16 NOTE — PROGRESS NOTE ADULT - PROBLEM SELECTOR PLAN 2
- likely toxic metabolic encephalopathy  - pts was able to converse normally prior to last admission  - CT head ordered - likely toxic metabolic encephalopathy  - pts was able to converse normally prior to last admission  - CT head: Opacification of the right tympanomastoid cavity which may be secondary to an effusion or inflammatory related soft tissue  - ENT consult will be placed - likely toxic metabolic encephalopathy  - pts was able to converse normally prior to last admission  - CT head: Opacification of the right tympanomastoid cavity which may be secondary to an effusion or inflammatory related soft tissue; pt will need more detailed Ear exam  - ENT consult will be placed - likely toxic metabolic encephalopathy; however pt also has R facial droop  - pts was able to converse normally prior to last admission  - CT head: Opacification of the right tympanomastoid cavity which may be secondary to an effusion or inflammatory related soft tissue;   - pt will need more detailed Ear exam  - Speech and Swallow eval pending

## 2017-12-16 NOTE — PROGRESS NOTE ADULT - PROBLEM SELECTOR PLAN 1
- decub ulcer worsened since last admission and previously tested positive for MRSA; wound care nurse and wound care  saw wound, will do debridement before re-testing for MRSA  - on vanc and cefepime (day 4); vanc trough ordered  - blood cx NGTD, urine cx grew candida  - Xray for possible osteo; ESR elevated to 101  - CXR clear, pt clearly an aspiration risk and currently unable to swallow meds  - if pt continues to be febrile may consider ARASH  - finger sticks Q6; will consider giving more D5 if pt not eating  - currently no diarrhea - decub ulcer worsened since last admission and previously tested positive for MRSA; wound care nurse and wound care  saw wound, will do debridement before re-testing for MRSA  - on vanc and cefepime (day 4); vanc trough ordered  - blood cx NGTD, urine cx grew candida  - Xray for possible osteo; ESR elevated to 101  - CXR clear, pt clearly an aspiration risk and currently unable to swallow meds  - if pt continues to be febrile may consider ARASH  - finger sticks Q6; will keep giving more D5 if pt not eating  - currently no diarrhea

## 2017-12-16 NOTE — PROGRESS NOTE ADULT - ATTENDING COMMENTS
Agree with above assessment and plan as outlined above.    - Not in clinical CHF    Raymond Kinney MD, FACC  [facility], [facility]  [address] [place] [address]  PHONE:  [phone]  BEEPER : [phone]

## 2017-12-16 NOTE — PROGRESS NOTE ADULT - ATTENDING COMMENTS
patient seen and examined by bedside, case d/w resident, agree with the above finding and plan  patient non verbal, no acute distress noted  CT head noted, opacification of rt tympanomastoid cavity ,but no evidence of intracranial pathology like mass or hemorrhage, will get ENT eval   c/w wound care patient seen and examined by bedside, case d/w resident, agree with the above finding and plan  patient non verbal, no acute distress noted  CT head noted, opacification of rt tympanomastoid cavity ,but no evidence of intracranial pathology like mass or hemorrhage, will get ENT eval   c/w wound care  hypophosphatemia supplemented, will repeat BMP in am

## 2017-12-16 NOTE — PROGRESS NOTE ADULT - SUBJECTIVE AND OBJECTIVE BOX
Subjective:   	 non verbal today lethargic  appears comfortable           Cardiovascular: Normal S1 S2,     No JVD, 1/6 ERICA murmur, Peripheral pulses palpable 2+ bilaterally  Respiratory: decreased breath sounds B/L LL's	  Gastrointestinal:  Soft, Non-tender, + BS	  Extremities no edema, cyanosis, clubbing B/L LE's       TELEMETRY: 	    ECG:  	SR A sense V paced	  RADIOLOGY:   CXR   < from: Xray Chest 1 View AP- PORTABLE-Urgent (12.13.17 @ 02:05) >  IMPRESSION:  No acute pulmonary disease.    < end of copied text >    CT HEAD     < from: CT Head w/wo IV Cont (12.15.17 @ 16:56) >  IMPRESSION:    No intracranial hemorrhage, mass effect or evidence of acute intracranial   pathology.    Opacification of the right tympanomastoid cavity which may be secondary   to an effusion or inflammatory related soft tissue.    < end of copied text >      PREVIOUS DIAGNOSTIC TESTING:      < from: TTE with Doppler (w/Cont) (04.03.17 @ 16:18) >  CONCLUSIONS:  1. Mitral annular calcification, otherwise normal mitral  valve. Mild mitral regurgitation.  2. Moderately dilated left atrium.  LA volume index = 46  cc/m2.  3. Moderate left ventricular enlargement.  4. Severe global left ventricular systolic dysfunction.  Endocardial visualization enhanced with intravenous  injection of echo contrast (Definity).  5. The right ventricle is not well visualized; grossly  normal right ventricular systolic function.  A device wire  is noted in the right heart.  6. Estimated right ventricular systolic pressure equals 60  mm Hg, assuming right atrial pressure equals 10 mm Hg,  consistent with moderate pulmonary hypertension.  ------------------------------------------------------------------------  Confirmed on  4/3/2017 - 17:19:47 by Jean-Pierre Carver M.D.    < end of copied text >    < from: Cardiac Cath Lab - Adult (04.26.16 @ 13:12) >  VENTRICLES: No LV gram was performed; however, a recent echocardiogram  demonstrated an EF of 19 %.  CORONARY VESSELS: The coronary circulation is right dominant.  LM:   --  LM: Normal.  LAD:   --  Proximal LAD: There was a [id number] % stenosis. There was mild  restenosis in proximal LAD stent.  --  Mid LAD: Angiography showed minor luminal irregularities with no flow  limiting lesions.  --  Distal LAD: Angiography showed minor luminal irregularities with no  flow limiting lesions.  --  D1: Angiography showed minor luminal irregularities with no flow  limiting lesions.  CX:   --  Circumflex: Normal.  RI:   --  Ramus intermedius: Angiography showed minor luminal  irregularities with no flow limiting lesions.  RCA:   --  Proximal RCA: Normal.  --  Mid RCA: Angiography showed mild atherosclerosis with no flow limiting  lesions.  --  Distal RCA: Angiography showed minor luminal irregularities with no  flow limiting lesions.  --  RPDA: Angiography showed minor luminal irregularities with no flow  limiting lesions.  --  RPLS: Angiography showed minor luminal irregularities with no flow  limiting lesions.  COMPLICATIONS: There were no complications.  DIAGNOSTIC RECOMMENDATIONS: Medical management and aggressive risk factor  modification is recommended.  Prepared and signed by  Nehal López M.D.  Signed 04/27/2016 13:13:20    < end of copied text >    < from: Transthoracic Echocardiogram (12.01.17 @ 16:53) >  CONCLUSIONS:  1. Normal mitral valve. Minimal mitral regurgitation.  2. Mild left ventricular enlargement.  3. Severe global left ventricular systolic dysfunction.  4. The right ventricle is not well visualized. A device  wire is noted in the right heart.  ------------------------------------------------------------------------  Confirmed on  12/1/2017 - 18:24:53 by Carli Encarnacion M.D. RPVI    < end of copied text     Lumbar             DIAGNOSTIC TESTING:  [ ] Echocardiogram:  [ ]  Catheterization:  [ ] Stress Test:    OTHER: 	      ASSESSMENT/PLAN: 	77y Female with hypertension, dyslipidemia, CAD s/p LAD stent with only mild re-stenosis with minor luminal irregularities otherwise on cath 4/16, with known severe LV dysfunction , NICM s/p Medtronic BiV ICD, VT on Amio, COPD, lymphoma previously on chemo with known spinal mets and compression fractures recent admit with GNR bacteremia (pseudomonas), felt to be urinary source, and sacral decub wound culture +MRSA, s/p ABX, now admitted from SNF with fever and AMS/lethargy.    Currently not answering my questions, but appears comfortable.    --Not in decomp CHF  --Abx per primary team  --recent TTE noted  - blood cx neg @ 72 hrs x 2  --ucx grew Candida Albicians   --f/u lumbarsacral x ray   --f/u wound recommendations   --c/w medical  management of CAD   --f/u w/ primary team for goals of care   ---to f/u Dr Hussein post discharge

## 2017-12-16 NOTE — PROGRESS NOTE ADULT - ASSESSMENT
76 yo F w/ CAD, MI, Systolic CH, s/p AICD, HTN, lymphoma previously on chemo with known spinal mets and compression fractures sent from nursing home for fever after just being discharge for AMS with tachycardia, fever, hypotension and a previous admission for UTI with pseudomonas and CRE.

## 2017-12-17 NOTE — PROGRESS NOTE ADULT - PROBLEM SELECTOR PLAN 7
- family meeting on 12/14: full code  - family to discuss feeding tube, risks explained  - PT recs: extended care facility

## 2017-12-17 NOTE — PROGRESS NOTE ADULT - ATTENDING COMMENTS
patient seen and examined by bedside, case d/w resident, agree with the above finding and plan  patient non verbal, no acute distress noted  CT head noted, opacification of rt tympanomastoid cavity ,but no evidence of intracranial pathology like mass or hemorrhage,  ENT eval requested   c/w wound care  hypophosphatemia  resolved with supplement

## 2017-12-17 NOTE — PROGRESS NOTE ADULT - SUBJECTIVE AND OBJECTIVE BOX
Patient is a 77y old  Female who presents with a chief complaint of fever (13 Dec 2017 13:52)      SUBJECTIVE / OVERNIGHT EVENTS: Pt not responding to questions today. Open eyes to voice.     MEDICATIONS  (STANDING):  amiodarone    Tablet 400 milliGRAM(s) Oral daily  aspirin  chewable 81 milliGRAM(s) Oral daily  atorvastatin 20 milliGRAM(s) Oral at bedtime  buDESOnide 160 MICROgram(s)/formoterol 4.5 MICROgram(s) Inhaler 2 Puff(s) Inhalation two times a day  cefepime  IVPB 2000 milliGRAM(s) IV Intermittent every 12 hours  collagenase Ointment 1 Application(s) Topical two times a day  Dakins Solution - 1/2 Strength 1 Application(s) Topical two times a day  dextrose 5%. 1000 milliLiter(s) (60 mL/Hr) IV Continuous <Continuous>  enoxaparin Injectable 40 milliGRAM(s) SubCutaneous daily  latanoprost 0.005% Ophthalmic Solution 1 Drop(s) Both EYES at bedtime  levothyroxine Injectable 25 MICROGram(s) IV Push daily  metoprolol succinate ER 25 milliGRAM(s) Oral daily  mirtazapine 7.5 milliGRAM(s) Oral at bedtime  multivitamin 1 Tablet(s) Oral daily  tiotropium 18 MICROgram(s) Capsule 1 Capsule(s) Inhalation daily  vancomycin  IVPB 1000 milliGRAM(s) IV Intermittent every 24 hours    MEDICATIONS  (PRN):  acetaminophen   Tablet. 650 milliGRAM(s) Oral every 6 hours PRN pain  acetaminophen  IVPB. 1000 milliGRAM(s) IV Intermittent every 24 hours PRN pain        CAPILLARY BLOOD GLUCOSE      POCT Blood Glucose.: 90 mg/dL (17 Dec 2017 07:18)  POCT Blood Glucose.: 97 mg/dL (16 Dec 2017 23:46)  POCT Blood Glucose.: 125 mg/dL (16 Dec 2017 18:01)  POCT Blood Glucose.: 128 mg/dL (16 Dec 2017 11:58)    I&O's Summary    16 Dec 2017 07:01  -  17 Dec 2017 07:00  --------------------------------------------------------  IN: 0 mL / OUT: 500 mL / NET: -500 mL      Vital Signs Last 24 Hrs  T(C): 37.6 (17 Dec 2017 05:47), Max: 37.6 (17 Dec 2017 05:47)  T(F): 99.7 (17 Dec 2017 05:47), Max: 99.7 (17 Dec 2017 05:47)  HR: 86 (17 Dec 2017 05:47) (76 - 86)  BP: 114/70 (17 Dec 2017 05:47) (105/58 - 114/70)  BP(mean): --  RR: 18 (17 Dec 2017 05:47) (18 - 19)  SpO2: 100% (17 Dec 2017 05:47) (100% - 100%)    PHYSICAL EXAM:  GENERAL: cachectic, NAD		              HEAD:  Atraumatic, Normocephalic  		EYES: EOMI, conjunctiva and sclera clear  		NECK: Supple, No JVD  		CHEST/LUNG: CTAB, No wheeze  		HEART: Regular rate and rhythm; No murmurs, rubs, or gallops  		ABDOMEN: Soft, Nontender, Nondistended; Bowel sounds present  		GENITOURINARY: knutson in place  		BACK: stage 3-4 sacral decub ulcer worse since last admission, foul smelling  		EXTREMITIES:  No clubbing, cyanosis, or edema  		      LABS:                        9.0    13.47 )-----------( 381      ( 17 Dec 2017 06:10 )             28.3     12-17    133<L>  |  97<L>  |  10  ----------------------------<  91  3.7   |  25  |  0.37<L>    Ca    8.7      17 Dec 2017 06:10  Phos  2.7     12-17  Mg     1.7     12-17    TPro  5.8<L>  /  Alb  2.4<L>  /  TBili  0.3  /  DBili  x   /  AST  51<H>  /  ALT  39<H>  /  AlkPhos  105  12-17        RADIOLOGY & ADDITIONAL TESTS: P official read of spinal xray        Consultant(s) Notes Reviewed:      Cards, wound care

## 2017-12-17 NOTE — PROGRESS NOTE ADULT - PROBLEM SELECTOR PLAN 2
- likely toxic metabolic encephalopathy; however pt also has R facial droop  - pts was able to converse normally prior to last admission  - CT head: Opacification of the right tympanomastoid cavity which may be secondary to an effusion or inflammatory related soft tissue; ENT consulted for further evaluation  - Speech and Swallow eval pending

## 2017-12-17 NOTE — CONSULT NOTE ADULT - SUBJECTIVE AND OBJECTIVE BOX
77F hx of CAD, MI, CHF, HTN, lymphoma s/p chemo with spinal mets and compression fractures currently admitted for fever and AMS from nursing home.  Pt was recently admitted for the same reason.  Pt found to be septic on admission and the source is thought to be an infected sacral ulcer.  Head imaging was performed and a right mastoid effusion was incidentally found and ENT called to evaluate.    On my arrival patient is unable to participate in exam.  Per nurse, who knows the patient from previous admission,  she has had a right facial nerve palsy since admission.  The nurse states she did not notice this on prior admission and my review of the chart does not show a facial nerve palsy at that time.    PMH: above  Allergies: PCN, peanuts    afebrile, VSS  awake, follow simple commands, does not talk  breathing comfortably  R sided CN7 paresis, difficult to examine severity due to inability to cooperate with exam but there appears to be complete eye closure.  AS normal external ear, EAC, and TM  AD non tender flat mastoid with no skin changes, normal external ear and EAC.  TM is intact with straw colored serous effusion. no evidence of infection  NC crusting and dry mucosa  OC/OP poor compliance with exam, poor dentition  Neck soft    CT:  right otomastoid opacification    A/P R sided facial palsy and simple mastoid effusion on exam.  CT also suggests simple effusion due to lack of bony erosion.  of note CT shows a normal appearing nasopharynx ruling our mass in this site.  - CT temporal bones to evaluate course of facial nerve  -  consider 9 day course of steroids to facial nerve paresis (60mg pred daily)  - tape eye closed at night if incomplete closure  - consider neurology eval  - will discuss with attending 77F hx of CAD, MI, CHF, HTN, lymphoma s/p chemo with spinal mets and compression fractures currently admitted for fever and AMS from nursing home.  Pt was recently admitted for the same reason.  Pt found to be septic on admission and the source is thought to be an infected sacral ulcer.  Head imaging was performed and a right mastoid effusion was incidentally found and ENT called to evaluate.    On my arrival patient is unable to participate in exam.  Per nurse, who knows the patient from previous admission,  she has had a right facial nerve palsy since admission.  The nurse states she did not notice this on prior admission and my review of the chart does not show a facial nerve palsy at that time.    PMH: above  Allergies: PCN, peanuts    afebrile, VSS  awake, follow simple commands, does not talk  breathing comfortably  R sided CN7 paresis, difficult to examine severity due to inability to cooperate with exam but there appears to be complete eye closure.  AS normal external ear, EAC, and TM  AD non tender flat mastoid with no skin changes, normal external ear and EAC.  TM is intact with straw colored serous effusion. no evidence of infection  NC crusting and dry mucosa  OC/OP poor compliance with exam, poor dentition  Neck soft    CT:  right otomastoid opacification    A/P R sided facial palsy and simple mastoid effusion on exam.  No clinical evidence of mastoiditis,  CT also suggests simple effusion due to lack of bony erosion.  Of note CT shows a normal appearing nasopharynx ruling out mass in this site causing unilateral effusion.  - CT temporal bones to evaluate course of facial nerve  -  consider 9 day course of steroids to facial nerve paresis (60mg pred daily)  - tape eye closed at night if incomplete closure, eye drops during day if dry  - neurology eval  - will discuss with attending

## 2017-12-17 NOTE — PROGRESS NOTE ADULT - ATTENDING COMMENTS
Patient seen and examined, agree with above assessment and plan as transcribed above.    - Cont abx per primary team  - Wound care    Raymond Kinney MD, FACC  [facility], [facility]  [address] [place] [address]  PHONE:  [phone]  BEEPER : [phone]

## 2017-12-17 NOTE — PROGRESS NOTE ADULT - SUBJECTIVE AND OBJECTIVE BOX
Subjective:   	alert  appears comfortable nodding head when spoken to      MEDICATIONS  (STANDING):  amiodarone    Tablet 400 milliGRAM(s) Oral daily  aspirin  chewable 81 milliGRAM(s) Oral daily  atorvastatin 20 milliGRAM(s) Oral at bedtime  buDESOnide 160 MICROgram(s)/formoterol 4.5 MICROgram(s) Inhaler 2 Puff(s) Inhalation two times a day  cefepime  IVPB 2000 milliGRAM(s) IV Intermittent every 12 hours  collagenase Ointment 1 Application(s) Topical two times a day  Dakins Solution - 1/2 Strength 1 Application(s) Topical two times a day  dextrose 5%. 1000 milliLiter(s) (60 mL/Hr) IV Continuous <Continuous>  enoxaparin Injectable 40 milliGRAM(s) SubCutaneous daily  latanoprost 0.005% Ophthalmic Solution 1 Drop(s) Both EYES at bedtime  levothyroxine Injectable 25 MICROGram(s) IV Push daily  metoprolol succinate ER 25 milliGRAM(s) Oral daily  mirtazapine 7.5 milliGRAM(s) Oral at bedtime  multivitamin 1 Tablet(s) Oral daily  tiotropium 18 MICROgram(s) Capsule 1 Capsule(s) Inhalation daily  vancomycin  IVPB 1000 milliGRAM(s) IV Intermittent every 24 hours    MEDICATIONS  (PRN):  acetaminophen   Tablet. 650 milliGRAM(s) Oral every 6 hours PRN pain  acetaminophen  IVPB. 1000 milliGRAM(s) IV Intermittent every 24 hours PRN pain      LABS:                        9.0    13.47 )-----------( 381      ( 17 Dec 2017 06:10 )             28.3     Hemoglobin: 9.0 g/dL (12-17 @ 06:10)  Hemoglobin: 8.3 g/dL (12-16 @ 05:45)  Hemoglobin: 8.7 g/dL (12-15 @ 05:00)  Hemoglobin: 9.0 g/dL (12-13 @ 03:00)    12-17    133<L>  |  97<L>  |  10  ----------------------------<  91  3.7   |  25  |  0.37<L>    Ca    8.7      17 Dec 2017 06:10  Phos  2.7     12-17  Mg     1.7     12-17    TPro  5.8<L>  /  Alb  2.4<L>  /  TBili  0.3  /  DBili  x   /  AST  51<H>  /  ALT  39<H>  /  AlkPhos  105  12-17    Creatinine Trend: 0.37<--, 0.39<--, 0.38<--, 0.51<--, 0.38<--, 0.42<--     PHYSICAL EXAM  Vital Signs Last 24 Hrs  T(C): 37.6 (17 Dec 2017 05:47), Max: 37.6 (17 Dec 2017 05:47)  T(F): 99.7 (17 Dec 2017 05:47), Max: 99.7 (17 Dec 2017 05:47)  HR: 86 (17 Dec 2017 05:47) (76 - 86)  BP: 114/70 (17 Dec 2017 05:47) (105/58 - 114/70)  BP(mean): --  RR: 18 (17 Dec 2017 05:47) (18 - 19)  SpO2: 100% (17 Dec 2017 05:47) (100% - 100%)          Cardiovascular: Normal S1 S2, RRR   No JVD, 1/6 ERICA murmur, Peripheral pulses palpable 2+ bilaterally  Respiratory: decreased breath sounds B/L LL's	  Gastrointestinal:  Soft, Non-tender, + BS	  Extremities no edema, cyanosis, clubbing B/L LE's       TELEMETRY: 	    ECG:  	SR A sense V paced	  RADIOLOGY:   CXR   < from: Xray Chest 1 View AP- PORTABLE-Urgent (12.13.17 @ 02:05) >  IMPRESSION:  No acute pulmonary disease.    < end of copied text >    CT HEAD     < from: CT Head w/wo IV Cont (12.15.17 @ 16:56) >  IMPRESSION:    No intracranial hemorrhage, mass effect or evidence of acute intracranial   pathology.    Opacification of the right tympanomastoid cavity which may be secondary   to an effusion or inflammatory related soft tissue.    < end of copied text >      PREVIOUS DIAGNOSTIC TESTING:      < from: TTE with Doppler (w/Cont) (04.03.17 @ 16:18) >  CONCLUSIONS:  1. Mitral annular calcification, otherwise normal mitral  valve. Mild mitral regurgitation.  2. Moderately dilated left atrium.  LA volume index = 46  cc/m2.  3. Moderate left ventricular enlargement.  4. Severe global left ventricular systolic dysfunction.  Endocardial visualization enhanced with intravenous  injection of echo contrast (Definity).  5. The right ventricle is not well visualized; grossly  normal right ventricular systolic function.  A device wire  is noted in the right heart.  6. Estimated right ventricular systolic pressure equals 60  mm Hg, assuming right atrial pressure equals 10 mm Hg,  consistent with moderate pulmonary hypertension.  ------------------------------------------------------------------------  Confirmed on  4/3/2017 - 17:19:47 by Jean-Pierre Carver M.D.    < end of copied text >    < from: Cardiac Cath Lab - Adult (04.26.16 @ 13:12) >  VENTRICLES: No LV gram was performed; however, a recent echocardiogram  demonstrated an EF of 19 %.  CORONARY VESSELS: The coronary circulation is right dominant.  LM:   --  LM: Normal.  LAD:   --  Proximal LAD: There was a [id number] % stenosis. There was mild  restenosis in proximal LAD stent.  --  Mid LAD: Angiography showed minor luminal irregularities with no flow  limiting lesions.  --  Distal LAD: Angiography showed minor luminal irregularities with no  flow limiting lesions.  --  D1: Angiography showed minor luminal irregularities with no flow  limiting lesions.  CX:   --  Circumflex: Normal.  RI:   --  Ramus intermedius: Angiography showed minor luminal  irregularities with no flow limiting lesions.  RCA:   --  Proximal RCA: Normal.  --  Mid RCA: Angiography showed mild atherosclerosis with no flow limiting  lesions.  --  Distal RCA: Angiography showed minor luminal irregularities with no  flow limiting lesions.  --  RPDA: Angiography showed minor luminal irregularities with no flow  limiting lesions.  --  RPLS: Angiography showed minor luminal irregularities with no flow  limiting lesions.  COMPLICATIONS: There were no complications.  DIAGNOSTIC RECOMMENDATIONS: Medical management and aggressive risk factor  modification is recommended.  Prepared and signed by  Nehal López M.D.  Signed 04/27/2016 13:13:20    < end of copied text >    < from: Transthoracic Echocardiogram (12.01.17 @ 16:53) >  CONCLUSIONS:  1. Normal mitral valve. Minimal mitral regurgitation.  2. Mild left ventricular enlargement.  3. Severe global left ventricular systolic dysfunction.  4. The right ventricle is not well visualized. A device  wire is noted in the right heart.  ------------------------------------------------------------------------  Confirmed on  12/1/2017 - 18:24:53 by Carli Encarnacion M.D. RPVI    < end of copied text     Lumbar             DIAGNOSTIC TESTING:  [ ] Echocardiogram:  [ ]  Catheterization:  [ ] Stress Test:    OTHER: 	      ASSESSMENT/PLAN: 	77y Female with hypertension, dyslipidemia, CAD s/p LAD stent with only mild re-stenosis with minor luminal irregularities otherwise on cath 4/16, with known severe LV dysfunction , NICM s/p Medtronic BiV ICD, VT on Amio, COPD, lymphoma previously on chemo with known spinal mets and compression fractures recent admit with GNR bacteremia (pseudomonas), felt to be urinary source, and sacral decub wound culture +MRSA, s/p ABX, now admitted from SNF with fever and AMS/lethargy.    Currently not answering my questions, but appears comfortable.    --Not in decomp CHF  --Abx per primary team  --recent TTE noted  - blood cx neg @ 96 hrs x 2  --ucx grew Candida Albicians   --f/u lumbarsacral x ray official report   --f/u wound recommendations   --c/w medical  management of CAD   --f/u w/ primary team for goals of care   ---to f/u Dr Hussein post discharge

## 2017-12-17 NOTE — PROGRESS NOTE ADULT - PROBLEM SELECTOR PLAN 1
- decub ulcer worsened since last admission and previously tested positive for MRSA; wound care nurse and wound care  saw wound, will do debridement before re-testing for MRSA  - on vanc and cefepime (day 5)  - blood cx NGTD, urine cx grew candida  - Xray official read P for possible osteo; ESR elevated to 101  - CXR clear, pt clearly an aspiration risk and currently unable to swallow meds  - if pt continues to be febrile may consider ARASH  - finger sticks Q6; will keep giving more D5 if pt not eating

## 2017-12-18 NOTE — CONSULT NOTE ADULT - PROBLEM SELECTOR RECOMMENDATION 6
Met with son with whom patient lived with prior to NH.  He is a  and his min and that of his mother are very important to them both.  There are several other siblings.  Son appears to be the primary decision maker but communicates with the other siblings.  He wants to do all interventions possible to prolong life.  He feels that if G-d takes her in spite of this, he would be able to sleep at night knowing that he did everything he could.  This includes an attempt at resuscitation from death, which he seemed to be versed in.  Emotional support provided.  Risks and benefits of these interventions (CPR/Peg/Mechanical vent) discussed in detail.

## 2017-12-18 NOTE — SWALLOW BEDSIDE ASSESSMENT ADULT - SLP PERTINENT HISTORY OF CURRENT PROBLEM
76 yo F w/ CAD, MI, Systolic CH, s/p AICD, HTN, lymphoma previously on chemo with known spinal mets and compression fractures sent from nursing home for fever after just being discharge for AMS with tachycardia, fever, hypotension and a previous admission for UTI with pseudomonas and CRE.  Pt also has sacral ulcer with previous MRSA

## 2017-12-18 NOTE — CONSULT NOTE ADULT - SUBJECTIVE AND OBJECTIVE BOX
HPI:  76 yo f w/ CAD, MI, Systolic CH, s/p AICD, HTN, lymphoma previously on chemo with known spinal mets and compression fractures sent from NH for fever after just being discharged with hospitalization due to AMS with tachycardia, fever, hypotension.  She also had previous admission for UTI with pseudomonas and CRE.  Prior to last admission she was able to converse at baseline, however since then has been non-verbal.  She recieved cefepime and vanco during last admission based on previous sensitivities.  Pt AMS improved after a few days (however she still was hardly speaking) and she passed s/s eval, so was started on oral feeds and was able to take PO meds when she was given them with ensure. Pt grew pseudomonas in blood and we continued with Cefepime and dc'd vanc. Swab of decubitus ulcer came back positive for MRSA and therefore, pt was continued on contact isolations and started on Vanc. TTE returned negative.  Pt completed 14 course of Cefepime, however, she spiked a fever 2 days after completion.   However, she then had no fevers for the next few days and repeat blood cultures were negative so ID was in agreement with stopping antibiotics and discharging back to nursing home.  Currently the patient is not speaking but does nod her head yes or no to questioning.  Only complaint is back pain where ulcer is located. (13 Dec 2017 13:52)    We are called to assist with GOC    PERTINENT PM/SXH:   Paroxysmal atrial fibrillation  GERD (gastroesophageal reflux disease)  DM (diabetes mellitus)  Asthma  MI (myocardial infarction)  CAD (coronary artery disease)  Lymphoma  HLD (hyperlipidemia)  CHF (congestive heart failure)  HTN (hypertension)    ICD (implantable cardioverter-defibrillator) in place  S/P coronary artery stent placement  S/P myomectomy  S/P appendectomy  S/P lymph node biopsy  H/O lymph node excision  H/O myomectomy  History of appendectomy    SOCIAL HISTORY:   Significant other/partner:  [s ]YES  [ ]NO*  Children:  [s ]YES  [ ]NO*  Presybeterian/Spirituality: Taoism  Substance hx:  [ ]YES  [ ]NO*  Tobacco hx:  [ ]YES  [ ]NO*  Alcohol hx: [ ]YES  [ ]NO*    Home Opioid hx:  [ ]YES  [ ]NO   Living Situation: [ ]Home  x[ ]Long term care  [ ]Rehab [ ]Other    FAMILY HISTORY:  No pertinent family history in first degree relatives    [ ]Family history non-contributory     BASELINE (I)ADLs (prior to admission):  [place]: [ ]total  [ ] moderate [x ]dependent    ADVANCE DIRECTIVES:    DNR   MOLST  [ ]YES [ ]NO                      [ ]Completed  Health Care Proxy [ ]YES  [ ]NO                   [ ]Completed  Living Will  [ ]YES [ ]NO           [ ]Surrogate  [ ]HCP  [ ]Guardian:  Phone#:    Allergies    peanuts (Unknown)  penicillin (Nausea)    Intolerances      MEDICATIONS  (STANDING):  amiodarone    Tablet 400 milliGRAM(s) Oral daily  artificial  tears Solution 1 Drop(s) Right EYE daily  aspirin  chewable 81 milliGRAM(s) Oral daily  atorvastatin 20 milliGRAM(s) Oral at bedtime  buDESOnide 160 MICROgram(s)/formoterol 4.5 MICROgram(s) Inhaler 2 Puff(s) Inhalation two times a day  collagenase Ointment 1 Application(s) Topical two times a day  Dakins Solution - 1/2 Strength 1 Application(s) Topical two times a day  dextrose 5%. 1000 milliLiter(s) (75 mL/Hr) IV Continuous <Continuous>  enoxaparin Injectable 40 milliGRAM(s) SubCutaneous daily  insulin lispro (HumaLOG) corrective regimen sliding scale   SubCutaneous three times a day before meals  insulin lispro (HumaLOG) corrective regimen sliding scale   SubCutaneous at bedtime  latanoprost 0.005% Ophthalmic Solution 1 Drop(s) Both EYES at bedtime  levothyroxine Injectable 25 MICROGram(s) IV Push daily  metoprolol succinate ER 25 milliGRAM(s) Oral daily  mirtazapine 7.5 milliGRAM(s) Oral at bedtime  multivitamin 1 Tablet(s) Oral daily  predniSONE   Tablet 60 milliGRAM(s) Oral daily  tiotropium 18 MICROgram(s) Capsule 1 Capsule(s) Inhalation daily  vancomycin  IVPB 1000 milliGRAM(s) IV Intermittent every 24 hours    MEDICATIONS  (PRN):  acetaminophen   Tablet. 650 milliGRAM(s) Oral every 6 hours PRN pain  acetaminophen  IVPB. 1000 milliGRAM(s) IV Intermittent every 24 hours PRN pain      PRESENT SYMPTOMS: Patient able to nod y/n to simple questions, non-verbal  Source: [x ]Patient   [ ]Family   [ ]Team     Pain:                        [x ]No [ ]Yes             [ ]Mild [ ]Moderate [ ]Severe  Onset -  Location -  Duration -  Character -  Alleviating/Aggravating -  Radiation -  Timing -    Dyspnea:                [x ]No [ ]Yes             [ ]Mild [ ]Moderate [ ]Severe  Anxiety:                  [x ]No [ ]Yes             [ ]Mild [ ]Moderate [ ]Severe  Fatigue:                  [ ]No [x ]Yes             [ ]Mild [ ]Moderate [ ]Severe  Nausea:                  [x ]No [ ]Yes             [ ]Mild [ ]Moderate [ ]Severe  Loss of appetite:   [ ]No [x ]Yes             [ ]Mild [ ]Moderate [ ]Severe  Constipation:        [x ]No [ ]Yes             [ ]Mild [ ]Moderate [ ]Severe    Other Symptoms:  [ ]All other review of systems negative   [ ]Unable to obtain due to poor mentation     Karnofsky Performance Score/Palliative Performance Status Version 2:  30       %  PHYSICAL EXAM:  Vital Signs Last 24 Hrs  T(C): 36.7 (18 Dec 2017 12:21), Max: 37.2 (18 Dec 2017 05:36)  T(F): 98.1 (18 Dec 2017 12:21), Max: 99 (18 Dec 2017 05:36)  HR: 77 (18 Dec 2017 12:21) (77 - 82)  BP: 114/66 (18 Dec 2017 12:21) (98/54 - 114/66)  BP(mean): --  RR: 18 (18 Dec 2017 12:21) (18 - 18)  SpO2: 95% (18 Dec 2017 12:21) (95% - 100%) I&O's Summary    17 Dec 2017 07:01  -  18 Dec 2017 07:00  --------------------------------------------------------  IN: 0 mL / OUT: 800 mL / NET: -800 mL      General:  [x ]Alert  [ ]Oriented x   [ ]Lethargic  [ ]Agitated   [ ]Cachexia   [ ]Unarousable  [ ]Verbal  [x ]Non-Verbal    HEENT:  [x ]Normal   [ ]Dry mouth   [ ]ET Tube/Trach  [ ]Oral lesions  Lungs:   [x ]Clear [ ]Tachypnea  [ ]Audible excessive secretions   [ ]Rhonchi        [ ]Right [ ]Left [ ]Bilateral  [ ]Crackles        [ ]Right [ ]Left [ ]Bilateral  [ ]Wheezing     [ ]Right [ ]Left [ ]Bilateral  Cardiovascular:  [x ]Regular [ ]Irregular [ ]Tachycardia  [ ]Bradycardia  [ ]Murmur [ ]Other +S1 +S2   Abdomen: [x ]Soft  [ ]Distended   [x ]+BS  [ ]Non tender [x ]Tender  [ ]PEG/]OGT/ NGT   Last BM:   12/17  Genitourinary: [ ]Normal [x ] Incontinent   [ ]Oliguria/Anuria   [ ]Zee  Musculoskeletal:  [ ]Normal   [x ]Weakness  [x ]Bedbound/Wheelchair bound [ ]Edema  Neurological: [ ]No focal deficits  [x ] Cognitive impairment  [ ] Dysphagia [ ]Dysarthria [ ] Paresis [ ]Other     Skin: [ ]Normal   [x ]Pressure ulcer(s) See RN documentation in the EMR which I have reviewed.   [ ]Rash    LABS:                        8.4    12.86 )-----------( 314      ( 18 Dec 2017 06:15 )             26.2     12-18    134<L>  |  99  |  9   ----------------------------<  90  3.9   |  24  |  0.40<L>    Ca    8.5      18 Dec 2017 06:15  Phos  2.3     12-18  Mg     1.7     12-18    TPro  5.1<L>  /  Alb  2.0<L>  /  TBili  0.3  /  DBili  x   /  AST  44<H>  /  ALT  32  /  AlkPhos  85  12-18          Shock: [ ]Septic [ ]Cardiogenic [ ]Neurologic [ ]Hypovolemic  Vasopressors x   Inotropes x     Protein Calorie Malnutrition: [ ]Mild [ ]Moderate [ ]Severe    Oral Intake: [ ]Unable/mouth care only [ ]Minimal [ ]Moderate [ ]Full Capability  Diet: [x ]NPO [ ]Tube feeds [ ]TPN [ ]Other     RADIOLOGY & ADDITIONAL STUDIES:   IMPRESSION:    No intracranial hemorrhage, mass effect or evidence of acute intracranial   pathology.    Opacification of the right tympanomastoid cavity which may be secondary   to an effusion or inflammatory related soft tissue.      IMPRESSION:  No acute pulmonary disease.      REFERRALS:   [ ]Chaplaincy  [ ] Hospice  [ ]Child Life  [ ]Social Work  [ ]Case management [ ]Holistic Therapy

## 2017-12-18 NOTE — PROGRESS NOTE ADULT - PROBLEM SELECTOR PLAN 1
- decub ulcer worsened since last admission and previously tested positive for MRSA; wound care nurse and wound care  saw wound, will do debridement before re-testing for MRSA  - on vanc and cefepime (day 6)  - blood cx NGTD, urine cx grew candida  - Xray official read P for possible osteo; ESR elevated to 101  - CXR clear, pt clearly an aspiration risk and currently unable to swallow meds  - if pt continues to be febrile may consider ARASH  - finger sticks Q6; will keep giving more D5 if pt not eating - decub ulcer worsened since last admission and previously tested positive for MRSA; wound care nurse and wound care  saw wound, will do debridement before re-testing for MRSA  - on vanc and cefepime (day 6)  - blood cx NGTD, urine cx grew candida  - Xray official read P for possible osteo; ESR elevated to 101  - CXR clear, pt clearly an aspiration risk and currently unable to swallow meds  - if pt continues to be febrile may consider ARASH  - finger sticks Q6; will keep giving more D5 if pt not eating  - will get GI eval for PEG - decub ulcer worsened since last admission and previously tested positive for MRSA; wound care nurse and wound care  saw wound, will do debridement before re-testing for MRSA  - on vanc and cefepime (day 6)  - blood cx NGTD, urine cx grew candida  - Xray official read P for possible osteo; ESR elevated to 101  - IV tylenol daily and morphine sln 1 mg q6  - CXR clear, pt clearly an aspiration risk and currently unable to swallow meds  - if pt continues to be febrile may consider ARASH  - finger sticks Q6; will keep giving more D5 if pt not eating  - will get GI eval for PEG - decub ulcer worsened since last admission and previously tested positive for MRSA; wound care Dr to do debridement and culture  - on vanc and cefepime (day 6)  - blood cx NGTD, urine cx grew candida  - Xray images not ideal to r/o osteo (can consider CT vs. indium WBC scan); ESR elevated to 101  - CXR clear, pt clearly an aspiration risk and currently unable to swallow meds  - if pt continues to be febrile may consider ARASH

## 2017-12-18 NOTE — PROGRESS NOTE ADULT - SUBJECTIVE AND OBJECTIVE BOX
Subjective:  lethargic but arousable, nodding head when spoken to        MEDICATIONS  (STANDING):  amiodarone    Tablet 400 milliGRAM(s) Oral daily  artificial  tears Solution 1 Drop(s) Right EYE daily  aspirin  chewable 81 milliGRAM(s) Oral daily  atorvastatin 20 milliGRAM(s) Oral at bedtime  buDESOnide 160 MICROgram(s)/formoterol 4.5 MICROgram(s) Inhaler 2 Puff(s) Inhalation two times a day  collagenase Ointment 1 Application(s) Topical two times a day  Dakins Solution - 1/2 Strength 1 Application(s) Topical two times a day  dextrose 5%. 1000 milliLiter(s) (75 mL/Hr) IV Continuous <Continuous>  enoxaparin Injectable 40 milliGRAM(s) SubCutaneous daily  insulin lispro (HumaLOG) corrective regimen sliding scale   SubCutaneous three times a day before meals  insulin lispro (HumaLOG) corrective regimen sliding scale   SubCutaneous at bedtime  latanoprost 0.005% Ophthalmic Solution 1 Drop(s) Both EYES at bedtime  levothyroxine Injectable 25 MICROGram(s) IV Push daily  metoprolol succinate ER 25 milliGRAM(s) Oral daily  mirtazapine 7.5 milliGRAM(s) Oral at bedtime  multivitamin 1 Tablet(s) Oral daily  predniSONE   Tablet 60 milliGRAM(s) Oral daily  sodium phosphate IVPB 15 milliMole(s) IV Intermittent once  tiotropium 18 MICROgram(s) Capsule 1 Capsule(s) Inhalation daily  vancomycin  IVPB 1000 milliGRAM(s) IV Intermittent every 24 hours    LABS:                        8.4    12.86 )-----------( 314      ( 18 Dec 2017 06:15 )             26.2     134<L>  |  99  |  9   ----------------------------<  90  3.9   |  24  |  0.40<L>    Ca    8.5      18 Dec 2017 06:15  Phos  2.3     12-18  Mg     1.7     12-18    TPro  5.1<L>  /  Alb  2.0<L>  /  TBili  0.3  /  DBili  x   /  AST  44<H>  /  ALT  32  /  AlkPhos  85  12-18    PHYSICAL EXAM  Vital Signs Last 24 Hrs  T(C): 37.2 (18 Dec 2017 05:36), Max: 37.2 (18 Dec 2017 05:36)  T(F): 99 (18 Dec 2017 05:36), Max: 99 (18 Dec 2017 05:36)  HR: 82 (18 Dec 2017 05:36) (81 - 82)  BP: 98/54 (18 Dec 2017 05:36) (98/54 - 100/59)  RR: 18 (18 Dec 2017 05:36) (18 - 18)  SpO2: 99% (18 Dec 2017 05:36) (99% - 100%)    Cardiovascular: Normal S1 S2, RRR   No JVD, 1/6 ERICA murmur, Peripheral pulses palpable 2+ bilaterally  Respiratory: decreased breath sounds B/L LL's	  Gastrointestinal:  Soft, Non-tender, + BS	  Extremities no edema, cyanosis, clubbing B/L LE's     DIAGNOSTIC DATA:    < from: TTE with Doppler (w/Cont) (04.03.17 @ 16:18) >  CONCLUSIONS:  1. Mitral annular calcification, otherwise normal mitral  valve. Mild mitral regurgitation.  2. Moderately dilated left atrium.  LA volume index = 46  cc/m2.  3. Moderate left ventricular enlargement.  4. Severe global left ventricular systolic dysfunction.  Endocardial visualization enhanced with intravenous  injection of echo contrast (Definity).  5. The right ventricle is not well visualized; grossly  normal right ventricular systolic function.  A device wire  is noted in the right heart.  6. Estimated right ventricular systolic pressure equals 60  mm Hg, assuming right atrial pressure equals 10 mm Hg,  consistent with moderate pulmonary hypertension.  ------------------------------------------------------------------------  Confirmed on  4/3/2017 - 17:19:47 by Jean-Pierre Carver M.D.    < end of copied text >    < from: Cardiac Cath Lab - Adult (04.26.16 @ 13:12) >  VENTRICLES: No LV gram was performed; however, a recent echocardiogram  demonstrated an EF of 19 %.  CORONARY VESSELS: The coronary circulation is right dominant.  LM:   --  LM: Normal.  LAD:   --  Proximal LAD: There was a [id number] % stenosis. There was mild  restenosis in proximal LAD stent.  --  Mid LAD: Angiography showed minor luminal irregularities with no flow  limiting lesions.  --  Distal LAD: Angiography showed minor luminal irregularities with no  flow limiting lesions.  --  D1: Angiography showed minor luminal irregularities with no flow  limiting lesions.  CX:   --  Circumflex: Normal.  RI:   --  Ramus intermedius: Angiography showed minor luminal  irregularities with no flow limiting lesions.  RCA:   --  Proximal RCA: Normal.  --  Mid RCA: Angiography showed mild atherosclerosis with no flow limiting  lesions.  --  Distal RCA: Angiography showed minor luminal irregularities with no  flow limiting lesions.  --  RPDA: Angiography showed minor luminal irregularities with no flow  limiting lesions.  --  RPLS: Angiography showed minor luminal irregularities with no flow  limiting lesions.  COMPLICATIONS: There were no complications.  DIAGNOSTIC RECOMMENDATIONS: Medical management and aggressive risk factor  modification is recommended.  Prepared and signed by  Nehal López M.D.  Signed 04/27/2016 13:13:20    < end of copied text >    < from: Transthoracic Echocardiogram (12.01.17 @ 16:53) >  CONCLUSIONS:  1. Normal mitral valve. Minimal mitral regurgitation.  2. Mild left ventricular enlargement.  3. Severe global left ventricular systolic dysfunction.  4. The right ventricle is not well visualized. A device  wire is noted in the right heart.  ------------------------------------------------------------------------  Confirmed on  12/1/2017 - 18:24:53 by Carli Encarnacion M.D. RPVI    < end of copied text      ASSESSMENT/PLAN: 	77y Female with hypertension, dyslipidemia, CAD s/p LAD stent with only mild re-stenosis with minor luminal irregularities otherwise on cath 4/16, with known severe LV dysfunction , NICM s/p Medtronic BiV ICD, VT on Amio, COPD, lymphoma previously on chemo with known spinal mets and compression fractures recent admit with GNR bacteremia (pseudomonas), felt to be urinary source, and sacral decub wound culture +MRSA, s/p ABX, now admitted from SNF with fever and AMS/lethargy.     --Not in decomp CHF  --recent TTE noted above  --On IV Abx for MRSA sacral wound/ blood cx from 12/13 negative to date  --f/u ID  --Primary team notes reviewed, per family- patient remains full code    Dolores Rodriguez PA-C  [facility]  [address], [address]   [place] [address]  office [phone]  pager [phone]

## 2017-12-18 NOTE — SWALLOW BEDSIDE ASSESSMENT ADULT - SWALLOW EVAL: RECOMMENDED DIET
1.) Oral diet is contraindicated at this time.  2.) MD to consider non-oral mean of nutrition/hydration as per patient's GOC. 1.) Oral diet is contraindicated at this time.  2.) MD to consider non-oral mean of nutrition/hydration as per patient's Goals Of Care. 1.) Oral diet is contraindicated at this time.  2.) MD to consider non-oral mean of nutrition/hydration.

## 2017-12-18 NOTE — SWALLOW BEDSIDE ASSESSMENT ADULT - ASR SWALLOW LINGUAL MOBILITY
Patient decline/refuse to accept PO trial presentations via teaspoon and cup sip presentation, as lips remained sealed; with no active retrieval; and no bolus manipulation with expectoration of minimal trial placed into the anterior oral cavity. decreased mobility, strength, and coordination

## 2017-12-18 NOTE — PROGRESS NOTE ADULT - ATTENDING COMMENTS
Patient seen and examined.  Agree with above.   -f/u palliative care  -medical management of cad recommended    Nehal López MD  [facility]  [address] [facility] [address] [facility], [address]  [place] [address]  office: [phone]  pager: [phone]

## 2017-12-18 NOTE — SWALLOW BEDSIDE ASSESSMENT ADULT - COMMENTS
Patient presented in bed, seated upright in bed, patient non-verbal communicated via head nods yes/no.  Patient provided with oral suctioning prior to puree trials as patient with poor secretion management and noted with secretions in oral cavity which were removed via sunctioned Yankauer. Patient presented in bed, seated upright in bed, patient non-verbal communicated via head nods yes/no.  Patient provided with oral suctioning prior to puree trials as patient with poor secretion management and noted with secretions in oral cavity which were removed via suctioned Yankauer.  Patient presents with right facial droop

## 2017-12-18 NOTE — PROGRESS NOTE ADULT - PROBLEM SELECTOR PLAN 2
- likely toxic metabolic encephalopathy; however pt also has R facial droop  - pts was able to converse normally prior to last admission  - CT head: Opacification of the right tympanomastoid cavity which may be secondary to an effusion or inflammatory related soft tissue  - ENT consulted for further evaluation based on CT results: recommends steroids, taping eye closed if pt unable to keep closed and eye drops  - Speech and Swallow eval pending - likely toxic metabolic encephalopathy; however pt also has R facial droop  - pts was able to converse normally prior to last admission  - CT head: Opacification of the right tympanomastoid cavity which may be secondary to an effusion or inflammatory related soft tissue  - ENT consulted for further evaluation based on CT results: recommends steroids (will not be given due to risk of infection), taping eye closed if pt unable to keep closed and eye drops

## 2017-12-18 NOTE — PROGRESS NOTE ADULT - PROBLEM SELECTOR PLAN 9
- family meeting on 12/14 and 12/18: full code  - PT recs: extended care facility  - pending chart from [facility]

## 2017-12-18 NOTE — SWALLOW BEDSIDE ASSESSMENT ADULT - SWALLOW EVAL: DIAGNOSIS
1.)  Patient presents with severe oropharyngeal stage dysphagia for puree consistency marked by absent manipulation of puree, absent anterior-posterior movement, and absent swallow trigger resulting in removal of puree trial with suction yankauer by clinician.  As a result of the aforementioned, an oral diet is contraindicated at this time and patient is at high risk for aspiration.

## 2017-12-18 NOTE — SWALLOW BEDSIDE ASSESSMENT ADULT - ASR SWALLOW ASPIRATION MONITOR
throat clearing/upper respiratory infection/change of breathing pattern/cough/oral hygiene/position upright (90Y)/fever/gurgly voice/pneumonia

## 2017-12-18 NOTE — PROGRESS NOTE ADULT - SUBJECTIVE AND OBJECTIVE BOX
Venancio Lamas MD  Cell: [phone]  Pager: [phone]    SUBJECTIVE / OVERNIGHT EVENTS: Pt not responding to questions today. Open eyes to voice. Nods head yes/no to questions.    VITAL SIGNS:  Vital Signs Last 24 Hrs  T(C): 37.2 (18 Dec 2017 05:36), Max: 37.2 (18 Dec 2017 05:36)  T(F): 99 (18 Dec 2017 05:36), Max: 99 (18 Dec 2017 05:36)  HR: 82 (18 Dec 2017 05:36) (81 - 82)  BP: 98/54 (18 Dec 2017 05:36) (98/54 - 100/59)  BP(mean): --  RR: 18 (18 Dec 2017 05:36) (18 - 18)  SpO2: 99% (18 Dec 2017 05:36) (99% - 100%)      PHYSICAL EXAM:               GENERAL: cachectic, NAD		              HEAD:  Atraumatic, Normocephalic  		EYES: EOMI, conjunctiva and sclera clear  		NECK: Supple, No JVD  		CHEST/LUNG: Poor inspiratory effort; No wheeze  		HEART: Regular rate and rhythm; No murmurs, rubs, or gallops  		ABDOMEN: Soft, Nontender, Nondistended; Bowel sounds present  		GENITOURINARY: knutson in place draining yellow liquid with debris  		BACK: stage 3-4 sacral decub ulcer worse since last admission  		EXTREMITIES:  No clubbing, cyanosis, or edema  		PSYCH: AAOx3  	NEUROLOGY: R facial droop, general weakness, unable to assess 	thoroughly for focal deficits                          8.4    12.86 )-----------( 314      ( 18 Dec 2017 06:15 )             26.2     12-18    134<L>  |  99  |  9   ----------------------------<  90  3.9   |  24  |  0.40<L>    Ca    8.5      18 Dec 2017 06:15  Phos  2.3     12-18  Mg     1.7     12-18    TPro  5.1<L>  /  Alb  2.0<L>  /  TBili  0.3  /  DBili  x   /  AST  44<H>  /  ALT  32  /  AlkPhos  85  12-18      CAPILLARY BLOOD GLUCOSE    POCT Blood Glucose.: 71 mg/dL (18 Dec 2017 06:17)  POCT Blood Glucose.: 104 mg/dL (17 Dec 2017 21:47)  POCT Blood Glucose.: 97 mg/dL (17 Dec 2017 18:10)  POCT Blood Glucose.: 101 mg/dL (17 Dec 2017 12:04)      MEDICATIONS  (STANDING):  amiodarone    Tablet 400 milliGRAM(s) Oral daily  artificial  tears Solution 1 Drop(s) Right EYE daily  aspirin  chewable 81 milliGRAM(s) Oral daily  atorvastatin 20 milliGRAM(s) Oral at bedtime  buDESOnide 160 MICROgram(s)/formoterol 4.5 MICROgram(s) Inhaler 2 Puff(s) Inhalation two times a day  cefepime  IVPB 2000 milliGRAM(s) IV Intermittent every 12 hours  collagenase Ointment 1 Application(s) Topical two times a day  Dakins Solution - 1/2 Strength 1 Application(s) Topical two times a day  dextrose 5%. 1000 milliLiter(s) (75 mL/Hr) IV Continuous <Continuous>  enoxaparin Injectable 40 milliGRAM(s) SubCutaneous daily  insulin lispro (HumaLOG) corrective regimen sliding scale   SubCutaneous three times a day before meals  insulin lispro (HumaLOG) corrective regimen sliding scale   SubCutaneous at bedtime  latanoprost 0.005% Ophthalmic Solution 1 Drop(s) Both EYES at bedtime  levothyroxine Injectable 25 MICROGram(s) IV Push daily  metoprolol succinate ER 25 milliGRAM(s) Oral daily  mirtazapine 7.5 milliGRAM(s) Oral at bedtime  multivitamin 1 Tablet(s) Oral daily  predniSONE   Tablet 60 milliGRAM(s) Oral daily  tiotropium 18 MICROgram(s) Capsule 1 Capsule(s) Inhalation daily  vancomycin  IVPB 1000 milliGRAM(s) IV Intermittent every 24 hours Venancio Lamas MD  Cell: [phone]  Pager: [phone]    SUBJECTIVE / OVERNIGHT EVENTS: Pt not responding to questions today. Open eyes to voice. Nods head yes/no to questions.    VITAL SIGNS:  Vital Signs Last 24 Hrs  T(C): 37.2 (18 Dec 2017 05:36), Max: 37.2 (18 Dec 2017 05:36)  T(F): 99 (18 Dec 2017 05:36), Max: 99 (18 Dec 2017 05:36)  HR: 82 (18 Dec 2017 05:36) (81 - 82)  BP: 98/54 (18 Dec 2017 05:36) (98/54 - 100/59)  BP(mean): --  RR: 18 (18 Dec 2017 05:36) (18 - 18)  SpO2: 99% (18 Dec 2017 05:36) (99% - 100%)      PHYSICAL EXAM:               GENERAL: cachectic, NAD		              HEAD:  Atraumatic, Normocephalic  		EYES: EOMI, conjunctiva and sclera clear  		NECK: Supple, No JVD  		CHEST/LUNG: Poor inspiratory effort; No wheeze  		HEART: Regular rate and rhythm; No murmurs, rubs, or gallops  		ABDOMEN: Soft, Nontender, Nondistended; Bowel sounds present  		GENITOURINARY: knutson in place draining yellow liquid with debris  		BACK: stage 3-4 sacral decub ulcer worse since last admission  		EXTREMITIES:  No clubbing, cyanosis, or edema  		PSYCH: Calm, nonverbal   	NEUROLOGY: R facial droop, general weakness, unable to asses thoroughly for focal deficits                          8.4    12.86 )-----------( 314      ( 18 Dec 2017 06:15 )             26.2     12-18    134<L>  |  99  |  9   ----------------------------<  90  3.9   |  24  |  0.40<L>    Ca    8.5      18 Dec 2017 06:15  Phos  2.3     12-18  Mg     1.7     12-18    TPro  5.1<L>  /  Alb  2.0<L>  /  TBili  0.3  /  DBili  x   /  AST  44<H>  /  ALT  32  /  AlkPhos  85  12-18      CAPILLARY BLOOD GLUCOSE    POCT Blood Glucose.: 71 mg/dL (18 Dec 2017 06:17)  POCT Blood Glucose.: 104 mg/dL (17 Dec 2017 21:47)  POCT Blood Glucose.: 97 mg/dL (17 Dec 2017 18:10)  POCT Blood Glucose.: 101 mg/dL (17 Dec 2017 12:04)      MEDICATIONS  (STANDING):  amiodarone    Tablet 400 milliGRAM(s) Oral daily  artificial  tears Solution 1 Drop(s) Right EYE daily  aspirin  chewable 81 milliGRAM(s) Oral daily  atorvastatin 20 milliGRAM(s) Oral at bedtime  buDESOnide 160 MICROgram(s)/formoterol 4.5 MICROgram(s) Inhaler 2 Puff(s) Inhalation two times a day  cefepime  IVPB 2000 milliGRAM(s) IV Intermittent every 12 hours  collagenase Ointment 1 Application(s) Topical two times a day  Dakins Solution - 1/2 Strength 1 Application(s) Topical two times a day  dextrose 5%. 1000 milliLiter(s) (75 mL/Hr) IV Continuous <Continuous>  enoxaparin Injectable 40 milliGRAM(s) SubCutaneous daily  insulin lispro (HumaLOG) corrective regimen sliding scale   SubCutaneous three times a day before meals  insulin lispro (HumaLOG) corrective regimen sliding scale   SubCutaneous at bedtime  latanoprost 0.005% Ophthalmic Solution 1 Drop(s) Both EYES at bedtime  levothyroxine Injectable 25 MICROGram(s) IV Push daily  metoprolol succinate ER 25 milliGRAM(s) Oral daily  mirtazapine 7.5 milliGRAM(s) Oral at bedtime  multivitamin 1 Tablet(s) Oral daily  predniSONE   Tablet 60 milliGRAM(s) Oral daily  tiotropium 18 MICROgram(s) Capsule 1 Capsule(s) Inhalation daily  vancomycin  IVPB 1000 milliGRAM(s) IV Intermittent every 24 hours

## 2017-12-18 NOTE — PROGRESS NOTE ADULT - PROBLEM SELECTOR PLAN 5
- Pt bed bound and unable to move arms well at baseline. She is completely dependent and long term resident in nursing home - will continue with home meds as pt tolerates  - EF 20% on last echo

## 2017-12-18 NOTE — PROGRESS NOTE ADULT - ATTENDING COMMENTS
patient seen and examined by bedside, case d/w resident, agree with the above finding and plan  patient non verbal, no acute distress noted  CT head noted, opacification of rt tympanomastoid cavity ,but no evidence of intracranial pathology like mass or hemorrhage,  ENT eval noted, will discuss steroid with ongoing infection  c/w wound care  hypophosphatemia  will supplement , repeat BMP  Prognosis guarded  GOC discussion with family patient seen and examined by bedside, case d/w resident, agree with the above finding and plan  patient non verbal, no acute distress noted  CT head noted, opacification of rt tympanomastoid cavity ,but no evidence of intracranial pathology like mass or hemorrhage,  ENT eval noted, will discuss steroid with ongoing infection  c/w wound care  hypophosphatemia  will supplement , repeat BMP  Prognosis guarded  cachexia : continue ensure, encouragement and assistance with feeding   GOC discussion with family

## 2017-12-18 NOTE — PROGRESS NOTE ADULT - PROBLEM SELECTOR PLAN 4
- will continue with home meds as pt tolerates - Speech and Swallow recommended NPO  - will get GI eval for PEG  - finger sticks Q6; will keep giving more D5 if pt not eating

## 2017-12-18 NOTE — CONSULT NOTE ADULT - ASSESSMENT
77 year old female with multiple medical problems, presents from NH with fever, tachycardia and hypotension with decline in functional status.  We are called to assist with GOC,

## 2017-12-18 NOTE — PROGRESS NOTE ADULT - PROBLEM SELECTOR PLAN 7
- family meeting on 12/14: full code  - family to discuss feeding tube, risks explained  - PT recs: extended care facility - family meeting on 12/14 and 12/18: full code  - PT recs: extended care facility  - pending chart from [facility] - Pt bed bound and unable to move arms well at baseline. She is completely dependent and long term resident in nursing home

## 2017-12-18 NOTE — CONSULT NOTE ADULT - SUBJECTIVE AND OBJECTIVE BOX
HPI:  78 yo f w/ CAD, MI, Systolic CH, s/p AICD, HTN, lymphoma previously on chemo with known spinal mets and compression fractures sent from NH for fever after just being discharged with hospitalization due to AMS with tachycardia, fever, hypotension.  She also had previous admission for UTI with pseudomonas and CRE.  Prior to last admission she was able to converse at baseline, however since then has been non-verbal.  She recieved cefepime and vanco during last admission based on previous sensitivities.  Pt AMS improved after a few days (however she still was hardly speaking) and she passed s/s eval, so was started on oral feeds and was able to take PO meds when she was given them with ensure. Pt grew pseudomonas in blood and we continued with Cefepime and dc'd vanc. Swab of decubitus ulcer came back positive for MRSA and therefore, pt was continued on contact isolations and started on Vanc. TTE returned negative.  Pt completed 14 course of Cefepime, however, she spiked a fever 2 days after completion.   However, she then had no fevers for the next few days and repeat blood cultures were negative so ID was in agreement with stopping antibiotics and discharging back to nursing home.  Currently the patient is not speaking but does nod her head yes or no to questioning.  Only complaint is back pain where ulcer is located. (13 Dec 2017 13:52)      PERTINENT PM/SXH:   Paroxysmal atrial fibrillation  GERD (gastroesophageal reflux disease)  DM (diabetes mellitus)  Asthma  MI (myocardial infarction)  CAD (coronary artery disease)  Lymphoma  HLD (hyperlipidemia)  CHF (congestive heart failure)  HTN (hypertension)    ICD (implantable cardioverter-defibrillator) in place  S/P coronary artery stent placement  S/P myomectomy  S/P appendectomy  S/P lymph node biopsy  H/O lymph node excision  H/O myomectomy  History of appendectomy    SOCIAL HISTORY:   Significant other/partner:  [ ]YES  [ ]NO*  Children:  [ ]YES  [ ]NO*  Bahai/Spirituality:  Substance hx:  [ ]YES  [ ]NO*  Tobacco hx:  [ ]YES  [ ]NO*  Alcohol hx: [ ]YES  [ ]NO*    Home Opioid hx:  [ ]YES  [ ]NO   Living Situation: [ ]Home  [ ]Long term care  [ ]Rehab [ ]Other    FAMILY HISTORY:  No pertinent family history in first degree relatives    [ ]Family history non-contributory     BASELINE (I)ADLs (prior to admission):  [place]: [ ]total  [ ] moderate [ ]dependent    ADVANCE DIRECTIVES:    DNR   MOLST  [ ]YES [ ]NO                      [ ]Completed  Health Care Proxy [ ]YES  [ ]NO                   [ ]Completed  Living Will  [ ]YES [ ]NO           [ ]Surrogate  [ ]HCP  [ ]Guardian:  Phone#:    Allergies    peanuts (Unknown)  penicillin (Nausea)    Intolerances      MEDICATIONS  (STANDING):  amiodarone    Tablet 400 milliGRAM(s) Oral daily  artificial  tears Solution 1 Drop(s) Right EYE daily  aspirin  chewable 81 milliGRAM(s) Oral daily  atorvastatin 20 milliGRAM(s) Oral at bedtime  buDESOnide 160 MICROgram(s)/formoterol 4.5 MICROgram(s) Inhaler 2 Puff(s) Inhalation two times a day  collagenase Ointment 1 Application(s) Topical two times a day  Dakins Solution - 1/2 Strength 1 Application(s) Topical two times a day  dextrose 5%. 1000 milliLiter(s) (75 mL/Hr) IV Continuous <Continuous>  enoxaparin Injectable 40 milliGRAM(s) SubCutaneous daily  insulin lispro (HumaLOG) corrective regimen sliding scale   SubCutaneous three times a day before meals  insulin lispro (HumaLOG) corrective regimen sliding scale   SubCutaneous at bedtime  latanoprost 0.005% Ophthalmic Solution 1 Drop(s) Both EYES at bedtime  levothyroxine Injectable 25 MICROGram(s) IV Push daily  metoprolol succinate ER 25 milliGRAM(s) Oral daily  mirtazapine 7.5 milliGRAM(s) Oral at bedtime  multivitamin 1 Tablet(s) Oral daily  predniSONE   Tablet 60 milliGRAM(s) Oral daily  sodium phosphate IVPB 15 milliMole(s) IV Intermittent once  tiotropium 18 MICROgram(s) Capsule 1 Capsule(s) Inhalation daily  vancomycin  IVPB 1000 milliGRAM(s) IV Intermittent every 24 hours    MEDICATIONS  (PRN):  acetaminophen   Tablet. 650 milliGRAM(s) Oral every 6 hours PRN pain  acetaminophen  IVPB. 1000 milliGRAM(s) IV Intermittent every 24 hours PRN pain      PRESENT SYMPTOMS:  Source: [ ]Patient   [ ]Family   [ ]Team     Pain:                        [ ]No [ ]Yes             [ ]Mild [ ]Moderate [ ]Severe  Onset -  Location -  Duration -  Character -  Alleviating/Aggravating -  Radiation -  Timing -    Dyspnea:                [ ]No [ ]Yes             [ ]Mild [ ]Moderate [ ]Severe  Anxiety:                  [ ]No [ ]Yes             [ ]Mild [ ]Moderate [ ]Severe  Fatigue:                  [ ]No [ ]Yes             [ ]Mild [ ]Moderate [ ]Severe  Nausea:                  [ ]No [ ]Yes             [ ]Mild [ ]Moderate [ ]Severe  Loss of appetite:   [ ]No [ ]Yes             [ ]Mild [ ]Moderate [ ]Severe  Constipation:        [ ]No [ ]Yes             [ ]Mild [ ]Moderate [ ]Severe    Other Symptoms:  [ ]All other review of systems negative   [ ]Unable to obtain due to poor mentation     Karnofsky Performance Score/Palliative Performance Status Version 2:         %  PHYSICAL EXAM:  Vital Signs Last 24 Hrs  T(C): 37.2 (18 Dec 2017 05:36), Max: 37.2 (18 Dec 2017 05:36)  T(F): 99 (18 Dec 2017 05:36), Max: 99 (18 Dec 2017 05:36)  HR: 82 (18 Dec 2017 05:36) (81 - 82)  BP: 98/54 (18 Dec 2017 05:36) (98/54 - 100/59)  BP(mean): --  RR: 18 (18 Dec 2017 05:36) (18 - 18)  SpO2: 99% (18 Dec 2017 05:36) (99% - 100%) I&O's Summary    17 Dec 2017 07:01  -  18 Dec 2017 07:00  --------------------------------------------------------  IN: 0 mL / OUT: 800 mL / NET: -800 mL      General:  [ ]Alert  [ ]Oriented x   [ ]Lethargic  [ ]Agitated   [ ]Cachexia   [ ]Unarousable  [ ]Verbal  [ ]Non-Verbal    HEENT:  [ ]Normal   [ ]Dry mouth   [ ]ET Tube/Trach  [ ]Oral lesions  Lungs:   [ ]Clear [ ]Tachypnea  [ ]Audible excessive secretions   [ ]Rhonchi        [ ]Right [ ]Left [ ]Bilateral  [ ]Crackles        [ ]Right [ ]Left [ ]Bilateral  [ ]Wheezing     [ ]Right [ ]Left [ ]Bilateral  Cardiovascular:  [ ]Regular [ ]Irregular [ ]Tachycardia  [ ]Bradycardia  [ ]Murmur [ ]Other  Abdomen: [ ]Soft  [ ]Distended   [ ]+BS  [ ]Non tender [ ]Tender  [ ]PEG/]OGT/ NGT   Last BM:   12/17  Genitourinary: [ ]Normal [  Incontinent   [ ]Oliguria/Anuria   [ ]Zee  Musculoskeletal:  [ ]Normal   [ ]Weakness  [ ]Bedbound/Wheelchair bound [ ]Edema  Neurological: [ ]No focal deficits  [ ] Cognitive impairment  [ ] Dysphagia [ ]Dysarthria [ ] Paresis [ ]Other     Skin: [ ]Normal   [ ]Pressure ulcer(s) sacrum See RN documentation in the EMR which I have reviewed.   [ ]Rash    LABS:                        8.4    12.86 )-----------( 314      ( 18 Dec 2017 06:15 )             26.2     12-18    134<L>  |  99  |  9   ----------------------------<  90  3.9   |  24  |  0.40<L>    Ca    8.5      18 Dec 2017 06:15  Phos  2.3     12-18  Mg     1.7     12-18    TPro  5.1<L>  /  Alb  2.0<L>  /  TBili  0.3  /  DBili  x   /  AST  44<H>  /  ALT  32  /  AlkPhos  85  12-18          Shock: [ ]Septic [ ]Cardiogenic [ ]Neurologic [ ]Hypovolemic  Vasopressors x   Inotropes x     Protein Calorie Malnutrition: [ ]Mild [ ]Moderate [ ]Severe    Oral Intake: [ ]Unable/mouth care only [ ]Minimal [ ]Moderate [ ]Full Capability  Diet: [ ]NPO [ ]Tube feeds [ ]TPN [ ]Other     RADIOLOGY & ADDITIONAL STUDIES: < from: CT Head w/wo IV Cont (12.15.17 @ 16:56) >  IMPRESSION:    No intracranial hemorrhage, mass effect or evidence of acute intracranial   pathology.    Opacification of the right tympanomastoid cavity which may be secondary   to an effusion or inflammatory related soft tissue.                < end of copied text >  < from: Xray Chest 1 View AP/PA (11.29.17 @ 11:22) >  INDINGS:    There is a left chest wall AICD present. The lungs are clear. There is no   evidence of pneumothorax or pleural effusion. The cardiomediastinal   silhouette is stable in size. Degenerative changes of the osseous   structures.    IMPRESSION:  No acute pulmonary disease.        < end of copied text >  Culture - Wound with Gram Stain (11.27.17 @ 17:25)    Culture - Wound with Gram Stain:   STAU^Staphylococcus aureus    Culture - Wound with Gram Stain:   RESULT CALLED TO: LATRELL CRESPO RN/YCR6  DATE / TIME CALLED: 11/30/17 [id number]  CALLED BY: ULYSSES MACIAS    -  Trimethoprim/Sulfamethoxazole: S <=0.5/9.5 MAGDALENA    -  Vancomycin: S 2 MAGDALENA    -  Tetra/Doxy: S <=4 MAGDALENA    -  Rifampin: S <=1 MAGDALENA    -  Cefazolin: R >16 MAGDALENA    -  Ciprofloxacin: R >2 MAGDALENA    -  Clindamycin: R >4 MAGDALENA    -  Daptomycin: S 1 MAGDALENA    -  Erythromycin: R >4 MAGDALENA    -  Gentamicin: S <=4 MAGDALENA    -  Linezolid: S 4 MAGDALENA    -  Moxifloxacin(Aerobic): R >4 MAGDALENA    -  Oxacillin: R >2 MAGDALENA    -  Penicillin: R >8 MAGDALENA    Specimen Source: OTHER    Organism Identification: Staph. aureus *MRSA*    Organism: Staph. aureus *MRSA*CR6  OXICILLIN-RESISTANT staphylococci should be regarded asCR6  RESISTANT to ALL other Beta-Lactams regardless of theCR6  in-vitro results obtained.  These include: ALLCR6  Penicillins, Cephalosporins, Amoxicillin-clavulanicCR6  acid, Ticarcillin-clavulanic acid,CR6  Ampicillin-sulbactam, and Imipenem.    Method Type: MICROSCAN POS COMBO 34        REFERRALS:   [ ]Chaplaincy  [ ] Hospice  [ ]Child Life  [ ]Social Work  [ ]Case management [ ]Holistic Therapy

## 2017-12-18 NOTE — PROGRESS NOTE ADULT - PROBLEM SELECTOR PLAN 3
- will continue with home meds as pt tolerates  - EF 20% on last echo - IV tylenol daily and morphine sln 1 mg q6 for sacral ulcer pain

## 2017-12-18 NOTE — SWALLOW BEDSIDE ASSESSMENT ADULT - PHARYNGEAL PHASE
Absent trigger of swallow/Patient with absent swallow trigger and puree trial was removed by clinician via suction Pietro

## 2017-12-18 NOTE — PROGRESS NOTE ADULT - PROBLEM SELECTOR PLAN 6
- lovenox for DVT ppx  - IV tylenol for pain  - Diet: pureed with ensure - will continue with home meds as pt tolerates

## 2017-12-19 NOTE — PROGRESS NOTE ADULT - SUBJECTIVE AND OBJECTIVE BOX
Venancio Lamas MD  Cell: [phone]  Pager: [phone]    SUBJECTIVE / OVERNIGHT EVENTS: Pt not responding to questions today. Open eyes to voice. Nods head yes/no to questions.    VITAL SIGNS:  Vital Signs Last 24 Hrs  T(C): 37.4 (19 Dec 2017 05:59), Max: 37.4 (19 Dec 2017 05:59)  T(F): 99.3 (19 Dec 2017 05:59), Max: 99.3 (19 Dec 2017 05:59)  HR: 87 (19 Dec 2017 06:51) (77 - 87)  BP: 102/60 (19 Dec 2017 06:51) (102/60 - 151/81)  BP(mean): --  RR: 18 (19 Dec 2017 05:59) (18 - 18)  SpO2: 97% (19 Dec 2017 05:59) (95% - 97%)      PHYSICAL EXAM:               GENERAL: cachectic, NAD		              HEAD:  Atraumatic, Normocephalic  		EYES: EOMI, conjunctiva and sclera clear  		NECK: Supple, No JVD  		CHEST/LUNG: Poor inspiratory effort; No wheeze  		HEART: Regular rate and rhythm; No murmurs, rubs, or gallops  		ABDOMEN: Soft, Nontender, Nondistended; Bowel sounds present  		GENITOURINARY: knutson in place draining yellow liquid with debris  		BACK: stage 3-4 sacral decub ulcer worse since last admission  		EXTREMITIES:  No clubbing, cyanosis, or edema  		PSYCH: Calm, nonverbal   	NEUROLOGY: R facial droop, general weakness, unable to asses 	thoroughly for focal deficits                          8.4    11.63 )-----------( 392      ( 19 Dec 2017 06:30 )             24.8     12-19    134<L>  |  96<L>  |  9   ----------------------------<  97  3.1<L>   |  25  |  0.39<L>    Ca    8.3<L>      19 Dec 2017 06:30  Phos  2.5     12-19  Mg     1.6     12-19    TPro  5.1<L>  /  Alb  2.0<L>  /  TBili  0.3  /  DBili  x   /  AST  38<H>  /  ALT  32  /  AlkPhos  83  12-19      CAPILLARY BLOOD GLUCOSE    POCT Blood Glucose.: 106 mg/dL (19 Dec 2017 06:06)  POCT Blood Glucose.: 99 mg/dL (18 Dec 2017 22:57)  POCT Blood Glucose.: 110 mg/dL (18 Dec 2017 18:02)  POCT Blood Glucose.: 114 mg/dL (18 Dec 2017 12:19)      MEDICATIONS  (STANDING):  amiodarone    Tablet 400 milliGRAM(s) Oral daily  artificial  tears Solution 1 Drop(s) Right EYE daily  aspirin  chewable 81 milliGRAM(s) Oral daily  atorvastatin 20 milliGRAM(s) Oral at bedtime  buDESOnide 160 MICROgram(s)/formoterol 4.5 MICROgram(s) Inhaler 2 Puff(s) Inhalation two times a day  collagenase Ointment 1 Application(s) Topical two times a day  Dakins Solution - 1/2 Strength 1 Application(s) Topical two times a day  dextrose 5%. 1000 milliLiter(s) (75 mL/Hr) IV Continuous <Continuous>  enoxaparin Injectable 40 milliGRAM(s) SubCutaneous daily  insulin lispro (HumaLOG) corrective regimen sliding scale   SubCutaneous three times a day before meals  insulin lispro (HumaLOG) corrective regimen sliding scale   SubCutaneous at bedtime  latanoprost 0.005% Ophthalmic Solution 1 Drop(s) Both EYES at bedtime  levothyroxine Injectable 25 MICROGram(s) IV Push daily  metoprolol succinate ER 25 milliGRAM(s) Oral daily  mirtazapine 7.5 milliGRAM(s) Oral at bedtime  multivitamin 1 Tablet(s) Oral daily  predniSONE   Tablet 60 milliGRAM(s) Oral daily  tiotropium 18 MICROgram(s) Capsule 1 Capsule(s) Inhalation daily Venancio Lamas MD  Cell: [phone]  Pager: [phone]    SUBJECTIVE / OVERNIGHT EVENTS: Pt not responding to questions today. Open eyes to voice. Nods head yes/no to questions.    VITAL SIGNS:  Vital Signs Last 24 Hrs  T(C): 37.4 (19 Dec 2017 05:59), Max: 37.4 (19 Dec 2017 05:59)  T(F): 99.3 (19 Dec 2017 05:59), Max: 99.3 (19 Dec 2017 05:59)  HR: 87 (19 Dec 2017 06:51) (77 - 87)  BP: 102/60 (19 Dec 2017 06:51) (102/60 - 151/81)  BP(mean): --  RR: 18 (19 Dec 2017 05:59) (18 - 18)  SpO2: 97% (19 Dec 2017 05:59) (95% - 97%)      PHYSICAL EXAM:               GENERAL: cachectic, NAD		              HEAD:  Atraumatic, Normocephalic  		EYES: EOMI, conjunctiva and sclera clear  		NECK: Supple, No JVD  		CHEST/LUNG: Poor inspiratory effort; No wheeze  		HEART: Regular rate and rhythm; No murmurs, rubs, or gallops  		ABDOMEN: Soft, Nontender, Nondistended; Bowel sounds present  		GENITOURINARY: knutson in place draining yellow liquid with debris  		BACK: stage 3-4 sacral decub ulcer worse since last admission  		EXTREMITIES:  No clubbing, cyanosis, or edema  		PSYCH: Calm, nonverbal   	NEUROLOGY: R facial droop, general weakness, unable to asses thoroughly for focal deficits, patient not following commands                          8.4    11.63 )-----------( 392      ( 19 Dec 2017 06:30 )             24.8     12-19    134<L>  |  96<L>  |  9   ----------------------------<  97  3.1<L>   |  25  |  0.39<L>    Ca    8.3<L>      19 Dec 2017 06:30  Phos  2.5     12-19  Mg     1.6     12-19    TPro  5.1<L>  /  Alb  2.0<L>  /  TBili  0.3  /  DBili  x   /  AST  38<H>  /  ALT  32  /  AlkPhos  83  12-19      CAPILLARY BLOOD GLUCOSE    POCT Blood Glucose.: 106 mg/dL (19 Dec 2017 06:06)  POCT Blood Glucose.: 99 mg/dL (18 Dec 2017 22:57)  POCT Blood Glucose.: 110 mg/dL (18 Dec 2017 18:02)  POCT Blood Glucose.: 114 mg/dL (18 Dec 2017 12:19)      MEDICATIONS  (STANDING):  amiodarone    Tablet 400 milliGRAM(s) Oral daily  artificial  tears Solution 1 Drop(s) Right EYE daily  aspirin  chewable 81 milliGRAM(s) Oral daily  atorvastatin 20 milliGRAM(s) Oral at bedtime  buDESOnide 160 MICROgram(s)/formoterol 4.5 MICROgram(s) Inhaler 2 Puff(s) Inhalation two times a day  collagenase Ointment 1 Application(s) Topical two times a day  Dakins Solution - 1/2 Strength 1 Application(s) Topical two times a day  dextrose 5%. 1000 milliLiter(s) (75 mL/Hr) IV Continuous <Continuous>  enoxaparin Injectable 40 milliGRAM(s) SubCutaneous daily  insulin lispro (HumaLOG) corrective regimen sliding scale   SubCutaneous three times a day before meals  insulin lispro (HumaLOG) corrective regimen sliding scale   SubCutaneous at bedtime  latanoprost 0.005% Ophthalmic Solution 1 Drop(s) Both EYES at bedtime  levothyroxine Injectable 25 MICROGram(s) IV Push daily  metoprolol succinate ER 25 milliGRAM(s) Oral daily  mirtazapine 7.5 milliGRAM(s) Oral at bedtime  multivitamin 1 Tablet(s) Oral daily  predniSONE   Tablet 60 milliGRAM(s) Oral daily  tiotropium 18 MICROgram(s) Capsule 1 Capsule(s) Inhalation daily

## 2017-12-19 NOTE — PROGRESS NOTE ADULT - SUBJECTIVE AND OBJECTIVE BOX
Subjective:  lethargic but arousable, nodding head when spoken to          MEDICATIONS  (STANDING):  amiodarone    Tablet 400 milliGRAM(s) Oral daily  artificial  tears Solution 1 Drop(s) Right EYE daily  aspirin  chewable 81 milliGRAM(s) Oral daily  atorvastatin 20 milliGRAM(s) Oral at bedtime  buDESOnide 160 MICROgram(s)/formoterol 4.5 MICROgram(s) Inhaler 2 Puff(s) Inhalation two times a day  collagenase Ointment 1 Application(s) Topical two times a day  Dakins Solution - 1/2 Strength 1 Application(s) Topical two times a day  dextrose 5%. 1000 milliLiter(s) (75 mL/Hr) IV Continuous <Continuous>  enoxaparin Injectable 40 milliGRAM(s) SubCutaneous daily  insulin lispro (HumaLOG) corrective regimen sliding scale   SubCutaneous three times a day before meals  insulin lispro (HumaLOG) corrective regimen sliding scale   SubCutaneous at bedtime  latanoprost 0.005% Ophthalmic Solution 1 Drop(s) Both EYES at bedtime  levothyroxine Injectable 25 MICROGram(s) IV Push daily  metoprolol succinate ER 25 milliGRAM(s) Oral daily  mirtazapine 7.5 milliGRAM(s) Oral at bedtime  multivitamin 1 Tablet(s) Oral daily  predniSONE   Tablet 60 milliGRAM(s) Oral daily  tiotropium 18 MICROgram(s) Capsule 1 Capsule(s) Inhalation daily    MEDICATIONS  (PRN):  acetaminophen   Tablet. 650 milliGRAM(s) Oral every 6 hours PRN pain  acetaminophen  IVPB. 1000 milliGRAM(s) IV Intermittent every 24 hours PRN pain      LABS:                        8.4    11.63 )-----------( 392      ( 19 Dec 2017 06:30 )             24.8     Hemoglobin: 8.4 g/dL (12-19 @ 06:30)  Hemoglobin: 8.4 g/dL (12-18 @ 06:15)  Hemoglobin: 9.0 g/dL (12-17 @ 06:10)  Hemoglobin: 8.3 g/dL (12-16 @ 05:45)  Hemoglobin: 8.7 g/dL (12-15 @ 05:00)    12-19    134<L>  |  96<L>  |  9   ----------------------------<  97  3.1<L>   |  25  |  0.39<L>    Ca    8.3<L>      19 Dec 2017 06:30  Phos  2.5     12-19  Mg     1.6     12-19    TPro  5.1<L>  /  Alb  2.0<L>  /  TBili  0.3  /  DBili  x   /  AST  38<H>  /  ALT  32  /  AlkPhos  83  12-19    Creatinine Trend: 0.39<--, 0.40<--, 0.37<--, 0.39<--, 0.38<--, 0.51<--           PHYSICAL EXAM  Vital Signs Last 24 Hrs  T(C): 37.1 (19 Dec 2017 12:48), Max: 37.4 (19 Dec 2017 05:59)  T(F): 98.8 (19 Dec 2017 12:48), Max: 99.3 (19 Dec 2017 05:59)  HR: 84 (19 Dec 2017 12:55) (81 - 87)  BP: 109/66 (19 Dec 2017 12:55) (92/60 - 151/81)  BP(mean): --  RR: 76 (19 Dec 2017 12:55) (16 - 76)  SpO2: 99% (19 Dec 2017 12:55) (96% - 99%)    Cardiovascular: Normal S1 S2, RRR   No JVD, 1/6 ERICA murmur, Peripheral pulses palpable 2+ bilaterally  Respiratory: decreased breath sounds B/L LL's	  Gastrointestinal:  Soft, Non-tender, + BS	  Extremities no edema, cyanosis, clubbing B/L LE's     DIAGNOSTIC DATA:    < from: TTE with Doppler (w/Cont) (04.03.17 @ 16:18) >  CONCLUSIONS:  1. Mitral annular calcification, otherwise normal mitral  valve. Mild mitral regurgitation.  2. Moderately dilated left atrium.  LA volume index = 46  cc/m2.  3. Moderate left ventricular enlargement.  4. Severe global left ventricular systolic dysfunction.  Endocardial visualization enhanced with intravenous  injection of echo contrast (Definity).  5. The right ventricle is not well visualized; grossly  normal right ventricular systolic function.  A device wire  is noted in the right heart.  6. Estimated right ventricular systolic pressure equals 60  mm Hg, assuming right atrial pressure equals 10 mm Hg,  consistent with moderate pulmonary hypertension.  ------------------------------------------------------------------------  Confirmed on  4/3/2017 - 17:19:47 by Jean-Pierre Carver M.D.    < end of copied text >    < from: Cardiac Cath Lab - Adult (04.26.16 @ 13:12) >  VENTRICLES: No LV gram was performed; however, a recent echocardiogram  demonstrated an EF of 19 %.  CORONARY VESSELS: The coronary circulation is right dominant.  LM:   --  LM: Normal.  LAD:   --  Proximal LAD: There was a sfhyyyst86 % stenosis. There was mild  restenosis in proximal LAD stent.  --  Mid LAD: Angiography showed minor luminal irregularities with no flow  limiting lesions.  --  Distal LAD: Angiography showed minor luminal irregularities with no  flow limiting lesions.  --  D1: Angiography showed minor luminal irregularities with no flow  limiting lesions.  CX:   --  Circumflex: Normal.  RI:   --  Ramus intermedius: Angiography showed minor luminal  irregularities with no flow limiting lesions.  RCA:   --  Proximal RCA: Normal.  --  Mid RCA: Angiography showed mild atherosclerosis with no flow limiting  lesions.  --  Distal RCA: Angiography showed minor luminal irregularities with no  flow limiting lesions.  --  RPDA: Angiography showed minor luminal irregularities with no flow  limiting lesions.  --  RPLS: Angiography showed minor luminal irregularities with no flow  limiting lesions.  COMPLICATIONS: There were no complications.  DIAGNOSTIC RECOMMENDATIONS: Medical management and aggressive risk factor  modification is recommended.  Prepared and signed by  Nehal López M.D.  Signed 04/27/2016 13:13:20    < end of copied text >    < from: Transthoracic Echocardiogram (12.01.17 @ 16:53) >  CONCLUSIONS:  1. Normal mitral valve. Minimal mitral regurgitation.  2. Mild left ventricular enlargement.  3. Severe global left ventricular systolic dysfunction.  4. The right ventricle is not well visualized. A device  wire is noted in the right heart.  ------------------------------------------------------------------------  Confirmed on  12/1/2017 - 18:24:53 by Carli Encarnacion M.D. RPVI    < end of copied text      ASSESSMENT/PLAN: 	77y Female with hypertension, dyslipidemia, CAD s/p LAD stent with only mild re-stenosis with minor luminal irregularities otherwise on cath 4/16, with known severe LV dysfunction , NICM s/p [employer] BiV ICD, VT on Amio, COPD, lymphoma previously on chemo with known spinal mets and compression fractures recent admit with GNR bacteremia (pseudomonas), felt to be urinary source, and sacral decub wound culture +MRSA, s/p ABX, now admitted from SNF with fever and AMS/lethargy.     --med management of CAD with ASA/Statin/BB  --Not in decomp CHF  --recent TTE noted above  --ID --On IV Abx for MRSA sacral wound/ blood cx from 12/13 negative to date  --Primary team notes reviewed, per family- patient remains full code  - f/u palliative eval

## 2017-12-19 NOTE — PROGRESS NOTE ADULT - PROBLEM SELECTOR PLAN 9
- family meeting on 12/14 and 12/18: full code  - PT recs: extended care facility  - pending chart from [facility]

## 2017-12-19 NOTE — PROGRESS NOTE ADULT - SUBJECTIVE AND OBJECTIVE BOX
HPI:  76 yo f w/ CAD, MI, Systolic CH, s/p AICD, HTN, lymphoma previously on chemo with known spinal mets and compression fractures sent from NH for fever after just being discharged with hospitalization due to AMS with tachycardia, fever, hypotension.  She also had previous admission for UTI with pseudomonas and CRE.  Prior to last admission she was able to converse at baseline, however since then has been non-verbal.  She recieved cefepime and vanco during last admission based on previous sensitivities.  Pt AMS improved after a few days (however she still was hardly speaking) and she passed s/s eval, so was started on oral feeds and was able to take PO meds when she was given them with ensure. Pt grew pseudomonas in blood and we continued with Cefepime and dc'd vanc. Swab of decubitus ulcer came back positive for MRSA and therefore, pt was continued on contact isolations and started on Vanc. TTE returned negative.  Pt completed 14 course of Cefepime, however, she spiked a fever 2 days after completion.   However, she then had no fevers for the next few days and repeat blood cultures were negative so ID was in agreement with stopping antibiotics and discharging back to nursing home.  Currently the patient is not speaking but does nod her head yes or no to questioning.  Only complaint is back pain where ulcer is located. (13 Dec 2017 13:52)      PERTINENT PM/SXH:   Paroxysmal atrial fibrillation  GERD (gastroesophageal reflux disease)  DM (diabetes mellitus)  Asthma  MI (myocardial infarction)  CAD (coronary artery disease)  Lymphoma  HLD (hyperlipidemia)  CHF (congestive heart failure)  HTN (hypertension)    ICD (implantable cardioverter-defibrillator) in place  S/P coronary artery stent placement  S/P myomectomy  S/P appendectomy  S/P lymph node biopsy  H/O lymph node excision  H/O myomectomy  History of appendectomy    SOCIAL HISTORY:   Significant other/partner:  [ ]YES  [ ]NO*  Children:  [ ]YES  [ ]NO*  Nondenominational/Spirituality:  Substance hx:  [ ]YES  [ ]NO*  Tobacco hx:  [ ]YES  [ ]NO*  Alcohol hx: [ ]YES  [ ]NO*    Home Opioid hx:  [ ]YES  [ ]NO   Living Situation: [ ]Home  [ ]Long term care  [ ]Rehab [ ]Other    FAMILY HISTORY:  No pertinent family history in first degree relatives    [ ]Family history non-contributory     BASELINE (I)ADLs (prior to admission):  [place]: [ ]total  [ ] moderate [ ]dependent    ADVANCE DIRECTIVES:    DNR   MOLST  [ ]YES [ ]NO                      [ ]Completed  Health Care Proxy [ ]YES  [ ]NO                   [ ]Completed  Living Will  [ ]YES [ ]NO           [ ]Surrogate  [ ]HCP  [ ]Guardian:  Phone#:    Allergies    peanuts (Unknown)  penicillin (Nausea)    Intolerances      MEDICATIONS  (STANDING):  amiodarone    Tablet 400 milliGRAM(s) Oral daily  artificial  tears Solution 1 Drop(s) Right EYE daily  aspirin  chewable 81 milliGRAM(s) Oral daily  atorvastatin 20 milliGRAM(s) Oral at bedtime  buDESOnide 160 MICROgram(s)/formoterol 4.5 MICROgram(s) Inhaler 2 Puff(s) Inhalation two times a day  collagenase Ointment 1 Application(s) Topical two times a day  Dakins Solution - 1/2 Strength 1 Application(s) Topical two times a day  dextrose 5%. 1000 milliLiter(s) (75 mL/Hr) IV Continuous <Continuous>  enoxaparin Injectable 40 milliGRAM(s) SubCutaneous daily  insulin lispro (HumaLOG) corrective regimen sliding scale   SubCutaneous three times a day before meals  insulin lispro (HumaLOG) corrective regimen sliding scale   SubCutaneous at bedtime  latanoprost 0.005% Ophthalmic Solution 1 Drop(s) Both EYES at bedtime  levothyroxine Injectable 25 MICROGram(s) IV Push daily  metoprolol succinate ER 25 milliGRAM(s) Oral daily  mirtazapine 7.5 milliGRAM(s) Oral at bedtime  multivitamin 1 Tablet(s) Oral daily  predniSONE   Tablet 60 milliGRAM(s) Oral daily  sodium phosphate IVPB 15 milliMole(s) IV Intermittent once  tiotropium 18 MICROgram(s) Capsule 1 Capsule(s) Inhalation daily  vancomycin  IVPB 1000 milliGRAM(s) IV Intermittent every 24 hours    MEDICATIONS  (PRN):  acetaminophen   Tablet. 650 milliGRAM(s) Oral every 6 hours PRN pain  acetaminophen  IVPB. 1000 milliGRAM(s) IV Intermittent every 24 hours PRN pain      PRESENT SYMPTOMS:  Source: [ ]Patient   [ ]Family   [ ]Team     Pain:                        [ ]No [ ]Yes             [ ]Mild [ ]Moderate [ ]Severe  Onset -  Location -  Duration -  Character -  Alleviating/Aggravating -  Radiation -  Timing -    Dyspnea:                [ ]No [ ]Yes             [ ]Mild [ ]Moderate [ ]Severe  Anxiety:                  [ ]No [ ]Yes             [ ]Mild [ ]Moderate [ ]Severe  Fatigue:                  [ ]No [ ]Yes             [ ]Mild [ ]Moderate [ ]Severe  Nausea:                  [ ]No [ ]Yes             [ ]Mild [ ]Moderate [ ]Severe  Loss of appetite:   [ ]No [ ]Yes             [ ]Mild [ ]Moderate [ ]Severe  Constipation:        [ ]No [ ]Yes             [ ]Mild [ ]Moderate [ ]Severe    Other Symptoms:  [ ]All other review of systems negative   [ ]Unable to obtain due to poor mentation     Karnofsky Performance Score/Palliative Performance Status Version 2:         %  PHYSICAL EXAM:  Vital Signs Last 24 Hrs  T(C): 37.2 (18 Dec 2017 05:36), Max: 37.2 (18 Dec 2017 05:36)  T(F): 99 (18 Dec 2017 05:36), Max: 99 (18 Dec 2017 05:36)  HR: 82 (18 Dec 2017 05:36) (81 - 82)  BP: 98/54 (18 Dec 2017 05:36) (98/54 - 100/59)  BP(mean): --  RR: 18 (18 Dec 2017 05:36) (18 - 18)  SpO2: 99% (18 Dec 2017 05:36) (99% - 100%) I&O's Summary    17 Dec 2017 07:01  -  18 Dec 2017 07:00  --------------------------------------------------------  IN: 0 mL / OUT: 800 mL / NET: -800 mL      General:  [ ]Alert  [ ]Oriented x   [ ]Lethargic  [ ]Agitated   [ ]Cachexia   [ ]Unarousable  [ ]Verbal  [ ]Non-Verbal    HEENT:  [ ]Normal   [ ]Dry mouth   [ ]ET Tube/Trach  [ ]Oral lesions  Lungs:   [ ]Clear [ ]Tachypnea  [ ]Audible excessive secretions   [ ]Rhonchi        [ ]Right [ ]Left [ ]Bilateral  [ ]Crackles        [ ]Right [ ]Left [ ]Bilateral  [ ]Wheezing     [ ]Right [ ]Left [ ]Bilateral  Cardiovascular:  [ ]Regular [ ]Irregular [ ]Tachycardia  [ ]Bradycardia  [ ]Murmur [ ]Other  Abdomen: [ ]Soft  [ ]Distended   [ ]+BS  [ ]Non tender [ ]Tender  [ ]PEG/]OGT/ NGT   Last BM:   12/17  Genitourinary: [ ]Normal [  Incontinent   [ ]Oliguria/Anuria   [ ]Zee  Musculoskeletal:  [ ]Normal   [ ]Weakness  [ ]Bedbound/Wheelchair bound [ ]Edema  Neurological: [ ]No focal deficits  [ ] Cognitive impairment  [ ] Dysphagia [ ]Dysarthria [ ] Paresis [ ]Other     Skin: [ ]Normal   [ ]Pressure ulcer(s) sacrum See RN documentation in the EMR which I have reviewed.   [ ]Rash    LABS:                        8.4    12.86 )-----------( 314      ( 18 Dec 2017 06:15 )             26.2     12-18    134<L>  |  99  |  9   ----------------------------<  90  3.9   |  24  |  0.40<L>    Ca    8.5      18 Dec 2017 06:15  Phos  2.3     12-18  Mg     1.7     12-18    TPro  5.1<L>  /  Alb  2.0<L>  /  TBili  0.3  /  DBili  x   /  AST  44<H>  /  ALT  32  /  AlkPhos  85  12-18          Shock: [ ]Septic [ ]Cardiogenic [ ]Neurologic [ ]Hypovolemic  Vasopressors x   Inotropes x     Protein Calorie Malnutrition: [ ]Mild [ ]Moderate [ ]Severe    Oral Intake: [ ]Unable/mouth care only [ ]Minimal [ ]Moderate [ ]Full Capability  Diet: [ ]NPO [ ]Tube feeds [ ]TPN [ ]Other     RADIOLOGY & ADDITIONAL STUDIES: < from: CT Head w/wo IV Cont (12.15.17 @ 16:56) >  IMPRESSION:    No intracranial hemorrhage, mass effect or evidence of acute intracranial   pathology.    Opacification of the right tympanomastoid cavity which may be secondary   to an effusion or inflammatory related soft tissue.                < end of copied text >  < from: Xray Chest 1 View AP/PA (11.29.17 @ 11:22) >  INDINGS:    There is a left chest wall AICD present. The lungs are clear. There is no   evidence of pneumothorax or pleural effusion. The cardiomediastinal   silhouette is stable in size. Degenerative changes of the osseous   structures.    IMPRESSION:  No acute pulmonary disease.        < end of copied text >  Culture - Wound with Gram Stain (11.27.17 @ 17:25)    Culture - Wound with Gram Stain:   STAU^Staphylococcus aureus    Culture - Wound with Gram Stain:   RESULT CALLED TO: LATRELL CRESPO RN/YCR6  DATE / TIME CALLED: 11/30/17 [id number]  CALLED BY: ULYSSES MACIAS    -  Trimethoprim/Sulfamethoxazole: S <=0.5/9.5 MAGDALENA    -  Vancomycin: S 2 MAGDALENA    -  Tetra/Doxy: S <=4 MAGDALENA    -  Rifampin: S <=1 MAGDALENA    -  Cefazolin: R >16 MAGDALENA    -  Ciprofloxacin: R >2 MAGDALENA    -  Clindamycin: R >4 MAGDALENA    -  Daptomycin: S 1 MAGDALENA    -  Erythromycin: R >4 MAGDALENA    -  Gentamicin: S <=4 MAGDALENA    -  Linezolid: S 4 MAGDALENA    -  Moxifloxacin(Aerobic): R >4 MAGDALENA    -  Oxacillin: R >2 MAGDALENA    -  Penicillin: R >8 MAGDALENA    Specimen Source: OTHER    Organism Identification: Staph. aureus *MRSA*    Organism: Staph. aureus *MRSA*CR6  OXICILLIN-RESISTANT staphylococci should be regarded asCR6  RESISTANT to ALL other Beta-Lactams regardless of theCR6  in-vitro results obtained.  These include: ALLCR6  Penicillins, Cephalosporins, Amoxicillin-clavulanicCR6  acid, Ticarcillin-clavulanic acid,CR6  Ampicillin-sulbactam, and Imipenem.    Method Type: MICROSCAN POS COMBO 34        REFERRALS:   [ ]Chaplaincy  [ ] Hospice  [ ]Child Life  [ ]Social Work  [ ]Case management [ ]Holistic Therapy

## 2017-12-19 NOTE — PROGRESS NOTE ADULT - PROBLEM SELECTOR PLAN 1
- decub ulcer worsened since last admission and previously tested positive for MRSA; wound care Dr to do debridement and culture  - on vanc and cefepime (day 7)  - blood cx NGTD, urine cx grew candida  - Xray images not ideal to r/o osteo (can consider CT vs. indium WBC scan); ESR elevated to 101  - CXR clear, pt clearly an aspiration risk and currently unable to swallow meds  - if pt continues to be febrile may consider ARASH - decub ulcer worsened since last admission and previously tested positive for MRSA; wound care Dr to do debridement and culture tomorrow  - on vanc and cefepime (day 7), vanc held today based on trough  - blood cx NGTD, urine cx grew candida  - Xray images not ideal to r/o osteo (can consider CT vs. indium WBC scan); ESR elevated to 101  - CXR clear, pt clearly an aspiration risk and currently unable to swallow meds  - if pt continues to be febrile may consider ARASH

## 2017-12-19 NOTE — PROGRESS NOTE ADULT - ATTENDING COMMENTS
Patient to be discharged from [facility] team consult service today.  Please re-consult if we can be of further assistance, ext [phone].    Lala Ricketts MD

## 2017-12-19 NOTE — PROGRESS NOTE ADULT - ATTENDING COMMENTS
Patient was seen and examined personally by me. I have discussed the plan and reviewed the resident's note and agree with the above physical exam findings including assessment and plan except as indicated below.    77 female admitted with sepsis 2/2 infected sacral decubitus.    Nonverbal and not following commands. Occasionally nods head.  + indwelling knutson    Awaiting sacral debridment. 12/13 Bcx NGTD, wound care. Repeat Vanco trough and dose Vanc based on trough.  Pain control with morphine 1mg q6 ATC PO, hold for sedation  CT head with opacification of rt tympanomastoid cavity ,but no evidence of intracranial pathology like mass or hemorrhage, awaiting  CT Int auditory canal per ENT recs. no indication for steroids with palsy of this duration  Supplement potassium for hypokalemia  Appreciate palliative consult. Currently Full code  Awaiting GI consult for PEG per family wishes  Prognosis guarded

## 2017-12-19 NOTE — PROGRESS NOTE ADULT - ATTENDING COMMENTS
Patient seen and examined.  Agree with above.   -f/u palliative care re: GOC  -medical management of cad recommended    Nehal López MD  [facility]  [address], [address]  [place] [address]  office: [phone]  pager: [phone]

## 2017-12-19 NOTE — PROGRESS NOTE ADULT - PROBLEM SELECTOR PLAN 2
- likely toxic metabolic encephalopathy; however pt also has R facial droop  - pts was able to converse normally prior to last admission  - CT head: Opacification of the right tympanomastoid cavity which may be secondary to an effusion or inflammatory related soft tissue  - ENT consulted for further evaluation based on CT results: recommends steroids (will not be given due to risk of infection), taping eye closed if pt unable to keep closed and eye drops - likely toxic metabolic encephalopathy; however pt also has R facial droop  - pts was able to converse normally prior to last admission  - CT head: Opacification of the right tympanomastoid cavity which may be secondary to an effusion or inflammatory related soft tissue  - ENT consulted for further evaluation based on CT results: recommends steroids (will not be given due to risk of infection), taping eye closed if pt unable to keep closed and eye drops - also CTIAC ordered

## 2017-12-19 NOTE — PROGRESS NOTE ADULT - PROBLEM SELECTOR PLAN 4
- Speech and Swallow recommended NPO  - will get GI eval for PEG  - finger sticks Q6; will keep giving more D5 if pt not eating

## 2017-12-19 NOTE — PROGRESS NOTE ADULT - SUBJECTIVE AND OBJECTIVE BOX
INTERVAL HPI/OVERNIGHT EVENTS: No new events, patient with fever, MMP, sacral wound in need of debridement  GOC discussion yesterday.    Allergies    peanuts (Unknown)  penicillin (Nausea)    Intolerances      ADVANCE DIRECTIVES:    DNR   MOLST [ ] YES [x ] NO     MEDICATIONS  (STANDING):  amiodarone    Tablet 400 milliGRAM(s) Oral daily  artificial  tears Solution 1 Drop(s) Right EYE daily  aspirin  chewable 81 milliGRAM(s) Oral daily  atorvastatin 20 milliGRAM(s) Oral at bedtime  buDESOnide 160 MICROgram(s)/formoterol 4.5 MICROgram(s) Inhaler 2 Puff(s) Inhalation two times a day  collagenase Ointment 1 Application(s) Topical two times a day  Dakins Solution - 1/2 Strength 1 Application(s) Topical two times a day  dextrose 5%. 1000 milliLiter(s) (75 mL/Hr) IV Continuous <Continuous>  enoxaparin Injectable 40 milliGRAM(s) SubCutaneous daily  insulin lispro (HumaLOG) corrective regimen sliding scale   SubCutaneous three times a day before meals  insulin lispro (HumaLOG) corrective regimen sliding scale   SubCutaneous at bedtime  latanoprost 0.005% Ophthalmic Solution 1 Drop(s) Both EYES at bedtime  levothyroxine Injectable 25 MICROGram(s) IV Push daily  metoprolol succinate ER 25 milliGRAM(s) Oral daily  mirtazapine 7.5 milliGRAM(s) Oral at bedtime  morphine   Solution 1 milliGRAM(s) Oral every 6 hours  multivitamin 1 Tablet(s) Oral daily  predniSONE   Tablet 60 milliGRAM(s) Oral daily  tiotropium 18 MICROgram(s) Capsule 1 Capsule(s) Inhalation daily    MEDICATIONS  (PRN):  acetaminophen   Tablet. 650 milliGRAM(s) Oral every 6 hours PRN pain      PRESENT SYMPTOMS: Patient non-verbal  Source: [x ] Patient   [ ] Family   [s ] Team  notes discomfort when doing wound care.    Pain:                        [ ] No [ ] Yes             [ ] Mild [ ] Moderate [ ] Severe    Onset -  Location -  Duration -  Character -  Alleviating/Aggravating -  Radiation -  Timing -    Dyspnea:                [ ] No [ ] Yes             [ ] Mild [ ] Moderate [ ] Severe  Anxiety:                  [ ] No [ ] Yes             [ ] Mild [ ] Moderate [ ] Severe  Fatigue:                  [ ] No [ ] Yes             [ ] Mild [ ] Moderate [ ] Severe  Nausea:                  [ ] No [ ] Yes             [ ] Mild [ ] Moderate [ ] Severe  Loss of appetite:   [ ] No [ x] Yes             [ ] Mild [ ] Moderate [ ] Severe  Constipation:        [ ] No [ ] Yes             [ ] Mild [ ] Moderate [ ] Severe    Other Symptoms:  [ ] All other review of systems negative   [x ] Unable to obtain due to poor mentation     Karnofsky Performance Score/Palliative Performance Status Version 2:   30      %  PHYSICAL EXAM:  Vital Signs Last 24 Hrs  T(C): 37.1 (19 Dec 2017 12:48), Max: 37.4 (19 Dec 2017 05:59)  T(F): 98.8 (19 Dec 2017 12:48), Max: 99.3 (19 Dec 2017 05:59)  HR: 84 (19 Dec 2017 12:55) (81 - 87)  BP: 109/66 (19 Dec 2017 12:55) (92/60 - 151/81)  BP(mean): --  RR: 76 (19 Dec 2017 12:55) (16 - 76)  SpO2: 99% (19 Dec 2017 12:55) (96% - 99%) I&O's Summary    18 Dec 2017 07:01  -  19 Dec 2017 07:00  --------------------------------------------------------  IN: 0 mL / OUT: 800 mL / NET: -800 mL    19 Dec 2017 07:01  -  19 Dec 2017 14:38  --------------------------------------------------------  IN: 0 mL / OUT: 700 mL / NET: -700 mL        General:  [x ] Alert  [ ] Oriented x      [ ] Lethargic  [ ] Agitated   [ ] Cachexia   [ ] Unarousable  [ ] Verbal  [x ] Non-Verbal  HEENT:  [x ] Normal   [ ] Dry mouth   [ ] ET Tube    [ ] Trach  [ ] Oral lesions right swelling upper lip with droop  Lungs:   [x ] Clear [ ] Tachypnea  [ ] Audible excessive secretions   [ ] Rhonchi        [ ] Right [ ] Left [ ] Bilateral  [ ] Crackles        [ ] Right [ ] Left [ ] Bilateral  [ ] Wheezing     [ ] Right [ ] Left [ ] Bilateral  Cardiovascular:  [ x] Regular [ ] Irregular [ ] Tachycardia   [ ] Bradycardia  [ ] Murmur [ ] Other +S1 +S2   Abdomen: [x ] Soft  [ ] Distended   [x ] +BS  [x ] Non tender [ ] Tender  [ ]PEG   [ ]OGT/ NGT   Last BM:   See RN documentation in the EMR which I have reviewed.   Genitourinary: [ ] Normal [x ] Incontinent   [ ] Oliguria/Anuria   [ ] Zee  Musculoskeletal:  [ ] Normal   [ ] Weakness  [x ] Bedbound/Wheelchair bound [x ] Edema  Neurological: [ ] No focal deficits  [ x] Cognitive impairment  [x ] Dysphagia [ ] Dysarthria [ ] Paresis [ ] Other   Skin: [ ] Normal   [x ] Pressure ulcer(s)                  [ ] Rash    LABS:                        8.4    11.63 )-----------( 392      ( 19 Dec 2017 06:30 )             24.8     12-19    134<L>  |  96<L>  |  9   ----------------------------<  97  3.1<L>   |  25  |  0.39<L>    Ca    8.3<L>      19 Dec 2017 06:30  Phos  2.5     12-19  Mg     1.6     12-19    TPro  5.1<L>  /  Alb  2.0<L>  /  TBili  0.3  /  DBili  x   /  AST  38<H>  /  ALT  32  /  AlkPhos  83  12-19          Shock: [ ] Septic [ ] Cardiogenic [ ] Neurologic [ ] Hypovolemic  Vasopressors x   Inotropes x     Oral Intake: [x ] Unable/mouth care only [ ] Minimal [ ] Moderate [ ] Full Capability    Diet: [ ] NPO [ ] Tube feeds [ ] TPN [ ] Other     RADIOLOGY & ADDITIONAL STUDIES: reviewed    REFERRALS:   [ ] Chaplaincy  [ ] Hospice  [ ] Child Life  [ ] Social Work  [ ] Case management [ ] Holistic Therapy

## 2017-12-19 NOTE — PROGRESS NOTE ADULT - PROBLEM SELECTOR PLAN 3
- IV tylenol daily and morphine sln 1 mg q6 for sacral ulcer pain - morphine sln 1 mg q6 for sacral ulcer pain; to be done before wound changes

## 2017-12-19 NOTE — PROGRESS NOTE ADULT - SUBJECTIVE AND OBJECTIVE BOX
Pt seen and examined at bedside.  No changes in status.  Discussed patient with team and they report the facial palsy has been present for at least a month and was never worked up.    vss  awake, minimally interactive  follows some simple commands  R facial nerve weakness, difficult to assess severity due poor ability to follow commands  near complete eye closure  Non tender R mastoid and external ear      A/P R sided facial palsy of at least one month duration and simple mastoid effusion on exam.  No clinical evidence of mastoiditis,  CT also suggests simple effusion.  Of note CT shows a normal appearing nasopharynx ruling out mass in this site causing unilateral effusion.  - CT temporal bone  - no indication for steroids with palsy of this duration  - tape eye closed at night, eye drops during day Q3-4hrs  - consider neurology eval  - discussed with neuro-otology attending

## 2017-12-20 NOTE — PROGRESS NOTE ADULT - PROBLEM SELECTOR PLAN 1
- decub ulcer worsened since last admission and previously tested positive for MRSA; wound care Dr to do debridement and culture tomorrow  - on vanc and cefepime (day 8), vanc held today based on trough  - blood cx NGTD, urine cx grew candida  - Xray images not ideal to r/o osteo (can consider CT vs. indium WBC scan); ESR elevated to 101  - CXR clear, pt clearly an aspiration risk and currently unable to swallow meds  - if pt continues to be febrile may consider ARASH - decub ulcer worsened since last admission and previously tested positive for MRSA  - wound care Dr performed debridement and culture today  - on vanc (day 8), vanc 500 daily started based on trough  - blood cx negative, urine cx grew candida  - Xray images not ideal to r/o osteo (can consider CT vs. indium WBC scan); ESR elevated to 101  - CXR clear, pt clearly an aspiration risk and currently unable to swallow meds  - if pt continues to be febrile may consider ARASH - prob to bone: suspect osteomyelitis  decub ulcer worsened since last admission and previously tested positive for MRSA, bcx NGTD  - wound care Dr performed debridement and culture today  - on vanc (day 8), vanc 500 daily started based on trough  - blood cx negative, urine cx grew candida  - Xray images not ideal to r/o osteo (can consider CT vs. indium WBC scan); ESR elevated to 101  - CXR clear, pt clearly an aspiration risk and currently unable to swallow meds  - if pt continues to be febrile may consider ARASH

## 2017-12-20 NOTE — CONSULT NOTE ADULT - SUBJECTIVE AND OBJECTIVE BOX
GASTROENTEROLOGY INITIAL CONSULT NOTE    Chief Complaint:  Patient is a 77y old  Female who presents with a chief complaint of fever (13 Dec 2017 13:52)    HPI: 77y Female 77 year old female with PMH CAD s/p PCI (on ASA 81), non-ischemic cardiomyopathy/systolic heart failure (EF 20%) s/p ACID, ventricular tachycardia (on amiodarone), COPD, lymphoma with spinal metastasis, hypertension, hyperlipidemia, recent admission for Pseudonomas bacteremia/UTI now admitted with fever and AMS/lethargy found to have +MRSA from sacral decubitus ulcer currently on vancomycin and cefepime. GI consulted for possible PEG tube placement. Patient was seen by speech and swallow on 12/18 and was found to have "absent trigger of swallow". Patient unable to give history and no family at bedside.     Allergies:  peanuts (Unknown)  penicillin (Nausea)      Hospital Medications:  acetaminophen   Tablet. 650 milliGRAM(s) Oral every 6 hours PRN  amiodarone    Tablet 400 milliGRAM(s) Oral daily  artificial  tears Solution 1 Drop(s) Right EYE daily  aspirin  chewable 81 milliGRAM(s) Oral daily  atorvastatin 20 milliGRAM(s) Oral at bedtime  buDESOnide 160 MICROgram(s)/formoterol 4.5 MICROgram(s) Inhaler 2 Puff(s) Inhalation two times a day  Dakins Solution - 1/2 Strength 1 Application(s) Topical two times a day  dextrose 5%. 1000 milliLiter(s) IV Continuous <Continuous>  dextrose Gel 1 Dose(s) Oral every 6 hours PRN  dextrose Gel 1 Dose(s) Oral every 6 hours PRN  dextrose Gel 1 Dose(s) Oral once  enoxaparin Injectable 40 milliGRAM(s) SubCutaneous daily  insulin lispro (HumaLOG) corrective regimen sliding scale   SubCutaneous at bedtime  latanoprost 0.005% Ophthalmic Solution 1 Drop(s) Both EYES at bedtime  levothyroxine Injectable 25 MICROGram(s) IV Push daily  metoprolol succinate ER 25 milliGRAM(s) Oral daily  mirtazapine 7.5 milliGRAM(s) Oral at bedtime  morphine   Solution 1 milliGRAM(s) Oral every 6 hours  multivitamin 1 Tablet(s) Oral daily  tiotropium 18 MICROgram(s) Capsule 1 Capsule(s) Inhalation daily  vancomycin  IVPB 500 milliGRAM(s) IV Intermittent once  vancomycin  IVPB          PMHX/PSHX:  Paroxysmal atrial fibrillation  GERD (gastroesophageal reflux disease)  DM (diabetes mellitus)  Asthma  MI (myocardial infarction)  CAD (coronary artery disease)  Lymphoma  HLD (hyperlipidemia)  CHF (congestive heart failure)  HTN (hypertension)  ICD (implantable cardioverter-defibrillator) in place  S/P coronary artery stent placement  S/P myomectomy  S/P appendectomy  S/P lymph node biopsy  H/O lymph node excision  H/O myomectomy  History of appendectomy      Family history:  No pertinent family history in first degree relatives      Social History: Unable to obtain    ROS:  Unable to obtain      PHYSICAL EXAM:   Vital Signs:  Vital Signs Last 24 Hrs  T(C): 37.2 (20 Dec 2017 04:25), Max: 37.2 (20 Dec 2017 04:25)  T(F): 99 (20 Dec 2017 04:25), Max: 99 (20 Dec 2017 04:25)  HR: 85 (20 Dec 2017 04:25) (82 - 86)  BP: 99/51 (20 Dec 2017 04:25) (92/60 - 111/60)  BP(mean): --  RR: 18 (20 Dec 2017 04:25) (16 - 76)  SpO2: 98% (20 Dec 2017 04:25) (97% - 99%)  Daily     Daily     GENERAL:  chronically ill appearing   HEENT:  -icterus   CHEST:  clear bilaterally, no wheezes or rales  HEART:  RRR, S1S2  ABDOMEN:  soft, non-tender, non-distended, normoactive bowel sounds,  no hepato-splenomegaly  EXTEREMITIES:  no  edema  SKIN:  No rash  NEURO:  intermittently opens eyes    LABS:                        5.3    13.96 )-----------( 481      ( 20 Dec 2017 06:30 )             15.2     12-20    135  |  98  |  10  ----------------------------<  75  Test not performed SPECIMEN GROSSLY HEMOLYZED   |  26  |  0.43<L>    Ca    8.7      20 Dec 2017 06:30  Phos  2.6     12-20  Mg     1.9     12-20    TPro  6.1  /  Alb  2.3<L>  /  TBili  0.3  /  DBili  x   /  AST  69<H>  /  ALT  34<H>  /  AlkPhos  84  12-20    LIVER FUNCTIONS - ( 20 Dec 2017 06:30 )  Alb: 2.3 g/dL / Pro: 6.1 g/dL / ALK PHOS: 84 u/L / ALT: 34 u/L / AST: 69 u/L / GGT: x GASTROENTEROLOGY INITIAL CONSULT NOTE    Chief Complaint:  Patient is a 77y old  Female who presents with a chief complaint of fever (13 Dec 2017 13:52)    HPI: 77y Female 77 year old female with PMH CAD s/p PCI (on ASA 81), non-ischemic cardiomyopathy/systolic heart failure (EF 20%) s/p ACID, ventricular tachycardia (on amiodarone), COPD, lymphoma with spinal metastasis, hypertension, hyperlipidemia, recent admission for Pseudonomas bacteremia/UTI now admitted with fever and AMS/lethargy found to have +MRSA from sacral decubitus ulcer currently on vancomycin and cefepime. GI consulted for possible PEG tube placement. Patient was seen by speech and swallow on 12/18 and was found to have "absent trigger of swallow". Patient unable to give history and no family at bedside.     Allergies:  peanuts (Unknown)  penicillin (Nausea)      Hospital Medications:  acetaminophen   Tablet. 650 milliGRAM(s) Oral every 6 hours PRN  amiodarone    Tablet 400 milliGRAM(s) Oral daily  artificial  tears Solution 1 Drop(s) Right EYE daily  aspirin  chewable 81 milliGRAM(s) Oral daily  atorvastatin 20 milliGRAM(s) Oral at bedtime  buDESOnide 160 MICROgram(s)/formoterol 4.5 MICROgram(s) Inhaler 2 Puff(s) Inhalation two times a day  Dakins Solution - 1/2 Strength 1 Application(s) Topical two times a day  dextrose 5%. 1000 milliLiter(s) IV Continuous <Continuous>  dextrose Gel 1 Dose(s) Oral every 6 hours PRN  dextrose Gel 1 Dose(s) Oral every 6 hours PRN  dextrose Gel 1 Dose(s) Oral once  enoxaparin Injectable 40 milliGRAM(s) SubCutaneous daily  insulin lispro (HumaLOG) corrective regimen sliding scale   SubCutaneous at bedtime  latanoprost 0.005% Ophthalmic Solution 1 Drop(s) Both EYES at bedtime  levothyroxine Injectable 25 MICROGram(s) IV Push daily  metoprolol succinate ER 25 milliGRAM(s) Oral daily  mirtazapine 7.5 milliGRAM(s) Oral at bedtime  morphine   Solution 1 milliGRAM(s) Oral every 6 hours  multivitamin 1 Tablet(s) Oral daily  tiotropium 18 MICROgram(s) Capsule 1 Capsule(s) Inhalation daily  vancomycin  IVPB 500 milliGRAM(s) IV Intermittent once  vancomycin  IVPB          PMHX/PSHX:  Paroxysmal atrial fibrillation  GERD (gastroesophageal reflux disease)  DM (diabetes mellitus)  Asthma  MI (myocardial infarction)  CAD (coronary artery disease)  Lymphoma  HLD (hyperlipidemia)  CHF (congestive heart failure)  HTN (hypertension)  ICD (implantable cardioverter-defibrillator) in place  S/P coronary artery stent placement  S/P myomectomy  S/P appendectomy  S/P lymph node biopsy  H/O lymph node excision  H/O myomectomy  History of appendectomy      Family history:  No pertinent family history in first degree relatives      Social History: Unable to obtain    ROS:  Unable to obtain      PHYSICAL EXAM:   Vital Signs:  Vital Signs Last 24 Hrs  T(C): 37.2 (20 Dec 2017 04:25), Max: 37.2 (20 Dec 2017 04:25)  T(F): 99 (20 Dec 2017 04:25), Max: 99 (20 Dec 2017 04:25)  HR: 85 (20 Dec 2017 04:25) (82 - 86)  BP: 99/51 (20 Dec 2017 04:25) (92/60 - 111/60)  BP(mean): --  RR: 18 (20 Dec 2017 04:25) (16 - 76)  SpO2: 98% (20 Dec 2017 04:25) (97% - 99%)  Daily     Daily     GENERAL:  chronically ill appearing   HEENT:  -icterus, +JVD  CHEST:  bibasilar crackles   HEART:  RRR, S1S2  ABDOMEN:  soft, mild distension, diffuse/non-specific tenderness to palpation   EXTEREMITIES:  no  edema  SKIN:  No rash  NEURO:  intermittently opens eyes and moans     LABS:                        5.3    13.96 )-----------( 481      ( 20 Dec 2017 06:30 )             15.2     12-20    135  |  98  |  10  ----------------------------<  75  Test not performed SPECIMEN GROSSLY HEMOLYZED   |  26  |  0.43<L>    Ca    8.7      20 Dec 2017 06:30  Phos  2.6     12-20  Mg     1.9     12-20    TPro  6.1  /  Alb  2.3<L>  /  TBili  0.3  /  DBili  x   /  AST  69<H>  /  ALT  34<H>  /  AlkPhos  84  12-20    LIVER FUNCTIONS - ( 20 Dec 2017 06:30 )  Alb: 2.3 g/dL / Pro: 6.1 g/dL / ALK PHOS: 84 u/L / ALT: 34 u/L / AST: 69 u/L / GGT: x

## 2017-12-20 NOTE — DIETITIAN INITIAL EVALUATION ADULT. - PHYSICAL APPEARANCE
debilitated/Subcutaneous fat loss of orbital region.  Muscle loss of clavicle region & acromion w protruding, prominent bone.

## 2017-12-20 NOTE — DIETITIAN INITIAL EVALUATION ADULT. - NS AS NUTRI INTERV COLLABORAT
1)Establish long term nutritional goals w consideration for comfort feeds (as/if consistent w Pt/family wishes).               2)At this time, PO diet deferred to medical team.                         3)Obtain daily weights               4)RDN remains available.  Uma Knight RDN, CD/N  pager [phone]

## 2017-12-20 NOTE — PROCEDURE NOTE - PROCEDURE
<<-----Click on this checkbox to enter Procedure Excisional debridement of wound  12/20/2017  sacral ulcer  Active  CDELPIN

## 2017-12-20 NOTE — PROGRESS NOTE ADULT - ASSESSMENT
78 yo F w/ CAD, MI, Systolic CH, s/p AICD, HTN, lymphoma previously on chemo with known spinal mets and compression fractures sent from nursing home for fever after just being discharge for AMS with tachycardia, fever, hypotension and a previous admission for UTI with pseudomonas and CRE. 76 yo F w/ CAD, MI, Systolic CH, s/p AICD, HTN, lymphoma previously on chemo with known spinal mets and compression fractures sent from nursing home for fever after just being discharge for AMS with tachycardia, fever, hypotension and a previous admission for UTI with pseudomonas and CRE along with sacral ulcer with MRSA. 78 yo Female w/ CAD, MI, Systolic CH, s/p AICD, HTN, h/o lymphoma s/p chemo, on remission, chronic indwelling Zee, recent admit for sepsis 2/2 UTI (hx of pseudomonas in the urine, CRE), sacral decub (hx of MRSA) sent from [facility] for fever.

## 2017-12-20 NOTE — CONSULT NOTE ADULT - ASSESSMENT
Impression:   1) Altered mental status with swallow evaluation showing absent swallow trigger: GI consult requested for possible PEG placement   2) NICM (EF 20%) s/p AiCD   3) Lymphoma with metastatic disease   4) Ventricular tachycardia on amiodarone   5) CAD s/p PCI on ASA 81       Plan:   - given poor functional status, significant comorbidities, and overall poor prognosis patient would not benefit from PEG placement and furthermore would be extremely high risk for sedation and endoscopic PEG tube placement   - recommend ongoing goals of care discussion with primary team and palliative care   - PO diet as tolerated with assistance, otherwise consider Doboff feeding tube   - call with questions Impression:   1) Altered mental status with swallow evaluation showing absent swallow trigger: GI consult requested for possible PEG placement   2) NICM (EF 20%) s/p AiCD   3) Lymphoma with metastatic disease   4) Ventricular tachycardia on amiodarone   5) CAD s/p PCI on ASA 81       Plan:   - given poor functional status, significant comorbidities, and overall poor prognosis patient would not benefit from PEG placement and furthermore would be extremely high risk for sedation and endoscopic PEG tube placement   - recommend ongoing goals of care discussion with primary team and palliative care   - NPO given poor mental status (per RN, today is worse than previous when she was able to take Ensure with crushed medications); if mental status improves can trial liquid diet otherwise consider Doboff feeding tube if in line with goals of care  - call with questions

## 2017-12-20 NOTE — PROGRESS NOTE ADULT - PROBLEM SELECTOR PLAN 10
- GI to call son today to discuss PEG, will f/u with son tomorrow  - family meeting on 12/14 and 12/18: full code  - PT recs: extended care facility  - pending chart from [facility]

## 2017-12-20 NOTE — PROGRESS NOTE ADULT - SUBJECTIVE AND OBJECTIVE BOX
Subjective:  lethargic but arousable    MEDICATIONS  (STANDING):  amiodarone    Tablet 400 milliGRAM(s) Oral daily  artificial  tears Solution 1 Drop(s) Right EYE daily  aspirin  chewable 81 milliGRAM(s) Oral daily  atorvastatin 20 milliGRAM(s) Oral at bedtime  buDESOnide 160 MICROgram(s)/formoterol 4.5 MICROgram(s) Inhaler 2 Puff(s) Inhalation two times a day  Dakins Solution - 1/2 Strength 1 Application(s) Topical two times a day  dextrose 5%. 1000 milliLiter(s) (75 mL/Hr) IV Continuous <Continuous>  enoxaparin Injectable 40 milliGRAM(s) SubCutaneous daily  insulin lispro (HumaLOG) corrective regimen sliding scale   SubCutaneous at bedtime  latanoprost 0.005% Ophthalmic Solution 1 Drop(s) Both EYES at bedtime  levothyroxine Injectable 25 MICROGram(s) IV Push daily  lidocaine 1%/EPINEPHrine 1:100,000 Inj 20 milliLiter(s) Local Injection once  metoprolol succinate ER 25 milliGRAM(s) Oral daily  mirtazapine 7.5 milliGRAM(s) Oral at bedtime  morphine   Solution 1 milliGRAM(s) Oral every 6 hours  multivitamin 1 Tablet(s) Oral daily  tiotropium 18 MICROgram(s) Capsule 1 Capsule(s) Inhalation daily  vancomycin  IVPB 500 milliGRAM(s) IV Intermittent once  vancomycin  IVPB          LABS:                        8.3    13.36 )-----------( 396      ( 20 Dec 2017 07:45 )             25.7     135  |  98  |  10  ----------------------------<  75  Test not performed SPECIMEN GROSSLY HEMOLYZED   |  26  |  0.43<L>    Ca    8.7      20 Dec 2017 06:30  Phos  2.6     12-20  Mg     1.9     12-20    TPro  6.1  /  Alb  2.3<L>  /  TBili  0.3  /  DBili  x   /  AST  69<H>  /  ALT  34<H>  /  AlkPhos  84  12-20    PHYSICAL EXAM  Vital Signs Last 24 Hrs  T(C): 37.2 (20 Dec 2017 04:25), Max: 37.2 (20 Dec 2017 04:25)  T(F): 99 (20 Dec 2017 04:25), Max: 99 (20 Dec 2017 04:25)  HR: 85 (20 Dec 2017 04:25) (82 - 86)  BP: 99/51 (20 Dec 2017 04:25) (92/60 - 111/60)  RR: 18 (20 Dec 2017 04:25) (16 - 76)  SpO2: 98% (20 Dec 2017 04:25) (97% - 99%)    Cardiovascular: Normal S1 S2, RRR   No JVD, 1/6 ERICA murmur, Peripheral pulses palpable 2+ bilaterally  Respiratory: decreased breath sounds B/L LL's	  Gastrointestinal:  Soft, Non-tender, + BS	  Extremities no edema, cyanosis, clubbing B/L LE's     DIAGNOSTIC DATA:    < from: TTE with Doppler (w/Cont) (04.03.17 @ 16:18) >  CONCLUSIONS:  1. Mitral annular calcification, otherwise normal mitral  valve. Mild mitral regurgitation.  2. Moderately dilated left atrium.  LA volume index = 46  cc/m2.  3. Moderate left ventricular enlargement.  4. Severe global left ventricular systolic dysfunction.  Endocardial visualization enhanced with intravenous  injection of echo contrast (Definity).  5. The right ventricle is not well visualized; grossly  normal right ventricular systolic function.  A device wire  is noted in the right heart.  6. Estimated right ventricular systolic pressure equals 60  mm Hg, assuming right atrial pressure equals 10 mm Hg,  consistent with moderate pulmonary hypertension.  ------------------------------------------------------------------------  Confirmed on  4/3/2017 - 17:19:47 by Jean-Pierre Carver M.D.    < end of copied text >    < from: Cardiac Cath Lab - Adult (04.26.16 @ 13:12) >  VENTRICLES: No LV gram was performed; however, a recent echocardiogram  demonstrated an EF of 19 %.  CORONARY VESSELS: The coronary circulation is right dominant.  LM:   --  LM: Normal.  LAD:   --  Proximal LAD: There was a [id number] % stenosis. There was mild  restenosis in proximal LAD stent.  --  Mid LAD: Angiography showed minor luminal irregularities with no flow  limiting lesions.  --  Distal LAD: Angiography showed minor luminal irregularities with no  flow limiting lesions.  --  D1: Angiography showed minor luminal irregularities with no flow  limiting lesions.  CX:   --  Circumflex: Normal.  RI:   --  Ramus intermedius: Angiography showed minor luminal  irregularities with no flow limiting lesions.  RCA:   --  Proximal RCA: Normal.  --  Mid RCA: Angiography showed mild atherosclerosis with no flow limiting  lesions.  --  Distal RCA: Angiography showed minor luminal irregularities with no  flow limiting lesions.  --  RPDA: Angiography showed minor luminal irregularities with no flow  limiting lesions.  --  RPLS: Angiography showed minor luminal irregularities with no flow  limiting lesions.  COMPLICATIONS: There were no complications.  DIAGNOSTIC RECOMMENDATIONS: Medical management and aggressive risk factor  modification is recommended.  Prepared and signed by  Nehal López M.D.  Signed 04/27/2016 13:13:20    < end of copied text >    < from: Transthoracic Echocardiogram (12.01.17 @ 16:53) >  CONCLUSIONS:  1. Normal mitral valve. Minimal mitral regurgitation.  2. Mild left ventricular enlargement.  3. Severe global left ventricular systolic dysfunction.  4. The right ventricle is not well visualized. A device  wire is noted in the right heart.  ------------------------------------------------------------------------  Confirmed on  12/1/2017 - 18:24:53 by Carli Encarnacion M.D. RPVI    < end of copied text      ASSESSMENT/PLAN: 	77y Female with hypertension, dyslipidemia, CAD s/p LAD stent with only mild re-stenosis with minor luminal irregularities otherwise on cath 4/16, with known severe LV dysfunction , NICM s/p Med[employer]onic BiV ICD, VT on Amio, COPD, lymphoma previously on chemo with known spinal mets and compression fractures recent admit with GNR bacteremia (pseudomonas), felt to be urinary source, and sacral decub wound culture +MRSA, s/p ABX, now admitted from SNF with fever and AMS/lethargy.     --med management of CAD with ASA/Statin/BB  --Not in decomp CHF  --recent TTE noted above  --ID --On IV Abx for MRSA sacral wound/ blood cx from 12/13 negative to date  --Primary team notes reviewed, per family- patient remains full code  - f/u palliative eval    Dolores Rodriguez PA-C  [facility]  [address], [address]   [place] [address]  office [phone]  pager [phone]

## 2017-12-20 NOTE — PROGRESS NOTE ADULT - PROBLEM SELECTOR PLAN 2
- likely toxic metabolic encephalopathy vs. vascular dementia; however pt also has R facial droop  - pts was able to converse normally prior to last admission  - CT head: Opacification of the right tympanomastoid cavity which may be secondary to an effusion or inflammatory related soft tissue  - ENT consulted for further evaluation based on CT results: recommends steroids (will not be given due to risk of infection), taping eye closed if pt unable to keep closed and eye drops - also CTIAC ordered - likely toxic metabolic encephalopathy vs. vascular dementia; however pt also has R facial droop  - pts was able to converse normally prior to last admission  - CT head: Opacification of the right tympanomastoid cavity which may be secondary to an effusion or inflammatory related soft tissue  - ENT consulted for further evaluation based on CT results: recommends steroids (will not be given due to risk of infection), taping eye closed if pt unable to keep closed and eye drops - after discussion with endo CTIAC d/c'ed given it would be unlikely to change pts. outlook or current treatment plan

## 2017-12-20 NOTE — PROGRESS NOTE ADULT - PROBLEM SELECTOR PLAN 5
- will continue with home meds as pt tolerates  - EF 20% on last echo - presently euvolemic   will continue with home meds as pt tolerates

## 2017-12-20 NOTE — CHART NOTE - NSCHARTNOTEFT_GEN_A_CORE
NUTRITION SERVICES     Upon Nutritional Assessment by the Registered Dietitian your patient was determined to meet criteria/ has evidence of the following diagnosis/diagnoses:  [ ] Mild Protein Calorie Malnutrition   [ ] Moderate Protein Calorie Malnutrition   [ X ] Severe Protein Calorie Malnutrition   [ ] Unspecified Protein Calorie Malnutrition   [ ] Underweight / BMI <19  [ ] Morbid Obesity / BMI >40    Findings as based on:  •  Comprehensive nutritional assessment and consultation    Please refer to Initial Dietitian Evaluation via documents section of [facility] EMR for further recommendations.    Uma Chu RDN, CDN   pager: [phone]

## 2017-12-20 NOTE — PROGRESS NOTE ADULT - ATTENDING COMMENTS
Patient was seen and examined personally by me. I have discussed the plan and reviewed the resident's note and agree with the above physical exam findings including assessment and plan except as indicated below.    77 female admitted with sepsis 2/2 infected sacral decubitus with probe to bone, suspicious for osteomyelitis. Also with encephalopathy: possibly vascular dementia with poor functional status and dysphagia.    Nonverbal and not following commands. Occasionally nods head.  + indwelling knutson with clear yellow urine    s/p sacral debridement 12/20- f/u wound culture and bone specimen that was sent. 12/13 Bcx NGTD, wound care following.  Continue Vanco, check troughs daily  Pain control with morphine 1mg q6 ATC PO, hold for sedation  CT head with opacification of rt tympanomastoid cavity ,but no evidence of intracranial pathology like mass or hemorrhage. no indication for steroids with palsy of this duration. No emergent need for CT IAC at this time.  Appreciate palliative consult. Currently Full code  Per GI: patient not candidate for PEG. GI to discuss with family and afterwards, will have another GOC discussion with family. Patient has not been receiving any PO meds due to inability to tolerate oral intake. Currently holding off NGT  CBC this AM was an error, on repeat Hgb remains stable  Prognosis guarded

## 2017-12-20 NOTE — PROGRESS NOTE ADULT - PROBLEM SELECTOR PLAN 9
- family meeting on 12/14 and 12/18: full code  - PT recs: extended care facility  - pending chart from [facility] - GI to call son today to discuss PEG, will f/u with son tomorrow  - family meeting on 12/14 and 12/18: full code  - PT recs: extended care facility  - pending chart from [facility] - lovenox for DVT ppx

## 2017-12-20 NOTE — PROGRESS NOTE ADULT - PROBLEM SELECTOR PLAN 3
- morphine sln 1 mg q6 for sacral ulcer pain; to be done before wound changes  - watch for constipation with morphine - morphine sln 1 mg q6 for sacral ulcer pain; to be done before wound changes  - will monitor for side effects of morphine and consider adjusting dose  - watch for constipation with morphine

## 2017-12-20 NOTE — DIETITIAN INITIAL EVALUATION ADULT. - PROBLEM SELECTOR PLAN 1
- decub ulcer worsened since last admission and previously tested positive for MRSA; wound care consult placed and they will order cx when wound examined per nursing staff  - on vanc and cefepime; vanc trough ordered  - blood and urine cx ordered  - will consider Xray for possible osteo  - CXR clear, pt clearly an aspiration risk and currently unable to swallow meds  - UA with large leuk est but no nitrites; recurrence of UTI as source of infection less likely  - if pt continues to be febrile may consider ARASH  - pt not eating so on D5 @ 60 and will reassess tomorrow, finger sticks Q6  - currently no diarrhea

## 2017-12-20 NOTE — PROGRESS NOTE ADULT - SUBJECTIVE AND OBJECTIVE BOX
Venancio Lamas MD  Cell: [phone]  Pager: [phone]    SUBJECTIVE / OVERNIGHT EVENTS: Pt opens eyes to voice. Nods head yes/no to questions.    VITAL SIGNS:  Vital Signs Last 24 Hrs  T(C): 37.2 (20 Dec 2017 04:25), Max: 37.2 (20 Dec 2017 04:25)  T(F): 99 (20 Dec 2017 04:25), Max: 99 (20 Dec 2017 04:25)  HR: 85 (20 Dec 2017 04:25) (82 - 86)  BP: 99/51 (20 Dec 2017 04:25) (92/60 - 111/60)  BP(mean): --  RR: 18 (20 Dec 2017 04:25) (16 - 76)  SpO2: 98% (20 Dec 2017 04:25) (97% - 99%)      PHYSICAL EXAM:               GENERAL: cachectic, NAD		              HEAD:  Atraumatic, Normocephalic  		EYES: EOMI, conjunctiva and sclera clear  		MOUTH: mucous membranes dry  		NECK: Supple, No JVD  		CHEST/LUNG: Poor inspiratory effort; No wheeze  		HEART: Regular rate and rhythm; No murmurs, rubs, or gallops  		ABDOMEN: Soft, Nontender, Nondistended; Bowel sounds present  		GENITOURINARY: knutson in place draining yellow liquid with debris  		BACK: stage 3-4 sacral decub ulcer worse since last admission  		EXTREMITIES:  No clubbing, cyanosis, or edema  		PSYCH: Calm, nonverbal   	NEUROLOGY: R facial droop, general weakness, unable to asses 	thoroughly for focal deficits, patient not following commands                          8.3    13.36 )-----------( 396      ( 20 Dec 2017 07:45 )             25.7     12-20    135  |  98  |  10  ----------------------------<  75  Test not performed SPECIMEN GROSSLY HEMOLYZED   |  26  |  0.43<L>    Ca    8.7      20 Dec 2017 06:30  Phos  2.6     12-20  Mg     1.9     12-20    TPro  6.1  /  Alb  2.3<L>  /  TBili  0.3  /  DBili  x   /  AST  69<H>  /  ALT  34<H>  /  AlkPhos  84  12-20      CAPILLARY BLOOD GLUCOSE      POCT Blood Glucose.: 90 mg/dL (20 Dec 2017 06:37)  POCT Blood Glucose.: 89 mg/dL (19 Dec 2017 22:55)  POCT Blood Glucose.: 88 mg/dL (19 Dec 2017 18:41)  POCT Blood Glucose.: 119 mg/dL (19 Dec 2017 12:31)      MEDICATIONS  (STANDING):  amiodarone    Tablet 400 milliGRAM(s) Oral daily  artificial  tears Solution 1 Drop(s) Right EYE daily  aspirin  chewable 81 milliGRAM(s) Oral daily  atorvastatin 20 milliGRAM(s) Oral at bedtime  buDESOnide 160 MICROgram(s)/formoterol 4.5 MICROgram(s) Inhaler 2 Puff(s) Inhalation two times a day  Dakins Solution - 1/2 Strength 1 Application(s) Topical two times a day  dextrose 5%. 1000 milliLiter(s) (75 mL/Hr) IV Continuous <Continuous>  enoxaparin Injectable 40 milliGRAM(s) SubCutaneous daily  insulin lispro (HumaLOG) corrective regimen sliding scale   SubCutaneous at bedtime  latanoprost 0.005% Ophthalmic Solution 1 Drop(s) Both EYES at bedtime  levothyroxine Injectable 25 MICROGram(s) IV Push daily  metoprolol succinate ER 25 milliGRAM(s) Oral daily  mirtazapine 7.5 milliGRAM(s) Oral at bedtime  morphine   Solution 1 milliGRAM(s) Oral every 6 hours  multivitamin 1 Tablet(s) Oral daily  tiotropium 18 MICROgram(s) Capsule 1 Capsule(s) Inhalation daily  vancomycin  IVPB 500 milliGRAM(s) IV Intermittent once  vancomycin  IVPB Venancio Lamas MD  Cell: [phone]  Pager: [phone]    SUBJECTIVE / OVERNIGHT EVENTS: Pt opens eyes to voice. Nods head yes/no to questions.    VITAL SIGNS:  Vital Signs Last 24 Hrs  T(C): 37.2 (20 Dec 2017 04:25), Max: 37.2 (20 Dec 2017 04:25)  T(F): 99 (20 Dec 2017 04:25), Max: 99 (20 Dec 2017 04:25)  HR: 85 (20 Dec 2017 04:25) (82 - 86)  BP: 99/51 (20 Dec 2017 04:25) (92/60 - 111/60)  BP(mean): --  RR: 18 (20 Dec 2017 04:25) (16 - 76)  SpO2: 98% (20 Dec 2017 04:25) (97% - 99%)      PHYSICAL EXAM:               GENERAL: cachectic, NAD		              HEAD:  Atraumatic, Normocephalic  		EYES: EOMI, conjunctiva and sclera clear  		MOUTH: mucous membranes dry  		NECK: Supple, No JVD  		CHEST/LUNG: Poor inspiratory effort; No wheeze  		HEART: Regular rate and rhythm; No murmurs, rubs, or gallops  		ABDOMEN: Soft, Nontender, Nondistended; Bowel sounds present  		GENITOURINARY: knutson in place draining yellow liquid with debris  		BACK: stage 3-4 sacral decub ulcer worse since last admission  		EXTREMITIES:  No clubbing, cyanosis, or edema  		PSYCH: Calm, nonverbal   	NEUROLOGY: R facial droop, general weakness, unable to asses thoroughly for focal deficits, patient not following commands                          8.3    13.36 )-----------( 396      ( 20 Dec 2017 07:45 )             25.7     12-20    135  |  98  |  10  ----------------------------<  75  Test not performed SPECIMEN GROSSLY HEMOLYZED   |  26  |  0.43<L>    Ca    8.7      20 Dec 2017 06:30  Phos  2.6     12-20  Mg     1.9     12-20    TPro  6.1  /  Alb  2.3<L>  /  TBili  0.3  /  DBili  x   /  AST  69<H>  /  ALT  34<H>  /  AlkPhos  84  12-20      CAPILLARY BLOOD GLUCOSE      POCT Blood Glucose.: 90 mg/dL (20 Dec 2017 06:37)  POCT Blood Glucose.: 89 mg/dL (19 Dec 2017 22:55)  POCT Blood Glucose.: 88 mg/dL (19 Dec 2017 18:41)  POCT Blood Glucose.: 119 mg/dL (19 Dec 2017 12:31)      MEDICATIONS  (STANDING):  amiodarone    Tablet 400 milliGRAM(s) Oral daily  artificial  tears Solution 1 Drop(s) Right EYE daily  aspirin  chewable 81 milliGRAM(s) Oral daily  atorvastatin 20 milliGRAM(s) Oral at bedtime  buDESOnide 160 MICROgram(s)/formoterol 4.5 MICROgram(s) Inhaler 2 Puff(s) Inhalation two times a day  Dakins Solution - 1/2 Strength 1 Application(s) Topical two times a day  dextrose 5%. 1000 milliLiter(s) (75 mL/Hr) IV Continuous <Continuous>  enoxaparin Injectable 40 milliGRAM(s) SubCutaneous daily  insulin lispro (HumaLOG) corrective regimen sliding scale   SubCutaneous at bedtime  latanoprost 0.005% Ophthalmic Solution 1 Drop(s) Both EYES at bedtime  levothyroxine Injectable 25 MICROGram(s) IV Push daily  metoprolol succinate ER 25 milliGRAM(s) Oral daily  mirtazapine 7.5 milliGRAM(s) Oral at bedtime  morphine   Solution 1 milliGRAM(s) Oral every 6 hours  multivitamin 1 Tablet(s) Oral daily  tiotropium 18 MICROgram(s) Capsule 1 Capsule(s) Inhalation daily  vancomycin  IVPB 500 milliGRAM(s) IV Intermittent once  vancomycin  IVPB

## 2017-12-20 NOTE — PROGRESS NOTE ADULT - PROBLEM SELECTOR PLAN 6
- will continue with home meds as pt tolerates - will continue with home meds as pt tolerates  - EF 20% on last echo

## 2017-12-20 NOTE — PROGRESS NOTE ADULT - ATTENDING COMMENTS
Agree with above.   -no further cardiac workup needed at this time  -continue with medical management of cad and cardiomyopathy  -f/u ID    Nehal López MD  [facility]  [address], [address]  [place] [address]  office: [phone]  pager: [phone]

## 2017-12-20 NOTE — PROVIDER CONTACT NOTE (OTHER) - SITUATION
Patient's bp 99/51  and patient is no swallowing her meds. Iv lock inserted and fluids restarted. Bp meds held.

## 2017-12-20 NOTE — DIETITIAN INITIAL EVALUATION ADULT. - OTHER INFO
Pt. w persistently poor PO intake currently and since previous admission.  PO deemed contraindicated per swallow eval on 12/18/17.  Per GI note, Pt. would not benefit from PEG, is high risk for endoscopic placement, and has overall poor prognosis. Pt. followed by palliative.  No stated/noted nausea/vomiting/diarrhea/constipation.  Food allergy to peanuts.  No noted edema.  Unstageable sacral pressure ulcer.  Weight has decreased from 115.9lbs (52.6kg) 11/28/17 to current of 110.2lbs (50kg) on 12/10/17 indicating 4.9% weight decrease x < 1 month.

## 2017-12-20 NOTE — CHART NOTE - NSCHARTNOTEFT_GEN_A_CORE
Patient to be discharged from [facility] team consult service today.  Please re-consult if we can be of further assistance, ext [phone].    Lala Ricketts MD

## 2017-12-20 NOTE — PROGRESS NOTE ADULT - PROBLEM SELECTOR PLAN 4
- Speech and Swallow recommended NPO  - GI eval for PEG; recommend holding off on PEG  - finger sticks Q6; will keep giving more D5 if pt not eating

## 2017-12-21 NOTE — PROGRESS NOTE ADULT - PROBLEM SELECTOR PLAN 9
- lovenox for DVT ppx Face to face conversation held with sonGavin at bedside  Updated on current clinical progress and that patient is not candidate for PEG. GI fellow also at bedside and discussed with son. Per GI, can place NGT and can DC with NGT to an acceptable facility when time comes. Patient advocate called per son request. Ethics consult also placed. Per son, would still like CPR and intubation if necessary knowing that this will likely prolong patient's suffering and pain. 18 mins spent face to face  - Will get labs Q48 hours as opposed to daily  - family meeting on 12/14, 12/18 and 12/21: full code  - PT recs: extended care facility  - pending chart from [facility]

## 2017-12-21 NOTE — CONSULT NOTE ADULT - SUBJECTIVE AND OBJECTIVE BOX
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 Patient was found to be encephalopathic, of unclear etiology. While more likely the patient has toxic metabolic encephalopathy, the patient has developed a right facial droop suggesting a vascular cause. Patient is suspected to have osteomyelitis as a result of her decubitus ulcer. Patient was found to have candida growing in the urine. The wound was debrided and the patient was started on cefepime and vancomycin. Patient receiving morphine for pain. Patient has not been eating by mouth and failed a speech and swallow. Patient’s son has asked that a PEG be placed to feed his mother and that she be a full code. Team has questioned appropriateness of a PEG and goals of care regarding cardiopulmonary status, prompting an ethics consult.     Prognosis Estimate (survival in days, wks, mos, yrs):	 weeks to months							  Patient Decision-Making Capacity:  Has capacity    Lacks capacity   Patient Aware of:  Diagnosis:   Yes    No   Unknown    Prognosis:   Yes    No   Unknown         Name of medical decision-maker should patient lack capacity:    Gavin Pulido     Relationship: son     Role:   Health Care Agent       Surrogate   Contact #(s):   [phone]			     Other ‘stakeholders’: other children, unknown at this time											  Evidence of Patient’s Preference of Life-Sustaining Treatment (Written or Oral): NONE   Resuscitation status:   DNR:  Yes   No      DNI:  Yes   No        Discussion – Family meeting [id number] Ethics attending STEPHEN Kearney met with patient and son Gavin at bedside. Patient when spoken to, opened her eyes and turned, but was not able to verbalize a response when spoken to.  Spoke with Gavin regarding his mother. He described a medical course of the last 4 years as jovanny (patient had suffered a heart attack and has been treated for cancer). She was able to regain her daily abilities, but definitely needed help, she then moved in with this son. Her life was full and busy. 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 They never spoke about advance directives, but her son did say that she had a zest for life and that she took care of everyone. He and his siblings have discussed these current medical events and they do realize that she is the most debilitated she has ever been. He stated that they would have a hard time seeing her “starve to death”  since they consider that to be a part of “suffering.”  They do want her to be as comfortable as possible. Discussion surrounded other aspects of her care. He stated that none of them want her to be in pain, and that currently she is receiving  morphine tablets, and would like it changed to another route, also that labs don’t have to be done every day, agreed to every other day as well as desires vital signs to be once a day and not as often as it is.   He indicated that DNR is not a possible decision for them at this point, they feel that this will limit her chances, but thoroughly understand that she could die in her sleep. The team discussed the issue of end of life and the possibility that she will not absorb the enteral nutrition and he understands and is willing to revisit if this becomes the case in a day or two after feedings have begun.  Stated he is appreciative of all the effort the team and staff have put forward to ensure that his mom gets the best care and is willing to dialog when necessary.   Discussion- [id number]- Discussion with Medicine regarding the surrogate’s requests and plan of care are the team will adjust lab work, change the route for pain medication as well as monitor the patient’s response to the NGT feeds.     Bioethics analysis:												  Here we have a conflict of autonomy and non-maleficence and beneficence. Autonomy recognizes that not all individuals have the same preferences and goals and thus that different individuals will make different decisions regarding their health. Autonomy centers on letting patients/their surrogates decide what is best for their health/their family members’ health after being fully informed of the options available as well as the risks and benefits of those options. Non-maleficence focuses on the clinician’s desire to do no harm. While, beneficence focuses on the clinicians’ desire to do what is best for their patient, requiring that health care providers consider what is best for the patient given the individual patient’s unique circumstances.   Beginning with autonomy, essential to the notion of autonomy is that of capacity, making sure the patient is fit to protect his/her autonomy. In this situation, the patient lacks capacity as a result of her encephalopathy. Although she lacks capacity, she maintains the right to autonomy which can best be protected by her children. The 2010 [facility] Health Care Decision Act (CDA) addresses the situation of a sick individual who lacks capacity, but has no available surrogate: the surrogate has the exclusive right to make decisions about whether to use or forgo life sustaining treatments (LSTs) when it appears that the patient will die without them. The ethical standard the surrogate is supposed to apply to such decision-making is first, "substituted judgment" – based on the patient’s prior articulation of preferences for such a situation or second, "best interests". The [facility] establishes a hierarchy of surrogacy, in order of priority decision maker for the incapable patient.   – Given that the patient lacks both a court appointed guardian and a spouse, her children become her next appropriate surrogates. Her children are in full accord and have chosen to defer to the one son, Duy, which they are allowed to do under the law.    Despite the patient’s son Duy being an appropriate surrogate to represent his mother’s views, providers must still weigh the risks and benefits when analyzing requests made by patient’s surrogates. In this case, entereal nutrition is being requested by the patient’s son. While the patient may get increased nutrition from a PEG, a PEG does not come without significant risk given her co-morbidities. Given the patient’s 20% ejection fraction, as well as the patient’s recent fevers the risk of anesthesia and infection at the PEG site are so significant that the gastroenterology department does not believe the potential benefit of nutrition will outweigh the anesthetic and infectious risks. Despite the family asking for placement of a PEG, if the benefits do not outweigh the risks it is inappropriate to place one. The same goes for an NG tube. While an NG tube has been placed. The family is very aware that this placement is on a trial basis only. Keeping in mind the principles of beneficence and non-maleficence, if the risks at any time are found to outweigh the benefits, the NG tube should be removed. There is a growing agreement in bioethical literature that there is no need for physicians to make unlimited offers of interventions that are patently beneficial. In the 1/11/12 issue of [employer]IAN, bioethicists Martir and Anthony restate an argument they made in a 1986 [facility] issue. "Although patients’ preferences are key factors in clinical decision making, a patient’s preference for a diagnostic or therapeutic intervention is not decisive unless a modicum of potential benefit, viewed from a conventional medical perspective, is present…. Physicians should not provide interventions that do not meet this criterion," (Martir, 2012).   An additional example of  further escalation of treatment where the risks of escalation may outweigh the benefits of escalation is CPR. If Ms. Pulido reaches a point of requiring CPR for resuscitation, clinicians must consider whether given his condition at that time, if compressions could result in significant suffering that outweighs the benefits of minimal prolongation of life. While the team may not make the patient DNR/DNI without the family’s agreement, the team has a duty to consider if resuscitation is appropriate should the scenario arise, and offer it only if they believe the benefits outweigh the risks.  The health team is commended for their virtue in providing  treatment that has allowed this patient to survive to this point. In this specific case, in accordance with the PALLIATIVE CARE ACCESS ACT ([facility] SECTION 2997-D), the attending health care practitioner is required to provide patients with a terminal illness not only prognosis, risks and benefits of treatment options but also patient’s legal rights to symptom management at the end of life.  Fundamental to a palliative approach is the aim to reduce suffering and improve the quality of life for dying patients and their families through identification and management of pain and physical, social, psychological, spiritual, Zoroastrianism and cultural needs.

## 2017-12-21 NOTE — PROGRESS NOTE ADULT - PROBLEM SELECTOR PLAN 3
- morphine sln 1 mg q6 for sacral ulcer pain; to be done before wound changes  - will monitor for side effects of morphine and consider adjusting dose  - watch for constipation with morphine

## 2017-12-21 NOTE — PROGRESS NOTE ADULT - ASSESSMENT
78 yo Female w/ CAD, MI, Systolic CH, s/p AICD, HTN, h/o lymphoma s/p chemo, on remission, chronic indwelling Zee, recent admit for sepsis 2/2 UTI (hx of pseudomonas in the urine, CRE), sacral decub (hx of MRSA) sent from [facility] for fever.

## 2017-12-21 NOTE — PROGRESS NOTE ADULT - PROBLEM SELECTOR PLAN 8
appreciate nutrition recs - family in agreement with pleasure feeds,   - will place NG tube and start nutrition  - appreciate nutrition recs - family in agreement with pleasure feeds  - will place NG tube and start nutrition  - appreciate nutrition recs

## 2017-12-21 NOTE — PROGRESS NOTE ADULT - PROBLEM SELECTOR PLAN 5
- presently euvolemic   will continue with home meds as pt tolerates - presently euvolemic  - will continue with home meds as pt tolerates

## 2017-12-21 NOTE — PROGRESS NOTE ADULT - PROBLEM SELECTOR PLAN 10
- GI to call son today to discuss PEG, will f/u with son tomorrow  - family meeting on 12/14 and 12/18: full code  - PT recs: extended care facility  - pending chart from [facility] - Will get labs Q48 hours as opposed to daily  - family meeting on 12/14, 12/18 and 12/21: full code  - PT recs: extended care facility  - pending chart from [facility] - lovenox for DVT ppx

## 2017-12-21 NOTE — PROGRESS NOTE ADULT - SUBJECTIVE AND OBJECTIVE BOX
Subjective:  lethargic but arousable, nods yes or no to questions, son at bedside    MEDICATIONS  (STANDING):  amiodarone    Tablet 400 milliGRAM(s) Oral daily  artificial  tears Solution 1 Drop(s) Right EYE daily  aspirin  chewable 81 milliGRAM(s) Oral daily  atorvastatin 20 milliGRAM(s) Oral at bedtime  buDESOnide 160 MICROgram(s)/formoterol 4.5 MICROgram(s) Inhaler 2 Puff(s) Inhalation two times a day  cefepime  IVPB 2000 milliGRAM(s) IV Intermittent every 12 hours     Dakins Solution - 1/2 Strength 1 Application(s) Topical two times a day  dextrose 5%. 1000 milliLiter(s) (75 mL/Hr) IV Continuous <Continuous>  enoxaparin Injectable 40 milliGRAM(s) SubCutaneous daily  insulin lispro (HumaLOG) corrective regimen sliding scale   SubCutaneous at bedtime  latanoprost 0.005% Ophthalmic Solution 1 Drop(s) Both EYES at bedtime  levothyroxine Injectable 25 MICROGram(s) IV Push daily  metoprolol succinate ER 25 milliGRAM(s) Oral daily  metroNIDAZOLE  IVPB 500 milliGRAM(s) IV Intermittent once  metroNIDAZOLE  IVPB 500 milliGRAM(s) IV Intermittent every 8 hours      mirtazapine 7.5 milliGRAM(s) Oral at bedtime  morphine   Solution 1 milliGRAM(s) Oral every 6 hours  multivitamin 1 Tablet(s) Oral daily  tiotropium 18 MICROgram(s) Capsule 1 Capsule(s) Inhalation daily  vancomycin  IVPB 500 milliGRAM(s) IV Intermittent every 24 hours         LABS:                        9.2    14.72 )-----------( 402      ( 21 Dec 2017 09:20 )             29.3     x   |  x   |  x   ----------------------------<  x   4.8   |  x   |  x     Ca    8.7      20 Dec 2017 06:30  Phos  2.6     12-20  Mg     1.9     12-20    TPro  6.1  /  Alb  2.3<L>  /  TBili  0.3  /  DBili  x   /  AST  69<H>  /  ALT  34<H>  /  AlkPhos  84  12-20     PHYSICAL EXAM  Vital Signs Last 24 Hrs  T(C): 38.6 (21 Dec 2017 05:19), Max: 38.6 (21 Dec 2017 05:19)  T(F): 101.5 (21 Dec 2017 05:19), Max: 101.5 (21 Dec 2017 05:19)  HR: 84 (21 Dec 2017 05:19) (83 - 88)  BP: 108/54 (21 Dec 2017 05:19) (108/50 - 114/63)  RR: 16 (21 Dec 2017 05:19) (16 - 17)  SpO2: 96% (21 Dec 2017 05:19) (96% - 100%)      Cardiovascular: Normal S1 S2, RRR   No JVD, 1/6 ERICA murmur, Peripheral pulses palpable 2+ bilaterally  Respiratory: decreased breath sounds B/L LL's	  Gastrointestinal:  Soft, Non-tender, + BS	  Extremities no edema, cyanosis, clubbing B/L LE's     DIAGNOSTIC DATA:    < from: TTE with Doppler (w/Cont) (04.03.17 @ 16:18) >  CONCLUSIONS:  1. Mitral annular calcification, otherwise normal mitral  valve. Mild mitral regurgitation.  2. Moderately dilated left atrium.  LA volume index = 46  cc/m2.  3. Moderate left ventricular enlargement.  4. Severe global left ventricular systolic dysfunction.  Endocardial visualization enhanced with intravenous  injection of echo contrast (Definity).  5. The right ventricle is not well visualized; grossly  normal right ventricular systolic function.  A device wire  is noted in the right heart.  6. Estimated right ventricular systolic pressure equals 60  mm Hg, assuming right atrial pressure equals 10 mm Hg,  consistent with moderate pulmonary hypertension.  ------------------------------------------------------------------------  Confirmed on  4/3/2017 - 17:19:47 by Jean-Pierre Carver M.D.    < end of copied text >    < from: Cardiac Cath Lab - Adult (04.26.16 @ 13:12) >  VENTRICLES: No LV gram was performed; however, a recent echocardiogram  demonstrated an EF of 19 %.  CORONARY VESSELS: The coronary circulation is right dominant.  LM:   --  LM: Normal.  LAD:   --  Proximal LAD: There was a [id number] % stenosis. There was mild  restenosis in proximal LAD stent.  --  Mid LAD: Angiography showed minor luminal irregularities with no flow  limiting lesions.  --  Distal LAD: Angiography showed minor luminal irregularities with no  flow limiting lesions.  --  D1: Angiography showed minor luminal irregularities with no flow  limiting lesions.  CX:   --  Circumflex: Normal.  RI:   --  Ramus intermedius: Angiography showed minor luminal  irregularities with no flow limiting lesions.  RCA:   --  Proximal RCA: Normal.  --  Mid RCA: Angiography showed mild atherosclerosis with no flow limiting  lesions.  --  Distal RCA: Angiography showed minor luminal irregularities with no  flow limiting lesions.  --  RPDA: Angiography showed minor luminal irregularities with no flow  limiting lesions.  --  RPLS: Angiography showed minor luminal irregularities with no flow  limiting lesions.  COMPLICATIONS: There were no complications.  DIAGNOSTIC RECOMMENDATIONS: Medical management and aggressive risk factor  modification is recommended.  Prepared and signed by  Nehal López M.D.  Signed 04/27/2016 13:13:20    < end of copied text >    < from: Transthoracic Echocardiogram (12.01.17 @ 16:53) >  CONCLUSIONS:  1. Normal mitral valve. Minimal mitral regurgitation.  2. Mild left ventricular enlargement.  3. Severe global left ventricular systolic dysfunction.  4. The right ventricle is not well visualized. A device  wire is noted in the right heart.  ------------------------------------------------------------------------  Confirmed on  12/1/2017 - 18:24:53 by Carli Encarnacion M.D. RPVI    < end of copied text      ASSESSMENT/PLAN: 	77y Female with hypertension, dyslipidemia, CAD s/p LAD stent with only mild re-stenosis with minor luminal irregularities otherwise on cath 4/16, with known severe LV dysfunction , NICM s/p Medtronic BiV ICD, VT on Amio, COPD, lymphoma previously on chemo with known spinal mets and compression fractures recent admit with GNR bacteremia (pseudomonas), felt to be urinary source, and sacral decub wound culture +MRSA, s/p ABX, now admitted from SNF with fever and AMS/lethargy.     --med management of CAD with ASA/Statin/BB  --Not in decomp CHF  --recent TTE noted above  --ID --On IV Abx for MRSA sacral wound/ blood cx from 12/13 negative to date, noted fever overnight  --Primary team notes reviewed, per family- patient remains full code      Dolores Rodriguez PA-C  [facility]  [address], [address]   [place] [address]  office [phone]  pager [phone]

## 2017-12-21 NOTE — PROGRESS NOTE ADULT - ATTENDING COMMENTS
Patient was seen and examined personally by me. I have discussed the plan and reviewed the resident's note and agree with the above physical exam findings including assessment and plan except as indicated below.    77 female admitted with sepsis 2/2 infected sacral decubitus with probe to bone, suspicious for osteomyelitis. Also with encephalopathy: possibly vascular dementia with poor functional status and dysphagia. Course complicated by sepsis 2/2 catheter associated UTI  Febrile overnight with + UA and suspicion for aspiration  Nonverbal and not following commands. Occasionally nods head, responsive to pain.  Right pupil dilated  + indwelling knutson with clear yellow urine    s/p sacral debridement 12/20- f/u wound culture and bone specimen that was sent. Wound cx  with GPC and GNR, treating as osteomyelitis  Continue Vanco, check troughs daily, add Falgyl and Cefepime to treat UTI and for suspected aspiration  Pain control with morphine 1mg q6 ATC PO, hold for sedation  Advance directives conversation held with sonGavin who is primary decision maker, no HCP form in chart  Patient advocate was called and ethics consult placed: appreciate support  NGT placed after conversation held, vitals qshift, will do blood draws every other day per son, will not pursue aggressive workup such as CT head at this time for blown pupil as patient will likely not be a candidate for any form of neurosurgery and would be medically futile. Start feeds once NGT confirmed.  Prognosis guarded

## 2017-12-21 NOTE — CONSULT NOTE ADULT - ASSESSMENT
Conclusion: At present “doing everything” for this patient is providing comfort care and palliation for symptoms. It is appropriate to set specific goals of care and treatment indications for all conditions including malnutrition. Physicians are not obligated to perform procedures that are not in accordance with acceptable practice where harm exceeds benefit, and thus a PEG tube is not appropriate and an NG tube is appropriate only on a trial basis. The risk benefit ratio of CPR should be assessed should the patient code. Currently the family is struggling with the decline of their mother and that they do not want to have any regrets. Ethics will remain available. 							    Case discussed with Medical Ethics Attending, STEPHEN Segovia, BSN PCCN

## 2017-12-21 NOTE — PROGRESS NOTE ADULT - ATTENDING COMMENTS
Agree with above.   -Continue with asa for history of cad  -no further cardiac workup needed at this time    Nehal López MD  [facility]  [address], [address]  [place] [address]  office: [phone]  pager: [phone]

## 2017-12-21 NOTE — CONSULT NOTE ADULT - SUBJECTIVE AND OBJECTIVE BOX
76 yo Female w/ CAD, MI, Systolic CH, s/p AICD, HTN, h/o lymphoma s/p chemo, on remission, chronic indwelling Zee, recent admit for sepsis 2/2 UTI (hx of pseudomonas in the urine, CRE), sacral decub (hx of MRSA) sent from [facility] for fever    Contacted for ethics consultation as surrogate requesting aggressive therapy    Consult initiated by visiting patient. Patient seen and cachectic, nonverbal.    Ethics consult initiated. Surrogate to be contacted. Ethics attending STEPHEN Kearney contacted to reach surrogate    Encounter time 15 minutes

## 2017-12-21 NOTE — PROGRESS NOTE ADULT - SUBJECTIVE AND OBJECTIVE BOX
Venancio Lamas MD  Cell: [phone]  Pager: [phone]    SUBJECTIVE / OVERNIGHT EVENTS: Febrile overnight.  Pt opens eyes to voice. Nods head yes/no to questions.    VITAL SIGNS:  Vital Signs Last 24 Hrs  T(C): 38.6 (21 Dec 2017 05:19), Max: 38.6 (21 Dec 2017 05:19)  T(F): 101.5 (21 Dec 2017 05:19), Max: 101.5 (21 Dec 2017 05:19)  HR: 84 (21 Dec 2017 05:19) (83 - 88)  BP: 108/54 (21 Dec 2017 05:19) (108/50 - 114/63)  BP(mean): --  RR: 16 (21 Dec 2017 05:19) (16 - 17)  SpO2: 96% (21 Dec 2017 05:19) (96% - 100%)      PHYSICAL EXAM:               GENERAL: cachectic, NAD		              HEAD:  Atraumatic, Normocephalic  		EYES: EOMI, conjunctiva and sclera clear  		MOUTH: mucous membranes dry  		NECK: Supple, No JVD  		CHEST/LUNG: Poor inspiratory effort; No wheeze, mild bibasilar crackles  		HEART: Regular rate and rhythm; No murmurs, rubs, or gallops  		ABDOMEN: Soft, Nontender, Nondistended; Bowel sounds present  		GENITOURINARY: knutson in place draining yellow liquid with debris  		BACK: stage 3-4 sacral decub ulcer worse since last admission  		EXTREMITIES:  No clubbing, cyanosis, or edema  		PSYCH: Calm, nonverbal   	NEUROLOGY: R facial droop, general weakness, unable to asses thoroughly for focal deficits, patient not following commands                          8.3    13.36 )-----------( 396      ( 20 Dec 2017 07:45 )             25.7     12-20    x   |  x   |  x   ----------------------------<  x   4.8   |  x   |  x     Ca    8.7      20 Dec 2017 06:30  Phos  2.6     12-20  Mg     1.9     12-20    TPro  6.1  /  Alb  2.3<L>  /  TBili  0.3  /  DBili  x   /  AST  69<H>  /  ALT  34<H>  /  AlkPhos  84  12-20      CAPILLARY BLOOD GLUCOSE      POCT Blood Glucose.: 133 mg/dL (21 Dec 2017 07:07)  POCT Blood Glucose.: 104 mg/dL (20 Dec 2017 22:01)  POCT Blood Glucose.: 99 mg/dL (20 Dec 2017 18:35)  POCT Blood Glucose.: 152 mg/dL (20 Dec 2017 11:54)      MEDICATIONS  (STANDING):  amiodarone    Tablet 400 milliGRAM(s) Oral daily  artificial  tears Solution 1 Drop(s) Right EYE daily  aspirin  chewable 81 milliGRAM(s) Oral daily  atorvastatin 20 milliGRAM(s) Oral at bedtime  buDESOnide 160 MICROgram(s)/formoterol 4.5 MICROgram(s) Inhaler 2 Puff(s) Inhalation two times a day  cefepime  IVPB 2000 milliGRAM(s) IV Intermittent once  cefepime  IVPB 2000 milliGRAM(s) IV Intermittent every 12 hours  cefepime  IVPB      Dakins Solution - 1/2 Strength 1 Application(s) Topical two times a day  dextrose 5%. 1000 milliLiter(s) (75 mL/Hr) IV Continuous <Continuous>  enoxaparin Injectable 40 milliGRAM(s) SubCutaneous daily  insulin lispro (HumaLOG) corrective regimen sliding scale   SubCutaneous at bedtime  latanoprost 0.005% Ophthalmic Solution 1 Drop(s) Both EYES at bedtime  levothyroxine Injectable 25 MICROGram(s) IV Push daily  metoprolol succinate ER 25 milliGRAM(s) Oral daily  mirtazapine 7.5 milliGRAM(s) Oral at bedtime  morphine   Solution 1 milliGRAM(s) Oral every 6 hours  multivitamin 1 Tablet(s) Oral daily  tiotropium 18 MICROgram(s) Capsule 1 Capsule(s) Inhalation daily  vancomycin  IVPB 500 milliGRAM(s) IV Intermittent every 24 hours  vancomycin  IVPB Venancio Lamas MD  Cell: [phone]  Pager: [phone]    SUBJECTIVE / OVERNIGHT EVENTS: Febrile overnight.  Pt opens eyes to voice. Nods head yes/no to questions.    VITAL SIGNS:  Vital Signs Last 24 Hrs  T(C): 38.6 (21 Dec 2017 05:19), Max: 38.6 (21 Dec 2017 05:19)  T(F): 101.5 (21 Dec 2017 05:19), Max: 101.5 (21 Dec 2017 05:19)  HR: 84 (21 Dec 2017 05:19) (83 - 88)  BP: 108/54 (21 Dec 2017 05:19) (108/50 - 114/63)  BP(mean): --  RR: 16 (21 Dec 2017 05:19) (16 - 17)  SpO2: 96% (21 Dec 2017 05:19) (96% - 100%)      PHYSICAL EXAM:               GENERAL: cachectic, NAD		              HEAD:  Atraumatic, Normocephalic  		EYES: fixed dilated R pupil  		MOUTH: mucous membranes dry  		NECK: Supple, No JVD  		CHEST/LUNG: tachypneic, Poor inspiratory effort; mild bibasilar crackles  		HEART: Regular rate and rhythm; No murmurs, rubs, or gallops  		ABDOMEN: Soft, Nontender, Nondistended; Bowel sounds present  		GENITOURINARY: knutson in place draining yellow liquid with debris  		BACK: stage 3-4 sacral decub ulcer worse since last admission  		EXTREMITIES:  No clubbing, cyanosis, or edema  		PSYCH: Calm, nonverbal   	NEUROLOGY: R facial droop, general weakness, unable to asses thoroughly for focal deficits, patient not following commands                          8.3    13.36 )-----------( 396      ( 20 Dec 2017 07:45 )             25.7     12-20    x   |  x   |  x   ----------------------------<  x   4.8   |  x   |  x     Ca    8.7      20 Dec 2017 06:30  Phos  2.6     12-20  Mg     1.9     12-20    TPro  6.1  /  Alb  2.3<L>  /  TBili  0.3  /  DBili  x   /  AST  69<H>  /  ALT  34<H>  /  AlkPhos  84  12-20      CAPILLARY BLOOD GLUCOSE      POCT Blood Glucose.: 133 mg/dL (21 Dec 2017 07:07)  POCT Blood Glucose.: 104 mg/dL (20 Dec 2017 22:01)  POCT Blood Glucose.: 99 mg/dL (20 Dec 2017 18:35)  POCT Blood Glucose.: 152 mg/dL (20 Dec 2017 11:54)      MEDICATIONS  (STANDING):  amiodarone    Tablet 400 milliGRAM(s) Oral daily  artificial  tears Solution 1 Drop(s) Right EYE daily  aspirin  chewable 81 milliGRAM(s) Oral daily  atorvastatin 20 milliGRAM(s) Oral at bedtime  buDESOnide 160 MICROgram(s)/formoterol 4.5 MICROgram(s) Inhaler 2 Puff(s) Inhalation two times a day  cefepime  IVPB 2000 milliGRAM(s) IV Intermittent once  cefepime  IVPB 2000 milliGRAM(s) IV Intermittent every 12 hours  cefepime  IVPB      Dakins Solution - 1/2 Strength 1 Application(s) Topical two times a day  dextrose 5%. 1000 milliLiter(s) (75 mL/Hr) IV Continuous <Continuous>  enoxaparin Injectable 40 milliGRAM(s) SubCutaneous daily  insulin lispro (HumaLOG) corrective regimen sliding scale   SubCutaneous at bedtime  latanoprost 0.005% Ophthalmic Solution 1 Drop(s) Both EYES at bedtime  levothyroxine Injectable 25 MICROGram(s) IV Push daily  metoprolol succinate ER 25 milliGRAM(s) Oral daily  mirtazapine 7.5 milliGRAM(s) Oral at bedtime  morphine   Solution 1 milliGRAM(s) Oral every 6 hours  multivitamin 1 Tablet(s) Oral daily  tiotropium 18 MICROgram(s) Capsule 1 Capsule(s) Inhalation daily  vancomycin  IVPB 500 milliGRAM(s) IV Intermittent every 24 hours  vancomycin  IVPB

## 2017-12-21 NOTE — CONSULT NOTE ADULT - PROBLEM SELECTOR RECOMMENDATION 9
Surrogate mediation will occur
from immobility and sacral pressure ulcer, which needs debridement.  Patient not eating, unable to take oral pain medications.  Team managing with IV tylenol.  Recommend oral morphine solution, 1-2mg in check prior to wound care and PRN pain.  Bowel regimen.

## 2017-12-21 NOTE — PROGRESS NOTE ADULT - PROBLEM SELECTOR PLAN 2
- likely toxic metabolic encephalopathy vs. vascular dementia; however pt also has R facial droop  - pts was able to converse normally prior to last admission  - CT head: Opacification of the right tympanomastoid cavity which may be secondary to an effusion or inflammatory related soft tissue  - ENT consulted for further evaluation based on CT results: recommends steroids (will not be given due to risk of infection), taping eye closed if pt unable to keep closed and eye drops - after discussion with endo CTIAC d/c'ed given it would be unlikely to change pts. outlook or current treatment plan - likely toxic metabolic encephalopathy vs. vascular dementia; however pt also has R facial droop  - pts was able to converse normally prior to last admission  - CT head: Opacification of the right tympanomastoid cavity which may be secondary to an effusion or inflammatory related soft tissue  - ENT consulted for further evaluation based on CT results: recommends steroids (will not be given due to risk of infection), taping eye closed if pt unable to keep closed and eye drops - CTIAC d/c'ed given it would be unlikely to change pts. outlook - likely toxic metabolic encephalopathy vs. vascular dementia; however pt also has R facial droop and R blown pupil  - pts was able to converse normally prior to last admission  - CT head: Opacification of the right tympanomastoid cavity which may be secondary to an effusion or inflammatory related soft tissue  - ENT consulted for further evaluation based on CT results: recommends steroids (will not be given due to risk of infection), taping eye closed if pt unable to keep closed and eye drops - CTIAC d/c'ed given it would be unlikely to change pts. outlook

## 2017-12-21 NOTE — PROGRESS NOTE ADULT - PROBLEM SELECTOR PLAN 1
- febrile 12/21; repeat cultures and CXR ordered  - decub ulcer worsened since last admission and previously tested positive for MRSA - ulcer deep enough to reach bone: suspect osteomyelitis  - wound care Dr performed debridement and culture today  - on vanc (day 9), vanc 500 daily started based on trough  - blood cx negative, urine cx grew candida  - Xray images not ideal to r/o osteo (can consider CT vs. indium WBC scan); ESR elevated to 101  - CXR clear, pt clearly an aspiration risk and currently unable to swallow meds  - if pt continues to be febrile may consider ARASH - febrile 12/21; repeat cultures and CXR ordered - likely catheter associated UTI based on UA  - decub ulcer worsened since last admission and previously tested positive for MRSA - ulcer deep enough to reach bone: suspect osteomyelitis  - wound care Dr performed debridement and culture 12/20  - on vanc 500 (day 9), restarted cefepime due to fever on 12/21  - blood cx negative, urine cx grew candida  - Xray images not ideal to r/o osteo (can consider CT vs. indium WBC scan); ESR elevated to 101  - CXR clear, pt clearly an aspiration risk and currently unable to swallow meds  - if pt continues to be febrile may consider ARASH

## 2017-12-22 NOTE — PROGRESS NOTE ADULT - ATTENDING COMMENTS
Patient seen and examined.  Agree with above.   -No further cardiac workup needed at this time    Nehal López MD  [facility]  [address], [address]  [place] [address]  office: [phone]  pager: [phone]

## 2017-12-22 NOTE — PROGRESS NOTE ADULT - PROBLEM SELECTOR PLAN 3
- morphine sln 1 mg q6 for sacral ulcer pain; to be done before wound changes  - will monitor for side effects of morphine and consider adjusting dose  - watch for constipation with morphine - morphine IV q6 for sacral ulcer pain; to be done before wound changes  - will monitor for side effects of morphine and consider adjusting dose  - watch for constipation with morphine

## 2017-12-22 NOTE — PROGRESS NOTE ADULT - SUBJECTIVE AND OBJECTIVE BOX
Venancio Lamas MD  Cell: [phone]  Pager: [phone]    SUBJECTIVE / OVERNIGHT EVENTS: No events overnight.  Pt opens eyes to voice. Nods head yes/no to questions however not doing so appropriately.    VITAL SIGNS:  Vital Signs Last 24 Hrs  T(C): 37.9 (22 Dec 2017 05:12), Max: 37.9 (22 Dec 2017 05:12)  T(F): 100.3 (22 Dec 2017 05:12), Max: 100.3 (22 Dec 2017 05:12)  HR: 90 (22 Dec 2017 05:12) (86 - 90)  BP: 99/53 (22 Dec 2017 05:12) (99/53 - 122/74)  BP(mean): --  RR: 18 (22 Dec 2017 05:12) (18 - 19)  SpO2: 99% (22 Dec 2017 05:12) (98% - 100%)      PHYSICAL EXAM:               GENERAL: cachectic, NAD		  		EYES: fixed dilated R pupil  		MOUTH: mucous membranes dry  		CHEST/LUNG: tachypneic, Poor inspiratory effort  		HEART: Regular rate and rhythm; No murmurs, rubs, or gallops  		ABDOMEN: Soft, Nontender, Nondistended; Bowel sounds present  		GENITOURINARY: knutson in place draining yellow liquid with debris  		BACK: stage 3-4 sacral decub ulcer worse since last admission  		PSYCH: Calm, nonverbal   	NEUROLOGY: R facial droop, general weakness, unable to asses thoroughly for focal deficits, patient not following commands                          9.2    14.72 )-----------( 402      ( 21 Dec 2017 09:20 )             29.3     12-21    131<L>  |  93<L>  |  8   ----------------------------<  105<H>  3.5   |  25  |  0.41<L>    Ca    8.8      21 Dec 2017 13:33  Phos  2.4     12-21  Mg     1.8     12-21    TPro  6.1  /  Alb  2.1<L>  /  TBili  0.5  /  DBili  x   /  AST  43<H>  /  ALT  28  /  AlkPhos  100  12-21      CAPILLARY BLOOD GLUCOSE    POCT Blood Glucose.: 94 mg/dL (22 Dec 2017 07:11)  POCT Blood Glucose.: 133 mg/dL (21 Dec 2017 22:39)  POCT Blood Glucose.: 98 mg/dL (21 Dec 2017 18:14)  POCT Blood Glucose.: 97 mg/dL (21 Dec 2017 12:02)      MEDICATIONS  (STANDING):  amiodarone    Tablet 400 milliGRAM(s) Oral daily  artificial  tears Solution 1 Drop(s) Right EYE daily  aspirin  chewable 81 milliGRAM(s) Oral daily  atorvastatin 20 milliGRAM(s) Oral at bedtime  buDESOnide 160 MICROgram(s)/formoterol 4.5 MICROgram(s) Inhaler 2 Puff(s) Inhalation two times a day  cefepime  IVPB 2000 milliGRAM(s) IV Intermittent every 12 hours  cefepime  IVPB      Dakins Solution - 1/2 Strength 1 Application(s) Topical two times a day  dextrose 5%. 1000 milliLiter(s) (60 mL/Hr) IV Continuous <Continuous>  enoxaparin Injectable 40 milliGRAM(s) SubCutaneous daily  insulin lispro (HumaLOG) corrective regimen sliding scale   SubCutaneous at bedtime  latanoprost 0.005% Ophthalmic Solution 1 Drop(s) Both EYES at bedtime  levothyroxine Injectable 25 MICROGram(s) IV Push daily  metoprolol succinate ER 25 milliGRAM(s) Oral daily  metroNIDAZOLE  IVPB 500 milliGRAM(s) IV Intermittent every 8 hours  metroNIDAZOLE  IVPB      mirtazapine 7.5 milliGRAM(s) Oral at bedtime  morphine   Solution 1 milliGRAM(s) Oral every 6 hours  multivitamin 1 Tablet(s) Oral daily  tiotropium 18 MICROgram(s) Capsule 1 Capsule(s) Inhalation daily  vancomycin  IVPB 500 milliGRAM(s) IV Intermittent every 24 hours  vancomycin  IVPB

## 2017-12-22 NOTE — PROGRESS NOTE ADULT - ASSESSMENT
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

## 2017-12-22 NOTE — PROGRESS NOTE ADULT - PROBLEM SELECTOR PLAN 9
- Face to face conversation held with sonGavin at bedside  - Updated on current clinical progress and that patient is not candidate for PEG. GI fellow also at bedside and discussed with son. Per GI, can place NGT and can DC with NGT to an acceptable facility when time comes. Patient advocate called per son request. Ethics consult also placed. Per son, would still like CPR and intubation if necessary knowing that this will likely prolong patient's suffering and pain. 18 mins spent face to face  - Will get labs Q48 hours as opposed to daily  - family meeting on 12/14, 12/18 and 12/21: full code  - PT recs: extended care facility  - pending chart from [facility]

## 2017-12-22 NOTE — PROGRESS NOTE ADULT - PROBLEM SELECTOR PLAN 2
- likely toxic metabolic encephalopathy vs. vascular dementia; however pt also has R facial droop and R blown pupil  - pts was able to converse normally prior to last admission  - CT head: Opacification of the right tympanomastoid cavity which may be secondary to an effusion or inflammatory related soft tissue  - ENT consulted for further evaluation based on CT results: recommends steroids (will not be given due to risk of infection), taping eye closed if pt unable to keep closed and eye drops - CTIAC d/c'ed given it would be unlikely to change pts. outlook

## 2017-12-22 NOTE — PROGRESS NOTE ADULT - PROBLEM SELECTOR PLAN 4
- Speech and Swallow recommended NPO  - GI eval for PEG; recommend holding off on PEG  - attempted to place NG 12/21 per son's request; however was not placed appropriately and son opted to hold off on replacing due to pts discomfort  - finger sticks Q6; will keep giving more D5 if pt not eating - Speech and Swallow recommended NPO  - GI eval for PEG; recommend holding off on PEG  - attempted to place NG 12/21 per son's request; however was not placed appropriately and will place again tomorrow per son's request  - finger sticks Q6; will keep giving more D5 if pt not eating

## 2017-12-22 NOTE — PROGRESS NOTE ADULT - SUBJECTIVE AND OBJECTIVE BOX
Subjective:  Alert nods yes or no to questions    MEDICATIONS  (STANDING):  amiodarone    Tablet 400 milliGRAM(s) Oral daily  artificial  tears Solution 1 Drop(s) Right EYE daily  aspirin  chewable 81 milliGRAM(s) Oral daily  atorvastatin 20 milliGRAM(s) Oral at bedtime  buDESOnide 160 MICROgram(s)/formoterol 4.5 MICROgram(s) Inhaler 2 Puff(s) Inhalation two times a day  Dakins Solution - 1/2 Strength 1 Application(s) Topical two times a day  dextrose 5%. 1000 milliLiter(s) (60 mL/Hr) IV Continuous <Continuous>  enoxaparin Injectable 40 milliGRAM(s) SubCutaneous daily  insulin lispro (HumaLOG) corrective regimen sliding scale   SubCutaneous at bedtime  latanoprost 0.005% Ophthalmic Solution 1 Drop(s) Both EYES at bedtime  levothyroxine Injectable 25 MICROGram(s) IV Push daily  meropenem IVPB 1000 milliGRAM(s) IV Intermittent once  meropenem IVPB      metoprolol succinate ER 25 milliGRAM(s) Oral daily  mirtazapine 7.5 milliGRAM(s) Oral at bedtime  morphine  - Injectable 0.5 milliGRAM(s) IV Push every 6 hours  multivitamin 1 Tablet(s) Oral daily  tiotropium 18 MICROgram(s) Capsule 1 Capsule(s) Inhalation daily  vancomycin  IVPB 500 milliGRAM(s) IV Intermittent every 24 hours  vancomycin  IVPB        MEDICATIONS  (PRN):  acetaminophen   Tablet 650 milliGRAM(s) Oral every 6 hours PRN For Temp greater than 38 C (100.4 F)  acetaminophen   Tablet. 650 milliGRAM(s) Oral every 6 hours PRN pain  dextrose Gel 1 Dose(s) Oral every 6 hours PRN Blood Glucose LESS THAN 70 milliGRAM(s)/deciliter  dextrose Gel 1 Dose(s) Oral every 6 hours PRN Blood Glucose LESS THAN 70 milliGRAM(s)/deciliter      LABS:                        9.2    14.72 )-----------( 402      ( 21 Dec 2017 09:20 )             29.3     Hemoglobin: 9.2 g/dL (12-21 @ 09:20)  Hemoglobin: 8.3 g/dL (12-20 @ 07:45)  Hemoglobin: 5.3 g/dL (12-20 @ 06:30)  Hemoglobin: 8.4 g/dL (12-19 @ 06:30)  Hemoglobin: 8.4 g/dL (12-18 @ 06:15)    12-21    131<L>  |  93<L>  |  8   ----------------------------<  105<H>  3.5   |  25  |  0.41<L>    Ca    8.8      21 Dec 2017 13:33  Phos  2.4     12-21  Mg     1.8     12-21    TPro  6.1  /  Alb  2.1<L>  /  TBili  0.5  /  DBili  x   /  AST  43<H>  /  ALT  28  /  AlkPhos  100  12-21    Creatinine Trend: 0.41<--, 0.44<--, 0.43<--, 0.39<--, 0.40<--, 0.37<--           PHYSICAL EXAM  Vital Signs Last 24 Hrs  T(C): 37.8 (22 Dec 2017 11:22), Max: 37.9 (22 Dec 2017 05:12)  T(F): 100 (22 Dec 2017 11:22), Max: 100.3 (22 Dec 2017 05:12)  HR: 81 (22 Dec 2017 12:12) (81 - 90)  BP: 106/56 (22 Dec 2017 12:12) (94/51 - 106/56)  BP(mean): --  RR: 25 (22 Dec 2017 12:12) (18 - 25)  SpO2: 94% (22 Dec 2017 12:12) (94% - 100%)    Cardiovascular: Normal S1 S2, RRR   No JVD, 1/6 ERICA murmur, Peripheral pulses palpable 2+ bilaterally  Respiratory: decreased breath sounds B/L LL's	  Gastrointestinal:  Soft, Non-tender, + BS	  Extremities no edema, cyanosis, clubbing B/L LE's     DIAGNOSTIC DATA:    < from: TTE with Doppler (w/Cont) (04.03.17 @ 16:18) >  CONCLUSIONS:  1. Mitral annular calcification, otherwise normal mitral  valve. Mild mitral regurgitation.  2. Moderately dilated left atrium.  LA volume index = 46  cc/m2.  3. Moderate left ventricular enlargement.  4. Severe global left ventricular systolic dysfunction.  Endocardial visualization enhanced with intravenous  injection of echo contrast (Definity).  5. The right ventricle is not well visualized; grossly  normal right ventricular systolic function.  A device wire  is noted in the right heart.  6. Estimated right ventricular systolic pressure equals 60  mm Hg, assuming right atrial pressure equals 10 mm Hg,  consistent with moderate pulmonary hypertension.  ------------------------------------------------------------------------  Confirmed on  4/3/2017 - 17:19:47 by Jean-Pierre Carver M.D.    < end of copied text >    < from: Cardiac Cath Lab - Adult (04.26.16 @ 13:12) >  VENTRICLES: No LV gram was performed; however, a recent echocardiogram  demonstrated an EF of 19 %.  CORONARY VESSELS: The coronary circulation is right dominant.  LM:   --  LM: Normal.  LAD:   --  Proximal LAD: There was a [id number] % stenosis. There was mild  restenosis in proximal LAD stent.  --  Mid LAD: Angiography showed minor luminal irregularities with no flow  limiting lesions.  --  Distal LAD: Angiography showed minor luminal irregularities with no  flow limiting lesions.  --  D1: Angiography showed minor luminal irregularities with no flow  limiting lesions.  CX:   --  Circumflex: Normal.  RI:   --  Ramus intermedius: Angiography showed minor luminal  irregularities with no flow limiting lesions.  RCA:   --  Proximal RCA: Normal.  --  Mid RCA: Angiography showed mild atherosclerosis with no flow limiting  lesions.  --  Distal RCA: Angiography showed minor luminal irregularities with no  flow limiting lesions.  --  RPDA: Angiography showed minor luminal irregularities with no flow  limiting lesions.  --  RPLS: Angiography showed minor luminal irregularities with no flow  limiting lesions.  COMPLICATIONS: There were no complications.  DIAGNOSTIC RECOMMENDATIONS: Medical management and aggressive risk factor  modification is recommended.  Prepared and signed by  Nehal López M.D.  Signed 04/27/2016 13:13:20    < end of copied text >    < from: Transthoracic Echocardiogram (12.01.17 @ 16:53) >  CONCLUSIONS:  1. Normal mitral valve. Minimal mitral regurgitation.  2. Mild left ventricular enlargement.  3. Severe global left ventricular systolic dysfunction.  4. The right ventricle is not well visualized. A device  wire is noted in the right heart.  ------------------------------------------------------------------------  Confirmed on  12/1/2017 - 18:24:53 by Carli Encarnacion M.D. RPVI    < end of copied text      ASSESSMENT/PLAN: 	77y Female with hypertension, dyslipidemia, CAD s/p LAD stent with only mild re-stenosis with minor luminal irregularities otherwise on cath 4/16, with known severe LV dysfunction , NICM s/p [employer]onic BiV ICD, VT on Amio, COPD, lymphoma previously on chemo with known spinal mets and compression fractures recent admit with GNR bacteremia (pseudomonas), felt to be urinary source, and sacral decub wound culture +MRSA, s/p ABX, now admitted from SNF with fever and AMS/lethargy.     --med management of CAD with ASA/Statin/BB  --Not in decomp CHF  --recent TTE noted above  --ID --On IV Abx for MRSA sacral wound/ blood cx from 12/13 NGTD Wound cx w/E Coli; ESBL; and pseudomonas  	   noted fevers   --F/U GI not candidate for PEG   --as per primary team pt remains full code

## 2017-12-22 NOTE — PROVIDER CONTACT NOTE (OTHER) - SITUATION
Pt due for standing oral morphine. Pt unable to swallow fluid at this time; tested with water in a syringe and it just came out the side of her mouth and the rest was suctioned out

## 2017-12-22 NOTE — PROGRESS NOTE ADULT - PROBLEM SELECTOR PLAN 1
- febrile 12/21; repeat cultures and CXR ordered - likely catheter associated UTI based on UA  - decub ulcer worsened since last admission and previously tested positive for MRSA - ulcer deep enough to reach bone: suspect osteomyelitis  - wound care Dr performed debridement and culture 12/20  - on vanc 500 (day 9), restarted cefepime due to fever on 12/21  - blood cx negative, urine cx grew candida  - Xray images not ideal to r/o osteo (can consider CT vs. indium WBC scan); ESR elevated to 101  - CXR clear, pt clearly an aspiration risk and currently unable to swallow meds  - if pt continues to be febrile may consider ARASH - febrile 12/21; repeat cultures and CXR ordered - likely catheter associated UTI based on UA  - decub ulcer worsened since last admission and previously tested positive for MRSA - ulcer deep enough to reach bone: suspect osteomyelitis  - wound care Dr performed debridement and culture 12/20  - on vanc 500 (day 10), restarted cefepime due to fever on 12/21  - blood cx negative, urine cx grew candida  - Xray images not ideal to r/o osteo (can consider CT vs. indium WBC scan); ESR elevated to 101  - CXR clear, pt clearly an aspiration risk and currently unable to swallow meds  - if pt continues to be febrile may consider ARASH

## 2017-12-22 NOTE — PROGRESS NOTE ADULT - PROBLEM SELECTOR PLAN 8
- family in agreement with pleasure feeds  - attempted to place NG 12/21 per son's request; however was not placed appropriately and son opted to hold off on replacing due to pts discomfort - family in agreement with pleasure feeds  - attempted to place NG 12/21 per son's request; however was not placed appropriately and will place again tomorrow per son's request

## 2017-12-23 NOTE — PROGRESS NOTE ADULT - PROBLEM SELECTOR PLAN 9
- Face to face conversation held with sonGavin at bedside  - Updated on current clinical progress and that patient is not candidate for PEG. GI fellow also at bedside and discussed with son. Per GI, can place NGT and can DC with NGT to an acceptable facility when time comes. Patient advocate called per son request. Ethics consult also placed. Per son, would still like CPR and intubation if necessary knowing that this will likely prolong patient's suffering and pain. 18 mins spent face to face  - Will get labs Q48 hours as opposed to daily  - family meeting on 12/14, 12/18 and 12/21: full code  - PT recs: extended care facility  - pending chart from [facility]

## 2017-12-23 NOTE — PROGRESS NOTE ADULT - SUBJECTIVE AND OBJECTIVE BOX
Subjective:  Alert nods yes or no to questions    MEDICATIONS  (STANDING):  amiodarone    Tablet 400 milliGRAM(s) Oral daily  artificial  tears Solution 1 Drop(s) Right EYE daily  aspirin  chewable 81 milliGRAM(s) Oral daily  atorvastatin 20 milliGRAM(s) Oral at bedtime  buDESOnide 160 MICROgram(s)/formoterol 4.5 MICROgram(s) Inhaler 2 Puff(s) Inhalation two times a day  Dakins Solution - 1/2 Strength 1 Application(s) Topical two times a day  dextrose 5%. 1000 milliLiter(s) (60 mL/Hr) IV Continuous <Continuous>  enoxaparin Injectable 40 milliGRAM(s) SubCutaneous daily  insulin lispro (HumaLOG) corrective regimen sliding scale   SubCutaneous at bedtime  latanoprost 0.005% Ophthalmic Solution 1 Drop(s) Both EYES at bedtime  levothyroxine Injectable 25 MICROGram(s) IV Push daily  meropenem IVPB 1000 milliGRAM(s) IV Intermittent every 12 hours  meropenem IVPB      metoprolol succinate ER 25 milliGRAM(s) Oral daily  mirtazapine 7.5 milliGRAM(s) Oral at bedtime  morphine  - Injectable 0.5 milliGRAM(s) IV Push every 6 hours  multivitamin 1 Tablet(s) Oral daily  tiotropium 18 MICROgram(s) Capsule 1 Capsule(s) Inhalation daily  vancomycin  IVPB        MEDICATIONS  (PRN):  acetaminophen   Tablet 650 milliGRAM(s) Oral every 6 hours PRN For Temp greater than 38 C (100.4 F)  acetaminophen   Tablet. 650 milliGRAM(s) Oral every 6 hours PRN pain  dextrose Gel 1 Dose(s) Oral every 6 hours PRN Blood Glucose LESS THAN 70 milliGRAM(s)/deciliter  dextrose Gel 1 Dose(s) Oral every 6 hours PRN Blood Glucose LESS THAN 70 milliGRAM(s)/deciliter      LABS:    Hemoglobin: 9.2 g/dL (12-21 @ 09:20)  Hemoglobin: 8.3 g/dL (12-20 @ 07:45)  Hemoglobin: 5.3 g/dL (12-20 @ 06:30)  Hemoglobin: 8.4 g/dL (12-19 @ 06:30)    12-23    130<L>  |  95<L>  |  8   ----------------------------<  83  4.7   |  22  |  0.49<L>    Ca    8.6      23 Dec 2017 07:10  Phos  2.7     12-23  Mg     1.7     12-23    TPro  5.7<L>  /  Alb  2.1<L>  /  TBili  0.4  /  DBili  x   /  AST  39<H>  /  ALT  22  /  AlkPhos  98  12-23    Creatinine Trend: 0.49<--, 0.41<--, 0.44<--, 0.43<--, 0.39<--, 0.40<--           PHYSICAL EXAM  Vital Signs Last 24 Hrs  T(C): 37.3 (23 Dec 2017 05:42), Max: 37.8 (22 Dec 2017 11:22)  T(F): 99.2 (23 Dec 2017 05:42), Max: 100 (22 Dec 2017 11:22)  HR: 80 (23 Dec 2017 05:42) (80 - 91)  BP: 113/61 (23 Dec 2017 05:42) (94/51 - 115/62)  BP(mean): --  RR: 20 (23 Dec 2017 05:42) (20 - 25)  SpO2: 95% (23 Dec 2017 05:42) (94% - 96%)      Cardiovascular: Normal S1 S2, RRR   No JVD, 1/6 ERICA murmur, Peripheral pulses palpable 2+ bilaterally  Respiratory: decreased breath sounds B/L LL's	  Gastrointestinal:  Soft, Non-tender, + BS	  Extremities no edema, cyanosis, clubbing B/L LE's     DIAGNOSTIC DATA:    < from: TTE with Doppler (w/Cont) (04.03.17 @ 16:18) >  CONCLUSIONS:  1. Mitral annular calcification, otherwise normal mitral  valve. Mild mitral regurgitation.  2. Moderately dilated left atrium.  LA volume index = 46  cc/m2.  3. Moderate left ventricular enlargement.  4. Severe global left ventricular systolic dysfunction.  Endocardial visualization enhanced with intravenous  injection of echo contrast (Definity).  5. The right ventricle is not well visualized; grossly  normal right ventricular systolic function.  A device wire  is noted in the right heart.  6. Estimated right ventricular systolic pressure equals 60  mm Hg, assuming right atrial pressure equals 10 mm Hg,  consistent with moderate pulmonary hypertension.  ------------------------------------------------------------------------  Confirmed on  4/3/2017 - 17:19:47 by Jean-Pierre Carver M.D.    < end of copied text >    < from: Cardiac Cath Lab - Adult (04.26.16 @ 13:12) >  VENTRICLES: No LV gram was performed; however, a recent echocardiogram  demonstrated an EF of 19 %.  CORONARY VESSELS: The coronary circulation is right dominant.  LM:   --  LM: Normal.  LAD:   --  Proximal LAD: There was a [id number] % stenosis. There was mild  restenosis in proximal LAD stent.  --  Mid LAD: Angiography showed minor luminal irregularities with no flow  limiting lesions.  --  Distal LAD: Angiography showed minor luminal irregularities with no  flow limiting lesions.  --  D1: Angiography showed minor luminal irregularities with no flow  limiting lesions.  CX:   --  Circumflex: Normal.  RI:   --  Ramus intermedius: Angiography showed minor luminal  irregularities with no flow limiting lesions.  RCA:   --  Proximal RCA: Normal.  --  Mid RCA: Angiography showed mild atherosclerosis with no flow limiting  lesions.  --  Distal RCA: Angiography showed minor luminal irregularities with no  flow limiting lesions.  --  RPDA: Angiography showed minor luminal irregularities with no flow  limiting lesions.  --  RPLS: Angiography showed minor luminal irregularities with no flow  limiting lesions.  COMPLICATIONS: There were no complications.  DIAGNOSTIC RECOMMENDATIONS: Medical management and aggressive risk factor  modification is recommended.  Prepared and signed by  Nehal López M.D.  Signed 04/27/2016 13:13:20    < end of copied text >    < from: Transthoracic Echocardiogram (12.01.17 @ 16:53) >  CONCLUSIONS:  1. Normal mitral valve. Minimal mitral regurgitation.  2. Mild left ventricular enlargement.  3. Severe global left ventricular systolic dysfunction.  4. The right ventricle is not well visualized. A device  wire is noted in the right heart.  ------------------------------------------------------------------------  Confirmed on  12/1/2017 - 18:24:53 by Carli Encarnacion M.D. RPVI    < end of copied text      ASSESSMENT/PLAN: 	77y Female with hypertension, dyslipidemia, CAD s/p LAD stent with only mild re-stenosis with minor luminal irregularities otherwise on cath 4/16, with known severe LV dysfunction , NICM s/p [employer]tronic BiV ICD, VT on Amio, COPD, lymphoma previously on chemo with known spinal mets and compression fractures recent admit with GNR bacteremia (pseudomonas), felt to be urinary source, and sacral decub wound culture +MRSA, s/p ABX, now admitted from SNF with fever and AMS/lethargy.     --med management of CAD with ASA/Statin/BB  --Not in decomp CHF  --recent TTE noted above  --ID --On IV Abx for MRSA sacral wound/ blood cx from 12/13 NGTD, Wound cx w/E Coli; ESBL; and pseudomonas  	  noted fevers   --F/U GI not candidate for PEG   --as per primary team pt remains full code

## 2017-12-23 NOTE — PROGRESS NOTE ADULT - PROBLEM SELECTOR PLAN 8
- family in agreement with pleasure feeds  - attempted to place NG 12/21 per son's request; however was not placed appropriately and will place again today per son's request

## 2017-12-23 NOTE — PROGRESS NOTE ADULT - PROBLEM SELECTOR PLAN 4
- Speech and Swallow recommended NPO  - GI eval for PEG; recommend holding off on PEG  - attempted to place NG 12/21 per son's request; however was not placed appropriately and will place again today per son's request  - finger sticks Q6; will keep giving more D5 if pt not eating

## 2017-12-23 NOTE — PROGRESS NOTE ADULT - PROBLEM SELECTOR PLAN 1
- febrile 12/21; repeat cultures 12/21 NGTD and CXR clear - likely catheter associated UTI based on UA from 12/21  - decub ulcer worsened since last admission and previously tested positive for MRSA - ulcer deep enough to reach bone: suspect osteomyelitis  - wound care Dr performed debridement and culture 12/20 showed ESBL e coli, pseudomonas and gram + cocci  - on vanc 500 (day 11), restarted cefepime due to fever on 12/21 but then switched to meropenem based on sensitivities from wound culture  - blood cx negative, urine cx grew candida  - Xray images not ideal to r/o osteo (can consider CT vs. indium WBC scan); ESR elevated to 101 - febrile 12/21; repeat cultures 12/21 NGTD and CXR clear - likely catheter associated UTI based on UA from 12/21  - decub ulcer worsened since last admission and previously tested positive for MRSA - ulcer deep enough to reach bone: suspect osteomyelitis  - wound care Dr performed debridement and culture 12/20 showed ESBL e coli, pseudomonas and gram + cocci  - on vanc 750 (day 12), restarted cefepime due to fever on 12/21 but then switched to meropenem based on sensitivities from wound culture  - blood cx negative, urine cx grew candida  - Xray images not ideal to r/o osteo (can consider CT vs. indium WBC scan); ESR elevated to 101

## 2017-12-23 NOTE — PROGRESS NOTE ADULT - PROBLEM SELECTOR PLAN 3
- morphine IV q6 for sacral ulcer pain; to be done before wound changes  - will monitor for side effects of morphine and consider adjusting dose  - watch for constipation with morphine

## 2017-12-23 NOTE — PROGRESS NOTE ADULT - ASSESSMENT
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

## 2017-12-23 NOTE — PROGRESS NOTE ADULT - ATTENDING COMMENTS
Patient was seen and examined personally by me. I have discussed the plan and reviewed the resident's note and agree with the above physical exam findings including assessment and plan except as indicated below.    77 female admitted with sepsis 2/2 infected sacral decubitus with probe to bone, suspicious for osteomyelitis. Also with encephalopathy: possibly vascular dementia with poor functional status and dysphagia. Course complicated by sepsis 2/2 catheter associated UTI.      Nonverbal and not following commands. Occasionally nods head, responsive to pain.  Right pupil dilated  + indwelling knutson with clear yellow urine    s/p sacral debridement 12/20. Tissue cx with ESBL Ecoli, pseudomonas and VRE  Continue Vanc, Meropenem  Check Vanc troughs when checking labs every other day  Pain control with morphine 0.5 IV q6 ATC, hold for sedation  Will hold off replacing NGT until son present at bedside. Will perform trial with NGT and if no improvement, plan to DC.  Will perform short term trial of NGT. Will need to rediscuss utility of Abx with patient's son and palliative care.  Prognosis guarded  Surrogate/primary decision maker: Gavin

## 2017-12-23 NOTE — PROGRESS NOTE ADULT - SUBJECTIVE AND OBJECTIVE BOX
Venancio Lamas MD  Cell: [phone]  Pager: [phone]    SUBJECTIVE / OVERNIGHT EVENTS:   No events overnight.  Pt opens eyes to voice. Nods head yes/no to questions however not doing so appropriately.    VITAL SIGNS:  Vital Signs Last 24 Hrs  T(C): 37.3 (23 Dec 2017 05:42), Max: 37.8 (22 Dec 2017 11:22)  T(F): 99.2 (23 Dec 2017 05:42), Max: 100 (22 Dec 2017 11:22)  HR: 80 (23 Dec 2017 05:42) (80 - 91)  BP: 113/61 (23 Dec 2017 05:42) (94/51 - 115/62)  BP(mean): --  RR: 20 (23 Dec 2017 05:42) (20 - 25)  SpO2: 95% (23 Dec 2017 05:42) (94% - 96%)      PHYSICAL EXAM:               GENERAL: cachectic, NAD, opens eyes when spoke to		  		EYES: fixed dilated R pupil  		MOUTH: mucous membranes dry  		CHEST/LUNG: tachypneic, Poor inspiratory effort  		HEART: Regular rate and rhythm; No murmurs, rubs, or gallops  		ABDOMEN: Soft, Nontender, Nondistended; Bowel sounds present  		GENITOURINARY: knutson in place draining yellow liquid with debris  		BACK: stage 3-4 sacral decub ulcer worse since last admission  		PSYCH: Calm, nonverbal   	NEUROLOGY: R facial droop, general weakness, unable to asses thoroughly for focal deficits, patient not following commands                          9.2    14.72 )-----------( 402      ( 21 Dec 2017 09:20 )             29.3     12-21    131<L>  |  93<L>  |  8   ----------------------------<  105<H>  3.5   |  25  |  0.41<L>    Ca    8.8      21 Dec 2017 13:33  Phos  2.4     12-21  Mg     1.8     12-21    TPro  6.1  /  Alb  2.1<L>  /  TBili  0.5  /  DBili  x   /  AST  43<H>  /  ALT  28  /  AlkPhos  100  12-21      CAPILLARY BLOOD GLUCOSE    POCT Blood Glucose.: 96 mg/dL (23 Dec 2017 06:51)  POCT Blood Glucose.: 85 mg/dL (22 Dec 2017 22:52)  POCT Blood Glucose.: 100 mg/dL (22 Dec 2017 17:28)  POCT Blood Glucose.: 112 mg/dL (22 Dec 2017 11:21)      MEDICATIONS  (STANDING):  amiodarone    Tablet 400 milliGRAM(s) Oral daily  artificial  tears Solution 1 Drop(s) Right EYE daily  aspirin  chewable 81 milliGRAM(s) Oral daily  atorvastatin 20 milliGRAM(s) Oral at bedtime  buDESOnide 160 MICROgram(s)/formoterol 4.5 MICROgram(s) Inhaler 2 Puff(s) Inhalation two times a day  Dakins Solution - 1/2 Strength 1 Application(s) Topical two times a day  dextrose 5%. 1000 milliLiter(s) (60 mL/Hr) IV Continuous <Continuous>  enoxaparin Injectable 40 milliGRAM(s) SubCutaneous daily  insulin lispro (HumaLOG) corrective regimen sliding scale   SubCutaneous at bedtime  latanoprost 0.005% Ophthalmic Solution 1 Drop(s) Both EYES at bedtime  levothyroxine Injectable 25 MICROGram(s) IV Push daily  meropenem IVPB 1000 milliGRAM(s) IV Intermittent every 12 hours  meropenem IVPB      metoprolol succinate ER 25 milliGRAM(s) Oral daily  mirtazapine 7.5 milliGRAM(s) Oral at bedtime  morphine  - Injectable 0.5 milliGRAM(s) IV Push every 6 hours  multivitamin 1 Tablet(s) Oral daily  tiotropium 18 MICROgram(s) Capsule 1 Capsule(s) Inhalation daily  vancomycin  IVPB 500 milliGRAM(s) IV Intermittent every 24 hours  vancomycin  IVPB Venancio Lamas MD  Cell: [phone]  Pager: [phone]    SUBJECTIVE / OVERNIGHT EVENTS:   No events overnight.  Pt opens eyes to voice. Nods head yes/no to questions however not doing so appropriately.    VITAL SIGNS:  Vital Signs Last 24 Hrs  T(C): 37.3 (23 Dec 2017 05:42), Max: 37.8 (22 Dec 2017 11:22)  T(F): 99.2 (23 Dec 2017 05:42), Max: 100 (22 Dec 2017 11:22)  HR: 80 (23 Dec 2017 05:42) (80 - 91)  BP: 113/61 (23 Dec 2017 05:42) (94/51 - 115/62)  BP(mean): --  RR: 20 (23 Dec 2017 05:42) (20 - 25)  SpO2: 95% (23 Dec 2017 05:42) (94% - 96%)      PHYSICAL EXAM:               GENERAL: cachectic, NAD, opens eyes when spoke to		  		EYES: fixed dilated R pupil  		MOUTH: mucous membranes dry  		CHEST/LUNG: tachypneic, Poor inspiratory effort  		HEART: Regular rate and rhythm; No murmurs, rubs, or gallops  		ABDOMEN: Soft, Nontender, Nondistended; Bowel sounds present  		GENITOURINARY: knutson in place draining yellow liquid with debris  		BACK: stage 3-4 sacral decub ulcer worse since last admission  		PSYCH: Calm, nonverbal   	NEUROLOGY: R facial droop, general weakness, unable to asses thoroughly for focal deficits, patient not following commands, resting hand tremors                          9.2    14.72 )-----------( 402      ( 21 Dec 2017 09:20 )             29.3     12-21    131<L>  |  93<L>  |  8   ----------------------------<  105<H>  3.5   |  25  |  0.41<L>    Ca    8.8      21 Dec 2017 13:33  Phos  2.4     12-21  Mg     1.8     12-21    TPro  6.1  /  Alb  2.1<L>  /  TBili  0.5  /  DBili  x   /  AST  43<H>  /  ALT  28  /  AlkPhos  100  12-21      CAPILLARY BLOOD GLUCOSE    POCT Blood Glucose.: 96 mg/dL (23 Dec 2017 06:51)  POCT Blood Glucose.: 85 mg/dL (22 Dec 2017 22:52)  POCT Blood Glucose.: 100 mg/dL (22 Dec 2017 17:28)  POCT Blood Glucose.: 112 mg/dL (22 Dec 2017 11:21)      MEDICATIONS  (STANDING):  amiodarone    Tablet 400 milliGRAM(s) Oral daily  artificial  tears Solution 1 Drop(s) Right EYE daily  aspirin  chewable 81 milliGRAM(s) Oral daily  atorvastatin 20 milliGRAM(s) Oral at bedtime  buDESOnide 160 MICROgram(s)/formoterol 4.5 MICROgram(s) Inhaler 2 Puff(s) Inhalation two times a day  Dakins Solution - 1/2 Strength 1 Application(s) Topical two times a day  dextrose 5%. 1000 milliLiter(s) (60 mL/Hr) IV Continuous <Continuous>  enoxaparin Injectable 40 milliGRAM(s) SubCutaneous daily  insulin lispro (HumaLOG) corrective regimen sliding scale   SubCutaneous at bedtime  latanoprost 0.005% Ophthalmic Solution 1 Drop(s) Both EYES at bedtime  levothyroxine Injectable 25 MICROGram(s) IV Push daily  meropenem IVPB 1000 milliGRAM(s) IV Intermittent every 12 hours  meropenem IVPB      metoprolol succinate ER 25 milliGRAM(s) Oral daily  mirtazapine 7.5 milliGRAM(s) Oral at bedtime  morphine  - Injectable 0.5 milliGRAM(s) IV Push every 6 hours  multivitamin 1 Tablet(s) Oral daily  tiotropium 18 MICROgram(s) Capsule 1 Capsule(s) Inhalation daily  vancomycin  IVPB 500 milliGRAM(s) IV Intermittent every 24 hours  vancomycin  IVPB

## 2017-12-24 NOTE — PROGRESS NOTE ADULT - SUBJECTIVE AND OBJECTIVE BOX
Subjective:  will open eyes today when spoken to       MEDICATIONS  (STANDING):  amiodarone    Tablet 400 milliGRAM(s) Oral daily  artificial  tears Solution 1 Drop(s) Right EYE daily  aspirin  chewable 81 milliGRAM(s) Oral daily  atorvastatin 20 milliGRAM(s) Oral at bedtime  buDESOnide 160 MICROgram(s)/formoterol 4.5 MICROgram(s) Inhaler 2 Puff(s) Inhalation two times a day  Dakins Solution - 1/2 Strength 1 Application(s) Topical two times a day  dextrose 5%. 1000 milliLiter(s) (60 mL/Hr) IV Continuous <Continuous>  enoxaparin Injectable 40 milliGRAM(s) SubCutaneous daily  insulin lispro (HumaLOG) corrective regimen sliding scale   SubCutaneous at bedtime  latanoprost 0.005% Ophthalmic Solution 1 Drop(s) Both EYES at bedtime  levothyroxine Injectable 25 MICROGram(s) IV Push daily  meropenem IVPB 1000 milliGRAM(s) IV Intermittent every 12 hours  meropenem IVPB      metoprolol succinate ER 25 milliGRAM(s) Oral daily  mirtazapine 7.5 milliGRAM(s) Oral at bedtime  morphine  - Injectable 0.5 milliGRAM(s) IV Push every 6 hours  multivitamin 1 Tablet(s) Oral daily  tiotropium 18 MICROgram(s) Capsule 1 Capsule(s) Inhalation daily  vancomycin  IVPB      vancomycin  IVPB 750 milliGRAM(s) IV Intermittent every 24 hours    MEDICATIONS  (PRN):  acetaminophen   Tablet 650 milliGRAM(s) Oral every 6 hours PRN For Temp greater than 38 C (100.4 F)  acetaminophen   Tablet. 650 milliGRAM(s) Oral every 6 hours PRN pain  dextrose Gel 1 Dose(s) Oral every 6 hours PRN Blood Glucose LESS THAN 70 milliGRAM(s)/deciliter  dextrose Gel 1 Dose(s) Oral every 6 hours PRN Blood Glucose LESS THAN 70 milliGRAM(s)/deciliter  morphine  - Injectable 0.5 milliGRAM(s) IV Push every 4 hours PRN Severe Pain (7 - 10)      LABS:                        9.0    13.09 )-----------( 351      ( 24 Dec 2017 06:40 )             27.8     Hemoglobin: 9.0 g/dL (12-24 @ 06:40)  Hemoglobin: 9.2 g/dL (12-21 @ 09:20)  Hemoglobin: 8.3 g/dL (12-20 @ 07:45)  Hemoglobin: 5.3 g/dL (12-20 @ 06:30)    12-23    130<L>  |  95<L>  |  8   ----------------------------<  83  4.7   |  22  |  0.49<L>    Ca    8.6      23 Dec 2017 07:10  Phos  2.7     12-23  Mg     1.7     12-23    TPro  5.7<L>  /  Alb  2.1<L>  /  TBili  0.4  /  DBili  x   /  AST  39<H>  /  ALT  22  /  AlkPhos  98  12-23    Creatinine Trend: 0.49<--, 0.41<--, 0.44<--, 0.43<--, 0.39<--, 0.40<--     PHYSICAL EXAM  Vital Signs Last 24 Hrs  T(C): 36.2 (23 Dec 2017 20:50), Max: 36.8 (23 Dec 2017 14:23)  T(F): 97.1 (23 Dec 2017 20:50), Max: 98.3 (23 Dec 2017 14:23)  HR: 51 (23 Dec 2017 20:50) (51 - 80)  BP: 119/67 (23 Dec 2017 20:50) (119/67 - 147/80)  BP(mean): --  RR: 22 (23 Dec 2017 20:50) (18 - 25)  SpO2: 100% (23 Dec 2017 20:50) (98% - 100%)    Cardiovascular: Normal S1 S2, RRR   No JVD, 1/6 ERICA murmur, Peripheral pulses palpable 2+ bilaterally  Respiratory: decreased breath sounds w/ decreased inspiratory effort noted  B/L LL's	  Gastrointestinal:  Soft, Non-tender, + BS	  Extremities no edema, cyanosis, clubbing B/L LE's     DIAGNOSTIC DATA:    < from: TTE with Doppler (w/Cont) (04.03.17 @ 16:18) >  CONCLUSIONS:  1. Mitral annular calcification, otherwise normal mitral  valve. Mild mitral regurgitation.  2. Moderately dilated left atrium.  LA volume index = 46  cc/m2.  3. Moderate left ventricular enlargement.  4. Severe global left ventricular systolic dysfunction.  Endocardial visualization enhanced with intravenous  injection of echo contrast (Definity).  5. The right ventricle is not well visualized; grossly  normal right ventricular systolic function.  A device wire  is noted in the right heart.  6. Estimated right ventricular systolic pressure equals 60  mm Hg, assuming right atrial pressure equals 10 mm Hg,  consistent with moderate pulmonary hypertension.  ------------------------------------------------------------------------  Confirmed on  4/3/2017 - 17:19:47 by Jean-Pierre Carver M.D.    < end of copied text >    < from: Cardiac Cath Lab - Adult (04.26.16 @ 13:12) >  VENTRICLES: No LV gram was performed; however, a recent echocardiogram  demonstrated an EF of 19 %.  CORONARY VESSELS: The coronary circulation is right dominant.  LM:   --  LM: Normal.  LAD:   --  Proximal LAD: There was a [id number] % stenosis. There was mild  restenosis in proximal LAD stent.  --  Mid LAD: Angiography showed minor luminal irregularities with no flow  limiting lesions.  --  Distal LAD: Angiography showed minor luminal irregularities with no  flow limiting lesions.  --  D1: Angiography showed minor luminal irregularities with no flow  limiting lesions.  CX:   --  Circumflex: Normal.  RI:   --  Ramus intermedius: Angiography showed minor luminal  irregularities with no flow limiting lesions.  RCA:   --  Proximal RCA: Normal.  --  Mid RCA: Angiography showed mild atherosclerosis with no flow limiting  lesions.  --  Distal RCA: Angiography showed minor luminal irregularities with no  flow limiting lesions.  --  RPDA: Angiography showed minor luminal irregularities with no flow  limiting lesions.  --  RPLS: Angiography showed minor luminal irregularities with no flow  limiting lesions.  COMPLICATIONS: There were no complications.  DIAGNOSTIC RECOMMENDATIONS: Medical management and aggressive risk factor  modification is recommended.  Prepared and signed by  Nehal López M.D.  Signed 04/27/2016 13:13:20    < end of copied text >    < from: Transthoracic Echocardiogram (12.01.17 @ 16:53) >  CONCLUSIONS:  1. Normal mitral valve. Minimal mitral regurgitation.  2. Mild left ventricular enlargement.  3. Severe global left ventricular systolic dysfunction.  4. The right ventricle is not well visualized. A device  wire is noted in the right heart.  ------------------------------------------------------------------------  Confirmed on  12/1/2017 - 18:24:53 by TAJ Hyatt    < end of copied text      ASSESSMENT/PLAN: 	77y Female with hypertension, dyslipidemia, CAD s/p LAD stent with only mild re-stenosis with minor luminal irregularities otherwise on cath 4/16, with known severe LV dysfunction , NICM s/p [employer] BiV ICD, VT on Amio, COPD, lymphoma previously on chemo with known spinal mets and compression fractures recent admit with GNR bacteremia (pseudomonas), felt to be urinary source, and sacral decub wound culture +MRSA, s/p ABX, now admitted from SNF with fever and AMS/lethargy.     --med management of CAD with ASA/Statin/BB  --Not in decomp CHF  --recent TTE noted above  --ID --On IV Abx for MRSA sacral wound/ blood cx from 12/13 NGTD, Wound cx w/E Coli; ESBL; and pseudomonas  --F/U GI not candidate for PEG   --as per primary team pt remains full code

## 2017-12-24 NOTE — PROGRESS NOTE ADULT - PROBLEM SELECTOR PLAN 9
- Face to face conversation held with sonGavin at bedside  - Updated on current clinical progress and that patient is not candidate for PEG. GI fellow also at bedside and discussed with son. Per GI, can place NGT and can DC with NGT to an acceptable facility when time comes. Patient advocate called per son request. Ethics consult also placed. Per son, would still like CPR and intubation if necessary knowing that this will likely prolong patient's suffering and pain. 18 mins spent face to face  - Will get labs Q48 hours as opposed to daily  - family meeting on 12/14, 12/18 and 12/21: full code  - PT recs: extended care facility  - pending chart from [facility]

## 2017-12-24 NOTE — PROGRESS NOTE ADULT - ASSESSMENT
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

## 2017-12-24 NOTE — PROGRESS NOTE ADULT - SUBJECTIVE AND OBJECTIVE BOX
Venancio Lamas MD  Cell: [phone]  Pager: [phone]    SUBJECTIVE / OVERNIGHT EVENTS:   No events overnight.  Pt opens eyes to voice. Nods head yes/no to questions however not doing so appropriately.    VITAL SIGNS:  Vital Signs Last 24 Hrs  T(C): 36.2 (23 Dec 2017 20:50), Max: 37.2 (23 Dec 2017 11:16)  T(F): 97.1 (23 Dec 2017 20:50), Max: 98.9 (23 Dec 2017 11:16)  HR: 51 (23 Dec 2017 20:50) (51 - 90)  BP: 119/67 (23 Dec 2017 20:50) (110/59 - 147/80)  BP(mean): --  RR: 22 (23 Dec 2017 20:50) (18 - 25)  SpO2: 100% (23 Dec 2017 20:50) (94% - 100%)      PHYSICAL EXAM:               GENERAL: cachectic, NAD, opens eyes when spoke to		  		EYES: fixed dilated R pupil  		MOUTH: mucous membranes dry  		CHEST/LUNG: tachypneic, Poor inspiratory effort  		HEART: Regular rate and rhythm; No murmurs, rubs, or gallops  		ABDOMEN: Soft, Nontender, Nondistended; Bowel sounds present  		GENITOURINARY: knutson in place draining yellow liquid with debris  		BACK: stage 3-4 sacral decub ulcer worse since last admission  		PSYCH: Calm, nonverbal   	NEUROLOGY: R facial droop, general weakness, unable to asses thoroughly for focal deficits, patient not following commands, resting hand tremors                          9.0    13.09 )-----------( 351      ( 24 Dec 2017 06:40 )             27.8     12-23    130<L>  |  95<L>  |  8   ----------------------------<  83  4.7   |  22  |  0.49<L>    Ca    8.6      23 Dec 2017 07:10  Phos  2.7     12-23  Mg     1.7     12-23    TPro  5.7<L>  /  Alb  2.1<L>  /  TBili  0.4  /  DBili  x   /  AST  39<H>  /  ALT  22  /  AlkPhos  98  12-23      CAPILLARY BLOOD GLUCOSE    POCT Blood Glucose.: 88 mg/dL (24 Dec 2017 07:32)  POCT Blood Glucose.: 100 mg/dL (24 Dec 2017 00:31)  POCT Blood Glucose.: 82 mg/dL (23 Dec 2017 18:29)  POCT Blood Glucose.: 120 mg/dL (23 Dec 2017 12:12)      MEDICATIONS  (STANDING):  amiodarone    Tablet 400 milliGRAM(s) Oral daily  artificial  tears Solution 1 Drop(s) Right EYE daily  aspirin  chewable 81 milliGRAM(s) Oral daily  atorvastatin 20 milliGRAM(s) Oral at bedtime  buDESOnide 160 MICROgram(s)/formoterol 4.5 MICROgram(s) Inhaler 2 Puff(s) Inhalation two times a day  Dakins Solution - 1/2 Strength 1 Application(s) Topical two times a day  dextrose 5%. 1000 milliLiter(s) (60 mL/Hr) IV Continuous <Continuous>  enoxaparin Injectable 40 milliGRAM(s) SubCutaneous daily  insulin lispro (HumaLOG) corrective regimen sliding scale   SubCutaneous at bedtime  latanoprost 0.005% Ophthalmic Solution 1 Drop(s) Both EYES at bedtime  levothyroxine Injectable 25 MICROGram(s) IV Push daily  meropenem IVPB 1000 milliGRAM(s) IV Intermittent every 12 hours  meropenem IVPB      metoprolol succinate ER 25 milliGRAM(s) Oral daily  mirtazapine 7.5 milliGRAM(s) Oral at bedtime  morphine  - Injectable 0.5 milliGRAM(s) IV Push every 6 hours  multivitamin 1 Tablet(s) Oral daily  tiotropium 18 MICROgram(s) Capsule 1 Capsule(s) Inhalation daily  vancomycin  IVPB      vancomycin  IVPB 750 milliGRAM(s) IV Intermittent every 24 hours

## 2017-12-24 NOTE — PROGRESS NOTE ADULT - PROBLEM SELECTOR PLAN 3
- morphine IV q6 for sacral ulcer pain; to be done before wound changes  - will monitor for side effects of morphine and consider adjusting dose  - watch for constipation with morphine - morphine IV q6 plus PRNs for sacral ulcer pain; to be given before dressing change  - will monitor for side effects of morphine and consider adjusting dose  - watch for constipation with morphine

## 2017-12-24 NOTE — PROGRESS NOTE ADULT - PROBLEM SELECTOR PLAN 8
- family in agreement with pleasure feeds  - attempted to place NG 12/21 per son's request; however was not placed appropriately and will place again per son's request when he is present - family in agreement with pleasure feeds  - attempted to place NG 12/21 per son's request; however was not placed appropriately and will place again per son's request when he is present.  Discussed with son on 12/24.

## 2017-12-24 NOTE — PROGRESS NOTE ADULT - PROBLEM SELECTOR PLAN 1
- febrile 12/21; repeat cultures 12/21 NGTD and CXR clear - likely catheter associated UTI based on UA from 12/21  - decub ulcer worsened since last admission and previously tested positive for MRSA - ulcer deep enough to reach bone: suspect osteomyelitis  - wound care Dr performed debridement and culture 12/20 showed ESBL e coli, pseudomonas and gram + cocci  - on vanc 750 (day 12), restarted cefepime due to fever on 12/21 but then switched to meropenem based on sensitivities from wound culture  - blood cx negative, urine cx grew candida  - Xray images not ideal to r/o osteo (can consider CT vs. indium WBC scan); ESR elevated to 101 - febrile 12/21; repeat cultures 12/21 NGTD and CXR clear - likely catheter associated UTI based on UA from 12/21  - decub ulcer worsened since last admission and previously tested positive for MRSA - ulcer deep enough to reach bone: suspect osteomyelitis  - wound care Dr performed debridement and culture 12/20 showed ESBL e coli, pseudomonas and enterococcus faecalis  - on vanc 750 (day 12), restarted cefepime due to fever on 12/21 but then switched to meropenem based on sensitivities from wound culture  - blood cx negative, urine cx grew candida  - Xray images not ideal to r/o osteo (can consider CT vs. indium WBC scan); ESR elevated to 101

## 2017-12-25 NOTE — PROGRESS NOTE ADULT - PROBLEM SELECTOR PLAN 4
- Speech and Swallow recommended NPO  - GI eval for PEG; recommend holding off on PEG  - attempted to place NG 12/21 per son's request; however was not placed appropriately and will place again today per son's request  - finger sticks Q6; will keep giving more D5 if pt not eating - Speech and Swallow recommended NPO  - GI eval for PEG; recommend holding off on PEG  - attempted to place NG 12/21 per son's request; however was not placed appropriately and will place again today per son's request  - finger sticks Q6  - will continue re-ordering D5 if pt not eating

## 2017-12-25 NOTE — PROGRESS NOTE ADULT - ASSESSMENT
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

## 2017-12-25 NOTE — PROGRESS NOTE ADULT - ATTENDING COMMENTS
Patient was seen and examined personally by me. I have discussed the plan and reviewed the resident's note and agree with the above physical exam findings including assessment and plan except as indicated below.    77 female admitted with sepsis 2/2 infected sacral decubitus with probe to bone, suspicious for osteomyelitis. Also with encephalopathy: possibly vascular dementia with poor functional status and dysphagia. Course complicated by sepsis 2/2 catheter associated UTI.    Nonverbal and not following commands. Occasionally nods head, responsive to pain. Appears uncomfortable and groaning  Right pupil dilated  + indwelling knutson with clear yellow urine    s/p sacral debridement 12/20. Tissue cx with ESBL Ecoli, pseudomonas and VRE  Continue Vanc, Meropenem  Check Vanc troughs when checking labs every other day  Pain control : increase morphine 1 IV q6 ATC, hold for sedation  Will hold off replacing NGT until son present at bedside. Will perform trial with NGT and if no improvement, plan to DC.  Will need to rediscuss utility of Abx with patient's son with palliative care.  Prognosis guarded  Surrogate/primary decision maker: Gavin Patient was seen and examined personally by me. I have discussed the plan and reviewed the resident's note and agree with the above physical exam findings including assessment and plan except as indicated below.    77 female admitted with sepsis 2/2 infected sacral decubitus with probe to bone, suspicious for osteomyelitis. Also with encephalopathy: possibly vascular dementia with poor functional status and dysphagia. Course complicated by sepsis 2/2 catheter associated UTI.    Nonverbal and not following commands. Occasionally nods head, responsive to pain. Appears uncomfortable and groaning  Right pupil dilated  + indwelling knutson with clear yellow urine    s/p sacral debridement 12/20. Tissue cx with ESBL Ecoli, pseudomonas and VRE  Continue Vanc, Meropenem  Check Vanc troughs when checking labs every other day  Pain control : increase morphine 1 IV q6 ATC, hold for sedation  Will hold off replacing NGT until son present at bedside. Will perform trial with NGT and if no improvement, plan to DC.  Change PO meds to IV for now  Will need to rediscuss utility of Abx with patient's son with palliative care.  Prognosis guarded  Surrogate/primary decision maker: Gavin

## 2017-12-25 NOTE — PROGRESS NOTE ADULT - PROBLEM SELECTOR PLAN 8
- family in agreement with pleasure feeds  - attempted to place NG 12/21 per son's request; however was not placed appropriately and will place again per son's request when he is present.  Discussed with son on 12/24.

## 2017-12-25 NOTE — PROGRESS NOTE ADULT - PROBLEM SELECTOR PLAN 1
- febrile 12/21; repeat cultures 12/21 NGTD and CXR clear - likely catheter associated UTI based on UA from 12/21  - decub ulcer worsened since last admission and previously tested positive for MRSA - ulcer deep enough to reach bone: suspect osteomyelitis  - wound care Dr performed debridement and culture 12/20 showed ESBL e coli, pseudomonas and enterococcus faecalis  - on vanc 750 (day 12), restarted cefepime due to fever on 12/21 but then switched to meropenem based on sensitivities from wound culture  - blood cx negative, urine cx grew candida  - Xray images not ideal to r/o osteo (can consider CT vs. indium WBC scan); ESR elevated to 101 - febrile 12/21; repeat cultures 12/21 NGTD and CXR clear - likely catheter associated UTI based on UA from 12/21  - decub ulcer worsened since last admission and previously tested positive for MRSA - ulcer deep enough to reach bone: suspect osteomyelitis  - wound care Dr performed debridement and culture 12/20 showed ESBL e coli, pseudomonas and enterococcus faecalis  - on vanc 750 (day 13, will dose base on troughs)), restarted cefepime due to fever on 12/21 then switched to meropenem based on sensitivities from wound culture  - blood cx negative, urine cx grew candida  - Xray images not ideal to r/o osteo (can consider CT vs. indium WBC scan); ESR elevated to 101

## 2017-12-25 NOTE — PROGRESS NOTE ADULT - SUBJECTIVE AND OBJECTIVE BOX
Subjective:  will open eyes today when spoken to Appears comfortable     MEDICATIONS  (STANDING):  amiodarone    Tablet 400 milliGRAM(s) Oral daily  artificial  tears Solution 1 Drop(s) Right EYE daily  aspirin  chewable 81 milliGRAM(s) Oral daily  atorvastatin 20 milliGRAM(s) Oral at bedtime  buDESOnide 160 MICROgram(s)/formoterol 4.5 MICROgram(s) Inhaler 2 Puff(s) Inhalation two times a day  Dakins Solution - 1/2 Strength 1 Application(s) Topical two times a day  dextrose 5%. 1000 milliLiter(s) (60 mL/Hr) IV Continuous <Continuous>  enoxaparin Injectable 40 milliGRAM(s) SubCutaneous daily  insulin lispro (HumaLOG) corrective regimen sliding scale   SubCutaneous at bedtime  latanoprost 0.005% Ophthalmic Solution 1 Drop(s) Both EYES at bedtime  levothyroxine Injectable 25 MICROGram(s) IV Push daily  meropenem IVPB 1000 milliGRAM(s) IV Intermittent every 12 hours  meropenem IVPB      metoprolol succinate ER 25 milliGRAM(s) Oral daily  mirtazapine 7.5 milliGRAM(s) Oral at bedtime  morphine  - Injectable 0.5 milliGRAM(s) IV Push every 4 hours  multivitamin 1 Tablet(s) Oral daily  tiotropium 18 MICROgram(s) Capsule 1 Capsule(s) Inhalation daily  vancomycin  IVPB      vancomycin  IVPB 750 milliGRAM(s) IV Intermittent every 24 hours    MEDICATIONS  (PRN):  acetaminophen   Tablet 650 milliGRAM(s) Oral every 6 hours PRN For Temp greater than 38 C (100.4 F)  acetaminophen   Tablet. 650 milliGRAM(s) Oral every 6 hours PRN pain  dextrose Gel 1 Dose(s) Oral every 6 hours PRN Blood Glucose LESS THAN 70 milliGRAM(s)/deciliter  dextrose Gel 1 Dose(s) Oral every 6 hours PRN Blood Glucose LESS THAN 70 milliGRAM(s)/deciliter  morphine  - Injectable 0.5 milliGRAM(s) IV Push every 4 hours PRN Severe Pain (7 - 10)      LABS:                        9.0    13.09 )-----------( 351      ( 24 Dec 2017 06:40 )             27.8     Hemoglobin: 9.0 g/dL (12-24 @ 06:40)  Hemoglobin: 9.2 g/dL (12-21 @ 09:20)          Creatinine Trend: 0.49<--, 0.41<--, 0.44<--, 0.43<--, 0.39<--, 0.40<--           PHYSICAL EXAM  Vital Signs Last 24 Hrs  T(C): 37.2 (25 Dec 2017 06:24), Max: 37.3 (24 Dec 2017 23:39)  T(F): 98.9 (25 Dec 2017 06:24), Max: 99.2 (24 Dec 2017 23:39)  HR: 85 (25 Dec 2017 06:24) (75 - 88)  BP: 111/59 (25 Dec 2017 06:24) (111/59 - 138/64)  BP(mean): --  RR: 20 (25 Dec 2017 06:24) (19 - 24)  SpO2: 97% (25 Dec 2017 06:24) (97% - 100%)    Cardiovascular: Normal S1 S2, RRR   No JVD, 1/6 ERICA murmur, Peripheral pulses palpable 2+ bilaterally  Respiratory: decreased breath sounds w/ decreased inspiratory effort noted  B/L	  Gastrointestinal:  Soft, Non-tender, + BS	  Extremities no edema, cyanosis, clubbing B/L LE's     DIAGNOSTIC DATA:    < from: TTE with Doppler (w/Cont) (04.03.17 @ 16:18) >  CONCLUSIONS:  1. Mitral annular calcification, otherwise normal mitral  valve. Mild mitral regurgitation.  2. Moderately dilated left atrium.  LA volume index = 46  cc/m2.  3. Moderate left ventricular enlargement.  4. Severe global left ventricular systolic dysfunction.  Endocardial visualization enhanced with intravenous  injection of echo contrast (Definity).  5. The right ventricle is not well visualized; grossly  normal right ventricular systolic function.  A device wire  is noted in the right heart.  6. Estimated right ventricular systolic pressure equals 60  mm Hg, assuming right atrial pressure equals 10 mm Hg,  consistent with moderate pulmonary hypertension.  ------------------------------------------------------------------------  Confirmed on  4/3/2017 - 17:19:47 by Jean-Pierre Carver M.D.    < end of copied text >    < from: Cardiac Cath Lab - Adult (04.26.16 @ 13:12) >  VENTRICLES: No LV gram was performed; however, a recent echocardiogram  demonstrated an EF of 19 %.  CORONARY VESSELS: The coronary circulation is right dominant.  LM:   --  LM: Normal.  LAD:   --  Proximal LAD: There was a [id number] % stenosis. There was mild  restenosis in proximal LAD stent.  --  Mid LAD: Angiography showed minor luminal irregularities with no flow  limiting lesions.  --  Distal LAD: Angiography showed minor luminal irregularities with no  flow limiting lesions.  --  D1: Angiography showed minor luminal irregularities with no flow  limiting lesions.  CX:   --  Circumflex: Normal.  RI:   --  Ramus intermedius: Angiography showed minor luminal  irregularities with no flow limiting lesions.  RCA:   --  Proximal RCA: Normal.  --  Mid RCA: Angiography showed mild atherosclerosis with no flow limiting  lesions.  --  Distal RCA: Angiography showed minor luminal irregularities with no  flow limiting lesions.  --  RPDA: Angiography showed minor luminal irregularities with no flow  limiting lesions.  --  RPLS: Angiography showed minor luminal irregularities with no flow  limiting lesions.  COMPLICATIONS: There were no complications.  DIAGNOSTIC RECOMMENDATIONS: Medical management and aggressive risk factor  modification is recommended.  Prepared and signed by  Nehal López M.D.  Signed 04/27/2016 13:13:20    < end of copied text >    < from: Transthoracic Echocardiogram (12.01.17 @ 16:53) >  CONCLUSIONS:  1. Normal mitral valve. Minimal mitral regurgitation.  2. Mild left ventricular enlargement.  3. Severe global left ventricular systolic dysfunction.  4. The right ventricle is not well visualized. A device  wire is noted in the right heart.  ------------------------------------------------------------------------  Confirmed on  12/1/2017 - 18:24:53 by Carli Encarnacion M.D. RPVI    < end of copied text      ASSESSMENT/PLAN: 	77y Female with hypertension, dyslipidemia, CAD s/p LAD stent with only mild re-stenosis with minor luminal irregularities otherwise on cath 4/16, with known severe LV dysfunction , NICM s/p [employer] BiV ICD, VT on Amio, COPD, lymphoma previously on chemo with known spinal mets and compression fractures recent admit with GNR bacteremia (pseudomonas), felt to be urinary source, and sacral decub wound culture +MRSA, s/p ABX, now admitted from SNF with fever and AMS/lethargy.     --med management of CAD with ASA/Statin/BB  --Not in decomp CHF  --recent TTE noted above  --ID --On IV Abx for MRSA sacral wound/ blood cx from 12/13 NGTD, Wound cx w/E Coli; ESBL; and pseudomonas  --F/U GI not candidate for PEG   --as per primary team pt remains full code

## 2017-12-25 NOTE — PROGRESS NOTE ADULT - PROBLEM SELECTOR PLAN 3
- morphine IV q6 plus PRNs for sacral ulcer pain; to be given before dressing change  - will monitor for side effects of morphine and consider adjusting dose  - watch for constipation with morphine - morphine IV q4 plus PRNs for sacral ulcer pain; to be given before dressing change  - will monitor for side effects of morphine and consider adjusting dose  - watch for constipation with morphine

## 2017-12-25 NOTE — PROGRESS NOTE ADULT - PROBLEM SELECTOR PLAN 9
- Face to face conversation held with sonGavin at bedside  - Updated on current clinical progress and that patient is not candidate for PEG. GI fellow also at bedside and discussed with son. Per GI, can place NGT and can DC with NGT to an acceptable facility when time comes. Patient advocate called per son request. Ethics consult also placed. Per son, would still like CPR and intubation if necessary knowing that this will likely prolong patient's suffering and pain. 18 mins spent face to face  - Will get labs Q48 hours as opposed to daily  - family meeting on 12/14, 12/18 and 12/21: full code  - PT recs: extended care facility  - pending chart from [facility] - will plan to discuss stopping abx with son and discuss hospice  - Face to face conversation held with sonGavin at bedside  - Updated on current clinical progress and that patient is not candidate for PEG. GI fellow also at bedside and discussed with son. Per GI, can place NGT and can DC with NGT to an acceptable facility when time comes. Patient advocate called per son request. Ethics consult also placed. Per son, would still like CPR and intubation if necessary knowing that this will likely prolong patient's suffering and pain. 18 mins spent face to face  - Will get labs Q48 hours as opposed to daily  - family meeting on 12/14, 12/18 and 12/21: full code  - PT recs: extended care facility  - pending chart from [facility]

## 2017-12-25 NOTE — PROGRESS NOTE ADULT - SUBJECTIVE AND OBJECTIVE BOX
Venancio Lamas MD  Cell: [phone]  Pager: [phone]    SUBJECTIVE / OVERNIGHT EVENTS:   No events overnight.  Pt opens eyes to voice. Nods head yes/no to questions however not doing so appropriately.    VITAL SIGNS:  Vital Signs Last 24 Hrs  T(C): 37.2 (25 Dec 2017 06:24), Max: 37.3 (24 Dec 2017 23:39)  T(F): 98.9 (25 Dec 2017 06:24), Max: 99.2 (24 Dec 2017 23:39)  HR: 85 (25 Dec 2017 06:24) (75 - 88)  BP: 111/59 (25 Dec 2017 06:24) (111/59 - 138/64)  BP(mean): --  RR: 20 (25 Dec 2017 06:24) (19 - 24)  SpO2: 97% (25 Dec 2017 06:24) (97% - 100%)      PHYSICAL EXAM:               GENERAL: cachectic, NAD, opens eyes when spoke to		  		EYES: fixed dilated R pupil  		MOUTH: mucous membranes dry  		CHEST/LUNG: tachypneic, Poor inspiratory effort  		HEART: Regular rate and rhythm; No murmurs, rubs, or gallops  		ABDOMEN: Soft, Nontender, Nondistended; Bowel sounds present  		GENITOURINARY: knutson in place draining yellow liquid with debris  		BACK: unstageable sacral decub ulcer worse since last admission  		PSYCH: Calm, nonverbal   	NEUROLOGY: R facial droop, general weakness, unable to asses thoroughly for focal deficits, patient not following commands, resting hand tremors                            9.0    13.09 )-----------( 351      ( 24 Dec 2017 06:40 )             27.8           CAPILLARY BLOOD GLUCOSE    POCT Blood Glucose.: 99 mg/dL (25 Dec 2017 07:09)  POCT Blood Glucose.: 86 mg/dL (24 Dec 2017 21:43)  POCT Blood Glucose.: 107 mg/dL (24 Dec 2017 12:18)      MEDICATIONS  (STANDING):  amiodarone    Tablet 400 milliGRAM(s) Oral daily  artificial  tears Solution 1 Drop(s) Right EYE daily  aspirin  chewable 81 milliGRAM(s) Oral daily  atorvastatin 20 milliGRAM(s) Oral at bedtime  buDESOnide 160 MICROgram(s)/formoterol 4.5 MICROgram(s) Inhaler 2 Puff(s) Inhalation two times a day  Dakins Solution - 1/2 Strength 1 Application(s) Topical two times a day  dextrose 5%. 1000 milliLiter(s) (60 mL/Hr) IV Continuous <Continuous>  enoxaparin Injectable 40 milliGRAM(s) SubCutaneous daily  insulin lispro (HumaLOG) corrective regimen sliding scale   SubCutaneous at bedtime  latanoprost 0.005% Ophthalmic Solution 1 Drop(s) Both EYES at bedtime  levothyroxine Injectable 25 MICROGram(s) IV Push daily  meropenem IVPB 1000 milliGRAM(s) IV Intermittent every 12 hours  meropenem IVPB      metoprolol succinate ER 25 milliGRAM(s) Oral daily  mirtazapine 7.5 milliGRAM(s) Oral at bedtime  morphine  - Injectable 0.5 milliGRAM(s) IV Push every 6 hours  multivitamin 1 Tablet(s) Oral daily  tiotropium 18 MICROgram(s) Capsule 1 Capsule(s) Inhalation daily  vancomycin  IVPB      vancomycin  IVPB 750 milliGRAM(s) IV Intermittent every 24 hours

## 2017-12-26 NOTE — CONSULT NOTE ADULT - PROVIDER SPECIALTY LIST ADULT
ENT
Ethics
Ethics
Gastroenterology
Infectious Disease
Palliative Care
Palliative Care
Wound Care
Cardiology

## 2017-12-26 NOTE — PROGRESS NOTE ADULT - PROBLEM SELECTOR PLAN 5
- presently euvolemic  - will continue with home meds as pt tolerates - presently euvolemic   - will continue with home meds as pt tolerates

## 2017-12-26 NOTE — CONSULT NOTE ADULT - ATTENDING COMMENTS
Patient seen and examined.  Agree with above.   -f/u BCx  -Continue with medical management of cad    Nehal López MD  [facility]  [address], [id number]  [place] [address]  office: [phone]  pager: [phone]
76 yo f w/ CAD, MI, Systolic CH, s/p AICD, HTN, lymphoma previously on chemo with known spinal mets and compression fractures, functional quadriplegia, nonverbal, recent admission for Pseudomonas bacteremia secondary to UTI, MRSA of sacral decubitus sent from NH 12/13 for altered mental status and fever, admitted for presumed UTI and worsening of sacral decubitus. Here, was noticed to have worsening sacral decubitus. Has stage 4 wound. Likely component of OM, ? chronic OM considering time course--uncertain benefit to long duration antibiotics. Cultures with ESBL, VRE, Pseudomonas. For now, continue treatment for infected wound. Sacral wound infection, positive culture finding.  - Meropenem 1g q 8  - DC Vancomycin  - Wound care  - Goals of care discussions/Ethics consult noted  - Anticipate would treat with skin/soft tissue duration for sacral wound, appears less likely benefit to prolonged course antibiotics    Michael Abraham MD  Pager [phone]  After 5pm and on weekends call [phone]
I have personally seen and examined the patient and completed the note.

## 2017-12-26 NOTE — PROGRESS NOTE ADULT - PROBLEM SELECTOR PLAN 4
- Speech and Swallow recommended NPO  - GI eval for PEG; recommend holding off on PEG  - attempted to place NG 12/21 per son's request; however was not placed appropriately and will place again today per son's request  - finger sticks Q6  - will continue re-ordering D5 if pt not eating

## 2017-12-26 NOTE — CONSULT NOTE ADULT - SUBJECTIVE AND OBJECTIVE BOX
HPI:  78 yo f w/ CAD, MI, Systolic CH, s/p AICD, HTN, lymphoma previously on chemo with known spinal mets and compression fractures sent from NH for fever after just being discharged with hospitalization due to AMS with tachycardia, fever, hypotension.  She also had previous admission for UTI with pseudomonas and CRE.  Prior to last admission she was able to converse at baseline, however since then has been non-verbal.  She recieved cefepime and vanco during last admission based on previous sensitivities.  Pt AMS improved after a few days (however she still was hardly speaking) and she passed s/s eval, so was started on oral feeds and was able to take PO meds when she was given them with ensure. Pt grew pseudomonas in blood and we continued with Cefepime and dc'd vanc. Swab of decubitus ulcer came back positive for MRSA and therefore, pt was continued on contact isolations and started on Vanc. TTE returned negative.  Pt completed 14 course of Cefepime, however, she spiked a fever 2 days after completion.   However, she then had no fevers for the next few days and repeat blood cultures were negative so ID was in agreement with stopping antibiotics and discharging back to nursing home.  Currently the patient is not speaking but does nod her head yes or no to questioning.  Only complaint is back pain where ulcer is located. (13 Dec 2017 13:52)  ID note-above reviewed. Patient with known sacral decubitus ulcer with recent admission for pseudomonas bacteremia secondary to UTI and MRSA in sacral ulcer, completed course of cefepime (h/o penicillin allergy-rash) and vancomycin. She returned from NH on 12/13 with ams and sepsis presumed to be from UTI, Ucx showed candida albicans. She has been on cefepime/meropenem and vancomycin since admission. On 12/20 underwent debridement of sacral ulcer, wound culture growing ESBL E.coli, P.aeruginosa and VRE (ampicillin sensitive)     PAST MEDICAL & SURGICAL HISTORY:  Paroxysmal atrial fibrillation  GERD (gastroesophageal reflux disease)  DM (diabetes mellitus)  Asthma  MI (myocardial infarction)  CAD (coronary artery disease)  Lymphoma: S/P resection and chemotherapy in remission  HLD (hyperlipidemia)  CHF (congestive heart failure): On home oxygen 2L PRN  HTN (hypertension)  ICD (implantable cardioverter-defibrillator) in place  S/P coronary artery stent placement  S/P myomectomy  S/P appendectomy  S/P lymph node biopsy: Biopsy and resection      Allergies  peanuts (Unknown)  penicillin (Nausea)        ANTIMICROBIALS:  meropenem IVPB 1000 every 12 hours  meropenem IVPB    vancomycin  IVPB    vancomycin  IVPB 750 every 24 hours      OTHER MEDS: MEDICATIONS  (STANDING):  acetaminophen  Suppository 650 every 6 hours PRN  acetaminophen  Suppository. 650 every 6 hours PRN  amiodarone    Tablet 400 daily  aspirin  chewable 81 daily  atorvastatin 20 at bedtime  buDESOnide 160 MICROgram(s)/formoterol 4.5 MICROgram(s) Inhaler 2 two times a day  dextrose Gel 1 every 6 hours PRN  dextrose Gel 1 every 6 hours PRN  enoxaparin Injectable 40 daily  insulin lispro (HumaLOG) corrective regimen sliding scale  at bedtime  levothyroxine Injectable 25 daily  metoprolol    tartrate Injectable 2.5 every 6 hours  mirtazapine 7.5 at bedtime  morphine  - Injectable 0.5 every 4 hours PRN  morphine  - Injectable 0.5 every 4 hours  tiotropium 18 MICROgram(s) Capsule 1 daily      SOCIAL HISTORY:  [ ] etoh [ ] tobacco [ ] former smoker [ ] IVDU    FAMILY HISTORY:  No pertinent family history in first degree relatives      REVIEW OF SYSTEMS  [x] ROS unobtainable because:  nonverbal  [  ] All other systems negative except as noted below:	    Constitutional:  [ ] fever [ ] weight loss  Skin:  [ ] rash [ ] phlebitis	  Eyes: [ ] icterus [ ] inflammation	  ENMT: [ ] discharge [ ] thrush [ ] ulcers [ ] exudates  Respiratory: [ ] dyspnea [ ] hemoptysis [ ] cough [ ] sputum	  Cardiovascular:  [ ] chest pain [ ] palpitations [ ] edema	  Gastrointestinal:  [ ] nausea [ ] vomiting [ ] diarrhea [ ] constipation [ ] pain	  Genitourinary:  [ ] dysuria [ ] frequency [ ] hematuria [ ] discharge [ ] flank pain  Musculoskeletal:  [ ] myalgias [ ] arthralgias [ ] arthritis	  Neurological:  [ ] headache [ ] seizures	  Psychiatric:  [ ] anxiety [ ] depression	  Hematology/Lymphatics:  [ ] lymphadenopathy  Endocrine:  [ ] adrenal [ ] thyroid  Allergic/Immunologic:	 [ ] transplant [ ] seasonal    Vital Signs Last 24 Hrs  T(F): 97.9 (12-26-17 @ 06:43), Max: 101.5 (12-21-17 @ 05:19)    Vital Signs Last 24 Hrs  HR: 79 (12-26-17 @ 10:02) (76 - 84)  BP: 106/65 (12-26-17 @ 10:02) (100/53 - 127/71)  RR: 20 (12-26-17 @ 10:02)  SpO2: 97% (12-26-17 @ 10:02) (95% - 100%)  Wt(kg): --    PHYSICAL EXAM:  General: non-toxic, awake, moaning in pain, does not respond to command  HEAD/EYES: anicteric, PERRL RT facial droop  ENT:  supple  Cardiovascular:   S1, S2 + AICD  Respiratory:  clear bilaterally  GI:  soft, non-tender, well healed midline scar, normal bowel sounds  :  no CVA tenderness   Musculoskeletal:  no synovitis  Neurologic:  grossly non-focal  Skin:  unstageable sacral ulcer 6x6cm extending laterally over b/l buttocks more on the left, sacral bone visible, foul smelling, no discharge  Lymph: no lymphadenopathy  Psychiatric:  appropriate affect  Vascular:  no phlebitis                          MICROBIOLOGY:  BLOOD PERIPHERAL  12-21-17 --  --  --      OTHER  12-20-17 --  --  E.COLI ESBL POSITIVE  Pseudomonas aeruginosa  Enterococcus faecalis VRE  Culture - Tissue with Gram Stain (12.20.17 @ 15:35)    Gram Stain Wound:   GPCPR Gram Pos Cocci in Pairs  QUANTITY OF BACTERIA SEEN: FEW (2+)  WBC^White Blood Cells  QNTY CELLS IN GRAM STAIN: RARE (1+)    -  Amikacin: S <=16 MAGDALENA    -  Amikacin: S <=16 MAGDALENA    -  Ampicillin: R >16 MAGDALENA    -  Ampicillin: S <=2 MAGDALENA    -  Ampicillin/Sulbactam: R >16/8 MAGDALENA    -  Aztreonam: R >16 MAGDALENA    -  Aztreonam: I 16 MAGDALENA    -  Cefazolin: R >16 MAGDALENA    -  Cefepime: R 8 MAGDALENA    -  Cefepime: I 16 MAGDALENA    -  Cefoxitin: S <=8 MAGDALENA    -  Ceftazidime: R >16 MAGDALENA    -  Ceftazidime: S 4 MAGDALENA    -  Ceftriaxone: R    -  Ciprofloxacin: I 2 MAGDALENA    -  Ciprofloxacin: R >2 MAGDALENA    -  Ciprofloxacin: R >2 MAGDALENA    -  Daptomycin: S 1 MAGDALENA    -  Ertapenem: S <=1 MAGDALENA    -  Gentamicin: S <=4 MAGDALENA    -  Gentamicin: S <=4 MAGDALENA    -  Imipenem: S <=1 MAGDALENA    -  Imipenem: S 2 MAGDALENA    -  Linezolid: S    -  Piperacillin/Tazobactam: R <=16 MAGDALENA    -  Piperacillin/Tazobactam: S <=16 MAGDALENA    -  Tetra/Doxy: R >8 MAGDALENA    -  Tigecycline: S <=2 MAGDALENA    -  Tobramycin: S <=4 MAGDALENA    -  Tobramycin: S <=4 MAGDALENA    -  Trimethoprim/Sulfamethoxazole: S <=2/38 MAGDALENA    -  Vancomycin: R >16 MAGDALENA    Culture - Tissue:   MANY  RESULT CALLED TO / READ BACK: AYLEENRN/Y  DATE / TIME CALLED: 12/22/17 1451  CALLED BY: KARTIK ARCOS  RESULT CALLED TO / READ BACK: OSCAR ABBASI RN/Y  DATE / TIME CALLED: 12/23/17 1144  CALLED BY: KARTIK ARCOS    -  Levofloxacin: S <=2 MAGDALENA    -  Meropenem: S <=1 MAGDALENA    -  Levofloxacin: R >4 MAGDALENA    -  Meropenem: S <=1 MAGDALENA    Specimen Source: OTHER    Organism Identification: E.COLI ESBL POSITIVE  Pseudomonas aeruginosa  Enterococcus faecalis VRE    Organism: E.COLI ESBL POSITIVE    Organism: Pseudomonas aeruginosa    Organism: Enterococcus faecalis VRE    Method Type: MICROSCAN NEG URINE COMBO 61    Method Type: MICROSCAN NEG URINE COMBO 61    Method Type: MICROSCAN POS COMBO 34        BLOOD VENOUS  12-13-17 --  --  --      URINE CATHETER  12-13-17 --  --  --      BLOOD PERIPHERAL  12-13-17 --  --  --      BLOOD PERIPHERAL  12-08-17 --  --  --      URINE MIDSTREAM  12-08-17 --  --  --      BLOOD  11-30-17 --  --  --      BLOOD  11-29-17 --  --  --      OTHER  11-27-17 --  --  Staph. aureus *MRSA*      BLOOD PERIPHERAL  11-27-17 --  --  --              v            RADIOLOGY:  < from: Xray Chest 1 View AP- PORTABLE-Urgent (12.21.17 @ 17:35) >  FINDINGS:     There is no NG tube in this radiograph. An AICD device overlies left   hemithorax.  Cardiac size is enlarged.  Portions of the bilateral upper lungs are excluded from this radiograph.  No pleural effusions or pneumothorax.  No acute osseous abnormality.      IMPRESSION:    There is no NG tube in this radiograph.    < end of copied text > HPI:  76 yo f w/ CAD, MI, Systolic CH, s/p AICD, HTN, lymphoma previously on chemo with known spinal mets and compression fractures sent from NH for fever after just being discharged with hospitalization due to AMS with tachycardia, fever, hypotension.  She also had previous admission for UTI with pseudomonas and CRE.  Prior to last admission she was able to converse at baseline, however since then has been non-verbal.  She recieved cefepime and vanco during last admission based on previous sensitivities.  Pt AMS improved after a few days (however she still was hardly speaking) and she passed s/s eval, so was started on oral feeds and was able to take PO meds when she was given them with ensure. Pt grew pseudomonas in blood and we continued with Cefepime and dc'd vanc. Swab of decubitus ulcer came back positive for MRSA and therefore, pt was continued on contact isolations and started on Vanc. TTE returned negative.  Pt completed 14 course of Cefepime, however, she spiked a fever 2 days after completion.   However, she then had no fevers for the next few days and repeat blood cultures were negative so ID was in agreement with stopping antibiotics and discharging back to nursing home.  Currently the patient is not speaking but does nod her head yes or no to questioning.  Only complaint is back pain where ulcer is located. (13 Dec 2017 13:52)  ID note-above reviewed. Patient with known sacral decubitus ulcer with recent admission for pseudomonas bacteremia secondary to UTI and MRSA in sacral ulcer, completed course of cefepime (h/o penicillin allergy-rash) and vancomycin. She returned from NH on 12/13 with ams and sepsis presumed to be from UTI, Ucx showed candida albicans. She has been on cefepime/meropenem and vancomycin since admission. On 12/20 underwent debridement of sacral ulcer, wound culture growing ESBL E.coli, P.aeruginosa and VRE (ampicillin sensitive)     PAST MEDICAL & SURGICAL HISTORY:  Paroxysmal atrial fibrillation  GERD (gastroesophageal reflux disease)  DM (diabetes mellitus)  Asthma  MI (myocardial infarction)  CAD (coronary artery disease)  Lymphoma: S/P resection and chemotherapy in remission  HLD (hyperlipidemia)  CHF (congestive heart failure): On home oxygen 2L PRN  HTN (hypertension)  ICD (implantable cardioverter-defibrillator) in place  S/P coronary artery stent placement  S/P myomectomy  S/P appendectomy  S/P lymph node biopsy: Biopsy and resection      Allergies  peanuts (Unknown)  penicillin (Nausea)      ANTIMICROBIALS:  meropenem IVPB 1000 every 12 hours  meropenem IVPB    vancomycin  IVPB    vancomycin  IVPB 750 every 24 hours      OTHER MEDS: MEDICATIONS  (STANDING):  acetaminophen  Suppository 650 every 6 hours PRN  acetaminophen  Suppository. 650 every 6 hours PRN  amiodarone    Tablet 400 daily  aspirin  chewable 81 daily  atorvastatin 20 at bedtime  buDESOnide 160 MICROgram(s)/formoterol 4.5 MICROgram(s) Inhaler 2 two times a day  dextrose Gel 1 every 6 hours PRN  dextrose Gel 1 every 6 hours PRN  enoxaparin Injectable 40 daily  insulin lispro (HumaLOG) corrective regimen sliding scale  at bedtime  levothyroxine Injectable 25 daily  metoprolol    tartrate Injectable 2.5 every 6 hours  mirtazapine 7.5 at bedtime  morphine  - Injectable 0.5 every 4 hours PRN  morphine  - Injectable 0.5 every 4 hours  tiotropium 18 MICROgram(s) Capsule 1 daily    SOCIAL HISTORY:  No tobacco, no alcohol, no illicit drugs    FAMILY HISTORY:  No pertinent family history in first degree relatives      REVIEW OF SYSTEMS  [x] ROS unobtainable because:  nonverbal  [  ] All other systems negative except as noted below:	    Constitutional:  [ ] fever [ ] weight loss  Skin:  [ ] rash [ ] phlebitis	  Eyes: [ ] icterus [ ] inflammation	  ENMT: [ ] discharge [ ] thrush [ ] ulcers [ ] exudates  Respiratory: [ ] dyspnea [ ] hemoptysis [ ] cough [ ] sputum	  Cardiovascular:  [ ] chest pain [ ] palpitations [ ] edema	  Gastrointestinal:  [ ] nausea [ ] vomiting [ ] diarrhea [ ] constipation [ ] pain	  Genitourinary:  [ ] dysuria [ ] frequency [ ] hematuria [ ] discharge [ ] flank pain  Musculoskeletal:  [ ] myalgias [ ] arthralgias [ ] arthritis	  Neurological:  [ ] headache [ ] seizures	  Psychiatric:  [ ] anxiety [ ] depression	  Hematology/Lymphatics:  [ ] lymphadenopathy  Endocrine:  [ ] adrenal [ ] thyroid  Allergic/Immunologic:	 [ ] transplant [ ] seasonal    Vital Signs Last 24 Hrs  T(F): 97.9 (12-26-17 @ 06:43), Max: 101.5 (12-21-17 @ 05:19)    Vital Signs Last 24 Hrs  HR: 79 (12-26-17 @ 10:02) (76 - 84)  BP: 106/65 (12-26-17 @ 10:02) (100/53 - 127/71)  RR: 20 (12-26-17 @ 10:02)  SpO2: 97% (12-26-17 @ 10:02) (95% - 100%)  Wt(kg): --    PHYSICAL EXAM:  General: non-toxic, awake, moaning in pain, does not respond to command  HEAD/EYES: anicteric, PERRL RT facial droop  ENT:  supple  Cardiovascular:   S1, S2 + AICD  Respiratory:  clear bilaterally  GI:  soft, non-tender, well healed midline scar, normal bowel sounds  :  no CVA tenderness   Musculoskeletal:  no synovitis  Neurologic:  grossly non-focal  Skin:  unstageable sacral ulcer 6x6cm extending laterally over b/l buttocks more on the left, sacral bone visible, foul smelling, no discharge  Lymph: no lymphadenopathy  Psychiatric:  appropriate affect  Vascular:  no phlebitis    MICROBIOLOGY:  BLOOD PERIPHERAL  12-21-17 NGTD    OTHER  12-20-17 --  --  E.COLI ESBL POSITIVE  Pseudomonas aeruginosa  Enterococcus faecalis VRE  Culture - Tissue with Gram Stain (12.20.17 @ 15:35)    Gram Stain Wound:   GPCPR Gram Pos Cocci in Pairs  QUANTITY OF BACTERIA SEEN: FEW (2+)  WBC^White Blood Cells  QNTY CELLS IN GRAM STAIN: RARE (1+)    -  Amikacin: S <=16 MAGDALENA    -  Amikacin: S <=16 MAGDALENA    -  Ampicillin: R >16 MAGDALENA    -  Ampicillin: S <=2 MAGDALENA    -  Ampicillin/Sulbactam: R >16/8 MAGDALENA    -  Aztreonam: R >16 MAGDALENA    -  Aztreonam: I 16 MAGDALENA    -  Cefazolin: R >16 MAGDALENA    -  Cefepime: R 8 MAGDALENA    -  Cefepime: I 16 MAGDALENA    -  Cefoxitin: S <=8 MAGDALENA    -  Ceftazidime: R >16 MAGDALENA    -  Ceftazidime: S 4 MAGDALENA    -  Ceftriaxone: R    -  Ciprofloxacin: I 2 MAGDALENA    -  Ciprofloxacin: R >2 MAGDALENA    -  Ciprofloxacin: R >2 MAGDALENA    -  Daptomycin: S 1 MAGDALENA    -  Ertapenem: S <=1 MAGDALENA    -  Gentamicin: S <=4 MAGDALENA    -  Gentamicin: S <=4 MAGDALENA    -  Imipenem: S <=1 MAGDALENA    -  Imipenem: S 2 MAGDALENA    -  Linezolid: S    -  Piperacillin/Tazobactam: R <=16 MAGDALENA    -  Piperacillin/Tazobactam: S <=16 MAGDALENA    -  Tetra/Doxy: R >8 MAGDALENA    -  Tigecycline: S <=2 MAGDALENA    -  Tobramycin: S <=4 MAGDALENA    -  Tobramycin: S <=4 MAGDAELNA    -  Trimethoprim/Sulfamethoxazole: S <=2/38 MAGDALENA    -  Vancomycin: R >16 MAGDALENA    Culture - Tissue:   MANY  RESULT CALLED TO / READ BACK: AYLEENRN/Y  DATE / TIME CALLED: 12/22/17 1451  CALLED BY: KARTIK ARCOS  RESULT CALLED TO / READ BACK: OSCAR ABBASI RN/Y  DATE / TIME CALLED: 12/23/17 1144  CALLED BY: KARTIK ARCOS    -  Levofloxacin: S <=2 MAGDALENA    -  Meropenem: S <=1 MAGDALENA    -  Levofloxacin: R >4 MAGDALENA    -  Meropenem: S <=1 MAGDALENA    Specimen Source: OTHER    Organism Identification: E.COLI ESBL POSITIVE  Pseudomonas aeruginosa  Enterococcus faecalis VRE    Organism: E.COLI ESBL POSITIVE    Organism: Pseudomonas aeruginosa    Organism: Enterococcus faecalis VRE    Method Type: MICROSCAN NEG URINE COMBO 61    Method Type: MICROSCAN NEG URINE COMBO 61    Method Type: MICROSCAN POS COMBO 34      BLOOD VENOUS  12-13-17 NGTD    OTHER  11-27-17 --  --  Staph. aureus *MRSA*    (other cultures reviewed)    RADIOLOGY:  < from: Xray Chest 1 View AP- PORTABLE-Urgent (12.21.17 @ 17:35) >  FINDINGS:     There is no NG tube in this radiograph. An AICD device overlies left   hemithorax.  Cardiac size is enlarged.  Portions of the bilateral upper lungs are excluded from this radiograph.  No pleural effusions or pneumothorax.  No acute osseous abnormality.      IMPRESSION:    There is no NG tube in this radiograph.

## 2017-12-26 NOTE — PROGRESS NOTE ADULT - PROBLEM SELECTOR PLAN 1
- febrile 12/21; repeat cultures 12/21 NGTD and CXR clear - likely catheter associated UTI based on UA from 12/21  - decub ulcer worsened since last admission and previously tested positive for MRSA - ulcer deep enough to reach bone: suspect osteomyelitis  - wound care Dr performed debridement and culture 12/20 showed ESBL e coli, pseudomonas and enterococcus faecalis  - on vanc 750 (day 13, will dose base on troughs)), restarted cefepime due to fever on 12/21 then switched to meropenem based on sensitivities from wound culture  - blood cx negative, urine cx grew candida  - Xray images not ideal to r/o osteo (can consider CT vs. indium WBC scan); ESR elevated to 101 - febrile 12/21; repeat cultures 12/21 NGTD and CXR clear - likely catheter associated UTI based on UA from 12/21 vs infected sacral DTI with acute vs chronic OM per ID  - decub ulcer worsened since last admission and previously tested positive for MRSA - bone probed during I&D and now visible on dressing changes  - wound care Dr performed debridement and culture 12/20 showed ESBL e coli, pseudomonas and vancomycin-resistant enterococcus faecalis  - ID recs appreciated: d/c vancomycin as wound culture no longer MRSA+, would recommend continuation of tx with meropenem 1q8 but unlikely will recommend prolonged abx course as patient and wound unlikely to benefit from prolonged abx course  - blood cx negative, urine cx grew candida  - Xray images not ideal to r/o osteo (can consider CT vs. indium WBC scan); ESR elevated to 101

## 2017-12-26 NOTE — CONSULT NOTE ADULT - ASSESSMENT
76 yo f w/ CAD, MI, Systolic CH, s/p AICD, HTN, lymphoma previously on chemo with known spinal mets and compression fractures, functional quadriplegia, nonverbal, recent admission for Pseudomonas bacteremia secondary to UTI, MRSA of sacral decubitus sent from NH 12/13 for altered mental status and fever, admitted for presumed UTI and worsening of sacral decubitus. Urine culture growing Candida albicans-likely colonization. She underwent excisional debridement of sacral wound 12/20, wound cultures are growing VRE, ESBL E.coli and P.aeruginosa. Also found to have new right facial palsy and simple effusion on CT.   She has been afebrile x 24h but has a persistent leukocytosis. She is non verbal and not able to eat. Per GI not a candidate for PEG placement.  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  Day 14 of vancomycin  Day 5 of meropenem (received 7 days of cefepime)

## 2017-12-26 NOTE — CONSULT NOTE ADULT - CONSULT REASON
Ethical dilemma posed by a 77 year old female with advanced illness whose surrogate desires aggressive treatment
GOC
GOC, assistance at family meeting
PEG consult
To assist in a case of a 77 year old female with sepsis secondary to MRSA from sacral ulcer whose family desires interventions that do not benefit the patient medically.
mastoid effusion on CT
sacral decubitus
sacral decubitus
Well known to our practice with NICM, ICD

## 2017-12-26 NOTE — PROGRESS NOTE ADULT - PROBLEM SELECTOR PLAN 3
- morphine IV q4 plus PRNs for sacral ulcer pain; to be given before dressing change  - will monitor for side effects of morphine and consider adjusting dose  - watch for constipation with morphine

## 2017-12-26 NOTE — PROGRESS NOTE ADULT - ATTENDING COMMENTS
Patient was seen and examined personally by me. I have discussed the plan and reviewed the resident's note and agree with the above physical exam findings including assessment and plan except as indicated below.    77 female admitted with sepsis 2/2 infected sacral decubitus with probe to bone, suspicious for osteomyelitis. Also with encephalopathy: possibly vascular dementia with poor functional status and dysphagia. Course complicated by sepsis 2/2 catheter associated UTI.    Nonverbal and not following commands. Occasionally nods head, responsive to pain. Appears uncomfortable and groaning  Right pupil dilated  + indwelling knutson with clear yellow urine    s/p sacral debridement 12/20. Tissue cx with ESBL Ecoli, pseudomonas and VRE  Continue Vanc, Meropenem, ID consulted for further course and duration  Pain control : increase morphine from 0.5 q4 to 1 IV q4 ATC, hold for sedation  Will hold off replacing NGT until son present at bedside. Will perform trial with NGT and if no improvement, plan to DC to which son is agreeable  Prognosis guarded  Surrogate/primary decision maker: Gavin  Updated and discussed with son, Gavin. Plan to come to hospital today at 4pm at which time, will attempt NGT  Vitals q shift, labs every other day

## 2017-12-26 NOTE — PROGRESS NOTE ADULT - SUBJECTIVE AND OBJECTIVE BOX
Patient is a 77y old  Female who presents with a chief complaint of fever (13 Dec 2017 13:52)    Tamara Vieira, PGY-3  Internal Medicine Team 4  Pager [phone]      SUBJECTIVE / OVERNIGHT EVENTS:    REVIEW OF SYSTEMS:    CONSTITUTIONAL: No weakness, fevers or chills  EYES/ENT: No visual changes;  No dysphagia  NECK: No pain or stiffness  RESPIRATORY: No cough, wheezing, hemoptysis; No shortness of breath  CARDIOVASCULAR: No chest pain or palpitations; No lower extremity edema  GASTROINTESTINAL: No abdominal or epigastric pain. No nausea, vomiting, or hematemesis; No diarrhea or constipation. No melena or hematochezia.  GENITOURINARY: No dysuria, frequency or hematuria  NEUROLOGICAL: No numbness or weakness  SKIN: No itching, burning, rashes, or lesions   All other review of systems is negative unless indicated above.    MEDICATIONS  (STANDING):  amiodarone    Tablet 400 milliGRAM(s) Oral daily  artificial  tears Solution 1 Drop(s) Right EYE daily  aspirin  chewable 81 milliGRAM(s) Oral daily  atorvastatin 20 milliGRAM(s) Oral at bedtime  buDESOnide 160 MICROgram(s)/formoterol 4.5 MICROgram(s) Inhaler 2 Puff(s) Inhalation two times a day  Dakins Solution - 1/2 Strength 1 Application(s) Topical two times a day  dextrose 5%. 1000 milliLiter(s) (60 mL/Hr) IV Continuous <Continuous>  enoxaparin Injectable 40 milliGRAM(s) SubCutaneous daily  insulin lispro (HumaLOG) corrective regimen sliding scale   SubCutaneous at bedtime  latanoprost 0.005% Ophthalmic Solution 1 Drop(s) Both EYES at bedtime  levothyroxine Injectable 25 MICROGram(s) IV Push daily  meropenem IVPB 1000 milliGRAM(s) IV Intermittent every 12 hours  meropenem IVPB      metoprolol    tartrate Injectable 2.5 milliGRAM(s) IV Push every 6 hours  mirtazapine 7.5 milliGRAM(s) Oral at bedtime  morphine  - Injectable 0.5 milliGRAM(s) IV Push every 4 hours  multivitamin 1 Tablet(s) Oral daily  tiotropium 18 MICROgram(s) Capsule 1 Capsule(s) Inhalation daily  vancomycin  IVPB      vancomycin  IVPB 750 milliGRAM(s) IV Intermittent every 24 hours    MEDICATIONS  (PRN):  acetaminophen  Suppository 650 milliGRAM(s) Rectal every 6 hours PRN For Temp greater than 38 C (100.4 F)  acetaminophen  Suppository. 650 milliGRAM(s) Rectal every 6 hours PRN Mild Pain (1 - 3)  dextrose Gel 1 Dose(s) Oral every 6 hours PRN Blood Glucose LESS THAN 70 milliGRAM(s)/deciliter  dextrose Gel 1 Dose(s) Oral every 6 hours PRN Blood Glucose LESS THAN 70 milliGRAM(s)/deciliter  morphine  - Injectable 0.5 milliGRAM(s) IV Push every 4 hours PRN Severe Pain (7 - 10)    Vital Signs Last 24 Hrs  T(C): 37.1 (26 Dec 2017 05:25), Max: 37.1 (25 Dec 2017 21:01)  T(F): 98.8 (26 Dec 2017 05:25), Max: 98.8 (25 Dec 2017 21:01)  HR: 79 (26 Dec 2017 05:25) (76 - 84)  BP: 101/58 (26 Dec 2017 05:25) (101/58 - 127/71)  BP(mean): --  RR: 19 (26 Dec 2017 05:25) (18 - 20)  SpO2: 98% (26 Dec 2017 05:25) (95% - 100%)    CAPILLARY BLOOD GLUCOSE      POCT Blood Glucose.: 83 mg/dL (26 Dec 2017 05:28)  POCT Blood Glucose.: 79 mg/dL (25 Dec 2017 23:19)  POCT Blood Glucose.: 85 mg/dL (25 Dec 2017 18:15)  POCT Blood Glucose.: 90 mg/dL (25 Dec 2017 12:38)    I&O's Summary    25 Dec 2017 07:01  -  26 Dec 2017 07:00  --------------------------------------------------------  IN: 420 mL / OUT: 700 mL / NET: -280 mL        PHYSICAL EXAM: limited as patient unable to fully participate  GENERAL: NAD, cachexic  HEAD:  L facial droop, Atraumatic, Normocephalic  EYES: PERRL  NECK: Supple, No JVD  CHEST/LUNG: Clear to auscultation bilaterally; No wheeze  HEART: Regular rate and rhythm; No murmurs, rubs, or gallops  ABDOMEN: Soft, Nontender, Nondistended; Bowel sounds present  EXTREMITIES:  2+ Peripheral Pulses, No clubbing, cyanosis, or edema  PSYCH: AAOx0  SKIN: No rashes or lesions      LABS: NO AM LABS      RADIOLOGY & ADDITIONAL TESTS:     MICROBIOLOGY:     CONSULTS: Patient is a 77y old  Female who presents with a chief complaint of fever (13 Dec 2017 13:52)    Tamara Vieira, PGY-3  Internal Medicine Team 4  Pager [phone]      SUBJECTIVE / OVERNIGHT EVENTS: No acute events overnight. Afebrile.    REVIEW OF SYSTEMS: unattainable given nonverbal state    MEDICATIONS  (STANDING):  amiodarone    Tablet 400 milliGRAM(s) Oral daily  artificial  tears Solution 1 Drop(s) Right EYE daily  aspirin  chewable 81 milliGRAM(s) Oral daily  atorvastatin 20 milliGRAM(s) Oral at bedtime  buDESOnide 160 MICROgram(s)/formoterol 4.5 MICROgram(s) Inhaler 2 Puff(s) Inhalation two times a day  Dakins Solution - 1/2 Strength 1 Application(s) Topical two times a day  dextrose 5%. 1000 milliLiter(s) (60 mL/Hr) IV Continuous <Continuous>  enoxaparin Injectable 40 milliGRAM(s) SubCutaneous daily  insulin lispro (HumaLOG) corrective regimen sliding scale   SubCutaneous at bedtime  latanoprost 0.005% Ophthalmic Solution 1 Drop(s) Both EYES at bedtime  levothyroxine Injectable 25 MICROGram(s) IV Push daily  meropenem IVPB 1000 milliGRAM(s) IV Intermittent every 12 hours  meropenem IVPB      metoprolol    tartrate Injectable 2.5 milliGRAM(s) IV Push every 6 hours  mirtazapine 7.5 milliGRAM(s) Oral at bedtime  morphine  - Injectable 0.5 milliGRAM(s) IV Push every 4 hours  multivitamin 1 Tablet(s) Oral daily  tiotropium 18 MICROgram(s) Capsule 1 Capsule(s) Inhalation daily  vancomycin  IVPB      vancomycin  IVPB 750 milliGRAM(s) IV Intermittent every 24 hours    MEDICATIONS  (PRN):  acetaminophen  Suppository 650 milliGRAM(s) Rectal every 6 hours PRN For Temp greater than 38 C (100.4 F)  acetaminophen  Suppository. 650 milliGRAM(s) Rectal every 6 hours PRN Mild Pain (1 - 3)  dextrose Gel 1 Dose(s) Oral every 6 hours PRN Blood Glucose LESS THAN 70 milliGRAM(s)/deciliter  dextrose Gel 1 Dose(s) Oral every 6 hours PRN Blood Glucose LESS THAN 70 milliGRAM(s)/deciliter  morphine  - Injectable 0.5 milliGRAM(s) IV Push every 4 hours PRN Severe Pain (7 - 10)    Vital Signs Last 24 Hrs  T(C): 37.1 (26 Dec 2017 05:25), Max: 37.1 (25 Dec 2017 21:01)  T(F): 98.8 (26 Dec 2017 05:25), Max: 98.8 (25 Dec 2017 21:01)  HR: 79 (26 Dec 2017 05:25) (76 - 84)  BP: 101/58 (26 Dec 2017 05:25) (101/58 - 127/71)  BP(mean): --  RR: 19 (26 Dec 2017 05:25) (18 - 20)  SpO2: 98% (26 Dec 2017 05:25) (95% - 100%)    CAPILLARY BLOOD GLUCOSE      POCT Blood Glucose.: 83 mg/dL (26 Dec 2017 05:28)  POCT Blood Glucose.: 79 mg/dL (25 Dec 2017 23:19)  POCT Blood Glucose.: 85 mg/dL (25 Dec 2017 18:15)  POCT Blood Glucose.: 90 mg/dL (25 Dec 2017 12:38)    I&O's Summary    25 Dec 2017 07:01  -  26 Dec 2017 07:00  --------------------------------------------------------  IN: 420 mL / OUT: 700 mL / NET: -280 mL        PHYSICAL EXAM: limited as patient unable to fully participate  GENERAL: cachectic, appears uncomfortable and in pain, moaning  HEAD:  L facial droop, Atraumatic, Normocephalic  EYES: right pupil fixed and dilated, left pupil reactive  NECK: Supple, No JVD  CHEST/LUNG: decreased BS b/l  HEART: Regular rate and rhythm; No murmurs, rubs, or gallops  ABDOMEN: Soft, Nontender, Nondistended; Bowel sounds present  EXTREMITIES:  2+ Peripheral Pulses, No clubbing, cyanosis, or edema  PSYCH: Awake  : +knutson catheter      LABS: NO AM LABS      RADIOLOGY & ADDITIONAL TESTS:     MICROBIOLOGY:     CONSULTS: Patient is a 77y old  Female who presents with a chief complaint of fever (13 Dec 2017 13:52)    Tamara Vieira, PGY-3  Internal Medicine Team 4  Pager [phone]      SUBJECTIVE / OVERNIGHT EVENTS: No acute events overnight. Afebrile.    REVIEW OF SYSTEMS: unattainable given nonverbal state    MEDICATIONS  (STANDING):  amiodarone    Tablet 400 milliGRAM(s) Oral daily  artificial  tears Solution 1 Drop(s) Right EYE daily  aspirin  chewable 81 milliGRAM(s) Oral daily  atorvastatin 20 milliGRAM(s) Oral at bedtime  buDESOnide 160 MICROgram(s)/formoterol 4.5 MICROgram(s) Inhaler 2 Puff(s) Inhalation two times a day  Dakins Solution - 1/2 Strength 1 Application(s) Topical two times a day  dextrose 5%. 1000 milliLiter(s) (60 mL/Hr) IV Continuous <Continuous>  enoxaparin Injectable 40 milliGRAM(s) SubCutaneous daily  insulin lispro (HumaLOG) corrective regimen sliding scale   SubCutaneous at bedtime  latanoprost 0.005% Ophthalmic Solution 1 Drop(s) Both EYES at bedtime  levothyroxine Injectable 25 MICROGram(s) IV Push daily  meropenem IVPB 1000 milliGRAM(s) IV Intermittent every 12 hours  meropenem IVPB      metoprolol    tartrate Injectable 2.5 milliGRAM(s) IV Push every 6 hours  mirtazapine 7.5 milliGRAM(s) Oral at bedtime  morphine  - Injectable 0.5 milliGRAM(s) IV Push every 4 hours  multivitamin 1 Tablet(s) Oral daily  tiotropium 18 MICROgram(s) Capsule 1 Capsule(s) Inhalation daily  vancomycin  IVPB      vancomycin  IVPB 750 milliGRAM(s) IV Intermittent every 24 hours    MEDICATIONS  (PRN):  acetaminophen  Suppository 650 milliGRAM(s) Rectal every 6 hours PRN For Temp greater than 38 C (100.4 F)  acetaminophen  Suppository. 650 milliGRAM(s) Rectal every 6 hours PRN Mild Pain (1 - 3)  dextrose Gel 1 Dose(s) Oral every 6 hours PRN Blood Glucose LESS THAN 70 milliGRAM(s)/deciliter  dextrose Gel 1 Dose(s) Oral every 6 hours PRN Blood Glucose LESS THAN 70 milliGRAM(s)/deciliter  morphine  - Injectable 0.5 milliGRAM(s) IV Push every 4 hours PRN Severe Pain (7 - 10)    Vital Signs Last 24 Hrs  T(C): 37.1 (26 Dec 2017 05:25), Max: 37.1 (25 Dec 2017 21:01)  T(F): 98.8 (26 Dec 2017 05:25), Max: 98.8 (25 Dec 2017 21:01)  HR: 79 (26 Dec 2017 05:25) (76 - 84)  BP: 101/58 (26 Dec 2017 05:25) (101/58 - 127/71)  BP(mean): --  RR: 19 (26 Dec 2017 05:25) (18 - 20)  SpO2: 98% (26 Dec 2017 05:25) (95% - 100%)    CAPILLARY BLOOD GLUCOSE      POCT Blood Glucose.: 83 mg/dL (26 Dec 2017 05:28)  POCT Blood Glucose.: 79 mg/dL (25 Dec 2017 23:19)  POCT Blood Glucose.: 85 mg/dL (25 Dec 2017 18:15)  POCT Blood Glucose.: 90 mg/dL (25 Dec 2017 12:38)    I&O's Summary    25 Dec 2017 07:01  -  26 Dec 2017 07:00  --------------------------------------------------------  IN: 420 mL / OUT: 700 mL / NET: -280 mL        PHYSICAL EXAM: limited as patient unable to fully participate, communicates largely by nodding/shaking head  GENERAL: cachectic, appears uncomfortable and in pain, moaning  HEAD:  L facial droop, dysarthric, Atraumatic, Normocephalic  EYES: right pupil fixed and dilated, left pupil reactive  NECK: Supple, No JVD  CHEST/LUNG: decreased BS b/l  HEART: Regular rate and rhythm; No murmurs, rubs, or gallops  ABDOMEN: Soft, Nontender, Nondistended; Bowel sounds present  EXTREMITIES:  2+ Peripheral Pulses, No clubbing, cyanosis, or edema  PSYCH: Awake  : +knutson catheter    LABS: NO AM LABS      RADIOLOGY & ADDITIONAL TESTS:     MICROBIOLOGY:     CONSULTS: ID

## 2017-12-26 NOTE — PROGRESS NOTE ADULT - ASSESSMENT
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

## 2017-12-26 NOTE — CHART NOTE - NSCHARTNOTEFT_GEN_A_CORE
NUTRITION FOLLOW-UP:  Pt. continues to have minimal to no PO intake.  Per discussion w interdisciplinary team, possible re-attempt at NGT placement when son present, as well as ongoing goals of care discussions.  No stated/noted nausea/vomiting/diarrhea/constipation at this time.  Possible, although questionable 3.2kg weight increase during admission (perhaps somewhat fluid related, given edema).      Weight:  112.8lbs (51.2kg) on 12.26.17                            (48kg) on 12.23.17    Edema: 1+ B/L LE.    Skin: Unstageable sacral pressure ulcer.      Pertinent Medications: MEDICATIONS  (STANDING):  amiodarone    Tablet 400 milliGRAM(s) Oral daily  artificial  tears Solution 1 Drop(s) Right EYE daily  aspirin  chewable 81 milliGRAM(s) Oral daily  atorvastatin 20 milliGRAM(s) Oral at bedtime  buDESOnide 160 MICROgram(s)/formoterol 4.5 MICROgram(s) Inhaler 2 Puff(s) Inhalation two times a day  Dakins Solution - 1/2 Strength 1 Application(s) Topical two times a day  dextrose 5%. 1000 milliLiter(s) (60 mL/Hr) IV Continuous <Continuous>  enoxaparin Injectable 40 milliGRAM(s) SubCutaneous daily  insulin lispro (HumaLOG) corrective regimen sliding scale   SubCutaneous at bedtime  latanoprost 0.005% Ophthalmic Solution 1 Drop(s) Both EYES at bedtime  levothyroxine Injectable 25 MICROGram(s) IV Push daily  meropenem IVPB 1000 milliGRAM(s) IV Intermittent every 12 hours  meropenem IVPB      metoprolol    tartrate Injectable 2.5 milliGRAM(s) IV Push every 6 hours  mirtazapine 7.5 milliGRAM(s) Oral at bedtime  morphine  - Injectable 1 milliGRAM(s) IV Push every 4 hours  multivitamin 1 Tablet(s) Oral daily  tiotropium 18 MICROgram(s) Capsule 1 Capsule(s) Inhalation daily  vancomycin  IVPB      vancomycin  IVPB 750 milliGRAM(s) IV Intermittent every 24 hours    MEDICATIONS  (PRN):  acetaminophen  Suppository 650 milliGRAM(s) Rectal every 6 hours PRN For Temp greater than 38 C (100.4 F)  acetaminophen  Suppository. 650 milliGRAM(s) Rectal every 6 hours PRN Mild Pain (1 - 3)  dextrose Gel 1 Dose(s) Oral every 6 hours PRN Blood Glucose LESS THAN 70 milliGRAM(s)/deciliter  dextrose Gel 1 Dose(s) Oral every 6 hours PRN Blood Glucose LESS THAN 70 milliGRAM(s)/deciliter  morphine  - Injectable 0.5 milliGRAM(s) IV Push every 4 hours PRN Severe Pain (7 - 10)         CAPILLARY BLOOD GLUCOSE      POCT Blood Glucose.: 83 mg/dL (26 Dec 2017 05:28)  POCT Blood Glucose.: 79 mg/dL (25 Dec 2017 23:19)  POCT Blood Glucose.: 85 mg/dL (25 Dec 2017 18:15)  POCT Blood Glucose.: 90 mg/dL (25 Dec 2017 12:38)      Current Diet:  Pureed + Prosource 1 packet (30mL) PO 1x daily + Ensure Enlive 8oz PO 3x daily         NUTRITION Dx:  Pt. remains severely malnourished.        PLAN/RECOMMENDATIONS:    1) PO diet deferred to medical team.   2) If/when NGT placed, re-consult nutrition services for enteral recommendations.  3) Obtain daily weights.  4) RDN remains available and will f/u PRN.          Uma Chu RDN, CDN pager [phone]

## 2017-12-26 NOTE — CONSULT NOTE ADULT - CONSULT REQUESTED DATE/TIME
21-Dec-2017 15:30
15-Dec-2017 09:46
17-Dec-2017
18-Dec-2017 11:00
18-Dec-2017 14:45
20-Dec-2017 08:43
21-Dec-2017 15:00
26-Dec-2017 10:22
13-Dec-2017 14:32

## 2017-12-26 NOTE — PROGRESS NOTE ADULT - PROBLEM SELECTOR PLAN 10
- Will get labs Q48 hours per son's request  - lovenox for DVT ppx - Will get labs Q48 hours per son's request  - Lovenox for DVT ppx

## 2017-12-26 NOTE — PROGRESS NOTE ADULT - SUBJECTIVE AND OBJECTIVE BOX
S: no events    acetaminophen  Suppository 650 milliGRAM(s) Rectal every 6 hours PRN  acetaminophen  Suppository. 650 milliGRAM(s) Rectal every 6 hours PRN  amiodarone    Tablet 400 milliGRAM(s) Oral daily  artificial  tears Solution 1 Drop(s) Right EYE daily  aspirin  chewable 81 milliGRAM(s) Oral daily  atorvastatin 20 milliGRAM(s) Oral at bedtime  buDESOnide 160 MICROgram(s)/formoterol 4.5 MICROgram(s) Inhaler 2 Puff(s) Inhalation two times a day  Dakins Solution - 1/2 Strength 1 Application(s) Topical two times a day  dextrose 5%. 1000 milliLiter(s) IV Continuous <Continuous>  dextrose Gel 1 Dose(s) Oral every 6 hours PRN  dextrose Gel 1 Dose(s) Oral every 6 hours PRN  enoxaparin Injectable 40 milliGRAM(s) SubCutaneous daily  insulin lispro (HumaLOG) corrective regimen sliding scale   SubCutaneous at bedtime  latanoprost 0.005% Ophthalmic Solution 1 Drop(s) Both EYES at bedtime  levothyroxine Injectable 25 MICROGram(s) IV Push daily  meropenem IVPB 1000 milliGRAM(s) IV Intermittent every 12 hours  meropenem IVPB      metoprolol    tartrate Injectable 2.5 milliGRAM(s) IV Push every 6 hours  mirtazapine 7.5 milliGRAM(s) Oral at bedtime  morphine  - Injectable 0.5 milliGRAM(s) IV Push every 4 hours PRN  morphine  - Injectable 1 milliGRAM(s) IV Push every 4 hours  multivitamin 1 Tablet(s) Oral daily  tiotropium 18 MICROgram(s) Capsule 1 Capsule(s) Inhalation daily        T(C): 36.6 (12-26-17 @ 06:43), Max: 37.1 (12-25-17 @ 21:01)  HR: 79 (12-26-17 @ 10:02) (76 - 84)  BP: 106/65 (12-26-17 @ 10:02) (100/53 - 127/71)  RR: 20 (12-26-17 @ 10:02) (18 - 21)  SpO2: 97% (12-26-17 @ 10:02) (95% - 100%)  Wt(kg): --    I&O's Summary    25 Dec 2017 07:01  -  26 Dec 2017 07:00  --------------------------------------------------------  IN: 420 mL / OUT: 900 mL / NET: -480 mL    26 Dec 2017 07:01  -  26 Dec 2017 12:42  --------------------------------------------------------  IN: 0 mL / OUT: 600 mL / NET: -600 mL        Cardiovascular: Normal S1 S2, RRR   No JVD,  Respiratory: decreased breath sounds w/ decreased inspiratory effort noted  B/L	  Gastrointestinal:  Soft, Non-tender, + BS	  Extremities no edema, cyanosis, clubbing B/L LE's     DIAGNOSTIC DATA:    < from: TTE with Doppler (w/Cont) (04.03.17 @ 16:18) >  CONCLUSIONS:  1. Mitral annular calcification, otherwise normal mitral  valve. Mild mitral regurgitation.  2. Moderately dilated left atrium.  LA volume index = 46  cc/m2.  3. Moderate left ventricular enlargement.  4. Severe global left ventricular systolic dysfunction.  Endocardial visualization enhanced with intravenous  injection of echo contrast (Definity).  5. The right ventricle is not well visualized; grossly  normal right ventricular systolic function.  A device wire  is noted in the right heart.  6. Estimated right ventricular systolic pressure equals 60  mm Hg, assuming right atrial pressure equals 10 mm Hg,  consistent with moderate pulmonary hypertension.  ------------------------------------------------------------------------  Confirmed on  4/3/2017 - 17:19:47 by Jean-Pierre Carver M.D.    < end of copied text >    < from: Cardiac Cath Lab - Adult (04.26.16 @ 13:12) >  VENTRICLES: No LV gram was performed; however, a recent echocardiogram  demonstrated an EF of 19 %.  CORONARY VESSELS: The coronary circulation is right dominant.  LM:   --  LM: Normal.  LAD:   --  Proximal LAD: There was a [id number] % stenosis. There was mild  restenosis in proximal LAD stent.  --  Mid LAD: Angiography showed minor luminal irregularities with no flow  limiting lesions.  --  Distal LAD: Angiography showed minor luminal irregularities with no  flow limiting lesions.  --  D1: Angiography showed minor luminal irregularities with no flow  limiting lesions.  CX:   --  Circumflex: Normal.  RI:   --  Ramus intermedius: Angiography showed minor luminal  irregularities with no flow limiting lesions.  RCA:   --  Proximal RCA: Normal.  --  Mid RCA: Angiography showed mild atherosclerosis with no flow limiting  lesions.  --  Distal RCA: Angiography showed minor luminal irregularities with no  flow limiting lesions.  --  RPDA: Angiography showed minor luminal irregularities with no flow  limiting lesions.  --  RPLS: Angiography showed minor luminal irregularities with no flow  limiting lesions.  COMPLICATIONS: There were no complications.  DIAGNOSTIC RECOMMENDATIONS: Medical management and aggressive risk factor  modification is recommended.  Prepared and signed by  Nehal López M.D.  Signed 04/27/2016 13:13:20    < end of copied text >    < from: Transthoracic Echocardiogram (12.01.17 @ 16:53) >  CONCLUSIONS:  1. Normal mitral valve. Minimal mitral regurgitation.  2. Mild left ventricular enlargement.  3. Severe global left ventricular systolic dysfunction.  4. The right ventricle is not well visualized. A device  wire is noted in the right heart.  ------------------------------------------------------------------------  Confirmed on  12/1/2017 - 18:24:53 by Carli Encarnacion M.D. RPVI    < end of copied text      ASSESSMENT/PLAN: 	77y Female with hypertension, dyslipidemia, CAD s/p LAD stent with only mild re-stenosis with minor luminal irregularities otherwise on cath 4/16, with known severe LV dysfunction , NICM s/p [employer]tronic BiV ICD, VT on Amio, COPD, lymphoma previously on chemo with known spinal mets and compression fractures recent admit with GNR bacteremia (pseudomonas), felt to be urinary source, and sacral decub wound culture +MRSA, s/p ABX, now admitted from SNF with fever and AMS/lethargy.     -continue with medical management of cad and cardiomyopathy  -no clinical heart failure  -continue with asa for history of pci  -no further inpatient cv workup needed at this time  -f/u primary team re: [facility]    Nehal López MD  [facility]  [address] [place] [address], [id number]  [place] [address]  office: [phone]  pager: [phone]

## 2017-12-26 NOTE — PROGRESS NOTE ADULT - PROBLEM SELECTOR PLAN 9
- will plan to discuss stopping abx with son and discuss hospice  - Face to face conversation held with sonGavin at bedside  - Updated on current clinical progress and that patient is not candidate for PEG. GI fellow also at bedside and discussed with son. Per GI, can place NGT and can DC with NGT to an acceptable facility when time comes. Patient advocate called per son request. Ethics consult also placed. Per son, would still like CPR and intubation if necessary knowing that this will likely prolong patient's suffering and pain. 18 mins spent face to face  - Will get labs Q48 hours as opposed to daily  - family meeting on 12/14, 12/18 and 12/21: full code  - PT recs: extended care facility  - pending chart from [facility] - will plan to discuss stopping abx with son and discuss hospice  - Face to face conversation held with sonGavin at bedside  - Updated on current clinical progress and that patient is not candidate for PEG. GI fellow also at bedside on Fri 12/22 and discussed with son that offered a trial of NGT Patient advocate called per son request. Ethics consult also placed and stated that a trial of NGT could be attempted but would not recommend discharging patient with NGT. Per son, would still like CPR and intubation if necessary knowing that this will likely prolong patient's suffering and pain.  - Will get labs Q48 hours as opposed to daily  - family meeting on 12/14, 12/18 and 12/21: full code  - PT recs: extended care facility  - pending chart from [facility]

## 2017-12-27 NOTE — PROGRESS NOTE ADULT - PROBLEM SELECTOR PLAN 8
- family in agreement with pleasure feeds  - attempted to place NG 12/21 and 12/26 per son's request; however first time was not positioned appropriately and was removed; second time son decided against it given pts obvious discomfort

## 2017-12-27 NOTE — PROGRESS NOTE ADULT - PROBLEM SELECTOR PLAN 3
- morphine 1 IV q4 + PRNs for sacral ulcer pain; to be given with dressing change  - will monitor for side effects of morphine and consider adjusting dose  - watch for constipation with morphine

## 2017-12-27 NOTE — PROGRESS NOTE ADULT - PROBLEM SELECTOR PLAN 9
- will plan to discuss stopping abx with son and discuss hospice  - Face to face conversation held with sonGavin at bedside  - Updated on current clinical progress and that patient is not candidate for PEG. GI fellow also at bedside on Fri 12/22 and discussed with son that offered a trial of NGT Patient advocate called per son request. Ethics consult also placed and stated that a trial of NGT could be attempted but would not recommend discharging patient with NGT. Per son, would still like CPR and intubation if necessary knowing that this will likely prolong patient's suffering and pain.  - Will get labs Q48 hours as opposed to daily  - family meeting on 12/14, 12/18 and 12/21: full code  - PT recs: extended care facility  - pending chart from [facility] - family meeting tomorrow at 2PM  - will plan to discuss stopping abx with son and discuss hospice  - Face to face conversation held with sonGavin at bedside  - Updated on current clinical progress and that patient is not candidate for PEG. GI fellow also at bedside on Fri 12/22 and discussed with son that offered a trial of NGT Patient advocate called per son request. Ethics consult also placed and stated that a trial of NGT could be attempted but would not recommend discharging patient with NGT. Per son, would still like CPR and intubation if necessary knowing that this will likely prolong patient's suffering and pain.  - Will get labs Q48 hours as opposed to daily  - family meeting on 12/14, 12/18 and 12/21: full code  - PT recs: extended care facility  - pending chart from [facility]

## 2017-12-27 NOTE — PROGRESS NOTE ADULT - PROBLEM SELECTOR PLAN 1
- febrile 12/21; repeat cultures 12/21 NGTD and CXR clear - likely catheter associated UTI based on UA from 12/21 vs infected sacral DTI with acute vs chronic OM per ID  - decub ulcer worsened since last admission and previously tested positive for MRSA - bone probed during I&D and now visible on dressing changes  - wound care Dr performed debridement and culture 12/20 showed ESBL e coli, pseudomonas and vancomycin-resistant enterococcus faecalis  - ID recs appreciated: d/c vancomycin as wound culture no longer MRSA+, would recommend continuation of meropenem 1q8 for short course as patient and wound unlikely to benefit from prolonged abx course  - blood cx negative, urine cx grew candida  - Xray images not ideal to r/o osteo (can consider CT vs. indium WBC scan); ESR elevated to 101 - febrile 12/21; repeat cultures 12/21 NGTD and CXR clear - likely catheter associated UTI based on UA from 12/21 vs infected sacral DTI with acute vs chronic OM per ID  - decub ulcer worsened since last admission and previously tested positive for MRSA - bone probed during I&D and now visible on dressing changes  - wound care Dr performed debridement and culture 12/20 showed ESBL e coli, pseudomonas and vancomycin-resistant enterococcus faecalis  - ID recs appreciated: meropenem 1q8 for short course  - blood cx negative, urine cx grew candida  - Xray images not ideal to r/o osteo (can consider CT vs. indium WBC scan); ESR elevated to 101

## 2017-12-27 NOTE — PROGRESS NOTE ADULT - SUBJECTIVE AND OBJECTIVE BOX
Venancio Lamas MD  Cell: [phone]  Pager: [phone]    SUBJECTIVE / OVERNIGHT EVENTS:   No acute events overnight.  Afebrile.  Opens eyes to question and still nodding head yes/no but often not appropriately      VITAL SIGNS:  Vital Signs Last 24 Hrs  T(C): 36.6 (27 Dec 2017 05:35), Max: 36.9 (26 Dec 2017 21:02)  T(F): 97.9 (27 Dec 2017 05:35), Max: 98.4 (26 Dec 2017 21:02)  HR: 78 (27 Dec 2017 05:35) (64 - 85)  BP: 113/59 (27 Dec 2017 05:35) (106/65 - 126/78)  BP(mean): --  RR: 20 (27 Dec 2017 05:35) (20 - 20)  SpO2: 100% (27 Dec 2017 05:35) (95% - 100%)      PHYSICAL EXAM:               GENERAL: cachectic, NAD, opens eyes when spoke to		  		EYES: fixed dilated R pupil  		MOUTH: mucous membranes dry  		CHEST/LUNG: tachypneic, Poor inspiratory effort  		HEART: Regular rate and rhythm; No murmurs, rubs, or gallops  		ABDOMEN: Soft, Nontender, Nondistended; Bowel sounds present  		GENITOURINARY: knutson in place draining yellow liquid with debris  		BACK: unstageable sacral decub ulcer worse since last admission  		PSYCH: Calm, nonverbal   	NEUROLOGY: R facial droop, general weakness, unable to asses thoroughly for focal deficits, patient not following commands, resting hand tremors                          8.6    11.94 )-----------( 450      ( 27 Dec 2017 07:20 )             27.2         CAPILLARY BLOOD GLUCOSE    POCT Blood Glucose.: 74 mg/dL (27 Dec 2017 07:14)  POCT Blood Glucose.: 100 mg/dL (27 Dec 2017 07:06)  POCT Blood Glucose.: 83 mg/dL (26 Dec 2017 23:51)  POCT Blood Glucose.: 96 mg/dL (26 Dec 2017 18:18)  POCT Blood Glucose.: 83 mg/dL (26 Dec 2017 12:29)      MEDICATIONS  (STANDING):  amiodarone    Tablet 400 milliGRAM(s) Oral daily  artificial  tears Solution 1 Drop(s) Right EYE daily  aspirin  chewable 81 milliGRAM(s) Oral daily  atorvastatin 20 milliGRAM(s) Oral at bedtime  buDESOnide 160 MICROgram(s)/formoterol 4.5 MICROgram(s) Inhaler 2 Puff(s) Inhalation two times a day  Dakins Solution - 1/2 Strength 1 Application(s) Topical two times a day  dextrose 5%. 1000 milliLiter(s) (60 mL/Hr) IV Continuous <Continuous>  enoxaparin Injectable 40 milliGRAM(s) SubCutaneous daily  insulin lispro (HumaLOG) corrective regimen sliding scale   SubCutaneous at bedtime  latanoprost 0.005% Ophthalmic Solution 1 Drop(s) Both EYES at bedtime  levothyroxine Injectable 25 MICROGram(s) IV Push daily  meropenem IVPB 1000 milliGRAM(s) IV Intermittent every 12 hours  meropenem IVPB      metoprolol    tartrate Injectable 2.5 milliGRAM(s) IV Push every 6 hours  mirtazapine 7.5 milliGRAM(s) Oral at bedtime  morphine  - Injectable 1 milliGRAM(s) IV Push every 4 hours  multivitamin 1 Tablet(s) Oral daily  tiotropium 18 MICROgram(s) Capsule 1 Capsule(s) Inhalation daily

## 2017-12-27 NOTE — PROGRESS NOTE ADULT - ATTENDING COMMENTS
Patient was seen and examined personally by me. I have discussed the plan and reviewed the resident's note and agree with the above physical exam findings including assessment and plan except as indicated below.    77 female admitted with sepsis 2/2 infected sacral decubitus with probe to bone, suspicious for osteomyelitis. Also with encephalopathy: possibly vascular dementia with poor functional status and dysphagia. Course complicated by sepsis 2/2 catheter associated UTI.    Nonverbal and not following commands. Appears uncomfortable  Right pupil dilated  + indwelling knutson with yellow urine    s/p sacral debridement 12/20. Tissue cx with ESBL Ecoli, pseudomonas and VRE  Continue Meropenem. Vanc d/parker per ID. Unlikely to benefit from prolonged course.  Pain control : morphine 1 IV q4 ATC, hold for sedation  Attempted NGT again twice yesterday with no success. Will discuss with son that no further attempts will be made as at this point, risk> benefit.  Patient to continue pleasure feeds as tolerated only. Not candidate for PEG and definitely not candidate for IV TPN given other comorbidities  Prognosis guarded  Surrogate/primary decision maker: Gavin  Vitals q shift, labs every other day  Once Abx and course determined will start dispo planning to LTCF.  Attempted to call sonJoãoGavin today.

## 2017-12-27 NOTE — PROGRESS NOTE ADULT - ASSESSMENT
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

## 2017-12-27 NOTE — PROGRESS NOTE ADULT - SUBJECTIVE AND OBJECTIVE BOX
Opens eyes to verbal stimuli. Appears comfortable.    acetaminophen  Suppository 650 milliGRAM(s) Rectal every 6 hours PRN  acetaminophen  Suppository. 650 milliGRAM(s) Rectal every 6 hours PRN  amiodarone    Tablet 400 milliGRAM(s) Oral daily  artificial  tears Solution 1 Drop(s) Right EYE daily  aspirin  chewable 81 milliGRAM(s) Oral daily  atorvastatin 20 milliGRAM(s) Oral at bedtime  buDESOnide 160 MICROgram(s)/formoterol 4.5 MICROgram(s) Inhaler 2 Puff(s) Inhalation two times a day  Dakins Solution - 1/2 Strength 1 Application(s) Topical two times a day  enoxaparin Injectable 40 milliGRAM(s) SubCutaneous daily  insulin lispro (HumaLOG) corrective regimen sliding scale   SubCutaneous at bedtime  latanoprost 0.005% Ophthalmic Solution 1 Drop(s) Both EYES at bedtime  levothyroxine Injectable 25 MICROGram(s) IV Push daily  meropenem IVPB 1000 milliGRAM(s) IV Intermittent every 12 hours  meropenem IVPB      metoprolol    tartrate Injectable 2.5 milliGRAM(s) IV Push every 6 hours  mirtazapine 7.5 milliGRAM(s) Oral at bedtime  morphine  - Injectable 0.5 milliGRAM(s) IV Push every 4 hours PRN  morphine  - Injectable 1 milliGRAM(s) IV Push every 4 hours  multivitamin 1 Tablet(s) Oral daily  tiotropium 18 MICROgram(s) Capsule 1 Capsule(s) Inhalation daily        T(C): 36.6 (12-26-17 @ 06:43), Max: 37.1 (12-25-17 @ 21:01)  HR: 79 (12-26-17 @ 10:02) (76 - 84)  BP: 106/65 (12-26-17 @ 10:02) (100/53 - 127/71)  RR: 20 (12-26-17 @ 10:02) (18 - 21)  SpO2: 97% (12-26-17 @ 10:02) (95% - 100%)  Wt(kg): --    I&O's Summary    25 Dec 2017 07:01  -  26 Dec 2017 07:00  --------------------------------------------------------  IN: 420 mL / OUT: 900 mL / NET: -480 mL    26 Dec 2017 07:01  -  26 Dec 2017 12:42  --------------------------------------------------------  IN: 0 mL / OUT: 600 mL / NET: -600 mL        Cardiovascular: Normal S1 S2, RRR   No JVD,  Respiratory: decreased breath sounds w/ decreased inspiratory effort noted  B/L	  Gastrointestinal:  Soft, Non-tender, + BS	  Extremities no edema, cyanosis, clubbing B/L LE's     DIAGNOSTIC DATA:    < from: TTE with Doppler (w/Cont) (04.03.17 @ 16:18) >  CONCLUSIONS:  1. Mitral annular calcification, otherwise normal mitral  valve. Mild mitral regurgitation.  2. Moderately dilated left atrium.  LA volume index = 46  cc/m2.  3. Moderate left ventricular enlargement.  4. Severe global left ventricular systolic dysfunction.  Endocardial visualization enhanced with intravenous  injection of echo contrast (Definity).  5. The right ventricle is not well visualized; grossly  normal right ventricular systolic function.  A device wire  is noted in the right heart.  6. Estimated right ventricular systolic pressure equals 60  mm Hg, assuming right atrial pressure equals 10 mm Hg,  consistent with moderate pulmonary hypertension.  ------------------------------------------------------------------------  Confirmed on  4/3/2017 - 17:19:47 by Jean-Pierre Carver M.D.    < end of copied text >    < from: Cardiac Cath Lab - Adult (04.26.16 @ 13:12) >  VENTRICLES: No LV gram was performed; however, a recent echocardiogram  demonstrated an EF of 19 %.  CORONARY VESSELS: The coronary circulation is right dominant.  LM:   --  LM: Normal.  LAD:   --  Proximal LAD: There was a axkieohk47 % stenosis. There was mild  restenosis in proximal LAD stent.  --  Mid LAD: Angiography showed minor luminal irregularities with no flow  limiting lesions.  --  Distal LAD: Angiography showed minor luminal irregularities with no  flow limiting lesions.  --  D1: Angiography showed minor luminal irregularities with no flow  limiting lesions.  CX:   --  Circumflex: Normal.  RI:   --  Ramus intermedius: Angiography showed minor luminal  irregularities with no flow limiting lesions.  RCA:   --  Proximal RCA: Normal.  --  Mid RCA: Angiography showed mild atherosclerosis with no flow limiting  lesions.  --  Distal RCA: Angiography showed minor luminal irregularities with no  flow limiting lesions.  --  RPDA: Angiography showed minor luminal irregularities with no flow  limiting lesions.  --  RPLS: Angiography showed minor luminal irregularities with no flow  limiting lesions.  COMPLICATIONS: There were no complications.  DIAGNOSTIC RECOMMENDATIONS: Medical management and aggressive risk factor  modification is recommended.  Prepared and signed by  Nehal López M.D.  Signed 04/27/2016 13:13:20    < end of copied text >    < from: Transthoracic Echocardiogram (12.01.17 @ 16:53) >  CONCLUSIONS:  1. Normal mitral valve. Minimal mitral regurgitation.  2. Mild left ventricular enlargement.  3. Severe global left ventricular systolic dysfunction.  4. The right ventricle is not well visualized. A device  wire is noted in the right heart.  ------------------------------------------------------------------------  Confirmed on  12/1/2017 - 18:24:53 by Carli Encarnacion M.D. RPVI    < end of copied text      ASSESSMENT/PLAN: 	77y Female with hypertension, dyslipidemia, CAD s/p LAD stent with only mild re-stenosis with minor luminal irregularities otherwise on cath 4/16, with known severe LV dysfunction , NICM s/p Medtronic BiV ICD, VT on Amio, COPD, lymphoma previously on chemo with known spinal mets and compression fractures recent admit with GNR bacteremia (pseudomonas), felt to be urinary source, and sacral decub wound culture +MRSA, s/p ABX, now admitted from SNF with fever and AMS/lethargy.     -continue with medical management of cad and cardiomyopathy with ASA/statin/BB  -no clinical heart failure  -continue with asa for history of pci  -no further inpatient cv workup needed at this time  -f/u primary team re: GOC - currently full code     Taryn Garrett [facility]  O:  [phone]  P: [phone]

## 2017-12-27 NOTE — PROGRESS NOTE ADULT - ATTENDING COMMENTS
Agree with above.   -No further cardiac workup needed at this time  -continue with asa for history of pci    Nehal López MD  [facility]  [address], [address]  [place] [address]  office: [phone]  pager: [phone]

## 2017-12-27 NOTE — PROGRESS NOTE ADULT - PROBLEM SELECTOR PLAN 4
- Speech and Swallow recommended NPO  - GI eval for PEG; recommend holding off on PEG  - attempted to place NG 12/21 and 12/26 per son's request; however first time was not positioned appropriately and was removed; second time son decided against it given pts obvious discomfort  - finger sticks Q6  - will continue re-ordering D5 if pt not eating

## 2017-12-27 NOTE — PROGRESS NOTE ADULT - SUBJECTIVE AND OBJECTIVE BOX
CC: F/U for ? infected wound    Saw/spoke to patient. No fevers. Patient non-verbal, not able to provide ROS. Otherwise unchanged.    Allergies  No Known Drug Allergies  peanuts (Unknown)    ANTIMICROBIALS:  meropenem IVPB 1000 every 12 hours  meropenem IVPB      PE:    Vital Signs Last 24 Hrs  T(C): 36.6 (27 Dec 2017 05:35), Max: 36.9 (26 Dec 2017 21:02)  T(F): 97.9 (27 Dec 2017 05:35), Max: 98.4 (26 Dec 2017 21:02)  HR: 86 (27 Dec 2017 10:12) (64 - 86)  BP: 122/83 (27 Dec 2017 10:12) (113/59 - 126/78)  BP(mean): --  RR: 20 (27 Dec 2017 10:12) (20 - 20)  SpO2: 98% (27 Dec 2017 10:12) (95% - 100%)    Gen: AOx3, NAD, non-toxic, pleasant  CV: S1+S2 normal, no murmurs, nontachycardic  Resp: Clear bilat, no resp distress, no crackles/wheezes  Abd: Soft, nontender, +BS  Ext: No LE edema, no wounds  Msk: Stage 4 wound, unchanged    LABS:                        8.6    11.94 )-----------( 450      ( 27 Dec 2017 07:20 )             27.2     12-27    133<L>  |  96<L>  |  6<L>  ----------------------------<  83  3.7   |  26  |  0.36<L>    Ca    8.5      27 Dec 2017 07:20  Phos  1.8     12-27  Mg     1.7     12-27    TPro  5.5<L>  /  Alb  2.2<L>  /  TBili  0.4  /  DBili  x   /  AST  34<H>  /  ALT  15  /  AlkPhos  74  12-27    MICROBIOLOGY:    BLOOD PERIPHERAL  12-21-17 NGTD    OTHER  12-20-17 --  --  E.COLI ESBL POSITIVE  Pseudomonas aeruginosa  Enterococcus faecalis VRE    BLOOD VENOUS  12-13-17 NGTD    URINE CATHETER  12-13-17 NGTD    OTHER  11-27-17 --  --  Staph. aureus *MRSA*    RADIOLOGY:    No new available

## 2017-12-27 NOTE — PROGRESS NOTE ADULT - ASSESSMENT
78 yo f w/ CAD, MI, Systolic CH, s/p AICD, HTN, lymphoma previously on chemo with known spinal mets and compression fractures, functional quadriplegia, nonverbal, recent admission for Pseudomonas bacteremia secondary to UTI, MRSA of sacral decubitus sent from [facility] 12/13 for altered mental status and fever, admitted for presumed UTI and worsening of sacral decubitus. Here, was noticed to have worsening sacral decubitus. Has stage 4 wound. Likely component of OM, ? chronic OM considering time course--uncertain benefit to long duration antibiotics. Cultures with ESBL, VRE, Pseudomonas (no MRSA); suspect lesser contribution infection from VRE. For now, continue treatment for infected wound. Sacral wound infection, positive culture finding.  - Meropenem 1g q 8  - Wound care  - Goals of care discussions/Ethics consult noted  - Anticipate would treat with skin/soft tissue duration for sacral wound, appears less likely benefit to prolonged course antibiotics--pending [facility] discussion    Michael Abraham MD  Pager [phone]  After 5pm and on weekends call [phone]

## 2017-12-28 NOTE — PROGRESS NOTE ADULT - SUBJECTIVE AND OBJECTIVE BOX
Opens eyes to verbal stimuli. Appears comfortable.    MEDICATIONS  (STANDING):  amiodarone    Tablet 400 milliGRAM(s) Oral daily  artificial  tears Solution 1 Drop(s) Right EYE daily  aspirin  chewable 81 milliGRAM(s) Oral daily  atorvastatin 20 milliGRAM(s) Oral at bedtime  Biotene Dry Mouth Oral Rinse 5 milliLiter(s) Swish and Spit three times a day  buDESOnide 160 MICROgram(s)/formoterol 4.5 MICROgram(s) Inhaler 2 Puff(s) Inhalation two times a day  Dakins Solution - 1/2 Strength 1 Application(s) Topical two times a day  dextrose 5%. 1000 milliLiter(s) (60 mL/Hr) IV Continuous <Continuous>  enoxaparin Injectable 40 milliGRAM(s) SubCutaneous daily  FIRST- Mouthwash  BLM 5 milliLiter(s) Swish and Spit three times a day  insulin lispro (HumaLOG) corrective regimen sliding scale   SubCutaneous at bedtime  latanoprost 0.005% Ophthalmic Solution 1 Drop(s) Both EYES at bedtime  levothyroxine Injectable 25 MICROGram(s) IV Push daily  meropenem IVPB 1000 milliGRAM(s) IV Intermittent every 12 hours  meropenem IVPB      metoprolol    tartrate Injectable 2.5 milliGRAM(s) IV Push every 6 hours  mirtazapine 7.5 milliGRAM(s) Oral at bedtime  morphine  - Injectable 1 milliGRAM(s) IV Push every 4 hours  multivitamin 1 Tablet(s) Oral daily  tiotropium 18 MICROgram(s) Capsule 1 Capsule(s) Inhalation daily    MEDICATIONS  (PRN):  acetaminophen  Suppository 650 milliGRAM(s) Rectal every 6 hours PRN For Temp greater than 38 C (100.4 F)  acetaminophen  Suppository. 650 milliGRAM(s) Rectal every 6 hours PRN Mild Pain (1 - 3)  dextrose Gel 1 Dose(s) Oral every 6 hours PRN Blood Glucose LESS THAN 70 milliGRAM(s)/deciliter  dextrose Gel 1 Dose(s) Oral every 6 hours PRN Blood Glucose LESS THAN 70 milliGRAM(s)/deciliter  morphine  - Injectable 0.5 milliGRAM(s) IV Push every 4 hours PRN Severe Pain (7 - 10)      LABS:                        8.6    11.94 )-----------( 450      ( 27 Dec 2017 07:20 )             27.2     Hemoglobin: 8.6 g/dL (12-27 @ 07:20)  Hemoglobin: 9.0 g/dL (12-24 @ 06:40)    12-27    133<L>  |  96<L>  |  6<L>  ----------------------------<  83  3.7   |  26  |  0.36<L>    Ca    8.5      27 Dec 2017 07:20  Phos  1.8     12-27  Mg     1.7     12-27    TPro  5.5<L>  /  Alb  2.2<L>  /  TBili  0.4  /  DBili  x   /  AST  34<H>  /  ALT  15  /  AlkPhos  74  12-27    Creatinine Trend: 0.36<--, 0.49<--, 0.41<--, 0.44<--, 0.43<--, 0.39<--     PHYSICAL EXAM  Vital Signs Last 24 Hrs  T(C): 37.2 (28 Dec 2017 12:57), Max: 37.2 (28 Dec 2017 12:57)  T(F): 98.9 (28 Dec 2017 12:57), Max: 98.9 (28 Dec 2017 12:57)  HR: 82 (28 Dec 2017 12:57) (77 - 88)  BP: 115/59 (28 Dec 2017 12:57) (106/56 - 116/56)  BP(mean): --  RR: 20 (28 Dec 2017 12:57) (17 - 20)  SpO2: 96% (28 Dec 2017 12:57) (96% - 100%)    Cardiovascular: Normal S1 S2, RRR   No JVD,  Respiratory: decreased breath sounds w/ decreased inspiratory effort noted  B/L	  Gastrointestinal:  Soft, Non-tender, + BS	  Extremities no edema, cyanosis, clubbing B/L LE's     DIAGNOSTIC DATA:    < from: TTE with Doppler (w/Cont) (04.03.17 @ 16:18) >  CONCLUSIONS:  1. Mitral annular calcification, otherwise normal mitral  valve. Mild mitral regurgitation.  2. Moderately dilated left atrium.  LA volume index = 46  cc/m2.  3. Moderate left ventricular enlargement.  4. Severe global left ventricular systolic dysfunction.  Endocardial visualization enhanced with intravenous  injection of echo contrast (Definity).  5. The right ventricle is not well visualized; grossly  normal right ventricular systolic function.  A device wire  is noted in the right heart.  6. Estimated right ventricular systolic pressure equals 60  mm Hg, assuming right atrial pressure equals 10 mm Hg,  consistent with moderate pulmonary hypertension.  ------------------------------------------------------------------------  Confirmed on  4/3/2017 - 17:19:47 by Jean-Pierre Carver M.D.    < end of copied text >    < from: Cardiac Cath Lab - Adult (04.26.16 @ 13:12) >  VENTRICLES: No LV gram was performed; however, a recent echocardiogram  demonstrated an EF of 19 %.  CORONARY VESSELS: The coronary circulation is right dominant.  LM:   --  LM: Normal.  LAD:   --  Proximal LAD: There was a vynocikf90 % stenosis. There was mild  restenosis in proximal LAD stent.  --  Mid LAD: Angiography showed minor luminal irregularities with no flow  limiting lesions.  --  Distal LAD: Angiography showed minor luminal irregularities with no  flow limiting lesions.  --  D1: Angiography showed minor luminal irregularities with no flow  limiting lesions.  CX:   --  Circumflex: Normal.  RI:   --  Ramus intermedius: Angiography showed minor luminal  irregularities with no flow limiting lesions.  RCA:   --  Proximal RCA: Normal.  --  Mid RCA: Angiography showed mild atherosclerosis with no flow limiting  lesions.  --  Distal RCA: Angiography showed minor luminal irregularities with no  flow limiting lesions.  --  RPDA: Angiography showed minor luminal irregularities with no flow  limiting lesions.  --  RPLS: Angiography showed minor luminal irregularities with no flow  limiting lesions.  COMPLICATIONS: There were no complications.  DIAGNOSTIC RECOMMENDATIONS: Medical management and aggressive risk factor  modification is recommended.  Prepared and signed by  Nehal López M.D.  Signed 04/27/2016 13:13:20    < end of copied text >    < from: Transthoracic Echocardiogram (12.01.17 @ 16:53) >  CONCLUSIONS:  1. Normal mitral valve. Minimal mitral regurgitation.  2. Mild left ventricular enlargement.  3. Severe global left ventricular systolic dysfunction.  4. The right ventricle is not well visualized. A device  wire is noted in the right heart.  ------------------------------------------------------------------------  Confirmed on  12/1/2017 - 18:24:53 by TAJ Hyatt    < end of copied text      ASSESSMENT/PLAN: 	77y Female with hypertension, dyslipidemia, CAD s/p LAD stent with only mild re-stenosis with minor luminal irregularities otherwise on cath 4/16, with known severe LV dysfunction , NICM s/p Medtronic BiV ICD, VT on Amio, COPD, lymphoma previously on chemo with known spinal mets and compression fractures recent admit with GNR bacteremia (pseudomonas), felt to be urinary source, and sacral decub wound culture +MRSA, s/p ABX, now admitted from SNF with fever and AMS/lethargy.     -continue with medical management of cad and cardiomyopathy with ASA/statin/BB  -no clinical heart failure  -continue with asa for history of pci  -no further inpatient cv workup needed at this time  -f/u primary team re: GEETA - currently full code

## 2017-12-28 NOTE — PROGRESS NOTE ADULT - ATTENDING COMMENTS
Patient was seen and examined personally by me. I have discussed the plan and reviewed the resident's note and agree with the above physical exam findings including assessment and plan except as indicated below.    77 female admitted with sepsis 2/2 infected sacral decubitus with probe to bone, suspicious for osteomyelitis. Also with encephalopathy: possibly vascular dementia with poor functional status and dysphagia. Course complicated by sepsis 2/2 catheter associated UTI.    Nonverbal and not following commands. Appears uncomfortable  Right pupil dilated  + indwelling knutson with yellow urine    s/p sacral debridement 12/20. Tissue cx with ESBL Ecoli, pseudomonas and VRE  Continue Meropenem. Unlikely to benefit from prolonged course. ID following  Pain control : morphine 1 IV q4 ATC, hold for sedation  Discussed with  Gavin the son on 12/27 that no further attempts will be made as at this point after multiple unsuccessful attempts at NGT, risk> benefit.  Patient to continue pleasure feeds as tolerated only. Not candidate for PEG and definitely not candidate for IV TPN given other comorbidities  Discussed with son yesterday, Gavin, that at this time, we should focus on patient's comfort. Gavin stated he will be in hospital today at 2pm to further discuss.  Prognosis guarded  Surrogate/primary decision maker: Gavin-son  Vitals q shift, labs every other day

## 2017-12-28 NOTE — PROGRESS NOTE ADULT - ATTENDING COMMENTS
Patient seen and examined.  Agree with above.   -No further cardiac workup needed at this time    Nehal López MD  [facility]  [address], [address]  [place] [address]  office: [phone]  pager: [phone]

## 2017-12-28 NOTE — CHART NOTE - NSCHARTNOTEFT_GEN_A_CORE
Family meeting with son, Mr Gavin Pulido, this evening yielded in conversation that patient cannot tolerate NGT and so it is no longer being offered.  He was agreeable with decision.  Patient also not a candidate for PEG per GI.  Son interested in parenteral nutrition and was informed that patient is not a candidate for TPN with education regarding why risks outweigh benefits.  Son worked at [employer] for 25 years and his wife is a nurse, so he expressed that he understands that there is always an option, the issue is if the risk may not meet the goals of the patient and family.  As such, he expressed understanding regarding lack of further options to feed his mother.  He was explicit that he did not wish for his mother to go back to [facility] where he feared that she would be in pain and she would deteriorate faster.  Inpatient hospice was suggested and so he expressed interest but required one day to think about it and discuss it with his family.  Will follow up tomorrow regarding his thoughts and also ask hospice to potentially contact him.    -Jeronimo, PGY-3  Medicine Team 4  TUNG P) [phone]

## 2017-12-28 NOTE — PROGRESS NOTE ADULT - ASSESSMENT
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

## 2017-12-28 NOTE — PROGRESS NOTE ADULT - PROBLEM SELECTOR PLAN 9
- family meeting tomorrow at 2PM  - will plan to discuss stopping abx with son and discuss hospice  - Face to face conversation held with sonGavin at bedside  - Updated on current clinical progress and that patient is not candidate for PEG. GI fellow also at bedside on Fri 12/22 and discussed with son that offered a trial of NGT Patient advocate called per son request. Ethics consult also placed and stated that a trial of NGT could be attempted but would not recommend discharging patient with NGT. Per son, would still like CPR and intubation if necessary knowing that this will likely prolong patient's suffering and pain.  - Will get labs Q48 hours as opposed to daily  - family meeting on 12/14, 12/18 and 12/21: full code  - PT recs: extended care facility  - pending chart from [facility] - family meeting planned for today  - will plan to discuss stopping abx with son and discuss hospice  - Face to face conversation held with sonGavin at bedside  - Updated on current clinical progress and that patient is not candidate for PEG. GI fellow also at bedside on Fri 12/22 and discussed with son that offered a trial of NGT Patient advocate called per son request. Ethics consult also placed and stated that a trial of NGT could be attempted but would not recommend discharging patient with NGT. Per son, would still like CPR and intubation if necessary knowing that this will likely prolong patient's suffering and pain.  - Will get labs Q48 hours as opposed to daily  - family meeting on 12/14, 12/18 and 12/21: full code  - PT recs: extended care facility  - pending chart from [facility]

## 2017-12-28 NOTE — PROGRESS NOTE ADULT - SUBJECTIVE AND OBJECTIVE BOX
Venancio Lamas MD  Cell: [phone]  Pager: [phone]    SUBJECTIVE / OVERNIGHT EVENTS:   No acute events overnight.  Afebrile.  Opens eyes to question and still nodding head yes/no but often not appropriately      VITAL SIGNS:  Vital Signs Last 24 Hrs  T(C): 36.9 (28 Dec 2017 05:49), Max: 37.1 (28 Dec 2017 03:23)  T(F): 98.4 (28 Dec 2017 05:49), Max: 98.8 (28 Dec 2017 03:23)  HR: 79 (28 Dec 2017 05:49) (77 - 88)  BP: 106/59 (28 Dec 2017 05:49) (106/56 - 122/83)  BP(mean): --  RR: 17 (28 Dec 2017 05:49) (17 - 20)  SpO2: 100% (28 Dec 2017 05:49) (97% - 100%)      PHYSICAL EXAM:               GENERAL: cachectic, NAD, opens eyes when spoke to		  		EYES: fixed dilated R pupil  		MOUTH: mucous membranes dry  		CHEST/LUNG: tachypneic, Poor inspiratory effort  		HEART: Regular rate and rhythm; No murmurs, rubs, or gallops  		ABDOMEN: Soft, Nontender, Nondistended; Bowel sounds present  		GENITOURINARY: knutson in place draining yellow liquid with debris  		BACK: unstageable sacral decub ulcer worse since last admission  		PSYCH: Calm, nonverbal   	NEUROLOGY: R facial droop, general weakness, unable to asses thoroughly for focal deficits, patient not following commands, resting hand tremors                          8.6    11.94 )-----------( 450      ( 27 Dec 2017 07:20 )             27.2     12-27    133<L>  |  96<L>  |  6<L>  ----------------------------<  83  3.7   |  26  |  0.36<L>    Ca    8.5      27 Dec 2017 07:20  Phos  1.8     12-27  Mg     1.7     12-27    TPro  5.5<L>  /  Alb  2.2<L>  /  TBili  0.4  /  DBili  x   /  AST  34<H>  /  ALT  15  /  AlkPhos  74  12-27      CAPILLARY BLOOD GLUCOSE    POCT Blood Glucose.: 96 mg/dL (28 Dec 2017 08:22)  POCT Blood Glucose.: 94 mg/dL (27 Dec 2017 23:08)  POCT Blood Glucose.: 95 mg/dL (27 Dec 2017 18:06)  POCT Blood Glucose.: 82 mg/dL (27 Dec 2017 12:01)      MEDICATIONS  (STANDING):  amiodarone    Tablet 400 milliGRAM(s) Oral daily  artificial  tears Solution 1 Drop(s) Right EYE daily  aspirin  chewable 81 milliGRAM(s) Oral daily  atorvastatin 20 milliGRAM(s) Oral at bedtime  Biotene Dry Mouth Oral Rinse 5 milliLiter(s) Swish and Spit three times a day  buDESOnide 160 MICROgram(s)/formoterol 4.5 MICROgram(s) Inhaler 2 Puff(s) Inhalation two times a day  Dakins Solution - 1/2 Strength 1 Application(s) Topical two times a day  dextrose 5%. 1000 milliLiter(s) (60 mL/Hr) IV Continuous <Continuous>  enoxaparin Injectable 40 milliGRAM(s) SubCutaneous daily  FIRST- Mouthwash  BLM 5 milliLiter(s) Swish and Spit three times a day  insulin lispro (HumaLOG) corrective regimen sliding scale   SubCutaneous at bedtime  latanoprost 0.005% Ophthalmic Solution 1 Drop(s) Both EYES at bedtime  levothyroxine Injectable 25 MICROGram(s) IV Push daily  meropenem IVPB 1000 milliGRAM(s) IV Intermittent every 12 hours  meropenem IVPB      metoprolol    tartrate Injectable 2.5 milliGRAM(s) IV Push every 6 hours  mirtazapine 7.5 milliGRAM(s) Oral at bedtime  morphine  - Injectable 1 milliGRAM(s) IV Push every 4 hours  multivitamin 1 Tablet(s) Oral daily  tiotropium 18 MICROgram(s) Capsule 1 Capsule(s) Inhalation daily

## 2017-12-28 NOTE — PROGRESS NOTE ADULT - PROBLEM SELECTOR PLAN 1
- febrile 12/21; repeat cultures 12/21 NGTD and CXR clear - likely catheter associated UTI based on UA from 12/21 vs infected sacral DTI with acute vs chronic OM per ID  - decub ulcer worsened since last admission and previously tested positive for MRSA - bone probed during I&D and now visible on dressing changes  - wound care Dr performed debridement and culture 12/20 showed ESBL e coli, pseudomonas and vancomycin-resistant enterococcus faecalis  - ID recs appreciated: meropenem 1q8 for short course  - blood cx negative, urine cx grew candida  - Xray images not ideal to r/o osteo (can consider CT vs. indium WBC scan); ESR elevated to 101

## 2017-12-29 NOTE — PROGRESS NOTE ADULT - ATTENDING COMMENTS
Patient seen and examined, agree with above assessment and plan as transcribed above.    - no need for further cardiac w/u    Raymond Kinney MD, FACC  [facility], [facility]  [address] [place] [address]  PHONE:  [phone]  BEEPER : [phone]

## 2017-12-29 NOTE — DISCHARGE NOTE ADULT - PATIENT PORTAL LINK FT
“You can access the [employer] Patient Portal, offered by [facility], by registering with the following website: [email or web address]”

## 2017-12-29 NOTE — PROGRESS NOTE ADULT - SUBJECTIVE AND OBJECTIVE BOX
CC: F/U for ? infected sacral wound    No verbal response.  Appears comfortable.    Allergies  No Known Drug Allergies  peanuts (Unknown)    ANTIMICROBIALS:  meropenem IVPB 1000 every 12 hours  meropenem IVPB      PE:  Vital Signs Last 24 Hrs  T(F): 97.3 (12-29-17 @ 13:01), Max: 98.5 (12-28-17 @ 22:44)  HR: 81 (12-29-17 @ 17:37)  BP: 109/68 (12-29-17 @ 17:37)  RR: 18 (12-29-17 @ 17:37)  SpO2: 98% (12-29-17 @ 17:37) (97% - 100%)    Gen: eyes closed NAD, non-toxic  CV: S1+S2 normal, no murmurs, PPM  Resp: poor effort  Abd: Soft, nontender, +BS  Zee  Ext: No LE edema, no wounds  Msk: Stage 4 wound sacrum    LABS:             12-29    128  |  94  |  4   ----------------------------<  85  4.7   |  24  |  0.34  Ca    8.4      29 Dec 2017 03:40Phos  2.3     12-29Mg     2.1     12-29  TPro  5.6  /  Alb  2.0  /  TBili  0.5  /  DBili  x   /  AST  34  /  ALT  14  /  AlkPhos  80  12-29    MICROBIOLOGY:    BLOOD PERIPHERAL  12-21-17 no growth    OTHER  wound  12-20-17 --  --  E.COLI ESBL POSITIVE  Pseudomonas aeruginosa  Enterococcus faecalis VRE    BLOOD VENOUS  12-13-17 NGTD    URINE CATHETER  12-13-17 NGTD    OTHER  11-27-17 --  --  Staph. aureus *MRSA*    RADIOLOGY:    No new available

## 2017-12-29 NOTE — PROVIDER CONTACT NOTE (OTHER) - SITUATION
patient finger stick was 77 no symptoms of hypoglycemia noted. patient hasn't been eating unable to tolerate

## 2017-12-29 NOTE — PROGRESS NOTE ADULT - SUBJECTIVE AND OBJECTIVE BOX
Venancio Lamas MD  Cell: [phone]  Pager: [phone]    SUBJECTIVE / OVERNIGHT EVENTS:   No acute events overnight.  Afebrile.  Opens eyes to question and still nodding head yes/no but often not appropriately    VITAL SIGNS:  Vital Signs Last 24 Hrs  T(C): 36.8 (29 Dec 2017 05:04), Max: 37.2 (28 Dec 2017 12:57)  T(F): 98.2 (29 Dec 2017 05:04), Max: 98.9 (28 Dec 2017 12:57)  HR: 72 (29 Dec 2017 06:57) (72 - 89)  BP: 104/61 (29 Dec 2017 06:57) (104/61 - 120/50)  BP(mean): --  RR: 18 (29 Dec 2017 06:57) (17 - 20)  SpO2: 100% (29 Dec 2017 06:57) (96% - 100%)      PHYSICAL EXAM:               GENERAL: cachectic, NAD, opens eyes when spoke to		  		EYES: fixed dilated R pupil  		MOUTH: mucous membranes dry  		CHEST/LUNG: tachypneic, Poor inspiratory effort  		HEART: Regular rate and rhythm; No murmurs, rubs, or gallops  		ABDOMEN: Soft, Nontender, Nondistended; Bowel sounds present  		GENITOURINARY: knutson in place draining yellow liquid with debris  		BACK: unstageable sacral decub ulcer worse since last admission  		PSYCH: Calm, nonverbal   	NEUROLOGY: R facial droop, general weakness, unable to asses thoroughly for focal deficits, patient not following commands, resting hand tremors        12-29    128<L>  |  94<L>  |  4<L>  ----------------------------<  85  4.7   |  24  |  0.34<L>    Ca    8.4      29 Dec 2017 03:40  Phos  2.3     12-29  Mg     2.1     12-29    TPro  5.6<L>  /  Alb  2.0<L>  /  TBili  0.5  /  DBili  x   /  AST  34<H>  /  ALT  14  /  AlkPhos  80  12-29      CAPILLARY BLOOD GLUCOSE    POCT Blood Glucose.: 84 mg/dL (29 Dec 2017 06:30)  POCT Blood Glucose.: 119 mg/dL (29 Dec 2017 01:11)  POCT Blood Glucose.: 77 mg/dL (28 Dec 2017 23:52)  POCT Blood Glucose.: 99 mg/dL (28 Dec 2017 18:12)  POCT Blood Glucose.: 78 mg/dL (28 Dec 2017 12:25)      MEDICATIONS  (STANDING):  amiodarone    Tablet 400 milliGRAM(s) Oral daily  artificial  tears Solution 1 Drop(s) Right EYE daily  aspirin  chewable 81 milliGRAM(s) Oral daily  atorvastatin 20 milliGRAM(s) Oral at bedtime  Biotene Dry Mouth Oral Rinse 5 milliLiter(s) Swish and Spit three times a day  buDESOnide 160 MICROgram(s)/formoterol 4.5 MICROgram(s) Inhaler 2 Puff(s) Inhalation two times a day  Dakins Solution - 1/2 Strength 1 Application(s) Topical two times a day  dextrose 5%. 1000 milliLiter(s) (60 mL/Hr) IV Continuous <Continuous>  dextrose 50% Injectable 12.5 Gram(s) IV Push once  enoxaparin Injectable 40 milliGRAM(s) SubCutaneous daily  FIRST- Mouthwash  BLM 5 milliLiter(s) Swish and Spit three times a day  insulin lispro (HumaLOG) corrective regimen sliding scale   SubCutaneous at bedtime  latanoprost 0.005% Ophthalmic Solution 1 Drop(s) Both EYES at bedtime  levothyroxine Injectable 25 MICROGram(s) IV Push daily  meropenem IVPB 1000 milliGRAM(s) IV Intermittent every 12 hours  meropenem IVPB      metoprolol    tartrate Injectable 2.5 milliGRAM(s) IV Push every 6 hours  mirtazapine 7.5 milliGRAM(s) Oral at bedtime  morphine  - Injectable 1 milliGRAM(s) IV Push every 4 hours  multivitamin 1 Tablet(s) Oral daily  tiotropium 18 MICROgram(s) Capsule 1 Capsule(s) Inhalation daily

## 2017-12-29 NOTE — PROGRESS NOTE ADULT - PROBLEM SELECTOR PLAN 10
- Will get labs Q48 hours per son's request  - Lovenox for DVT ppx - Will get labs Q48 hours per son's request  - Lovenox for DVT ppx.

## 2017-12-29 NOTE — CHART NOTE - NSCHARTNOTEFT_GEN_A_CORE
Spoke to son Gavin again regarding GOC in relation to nutrition status as patient still NPO.  Son became hostile to provider and did not like the topic of conversation so demanded to speak with provider's chief resident.  Apologized for the nature of the interaction but reiterated the goals were mutually for the benefit of the patient.  The outcome of the Ethics family meeting that occurred on 12/21 was also discussed.  He denied meeting with Ethics, and then per his request, parts of the Ethics consult note written following the meeting were read to him regarding what was discussed at the meeting and the outcome of the meeting.  He states that he will visit his mother on Monday and will continue conversation at that time.     -Jeronimo, PGY-3  [facility] Medicine Team 4  P) [phone] Spoke to son Gavin Pulido again regarding GOC in relation to nutrition status as patient still NPO.  Son became hostile to provider as he did not like the topic of conversation so demanded to speak with provider's chief resident.  He stated he did not agree to inpatient hospice and that provider must have misunderstood yesterday's conversation.  Apologized for the nature of the interaction and reiterated the goals were mutually for the benefit of the patient.    The outcome of the Ethics family meeting that occurred on 12/21 was also discussed.  He denied meeting with Ethics, and then per his request, parts of the Ethics consult note written following the meeting were read to him regarding what was discussed at the meeting and the outcome of the meeting.  He states that he will visit his mother on Monday and will continue conversation at that time.     -Jeronimo, PGY-3  [facility] Medicine Team 4  P) [phone]

## 2017-12-29 NOTE — DISCHARGE NOTE ADULT - CARE PLAN
Principal Discharge DX:	Decubitus ulcer of sacral region, unspecified ulcer stage  Instructions for follow-up, activity and diet:	You have a very severe sacral ulcer which is currently unstageable and goes down to the bone.  Based on it's presentation you likely have osteomyelitis as well.  You received antibiotics for the bacteria that grew in your ulcer.  A wound care nurse saw your ulcer and recommended that a specialized wound care doctor see it.  This would care doctor performed a debridement and gave recommendations for wound care management which the nurses followed.  Secondary Diagnosis:	Dementia  Secondary Diagnosis:	Lymphoma  Secondary Diagnosis:	Pain  Instructions for follow-up, activity and diet:	You have a severe sacral ulcer which has been causing you chronic pain.  Although you have not been speaking words to us, whenever you are moved you grimace and yell out in pain.  We have been giving you morphine to help with your pain.  We discussed increasing the dose of your morphine with your son Gavin, however given the possibility that this could lead to respiratory suppression he preferred that we keep the dose the same.  He continued to express that his goal was to keep you alive for as long as possible, regardless of whether or not this causes increase/prolonged pain and suffering.  Secondary Diagnosis:	Severe malnutrition  Instructions for follow-up, activity and diet:	You have severe dysphagia and have been unable to swallow food or medications for several weeks now.  Based on your son's request, we continued to try to feed you however you would pocket the food in your mouth and never swallow it.  GI evaluated you for a PEG tube and deemed you to not be a candidate for this.  You also are not a candidate for TPN.  We attempted to place an NG tube twice per your son's request.  However, the tube was not positioned correctly and when we were attempting to place the NG tube the second time your son saw how much pain and discomfort it was causing you and decided that he no longer wanted it to be placed.  We informed your son that if he wanted us to try placing it again he can let us know.  Secondary Diagnosis:	Goals of care, counseling/discussion  Instructions for follow-up, activity and diet:	We had several discussion with your son Gavin about your code status.  We explained to him that you were currently in obvious pain/discomfort from you sacral ulcer.  We discussed how, given your current health and inability to swallow food, it would be highly unlikely that you would show any signs of improvement.  We discussed with him how we felt that your comfort should be prioritized.  When we explained to him that performing CPR would likely result increased pain and suffering he told us that he was ok with this.  He explained to us that he felt that it was his obligation to do anything that he possibly could in order to prolong your life, even if it required us causing increased and prolonged suffering, which would likely be the case if we were to perform CPR on you. Principal Discharge DX:	Decubitus ulcer of sacral region, unspecified ulcer stage  Goal:	Follow up with your Primary Care Physician within 1 week of discharge and continue with strict wound care management.  Instructions for follow-up, activity and diet:	You have a very severe sacral ulcer which is currently unstageable and goes down to the bone.  Based on it's presentation you likely have osteomyelitis as well.  You received antibiotics for the bacteria that grew in your ulcer.  A wound care nurse saw your ulcer and recommended that a specialized wound care doctor see it.  This would care doctor performed a debridement and gave recommendations for wound care management which the nurses followed.  This ulcer will likely stay chronically infected for the rest of your life given its size, depth and location.  Blood cultures were negative which shows that the infection from your ulcer did not get into the blood.  We do not expect your ulcer to improve since it has continuously deteriorated over time in spit of specialized wound care.  Secondary Diagnosis:	Dementia  Goal:	Follow up with your Primary Care Physician within 1 week of discharge.  Instructions for follow-up, activity and diet:	You have been non-verbal throughout this admission and only nod your head yes or no, however you have not been doing so appropriately.  A CT of you head was done which did not show any signs of stroke.  It is likely that you have vascular dementia.  Secondary Diagnosis:	Lymphoma  Instructions for follow-up, activity and diet:	You have a history of lymphoma, however the stage and current status of this is not know.  We asked your son to provide us with the information for the doctor who was taking care of this and he told us that he would get it for us on several occasions.  However, we were never able to obtain this doctor's name or phone number.  We were given the name of one doctor who the son thought was your Oncologist, however we were unable to locate any contact information for this physician and you son told us that he likely had the name wrong.  We explained to him that if he gets us the information we would be willing to get in touch with the doctor and give him more answers; however we would not be pursing a workup for this while in the hospital since it would not have an impact on the patient's treatment or prognosis.  Secondary Diagnosis:	Pain  Instructions for follow-up, activity and diet:	You have a severe sacral ulcer which has been causing you chronic pain.  Although you have not been speaking words to us, whenever you are moved you grimace and yell out in pain.  We have been giving you morphine to help with your pain.  We discussed increasing the dose of your morphine with your son Gavin, however given the possibility that this could lead to respiratory suppression he preferred that we keep the dose the same.  He continued to express that his goal was to keep you alive for as long as possible, regardless of whether or not this causes increase/prolonged pain and suffering.  Secondary Diagnosis:	Severe malnutrition  Instructions for follow-up, activity and diet:	You have severe dysphagia and have been unable to swallow food or medications for several weeks now.  Based on your son's request, we continued to try to feed you however you would pocket the food in your mouth and never swallow it.  We discussed the risk of aspiration with your son and he agreed to pleasure feeds.  GI evaluated you for a PEG tube and deemed you to not be a candidate for this.  You also are not a candidate for TPN.  We attempted to place an NG tube twice per your son's request.  However, the tube was not positioned correctly and when we were attempting to place the NG tube the second time your son saw how much pain and discomfort it was causing you and decided that he no longer wanted it to be placed.  We informed your son that if he wanted us to try placing it again he can let us know.  Secondary Diagnosis:	Goals of care, counseling/discussion  Instructions for follow-up, activity and diet:	We had several discussion with your son Gavin about your code status.  We explained to him that you were currently in obvious pain/discomfort from you sacral ulcer.  We discussed how, given your current health and inability to swallow food, it would be highly unlikely that you would show any signs of improvement.  We discussed with him how we felt that your comfort should be prioritized.  When we explained to him that performing CPR would likely result increased pain and suffering he told us that he was ok with this.  He explained to us that he felt that it was his obligation to do anything that he possibly could in order to prolong your life, even if it required us causing increased and prolonged suffering, which would likely be the case if we were to perform CPR on you.

## 2017-12-29 NOTE — DISCHARGE NOTE ADULT - CARE PROVIDER_API CALL
FOLLOW UP,   FOLLOW UP WITH YOUR PRIMARY CARE PROVIDER.  IF YOU WOULD LIKE TO FOLLOW UP AT [facility] PLEASE CALL [phone]  Phone: (   )    -  Fax: (   )    -

## 2017-12-29 NOTE — DISCHARGE NOTE ADULT - HOSPITAL COURSE
76 yo f w/ CAD, MI, Systolic CH, s/p AICD, HTN, lymphoma previously on chemo with known spinal mets and compression fractures sent from NH for fever after just being discharged with hospitalization due to AMS with tachycardia, fever, hypotension.  She also had previous admission for UTI with pseudomonas and CRE.  Prior to last admission she was able to converse at baseline, however since then has been non-verbal.  She received cefepime and vanco during last admission based on previous sensitivities.  Pt AMS improved after a few days (however she still was hardly speaking) and she passed s/s eval, so was started on oral feeds and was able to take PO meds when she was given them with ensure. Pt grew pseudomonas in blood and was continued on Cefepime and dc'd vanc. Swab of decubitus ulcer came back positive for MRSA and therefore, pt was continued on contact isolations and started on Vanc. TTE returned negative.  Pt completed 14 course of Cefepime, however, she spiked a fever 2 days after completion.   However, she then had no fevers for the next few days and repeat blood cultures were negative so ID was in agreement with stopping antibiotics and discharging back to nursing home.           When the pt returned to the hospital for this hospitalizations she was no speaking and was nodding her head yes/no to questions, however often not appropriately.  It was noted that she had R facial drop, hand tremors and fixed dilated R pupil.  CT head was only significant for opacification of the right tympanomastoid cavity which may be secondary to an effusion or inflammatory related soft tissue.  This was discussed with ENT who felt that CTIAC would better image the findings, however given pts functional status and prognosis it would not be of any value in terms of pts outlook.  Also, ENT reported that if they were able to see more detailed nerve compression they would recommend steroids, however given pts infections this would not be advisable.      On this admission sacral ulcer had clearly gotten worse.  Wound care doctor was able to probe bone during I&D and cultures grew ESBL e coli, pseudomonas and vancomycin-resistant enterococcus faecalis.  Pt was kept on meropenem per ID recs.    Xray was done to r/o osteo, however image was not ideal.  However, no further imaging was warranted as it would not change pts. prognosis or care.    Pt received GI eval for possible PEG based on son's request, however GI ruled the pt to not be a candidate.  NG tube placement was attempted twice per the son's request, however it was unable to be properly positioned and given the obvious distress that the NG tube was causing the pt her son decided against NG tube placement.      She was in obvious distress despite being nonverbal, likely due to unstageable sacral ulcer.  Pt was given low dose morphine to keep her more comfortable.  She never showed signs of lethargy or respiratory depression.          Speech and swallow recommended NPO however pts family understood the risks of aspiration and decided on pleasure feeds.  However, she began to be unable to swallow at all and was kept on D5 to keep her glucose wnl.        Several conversations were had with the pts son (also her other son was also present for some of the conversations) about code status.  The son did not want his mother to be put DNR/DNI and wanted everything done for her.  He voiced to the team that he was ok with his mother having increased suffering as long as it allowed her to live longer. 76 yo f w/ CAD, MI, Systolic CH, s/p AICD, HTN, lymphoma previously on chemo with known spinal mets and compression fractures sent from NH for fever after just being discharged with hospitalization due to AMS with tachycardia, fever, hypotension.  She had previous admission for UTI with pseudomonas and CRE.  Prior to last admission her son reports that she was able to converse in words/short phrases at baseline, however since then has been non-verbal.  She received cefepime and vanco during last admission based on previous sensitivities.  Pt AMS improved after a few days (however she still was hardly speaking) and she passed s/s eval, so was started on oral feeds and was able to take PO meds when she was given them with ensure. Pt grew pseudomonas in blood and was continued on Cefepime and dc'd vanc. Swab of decubitus ulcer came back positive for MRSA and therefore, pt was continued on contact isolations and started on Vanc. TTE returned negative.  Pt completed 14 course of Cefepime, however, she spiked a fever 2 days after completion.   However, she then had no fevers for the next few days and repeat blood cultures were negative so ID was in agreement with stopping antibiotics and discharging back to nursing home.           When the pt returned to the hospital for this hospitalizations she was no speaking and was nodding her head yes/no to questions, however often not appropriately.  It was noted that she had R facial drop, hand tremors and fixed dilated R pupil.  CT head was only significant for opacification of the right tympanomastoid cavity which may be secondary to an effusion or inflammatory related soft tissue.  This was discussed with ENT who felt that CTIAC would better image the findings, however given pts functional status and prognosis it would not be of any value in terms of pts outlook.  Also, ENT reported that if they were able to see more detailed nerve compression they would recommend steroids, however given pts infections this would not be advisable.      On this admission sacral ulcer had clearly gotten worse.  Wound care doctor was able to probe bone during I&D and cultures grew ESBL e coli, pseudomonas and vancomycin-resistant enterococcus faecalis.  Pt was kept on meropenem per ID recs.    Xray was done to r/o osteo, however image was not ideal.  However, no further imaging was warranted as it would not change pts. prognosis or care.    Pt received GI eval for possible PEG based on son's request, however GI ruled the pt to not be a candidate.  NG tube placement was attempted twice per the son's request, however it was unable to be properly positioned and given the obvious distress that the NG tube was causing the pt her son decided against NG tube placement.      She was in obvious distress despite being nonverbal, likely due to unstageable sacral ulcer.  Pt was given low dose morphine to keep her more comfortable.  She never showed signs of lethargy or respiratory depression.          Speech and swallow recommended NPO however pts family understood the risks of aspiration and decided on pleasure feeds.  However, she began to be unable to swallow at all and was kept on D5 to keep her glucose wnl.        Several conversations were had with the pts son (also her other son was also present for some of the conversations) about code status.  The son did not want his mother to be put DNR/DNI and wanted everything done for her.  He voiced to the team that he was ok with his mother having increased suffering as long as it allowed her to live longer. 76 yo f w/ CAD, MI, Systolic CH, s/p AICD, HTN, lymphoma previously on chemo with known spinal mets and compression fractures sent from NH for fever after just being discharged with hospitalization due to AMS with tachycardia, fever, hypotension.  She had previous admission for UTI with pseudomonas and CRE.  Prior to last admission her son reports that she was able to converse in words/short phrases at baseline, however since then has been non-verbal.  She received cefepime and vanco during last admission based on previous sensitivities.  Pt AMS improved after a few days (however she still was hardly speaking) and she passed s/s eval, so was started on oral feeds and was able to take PO meds when she was given them with ensure. Pt grew pseudomonas in blood and was continued on Cefepime and dc'd vanc. Swab of decubitus ulcer came back positive for MRSA and therefore, pt was continued on contact isolations and started on Vanc. TTE returned negative.  Pt completed 14 course of Cefepime, however, she spiked a fever 2 days after completion.   However, she then had no fevers for the next few days and repeat blood cultures were negative so ID was in agreement with stopping antibiotics and discharging back to nursing home.           When the pt returned to the hospital for this hospitalizations she was not speaking and was nodding her head yes/no to questions, however not appropriately.  It was noted that she had R facial drop, hand tremors and fixed dilated R pupil.  CT head was only significant for opacification of the right tympanomastoid cavity which may be secondary to an effusion or inflammatory related soft tissue.  This was discussed with ENT who felt that CTIAC would better image the findings, however given pts functional status and prognosis it would not be of any value in terms of pts outlook.  Also, ENT reported that if they were able to see more detailed nerve compression they would recommend steroids, however given pts infections this would not be advisable.        On this admission sacral ulcer had clearly gotten worse.  Wound care doctor was able to probe bone during I&D and cultures grew ESBL e coli, pseudomonas and vancomycin-resistant enterococcus faecalis.  The wound care doctor left recommendations for wound care which were strictly followed by the nursing staff.  Pt was kept on meropenem per ID recs for 14 day course.  A longer course was not needed as the pt will likely stay chronically infected in the ulcer given its size, depth and location.  Blood cultures were negative.         Xray was done to r/o osteo, however image was not ideal.  However, no further imaging was warranted as it would not change pts prognosis or care.  Given the extent of the ulcer we can assume that osteomyelitis is present.         Pt received GI eval for possible PEG based on son's request, however GI ruled the pt to not be a candidate.  NG tube placement was attempted twice per the son's request, however it was unable to be properly positioned and given the obvious distress that the NG tube was causing the pt her son decided against NG tube placement.          She was in obvious distress despite being nonverbal, likely due to unstageable sacral ulcer.  Pt was given low dose morphine to keep her more comfortable.  She never showed signs of lethargy or respiratory depression.  We discussed increasing the dose of your morphine with the pts son Gavin, however given the possibility that this could lead to respiratory suppression he preferred that we keep the dose the same.  He continued to express that his goal was to keep you alive for as long as possible, regardless of whether or not this causes increase/prolonged pain and suffering.         Speech and swallow recommended NPO however pts family understood the risks of aspiration and decided on pleasure feeds.  However, she began to be unable to swallow at all and was kept on D5 to keep her glucose wnl.          The patient has a history of lymphoma, however the stage and current status is not know.  We asked the pts son (Gavin) to provide us with the information for the doctor who was taking care of this and he told us that he would get it for us on several occasions.  However, we were never able to obtain this doctor's name or phone number.  We were given the name of one doctor who the son thought was the Oncologist, however we were unable to locate any contact information for this physician and Gavin told us that he likely had the name wrong.  We explained to him that if he gets us the information we would be willing to get in touch with the doctor and give him more answers; however we would not be pursing a workup for this while in the hospital since it would not have an impact on the patient's treatment or prognosis.         Several conversations were had with the pts son (also her other son was present for some of the conversations) about code status.  The son did not want his mother to be put DNR/DNI and wanted everything done for her.  He voiced to the team that he was ok with his mother having increased suffering as long as it allowed her to live longer.  He said that he felt that it was his obligation to have everything done for her in order to get her to live as long as possible, regardless of her quality of life.

## 2017-12-29 NOTE — DISCHARGE NOTE ADULT - PROVIDER TOKENS
FREE:[LAST:[FOLLOW UP],PHONE:[(   )    -],FAX:[(   )    -],ADDRESS:[FOLLOW UP WITH YOUR PRIMARY CARE PROVIDER.  IF YOU WOULD LIKE TO FOLLOW UP AT [facility] PLEASE CALL [phone]]]

## 2017-12-29 NOTE — PROGRESS NOTE ADULT - ATTENDING COMMENTS
Patient was seen and examined personally by me. I have discussed the plan and reviewed the resident's note and agree with the above physical exam findings including assessment and plan except as indicated below.    77 female admitted with sepsis 2/2 infected sacral decubitus with probe to bone, suspicious for osteomyelitis. Also with encephalopathy: possibly vascular dementia with poor functional status and dysphagia. Course complicated by sepsis 2/2 catheter associated UTI.    Nonverbal and not following commands but responsive to pain and voice  Right pupil dilated  + indwelling knutson with yellow urine    s/p sacral debridement 12/20. Tissue cx with ESBL Ecoli, pseudomonas and VRE  Continue Meropenem. Unlikely to benefit from prolonged course. ID following  Pain control : morphine 1 IV q4 ATC, hold for sedation, seems more comfortable  Discussed with sonGavin on 12/27 and 12/28 that no further attempts will be made as at this point after multiple unsuccessful attempts at NGT, risk> benefit. Patient to continue pleasure feeds as tolerated only. Not candidate for PEG and definitely not candidate for IV TPN  Son now considering inpatient hospice but will let Team know today.  DC sliding scale insulin given patient not eating and on gentle D5 IVF  Prognosis guarded  Surrogate/primary decision maker: Gavin-miguel  Vitals q shift, labs every other day

## 2017-12-29 NOTE — PROGRESS NOTE ADULT - ASSESSMENT
78 yo Female w/ CAD, MI, Systolic CH, s/p AICD, HTN, h/o lymphoma s/p chemo, on remission, chronic indwelling Knutson, recent admit for sepsis 2/2 UTI (hx of pseudomonas in the urine, CRE), sacral decub (hx of MRSA) sent from [facility] for fever likely 2/2 sacral DTI with multiple microbial organisms with acute vs chronic OM in setting of chronic knutson with UA+ on admission.

## 2017-12-29 NOTE — PROGRESS NOTE ADULT - PROBLEM SELECTOR PLAN 9
- discussed stopping abx with son and discussed hospice  - several Face to face conversations held with son Gavin at bedside  - Updated on current clinical progress and that patient is not candidate for PEG. GI fellow also at bedside on Fri 12/22 and discussed with son that offered a trial of NGT Patient advocate called per son request. Ethics consult also placed and stated that a trial of NGT could be attempted but would not recommend discharging patient with NGT. Per son, would still like CPR and intubation if necessary knowing that this will likely prolong patient's suffering and pain.  - Will get labs Q48 hours as opposed to daily  - family meeting on 12/14, 12/18 and 12/21: full code  - PT recs: extended care facility  - pending chart from radha

## 2017-12-29 NOTE — DISCHARGE NOTE ADULT - PLAN OF CARE
You have a very severe sacral ulcer which is currently unstageable and goes down to the bone.  Based on it's presentation you likely have osteomyelitis as well.  You received antibiotics for the bacteria that grew in your ulcer.  A wound care nurse saw your ulcer and recommended that a specialized wound care doctor see it.  This would care doctor performed a debridement and gave recommendations for wound care management which the nurses followed. You have a severe sacral ulcer which has been causing you chronic pain.  Although you have not been speaking words to us, whenever you are moved you grimace and yell out in pain.  We have been giving you morphine to help with your pain.  We discussed increasing the dose of your morphine with your son Gavin, however given the possibility that this could lead to respiratory suppression he preferred that we keep the dose the same.  He continued to express that his goal was to keep you alive for as long as possible, regardless of whether or not this causes increase/prolonged pain and suffering. You have severe dysphagia and have been unable to swallow food or medications for several weeks now.  Based on your son's request, we continued to try to feed you however you would pocket the food in your mouth and never swallow it.  GI evaluated you for a PEG tube and deemed you to not be a candidate for this.  You also are not a candidate for TPN.  We attempted to place an NG tube twice per your son's request.  However, the tube was not positioned correctly and when we were attempting to place the NG tube the second time your son saw how much pain and discomfort it was causing you and decided that he no longer wanted it to be placed.  We informed your son that if he wanted us to try placing it again he can let us know. We had several discussion with your son Gavin about your code status.  We explained to him that you were currently in obvious pain/discomfort from you sacral ulcer.  We discussed how, given your current health and inability to swallow food, it would be highly unlikely that you would show any signs of improvement.  We discussed with him how we felt that your comfort should be prioritized.  When we explained to him that performing CPR would likely result increased pain and suffering he told us that he was ok with this.  He explained to us that he felt that it was his obligation to do anything that he possibly could in order to prolong your life, even if it required us causing increased and prolonged suffering, which would likely be the case if we were to perform CPR on you. Follow up with your Primary Care Physician within 1 week of discharge and continue with strict wound care management. You have a very severe sacral ulcer which is currently unstageable and goes down to the bone.  Based on it's presentation you likely have osteomyelitis as well.  You received antibiotics for the bacteria that grew in your ulcer.  A wound care nurse saw your ulcer and recommended that a specialized wound care doctor see it.  This would care doctor performed a debridement and gave recommendations for wound care management which the nurses followed.  This ulcer will likely stay chronically infected for the rest of your life given its size, depth and location.  Blood cultures were negative which shows that the infection from your ulcer did not get into the blood.  We do not expect your ulcer to improve since it has continuously deteriorated over time in spit of specialized wound care. Follow up with your Primary Care Physician within 1 week of discharge. You have been non-verbal throughout this admission and only nod your head yes or no, however you have not been doing so appropriately.  A CT of you head was done which did not show any signs of stroke.  It is likely that you have vascular dementia. You have a history of lymphoma, however the stage and current status of this is not know.  We asked your son to provide us with the information for the doctor who was taking care of this and he told us that he would get it for us on several occasions.  However, we were never able to obtain this doctor's name or phone number.  We were given the name of one doctor who the son thought was your Oncologist, however we were unable to locate any contact information for this physician and you son told us that he likely had the name wrong.  We explained to him that if he gets us the information we would be willing to get in touch with the doctor and give him more answers; however we would not be pursing a workup for this while in the hospital since it would not have an impact on the patient's treatment or prognosis. You have severe dysphagia and have been unable to swallow food or medications for several weeks now.  Based on your son's request, we continued to try to feed you however you would pocket the food in your mouth and never swallow it.  We discussed the risk of aspiration with your son and he agreed to pleasure feeds.  GI evaluated you for a PEG tube and deemed you to not be a candidate for this.  You also are not a candidate for TPN.  We attempted to place an NG tube twice per your son's request.  However, the tube was not positioned correctly and when we were attempting to place the NG tube the second time your son saw how much pain and discomfort it was causing you and decided that he no longer wanted it to be placed.  We informed your son that if he wanted us to try placing it again he can let us know.

## 2017-12-29 NOTE — PROGRESS NOTE ADULT - SUBJECTIVE AND OBJECTIVE BOX
Opens eyes to verbal stimuli. Appears comfortable.    MEDICATIONS  (STANDING):  amiodarone    Tablet 400 milliGRAM(s) Oral daily  artificial  tears Solution 1 Drop(s) Right EYE daily  aspirin  chewable 81 milliGRAM(s) Oral daily  atorvastatin 20 milliGRAM(s) Oral at bedtime  Biotene Dry Mouth Oral Rinse 5 milliLiter(s) Swish and Spit three times a day  buDESOnide 160 MICROgram(s)/formoterol 4.5 MICROgram(s) Inhaler 2 Puff(s) Inhalation two times a day  Dakins Solution - 1/2 Strength 1 Application(s) Topical two times a day  dextrose 5%. 1000 milliLiter(s) (60 mL/Hr) IV Continuous <Continuous>  dextrose 50% Injectable 12.5 Gram(s) IV Push once  enoxaparin Injectable 40 milliGRAM(s) SubCutaneous daily  FIRST- Mouthwash  BLM 5 milliLiter(s) Swish and Spit three times a day  insulin lispro (HumaLOG) corrective regimen sliding scale   SubCutaneous at bedtime  latanoprost 0.005% Ophthalmic Solution 1 Drop(s) Both EYES at bedtime  levothyroxine Injectable 25 MICROGram(s) IV Push daily  meropenem IVPB 1000 milliGRAM(s) IV Intermittent every 12 hours  meropenem IVPB      metoprolol    tartrate Injectable 2.5 milliGRAM(s) IV Push every 6 hours  mirtazapine 7.5 milliGRAM(s) Oral at bedtime  morphine  - Injectable 1 milliGRAM(s) IV Push every 4 hours  multivitamin 1 Tablet(s) Oral daily  tiotropium 18 MICROgram(s) Capsule 1 Capsule(s) Inhalation daily    MEDICATIONS  (PRN):  acetaminophen  Suppository 650 milliGRAM(s) Rectal every 6 hours PRN For Temp greater than 38 C (100.4 F)  acetaminophen  Suppository. 650 milliGRAM(s) Rectal every 6 hours PRN Mild Pain (1 - 3)  dextrose Gel 1 Dose(s) Oral every 6 hours PRN Blood Glucose LESS THAN 70 milliGRAM(s)/deciliter  dextrose Gel 1 Dose(s) Oral every 6 hours PRN Blood Glucose LESS THAN 70 milliGRAM(s)/deciliter  morphine  - Injectable 0.5 milliGRAM(s) IV Push every 4 hours PRN Severe Pain (7 - 10)      LABS:    Hemoglobin: 8.6 g/dL (12-27 @ 07:20)    12-29    128<L>  |  94<L>  |  4<L>  ----------------------------<  85  4.7   |  24  |  0.34<L>    Ca    8.4      29 Dec 2017 03:40  Phos  2.3     12-29  Mg     2.1     12-29    TPro  5.6<L>  /  Alb  2.0<L>  /  TBili  0.5  /  DBili  x   /  AST  34<H>  /  ALT  14  /  AlkPhos  80  12-29    Creatinine Trend: 0.34<--, 0.36<--, 0.49<--, 0.41<--, 0.44<--, 0.43<--           PHYSICAL EXAM  Vital Signs Last 24 Hrs  T(C): 36.8 (29 Dec 2017 05:04), Max: 37.2 (28 Dec 2017 12:57)  T(F): 98.2 (29 Dec 2017 05:04), Max: 98.9 (28 Dec 2017 12:57)  HR: 82 (29 Dec 2017 11:08) (72 - 89)  BP: 110/63 (29 Dec 2017 11:08) (104/61 - 120/50)  BP(mean): --  RR: 18 (29 Dec 2017 11:08) (17 - 20)  SpO2: 100% (29 Dec 2017 11:08) (96% - 100%)    Cardiovascular: Normal S1 S2, RRR   No JVD,  Respiratory: decreased breath sounds w/ decreased inspiratory effort noted  B/L	  Gastrointestinal:  Soft, Non-tender, + BS	  Extremities no edema, cyanosis, clubbing B/L LE's     DIAGNOSTIC DATA:    < from: TTE with Doppler (w/Cont) (04.03.17 @ 16:18) >  CONCLUSIONS:  1. Mitral annular calcification, otherwise normal mitral  valve. Mild mitral regurgitation.  2. Moderately dilated left atrium.  LA volume index = 46  cc/m2.  3. Moderate left ventricular enlargement.  4. Severe global left ventricular systolic dysfunction.  Endocardial visualization enhanced with intravenous  injection of echo contrast (Definity).  5. The right ventricle is not well visualized; grossly  normal right ventricular systolic function.  A device wire  is noted in the right heart.  6. Estimated right ventricular systolic pressure equals 60  mm Hg, assuming right atrial pressure equals 10 mm Hg,  consistent with moderate pulmonary hypertension.  ------------------------------------------------------------------------  Confirmed on  4/3/2017 - 17:19:47 by Jean-Pierre Carver M.D.    < end of copied text >    < from: Cardiac Cath Lab - Adult (04.26.16 @ 13:12) >  VENTRICLES: No LV gram was performed; however, a recent echocardiogram  demonstrated an EF of 19 %.  CORONARY VESSELS: The coronary circulation is right dominant.  LM:   --  LM: Normal.  LAD:   --  Proximal LAD: There was a [id number] % stenosis. There was mild  restenosis in proximal LAD stent.  --  Mid LAD: Angiography showed minor luminal irregularities with no flow  limiting lesions.  --  Distal LAD: Angiography showed minor luminal irregularities with no  flow limiting lesions.  --  D1: Angiography showed minor luminal irregularities with no flow  limiting lesions.  CX:   --  Circumflex: Normal.  RI:   --  Ramus intermedius: Angiography showed minor luminal  irregularities with no flow limiting lesions.  RCA:   --  Proximal RCA: Normal.  --  Mid RCA: Angiography showed mild atherosclerosis with no flow limiting  lesions.  --  Distal RCA: Angiography showed minor luminal irregularities with no  flow limiting lesions.  --  RPDA: Angiography showed minor luminal irregularities with no flow  limiting lesions.  --  RPLS: Angiography showed minor luminal irregularities with no flow  limiting lesions.  COMPLICATIONS: There were no complications.  DIAGNOSTIC RECOMMENDATIONS: Medical management and aggressive risk factor  modification is recommended.  Prepared and signed by  Nehal López M.D.  Signed 04/27/2016 13:13:20    < end of copied text >    < from: Transthoracic Echocardiogram (12.01.17 @ 16:53) >  CONCLUSIONS:  1. Normal mitral valve. Minimal mitral regurgitation.  2. Mild left ventricular enlargement.  3. Severe global left ventricular systolic dysfunction.  4. The right ventricle is not well visualized. A device  wire is noted in the right heart.  ------------------------------------------------------------------------  Confirmed on  12/1/2017 - 18:24:53 by Carli Encarnacion M.D. RPVI    < end of copied text      ASSESSMENT/PLAN: 	77y Female with hypertension, dyslipidemia, CAD s/p LAD stent with only mild re-stenosis with minor luminal irregularities otherwise on cath 4/16, with known severe LV dysfunction , NICM s/p Medtronic BiV ICD, VT on Amio, COPD, lymphoma previously on chemo with known spinal mets and compression fractures recent admit with GNR bacteremia (pseudomonas), felt to be urinary source, and sacral decub wound culture +MRSA, s/p ABX, now admitted from SNF with fever and AMS/lethargy.     -continue with medical management of cad and cardiomyopathy with ASA/statin/BB  -no clinical heart failure  -continue with asa for history of pci  -no further inpatient cv workup needed at this time  -f/u primary team re: GOMELISSA - currently full code

## 2017-12-29 NOTE — PROGRESS NOTE ADULT - ASSESSMENT
78 yo f w/ CAD, MI,  s/p AICD,  lymphoma previously on chemo with known spinal mets and compression fractures, functional quadriplegia, nonverbal, recent admission for Pseudomonas bacteremia secondary to UTI, MRSA of sacral decubitus sent from NH 12/13 for altered mental status and fever.  Here, was noticed to have worsening sacral decubitus; stage 4 wound. Likely component of OM, ? chronic OM considering time course--uncertain benefit to long duration antibiotics. Cultures with ESBL, VRE, Pseudomonas (no MRSA)   -  would treat soft tissue component with 10- 14 days antibiotics.  - Meropenem 1g q 8 day #8  - Wound care      I will be away until 1/8/18.  ID service available for questions.

## 2017-12-30 NOTE — PROGRESS NOTE ADULT - ATTENDING COMMENTS
Patient was seen and examined personally by me. I have discussed the plan and reviewed the resident's note and agree with the above physical exam findings including assessment and plan.    77 female admitted with sepsis 2/2 infected sacral decubitus with probe to bone, suspicious for osteomyelitis. Also with encephalopathy: possibly vascular dementia with poor functional status and dysphagia. Course complicated by sepsis 2/2 catheter associated UTI.    s/p sacral debridement 12/20. Tissue cx with ESBL Ecoli, pseudomonas and VRE  Continue Meropenem IV. Unlikely to benefit from prolonged course. ID following  Pain control : morphine 1 IV q4 ATC, hold for sedation, seems more comfortable  Not candidate for PEG and definitely not candidate for IV TPN  Son now considering inpatient hospice but will discuss with team on Monday.   Prognosis guarded

## 2017-12-30 NOTE — PROGRESS NOTE ADULT - PROBLEM SELECTOR PLAN 1
- was febrile 12/21; repeat cultures 12/21 NGTD and CXR clear - likely catheter associated UTI based on UA from 12/21 vs infected sacral DTI with acute vs chronic OM per ID  - decub ulcer worsened since last admission and previously tested positive for MRSA - bone probed during I&D and now visible on dressing changes  - wound care  performed debridement and culture 12/20 showed ESBL e coli, pseudomonas and vancomycin-resistant enterococcus faecalis  - ID recs appreciated: meropenem 1q8 for short course; ID to recommend duration of course  - blood cx negative, urine cx grew candida  - Xray images not ideal to r/o osteo (can consider CT vs. indium WBC scan); ESR elevated to 101

## 2017-12-30 NOTE — PROGRESS NOTE ADULT - PROBLEM SELECTOR PLAN 10
- Will get labs Q48 hours per son's request  - Lovenox for DVT ppx.    Covered by Dr. Kashif Chandler, PGY3 until 2 pm (pager will be forwarded after 2pm)  [phone]

## 2017-12-30 NOTE — PROGRESS NOTE ADULT - ATTENDING COMMENTS
Agree with above assessment and plan as outlined above.    - Cont medical management of CAD and cardiomyopathy    Raymond Kinney MD, FACC  [facility], [facility]  [address] [place] [address]  PHONE:  [phone]  BEEPER : [phone]

## 2017-12-30 NOTE — PROGRESS NOTE ADULT - SUBJECTIVE AND OBJECTIVE BOX
Subjective: Opens eyes to verbal stimuli. non verbal                  Appears comfortable.    MEDICATIONS  (STANDING):  amiodarone    Tablet 400 milliGRAM(s) Oral daily  artificial  tears Solution 1 Drop(s) Right EYE daily  aspirin  chewable 81 milliGRAM(s) Oral daily  atorvastatin 20 milliGRAM(s) Oral at bedtime  Biotene Dry Mouth Oral Rinse 5 milliLiter(s) Swish and Spit three times a day  buDESOnide 160 MICROgram(s)/formoterol 4.5 MICROgram(s) Inhaler 2 Puff(s) Inhalation two times a day  Dakins Solution - 1/2 Strength 1 Application(s) Topical two times a day  dextrose 5%. 1000 milliLiter(s) (60 mL/Hr) IV Continuous <Continuous>  dextrose 50% Injectable 12.5 Gram(s) IV Push once  enoxaparin Injectable 40 milliGRAM(s) SubCutaneous daily  FIRST- Mouthwash  BLM 5 milliLiter(s) Swish and Spit three times a day  latanoprost 0.005% Ophthalmic Solution 1 Drop(s) Both EYES at bedtime  levothyroxine Injectable 25 MICROGram(s) IV Push daily  meropenem IVPB 1000 milliGRAM(s) IV Intermittent every 12 hours  meropenem IVPB      metoprolol    tartrate Injectable 2.5 milliGRAM(s) IV Push every 6 hours  mirtazapine 7.5 milliGRAM(s) Oral at bedtime  morphine  - Injectable 1 milliGRAM(s) IV Push every 4 hours  multivitamin 1 Tablet(s) Oral daily  tiotropium 18 MICROgram(s) Capsule 1 Capsule(s) Inhalation daily    MEDICATIONS  (PRN):  acetaminophen  Suppository 650 milliGRAM(s) Rectal every 6 hours PRN For Temp greater than 38 C (100.4 F)  acetaminophen  Suppository. 650 milliGRAM(s) Rectal every 6 hours PRN Mild Pain (1 - 3)  dextrose Gel 1 Dose(s) Oral every 6 hours PRN Blood Glucose LESS THAN 70 milliGRAM(s)/deciliter  dextrose Gel 1 Dose(s) Oral every 6 hours PRN Blood Glucose LESS THAN 70 milliGRAM(s)/deciliter  morphine  - Injectable 0.5 milliGRAM(s) IV Push every 4 hours PRN Severe Pain (7 - 10)      LABS:    Hemoglobin: 8.6 g/dL (12-27 @ 07:20)    12-29    128<L>  |  94<L>  |  4<L>  ----------------------------<  85  4.7   |  24  |  0.34<L>    Ca    8.4      29 Dec 2017 03:40  Phos  2.3     12-29  Mg     2.1     12-29    TPro  5.6<L>  /  Alb  2.0<L>  /  TBili  0.5  /  DBili  x   /  AST  34<H>  /  ALT  14  /  AlkPhos  80  12-29    Creatinine Trend: 0.34<--, 0.36<--, 0.49<--, 0.41<--, 0.44<--, 0.43<--           PHYSICAL EXAM  Vital Signs Last 24 Hrs  T(C): 37.1 (30 Dec 2017 05:37), Max: 37.1 (30 Dec 2017 05:37)  T(F): 98.7 (30 Dec 2017 05:37), Max: 98.7 (30 Dec 2017 05:37)  HR: 83 (30 Dec 2017 10:26) (75 - 83)  BP: 100/63 (30 Dec 2017 10:26) (99/62 - 130/72)  BP(mean): --  RR: 18 (30 Dec 2017 10:26) (16 - 19)  SpO2: 98% (30 Dec 2017 10:26) (94% - 100%)      Cardiovascular: Normal S1 S2, RRR   No JVD,  Respiratory: decreased breath sounds w/ decreased inspiratory effort noted  B/L	  Gastrointestinal:  Soft, Non-tender, + BS	  Extremities no edema, cyanosis, clubbing B/L LE's     DIAGNOSTIC DATA:    < from: TTE with Doppler (w/Cont) (04.03.17 @ 16:18) >  CONCLUSIONS:  1. Mitral annular calcification, otherwise normal mitral  valve. Mild mitral regurgitation.  2. Moderately dilated left atrium.  LA volume index = 46  cc/m2.  3. Moderate left ventricular enlargement.  4. Severe global left ventricular systolic dysfunction.  Endocardial visualization enhanced with intravenous  injection of echo contrast (Definity).  5. The right ventricle is not well visualized; grossly  normal right ventricular systolic function.  A device wire  is noted in the right heart.  6. Estimated right ventricular systolic pressure equals 60  mm Hg, assuming right atrial pressure equals 10 mm Hg,  consistent with moderate pulmonary hypertension.  ------------------------------------------------------------------------  Confirmed on  4/3/2017 - 17:19:47 by Jean-Pierre Carver M.D.    < end of copied text >    < from: Cardiac Cath Lab - Adult (04.26.16 @ 13:12) >  VENTRICLES: No LV gram was performed; however, a recent echocardiogram  demonstrated an EF of 19 %.  CORONARY VESSELS: The coronary circulation is right dominant.  LM:   --  LM: Normal.  LAD:   --  Proximal LAD: There was a [id number] % stenosis. There was mild  restenosis in proximal LAD stent.  --  Mid LAD: Angiography showed minor luminal irregularities with no flow  limiting lesions.  --  Distal LAD: Angiography showed minor luminal irregularities with no  flow limiting lesions.  --  D1: Angiography showed minor luminal irregularities with no flow  limiting lesions.  CX:   --  Circumflex: Normal.  RI:   --  Ramus intermedius: Angiography showed minor luminal  irregularities with no flow limiting lesions.  RCA:   --  Proximal RCA: Normal.  --  Mid RCA: Angiography showed mild atherosclerosis with no flow limiting  lesions.  --  Distal RCA: Angiography showed minor luminal irregularities with no  flow limiting lesions.  --  RPDA: Angiography showed minor luminal irregularities with no flow  limiting lesions.  --  RPLS: Angiography showed minor luminal irregularities with no flow  limiting lesions.  COMPLICATIONS: There were no complications.  DIAGNOSTIC RECOMMENDATIONS: Medical management and aggressive risk factor  modification is recommended.  Prepared and signed by  Nehal López M.D.  Signed 04/27/2016 13:13:20    < end of copied text >    < from: Transthoracic Echocardiogram (12.01.17 @ 16:53) >  CONCLUSIONS:  1. Normal mitral valve. Minimal mitral regurgitation.  2. Mild left ventricular enlargement.  3. Severe global left ventricular systolic dysfunction.  4. The right ventricle is not well visualized. A device  wire is noted in the right heart.  ------------------------------------------------------------------------  Confirmed on  12/1/2017 - 18:24:53 by Carli Encarnacion M.D. RPVI    < end of copied text      ASSESSMENT/PLAN: 	77y Female with hypertension, dyslipidemia, CAD s/p LAD stent with only mild re-stenosis with minor luminal irregularities otherwise on cath 4/16, with known severe LV dysfunction , NICM s/p Medtronic BiV ICD, VT on Amio, COPD, lymphoma previously on chemo with known spinal mets and compression fractures recent admit with GNR bacteremia (pseudomonas), felt to be urinary source, and sacral decub wound culture +MRSA, s/p ABX, now admitted from SNF with fever and AMS/lethargy.     -continue with medical management of cad and cardiomyopathy with ASA/statin/BB  -not in clinical heart failure  -continue with asa for history of pci  -no further inpatient cv workup needed at this time  -f/u primary team re: GOMELISSA - currently full code

## 2017-12-30 NOTE — PROGRESS NOTE ADULT - ASSESSMENT
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

## 2017-12-31 NOTE — PROGRESS NOTE ADULT - ATTENDING COMMENTS
Patient was seen and examined personally by me. I have discussed the plan and reviewed the resident's note and agree with the above physical exam findings including assessment and plan.    77 female admitted with sepsis 2/2 infected sacral decubitus with probe to bone, suspicious for osteomyelitis. Also with encephalopathy: possibly vascular dementia with poor functional status and dysphagia. Course complicated by sepsis 2/2 catheter associated UTI.    -s/p sacral debridement 12/20. Tissue cx with ESBL Ecoli, pseudomonas and VRE; continue Meropenem IV. Unlikely to benefit from prolonged course. ID following  -Pain control : morphine 1 IV q4 ATC, hold for sedation, seems more comfortable  -Not candidate for PEG and definitely not candidate for IV TPN    Prognosis guarded.

## 2017-12-31 NOTE — PROGRESS NOTE ADULT - SUBJECTIVE AND OBJECTIVE BOX
Subjective: non verbal lethargic able to arouse appears uncomfortable                     MEDICATIONS  (STANDING):  amiodarone    Tablet 400 milliGRAM(s) Oral daily  artificial  tears Solution 1 Drop(s) Right EYE daily  aspirin  chewable 81 milliGRAM(s) Oral daily  atorvastatin 20 milliGRAM(s) Oral at bedtime  Biotene Dry Mouth Oral Rinse 5 milliLiter(s) Swish and Spit three times a day  buDESOnide 160 MICROgram(s)/formoterol 4.5 MICROgram(s) Inhaler 2 Puff(s) Inhalation two times a day  Dakins Solution - 1/2 Strength 1 Application(s) Topical two times a day  dextrose 5%. 1000 milliLiter(s) (60 mL/Hr) IV Continuous <Continuous>  dextrose 50% Injectable 12.5 Gram(s) IV Push once  enoxaparin Injectable 40 milliGRAM(s) SubCutaneous daily  FIRST- Mouthwash  BLM 5 milliLiter(s) Swish and Spit three times a day  latanoprost 0.005% Ophthalmic Solution 1 Drop(s) Both EYES at bedtime  levothyroxine Injectable 25 MICROGram(s) IV Push daily  meropenem IVPB 1000 milliGRAM(s) IV Intermittent every 12 hours  meropenem IVPB      metoprolol    tartrate Injectable 2.5 milliGRAM(s) IV Push every 6 hours  mirtazapine 7.5 milliGRAM(s) Oral at bedtime  morphine  - Injectable 1 milliGRAM(s) IV Push every 4 hours  multivitamin 1 Tablet(s) Oral daily  potassium chloride  10 mEq/100 mL IVPB 10 milliEquivalent(s) IV Intermittent every 1 hour  sodium phosphate IVPB 15 milliMole(s) IV Intermittent once  tiotropium 18 MICROgram(s) Capsule 1 Capsule(s) Inhalation daily    MEDICATIONS  (PRN):  acetaminophen  Suppository 650 milliGRAM(s) Rectal every 6 hours PRN For Temp greater than 38 C (100.4 F)  acetaminophen  Suppository. 650 milliGRAM(s) Rectal every 6 hours PRN Mild Pain (1 - 3)  dextrose Gel 1 Dose(s) Oral every 6 hours PRN Blood Glucose LESS THAN 70 milliGRAM(s)/deciliter  dextrose Gel 1 Dose(s) Oral every 6 hours PRN Blood Glucose LESS THAN 70 milliGRAM(s)/deciliter  morphine  - Injectable 0.5 milliGRAM(s) IV Push every 4 hours PRN Severe Pain (7 - 10)      LABS:                        8.0    9.76  )-----------( 323      ( 31 Dec 2017 05:25 )             24.9     Hemoglobin: 8.0 g/dL (12-31 @ 05:25)  Hemoglobin: 8.6 g/dL (12-27 @ 07:20)    12-31    133<L>  |  94<L>  |  3<L>  ----------------------------<  77  3.1<L>   |  30  |  0.27<L>    Ca    7.8<L>      31 Dec 2017 05:25  Phos  1.5     12-31  Mg     1.7     12-31    TPro  4.7<L>  /  Alb  1.9<L>  /  TBili  0.3  /  DBili  x   /  AST  21  /  ALT  11  /  AlkPhos  66  12-31    Creatinine Trend: 0.27<--, 0.34<--, 0.36<--, 0.49<--, 0.41<--, 0.44<--         PHYSICAL EXAM  Vital Signs Last 24 Hrs  T(C): 36.8 (31 Dec 2017 06:18), Max: 36.8 (31 Dec 2017 06:18)  T(F): 98.3 (31 Dec 2017 06:18), Max: 98.3 (31 Dec 2017 06:18)  HR: 79 (31 Dec 2017 06:24) (78 - 87)  BP: 113/60 (31 Dec 2017 06:24) (99/59 - 138/61)  BP(mean): --  RR: 16 (31 Dec 2017 06:24) (16 - 18)  SpO2: 98% (31 Dec 2017 06:24) (97% - 100%)        Cardiovascular: Normal S1 S2, RRR   No JVD,  Respiratory: decreased breath sounds w/ decreased inspiratory effort noted  B/L	  Gastrointestinal:  Soft, Non-tender, + BS	  Extremities no edema, cyanosis, clubbing B/L LE's     DIAGNOSTIC DATA:    < from: TTE with Doppler (w/Cont) (04.03.17 @ 16:18) >  CONCLUSIONS:  1. Mitral annular calcification, otherwise normal mitral  valve. Mild mitral regurgitation.  2. Moderately dilated left atrium.  LA volume index = 46  cc/m2.  3. Moderate left ventricular enlargement.  4. Severe global left ventricular systolic dysfunction.  Endocardial visualization enhanced with intravenous  injection of echo contrast (Definity).  5. The right ventricle is not well visualized; grossly  normal right ventricular systolic function.  A device wire  is noted in the right heart.  6. Estimated right ventricular systolic pressure equals 60  mm Hg, assuming right atrial pressure equals 10 mm Hg,  consistent with moderate pulmonary hypertension.  ------------------------------------------------------------------------  Confirmed on  4/3/2017 - 17:19:47 by Jean-Pierre Carver M.D.    < end of copied text >    < from: Cardiac Cath Lab - Adult (04.26.16 @ 13:12) >  VENTRICLES: No LV gram was performed; however, a recent echocardiogram  demonstrated an EF of 19 %.  CORONARY VESSELS: The coronary circulation is right dominant.  LM:   --  LM: Normal.  LAD:   --  Proximal LAD: There was a [id number] % stenosis. There was mild  restenosis in proximal LAD stent.  --  Mid LAD: Angiography showed minor luminal irregularities with no flow  limiting lesions.  --  Distal LAD: Angiography showed minor luminal irregularities with no  flow limiting lesions.  --  D1: Angiography showed minor luminal irregularities with no flow  limiting lesions.  CX:   --  Circumflex: Normal.  RI:   --  Ramus intermedius: Angiography showed minor luminal  irregularities with no flow limiting lesions.  RCA:   --  Proximal RCA: Normal.  --  Mid RCA: Angiography showed mild atherosclerosis with no flow limiting  lesions.  --  Distal RCA: Angiography showed minor luminal irregularities with no  flow limiting lesions.  --  RPDA: Angiography showed minor luminal irregularities with no flow  limiting lesions.  --  RPLS: Angiography showed minor luminal irregularities with no flow  limiting lesions.  COMPLICATIONS: There were no complications.  DIAGNOSTIC RECOMMENDATIONS: Medical management and aggressive risk factor  modification is recommended.  Prepared and signed by  Nehal López M.D.  Signed 04/27/2016 13:13:20    < end of copied text >    < from: Transthoracic Echocardiogram (12.01.17 @ 16:53) >  CONCLUSIONS:  1. Normal mitral valve. Minimal mitral regurgitation.  2. Mild left ventricular enlargement.  3. Severe global left ventricular systolic dysfunction.  4. The right ventricle is not well visualized. A device  wire is noted in the right heart.  ------------------------------------------------------------------------  Confirmed on  12/1/2017 - 18:24:53 by Carli Encarnacion M.D. RPVI    < end of copied text      ASSESSMENT/PLAN: 	77y Female with hypertension, dyslipidemia, CAD s/p LAD stent with only mild re-stenosis with minor luminal irregularities otherwise on cath 4/16, with known severe LV dysfunction , NICM s/p [employer] BiV ICD, VT on Amio, COPD, lymphoma previously on chemo with known spinal mets and compression fractures recent admit with GNR bacteremia (pseudomonas), felt to be urinary source, and sacral decub wound culture +MRSA, s/p ABX, now admitted from SNF with fever and AMS/lethargy.     -continue with medical management of cad and cardiomyopathy with ASA/statin/BB  -not in clinical heart failure  -continue with asa for history of pci  -no further inpatient cv workup needed at this time  hypokalemic K 3.1 Hypophosphatemia  phos 1.5     correct electrolytes as per primary team w/i GOC   -f/u primary team re: GOC - currently full code

## 2017-12-31 NOTE — PROGRESS NOTE ADULT - PROBLEM SELECTOR PLAN 1
- was febrile 12/21; repeat cultures 12/21 NGTD and CXR clear - likely catheter associated UTI based on UA from 12/21 vs infected sacral DTI with acute vs chronic OM per ID  - decub ulcer worsened since last admission and previously tested positive for MRSA - bone probed during I&D and now visible on dressing changes  - wound care  performed debridement and culture 12/20 showed ESBL e coli, pseudomonas and vancomycin-resistant enterococcus faecalis  - ID recs appreciated: meropenem 1q8 day 10; ID to recommends 10-14 days  - blood cx negative, urine cx grew candida  - Xray images not ideal to r/o osteo (can consider CT vs. indium WBC scan); ESR elevated to 101

## 2017-12-31 NOTE — PROGRESS NOTE ADULT - ASSESSMENT
76 yo Female w/ CAD, MI, Systolic CH, s/p AICD, HTN, h/o lymphoma s/p chemo, on remission, chronic indwelling Knutson, recent admit for sepsis 2/2 UTI (hx of pseudomonas in the urine, CRE), sacral decub (hx of MRSA) sent from [facility] for fever likely 2/2 sacral DTI with multiple microbial organisms with acute vs chronic OM in setting of chronic knutson with UA+ on admission.

## 2017-12-31 NOTE — PROGRESS NOTE ADULT - SUBJECTIVE AND OBJECTIVE BOX
Venancio Lamas MD  Cell: [phone]  Pager: [phone]      SUBJECTIVE / OVERNIGHT EVENTS:   No acute events overnight.  Afebrile.  Opens eyes to question and still nodding head yes/no but often not appropriately.  Still in obvious discomfort when awoken.    VITAL SIGNS:  Vital Signs Last 24 Hrs  T(C): 36.8 (31 Dec 2017 06:18), Max: 36.8 (31 Dec 2017 06:18)  T(F): 98.3 (31 Dec 2017 06:18), Max: 98.3 (31 Dec 2017 06:18)  HR: 79 (31 Dec 2017 06:24) (78 - 87)  BP: 113/60 (31 Dec 2017 06:24) (99/59 - 138/61)  BP(mean): --  RR: 16 (31 Dec 2017 06:24) (16 - 18)  SpO2: 98% (31 Dec 2017 06:24) (97% - 100%)      PHYSICAL EXAM:               GENERAL: cachectic, NAD, opens eyes when spoke to		  		EYES: fixed dilated R pupil  		MOUTH: mucous membranes dry  		CHEST/LUNG: tachypneic, Poor inspiratory effort  		HEART: Regular rate and rhythm; No murmurs, rubs, or gallops  		ABDOMEN: Soft, Nontender, Nondistended; Bowel sounds present  		GENITOURINARY: knutson in place draining yellow liquid with debris  		BACK: unstageable sacral decub ulcer worse since last admission  		PSYCH: Calm, nonverbal   	NEUROLOGY: R facial droop, general weakness, unable to asses thoroughly for focal deficits, patient not following commands, resting hand tremors                          8.0    9.76  )-----------( 323      ( 31 Dec 2017 05:25 )             24.9     12-31    133<L>  |  94<L>  |  3<L>  ----------------------------<  77  3.1<L>   |  30  |  0.27<L>    Ca    7.8<L>      31 Dec 2017 05:25  Phos  1.5     12-31  Mg     1.7     12-31    TPro  4.7<L>  /  Alb  1.9<L>  /  TBili  0.3  /  DBili  x   /  AST  21  /  ALT  11  /  AlkPhos  66  12-31      CAPILLARY BLOOD GLUCOSE    POCT Blood Glucose.: 85 mg/dL (31 Dec 2017 06:29)  POCT Blood Glucose.: 86 mg/dL (30 Dec 2017 23:42)  POCT Blood Glucose.: 98 mg/dL (30 Dec 2017 18:27)  POCT Blood Glucose.: 96 mg/dL (30 Dec 2017 11:52)      MEDICATIONS  (STANDING):  amiodarone    Tablet 400 milliGRAM(s) Oral daily  artificial  tears Solution 1 Drop(s) Right EYE daily  aspirin  chewable 81 milliGRAM(s) Oral daily  atorvastatin 20 milliGRAM(s) Oral at bedtime  Biotene Dry Mouth Oral Rinse 5 milliLiter(s) Swish and Spit three times a day  buDESOnide 160 MICROgram(s)/formoterol 4.5 MICROgram(s) Inhaler 2 Puff(s) Inhalation two times a day  Dakins Solution - 1/2 Strength 1 Application(s) Topical two times a day  dextrose 5%. 1000 milliLiter(s) (60 mL/Hr) IV Continuous <Continuous>  dextrose 50% Injectable 12.5 Gram(s) IV Push once  enoxaparin Injectable 40 milliGRAM(s) SubCutaneous daily  FIRST- Mouthwash  BLM 5 milliLiter(s) Swish and Spit three times a day  latanoprost 0.005% Ophthalmic Solution 1 Drop(s) Both EYES at bedtime  levothyroxine Injectable 25 MICROGram(s) IV Push daily  meropenem IVPB 1000 milliGRAM(s) IV Intermittent every 12 hours  meropenem IVPB      metoprolol    tartrate Injectable 2.5 milliGRAM(s) IV Push every 6 hours  mirtazapine 7.5 milliGRAM(s) Oral at bedtime  morphine  - Injectable 1 milliGRAM(s) IV Push every 4 hours  multivitamin 1 Tablet(s) Oral daily  potassium chloride  10 mEq/100 mL IVPB 10 milliEquivalent(s) IV Intermittent every 1 hour  sodium phosphate IVPB 15 milliMole(s) IV Intermittent once  tiotropium 18 MICROgram(s) Capsule 1 Capsule(s) Inhalation daily      Venancio Lamas MD  Cell: [phone]  Pager: [phone]    HPI:  78 yo f w/ CAD, MI, Systolic CH, s/p AICD, HTN, lymphoma previously on chemo with known spinal mets and compression fractures sent from NH for fever after just being discharged with hospitalization due to AMS with tachycardia, fever, hypotension.  She also had previous admission for UTI with pseudomonas and CRE.  Prior to last admission she was able to converse at baseline, however since then has been non-verbal.  She recieved cefepime and vanco during last admission based on previous sensitivities.  Pt AMS improved after a few days (however she still was hardly speaking) and she passed s/s eval, so was started on oral feeds and was able to take PO meds when she was given them with ensure. Pt grew pseudomonas in blood and we continued with Cefepime and dc'd vanc. Swab of decubitus ulcer came back positive for MRSA and therefore, pt was continued on contact isolations and started on Vanc. TTE returned negative.  Pt completed 14 course of Cefepime, however, she spiked a fever 2 days after completion.   However, she then had no fevers for the next few days and repeat blood cultures were negative so ID was in agreement with stopping antibiotics and discharging back to nursing home.  Currently the patient is not speaking but does nod her head yes or no to questioning.  Only complaint is back pain where ulcer is located. (13 Dec 2017 13:52)    REVIEW OF SYSTEMS:    CONSTITUTIONAL: No weakness, fevers or chills  EYES/ENT: No visual changes, no throat pain   RESPIRATORY: No cough, wheezing, hemoptysis; No shortness of breath  CARDIOVASCULAR: No chest pain or palpitations  GASTROINTESTINAL: No abdominal, nausea, vomiting, or hematemesis; No diarrhea or constipation. No melena or hematochezia.  GENITOURINARY: No dysuria, frequency or hematuria  NEUROLOGICAL: No dizziness, numbness, or weakness  SKIN: No itching, burning, rashes, or lesions   All other review of systems is negative unless indicated above.    VITAL SIGNS:  Vital Signs Last 24 Hrs  T(C): 36.8 (31 Dec 2017 06:18), Max: 36.8 (31 Dec 2017 06:18)  T(F): 98.3 (31 Dec 2017 06:18), Max: 98.3 (31 Dec 2017 06:18)  HR: 79 (31 Dec 2017 06:24) (78 - 87)  BP: 113/60 (31 Dec 2017 06:24) (99/59 - 138/61)  BP(mean): --  RR: 16 (31 Dec 2017 06:24) (16 - 18)  SpO2: 98% (31 Dec 2017 06:24) (97% - 100%)      PHYSICAL EXAM:     GENERAL: no acute distress  HEENT: NC/AT, EOMI, neck supple, MMM  RESPIRATORY: LCTAB/L, no rhonchi, rales, or wheezing  CARDIOVASCULAR: RRR, no murmurs, gallops, rubs  ABDOMINAL: soft, non-tender, non-distended, positive bowel sounds   EXTREMITIES: no clubbing, cyanosis, or edema  NEUROLOGICAL: alert and oriented x 3, non-focal  SKIN: no rashes or lesions   MUSCULOSKELETAL: no gross joint deformity                          8.0    9.76  )-----------( 323      ( 31 Dec 2017 05:25 )             24.9     12-31    133<L>  |  94<L>  |  3<L>  ----------------------------<  77  3.1<L>   |  30  |  0.27<L>    Ca    7.8<L>      31 Dec 2017 05:25  Phos  1.5     12-31  Mg     1.7     12-31    TPro  4.7<L>  /  Alb  1.9<L>  /  TBili  0.3  /  DBili  x   /  AST  21  /  ALT  11  /  AlkPhos  66  12-31      CAPILLARY BLOOD GLUCOSE      POCT Blood Glucose.: 85 mg/dL (31 Dec 2017 06:29)  POCT Blood Glucose.: 86 mg/dL (30 Dec 2017 23:42)  POCT Blood Glucose.: 98 mg/dL (30 Dec 2017 18:27)  POCT Blood Glucose.: 96 mg/dL (30 Dec 2017 11:52)      MEDICATIONS  (STANDING):  amiodarone    Tablet 400 milliGRAM(s) Oral daily  artificial  tears Solution 1 Drop(s) Right EYE daily  aspirin  chewable 81 milliGRAM(s) Oral daily  atorvastatin 20 milliGRAM(s) Oral at bedtime  Biotene Dry Mouth Oral Rinse 5 milliLiter(s) Swish and Spit three times a day  buDESOnide 160 MICROgram(s)/formoterol 4.5 MICROgram(s) Inhaler 2 Puff(s) Inhalation two times a day  Dakins Solution - 1/2 Strength 1 Application(s) Topical two times a day  dextrose 5%. 1000 milliLiter(s) (60 mL/Hr) IV Continuous <Continuous>  dextrose 50% Injectable 12.5 Gram(s) IV Push once  enoxaparin Injectable 40 milliGRAM(s) SubCutaneous daily  FIRST- Mouthwash  BLM 5 milliLiter(s) Swish and Spit three times a day  latanoprost 0.005% Ophthalmic Solution 1 Drop(s) Both EYES at bedtime  levothyroxine Injectable 25 MICROGram(s) IV Push daily  meropenem IVPB 1000 milliGRAM(s) IV Intermittent every 12 hours  meropenem IVPB      metoprolol    tartrate Injectable 2.5 milliGRAM(s) IV Push every 6 hours  mirtazapine 7.5 milliGRAM(s) Oral at bedtime  morphine  - Injectable 1 milliGRAM(s) IV Push every 4 hours  multivitamin 1 Tablet(s) Oral daily  potassium chloride  10 mEq/100 mL IVPB 10 milliEquivalent(s) IV Intermittent every 1 hour  sodium phosphate IVPB 15 milliMole(s) IV Intermittent once  tiotropium 18 MICROgram(s) Capsule 1 Capsule(s) Inhalation daily

## 2017-12-31 NOTE — PROGRESS NOTE ADULT - PROBLEM SELECTOR PLAN 10
- Will get labs Q48 hours per son's request  - Lovenox for DVT ppx.    Covered by Dr. Kashif Chandler, PGY3 until 2 pm (pager will be forwarded after 2pm)  [id number] - Will get labs Q48 hours per son's request  - Lovenox for DVT ppx.

## 2017-12-31 NOTE — PROGRESS NOTE ADULT - ATTENDING COMMENTS
Agree with above assessment and plan as outlined above.    - Medical management of CAD    Raymond Kinney MD, FACC  [facility], [facility]  [address] [place] [address]  PHONE:  [phone]  BEEPER : [phone]

## 2018-01-01 VITALS
OXYGEN SATURATION: 91 % | DIASTOLIC BLOOD PRESSURE: 73 MMHG | SYSTOLIC BLOOD PRESSURE: 101 MMHG | RESPIRATION RATE: 32 BRPM | HEART RATE: 87 BPM

## 2018-01-01 DIAGNOSIS — M86.10 OTHER ACUTE OSTEOMYELITIS, UNSPECIFIED SITE: ICD-10-CM

## 2018-01-01 DIAGNOSIS — C85.90 NON-HODGKIN LYMPHOMA, UNSPECIFIED, UNSPECIFIED SITE: ICD-10-CM

## 2018-01-01 LAB
ALBUMIN SERPL ELPH-MCNC: 2 G/DL — LOW (ref 3.3–5)
ALBUMIN SERPL ELPH-MCNC: 2.1 G/DL — LOW (ref 3.3–5)
ALP SERPL-CCNC: 114 U/L — SIGNIFICANT CHANGE UP (ref 40–120)
ALP SERPL-CCNC: 76 U/L — SIGNIFICANT CHANGE UP (ref 40–120)
ALP SERPL-CCNC: 81 U/L — SIGNIFICANT CHANGE UP (ref 40–120)
ALP SERPL-CCNC: 81 U/L — SIGNIFICANT CHANGE UP (ref 40–120)
ALT FLD-CCNC: 12 U/L — SIGNIFICANT CHANGE UP (ref 4–33)
ALT FLD-CCNC: 13 U/L — SIGNIFICANT CHANGE UP (ref 4–33)
ALT FLD-CCNC: 7 U/L — SIGNIFICANT CHANGE UP (ref 4–33)
ALT FLD-CCNC: 9 U/L — SIGNIFICANT CHANGE UP (ref 4–33)
AST SERPL-CCNC: 23 U/L — SIGNIFICANT CHANGE UP (ref 4–32)
AST SERPL-CCNC: 25 U/L — SIGNIFICANT CHANGE UP (ref 4–32)
AST SERPL-CCNC: 30 U/L — SIGNIFICANT CHANGE UP (ref 4–32)
AST SERPL-CCNC: 45 U/L — HIGH (ref 4–32)
BASE EXCESS BLDA CALC-SCNC: 6.4 MMOL/L — SIGNIFICANT CHANGE UP
BASOPHILS # BLD AUTO: 0.05 K/UL — SIGNIFICANT CHANGE UP (ref 0–0.2)
BASOPHILS NFR BLD AUTO: 0.4 % — SIGNIFICANT CHANGE UP (ref 0–2)
BILIRUB SERPL-MCNC: 0.4 MG/DL — SIGNIFICANT CHANGE UP (ref 0.2–1.2)
BILIRUB SERPL-MCNC: 0.4 MG/DL — SIGNIFICANT CHANGE UP (ref 0.2–1.2)
BILIRUB SERPL-MCNC: 0.5 MG/DL — SIGNIFICANT CHANGE UP (ref 0.2–1.2)
BILIRUB SERPL-MCNC: 0.5 MG/DL — SIGNIFICANT CHANGE UP (ref 0.2–1.2)
BLD GP AB SCN SERPL QL: NEGATIVE — SIGNIFICANT CHANGE UP
BUN SERPL-MCNC: 10 MG/DL — SIGNIFICANT CHANGE UP (ref 7–23)
BUN SERPL-MCNC: 4 MG/DL — LOW (ref 7–23)
BUN SERPL-MCNC: 4 MG/DL — LOW (ref 7–23)
BUN SERPL-MCNC: 6 MG/DL — LOW (ref 7–23)
BUN SERPL-MCNC: SIGNIFICANT CHANGE UP MG/DL (ref 7–23)
CALCIUM SERPL-MCNC: 8 MG/DL — LOW (ref 8.4–10.5)
CALCIUM SERPL-MCNC: 8.1 MG/DL — LOW (ref 8.4–10.5)
CALCIUM SERPL-MCNC: 8.3 MG/DL — LOW (ref 8.4–10.5)
CALCIUM SERPL-MCNC: 8.3 MG/DL — LOW (ref 8.4–10.5)
CALCIUM SERPL-MCNC: SIGNIFICANT CHANGE UP MG/DL (ref 8.4–10.5)
CHLORIDE BLDA-SCNC: 102 MMOL/L — SIGNIFICANT CHANGE UP (ref 96–108)
CHLORIDE SERPL-SCNC: 101 MMOL/L — SIGNIFICANT CHANGE UP (ref 98–107)
CHLORIDE SERPL-SCNC: 101 MMOL/L — SIGNIFICANT CHANGE UP (ref 98–107)
CHLORIDE SERPL-SCNC: 90 MMOL/L — LOW (ref 98–107)
CHLORIDE SERPL-SCNC: 93 MMOL/L — LOW (ref 98–107)
CHLORIDE SERPL-SCNC: 94 MMOL/L — LOW (ref 98–107)
CO2 SERPL-SCNC: 23 MMOL/L — SIGNIFICANT CHANGE UP (ref 22–31)
CO2 SERPL-SCNC: 28 MMOL/L — SIGNIFICANT CHANGE UP (ref 22–31)
CO2 SERPL-SCNC: 28 MMOL/L — SIGNIFICANT CHANGE UP (ref 22–31)
CO2 SERPL-SCNC: 29 MMOL/L — SIGNIFICANT CHANGE UP (ref 22–31)
CO2 SERPL-SCNC: 30 MMOL/L — SIGNIFICANT CHANGE UP (ref 22–31)
CREAT SERPL-MCNC: 0.33 MG/DL — LOW (ref 0.5–1.3)
CREAT SERPL-MCNC: 0.33 MG/DL — LOW (ref 0.5–1.3)
CREAT SERPL-MCNC: 0.35 MG/DL — LOW (ref 0.5–1.3)
CREAT SERPL-MCNC: 0.53 MG/DL — SIGNIFICANT CHANGE UP (ref 0.5–1.3)
CREAT SERPL-MCNC: SIGNIFICANT CHANGE UP MG/DL (ref 0.5–1.3)
EOSINOPHIL # BLD AUTO: 0.03 K/UL — SIGNIFICANT CHANGE UP (ref 0–0.5)
EOSINOPHIL NFR BLD AUTO: 0.3 % — SIGNIFICANT CHANGE UP (ref 0–6)
GLUCOSE BLDA-MCNC: 188 MG/DL — HIGH (ref 70–99)
GLUCOSE SERPL-MCNC: 133 MG/DL — HIGH (ref 70–99)
GLUCOSE SERPL-MCNC: 82 MG/DL — SIGNIFICANT CHANGE UP (ref 70–99)
GLUCOSE SERPL-MCNC: 86 MG/DL — SIGNIFICANT CHANGE UP (ref 70–99)
GLUCOSE SERPL-MCNC: 99 MG/DL — SIGNIFICANT CHANGE UP (ref 70–99)
GLUCOSE SERPL-MCNC: SIGNIFICANT CHANGE UP MG/DL (ref 70–99)
HCO3 BLDA-SCNC: 30 MMOL/L — HIGH (ref 22–26)
HCT VFR BLD CALC: 21.5 % — LOW (ref 34.5–45)
HCT VFR BLD CALC: 26.4 % — LOW (ref 34.5–45)
HCT VFR BLD CALC: 27 % — LOW (ref 34.5–45)
HCT VFR BLD CALC: 30.8 % — LOW (ref 34.5–45)
HCT VFR BLDA CALC: 22.8 % — LOW (ref 34.5–46.5)
HGB BLD-MCNC: 7.2 G/DL — LOW (ref 11.5–15.5)
HGB BLD-MCNC: 8.4 G/DL — LOW (ref 11.5–15.5)
HGB BLD-MCNC: 8.5 G/DL — LOW (ref 11.5–15.5)
HGB BLD-MCNC: 9.4 G/DL — LOW (ref 11.5–15.5)
HGB BLDA-MCNC: 7.3 G/DL — LOW (ref 11.5–15.5)
IMM GRANULOCYTES # BLD AUTO: 0.06 # — SIGNIFICANT CHANGE UP
IMM GRANULOCYTES NFR BLD AUTO: 0.5 % — SIGNIFICANT CHANGE UP (ref 0–1.5)
LACTATE BLDA-SCNC: 2.3 MMOL/L — HIGH (ref 0.5–2)
LYMPHOCYTES # BLD AUTO: 1.21 K/UL — SIGNIFICANT CHANGE UP (ref 1–3.3)
LYMPHOCYTES # BLD AUTO: 10.4 % — LOW (ref 13–44)
MAGNESIUM SERPL-MCNC: 1.6 MG/DL — SIGNIFICANT CHANGE UP (ref 1.6–2.6)
MAGNESIUM SERPL-MCNC: 1.7 MG/DL — SIGNIFICANT CHANGE UP (ref 1.6–2.6)
MAGNESIUM SERPL-MCNC: 1.7 MG/DL — SIGNIFICANT CHANGE UP (ref 1.6–2.6)
MAGNESIUM SERPL-MCNC: 1.8 MG/DL — SIGNIFICANT CHANGE UP (ref 1.6–2.6)
MAGNESIUM SERPL-MCNC: SIGNIFICANT CHANGE UP MG/DL (ref 1.6–2.6)
MCHC RBC-ENTMCNC: 27.1 PG — SIGNIFICANT CHANGE UP (ref 27–34)
MCHC RBC-ENTMCNC: 27.6 PG — SIGNIFICANT CHANGE UP (ref 27–34)
MCHC RBC-ENTMCNC: 27.6 PG — SIGNIFICANT CHANGE UP (ref 27–34)
MCHC RBC-ENTMCNC: 28.7 PG — SIGNIFICANT CHANGE UP (ref 27–34)
MCHC RBC-ENTMCNC: 30.5 % — LOW (ref 32–36)
MCHC RBC-ENTMCNC: 31.1 % — LOW (ref 32–36)
MCHC RBC-ENTMCNC: 32.2 % — SIGNIFICANT CHANGE UP (ref 32–36)
MCHC RBC-ENTMCNC: 33.5 % — SIGNIFICANT CHANGE UP (ref 32–36)
MCV RBC AUTO: 85.7 FL — SIGNIFICANT CHANGE UP (ref 80–100)
MCV RBC AUTO: 85.7 FL — SIGNIFICANT CHANGE UP (ref 80–100)
MCV RBC AUTO: 87.1 FL — SIGNIFICANT CHANGE UP (ref 80–100)
MCV RBC AUTO: 90.6 FL — SIGNIFICANT CHANGE UP (ref 80–100)
MONOCYTES # BLD AUTO: 1.08 K/UL — HIGH (ref 0–0.9)
MONOCYTES NFR BLD AUTO: 9.3 % — SIGNIFICANT CHANGE UP (ref 2–14)
NEUTROPHILS # BLD AUTO: 9.22 K/UL — HIGH (ref 1.8–7.4)
NEUTROPHILS NFR BLD AUTO: 79.1 % — HIGH (ref 43–77)
NRBC # FLD: 0 — SIGNIFICANT CHANGE UP
PCO2 BLDA: 46 MMHG — SIGNIFICANT CHANGE UP (ref 32–48)
PH BLDA: 7.44 PH — SIGNIFICANT CHANGE UP (ref 7.35–7.45)
PHOSPHATE SERPL-MCNC: 1.9 MG/DL — LOW (ref 2.5–4.5)
PHOSPHATE SERPL-MCNC: 1.9 MG/DL — LOW (ref 2.5–4.5)
PHOSPHATE SERPL-MCNC: 2 MG/DL — LOW (ref 2.5–4.5)
PHOSPHATE SERPL-MCNC: 2.2 MG/DL — LOW (ref 2.5–4.5)
PHOSPHATE SERPL-MCNC: SIGNIFICANT CHANGE UP MG/DL (ref 2.5–4.5)
PLATELET # BLD AUTO: 285 K/UL — SIGNIFICANT CHANGE UP (ref 150–400)
PLATELET # BLD AUTO: 292 K/UL — SIGNIFICANT CHANGE UP (ref 150–400)
PLATELET # BLD AUTO: 336 K/UL — SIGNIFICANT CHANGE UP (ref 150–400)
PLATELET # BLD AUTO: 347 K/UL — SIGNIFICANT CHANGE UP (ref 150–400)
PMV BLD: 10.4 FL — SIGNIFICANT CHANGE UP (ref 7–13)
PMV BLD: 10.5 FL — SIGNIFICANT CHANGE UP (ref 7–13)
PMV BLD: 10.7 FL — SIGNIFICANT CHANGE UP (ref 7–13)
PMV BLD: 10.9 FL — SIGNIFICANT CHANGE UP (ref 7–13)
PO2 BLDA: 114 MMHG — HIGH (ref 83–108)
POTASSIUM BLDA-SCNC: 3.1 MMOL/L — LOW (ref 3.4–4.5)
POTASSIUM SERPL-MCNC: 3.7 MMOL/L — SIGNIFICANT CHANGE UP (ref 3.5–5.3)
POTASSIUM SERPL-MCNC: 4.4 MMOL/L — SIGNIFICANT CHANGE UP (ref 3.5–5.3)
POTASSIUM SERPL-MCNC: 4.5 MMOL/L — SIGNIFICANT CHANGE UP (ref 3.5–5.3)
POTASSIUM SERPL-MCNC: 6 MMOL/L — HIGH (ref 3.5–5.3)
POTASSIUM SERPL-MCNC: SIGNIFICANT CHANGE UP MMOL/L (ref 3.5–5.3)
POTASSIUM SERPL-SCNC: 3.7 MMOL/L — SIGNIFICANT CHANGE UP (ref 3.5–5.3)
POTASSIUM SERPL-SCNC: 4.4 MMOL/L — SIGNIFICANT CHANGE UP (ref 3.5–5.3)
POTASSIUM SERPL-SCNC: 4.5 MMOL/L — SIGNIFICANT CHANGE UP (ref 3.5–5.3)
POTASSIUM SERPL-SCNC: 6 MMOL/L — HIGH (ref 3.5–5.3)
POTASSIUM SERPL-SCNC: SIGNIFICANT CHANGE UP MMOL/L (ref 3.5–5.3)
PROT SERPL-MCNC: 4.7 G/DL — LOW (ref 6–8.3)
PROT SERPL-MCNC: 4.8 G/DL — LOW (ref 6–8.3)
PROT SERPL-MCNC: 5.2 G/DL — LOW (ref 6–8.3)
PROT SERPL-MCNC: 5.3 G/DL — LOW (ref 6–8.3)
RBC # BLD: 2.51 M/UL — LOW (ref 3.8–5.2)
RBC # BLD: 3.08 M/UL — LOW (ref 3.8–5.2)
RBC # BLD: 3.1 M/UL — LOW (ref 3.8–5.2)
RBC # BLD: 3.4 M/UL — LOW (ref 3.8–5.2)
RBC # FLD: 16.4 % — HIGH (ref 10.3–14.5)
RBC # FLD: 16.5 % — HIGH (ref 10.3–14.5)
RBC # FLD: 16.6 % — HIGH (ref 10.3–14.5)
RBC # FLD: 16.8 % — HIGH (ref 10.3–14.5)
RH IG SCN BLD-IMP: POSITIVE — SIGNIFICANT CHANGE UP
SAO2 % BLDA: 98.5 % — SIGNIFICANT CHANGE UP (ref 95–99)
SODIUM BLDA-SCNC: 136 MMOL/L — SIGNIFICANT CHANGE UP (ref 136–146)
SODIUM SERPL-SCNC: 125 MMOL/L — LOW (ref 135–145)
SODIUM SERPL-SCNC: 125 MMOL/L — LOW (ref 135–145)
SODIUM SERPL-SCNC: 131 MMOL/L — LOW (ref 135–145)
SODIUM SERPL-SCNC: 139 MMOL/L — SIGNIFICANT CHANGE UP (ref 135–145)
SODIUM SERPL-SCNC: 140 MMOL/L — SIGNIFICANT CHANGE UP (ref 135–145)
WBC # BLD: 10.07 K/UL — SIGNIFICANT CHANGE UP (ref 3.8–10.5)
WBC # BLD: 10.19 K/UL — SIGNIFICANT CHANGE UP (ref 3.8–10.5)
WBC # BLD: 11.65 K/UL — HIGH (ref 3.8–10.5)
WBC # BLD: 17.77 K/UL — HIGH (ref 3.8–10.5)
WBC # FLD AUTO: 10.07 K/UL — SIGNIFICANT CHANGE UP (ref 3.8–10.5)
WBC # FLD AUTO: 10.19 K/UL — SIGNIFICANT CHANGE UP (ref 3.8–10.5)
WBC # FLD AUTO: 11.65 K/UL — HIGH (ref 3.8–10.5)
WBC # FLD AUTO: 17.77 K/UL — HIGH (ref 3.8–10.5)

## 2018-01-01 PROCEDURE — 99233 SBSQ HOSP IP/OBS HIGH 50: CPT | Mod: GC

## 2018-01-01 PROCEDURE — 99232 SBSQ HOSP IP/OBS MODERATE 35: CPT

## 2018-01-01 PROCEDURE — 99233 SBSQ HOSP IP/OBS HIGH 50: CPT

## 2018-01-01 RX ORDER — MORPHINE SULFATE 50 MG/1
1 CAPSULE, EXTENDED RELEASE ORAL EVERY 4 HOURS
Qty: 0 | Refills: 0 | Status: DISCONTINUED | OUTPATIENT
Start: 2018-01-01 | End: 2018-01-01

## 2018-01-01 RX ORDER — POTASSIUM CHLORIDE 20 MEQ
10 PACKET (EA) ORAL
Qty: 0 | Refills: 0 | Status: COMPLETED | OUTPATIENT
Start: 2018-01-01 | End: 2018-01-01

## 2018-01-01 RX ORDER — MORPHINE SULFATE 50 MG/1
1.5 CAPSULE, EXTENDED RELEASE ORAL EVERY 4 HOURS
Qty: 0 | Refills: 0 | Status: DISCONTINUED | OUTPATIENT
Start: 2018-01-01 | End: 2018-01-01

## 2018-01-01 RX ORDER — MAGNESIUM SULFATE 500 MG/ML
2 VIAL (ML) INJECTION ONCE
Qty: 0 | Refills: 0 | Status: COMPLETED | OUTPATIENT
Start: 2018-01-01 | End: 2018-01-01

## 2018-01-01 RX ORDER — POTASSIUM PHOSPHATE, MONOBASIC POTASSIUM PHOSPHATE, DIBASIC 236; 224 MG/ML; MG/ML
15 INJECTION, SOLUTION INTRAVENOUS ONCE
Qty: 0 | Refills: 0 | Status: COMPLETED | OUTPATIENT
Start: 2018-01-01 | End: 2018-01-01

## 2018-01-01 RX ORDER — SODIUM CHLORIDE 9 MG/ML
1000 INJECTION, SOLUTION INTRAVENOUS
Qty: 0 | Refills: 0 | Status: DISCONTINUED | OUTPATIENT
Start: 2018-01-01 | End: 2018-01-01

## 2018-01-01 RX ADMIN — Medication 1 APPLICATION(S): at 10:39

## 2018-01-01 RX ADMIN — MEROPENEM 100 MILLIGRAM(S): 1 INJECTION INTRAVENOUS at 06:46

## 2018-01-01 RX ADMIN — ENOXAPARIN SODIUM 40 MILLIGRAM(S): 100 INJECTION SUBCUTANEOUS at 12:06

## 2018-01-01 RX ADMIN — Medication 5 MILLILITER(S): at 16:26

## 2018-01-01 RX ADMIN — Medication 100 MILLIEQUIVALENT(S): at 21:42

## 2018-01-01 RX ADMIN — Medication 5 MILLILITER(S): at 22:46

## 2018-01-01 RX ADMIN — LATANOPROST 1 DROP(S): 0.05 SOLUTION/ DROPS OPHTHALMIC; TOPICAL at 23:00

## 2018-01-01 RX ADMIN — Medication 1 APPLICATION(S): at 23:16

## 2018-01-01 RX ADMIN — Medication 2.5 MILLIGRAM(S): at 05:17

## 2018-01-01 RX ADMIN — Medication 5 MILLILITER(S): at 06:43

## 2018-01-01 RX ADMIN — MORPHINE SULFATE 1 MILLIGRAM(S): 50 CAPSULE, EXTENDED RELEASE ORAL at 18:39

## 2018-01-01 RX ADMIN — MORPHINE SULFATE 1 MILLIGRAM(S): 50 CAPSULE, EXTENDED RELEASE ORAL at 15:38

## 2018-01-01 RX ADMIN — MORPHINE SULFATE 1 MILLIGRAM(S): 50 CAPSULE, EXTENDED RELEASE ORAL at 02:55

## 2018-01-01 RX ADMIN — Medication 1 DROP(S): at 11:06

## 2018-01-01 RX ADMIN — Medication 2.5 MILLIGRAM(S): at 00:46

## 2018-01-01 RX ADMIN — MORPHINE SULFATE 1 MILLIGRAM(S): 50 CAPSULE, EXTENDED RELEASE ORAL at 23:30

## 2018-01-01 RX ADMIN — Medication 1 APPLICATION(S): at 22:59

## 2018-01-01 RX ADMIN — MORPHINE SULFATE 1 MILLIGRAM(S): 50 CAPSULE, EXTENDED RELEASE ORAL at 09:30

## 2018-01-01 RX ADMIN — MORPHINE SULFATE 1 MILLIGRAM(S): 50 CAPSULE, EXTENDED RELEASE ORAL at 06:40

## 2018-01-01 RX ADMIN — Medication 5 MILLILITER(S): at 06:30

## 2018-01-01 RX ADMIN — LATANOPROST 1 DROP(S): 0.05 SOLUTION/ DROPS OPHTHALMIC; TOPICAL at 22:27

## 2018-01-01 RX ADMIN — Medication 5 MILLILITER(S): at 05:17

## 2018-01-01 RX ADMIN — DIPHENHYDRAMINE HYDROCHLORIDE AND LIDOCAINE HYDROCHLORIDE AND ALUMINUM HYDROXIDE AND MAGNESIUM HYDRO 5 MILLILITER(S): KIT at 06:43

## 2018-01-01 RX ADMIN — MORPHINE SULFATE 1 MILLIGRAM(S): 50 CAPSULE, EXTENDED RELEASE ORAL at 06:43

## 2018-01-01 RX ADMIN — Medication 1 APPLICATION(S): at 09:03

## 2018-01-01 RX ADMIN — MORPHINE SULFATE 1 MILLIGRAM(S): 50 CAPSULE, EXTENDED RELEASE ORAL at 20:34

## 2018-01-01 RX ADMIN — LATANOPROST 1 DROP(S): 0.05 SOLUTION/ DROPS OPHTHALMIC; TOPICAL at 22:46

## 2018-01-01 RX ADMIN — MORPHINE SULFATE 1 MILLIGRAM(S): 50 CAPSULE, EXTENDED RELEASE ORAL at 06:49

## 2018-01-01 RX ADMIN — MORPHINE SULFATE 1 MILLIGRAM(S): 50 CAPSULE, EXTENDED RELEASE ORAL at 18:03

## 2018-01-01 RX ADMIN — Medication 2.5 MILLIGRAM(S): at 06:39

## 2018-01-01 RX ADMIN — Medication 100 MILLIEQUIVALENT(S): at 22:46

## 2018-01-01 RX ADMIN — MORPHINE SULFATE 1 MILLIGRAM(S): 50 CAPSULE, EXTENDED RELEASE ORAL at 10:49

## 2018-01-01 RX ADMIN — MORPHINE SULFATE 1.5 MILLIGRAM(S): 50 CAPSULE, EXTENDED RELEASE ORAL at 09:36

## 2018-01-01 RX ADMIN — MORPHINE SULFATE 1 MILLIGRAM(S): 50 CAPSULE, EXTENDED RELEASE ORAL at 09:45

## 2018-01-01 RX ADMIN — MORPHINE SULFATE 1 MILLIGRAM(S): 50 CAPSULE, EXTENDED RELEASE ORAL at 10:15

## 2018-01-01 RX ADMIN — MORPHINE SULFATE 1 MILLIGRAM(S): 50 CAPSULE, EXTENDED RELEASE ORAL at 10:25

## 2018-01-01 RX ADMIN — DIPHENHYDRAMINE HYDROCHLORIDE AND LIDOCAINE HYDROCHLORIDE AND ALUMINUM HYDROXIDE AND MAGNESIUM HYDRO 5 MILLILITER(S): KIT at 06:49

## 2018-01-01 RX ADMIN — MORPHINE SULFATE 1 MILLIGRAM(S): 50 CAPSULE, EXTENDED RELEASE ORAL at 19:07

## 2018-01-01 RX ADMIN — DIPHENHYDRAMINE HYDROCHLORIDE AND LIDOCAINE HYDROCHLORIDE AND ALUMINUM HYDROXIDE AND MAGNESIUM HYDRO 5 MILLILITER(S): KIT at 06:05

## 2018-01-01 RX ADMIN — MORPHINE SULFATE 1 MILLIGRAM(S): 50 CAPSULE, EXTENDED RELEASE ORAL at 22:59

## 2018-01-01 RX ADMIN — Medication 25 MICROGRAM(S): at 06:48

## 2018-01-01 RX ADMIN — Medication 5 MILLILITER(S): at 22:27

## 2018-01-01 RX ADMIN — Medication 5 MILLILITER(S): at 06:48

## 2018-01-01 RX ADMIN — MORPHINE SULFATE 1 MILLIGRAM(S): 50 CAPSULE, EXTENDED RELEASE ORAL at 02:40

## 2018-01-01 RX ADMIN — BUDESONIDE AND FORMOTEROL FUMARATE DIHYDRATE 2 PUFF(S): 160; 4.5 AEROSOL RESPIRATORY (INHALATION) at 09:29

## 2018-01-01 RX ADMIN — MORPHINE SULFATE 1 MILLIGRAM(S): 50 CAPSULE, EXTENDED RELEASE ORAL at 18:28

## 2018-01-01 RX ADMIN — Medication 2.5 MILLIGRAM(S): at 06:15

## 2018-01-01 RX ADMIN — MORPHINE SULFATE 1 MILLIGRAM(S): 50 CAPSULE, EXTENDED RELEASE ORAL at 03:10

## 2018-01-01 RX ADMIN — MORPHINE SULFATE 1 MILLIGRAM(S): 50 CAPSULE, EXTENDED RELEASE ORAL at 19:22

## 2018-01-01 RX ADMIN — Medication 5 MILLILITER(S): at 06:40

## 2018-01-01 RX ADMIN — ENOXAPARIN SODIUM 40 MILLIGRAM(S): 100 INJECTION SUBCUTANEOUS at 14:24

## 2018-01-01 RX ADMIN — MORPHINE SULFATE 1.5 MILLIGRAM(S): 50 CAPSULE, EXTENDED RELEASE ORAL at 09:21

## 2018-01-01 RX ADMIN — ENOXAPARIN SODIUM 40 MILLIGRAM(S): 100 INJECTION SUBCUTANEOUS at 13:42

## 2018-01-01 RX ADMIN — DIPHENHYDRAMINE HYDROCHLORIDE AND LIDOCAINE HYDROCHLORIDE AND ALUMINUM HYDROXIDE AND MAGNESIUM HYDRO 5 MILLILITER(S): KIT at 22:27

## 2018-01-01 RX ADMIN — MORPHINE SULFATE 1 MILLIGRAM(S): 50 CAPSULE, EXTENDED RELEASE ORAL at 22:19

## 2018-01-01 RX ADMIN — Medication 5 MILLILITER(S): at 22:24

## 2018-01-01 RX ADMIN — MORPHINE SULFATE 1 MILLIGRAM(S): 50 CAPSULE, EXTENDED RELEASE ORAL at 02:25

## 2018-01-01 RX ADMIN — BUDESONIDE AND FORMOTEROL FUMARATE DIHYDRATE 2 PUFF(S): 160; 4.5 AEROSOL RESPIRATORY (INHALATION) at 09:43

## 2018-01-01 RX ADMIN — SODIUM CHLORIDE 60 MILLILITER(S): 9 INJECTION, SOLUTION INTRAVENOUS at 12:49

## 2018-01-01 RX ADMIN — DIPHENHYDRAMINE HYDROCHLORIDE AND LIDOCAINE HYDROCHLORIDE AND ALUMINUM HYDROXIDE AND MAGNESIUM HYDRO 5 MILLILITER(S): KIT at 06:22

## 2018-01-01 RX ADMIN — Medication 5 MILLILITER(S): at 15:23

## 2018-01-01 RX ADMIN — MORPHINE SULFATE 1 MILLIGRAM(S): 50 CAPSULE, EXTENDED RELEASE ORAL at 23:15

## 2018-01-01 RX ADMIN — DIPHENHYDRAMINE HYDROCHLORIDE AND LIDOCAINE HYDROCHLORIDE AND ALUMINUM HYDROXIDE AND MAGNESIUM HYDRO 5 MILLILITER(S): KIT at 13:41

## 2018-01-01 RX ADMIN — MORPHINE SULFATE 1 MILLIGRAM(S): 50 CAPSULE, EXTENDED RELEASE ORAL at 09:43

## 2018-01-01 RX ADMIN — MORPHINE SULFATE 1 MILLIGRAM(S): 50 CAPSULE, EXTENDED RELEASE ORAL at 02:15

## 2018-01-01 RX ADMIN — MORPHINE SULFATE 1 MILLIGRAM(S): 50 CAPSULE, EXTENDED RELEASE ORAL at 13:43

## 2018-01-01 RX ADMIN — MORPHINE SULFATE 1 MILLIGRAM(S): 50 CAPSULE, EXTENDED RELEASE ORAL at 06:16

## 2018-01-01 RX ADMIN — ENOXAPARIN SODIUM 40 MILLIGRAM(S): 100 INJECTION SUBCUTANEOUS at 11:06

## 2018-01-01 RX ADMIN — MORPHINE SULFATE 1 MILLIGRAM(S): 50 CAPSULE, EXTENDED RELEASE ORAL at 02:14

## 2018-01-01 RX ADMIN — DIPHENHYDRAMINE HYDROCHLORIDE AND LIDOCAINE HYDROCHLORIDE AND ALUMINUM HYDROXIDE AND MAGNESIUM HYDRO 5 MILLILITER(S): KIT at 06:40

## 2018-01-01 RX ADMIN — MORPHINE SULFATE 1 MILLIGRAM(S): 50 CAPSULE, EXTENDED RELEASE ORAL at 06:30

## 2018-01-01 RX ADMIN — Medication 1 APPLICATION(S): at 22:23

## 2018-01-01 RX ADMIN — MEROPENEM 100 MILLIGRAM(S): 1 INJECTION INTRAVENOUS at 17:48

## 2018-01-01 RX ADMIN — MEROPENEM 100 MILLIGRAM(S): 1 INJECTION INTRAVENOUS at 17:53

## 2018-01-01 RX ADMIN — DIPHENHYDRAMINE HYDROCHLORIDE AND LIDOCAINE HYDROCHLORIDE AND ALUMINUM HYDROXIDE AND MAGNESIUM HYDRO 5 MILLILITER(S): KIT at 22:59

## 2018-01-01 RX ADMIN — SODIUM CHLORIDE 60 MILLILITER(S): 9 INJECTION, SOLUTION INTRAVENOUS at 09:30

## 2018-01-01 RX ADMIN — MORPHINE SULFATE 1 MILLIGRAM(S): 50 CAPSULE, EXTENDED RELEASE ORAL at 17:48

## 2018-01-01 RX ADMIN — Medication 5 MILLILITER(S): at 13:41

## 2018-01-01 RX ADMIN — Medication 5 MILLILITER(S): at 13:44

## 2018-01-01 RX ADMIN — Medication 5 MILLILITER(S): at 11:42

## 2018-01-01 RX ADMIN — Medication 50 GRAM(S): at 13:42

## 2018-01-01 RX ADMIN — Medication 5 MILLILITER(S): at 06:05

## 2018-01-01 RX ADMIN — Medication 62.5 MILLIMOLE(S): at 14:59

## 2018-01-01 RX ADMIN — MEROPENEM 100 MILLIGRAM(S): 1 INJECTION INTRAVENOUS at 05:18

## 2018-01-01 RX ADMIN — DIPHENHYDRAMINE HYDROCHLORIDE AND LIDOCAINE HYDROCHLORIDE AND ALUMINUM HYDROXIDE AND MAGNESIUM HYDRO 5 MILLILITER(S): KIT at 21:17

## 2018-01-01 RX ADMIN — Medication 25 MICROGRAM(S): at 06:22

## 2018-01-01 RX ADMIN — SODIUM CHLORIDE 60 MILLILITER(S): 9 INJECTION, SOLUTION INTRAVENOUS at 01:14

## 2018-01-01 RX ADMIN — MORPHINE SULFATE 1 MILLIGRAM(S): 50 CAPSULE, EXTENDED RELEASE ORAL at 17:57

## 2018-01-01 RX ADMIN — MORPHINE SULFATE 1 MILLIGRAM(S): 50 CAPSULE, EXTENDED RELEASE ORAL at 14:55

## 2018-01-01 RX ADMIN — MORPHINE SULFATE 1 MILLIGRAM(S): 50 CAPSULE, EXTENDED RELEASE ORAL at 11:05

## 2018-01-01 RX ADMIN — Medication 1 DROP(S): at 12:48

## 2018-01-01 RX ADMIN — DIPHENHYDRAMINE HYDROCHLORIDE AND LIDOCAINE HYDROCHLORIDE AND ALUMINUM HYDROXIDE AND MAGNESIUM HYDRO 5 MILLILITER(S): KIT at 15:23

## 2018-01-01 RX ADMIN — DIPHENHYDRAMINE HYDROCHLORIDE AND LIDOCAINE HYDROCHLORIDE AND ALUMINUM HYDROXIDE AND MAGNESIUM HYDRO 5 MILLILITER(S): KIT at 05:17

## 2018-01-01 RX ADMIN — Medication 2.5 MILLIGRAM(S): at 17:48

## 2018-01-01 RX ADMIN — Medication 25 MICROGRAM(S): at 06:49

## 2018-01-01 RX ADMIN — Medication 1 DROP(S): at 13:43

## 2018-01-01 RX ADMIN — MORPHINE SULFATE 1 MILLIGRAM(S): 50 CAPSULE, EXTENDED RELEASE ORAL at 06:29

## 2018-01-01 RX ADMIN — DIPHENHYDRAMINE HYDROCHLORIDE AND LIDOCAINE HYDROCHLORIDE AND ALUMINUM HYDROXIDE AND MAGNESIUM HYDRO 5 MILLILITER(S): KIT at 13:44

## 2018-01-01 RX ADMIN — Medication 1 DROP(S): at 13:42

## 2018-01-01 RX ADMIN — MORPHINE SULFATE 1 MILLIGRAM(S): 50 CAPSULE, EXTENDED RELEASE ORAL at 06:55

## 2018-01-01 RX ADMIN — Medication 1 APPLICATION(S): at 09:28

## 2018-01-01 RX ADMIN — ENOXAPARIN SODIUM 40 MILLIGRAM(S): 100 INJECTION SUBCUTANEOUS at 12:48

## 2018-01-01 RX ADMIN — Medication 5 MILLILITER(S): at 22:59

## 2018-01-01 RX ADMIN — MORPHINE SULFATE 1 MILLIGRAM(S): 50 CAPSULE, EXTENDED RELEASE ORAL at 10:30

## 2018-01-01 RX ADMIN — MORPHINE SULFATE 1 MILLIGRAM(S): 50 CAPSULE, EXTENDED RELEASE ORAL at 22:31

## 2018-01-01 RX ADMIN — MORPHINE SULFATE 1 MILLIGRAM(S): 50 CAPSULE, EXTENDED RELEASE ORAL at 06:09

## 2018-01-01 RX ADMIN — BUDESONIDE AND FORMOTEROL FUMARATE DIHYDRATE 2 PUFF(S): 160; 4.5 AEROSOL RESPIRATORY (INHALATION) at 21:18

## 2018-01-01 RX ADMIN — MORPHINE SULFATE 1 MILLIGRAM(S): 50 CAPSULE, EXTENDED RELEASE ORAL at 11:02

## 2018-01-01 RX ADMIN — SODIUM CHLORIDE 60 MILLILITER(S): 9 INJECTION, SOLUTION INTRAVENOUS at 06:05

## 2018-01-01 RX ADMIN — MEROPENEM 100 MILLIGRAM(S): 1 INJECTION INTRAVENOUS at 18:39

## 2018-01-01 RX ADMIN — Medication 1 APPLICATION(S): at 09:31

## 2018-01-01 RX ADMIN — DIPHENHYDRAMINE HYDROCHLORIDE AND LIDOCAINE HYDROCHLORIDE AND ALUMINUM HYDROXIDE AND MAGNESIUM HYDRO 5 MILLILITER(S): KIT at 06:19

## 2018-01-01 RX ADMIN — MORPHINE SULFATE 1 MILLIGRAM(S): 50 CAPSULE, EXTENDED RELEASE ORAL at 23:16

## 2018-01-01 RX ADMIN — Medication 1 DROP(S): at 14:24

## 2018-01-01 RX ADMIN — Medication 1 APPLICATION(S): at 11:06

## 2018-01-01 RX ADMIN — LATANOPROST 1 DROP(S): 0.05 SOLUTION/ DROPS OPHTHALMIC; TOPICAL at 21:17

## 2018-01-01 RX ADMIN — Medication 1 DROP(S): at 12:07

## 2018-01-01 RX ADMIN — MORPHINE SULFATE 1 MILLIGRAM(S): 50 CAPSULE, EXTENDED RELEASE ORAL at 02:30

## 2018-01-01 RX ADMIN — LATANOPROST 1 DROP(S): 0.05 SOLUTION/ DROPS OPHTHALMIC; TOPICAL at 23:15

## 2018-01-01 RX ADMIN — MORPHINE SULFATE 1 MILLIGRAM(S): 50 CAPSULE, EXTENDED RELEASE ORAL at 22:53

## 2018-01-01 RX ADMIN — MEROPENEM 100 MILLIGRAM(S): 1 INJECTION INTRAVENOUS at 06:22

## 2018-01-01 RX ADMIN — MORPHINE SULFATE 1 MILLIGRAM(S): 50 CAPSULE, EXTENDED RELEASE ORAL at 06:45

## 2018-01-01 RX ADMIN — Medication 1 APPLICATION(S): at 21:43

## 2018-01-01 RX ADMIN — MORPHINE SULFATE 1 MILLIGRAM(S): 50 CAPSULE, EXTENDED RELEASE ORAL at 02:52

## 2018-01-01 RX ADMIN — Medication 1 APPLICATION(S): at 09:15

## 2018-01-01 RX ADMIN — Medication 25 MICROGRAM(S): at 08:25

## 2018-01-01 RX ADMIN — MEROPENEM 100 MILLIGRAM(S): 1 INJECTION INTRAVENOUS at 06:20

## 2018-01-01 RX ADMIN — MORPHINE SULFATE 1 MILLIGRAM(S): 50 CAPSULE, EXTENDED RELEASE ORAL at 14:39

## 2018-01-01 RX ADMIN — Medication 25 MICROGRAM(S): at 06:29

## 2018-01-01 RX ADMIN — Medication 100 MILLIEQUIVALENT(S): at 23:54

## 2018-01-01 RX ADMIN — MORPHINE SULFATE 1 MILLIGRAM(S): 50 CAPSULE, EXTENDED RELEASE ORAL at 22:45

## 2018-01-01 RX ADMIN — Medication 2.5 MILLIGRAM(S): at 06:23

## 2018-01-01 RX ADMIN — TIOTROPIUM BROMIDE 1 CAPSULE(S): 18 CAPSULE ORAL; RESPIRATORY (INHALATION) at 09:30

## 2018-01-01 RX ADMIN — MORPHINE SULFATE 1 MILLIGRAM(S): 50 CAPSULE, EXTENDED RELEASE ORAL at 18:43

## 2018-01-01 RX ADMIN — DIPHENHYDRAMINE HYDROCHLORIDE AND LIDOCAINE HYDROCHLORIDE AND ALUMINUM HYDROXIDE AND MAGNESIUM HYDRO 5 MILLILITER(S): KIT at 23:16

## 2018-01-01 RX ADMIN — Medication 1 APPLICATION(S): at 22:27

## 2018-01-01 RX ADMIN — Medication 25 MICROGRAM(S): at 06:17

## 2018-01-01 RX ADMIN — MORPHINE SULFATE 1 MILLIGRAM(S): 50 CAPSULE, EXTENDED RELEASE ORAL at 14:00

## 2018-01-01 RX ADMIN — Medication 5 MILLILITER(S): at 06:22

## 2018-01-01 RX ADMIN — MORPHINE SULFATE 1 MILLIGRAM(S): 50 CAPSULE, EXTENDED RELEASE ORAL at 14:24

## 2018-01-01 RX ADMIN — DIPHENHYDRAMINE HYDROCHLORIDE AND LIDOCAINE HYDROCHLORIDE AND ALUMINUM HYDROXIDE AND MAGNESIUM HYDRO 5 MILLILITER(S): KIT at 16:26

## 2018-01-01 RX ADMIN — Medication 1 APPLICATION(S): at 21:11

## 2018-01-01 RX ADMIN — DIPHENHYDRAMINE HYDROCHLORIDE AND LIDOCAINE HYDROCHLORIDE AND ALUMINUM HYDROXIDE AND MAGNESIUM HYDRO 5 MILLILITER(S): KIT at 22:46

## 2018-01-01 RX ADMIN — MORPHINE SULFATE 1 MILLIGRAM(S): 50 CAPSULE, EXTENDED RELEASE ORAL at 10:10

## 2018-01-01 RX ADMIN — Medication 25 MICROGRAM(S): at 06:05

## 2018-01-01 RX ADMIN — Medication 2.5 MILLIGRAM(S): at 00:15

## 2018-01-01 RX ADMIN — Medication 5 MILLILITER(S): at 21:17

## 2018-01-01 RX ADMIN — ENOXAPARIN SODIUM 40 MILLIGRAM(S): 100 INJECTION SUBCUTANEOUS at 13:43

## 2018-01-01 RX ADMIN — MORPHINE SULFATE 1 MILLIGRAM(S): 50 CAPSULE, EXTENDED RELEASE ORAL at 22:38

## 2018-01-01 RX ADMIN — Medication 5 MILLILITER(S): at 23:16

## 2018-01-01 RX ADMIN — MORPHINE SULFATE 1 MILLIGRAM(S): 50 CAPSULE, EXTENDED RELEASE ORAL at 15:23

## 2018-01-01 RX ADMIN — MORPHINE SULFATE 1 MILLIGRAM(S): 50 CAPSULE, EXTENDED RELEASE ORAL at 07:00

## 2018-01-01 RX ADMIN — Medication 1 APPLICATION(S): at 10:10

## 2018-01-01 RX ADMIN — DIPHENHYDRAMINE HYDROCHLORIDE AND LIDOCAINE HYDROCHLORIDE AND ALUMINUM HYDROXIDE AND MAGNESIUM HYDRO 5 MILLILITER(S): KIT at 11:42

## 2018-01-01 RX ADMIN — TIOTROPIUM BROMIDE 1 CAPSULE(S): 18 CAPSULE ORAL; RESPIRATORY (INHALATION) at 09:43

## 2018-01-01 RX ADMIN — MORPHINE SULFATE 1 MILLIGRAM(S): 50 CAPSULE, EXTENDED RELEASE ORAL at 18:54

## 2018-01-01 RX ADMIN — MORPHINE SULFATE 1 MILLIGRAM(S): 50 CAPSULE, EXTENDED RELEASE ORAL at 18:15

## 2018-01-01 RX ADMIN — DIPHENHYDRAMINE HYDROCHLORIDE AND LIDOCAINE HYDROCHLORIDE AND ALUMINUM HYDROXIDE AND MAGNESIUM HYDRO 5 MILLILITER(S): KIT at 14:55

## 2018-01-01 NOTE — PROGRESS NOTE ADULT - SUBJECTIVE AND OBJECTIVE BOX
Venancio Lamas MD  Cell: [phone]  Pager: [phone]      SUBJECTIVE / OVERNIGHT EVENTS:   No acute events overnight.  Afebrile.  Opens eyes to question and still nodding head yes/no but often not appropriately.        VITAL SIGNS:  Vital Signs Last 24 Hrs  T(C): 37 (01 Jan 2018 06:04), Max: 37 (01 Jan 2018 06:04)  T(F): 98.6 (01 Jan 2018 06:04), Max: 98.6 (01 Jan 2018 06:04)  HR: 84 (01 Jan 2018 06:11) (80 - 89)  BP: 134/70 (01 Jan 2018 06:11) (116/63 - 139/73)  BP(mean): --  RR: 18 (01 Jan 2018 06:11) (17 - 20)  SpO2: 97% (01 Jan 2018 06:11) (96% - 100%)      PHYSICAL EXAM:               GENERAL: cachectic, NAD, opens eyes when spoke to		  		EYES: fixed dilated R pupil  		MOUTH: mucous membranes dry  		CHEST/LUNG: tachypneic, Poor inspiratory effort  		HEART: Regular rate and rhythm; No murmurs, rubs, or gallops  		ABDOMEN: Soft, Nontender, Nondistended; Bowel sounds present  		GENITOURINARY: knutson in place draining yellow liquid with debris  		BACK: unstageable sacral decub ulcer worse since last admission  		PSYCH: Calm, nonverbal   	NEUROLOGY: R facial droop, general weakness, unable to asses thoroughly for focal deficits, patient not following commands, resting hand tremors                          8.0    9.76  )-----------( 323      ( 31 Dec 2017 05:25 )             24.9     12-31    133<L>  |  94<L>  |  3<L>  ----------------------------<  77  3.1<L>   |  30  |  0.27<L>    Ca    7.8<L>      31 Dec 2017 05:25  Phos  1.5     12-31  Mg     1.7     12-31    TPro  4.7<L>  /  Alb  1.9<L>  /  TBili  0.3  /  DBili  x   /  AST  21  /  ALT  11  /  AlkPhos  66  12-31      CAPILLARY BLOOD GLUCOSE    POCT Blood Glucose.: 103 mg/dL (01 Jan 2018 06:12)  POCT Blood Glucose.: 96 mg/dL (31 Dec 2017 23:48)  POCT Blood Glucose.: 93 mg/dL (31 Dec 2017 18:22)  POCT Blood Glucose.: 93 mg/dL (31 Dec 2017 12:39)      MEDICATIONS  (STANDING):  amiodarone    Tablet 400 milliGRAM(s) Oral daily  artificial  tears Solution 1 Drop(s) Right EYE daily  aspirin  chewable 81 milliGRAM(s) Oral daily  atorvastatin 20 milliGRAM(s) Oral at bedtime  Biotene Dry Mouth Oral Rinse 5 milliLiter(s) Swish and Spit three times a day  buDESOnide 160 MICROgram(s)/formoterol 4.5 MICROgram(s) Inhaler 2 Puff(s) Inhalation two times a day  Dakins Solution - 1/2 Strength 1 Application(s) Topical two times a day  dextrose 5%. 1000 milliLiter(s) (60 mL/Hr) IV Continuous <Continuous>  dextrose 50% Injectable 12.5 Gram(s) IV Push once  enoxaparin Injectable 40 milliGRAM(s) SubCutaneous daily  FIRST- Mouthwash  BLM 5 milliLiter(s) Swish and Spit three times a day  latanoprost 0.005% Ophthalmic Solution 1 Drop(s) Both EYES at bedtime  levothyroxine Injectable 25 MICROGram(s) IV Push daily  meropenem IVPB 1000 milliGRAM(s) IV Intermittent every 12 hours  meropenem IVPB      metoprolol    tartrate Injectable 2.5 milliGRAM(s) IV Push every 6 hours  mirtazapine 7.5 milliGRAM(s) Oral at bedtime  morphine  - Injectable 1 milliGRAM(s) IV Push every 4 hours  multivitamin 1 Tablet(s) Oral daily  tiotropium 18 MICROgram(s) Capsule 1 Capsule(s) Inhalation daily

## 2018-01-01 NOTE — PROGRESS NOTE ADULT - NSHPATTENDINGPLANDISCUSS_GEN_ALL_CORE
Team 4
Team 4, CM
Team 4 resident
Team 4 resident
Team 4, CM
Team 4, CM
Team 4 resident
Team 4, son, Gavin
Team 4
Team 4, patient's son, Gavin
Team 4, son at bedside: Gavin, also spoke to patient's other son at bedside in AM
Team 4

## 2018-01-01 NOTE — PROGRESS NOTE ADULT - ATTENDING COMMENTS
Agree with above assessment and plan as outlined above.    - No clinical CHF.  - Ethics F/U    Raymond Kinney MD, [facility]  [facility], [facility]  [address] [place] [address]  PHONE:  [phone]  BEEPER : [phone]

## 2018-01-01 NOTE — PROGRESS NOTE ADULT - SUBJECTIVE AND OBJECTIVE BOX
Subjective: non verbal lethargic able to arouse appears uncomfortable but in no distress                    MEDICATIONS  (STANDING):  amiodarone    Tablet 400 milliGRAM(s) Oral daily  artificial  tears Solution 1 Drop(s) Right EYE daily  aspirin  chewable 81 milliGRAM(s) Oral daily  atorvastatin 20 milliGRAM(s) Oral at bedtime  Biotene Dry Mouth Oral Rinse 5 milliLiter(s) Swish and Spit three times a day  buDESOnide 160 MICROgram(s)/formoterol 4.5 MICROgram(s) Inhaler 2 Puff(s) Inhalation two times a day  Dakins Solution - 1/2 Strength 1 Application(s) Topical two times a day  dextrose 5%. 1000 milliLiter(s) (60 mL/Hr) IV Continuous <Continuous>  dextrose 50% Injectable 12.5 Gram(s) IV Push once  enoxaparin Injectable 40 milliGRAM(s) SubCutaneous daily  FIRST- Mouthwash  BLM 5 milliLiter(s) Swish and Spit three times a day  latanoprost 0.005% Ophthalmic Solution 1 Drop(s) Both EYES at bedtime  levothyroxine Injectable 25 MICROGram(s) IV Push daily  meropenem IVPB 1000 milliGRAM(s) IV Intermittent every 12 hours  meropenem IVPB      metoprolol    tartrate Injectable 2.5 milliGRAM(s) IV Push every 6 hours  mirtazapine 7.5 milliGRAM(s) Oral at bedtime  morphine  - Injectable 1 milliGRAM(s) IV Push every 4 hours  multivitamin 1 Tablet(s) Oral daily  tiotropium 18 MICROgram(s) Capsule 1 Capsule(s) Inhalation daily    MEDICATIONS  (PRN):  acetaminophen  Suppository 650 milliGRAM(s) Rectal every 6 hours PRN For Temp greater than 38 C (100.4 F)  acetaminophen  Suppository. 650 milliGRAM(s) Rectal every 6 hours PRN Mild Pain (1 - 3)  dextrose Gel 1 Dose(s) Oral every 6 hours PRN Blood Glucose LESS THAN 70 milliGRAM(s)/deciliter  dextrose Gel 1 Dose(s) Oral every 6 hours PRN Blood Glucose LESS THAN 70 milliGRAM(s)/deciliter      LABS:                        8.0    9.76  )-----------( 323      ( 31 Dec 2017 05:25 )             24.9     Hemoglobin: 8.0 g/dL (12-31 @ 05:25)    12-31    133<L>  |  94<L>  |  3<L>  ----------------------------<  77  3.1<L>   |  30  |  0.27<L>    Ca    7.8<L>      31 Dec 2017 05:25  Phos  1.5     12-31  Mg     1.7     12-31    TPro  4.7<L>  /  Alb  1.9<L>  /  TBili  0.3  /  DBili  x   /  AST  21  /  ALT  11  /  AlkPhos  66  12-31    Creatinine Trend: 0.27<--, 0.34<--, 0.36<--, 0.49<--, 0.41<--, 0.44<--           PHYSICAL EXAM  Vital Signs Last 24 Hrs  T(C): 37 (01 Jan 2018 06:04), Max: 37 (01 Jan 2018 06:04)  T(F): 98.6 (01 Jan 2018 06:04), Max: 98.6 (01 Jan 2018 06:04)  HR: 87 (01 Jan 2018 09:32) (80 - 89)  BP: 119/68 (01 Jan 2018 09:32) (116/63 - 139/73)  BP(mean): --  RR: 16 (01 Jan 2018 09:32) (16 - 20)  SpO2: 98% (01 Jan 2018 09:32) (96% - 100%)        Cardiovascular: Normal S1 S2, RRR   No JVD,  Respiratory: decreased breath sounds w/ decreased inspiratory effort noted  B/L	  Gastrointestinal:  Soft, Non-tender, + BS	  Extremities no edema, cyanosis, clubbing B/L LE's     DIAGNOSTIC DATA:    < from: TTE with Doppler (w/Cont) (04.03.17 @ 16:18) >  CONCLUSIONS:  1. Mitral annular calcification, otherwise normal mitral  valve. Mild mitral regurgitation.  2. Moderately dilated left atrium.  LA volume index = 46  cc/m2.  3. Moderate left ventricular enlargement.  4. Severe global left ventricular systolic dysfunction.  Endocardial visualization enhanced with intravenous  injection of echo contrast (Definity).  5. The right ventricle is not well visualized; grossly  normal right ventricular systolic function.  A device wire  is noted in the right heart.  6. Estimated right ventricular systolic pressure equals 60  mm Hg, assuming right atrial pressure equals 10 mm Hg,  consistent with moderate pulmonary hypertension.  ------------------------------------------------------------------------  Confirmed on  4/3/2017 - 17:19:47 by Jean-Pierre Carver M.D.    < end of copied text >    < from: Cardiac Cath Lab - Adult (04.26.16 @ 13:12) >  VENTRICLES: No LV gram was performed; however, a recent echocardiogram  demonstrated an EF of 19 %.  CORONARY VESSELS: The coronary circulation is right dominant.  LM:   --  LM: Normal.  LAD:   --  Proximal LAD: There was a [id number] % stenosis. There was mild  restenosis in proximal LAD stent.  --  Mid LAD: Angiography showed minor luminal irregularities with no flow  limiting lesions.  --  Distal LAD: Angiography showed minor luminal irregularities with no  flow limiting lesions.  --  D1: Angiography showed minor luminal irregularities with no flow  limiting lesions.  CX:   --  Circumflex: Normal.  RI:   --  Ramus intermedius: Angiography showed minor luminal  irregularities with no flow limiting lesions.  RCA:   --  Proximal RCA: Normal.  --  Mid RCA: Angiography showed mild atherosclerosis with no flow limiting  lesions.  --  Distal RCA: Angiography showed minor luminal irregularities with no  flow limiting lesions.  --  RPDA: Angiography showed minor luminal irregularities with no flow  limiting lesions.  --  RPLS: Angiography showed minor luminal irregularities with no flow  limiting lesions.  COMPLICATIONS: There were no complications.  DIAGNOSTIC RECOMMENDATIONS: Medical management and aggressive risk factor  modification is recommended.  Prepared and signed by  Nehal López M.D.  Signed 04/27/2016 13:13:20    < end of copied text >    < from: Transthoracic Echocardiogram (12.01.17 @ 16:53) >  CONCLUSIONS:  1. Normal mitral valve. Minimal mitral regurgitation.  2. Mild left ventricular enlargement.  3. Severe global left ventricular systolic dysfunction.  4. The right ventricle is not well visualized. A device  wire is noted in the right heart.  ------------------------------------------------------------------------  Confirmed on  12/1/2017 - 18:24:53 by Carli Encarnacion M.D. RPVI    < end of copied text      ASSESSMENT/PLAN: 	77y Female with hypertension, dyslipidemia, CAD s/p LAD stent with only mild re-stenosis with minor luminal irregularities otherwise on cath 4/16, with known severe LV dysfunction , NICM s/p Medtronic BiV ICD, VT on Amio, COPD, lymphoma previously on chemo with known spinal mets and compression fractures recent admit with GNR bacteremia (pseudomonas), felt to be urinary source, and sacral decub wound culture +MRSA, s/p ABX, now admitted from SNF with fever and AMS/lethargy.     -continue with medical management of cad and cardiomyopathy with ASA/statin/BB  -not in clinical heart failure  -continue with asa/statin for history of pci  -no further inpatient cv workup needed at this time  - ethics consult noted 12/21  -f/u primary team re:GEETA - currently full code

## 2018-01-01 NOTE — PROGRESS NOTE ADULT - ASSESSMENT
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

## 2018-01-02 NOTE — PROGRESS NOTE ADULT - PROBLEM SELECTOR PLAN 9
- discussed stopping abx with son and discussed hospice  - several Face to face conversations held with son Gavin at bedside  - Updated on current clinical progress and that patient is not candidate for PEG. GI fellow also at bedside on Fri 12/22 and discussed with son that offered a trial of NGT Patient advocate called per son request. Ethics consult also placed and stated that a trial of NGT could be attempted but would not recommend discharging patient with NGT. Per son, would still like CPR and intubation if necessary knowing that this will likely prolong patient's suffering and pain.  - Will get labs Q48 hours as opposed to daily  - family meeting on 12/14, 12/18 and 12/21: full code  - PT recs: extended care facility  - pending chart from [facility] - Son currently refusing hospice.   - several Face to face conversations held with son Gavin at bedside  - Updated on current clinical progress and that patient is not candidate for PEG. GI fellow also at bedside on Fri 12/22 and discussed with son that offered a trial of NGT Patient advocate called per son request. Ethics consult also placed and stated that a trial of NGT could be attempted but would not recommend discharging patient with NGT. Per son, would still like CPR and intubation if necessary knowing that this will likely prolong patient's suffering and pain.  - Will get labs Q48 hours as opposed to daily  - family meeting on 12/14, 12/18 and 12/21: full code  - PT recs: extended care facility  - pending chart from [facility]

## 2018-01-02 NOTE — PROGRESS NOTE ADULT - SUBJECTIVE AND OBJECTIVE BOX
CC: F/U for infected sacral wound    Interval history/ROS:  Unable to obtain ROS - patient non-verbal    Allergies  No Known Drug Allergies  peanuts (Unknown)    ANTIMICROBIALS:   meropenem IVPB 1000 every 12 hours     MEDICATIONS  (STANDING):  amiodarone    Tablet 400 daily  aspirin  chewable 81 daily  atorvastatin 20 at bedtime  buDESOnide 160 MICROgram(s)/formoterol 4.5 MICROgram(s) Inhaler 2 two times a day  enoxaparin Injectable 40 daily  levothyroxine Injectable 25 daily  metoprolol    tartrate Injectable 2.5 every 6 hours  mirtazapine 7.5 at bedtime  morphine  - Injectable 1 every 4 hours  tiotropium 18 MICROgram(s) Capsule 1 daily    Vital Signs Last 24 Hrs  T(F): 97.8 (01-02-18 @ 04:57), Max: 99.1 (01-01-18 @ 21:09)  HR: 86 (01-02-18 @ 09:42)  BP: 116/70 (01-02-18 @ 09:42)  RR: 17 (01-02-18 @ 09:42)  SpO2: 100% (01-02-18 @ 09:42) (95% - 100%)  Wt(kg): --  SpO2: 98% (12-29-17 @ 17:37) (97% - 100%)    Gen: awake, nontoxic  HEENT:  grossly normal  CV: S1, S2, no murmurs  Resp: poor effort  Abd: Soft, nontender, +BS  :  knutson  Skin:  no rash  Msk: Stage 4 wound sacrum - as per nurse, no malodor but deep to bone    125  |  90  |  4   ----------------------------<  82  6.0   |  28  |  0.35  Ca    8.0      02 Jan 2018 07:46Phos  2.2     01-02Mg     1.8     01-02  TPro  5.2  /  Alb  2.0  /  TBili  0.5  /  DBili  x   /  AST  30  /  ALT  12  /  AlkPhos  81  01-02    MICROBIOLOGY:    BLOOD PERIPHERAL  12-21-17 no growth    OTHER  wound  12-20-17 --  --  E.COLI ESBL POSITIVE  Pseudomonas aeruginosa (wendy-S)  Enterococcus faecalis VRE (amp-S)    BLOOD VENOUS  12-13-17 NGTD    URINE CATHETER  12-13-17 NGTD    RADIOLOGY:  No new available

## 2018-01-02 NOTE — PROGRESS NOTE ADULT - PROBLEM SELECTOR PLAN 2
- likely toxic metabolic encephalopathy vs. vascular dementia; however pt also has R facial droop and R blown pupil  - pts was able to converse normally prior to last admission  - CT head: Opacification of the right tympanomastoid cavity which may be secondary to an effusion or inflammatory related soft tissue  - ENT consulted for further evaluation based on CT results: recommends steroids (will not be given due to risk of infection), taping eye closed if pt unable to keep closed and eye drops - CTIAC d/c'ed given it would be unlikely to change pts. outlook - likely toxic metabolic encephalopathy vs. vascular dementia; however pt also has R facial droop and R blown pupil  - She was reportedly able to converse normally prior to last admission  - CT head with no clear signs of CVA     #: Opacification of the right tympanomastoid cavity which may be secondary to an effusion or inflammatory related soft tissue seen on CT  - Reviewed ENT note; No need for steroids in palsy of this duration.  - CTIAC d/c'ed given it would be unlikely to change pts. outlook

## 2018-01-02 NOTE — PROGRESS NOTE ADULT - SUBJECTIVE AND OBJECTIVE BOX
Venancio Lamas MD  Cell: [phone]  Pager: [phone]      SUBJECTIVE / OVERNIGHT EVENTS:   No acute events overnight.  Afebrile.  Opens eyes to question and still nodding head yes/no but often not appropriately.        VITAL SIGNS:  Vital Signs Last 24 Hrs  T(C): 36.6 (02 Jan 2018 04:57), Max: 37.3 (01 Jan 2018 21:09)  T(F): 97.8 (02 Jan 2018 04:57), Max: 99.1 (01 Jan 2018 21:09)  HR: 94 (02 Jan 2018 04:57) (80 - 94)  BP: 135/73 (02 Jan 2018 04:57) (110/62 - 135/73)  BP(mean): --  RR: 19 (02 Jan 2018 04:57) (16 - 20)  SpO2: 99% (02 Jan 2018 04:57) (95% - 99%)      PHYSICAL EXAM:               GENERAL: cachectic, NAD, opens eyes when spoke to		  		EYES: fixed dilated R pupil  		MOUTH: mucous membranes dry  		CHEST/LUNG: tachypneic, Poor inspiratory effort  		HEART: Regular rate and rhythm; S3 present  		ABDOMEN: Soft, Nontender, Nondistended; Bowel sounds present  		GENITOURINARY: knutson in place draining yellow liquid with debris  		BACK: unstageable sacral decub ulcer worse since last admission  		PSYCH: Calm, nonverbal   	NEUROLOGY: R facial droop, general weakness, unable to asses thoroughly for focal deficits, patient not following commands, resting hand tremors      CAPILLARY BLOOD GLUCOSE  POCT Blood Glucose.: 95 mg/dL (02 Jan 2018 06:37)  POCT Blood Glucose.: 110 mg/dL (01 Jan 2018 23:38)  POCT Blood Glucose.: 95 mg/dL (01 Jan 2018 18:11)  POCT Blood Glucose.: 101 mg/dL (01 Jan 2018 11:42)      MEDICATIONS  (STANDING):  amiodarone    Tablet 400 milliGRAM(s) Oral daily  artificial  tears Solution 1 Drop(s) Right EYE daily  aspirin  chewable 81 milliGRAM(s) Oral daily  atorvastatin 20 milliGRAM(s) Oral at bedtime  Biotene Dry Mouth Oral Rinse 5 milliLiter(s) Swish and Spit three times a day  buDESOnide 160 MICROgram(s)/formoterol 4.5 MICROgram(s) Inhaler 2 Puff(s) Inhalation two times a day  Dakins Solution - 1/2 Strength 1 Application(s) Topical two times a day  dextrose 5%. 1000 milliLiter(s) (60 mL/Hr) IV Continuous <Continuous>  dextrose 50% Injectable 12.5 Gram(s) IV Push once  enoxaparin Injectable 40 milliGRAM(s) SubCutaneous daily  FIRST- Mouthwash  BLM 5 milliLiter(s) Swish and Spit three times a day  latanoprost 0.005% Ophthalmic Solution 1 Drop(s) Both EYES at bedtime  levothyroxine Injectable 25 MICROGram(s) IV Push daily  meropenem IVPB 1000 milliGRAM(s) IV Intermittent every 12 hours  meropenem IVPB      metoprolol    tartrate Injectable 2.5 milliGRAM(s) IV Push every 6 hours  mirtazapine 7.5 milliGRAM(s) Oral at bedtime  morphine  - Injectable 1 milliGRAM(s) IV Push every 4 hours  multivitamin 1 Tablet(s) Oral daily  tiotropium 18 MICROgram(s) Capsule 1 Capsule(s) Inhalation daily

## 2018-01-02 NOTE — PROGRESS NOTE ADULT - ATTENDING COMMENTS
Patient was seen and examined personally by me. I have discussed the plan and reviewed the resident's note and agree with the above physical exam findings including assessment and plan except as indicated below.

## 2018-01-02 NOTE — PROGRESS NOTE ADULT - PROBLEM SELECTOR PLAN 8
- family in agreement with pleasure feeds  - attempted to place NG 12/21 and 12/26 per son's request; however first time was not positioned appropriately and was removed; second time son decided against it given pts obvious discomfort - family in agreement with pleasure feeds  - As previous mentioned, son is against NGT at this time given discomfort.

## 2018-01-02 NOTE — PROGRESS NOTE ADULT - PROBLEM SELECTOR PLAN 4
- Speech and Swallow recommended NPO  - GI eval for PEG; recommend holding off on PEG  - attempted to place NG 12/21 and 12/26 per son's request; however first time was not positioned appropriately and was removed; second time son decided against it given pts obvious discomfort  - finger sticks Q6  - will continue re-ordering D5 if pt not eating - Speech and Swallow recommended NPO  - GI eval for PEG; recommend holding off on PEG  - attempted to place NG 12/21 and 12/26 per son's request; however first time was not positioned appropriately and was removed; second time son decided against it given pts obvious discomfort  - finger sticks Q6  - now on D5  LR since not eating - Speech and Swallow recommended NPO  - GI eval for PEG; recommend holding off on PEG, given patient's prognosis  - Attempted to place NG 12/21 and 12/26 per son's request; however first time was not positioned appropriately and was removed; second time son decided against it given pts obvious discomfort  - finger sticks Q6  - now on D5 LR since not eating

## 2018-01-02 NOTE — PROGRESS NOTE ADULT - SUBJECTIVE AND OBJECTIVE BOX
Subjective: non verbal, lethargic, but able to arouse                   MEDICATIONS  (STANDING):  amiodarone    Tablet 400 milliGRAM(s) Oral daily  artificial  tears Solution 1 Drop(s) Right EYE daily  aspirin  chewable 81 milliGRAM(s) Oral daily  atorvastatin 20 milliGRAM(s) Oral at bedtime  Biotene Dry Mouth Oral Rinse 5 milliLiter(s) Swish and Spit three times a day  buDESOnide 160 MICROgram(s)/formoterol 4.5 MICROgram(s) Inhaler 2 Puff(s) Inhalation two times a day  Dakins Solution - 1/2 Strength 1 Application(s) Topical two times a day  dextrose 5% + lactated ringers. 1000 milliLiter(s) (60 mL/Hr) IV Continuous <Continuous>  dextrose 50% Injectable 12.5 Gram(s) IV Push once  enoxaparin Injectable 40 milliGRAM(s) SubCutaneous daily  FIRST- Mouthwash  BLM 5 milliLiter(s) Swish and Spit three times a day  latanoprost 0.005% Ophthalmic Solution 1 Drop(s) Both EYES at bedtime  levothyroxine Injectable 25 MICROGram(s) IV Push daily  meropenem IVPB 1000 milliGRAM(s) IV Intermittent every 12 hours    metoprolol    tartrate Injectable 2.5 milliGRAM(s) IV Push every 6 hours  mirtazapine 7.5 milliGRAM(s) Oral at bedtime  morphine  - Injectable 1 milliGRAM(s) IV Push every 4 hours  multivitamin 1 Tablet(s) Oral daily  tiotropium 18 MICROgram(s) Capsule 1 Capsule(s) Inhalation daily    LABS:                        9.4    11.65 )-----------( 285      ( 02 Jan 2018 11:40 )             30.8      TPro  5.2<L>  /  Alb  2.0<L>  /  TBili  0.5  /  DBili  x   /  AST  30  /  ALT  12  /  AlkPhos  81  01-02       PHYSICAL EXAM  Vital Signs Last 24 Hrs  T(C): 36.2 (02 Jan 2018 12:04), Max: 37.3 (01 Jan 2018 21:09)  T(F): 97.1 (02 Jan 2018 12:04), Max: 99.1 (01 Jan 2018 21:09)  HR: 80 (02 Jan 2018 12:04) (80 - 94)  BP: 112/68 (02 Jan 2018 12:04) (112/68 - 135/73)  RR: 17 (02 Jan 2018 12:04) (17 - 20)  SpO2: 99% (02 Jan 2018 12:04) (95% - 100%)      Cardiovascular: Normal S1 S2, RRR   No JVD,  Respiratory: decreased breath sounds w/ decreased inspiratory effort noted  B/L	  Gastrointestinal:  Soft, Non-tender, + BS	  Extremities no edema, cyanosis, clubbing B/L LE's     DIAGNOSTIC DATA:    < from: TTE with Doppler (w/Cont) (04.03.17 @ 16:18) >  CONCLUSIONS:  1. Mitral annular calcification, otherwise normal mitral  valve. Mild mitral regurgitation.  2. Moderately dilated left atrium.  LA volume index = 46  cc/m2.  3. Moderate left ventricular enlargement.  4. Severe global left ventricular systolic dysfunction.  Endocardial visualization enhanced with intravenous  injection of echo contrast (Definity).  5. The right ventricle is not well visualized; grossly  normal right ventricular systolic function.  A device wire  is noted in the right heart.  6. Estimated right ventricular systolic pressure equals 60  mm Hg, assuming right atrial pressure equals 10 mm Hg,  consistent with moderate pulmonary hypertension.  ------------------------------------------------------------------------  Confirmed on  4/3/2017 - 17:19:47 by Jean-Pierre Carver M.D.    < end of copied text >    < from: Cardiac Cath Lab - Adult (04.26.16 @ 13:12) >  VENTRICLES: No LV gram was performed; however, a recent echocardiogram  demonstrated an EF of 19 %.  CORONARY VESSELS: The coronary circulation is right dominant.  LM:   --  LM: Normal.  LAD:   --  Proximal LAD: There was a [id number] % stenosis. There was mild  restenosis in proximal LAD stent.  --  Mid LAD: Angiography showed minor luminal irregularities with no flow  limiting lesions.  --  Distal LAD: Angiography showed minor luminal irregularities with no  flow limiting lesions.  --  D1: Angiography showed minor luminal irregularities with no flow  limiting lesions.  CX:   --  Circumflex: Normal.  RI:   --  Ramus intermedius: Angiography showed minor luminal  irregularities with no flow limiting lesions.  RCA:   --  Proximal RCA: Normal.  --  Mid RCA: Angiography showed mild atherosclerosis with no flow limiting  lesions.  --  Distal RCA: Angiography showed minor luminal irregularities with no  flow limiting lesions.  --  RPDA: Angiography showed minor luminal irregularities with no flow  limiting lesions.  --  RPLS: Angiography showed minor luminal irregularities with no flow  limiting lesions.  COMPLICATIONS: There were no complications.  DIAGNOSTIC RECOMMENDATIONS: Medical management and aggressive risk factor  modification is recommended.  Prepared and signed by  Nehal López M.D.  Signed 04/27/2016 13:13:20    < end of copied text >    < from: Transthoracic Echocardiogram (12.01.17 @ 16:53) >  CONCLUSIONS:  1. Normal mitral valve. Minimal mitral regurgitation.  2. Mild left ventricular enlargement.  3. Severe global left ventricular systolic dysfunction.  4. The right ventricle is not well visualized. A device  wire is noted in the right heart.  ------------------------------------------------------------------------  Confirmed on  12/1/2017 - 18:24:53 by Carli Encarnacion M.D. RPVI    < end of copied text      ASSESSMENT/PLAN: 	77y Female with hypertension, dyslipidemia, CAD s/p LAD stent with only mild re-stenosis with minor luminal irregularities otherwise on cath 4/16, with known severe LV dysfunction , NICM s/p Medtronic BiV ICD, VT on Amio, COPD, lymphoma previously on chemo with known spinal mets and compression fractures recent admit with GNR bacteremia (pseudomonas), felt to be urinary source, and sacral decub wound culture +MRSA, s/p ABX, now admitted from SNF with fever and AMS/lethargy.     -continue with medical management of cad and cardiomyopathy with ASA/statin/BB  -not in clinical heart failure  -continue with asa/statin for history of pci  -no further inpatient cv workup needed at this time  - ethics consult noted 12/21  -patient remains full code    Dolores Rodriguez PA-C  [facility]  [address], [address]   [place] [address]  office [phone]  pager [phone]

## 2018-01-02 NOTE — PROGRESS NOTE ADULT - PROBLEM SELECTOR PLAN 1
- was febrile 12/21; repeat cultures 12/21 NGTD and CXR clear - likely catheter associated UTI based on UA from 12/21 vs infected sacral DTI with acute vs chronic OM per ID  - decub ulcer worsened since last admission and previously tested positive for MRSA - bone probed during I&D and now visible on dressing changes  - wound care  performed debridement and culture 12/20 showed ESBL e coli, pseudomonas and vancomycin-resistant enterococcus faecalis  - ID recs appreciated: meropenem 1q8 day 12; ID to recommends 10-14 days  - blood cx negative, urine cx grew candida  - Xray images not ideal to r/o osteo (can consider CT vs. indium WBC scan); ESR elevated to 101 Per ID, likely acute on chronic OM [Xrays inconclusive, but given extend of ulcer - diagnosis likely]  Continue Meropenem 1g Q8 Day 12 of 14  Wound care debridement cultures growing ESBL E.Coli, pseudomonas and VRE  Blood cultures NGTD, Urine Cx [Candida] - asymptomatic candiduria - No tx

## 2018-01-02 NOTE — PROGRESS NOTE ADULT - ASSESSMENT
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

## 2018-01-02 NOTE — PROGRESS NOTE ADULT - ASSESSMENT
77F CAD/MI, s/p AICD, lymphoma previously on chemo, currently with known spinal mets and compression fractures, functional quadriplegia, nonverbal, recent admission for Pseudomonas bacteremia secondary to UTI, MRSA of sacral decubitus sent from NH 12/13 for altered mental status and fever.  Here, was noticed to have worsening sacral decubitus; stage 4 wound.  Sacral OM but likely acute on chronic OM considering time course.  Short course for polymicrobial soft tissue infection.  Underlying chronic OM unlikely to be cured with antibiotics alone.  Doubt she is a surgical candidate for debridement given underlying metastatic malignancy.  Long term goals of care to be discussed by primary team.      - continue meropenem 1g q 8 day #12 today of 14  - d/w primary cardiologist  - Wound care

## 2018-01-02 NOTE — PROGRESS NOTE ADULT - PROBLEM SELECTOR PLAN 3
- morphine 1 IV q4 + PRNs for sacral ulcer pain; to be given with dressing change  - will monitor for side effects of morphine and consider adjusting dose  - watch for constipation with morphine - morphine 1 IV q4 + PRNs for sacral ulcer pain; to be given with dressing change  - Monitor for constipation given limited mobility, opioid use and decreased PO intake

## 2018-01-02 NOTE — PROGRESS NOTE ADULT - ATTENDING COMMENTS
Patient seen and examined.  Agree with above.   -Continue with medical management of cad and cardiomyopathy  -no further cardiac workup needed at this time    Nheal López MD  [facility]  [address], [address]  [place] [address]  office: [phone]  pager: [phone]

## 2018-01-03 NOTE — PROGRESS NOTE ADULT - PROBLEM SELECTOR PLAN 2
Secondary to poor functional status, immobility, progression of disease.  Continue with Morphine for pain control.   Wound care.

## 2018-01-03 NOTE — PROGRESS NOTE ADULT - ATTENDING COMMENTS
Agree with above.   -No further cardiac workup needed at this time  -medical management of cad and cardiomyopathy recommended    Nehal López MD  [facility]  [address], [address]  [place] [address]  office: [phone]  pager: [phone]

## 2018-01-03 NOTE — CHART NOTE - NSCHARTNOTEFT_GEN_A_CORE
NUTRITION FOLLOW-UP:  Pt. unable to participate w RDN, given current cognitive status.  PO diet prescribed for pleasure feeds.  However, as documented, and per nursing, Pt. w persistently poor PO intake.  Thus, Pt. with inadequate nutrient intake.  Will not open mouth for food or medications.  NGT was attempted 12/21 and 12/26, however son opted against it.  Per GI, PEG not recommended.  Given Dx sepsis & that Pt. continues to receive IV ABx, as well presence of bowel sounds w +BMs (fecal incontinence noted), would not recommend TPN at this time.  Remains on continuous IV fluids w dextrose to help maintain glucose levels. No noted/reported nausea/vomiting/diarrhea/constipation at this time.  Pt. with questionable, and unlikely weight increase (inaccuracy in weight measurement(s) perhaps?).  At this time, given current medical condition, unable to recommend feasible, safe, or appropriate feeding modality.  Comfort measures suggested, as consistent with Pt/family wishes.  Of note, son refusing hospice.      Weight:  54kg (1/3/18)               48kg (12/23/17)    Edema: None currently noted.    Skin: Sacrum unstageable pressure ulcer w chronic OM.    Pertinent Medications: MEDICATIONS  (STANDING):  amiodarone    Tablet 400 milliGRAM(s) Oral daily  artificial  tears Solution 1 Drop(s) Right EYE daily  aspirin  chewable 81 milliGRAM(s) Oral daily  atorvastatin 20 milliGRAM(s) Oral at bedtime  Biotene Dry Mouth Oral Rinse 5 milliLiter(s) Swish and Spit three times a day  buDESOnide 160 MICROgram(s)/formoterol 4.5 MICROgram(s) Inhaler 2 Puff(s) Inhalation two times a day  Dakins Solution - 1/2 Strength 1 Application(s) Topical two times a day  dextrose 5% + lactated ringers. 1000 milliLiter(s) (60 mL/Hr) IV Continuous <Continuous>  dextrose 50% Injectable 12.5 Gram(s) IV Push once  enoxaparin Injectable 40 milliGRAM(s) SubCutaneous daily  FIRST- Mouthwash  BLM 5 milliLiter(s) Swish and Spit three times a day  latanoprost 0.005% Ophthalmic Solution 1 Drop(s) Both EYES at bedtime  levothyroxine Injectable 25 MICROGram(s) IV Push daily  meropenem IVPB 1000 milliGRAM(s) IV Intermittent every 12 hours  meropenem IVPB      metoprolol    tartrate Injectable 2.5 milliGRAM(s) IV Push every 6 hours  mirtazapine 7.5 milliGRAM(s) Oral at bedtime  morphine  - Injectable 1 milliGRAM(s) IV Push every 4 hours  multivitamin 1 Tablet(s) Oral daily  tiotropium 18 MICROgram(s) Capsule 1 Capsule(s) Inhalation daily    MEDICATIONS  (PRN):  acetaminophen  Suppository 650 milliGRAM(s) Rectal every 6 hours PRN For Temp greater than 38 C (100.4 F)  acetaminophen  Suppository. 650 milliGRAM(s) Rectal every 6 hours PRN Mild Pain (1 - 3)  dextrose Gel 1 Dose(s) Oral every 6 hours PRN Blood Glucose LESS THAN 70 milliGRAM(s)/deciliter  dextrose Gel 1 Dose(s) Oral every 6 hours PRN Blood Glucose LESS THAN 70 milliGRAM(s)/deciliter    Pertinent Labs:  01-03 Na131 mmol/L<L> Glu 86 mg/dL K+ 4.5 mmol/L Cr  0.33 mg/dL<L> BUN 4 mg/dL<L> Phos 1.9 mg/dL<L> Alb 2.1 g/dL<L> PAB n/a       CAPILLARY BLOOD GLUCOSE      POCT Blood Glucose.: 86 mg/dL (03 Jan 2018 06:10)  POCT Blood Glucose.: 110 mg/dL (02 Jan 2018 23:29)  POCT Blood Glucose.: 86 mg/dL (02 Jan 2018 18:22)  POCT Blood Glucose.: 106 mg/dL (02 Jan 2018 12:19)        Current Diet: Pureed w Prosource 30mL PO 1x daily + Ensure Enlive 8oz PO 3x daily       Nutrition Dx:  Pt. remains severely malnourished.      PLAN/RECOMMENDATIONS:    1) Continuation of PO diet deferred.    2) Unable to recommend feasible, safe, or appropriate feeding modality.  Comfort measures suggested, as or if consistent w Pt/family wishes.    3) Obtain weekly weights.  4) RDN remains available and will f/u PRN.          Uma Chu RDN, CDN pager [phone]

## 2018-01-03 NOTE — PROGRESS NOTE ADULT - PROBLEM SELECTOR PLAN 9
- Son currently refusing hospice.   - several Face to face conversations held with son Gavin at bedside  - Updated on current clinical progress and that patient is not candidate for PEG. GI fellow also at bedside on Fri 12/22 and discussed with son that offered a trial of NGT Patient advocate called per son request. Ethics consult also placed and stated that a trial of NGT could be attempted but would not recommend discharging patient with NGT. Per son, would still like CPR and intubation if necessary knowing that this will likely prolong patient's suffering and pain.  - Will get labs Q48 hours as opposed to daily  - family meeting on 12/14, 12/18 and 12/21: full code  - PT recs: extended care facility  - pending chart from [facility] - Son currently refusing hospice; had discussion with the son who is not proxy on 1/3 and discussed end of life care plans with him given that the pt is showing no signs of improvement.  He said he will discuss this with his brother.  - several Face to face conversations held with son Gavin at bedside  - Updated on current clinical progress and that patient is not candidate for PEG. GI fellow also at bedside on Fri 12/22 and discussed with son that offered a trial of NGT Patient advocate called per son request. Ethics consult also placed and stated that a trial of NGT could be attempted but would not recommend discharging patient with NGT. Per son, would still like CPR and intubation if necessary knowing that this will likely prolong patient's suffering and pain.  - Will get labs Q48 hours as opposed to daily  - family meeting on 12/14, 12/18 and 12/21: full code  - PT recs: extended care facility  - pending chart from [facility]

## 2018-01-03 NOTE — PROGRESS NOTE ADULT - SUBJECTIVE AND OBJECTIVE BOX
Subjective: non verbal, lethargic, but able to arouse                   MEDICATIONS  (STANDING):  amiodarone    Tablet 400 milliGRAM(s) Oral daily  artificial  tears Solution 1 Drop(s) Right EYE daily  aspirin  chewable 81 milliGRAM(s) Oral daily  atorvastatin 20 milliGRAM(s) Oral at bedtime  Biotene Dry Mouth Oral Rinse 5 milliLiter(s) Swish and Spit three times a day  buDESOnide 160 MICROgram(s)/formoterol 4.5 MICROgram(s) Inhaler 2 Puff(s) Inhalation two times a day  Dakins Solution - 1/2 Strength 1 Application(s) Topical two times a day  dextrose 5% + lactated ringers. 1000 milliLiter(s) (60 mL/Hr) IV Continuous <Continuous>  dextrose 50% Injectable 12.5 Gram(s) IV Push once  enoxaparin Injectable 40 milliGRAM(s) SubCutaneous daily  FIRST- Mouthwash  BLM 5 milliLiter(s) Swish and Spit three times a day  latanoprost 0.005% Ophthalmic Solution 1 Drop(s) Both EYES at bedtime  levothyroxine Injectable 25 MICROGram(s) IV Push daily  meropenem IVPB 1000 milliGRAM(s) IV Intermittent every 12 hours    metoprolol    tartrate Injectable 2.5 milliGRAM(s) IV Push every 6 hours  mirtazapine 7.5 milliGRAM(s) Oral at bedtime  morphine  - Injectable 1 milliGRAM(s) IV Push every 4 hours  multivitamin 1 Tablet(s) Oral daily  tiotropium 18 MICROgram(s) Capsule 1 Capsule(s) Inhalation daily      LABS:                        8.5    10.07 )-----------( 347      ( 03 Jan 2018 06:30 )             26.4     131<L>  |  94<L>  |  4<L>  ----------------------------<  86  4.5   |  29  |  0.33<L>    Ca    8.3<L>      03 Jan 2018 06:30  Phos  1.9     01-03  Mg     1.7     01-03    TPro  5.3<L>  /  Alb  2.1<L>  /  TBili  0.4  /  DBili  x   /  AST  23  /  ALT  7   /  AlkPhos  81  01-03    Creatinine Trend: 0.33<--, Insufficient quant<--, 0.35<--, 0.27<--, 0.34<--, 0.36<--     PHYSICAL EXAM  Vital Signs Last 24 Hrs  T(C): 35.8 (03 Jan 2018 12:41), Max: 36.9 (02 Jan 2018 21:26)  T(F): 96.4 (03 Jan 2018 12:41), Max: 98.5 (03 Jan 2018 05:10)  HR: 81 (03 Jan 2018 12:41) (81 - 95)  BP: 117/70 (03 Jan 2018 12:41) (108/71 - 143/74)  RR: 19 (03 Jan 2018 12:41) (17 - 19)  SpO2: 98% (03 Jan 2018 10:09) (98% - 100%)    Cardiovascular: Normal S1 S2, RRR   No JVD,  Respiratory: decreased breath sounds w/ decreased inspiratory effort noted  B/L	  Gastrointestinal:  Soft, Non-tender, + BS	  Extremities no edema, cyanosis, clubbing B/L LE's     DIAGNOSTIC DATA:    < from: TTE with Doppler (w/Cont) (04.03.17 @ 16:18) >  CONCLUSIONS:  1. Mitral annular calcification, otherwise normal mitral  valve. Mild mitral regurgitation.  2. Moderately dilated left atrium.  LA volume index = 46  cc/m2.  3. Moderate left ventricular enlargement.  4. Severe global left ventricular systolic dysfunction.  Endocardial visualization enhanced with intravenous  injection of echo contrast (Definity).  5. The right ventricle is not well visualized; grossly  normal right ventricular systolic function.  A device wire  is noted in the right heart.  6. Estimated right ventricular systolic pressure equals 60  mm Hg, assuming right atrial pressure equals 10 mm Hg,  consistent with moderate pulmonary hypertension.  ------------------------------------------------------------------------  Confirmed on  4/3/2017 - 17:19:47 by Jean-Pierre Carver M.D.    < end of copied text >    < from: Cardiac Cath Lab - Adult (04.26.16 @ 13:12) >  VENTRICLES: No LV gram was performed; however, a recent echocardiogram  demonstrated an EF of 19 %.  CORONARY VESSELS: The coronary circulation is right dominant.  LM:   --  LM: Normal.  LAD:   --  Proximal LAD: There was a [id number] % stenosis. There was mild  restenosis in proximal LAD stent.  --  Mid LAD: Angiography showed minor luminal irregularities with no flow  limiting lesions.  --  Distal LAD: Angiography showed minor luminal irregularities with no  flow limiting lesions.  --  D1: Angiography showed minor luminal irregularities with no flow  limiting lesions.  CX:   --  Circumflex: Normal.  RI:   --  Ramus intermedius: Angiography showed minor luminal  irregularities with no flow limiting lesions.  RCA:   --  Proximal RCA: Normal.  --  Mid RCA: Angiography showed mild atherosclerosis with no flow limiting  lesions.  --  Distal RCA: Angiography showed minor luminal irregularities with no  flow limiting lesions.  --  RPDA: Angiography showed minor luminal irregularities with no flow  limiting lesions.  --  RPLS: Angiography showed minor luminal irregularities with no flow  limiting lesions.  COMPLICATIONS: There were no complications.  DIAGNOSTIC RECOMMENDATIONS: Medical management and aggressive risk factor  modification is recommended.  Prepared and signed by  Nehal López M.D.  Signed 04/27/2016 13:13:20    < end of copied text >    < from: Transthoracic Echocardiogram (12.01.17 @ 16:53) >  CONCLUSIONS:  1. Normal mitral valve. Minimal mitral regurgitation.  2. Mild left ventricular enlargement.  3. Severe global left ventricular systolic dysfunction.  4. The right ventricle is not well visualized. A device  wire is noted in the right heart.  ------------------------------------------------------------------------  Confirmed on  12/1/2017 - 18:24:53 by Carli Encarnacion M.D. RPVI    < end of copied text      ASSESSMENT/PLAN: 	77y Female with hypertension, dyslipidemia, CAD s/p LAD stent with only mild re-stenosis with minor luminal irregularities otherwise on cath 4/16, with known severe LV dysfunction , NICM s/p [employer]onic BiV ICD, VT on Amio, COPD, lymphoma previously on chemo with known spinal mets and compression fractures recent admit with GNR bacteremia (pseudomonas), felt to be urinary source, and sacral decub wound culture +MRSA, s/p ABX, now admitted from SNF with fever and AMS/lethargy.     -continue with medical management of cad and cardiomyopathy with ASA/statin/BB  -not in clinical heart failure  -continue with asa/statin for history of pci  -no further inpatient cv workup needed at this time  -ethics consult noted 12/21  -patient remains full code    Dolores Rodriguez PA-C  [facility]  [address], [address]   [place] [address]  office [phone]  pager [phone]

## 2018-01-03 NOTE — PROGRESS NOTE ADULT - PROBLEM SELECTOR PLAN 3
- morphine 1 IV q4 + PRNs for sacral ulcer pain; to be given with dressing change  - Monitor for constipation given limited mobility, opioid use and decreased PO intake - morphine 1 IV q4 + PRNs for sacral ulcer pain; to be given with dressing change

## 2018-01-03 NOTE — PROGRESS NOTE ADULT - ASSESSMENT
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

## 2018-01-03 NOTE — PROGRESS NOTE ADULT - SUBJECTIVE AND OBJECTIVE BOX
INTERVAL HPI/OVERNIGHT EVENTS:  Remain on IV Morphine for pain.  Comfortable at this point.     Allergies    No Known Drug Allergies  peanuts (Unknown)    Intolerances    penicillin (Nausea)      ADVANCE DIRECTIVES:    DNR: [ ] YES [x ] NO           PRESENT SYMPTOMS:   SOURCE:  [ ] Patient   [ ] Family   [ ] Team     Pain:     Dyspnea:  [ ] YES [ ] NO  Anxiety:  [ ] YES [ ] NO  Fatigue: [ ] YES [ ] NO  Nausea: [ ] YES [ ] NO  Loss of Appetite: [ ] YES [ ] NO  Constipation [ ] YES   [ ] No     OTHER SYMPTOMS:  [ ] All other ROS negative     [ ] Unable to obtain due to poor mentation    MEDICATIONS  (STANDING):  amiodarone    Tablet 400 milliGRAM(s) Oral daily  artificial  tears Solution 1 Drop(s) Right EYE daily  aspirin  chewable 81 milliGRAM(s) Oral daily  atorvastatin 20 milliGRAM(s) Oral at bedtime  Biotene Dry Mouth Oral Rinse 5 milliLiter(s) Swish and Spit three times a day  buDESOnide 160 MICROgram(s)/formoterol 4.5 MICROgram(s) Inhaler 2 Puff(s) Inhalation two times a day  Dakins Solution - 1/2 Strength 1 Application(s) Topical two times a day  dextrose 5% + lactated ringers. 1000 milliLiter(s) (60 mL/Hr) IV Continuous <Continuous>  dextrose 50% Injectable 12.5 Gram(s) IV Push once  enoxaparin Injectable 40 milliGRAM(s) SubCutaneous daily  FIRST- Mouthwash  BLM 5 milliLiter(s) Swish and Spit three times a day  latanoprost 0.005% Ophthalmic Solution 1 Drop(s) Both EYES at bedtime  levothyroxine Injectable 25 MICROGram(s) IV Push daily  meropenem IVPB 1000 milliGRAM(s) IV Intermittent every 12 hours  meropenem IVPB      metoprolol    tartrate Injectable 2.5 milliGRAM(s) IV Push every 6 hours  mirtazapine 7.5 milliGRAM(s) Oral at bedtime  morphine  - Injectable 1 milliGRAM(s) IV Push every 4 hours  multivitamin 1 Tablet(s) Oral daily  tiotropium 18 MICROgram(s) Capsule 1 Capsule(s) Inhalation daily    MEDICATIONS  (PRN):  acetaminophen  Suppository 650 milliGRAM(s) Rectal every 6 hours PRN For Temp greater than 38 C (100.4 F)  acetaminophen  Suppository. 650 milliGRAM(s) Rectal every 6 hours PRN Mild Pain (1 - 3)  dextrose Gel 1 Dose(s) Oral every 6 hours PRN Blood Glucose LESS THAN 70 milliGRAM(s)/deciliter  dextrose Gel 1 Dose(s) Oral every 6 hours PRN Blood Glucose LESS THAN 70 milliGRAM(s)/deciliter      Karnofsky Performance Score/Palliative Performance Status Version 2:         %  Protein Calorie Malnutrition:  [ ] Mild   [ ] Moderate   [ ] Severe     Physical Exam:    General: [ ] Alert,  A&O x     [ ] lethargic   [ ] Agitated   [ ] Cachexia   HEENT: [ ] Normal   [ ] Dry mouth   [ ] ET Tube    [ ] Trach   Lungs: [ ] Clear [ ] Rhonchi  [ ] Crackles [ ] Wheezing [ ] Tachypnea  [ ] Audible excessive secretions   Cardiovascular:  [ ] Regular rate and rhythm  [ ] Irregular [ ] Tachycardia   [ ] Bradycardia   Abdomen: [ ] Soft  [ ] Distended  [ ]  [ ] +BS  [ ] Non tender [ ] Tender  [ ]PEG   [ ] NGT   Last BM:     Genitourinary:  [ ] Normal [ ] Incontinent   [ ] Oliguria/Anuria   [ ] Zee  Musculoskeletal:  [ ] Normal   [ ] Generalized weakness  [ ] Bedbound   Neurological: [ ] No focal deficits  [ ] Cognitive impairment     Skin: [ ] Normal   [ ] Pressure ulcers     Vital Signs Last 24 Hrs  T(C): 36.4 (04 Jan 2018 06:40), Max: 36.6 (03 Jan 2018 22:25)  T(F): 97.5 (04 Jan 2018 06:40), Max: 97.8 (03 Jan 2018 22:25)  HR: 89 (04 Jan 2018 06:40) (81 - 89)  BP: 118/66 (04 Jan 2018 06:40) (111/70 - 125/82)  BP(mean): --  RR: 18 (04 Jan 2018 06:40) (17 - 20)  SpO2: 100% (04 Jan 2018 06:40) (98% - 100%)    LABS:                        8.5    10.07 )-----------( 347      ( 03 Jan 2018 06:30 )             26.4     01-03    131<L>  |  94<L>  |  4<L>  ----------------------------<  86  4.5   |  29  |  0.33<L>    Ca    8.3<L>      03 Jan 2018 06:30  Phos  1.9     01-03  Mg     1.7     01-03    TPro  5.3<L>  /  Alb  2.1<L>  /  TBili  0.4  /  DBili  x   /  AST  23  /  ALT  7   /  AlkPhos  81  01-03        I&O's Summary    03 Jan 2018 07:01  -  04 Jan 2018 07:00  --------------------------------------------------------  IN: 0 mL / OUT: 700 mL / NET: -700 mL        RADIOLOGY & ADDITIONAL STUDIES: INTERVAL HPI/OVERNIGHT EVENTS:  Remain on IV Morphine for pain.  Comfortable at this point.     Allergies    No Known Drug Allergies  peanuts (Unknown)    Intolerances    penicillin (Nausea)      ADVANCE DIRECTIVES:    DNR: [ ] YES [x ] NO           PRESENT SYMPTOMS:   SOURCE:  [ ] Patient   [ ] Family   [ x] Team     Pain:     Dyspnea:  [ ] YES [x ] NO  Anxiety:  [ ] YES [ ] NO  Fatigue: [ ] YES [ ] NO  Nausea: [ ] YES [ ] NO  Loss of Appetite: [ ] YES [ ] NO  Constipation [ ] YES   [ ] No     OTHER SYMPTOMS:  [ ] All other ROS negative     [x ] Unable to obtain due to poor mentation    MEDICATIONS  (STANDING):  amiodarone    Tablet 400 milliGRAM(s) Oral daily  artificial  tears Solution 1 Drop(s) Right EYE daily  aspirin  chewable 81 milliGRAM(s) Oral daily  atorvastatin 20 milliGRAM(s) Oral at bedtime  Biotene Dry Mouth Oral Rinse 5 milliLiter(s) Swish and Spit three times a day  buDESOnide 160 MICROgram(s)/formoterol 4.5 MICROgram(s) Inhaler 2 Puff(s) Inhalation two times a day  Dakins Solution - 1/2 Strength 1 Application(s) Topical two times a day  dextrose 5% + lactated ringers. 1000 milliLiter(s) (60 mL/Hr) IV Continuous <Continuous>  dextrose 50% Injectable 12.5 Gram(s) IV Push once  enoxaparin Injectable 40 milliGRAM(s) SubCutaneous daily  FIRST- Mouthwash  BLM 5 milliLiter(s) Swish and Spit three times a day  latanoprost 0.005% Ophthalmic Solution 1 Drop(s) Both EYES at bedtime  levothyroxine Injectable 25 MICROGram(s) IV Push daily  meropenem IVPB 1000 milliGRAM(s) IV Intermittent every 12 hours  meropenem IVPB      metoprolol    tartrate Injectable 2.5 milliGRAM(s) IV Push every 6 hours  mirtazapine 7.5 milliGRAM(s) Oral at bedtime  morphine  - Injectable 1 milliGRAM(s) IV Push every 4 hours  multivitamin 1 Tablet(s) Oral daily  tiotropium 18 MICROgram(s) Capsule 1 Capsule(s) Inhalation daily    MEDICATIONS  (PRN):  acetaminophen  Suppository 650 milliGRAM(s) Rectal every 6 hours PRN For Temp greater than 38 C (100.4 F)  acetaminophen  Suppository. 650 milliGRAM(s) Rectal every 6 hours PRN Mild Pain (1 - 3)  dextrose Gel 1 Dose(s) Oral every 6 hours PRN Blood Glucose LESS THAN 70 milliGRAM(s)/deciliter  dextrose Gel 1 Dose(s) Oral every 6 hours PRN Blood Glucose LESS THAN 70 milliGRAM(s)/deciliter      Karnofsky Performance Score/Palliative Performance Status Version 2:    30     %  Protein Calorie Malnutrition:  [ ] Mild   [ ] Moderate   [x ] Severe     Physical Exam:    General: [ ] Alert,  A&O x     [x ] lethargic   [ ] Agitated   [ x] Cachexia   HEENT: [ ] Normal   [ x] Dry mouth   [ ] ET Tube    [ ] Trach   Lungs: [x ] Clear [ ] Rhonchi  [ ] Crackles [ ] Wheezing [ ] Tachypnea  [ ] Audible excessive secretions   Cardiovascular:  [x ] Regular rate and rhythm  [ ] Irregular [ ] Tachycardia   [ ] Bradycardia   Abdomen: [ x] Soft  [ ] Distended  [ ]  [x ] +BS  [x ] Non tender [ ] Tender  [ ]PEG   [ ] NGT   Last BM:     Genitourinary:  [ ] Normal [ ] Incontinent   [ ] Oliguria/Anuria   [x ] Zee  Musculoskeletal:  [ ] Normal   [ ] Generalized weakness  [x ] Bedbound   Neurological: [ ] No focal deficits  [ x] Cognitive impairment     Skin: [ ] Normal   [x ] Pressure ulcers     Vital Signs Last 24 Hrs  T(C): 36.4 (04 Jan 2018 06:40), Max: 36.6 (03 Jan 2018 22:25)  T(F): 97.5 (04 Jan 2018 06:40), Max: 97.8 (03 Jan 2018 22:25)  HR: 89 (04 Jan 2018 06:40) (81 - 89)  BP: 118/66 (04 Jan 2018 06:40) (111/70 - 125/82)  BP(mean): --  RR: 18 (04 Jan 2018 06:40) (17 - 20)  SpO2: 100% (04 Jan 2018 06:40) (98% - 100%)    LABS:                        8.5    10.07 )-----------( 347      ( 03 Jan 2018 06:30 )             26.4     01-03    131<L>  |  94<L>  |  4<L>  ----------------------------<  86  4.5   |  29  |  0.33<L>    Ca    8.3<L>      03 Jan 2018 06:30  Phos  1.9     01-03  Mg     1.7     01-03    TPro  5.3<L>  /  Alb  2.1<L>  /  TBili  0.4  /  DBili  x   /  AST  23  /  ALT  7   /  AlkPhos  81  01-03        I&O's Summary    03 Jan 2018 07:01  -  04 Jan 2018 07:00  --------------------------------------------------------  IN: 0 mL / OUT: 700 mL / NET: -700 mL        RADIOLOGY & ADDITIONAL STUDIES:

## 2018-01-03 NOTE — PROGRESS NOTE ADULT - PROBLEM SELECTOR PLAN 4
- Speech and Swallow recommended NPO; pleasure feeds are being attempted however pt pockets food and does not swallow it  - GI eval for PEG; recommend holding off on PEG, given patient's prognosis  - Attempted to place NG 12/21 and 12/26 per son's request; however first time was not positioned appropriately and was removed; second time son decided against it given pts obvious discomfort  - finger sticks Q6  - now on D5 LR since not eating - Speech and Swallow recommended NPO; pleasure feeds are being attempted however pt pockets food and does not swallow it  - GI eval for PEG; recommend holding off on PEG given risk outweighs benefits  - Attempted to place NG 12/21 and 12/26 per son's request; however first time was not positioned appropriately and was removed; second time son decided against it given pts obvious discomfort  - finger sticks Q6  - now on D5 LR since not eating

## 2018-01-03 NOTE — PROGRESS NOTE ADULT - PROBLEM SELECTOR PLAN 8
- family in agreement with pleasure feeds; however pt not swallowing  - As previous mentioned, son is against NGT at this time given discomfort.

## 2018-01-03 NOTE — PROGRESS NOTE ADULT - SUBJECTIVE AND OBJECTIVE BOX
CC: F/U for infected sacral wound    Interval history/ROS:  Patient non-verbal and keeps eyes closed.  Unable to obtain ROS    Allergies  No Known Drug Allergies  peanuts (Unknown)    ANTIMICROBIALS:   meropenem IVPB 1000 every 12 hours     MEDICATIONS  (STANDING):  amiodarone    Tablet 400 daily  aspirin  chewable 81 daily  atorvastatin 20 at bedtime  buDESOnide 160 MICROgram(s)/formoterol 4.5 MICROgram(s) Inhaler 2 two times a day  enoxaparin Injectable 40 daily  levothyroxine Injectable 25 daily  metoprolol    tartrate Injectable 2.5 every 6 hours  mirtazapine 7.5 at bedtime  morphine  - Injectable 1 every 4 hours  tiotropium 18 MICROgram(s) Capsule 1 daily    Vital Signs Last 24 Hrs  T(F): 98.5 (01-03-18 @ 05:10), Max: 98.5 (01-03-18 @ 05:10)  HR: 88 (01-03-18 @ 10:09)  BP: 108/71 (01-03-18 @ 10:09)  RR: 18 (01-03-18 @ 10:09)  SpO2: 98% (01-03-18 @ 10:09) (98% - 100%)  Wt(kg): --    Gen: awake, nontoxic  Head/Eyes:  keeps eyes closed  Neck: supple  CV: S1, S2, no murmurs  Resp: poor effort  Abd: Soft, nontender, +BS  :  knutson; slightly cloudy urine  Skin:  no rash  Msk: Stage 4 wound sacrum - as per nurse, no malodor but deep to bone                        8.5    10.07 )-----------( 347      ( 03 Jan 2018 06:30 )             26.4 01-03    131  |  94  |  4   ----------------------------<  86  4.5   |  29  |  0.33  Ca    8.3      03 Jan 2018 06:30Phos  1.9     01-03Mg     1.7     01-03  TPro  5.3  /  Alb  2.1  /  TBili  0.4  /  DBili  x   /  AST  23  /  ALT  7   /  AlkPhos  81  01-03    MICROBIOLOGY:  BLOOD PERIPHERAL  12-21-17 no growth    OTHER  wound  12-20-17 --  --  E.COLI ESBL POSITIVE  Pseudomonas aeruginosa (wendy-S)  Enterococcus faecalis VRE (amp-S)    BLOOD VENOUS  12-13-17 NGTD    URINE CATHETER  12-13-17 NGTD    RADIOLOGY:  Xray Chest 1 View AP- PORTABLE-Urgent (12.21.17 @ 17:35) >  FINDINGS: There is no NG tube in this radiograph. An AICD device overlies left hemithorax.  Cardiac size is enlarged.  Portions of the bilateral upper lungs are excluded from this radiograph.  No pleural effusions or pneumothorax.  No acute osseous abnormality.

## 2018-01-03 NOTE — PROGRESS NOTE ADULT - PROBLEM SELECTOR PLAN 4
Patient remains full code after numerous discussions with son.  Given extremely poor prognosis, lack of any further treatment options, would strongly recommend [place] for wound care and end of life care.

## 2018-01-03 NOTE — PROGRESS NOTE ADULT - SUBJECTIVE AND OBJECTIVE BOX
Venancio Lamas MD  Cell: [phone]  Pager: [phone]    SUBJECTIVE / OVERNIGHT EVENTS:   No acute events overnight.  Afebrile.  Opens eyes to question and still nodding head yes/no but often not appropriately.      VITAL SIGNS:  Vital Signs Last 24 Hrs  T(C): 36.9 (03 Jan 2018 05:10), Max: 36.9 (02 Jan 2018 21:26)  T(F): 98.5 (03 Jan 2018 05:10), Max: 98.5 (03 Jan 2018 05:10)  HR: 85 (03 Jan 2018 06:25) (80 - 95)  BP: 117/74 (03 Jan 2018 06:25) (112/68 - 143/74)  BP(mean): --  RR: 18 (03 Jan 2018 06:25) (17 - 19)  SpO2: 100% (03 Jan 2018 06:25) (98% - 100%)      PHYSICAL EXAM:               GENERAL: cachectic, NAD, opens eyes when spoke to		  		EYES: fixed dilated R pupil  		MOUTH: mucous membranes dry  		CHEST/LUNG: tachypneic, Poor inspiratory effort  		HEART: Regular rate and rhythm; S3 present  		ABDOMEN: Soft, Nontender, Nondistended; Bowel sounds present  		GENITOURINARY: knutson in place draining yellow liquid with debris  		BACK: unstageable sacral decub ulcer worse since last admission  		PSYCH: Calm, nonverbal   	NEUROLOGY: R facial droop, general weakness, unable to asses thoroughly for focal deficits, patient not following commands, resting hand tremors                          8.5    10.07 )-----------( 347      ( 03 Jan 2018 06:30 )             26.4     01-03    131<L>  |  94<L>  |  4<L>  ----------------------------<  86  4.5   |  29  |  0.33<L>    Ca    8.3<L>      03 Jan 2018 06:30  Phos  1.9     01-03  Mg     1.7     01-03    TPro  5.3<L>  /  Alb  2.1<L>  /  TBili  0.4  /  DBili  x   /  AST  23  /  ALT  7   /  AlkPhos  81  01-03      CAPILLARY BLOOD GLUCOSE    POCT Blood Glucose.: 86 mg/dL (03 Jan 2018 06:10)  POCT Blood Glucose.: 110 mg/dL (02 Jan 2018 23:29)  POCT Blood Glucose.: 86 mg/dL (02 Jan 2018 18:22)  POCT Blood Glucose.: 106 mg/dL (02 Jan 2018 12:19)      MEDICATIONS  (STANDING):  amiodarone    Tablet 400 milliGRAM(s) Oral daily  artificial  tears Solution 1 Drop(s) Right EYE daily  aspirin  chewable 81 milliGRAM(s) Oral daily  atorvastatin 20 milliGRAM(s) Oral at bedtime  Biotene Dry Mouth Oral Rinse 5 milliLiter(s) Swish and Spit three times a day  buDESOnide 160 MICROgram(s)/formoterol 4.5 MICROgram(s) Inhaler 2 Puff(s) Inhalation two times a day  Dakins Solution - 1/2 Strength 1 Application(s) Topical two times a day  dextrose 5% + lactated ringers. 1000 milliLiter(s) (60 mL/Hr) IV Continuous <Continuous>  dextrose 50% Injectable 12.5 Gram(s) IV Push once  enoxaparin Injectable 40 milliGRAM(s) SubCutaneous daily  FIRST- Mouthwash  BLM 5 milliLiter(s) Swish and Spit three times a day  latanoprost 0.005% Ophthalmic Solution 1 Drop(s) Both EYES at bedtime  levothyroxine Injectable 25 MICROGram(s) IV Push daily  meropenem IVPB 1000 milliGRAM(s) IV Intermittent every 12 hours  meropenem IVPB      metoprolol    tartrate Injectable 2.5 milliGRAM(s) IV Push every 6 hours  mirtazapine 7.5 milliGRAM(s) Oral at bedtime  morphine  - Injectable 1 milliGRAM(s) IV Push every 4 hours  multivitamin 1 Tablet(s) Oral daily  tiotropium 18 MICROgram(s) Capsule 1 Capsule(s) Inhalation daily Venancio Lamas MD  Cell: [phone]  Pager: [phone]    SUBJECTIVE / OVERNIGHT EVENTS:   No acute events overnight.  Afebrile.  Opens eyes to question and still nodding head yes/no but often not appropriately.      ROS: Unable to obtain due to mental status    VITAL SIGNS:  Vital Signs Last 24 Hrs  T(C): 36.9 (03 Jan 2018 05:10), Max: 36.9 (02 Jan 2018 21:26)  T(F): 98.5 (03 Jan 2018 05:10), Max: 98.5 (03 Jan 2018 05:10)  HR: 85 (03 Jan 2018 06:25) (80 - 95)  BP: 117/74 (03 Jan 2018 06:25) (112/68 - 143/74)  BP(mean): --  RR: 18 (03 Jan 2018 06:25) (17 - 19)  SpO2: 100% (03 Jan 2018 06:25) (98% - 100%)      PHYSICAL EXAM:               GENERAL: cachectic, NAD, opens eyes when spoke to		  		EYES: fixed dilated R pupil  		MOUTH: mucous membranes dry  		CHEST/LUNG: tachypneic, Poor inspiratory effort  		HEART: Regular rate and rhythm; S3 present  		ABDOMEN: Soft, Nontender, Nondistended; Bowel sounds present  		GENITOURINARY: knutson in place draining yellow liquid with debris  		BACK: unstageable sacral decub ulcer worse since last admission  		PSYCH: Calm, nonverbal   	NEUROLOGY: R facial droop, general weakness, unable to asses thoroughly for focal deficits, patient not following commands, resting hand tremors                          8.5    10.07 )-----------( 347      ( 03 Jan 2018 06:30 )             26.4     01-03    131<L>  |  94<L>  |  4<L>  ----------------------------<  86  4.5   |  29  |  0.33<L>    Ca    8.3<L>      03 Jan 2018 06:30  Phos  1.9     01-03  Mg     1.7     01-03    TPro  5.3<L>  /  Alb  2.1<L>  /  TBili  0.4  /  DBili  x   /  AST  23  /  ALT  7   /  AlkPhos  81  01-03      CAPILLARY BLOOD GLUCOSE    POCT Blood Glucose.: 86 mg/dL (03 Jan 2018 06:10)  POCT Blood Glucose.: 110 mg/dL (02 Jan 2018 23:29)  POCT Blood Glucose.: 86 mg/dL (02 Jan 2018 18:22)  POCT Blood Glucose.: 106 mg/dL (02 Jan 2018 12:19)      MEDICATIONS  (STANDING):  amiodarone    Tablet 400 milliGRAM(s) Oral daily  artificial  tears Solution 1 Drop(s) Right EYE daily  aspirin  chewable 81 milliGRAM(s) Oral daily  atorvastatin 20 milliGRAM(s) Oral at bedtime  Biotene Dry Mouth Oral Rinse 5 milliLiter(s) Swish and Spit three times a day  buDESOnide 160 MICROgram(s)/formoterol 4.5 MICROgram(s) Inhaler 2 Puff(s) Inhalation two times a day  Dakins Solution - 1/2 Strength 1 Application(s) Topical two times a day  dextrose 5% + lactated ringers. 1000 milliLiter(s) (60 mL/Hr) IV Continuous <Continuous>  dextrose 50% Injectable 12.5 Gram(s) IV Push once  enoxaparin Injectable 40 milliGRAM(s) SubCutaneous daily  FIRST- Mouthwash  BLM 5 milliLiter(s) Swish and Spit three times a day  latanoprost 0.005% Ophthalmic Solution 1 Drop(s) Both EYES at bedtime  levothyroxine Injectable 25 MICROGram(s) IV Push daily  meropenem IVPB 1000 milliGRAM(s) IV Intermittent every 12 hours  meropenem IVPB      metoprolol    tartrate Injectable 2.5 milliGRAM(s) IV Push every 6 hours  mirtazapine 7.5 milliGRAM(s) Oral at bedtime  morphine  - Injectable 1 milliGRAM(s) IV Push every 4 hours  multivitamin 1 Tablet(s) Oral daily  tiotropium 18 MICROgram(s) Capsule 1 Capsule(s) Inhalation daily

## 2018-01-03 NOTE — PROGRESS NOTE ADULT - PROBLEM SELECTOR PLAN 1
- Per ID, likely acute on chronic OM [Xrays inconclusive, but given extend of ulcer - diagnosis likely]  - Continue Meropenem 1g Q8 Day 13 of 14  - Wound care debridement cultures growing ESBL E.Coli, pseudomonas and VRE  - Blood cultures NGTD, Urine Cx [Candida] - asymptomatic candiduria - No tx Sepsis - Resolved, no longer febrile, tachycardic   - Xrays inconclusive, but given extend of ulcer - diagnosis likely  - Continue Meropenem 1g Q8 Day 13 of 14  - Wound care debridement cultures growing ESBL E.Coli, pseudomonas and VRE  - Blood cultures NGTD, Urine Cx [Candida] - asymptomatic candiduria - No tx

## 2018-01-03 NOTE — PROGRESS NOTE ADULT - ASSESSMENT
77F CAD/MI, s/p AICD, lymphoma previously on chemo, currently with known spinal mets and compression fractures, functional quadriplegia, nonverbal, recent admission for Pseudomonas bacteremia secondary to UTI, MRSA of sacral decubitus sent from NH 12/13 for altered mental status and fever.  Here, was noticed to have worsening sacral decubitus; stage 4 wound.  Sacral OM but likely acute on chronic OM considering time course.  Short course for polymicrobial soft tissue infection.  Underlying chronic OM unlikely to be cured with antibiotics alone.  Doubt she is a surgical candidate for debridement given underlying metastatic malignancy.  As per notes, family not interested in palliative.      - continue meropenem 1g q 8 day #13 today of 14  - Wound care    poor prognosis

## 2018-01-03 NOTE — PROGRESS NOTE ADULT - ATTENDING COMMENTS
Patient was seen and examined personally by me. I have discussed the plan and reviewed/edited the resident's note and agree with the above physical exam findings including assessment and plan.

## 2018-01-03 NOTE — PROGRESS NOTE ADULT - PROBLEM SELECTOR PLAN 2
- likely toxic metabolic encephalopathy vs. vascular dementia; however pt also has R facial droop and R blown pupil  - She was reportedly able to converse normally prior to last admission  - CT head with no clear signs of CVA     #: Opacification of the right tympanomastoid cavity which may be secondary to an effusion or inflammatory related soft tissue seen on CT  - Reviewed ENT note; No need for steroids in palsy of this duration.  - CTIAC d/c'ed given it would be unlikely to change pts. outlook

## 2018-01-04 NOTE — PROGRESS NOTE ADULT - ATTENDING COMMENTS
Agree with above.   -No further cardiac workup needed at this time    Nehal López MD  [facility]  [address], [id number]  [place] [address]  office: [phone]  pager: [phone]

## 2018-01-04 NOTE — PROGRESS NOTE ADULT - ASSESSMENT
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

## 2018-01-04 NOTE — PROGRESS NOTE ADULT - SUBJECTIVE AND OBJECTIVE BOX
Subjective: non verbal, lethargic, but able to arouse                   MEDICATIONS  (STANDING):  amiodarone    Tablet 400 milliGRAM(s) Oral daily  artificial  tears Solution 1 Drop(s) Right EYE daily  aspirin  chewable 81 milliGRAM(s) Oral daily  atorvastatin 20 milliGRAM(s) Oral at bedtime  Biotene Dry Mouth Oral Rinse 5 milliLiter(s) Swish and Spit three times a day  buDESOnide 160 MICROgram(s)/formoterol 4.5 MICROgram(s) Inhaler 2 Puff(s) Inhalation two times a day  Dakins Solution - 1/2 Strength 1 Application(s) Topical two times a day  dextrose 5% + lactated ringers. 1000 milliLiter(s) (60 mL/Hr) IV Continuous <Continuous>  dextrose 50% Injectable 12.5 Gram(s) IV Push once  enoxaparin Injectable 40 milliGRAM(s) SubCutaneous daily  FIRST- Mouthwash  BLM 5 milliLiter(s) Swish and Spit three times a day  latanoprost 0.005% Ophthalmic Solution 1 Drop(s) Both EYES at bedtime  levothyroxine Injectable 25 MICROGram(s) IV Push daily  meropenem IVPB 1000 milliGRAM(s) IV Intermittent every 12 hours  meropenem IVPB      metoprolol    tartrate Injectable 2.5 milliGRAM(s) IV Push every 6 hours  mirtazapine 7.5 milliGRAM(s) Oral at bedtime  morphine  - Injectable 1 milliGRAM(s) IV Push every 4 hours  multivitamin 1 Tablet(s) Oral daily  tiotropium 18 MICROgram(s) Capsule 1 Capsule(s) Inhalation daily    MEDICATIONS  (PRN):  acetaminophen  Suppository 650 milliGRAM(s) Rectal every 6 hours PRN For Temp greater than 38 C (100.4 F)  acetaminophen  Suppository. 650 milliGRAM(s) Rectal every 6 hours PRN Mild Pain (1 - 3)  dextrose Gel 1 Dose(s) Oral every 6 hours PRN Blood Glucose LESS THAN 70 milliGRAM(s)/deciliter  dextrose Gel 1 Dose(s) Oral every 6 hours PRN Blood Glucose LESS THAN 70 milliGRAM(s)/deciliter      LABS:                        8.5    10.07 )-----------( 347      ( 03 Jan 2018 06:30 )             26.4     Hemoglobin: 8.5 g/dL (01-03 @ 06:30)  Hemoglobin: 9.4 g/dL (01-02 @ 11:40)  Hemoglobin: 8.0 g/dL (12-31 @ 05:25)    01-03    131<L>  |  94<L>  |  4<L>  ----------------------------<  86  4.5   |  29  |  0.33<L>    Ca    8.3<L>      03 Jan 2018 06:30  Phos  1.9     01-03  Mg     1.7     01-03    TPro  5.3<L>  /  Alb  2.1<L>  /  TBili  0.4  /  DBili  x   /  AST  23  /  ALT  7   /  AlkPhos  81  01-03    Creatinine Trend: 0.33<--, Insufficient quant<--, 0.35<--, 0.27<--, 0.34<--, 0.36<--           PHYSICAL EXAM  Vital Signs Last 24 Hrs  T(C): 36.6 (03 Jan 2018 22:25), Max: 36.9 (03 Jan 2018 05:10)  T(F): 97.8 (03 Jan 2018 22:25), Max: 98.5 (03 Jan 2018 05:10)  HR: 81 (04 Jan 2018 00:35) (81 - 95)  BP: 118/74 (04 Jan 2018 00:35) (108/71 - 123/86)  BP(mean): --  RR: 17 (04 Jan 2018 00:35) (17 - 19)  SpO2: 99% (04 Jan 2018 00:35) (98% - 100%)      Cardiovascular: Normal S1 S2, RRR   No JVD,  Respiratory: decreased breath sounds w/ decreased inspiratory effort noted  B/L	  Gastrointestinal:  Soft, Non-tender, + BS	  Extremities no edema, cyanosis, clubbing B/L LE's     DIAGNOSTIC DATA:    < from: TTE with Doppler (w/Cont) (04.03.17 @ 16:18) >  CONCLUSIONS:  1. Mitral annular calcification, otherwise normal mitral  valve. Mild mitral regurgitation.  2. Moderately dilated left atrium.  LA volume index = 46  cc/m2.  3. Moderate left ventricular enlargement.  4. Severe global left ventricular systolic dysfunction.  Endocardial visualization enhanced with intravenous  injection of echo contrast (Definity).  5. The right ventricle is not well visualized; grossly  normal right ventricular systolic function.  A device wire  is noted in the right heart.  6. Estimated right ventricular systolic pressure equals 60  mm Hg, assuming right atrial pressure equals 10 mm Hg,  consistent with moderate pulmonary hypertension.  ------------------------------------------------------------------------  Confirmed on  4/3/2017 - 17:19:47 by Jean-Pierre Carver M.D.    < end of copied text >    < from: Cardiac Cath Lab - Adult (04.26.16 @ 13:12) >  VENTRICLES: No LV gram was performed; however, a recent echocardiogram  demonstrated an EF of 19 %.  CORONARY VESSELS: The coronary circulation is right dominant.  LM:   --  LM: Normal.  LAD:   --  Proximal LAD: There was a [id number] % stenosis. There was mild  restenosis in proximal LAD stent.  --  Mid LAD: Angiography showed minor luminal irregularities with no flow  limiting lesions.  --  Distal LAD: Angiography showed minor luminal irregularities with no  flow limiting lesions.  --  D1: Angiography showed minor luminal irregularities with no flow  limiting lesions.  CX:   --  Circumflex: Normal.  RI:   --  Ramus intermedius: Angiography showed minor luminal  irregularities with no flow limiting lesions.  RCA:   --  Proximal RCA: Normal.  --  Mid RCA: Angiography showed mild atherosclerosis with no flow limiting  lesions.  --  Distal RCA: Angiography showed minor luminal irregularities with no  flow limiting lesions.  --  RPDA: Angiography showed minor luminal irregularities with no flow  limiting lesions.  --  RPLS: Angiography showed minor luminal irregularities with no flow  limiting lesions.  COMPLICATIONS: There were no complications.  DIAGNOSTIC RECOMMENDATIONS: Medical management and aggressive risk factor  modification is recommended.  Prepared and signed by  Nehal López M.D.  Signed 04/27/2016 13:13:20    < end of copied text >    < from: Transthoracic Echocardiogram (12.01.17 @ 16:53) >  CONCLUSIONS:  1. Normal mitral valve. Minimal mitral regurgitation.  2. Mild left ventricular enlargement.  3. Severe global left ventricular systolic dysfunction.  4. The right ventricle is not well visualized. A device  wire is noted in the right heart.  ------------------------------------------------------------------------  Confirmed on  12/1/2017 - 18:24:53 by Carli Encarnacion M.D. RPVI    < end of copied text      ASSESSMENT/PLAN: 	77y Female with hypertension, dyslipidemia, CAD s/p LAD stent with only mild re-stenosis with minor luminal irregularities otherwise on cath 4/16, with known severe LV dysfunction , NICM s/p [employer]onic BiV ICD, VT on [place], COPD, lymphoma previously on chemo with known spinal mets and compression fractures recent admit with GNR bacteremia (pseudomonas), felt to be urinary source, and sacral decub wound culture +MRSA, s/p ABX, now admitted from SNF with fever and AMS/lethargy.     -continue with medical management of cad and cardiomyopathy with ASA/statin/BB  -not in clinical heart failure  -continue with asa/statin for history of pci  -no further inpatient cv workup needed at this time  -ethics consult noted 12/21  -patient remains full code

## 2018-01-04 NOTE — PROGRESS NOTE ADULT - SUBJECTIVE AND OBJECTIVE BOX
Venancio Lamas MD  Cell: [phone]  Pager: [phone]    SUBJECTIVE / OVERNIGHT EVENTS:   No acute events overnight.  Afebrile.  Opens eyes to question and still nodding head yes/no but often not appropriately.      VITAL SIGNS:  Vital Signs Last 24 Hrs  T(C): 36.4 (04 Jan 2018 06:40), Max: 36.6 (03 Jan 2018 22:25)  T(F): 97.5 (04 Jan 2018 06:40), Max: 97.8 (03 Jan 2018 22:25)  HR: 89 (04 Jan 2018 06:40) (81 - 89)  BP: 118/66 (04 Jan 2018 06:40) (108/71 - 125/82)  BP(mean): --  RR: 18 (04 Jan 2018 06:40) (17 - 20)  SpO2: 100% (04 Jan 2018 06:40) (98% - 100%)      PHYSICAL EXAM:               GENERAL: cachectic, NAD, opens eyes when spoke to		  		EYES: fixed dilated R pupil  		MOUTH: mucous membranes dry  		CHEST/LUNG: tachypneic, Poor inspiratory effort  		HEART: Regular rate and rhythm; S3 present  		ABDOMEN: Soft, Nontender, Nondistended; Bowel sounds present  		GENITOURINARY: knutson in place draining yellow liquid with debris  		BACK: unstageable sacral decub ulcer worse since last admission  		PSYCH: Calm, nonverbal   	NEUROLOGY: R facial droop, general weakness, unable to asses thoroughly for focal deficits, patient not following commands, resting hand tremors                          8.5    10.07 )-----------( 347      ( 03 Jan 2018 06:30 )             26.4     01-03    131<L>  |  94<L>  |  4<L>  ----------------------------<  86  4.5   |  29  |  0.33<L>    Ca    8.3<L>      03 Jan 2018 06:30  Phos  1.9     01-03  Mg     1.7     01-03    TPro  5.3<L>  /  Alb  2.1<L>  /  TBili  0.4  /  DBili  x   /  AST  23  /  ALT  7   /  AlkPhos  81  01-03      CAPILLARY BLOOD GLUCOSE    POCT Blood Glucose.: 101 mg/dL (04 Jan 2018 06:58)  POCT Blood Glucose.: 86 mg/dL (03 Jan 2018 23:49)  POCT Blood Glucose.: 104 mg/dL (03 Jan 2018 17:49)  POCT Blood Glucose.: 113 mg/dL (03 Jan 2018 12:11)      MEDICATIONS  (STANDING):  amiodarone    Tablet 400 milliGRAM(s) Oral daily  artificial  tears Solution 1 Drop(s) Right EYE daily  aspirin  chewable 81 milliGRAM(s) Oral daily  atorvastatin 20 milliGRAM(s) Oral at bedtime  Biotene Dry Mouth Oral Rinse 5 milliLiter(s) Swish and Spit three times a day  buDESOnide 160 MICROgram(s)/formoterol 4.5 MICROgram(s) Inhaler 2 Puff(s) Inhalation two times a day  Dakins Solution - 1/2 Strength 1 Application(s) Topical two times a day  dextrose 5% + lactated ringers. 1000 milliLiter(s) (60 mL/Hr) IV Continuous <Continuous>  dextrose 50% Injectable 12.5 Gram(s) IV Push once  enoxaparin Injectable 40 milliGRAM(s) SubCutaneous daily  FIRST- Mouthwash  BLM 5 milliLiter(s) Swish and Spit three times a day  latanoprost 0.005% Ophthalmic Solution 1 Drop(s) Both EYES at bedtime  levothyroxine Injectable 25 MICROGram(s) IV Push daily  meropenem IVPB 1000 milliGRAM(s) IV Intermittent every 12 hours  meropenem IVPB      metoprolol    tartrate Injectable 2.5 milliGRAM(s) IV Push every 6 hours  mirtazapine 7.5 milliGRAM(s) Oral at bedtime  morphine  - Injectable 1 milliGRAM(s) IV Push every 4 hours  multivitamin 1 Tablet(s) Oral daily  tiotropium 18 MICROgram(s) Capsule 1 Capsule(s) Inhalation daily

## 2018-01-04 NOTE — PROGRESS NOTE ADULT - PROBLEM SELECTOR PLAN 1
Sepsis - Resolved, no longer febrile, tachycardic   - Xrays inconclusive, but given extend of ulcer - diagnosis likely  - Continue Meropenem 1g Q8 Day 14 of 14  - Wound care debridement cultures growing ESBL E.Coli, pseudomonas and VRE  - Blood cultures NGTD, Urine Cx [Candida] - asymptomatic candiduria - No tx Sepsis - Resolved, no longer febrile, tachycardic   - Xrays inconclusive, but given extent of ulcer - diagnosis likely  - D/c Meropenem 1g Q8 after today Day 14 of 14  - Wound care debridement cultures growing ESBL E.Coli, pseudomonas and VRE  - Blood cultures NGTD, Urine Cx [Candida] - asymptomatic candiduria - No tx

## 2018-01-04 NOTE — PROGRESS NOTE ADULT - PROBLEM SELECTOR PLAN 9
- Son currently refusing hospice; had discussion with the son who is not proxy on 1/3 and discussed end of life care plans with him given that the pt is showing no signs of improvement.  He said he will discuss this with his brother.  - several Face to face conversations held with son Gavin at bedside  - Updated on current clinical progress and that patient is not candidate for PEG. GI fellow also at bedside on Fri 12/22 and discussed with son that offered a trial of NGT Patient advocate called per son request. Ethics consult also placed and stated that a trial of NGT could be attempted but would not recommend discharging patient with NGT. Per son, would still like CPR and intubation if necessary knowing that this will likely prolong patient's suffering and pain.  - Will get labs Q48 hours as opposed to daily  - family meeting on 12/14, 12/18 and 12/21: full code  - PT recs: extended care facility  - pending chart from [facility] - Son currently refusing hospice; had discussion with the son who is not proxy on 1/3 and discussed end of life care plans with him given that the pt is showing no signs of improvement.  He said he will discuss this with his brother.  - several Face to face conversations held with son Gavin at bedside  - Updated on current clinical progress and that patient is not candidate for PEG. GI fellow also at bedside on Fri 12/22 and discussed with son that offered a trial of NGT Patient advocate called per son request. Ethics consult also placed and stated that a trial of NGT could be attempted but would not recommend discharging patient with NGT. Per son, would still like CPR and intubation if necessary knowing that this will likely prolong patient's suffering and pain.  - Will get labs Q48 hours as opposed to daily  - family meeting on 12/14, 12/18 and 12/21: full code  - PT recs: extended care facility  - Per IDR rounds, CM will attempt to reach out to [facility] for possible placement given patients poor prognosis.

## 2018-01-04 NOTE — PROGRESS NOTE ADULT - PROBLEM SELECTOR PLAN 4
- Speech and Swallow recommended NPO; pleasure feeds are being attempted however pt pockets food and does not swallow it  - GI eval for PEG; recommend holding off on PEG given risk outweighs benefits  - Attempted to place NG 12/21 and 12/26 per son's request; however first time was not positioned appropriately and was removed; second time son decided against it given pts obvious discomfort  - finger sticks Q6  - now on D5 LR since not eating

## 2018-01-05 NOTE — PROGRESS NOTE ADULT - ATTENDING COMMENTS
Agree with above.  No further cardiac workup needed at this time.    Nehal López MD  [facility]  [address], [address]  [place] [address]  office: [phone]  pager: [phone]

## 2018-01-05 NOTE — PROGRESS NOTE ADULT - SUBJECTIVE AND OBJECTIVE BOX
Venancio Lamas MD  Cell: [phone]  Pager: [phone]    SUBJECTIVE / OVERNIGHT EVENTS:   No acute events overnight.  Afebrile.  Opens eyes to question and still nodding head yes/no but often not appropriately.      VITAL SIGNS:  Vital Signs Last 24 Hrs  T(C): 36.5 (05 Jan 2018 06:25), Max: 36.5 (05 Jan 2018 06:25)  T(F): 97.7 (05 Jan 2018 06:25), Max: 97.7 (05 Jan 2018 06:25)  HR: 87 (05 Jan 2018 06:25) (81 - 99)  BP: 102/69 (05 Jan 2018 06:25) (102/69 - 137/75)  BP(mean): --  RR: 19 (05 Jan 2018 06:25) (18 - 24)  SpO2: 100% (05 Jan 2018 06:25) (99% - 100%)      PHYSICAL EXAM:               GENERAL: cachectic, NAD, opens eyes when spoke to		  	EYES: fixed dilated R pupil  	MOUTH: mucous membranes dry  	CHEST/LUNG: tachypneic, Poor inspiratory effort  	HEART: Regular rate and rhythm; S3 present  	ABDOMEN: Soft, Nontender, Nondistended; Bowel sounds present  	GENITOURINARY: knutson in place draining yellow liquid with debris  	BACK: unstageable sacral decub ulcer worse since last admission  	PSYCH: Calm, nonverbal   	NEUROLOGY: R facial droop, general weakness, unable to asses thoroughly for focal deficits, patient not following commands, resting hand tremors        POCT Blood Glucose.: 103 mg/dL (05 Jan 2018 06:07)  POCT Blood Glucose.: 92 mg/dL (04 Jan 2018 23:23)  POCT Blood Glucose.: 90 mg/dL (04 Jan 2018 18:12)  POCT Blood Glucose.: 93 mg/dL (04 Jan 2018 12:11)      MEDICATIONS  (STANDING):  amiodarone    Tablet 400 milliGRAM(s) Oral daily  artificial  tears Solution 1 Drop(s) Right EYE daily  aspirin  chewable 81 milliGRAM(s) Oral daily  atorvastatin 20 milliGRAM(s) Oral at bedtime  Biotene Dry Mouth Oral Rinse 5 milliLiter(s) Swish and Spit three times a day  buDESOnide 160 MICROgram(s)/formoterol 4.5 MICROgram(s) Inhaler 2 Puff(s) Inhalation two times a day  Dakins Solution - 1/2 Strength 1 Application(s) Topical two times a day  dextrose 5% + lactated ringers. 1000 milliLiter(s) (60 mL/Hr) IV Continuous <Continuous>  dextrose 50% Injectable 12.5 Gram(s) IV Push once  enoxaparin Injectable 40 milliGRAM(s) SubCutaneous daily  FIRST- Mouthwash  BLM 5 milliLiter(s) Swish and Spit three times a day  latanoprost 0.005% Ophthalmic Solution 1 Drop(s) Both EYES at bedtime  levothyroxine Injectable 25 MICROGram(s) IV Push daily  metoprolol    tartrate Injectable 2.5 milliGRAM(s) IV Push every 6 hours  mirtazapine 7.5 milliGRAM(s) Oral at bedtime  morphine  - Injectable 1 milliGRAM(s) IV Push every 4 hours  multivitamin 1 Tablet(s) Oral daily  tiotropium 18 MICROgram(s) Capsule 1 Capsule(s) Inhalation daily

## 2018-01-05 NOTE — PROGRESS NOTE ADULT - ASSESSMENT
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

## 2018-01-05 NOTE — PROGRESS NOTE ADULT - PROBLEM SELECTOR PLAN 9
- Son currently refusing hospice; had discussion with the son who is not proxy on 1/3 and discussed end of life care plans with him given that the pt is showing no signs of improvement.  He said he will discuss this with his brother.  - several Face to face conversations held with son Gavin at bedside  - Updated on current clinical progress and that patient is not candidate for PEG. GI fellow also at bedside on Fri 12/22 and discussed with son that offered a trial of NGT Patient advocate called per son request. Ethics consult also placed and stated that a trial of NGT could be attempted but would not recommend discharging patient with NGT. Per son, would still like CPR and intubation if necessary knowing that this will likely prolong patient's suffering and pain.  - Will get labs Q48 hours as opposed to daily  - family meeting on 12/14, 12/18 and 12/21: full code  - PT recs: extended care facility  - Per IDR rounds, CM will attempt to reach out to [facility] for possible placement given patients poor prognosis. - Son currently refusing hospice; had discussion with the son who is not proxy on 1/3 and discussed end of life care plans with him given that the pt is showing no signs of improvement.  He said he will discuss this with his brother.  - several Face to face conversations held with miguel Alonso at bedside  - Updated on current clinical progress and that patient is not candidate for PEG. GI fellow also at bedside on Fri 12/22 and discussed with son that offered a trial of NGT Patient advocate called per son request. Ethics consult also placed and stated that a trial of NGT could be attempted but would not recommend discharging patient with NGT. Per son, would still like CPR and intubation if necessary knowing that this will likely prolong patient's suffering and pain.  - Will get labs Q48 hours as opposed to daily  - family meeting on 12/14, 12/18 and 12/21: full code  - PT recs: extended care facility  - CM and team made multiple attempts to reach out to Gavin rivera (HCP) with no response. Will re-consult ethnics as there are few remaining medical options for this patient. - will reconsult Ethics today for dispo planning  - Son currently refusing hospice; had discussion with the son who is not proxy on 1/3 and discussed end of life care plans with him given that the pt is showing no signs of improvement.  He said he will discuss this with his brother.  - several Face to face conversations held with miguel Alonso at bedside  - Updated on current clinical progress and that patient is not candidate for PEG. GI fellow also at bedside on Fri 12/22 and discussed with son that offered a trial of NGT Patient advocate called per son request. Ethics consult also placed and stated that a trial of NGT could be attempted but would not recommend discharging patient with NGT. Per son, would still like CPR and intubation if necessary knowing that this will likely prolong patient's suffering and pain.  - Will get labs Q48 hours as opposed to daily  - family meeting on 12/14, 12/18 and 12/21: full code  - PT recs: extended care facility  - CM and team made multiple attempts to reach out to Gavin rivera (HCP) with no response. Will re-consult ethnics as there are few remaining medical options for this patient. - reconsulted Ethics for dispo planning; they say they can come to family meeting social work set up for Monday at 3PM  - Son currently refusing hospice; had discussion with the son who is not proxy on 1/3 and discussed end of life care plans with him given that the pt is showing no signs of improvement.  He said he will discuss this with his brother.  - several Face to face conversations held with miguel Alonso at bedside  - Updated on current clinical progress and that patient is not candidate for PEG. GI fellow also at bedside on Fri 12/22 and discussed with son that offered a trial of NGT Patient advocate called per son request. Ethics consult also placed and stated that a trial of NGT could be attempted but would not recommend discharging patient with NGT. Per son, would still like CPR and intubation if necessary knowing that this will likely prolong patient's suffering and pain.  - Will get labs Q48 hours as opposed to daily  - family meeting on 12/14, 12/18 and 12/21: full code  - PT recs: extended care facility  - CM and team made multiple attempts to reach out to Gavin rivera (HCP) with no response. Will re-consult ethnics as there are few remaining medical options for this patient.

## 2018-01-05 NOTE — PROGRESS NOTE ADULT - SUBJECTIVE AND OBJECTIVE BOX
Subjective: non verbal, lethargic, but able to arouse                  MEDICATIONS  (STANDING):  amiodarone    Tablet 400 milliGRAM(s) Oral daily  artificial  tears Solution 1 Drop(s) Right EYE daily  aspirin  chewable 81 milliGRAM(s) Oral daily  atorvastatin 20 milliGRAM(s) Oral at bedtime  Biotene Dry Mouth Oral Rinse 5 milliLiter(s) Swish and Spit three times a day  buDESOnide 160 MICROgram(s)/formoterol 4.5 MICROgram(s) Inhaler 2 Puff(s) Inhalation two times a day  Dakins Solution - 1/2 Strength 1 Application(s) Topical two times a day  dextrose 5% + lactated ringers. 1000 milliLiter(s) (60 mL/Hr) IV Continuous <Continuous>  dextrose 50% Injectable 12.5 Gram(s) IV Push once  enoxaparin Injectable 40 milliGRAM(s) SubCutaneous daily  FIRST- Mouthwash  BLM 5 milliLiter(s) Swish and Spit three times a day  latanoprost 0.005% Ophthalmic Solution 1 Drop(s) Both EYES at bedtime  levothyroxine Injectable 25 MICROGram(s) IV Push daily  metoprolol    tartrate Injectable 2.5 milliGRAM(s) IV Push every 6 hours  mirtazapine 7.5 milliGRAM(s) Oral at bedtime  morphine  - Injectable 1 milliGRAM(s) IV Push every 4 hours  multivitamin 1 Tablet(s) Oral daily  tiotropium 18 MICROgram(s) Capsule 1 Capsule(s) Inhalation daily    MEDICATIONS  (PRN):  acetaminophen  Suppository 650 milliGRAM(s) Rectal every 6 hours PRN For Temp greater than 38 C (100.4 F)  acetaminophen  Suppository. 650 milliGRAM(s) Rectal every 6 hours PRN Mild Pain (1 - 3)  dextrose Gel 1 Dose(s) Oral every 6 hours PRN Blood Glucose LESS THAN 70 milliGRAM(s)/deciliter  dextrose Gel 1 Dose(s) Oral every 6 hours PRN Blood Glucose LESS THAN 70 milliGRAM(s)/deciliter      LABS:                        7.2    10.19 )-----------( 292      ( 05 Jan 2018 10:25 )             21.5     Hemoglobin: 7.2 g/dL (01-05 @ 10:25)  Hemoglobin: 8.5 g/dL (01-03 @ 06:30)  Hemoglobin: 9.4 g/dL (01-02 @ 11:40)    01-05    139  |  101  |  6<L>  ----------------------------<  99  3.7   |  30  |  0.33<L>    Ca    8.1<L>      05 Jan 2018 10:25  Phos  2.0     01-05  Mg     1.7     01-05    TPro  4.7<L>  /  Alb  2.1<L>  /  TBili  0.4  /  DBili  x   /  AST  25  /  ALT  9   /  AlkPhos  76  01-05    Creatinine Trend: 0.33<--, 0.33<--, Insufficient quant<--, 0.35<--, 0.27<--, 0.34<--           PHYSICAL EXAM  Vital Signs Last 24 Hrs  T(C): 36.6 (05 Jan 2018 12:52), Max: 36.6 (05 Jan 2018 12:52)  T(F): 97.8 (05 Jan 2018 12:52), Max: 97.8 (05 Jan 2018 12:52)  HR: 85 (05 Jan 2018 13:42) (81 - 99)  BP: 105/76 (05 Jan 2018 13:42) (102/69 - 137/75)  BP(mean): --  RR: 19 (05 Jan 2018 13:42) (18 - 21)  SpO2: 100% (05 Jan 2018 13:42) (99% - 100%)      Cardiovascular: Normal S1 S2, RRR   No JVD,  Respiratory: decreased breath sounds w/ decreased inspiratory effort noted  B/L	  Gastrointestinal:  Soft, Non-tender, + BS	  Extremities no edema, cyanosis, clubbing B/L LE's     DIAGNOSTIC DATA:    < from: TTE with Doppler (w/Cont) (04.03.17 @ 16:18) >  CONCLUSIONS:  1. Mitral annular calcification, otherwise normal mitral  valve. Mild mitral regurgitation.  2. Moderately dilated left atrium.  LA volume index = 46  cc/m2.  3. Moderate left ventricular enlargement.  4. Severe global left ventricular systolic dysfunction.  Endocardial visualization enhanced with intravenous  injection of echo contrast (Definity).  5. The right ventricle is not well visualized; grossly  normal right ventricular systolic function.  A device wire  is noted in the right heart.  6. Estimated right ventricular systolic pressure equals 60  mm Hg, assuming right atrial pressure equals 10 mm Hg,  consistent with moderate pulmonary hypertension.  ------------------------------------------------------------------------  Confirmed on  4/3/2017 - 17:19:47 by Jean-Pierre Carver M.D.    < end of copied text >    < from: Cardiac Cath Lab - Adult (04.26.16 @ 13:12) >  VENTRICLES: No LV gram was performed; however, a recent echocardiogram  demonstrated an EF of 19 %.  CORONARY VESSELS: The coronary circulation is right dominant.  LM:   --  LM: Normal.  LAD:   --  Proximal LAD: There was a [id number] % stenosis. There was mild  restenosis in proximal LAD stent.  --  Mid LAD: Angiography showed minor luminal irregularities with no flow  limiting lesions.  --  Distal LAD: Angiography showed minor luminal irregularities with no  flow limiting lesions.  --  D1: Angiography showed minor luminal irregularities with no flow  limiting lesions.  CX:   --  Circumflex: Normal.  RI:   --  Ramus intermedius: Angiography showed minor luminal  irregularities with no flow limiting lesions.  RCA:   --  Proximal RCA: Normal.  --  Mid RCA: Angiography showed mild atherosclerosis with no flow limiting  lesions.  --  Distal RCA: Angiography showed minor luminal irregularities with no  flow limiting lesions.  --  RPDA: Angiography showed minor luminal irregularities with no flow  limiting lesions.  --  RPLS: Angiography showed minor luminal irregularities with no flow  limiting lesions.  COMPLICATIONS: There were no complications.  DIAGNOSTIC RECOMMENDATIONS: Medical management and aggressive risk factor  modification is recommended.  Prepared and signed by  Nehal López M.D.  Signed 04/27/2016 13:13:20    < end of copied text >    < from: Transthoracic Echocardiogram (12.01.17 @ 16:53) >  CONCLUSIONS:  1. Normal mitral valve. Minimal mitral regurgitation.  2. Mild left ventricular enlargement.  3. Severe global left ventricular systolic dysfunction.  4. The right ventricle is not well visualized. A device  wire is noted in the right heart.  ------------------------------------------------------------------------  Confirmed on  12/1/2017 - 18:24:53 by Carli Encarnacion M.D. RPVI    < end of copied text      ASSESSMENT/PLAN: 	77y Female with hypertension, dyslipidemia, CAD s/p LAD stent with only mild re-stenosis with minor luminal irregularities otherwise on cath 4/16, with known severe LV dysfunction , NICM s/p [employer]onic BiV ICD, VT on Amio, COPD, lymphoma previously on chemo with known spinal mets and compression fractures recent admit with GNR bacteremia (pseudomonas), felt to be urinary source, and sacral decub wound culture +MRSA, s/p ABX, now admitted from SNF with fever and AMS/lethargy.     -continue with medical management of cad and cardiomyopathy with ASA/statin/BB  -not in clinical heart failure  -continue with asa/statin for history of pci  -no further inpatient cv workup needed at this time  -ethics consult noted 12/21  -hypophosphatasia     supplement as per primary team if w/i GOC   -Anemic      monitor H/H   -patient remains full code

## 2018-01-05 NOTE — PROGRESS NOTE ADULT - PROBLEM SELECTOR PLAN 1
Sepsis - Resolved, no longer febrile, tachycardic   - Xrays inconclusive, but given extent of ulcer - diagnosis likely  - 14 day Meropenem course completed  - Wound care debridement cultures growing ESBL E.Coli, pseudomonas and VRE  - Blood cultures NGTD, Urine Cx [Candida] - asymptomatic candiduria - No tx Sepsis - Resolved, no longer febrile, tachycardic   - Xrays inconclusive, but given extent of ulcer - diagnosis likely  - 14 day Meropenem course completed. Now off antibiotics  - Wound care debridement cultures growing ESBL E.Coli, pseudomonas and VRE  - Blood cultures NGTD, Urine Cx [Candida] - asymptomatic candiduria - No tx

## 2018-01-06 NOTE — PROGRESS NOTE ADULT - SUBJECTIVE AND OBJECTIVE BOX
Venancio Lamas MD  Cell: [phone]  Pager: [phone]    SUBJECTIVE / OVERNIGHT EVENTS:   No acute events overnight.  Afebrile.  Opens eyes to question and still nodding head yes/no but often not appropriately.      VITAL SIGNS:  Vital Signs Last 24 Hrs  T(C): 36.8 (06 Jan 2018 06:39), Max: 36.8 (05 Jan 2018 23:08)  T(F): 98.3 (06 Jan 2018 06:39), Max: 98.3 (06 Jan 2018 06:39)  HR: 97 (06 Jan 2018 06:39) (80 - 97)  BP: 112/63 (06 Jan 2018 06:39) (99/63 - 114/91)  BP(mean): --  RR: 19 (06 Jan 2018 06:39) (18 - 19)  SpO2: 99% (06 Jan 2018 06:39) (95% - 100%)      PHYSICAL EXAM:               GENERAL: cachectic, NAD, opens eyes when spoke to		  	EYES: fixed dilated R pupil  	MOUTH: mucous membranes dry  	CHEST/LUNG: tachypneic, Poor inspiratory effort  	HEART: Regular rate and rhythm; S3 present  	ABDOMEN: Soft, Nontender, Nondistended; Bowel sounds present  	GENITOURINARY: knutson in place draining yellow liquid with debris  	BACK: unstageable sacral decub ulcer worse since last admission  	PSYCH: Calm, nonverbal   	NEUROLOGY: R facial droop, general weakness, unable to asses thoroughly for focal deficits, patient not following commands, resting hand tremors                          7.2    10.19 )-----------( 292      ( 05 Jan 2018 10:25 )             21.5     01-05    139  |  101  |  6<L>  ----------------------------<  99  3.7   |  30  |  0.33<L>    Ca    8.1<L>      05 Jan 2018 10:25  Phos  2.0     01-05  Mg     1.7     01-05    TPro  4.7<L>  /  Alb  2.1<L>  /  TBili  0.4  /  DBili  x   /  AST  25  /  ALT  9   /  AlkPhos  76  01-05      CAPILLARY BLOOD GLUCOSE  POCT Blood Glucose.: 101 mg/dL (06 Jan 2018 00:19)  POCT Blood Glucose.: 90 mg/dL (05 Jan 2018 18:36)  POCT Blood Glucose.: 87 mg/dL (05 Jan 2018 12:41)      MEDICATIONS  (STANDING):  amiodarone    Tablet 400 milliGRAM(s) Oral daily  artificial  tears Solution 1 Drop(s) Right EYE daily  aspirin  chewable 81 milliGRAM(s) Oral daily  atorvastatin 20 milliGRAM(s) Oral at bedtime  Biotene Dry Mouth Oral Rinse 5 milliLiter(s) Swish and Spit three times a day  buDESOnide 160 MICROgram(s)/formoterol 4.5 MICROgram(s) Inhaler 2 Puff(s) Inhalation two times a day  Dakins Solution - 1/2 Strength 1 Application(s) Topical two times a day  dextrose 5% + lactated ringers. 1000 milliLiter(s) (60 mL/Hr) IV Continuous <Continuous>  dextrose 50% Injectable 12.5 Gram(s) IV Push once  enoxaparin Injectable 40 milliGRAM(s) SubCutaneous daily  FIRST- Mouthwash  BLM 5 milliLiter(s) Swish and Spit three times a day  latanoprost 0.005% Ophthalmic Solution 1 Drop(s) Both EYES at bedtime  levothyroxine Injectable 25 MICROGram(s) IV Push daily  metoprolol    tartrate Injectable 2.5 milliGRAM(s) IV Push every 6 hours  mirtazapine 7.5 milliGRAM(s) Oral at bedtime  morphine  - Injectable 1 milliGRAM(s) IV Push every 4 hours  multivitamin 1 Tablet(s) Oral daily  tiotropium 18 MICROgram(s) Capsule 1 Capsule(s) Inhalation daily

## 2018-01-06 NOTE — PROGRESS NOTE ADULT - PROBLEM SELECTOR PLAN 9
- reconsulted Ethics for dispo planning; they say they can come to family meeting social work set up for Monday at 3PM with son Gavin  - Son currently refusing hospice; had discussion with the son who is not proxy on 1/3 and discussed end of life care plans with him given that the pt is showing no signs of improvement.  He said he will discuss this with his brother.  - several Face to face conversations held with miguel Alonso at bedside  - Updated on current clinical progress and that patient is not candidate for PEG. GI fellow also at bedside on Fri 12/22 and discussed with son that offered a trial of NGT Patient advocate called per son request. Ethics consult also placed and stated that a trial of NGT could be attempted but would not recommend discharging patient with NGT. Per son, would still like CPR and intubation if necessary knowing that this will likely prolong patient's suffering and pain.  - Will get labs Q48 hours as opposed to daily  - PT recs: extended care facility

## 2018-01-06 NOTE — PROVIDER CONTACT NOTE (OTHER) - ASSESSMENT
; O2 not reading accurately. Attempted multiple times to get reading. O2 sat ranging from 86-94. O2 sat continues to range after putting pt on 2L NC

## 2018-01-06 NOTE — PROGRESS NOTE ADULT - ATTENDING COMMENTS
Agree with above.   -No further cardiac workup needed at this time    Nehal López MD  [facility]  [address], [address]  [place] [address]  office: [phone]  pager: [phone]

## 2018-01-06 NOTE — PROGRESS NOTE ADULT - PROBLEM SELECTOR PLAN 1
- Sepsis - Resolved, no longer febrile and WBC wnl  - Xrays inconclusive, but given extent of ulcer diagnosis likely  - 14 day Meropenem course completed. Now off antibiotics  - Wound care debridement cultures growing ESBL E.Coli, pseudomonas and VRE  - Blood cultures negative, Urine Cx [Candida] - asymptomatic candiduria - No tx

## 2018-01-06 NOTE — PROGRESS NOTE ADULT - ASSESSMENT
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

## 2018-01-06 NOTE — PROGRESS NOTE ADULT - ATTENDING COMMENTS
Patient seen and examined. Agree with above note by resident.    Given overall poor prognosis, multiple co-morbidities and no mental or physical functions status, at this time patient is not a candidate for PEG. Patient would benefit with hospice and long term care facility for wound management. Family meeting is planned for monday at 3pm.

## 2018-01-06 NOTE — PROGRESS NOTE ADULT - SUBJECTIVE AND OBJECTIVE BOX
Subjective: non verbal, lethargic, but able to arouse                  MEDICATIONS  (STANDING):  amiodarone    Tablet 400 milliGRAM(s) Oral daily  artificial  tears Solution 1 Drop(s) Right EYE daily  aspirin  chewable 81 milliGRAM(s) Oral daily  Biotene Dry Mouth Oral Rinse 5 milliLiter(s) Swish and Spit three times a day  buDESOnide 160 MICROgram(s)/formoterol 4.5 MICROgram(s) Inhaler 2 Puff(s) Inhalation two times a day  Dakins Solution - 1/2 Strength 1 Application(s) Topical two times a day  dextrose 5% + lactated ringers. 1000 milliLiter(s) (60 mL/Hr) IV Continuous <Continuous>  dextrose 50% Injectable 12.5 Gram(s) IV Push once  enoxaparin Injectable 40 milliGRAM(s) SubCutaneous daily  FIRST- Mouthwash  BLM 5 milliLiter(s) Swish and Spit three times a day  latanoprost 0.005% Ophthalmic Solution 1 Drop(s) Both EYES at bedtime  levothyroxine Injectable 25 MICROGram(s) IV Push daily  metoprolol    tartrate Injectable 2.5 milliGRAM(s) IV Push every 6 hours  mirtazapine 7.5 milliGRAM(s) Oral at bedtime  morphine  - Injectable 1 milliGRAM(s) IV Push every 4 hours  multivitamin 1 Tablet(s) Oral daily  tiotropium 18 MICROgram(s) Capsule 1 Capsule(s) Inhalation daily    MEDICATIONS  (PRN):  acetaminophen  Suppository 650 milliGRAM(s) Rectal every 6 hours PRN For Temp greater than 38 C (100.4 F)  acetaminophen  Suppository. 650 milliGRAM(s) Rectal every 6 hours PRN Mild Pain (1 - 3)  dextrose Gel 1 Dose(s) Oral every 6 hours PRN Blood Glucose LESS THAN 70 milliGRAM(s)/deciliter  dextrose Gel 1 Dose(s) Oral every 6 hours PRN Blood Glucose LESS THAN 70 milliGRAM(s)/deciliter      LABS:                        8.4    17.77 )-----------( 336      ( 06 Jan 2018 10:10 )             27.0     Hemoglobin: 8.4 g/dL (01-06 @ 10:10)  Hemoglobin: 7.2 g/dL (01-05 @ 10:25)  Hemoglobin: 8.5 g/dL (01-03 @ 06:30)  Hemoglobin: 9.4 g/dL (01-02 @ 11:40)    01-05    139  |  101  |  6<L>  ----------------------------<  99  3.7   |  30  |  0.33<L>    Ca    8.1<L>      05 Jan 2018 10:25  Phos  2.0     01-05  Mg     1.7     01-05    TPro  4.7<L>  /  Alb  2.1<L>  /  TBili  0.4  /  DBili  x   /  AST  25  /  ALT  9   /  AlkPhos  76  01-05    Creatinine Trend: 0.33<--, 0.33<--, Insufficient quant<--, 0.35<--, 0.27<--, 0.34<--           PHYSICAL EXAM  Vital Signs Last 24 Hrs  T(C): 36.8 (06 Jan 2018 06:39), Max: 36.8 (05 Jan 2018 23:08)  T(F): 98.3 (06 Jan 2018 06:39), Max: 98.3 (06 Jan 2018 06:39)  HR: 86 (06 Jan 2018 11:01) (80 - 97)  BP: 109/60 (06 Jan 2018 11:01) (99/63 - 114/91)  BP(mean): --  RR: 18 (06 Jan 2018 11:01) (18 - 19)  SpO2: 99% (06 Jan 2018 11:01) (95% - 100%)      Cardiovascular: Normal S1 S2, RRR   No JVD,  Respiratory: decreased breath sounds w/ decreased inspiratory effort noted  B/L	  Gastrointestinal:  Soft, Non-tender, + BS	  Extremities no edema, cyanosis, clubbing B/L LE's     DIAGNOSTIC DATA:    < from: TTE with Doppler (w/Cont) (04.03.17 @ 16:18) >  CONCLUSIONS:  1. Mitral annular calcification, otherwise normal mitral  valve. Mild mitral regurgitation.  2. Moderately dilated left atrium.  LA volume index = 46  cc/m2.  3. Moderate left ventricular enlargement.  4. Severe global left ventricular systolic dysfunction.  Endocardial visualization enhanced with intravenous  injection of echo contrast (Definity).  5. The right ventricle is not well visualized; grossly  normal right ventricular systolic function.  A device wire  is noted in the right heart.  6. Estimated right ventricular systolic pressure equals 60  mm Hg, assuming right atrial pressure equals 10 mm Hg,  consistent with moderate pulmonary hypertension.  ------------------------------------------------------------------------  Confirmed on  4/3/2017 - 17:19:47 by Jean-Pierre Carver M.D.    < end of copied text >    < from: Cardiac Cath Lab - Adult (04.26.16 @ 13:12) >  VENTRICLES: No LV gram was performed; however, a recent echocardiogram  demonstrated an EF of 19 %.  CORONARY VESSELS: The coronary circulation is right dominant.  LM:   --  LM: Normal.  LAD:   --  Proximal LAD: There was a [id number] % stenosis. There was mild  restenosis in proximal LAD stent.  --  Mid LAD: Angiography showed minor luminal irregularities with no flow  limiting lesions.  --  Distal LAD: Angiography showed minor luminal irregularities with no  flow limiting lesions.  --  D1: Angiography showed minor luminal irregularities with no flow  limiting lesions.  CX:   --  Circumflex: Normal.  RI:   --  Ramus intermedius: Angiography showed minor luminal  irregularities with no flow limiting lesions.  RCA:   --  Proximal RCA: Normal.  --  Mid RCA: Angiography showed mild atherosclerosis with no flow limiting  lesions.  --  Distal RCA: Angiography showed minor luminal irregularities with no  flow limiting lesions.  --  RPDA: Angiography showed minor luminal irregularities with no flow  limiting lesions.  --  RPLS: Angiography showed minor luminal irregularities with no flow  limiting lesions.  COMPLICATIONS: There were no complications.  DIAGNOSTIC RECOMMENDATIONS: Medical management and aggressive risk factor  modification is recommended.  Prepared and signed by  Nehal López M.D.  Signed 04/27/2016 13:13:20    < end of copied text >    < from: Transthoracic Echocardiogram (12.01.17 @ 16:53) >  CONCLUSIONS:  1. Normal mitral valve. Minimal mitral regurgitation.  2. Mild left ventricular enlargement.  3. Severe global left ventricular systolic dysfunction.  4. The right ventricle is not well visualized. A device  wire is noted in the right heart.  ------------------------------------------------------------------------  Confirmed on  12/1/2017 - 18:24:53 by Carli Encarnacion M.D. RPVI    < end of copied text      ASSESSMENT/PLAN: 	77y Female with hypertension, dyslipidemia, CAD s/p LAD stent with only mild re-stenosis with minor luminal irregularities otherwise on cath 4/16, with known severe LV dysfunction , NICM s/p [employer] BiV ICD, VT on Amio, COPD, lymphoma previously on chemo with known spinal mets and compression fractures recent admit with GNR bacteremia (pseudomonas), felt to be urinary source, and sacral decub wound culture +MRSA, s/p ABX, now admitted from SNF with fever and AMS/lethargy.     -continue with medical management of cad / pci and  cardiomyopathy with ASA/statin/BB  -not in clinical heart failure  -no further inpatient cv workup needed at this time  -ethics consult noted 12/21  -Anemic      monitor H/H w/i GOC   -family meeting planned for Monday   -patient remains full code

## 2018-01-07 NOTE — PROGRESS NOTE ADULT - PROBLEM SELECTOR PLAN 3
- morphine 1 IV q4 + PRNs for sacral ulcer pain; to be given with dressing change - likely toxic metabolic encephalopathy vs. vascular dementia; however pt also has R facial droop and R blown pupil  - She was reportedly able to converse normally prior to last admission  - CT head with no clear signs of CVA     #: Opacification of the right tympanomastoid cavity which may be secondary to an effusion or inflammatory related soft tissue seen on CT  - Reviewed ENT note; No need for steroids in palsy of this duration.  - CTIAC d/c'ed given it would be unlikely to change pts. outlook

## 2018-01-07 NOTE — PROGRESS NOTE ADULT - SUBJECTIVE AND OBJECTIVE BOX
Tamara Vieira, PGY-3  Medicine Team 4  Pager: [phone]  After 7pm on weekdays or after noon on weekends: Page coverage at [phone]     SUBJECTIVE / OVERNIGHT EVENTS:   No acute events overnight.  Afebrile.  Opens eyes to question and still nodding head yes/no but often not appropriately.      VITAL SIGNS:  Vital Signs Last 24 Hrs  T(C): 36.8 (06 Jan 2018 06:39), Max: 36.8 (05 Jan 2018 23:08)  T(F): 98.3 (06 Jan 2018 06:39), Max: 98.3 (06 Jan 2018 06:39)  HR: 97 (06 Jan 2018 06:39) (80 - 97)  BP: 112/63 (06 Jan 2018 06:39) (99/63 - 114/91)  BP(mean): --  RR: 19 (06 Jan 2018 06:39) (18 - 19)  SpO2: 99% (06 Jan 2018 06:39) (95% - 100%)      PHYSICAL EXAM:               GENERAL: cachectic, NAD, opens eyes when spoke to		  	EYES: fixed dilated R pupil  	MOUTH: mucous membranes dry  	CHEST/LUNG: tachypneic, Poor inspiratory effort  	HEART: Regular rate and rhythm; S3 present  	ABDOMEN: Soft, Nontender, Nondistended; Bowel sounds present  	GENITOURINARY: knutson in place draining yellow liquid with debris  	BACK: unstageable sacral decub ulcer worse since last admission  	PSYCH: Calm, nonverbal   	NEUROLOGY: R facial droop, general weakness, unable to asses thoroughly for focal deficits, patient not following commands, resting hand tremors                          7.2    10.19 )-----------( 292      ( 05 Jan 2018 10:25 )             21.5     01-05    139  |  101  |  6<L>  ----------------------------<  99  3.7   |  30  |  0.33<L>    Ca    8.1<L>      05 Jan 2018 10:25  Phos  2.0     01-05  Mg     1.7     01-05    TPro  4.7<L>  /  Alb  2.1<L>  /  TBili  0.4  /  DBili  x   /  AST  25  /  ALT  9   /  AlkPhos  76  01-05      CAPILLARY BLOOD GLUCOSE  POCT Blood Glucose.: 101 mg/dL (06 Jan 2018 00:19)  POCT Blood Glucose.: 90 mg/dL (05 Jan 2018 18:36)  POCT Blood Glucose.: 87 mg/dL (05 Jan 2018 12:41)      MEDICATIONS  (STANDING):  amiodarone    Tablet 400 milliGRAM(s) Oral daily  artificial  tears Solution 1 Drop(s) Right EYE daily  aspirin  chewable 81 milliGRAM(s) Oral daily  atorvastatin 20 milliGRAM(s) Oral at bedtime  Biotene Dry Mouth Oral Rinse 5 milliLiter(s) Swish and Spit three times a day  buDESOnide 160 MICROgram(s)/formoterol 4.5 MICROgram(s) Inhaler 2 Puff(s) Inhalation two times a day  Dakins Solution - 1/2 Strength 1 Application(s) Topical two times a day  dextrose 5% + lactated ringers. 1000 milliLiter(s) (60 mL/Hr) IV Continuous <Continuous>  dextrose 50% Injectable 12.5 Gram(s) IV Push once  enoxaparin Injectable 40 milliGRAM(s) SubCutaneous daily  FIRST- Mouthwash  BLM 5 milliLiter(s) Swish and Spit three times a day  latanoprost 0.005% Ophthalmic Solution 1 Drop(s) Both EYES at bedtime  levothyroxine Injectable 25 MICROGram(s) IV Push daily  metoprolol    tartrate Injectable 2.5 milliGRAM(s) IV Push every 6 hours  mirtazapine 7.5 milliGRAM(s) Oral at bedtime  morphine  - Injectable 1 milliGRAM(s) IV Push every 4 hours  multivitamin 1 Tablet(s) Oral daily  tiotropium 18 MICROgram(s) Capsule 1 Capsule(s) Inhalation daily Tamara Vieira, PGY-3  Medicine Team 4  Pager: [phone]  After 7pm on weekdays or after noon on weekends: Page coverage at [phone]     SUBJECTIVE / OVERNIGHT EVENTS:   Hypoxic to 50s on RA with ABG performed and WNL.  Moaning in pain in the AM with morphine increased from 1q4 IVP to 1.5 IVP q4.  Afebrile.  Opens eyes to question but otherwise not responding to questions.      Later in morning, notified by nursing that pt breathing agonally.  Upon examination, patient was pulseless with Code Blue called.  Please see MAR note for Code note.    ROS  Unable to respond appropriately to questions.  Lethargic.    MEDICATIONS  (STANDING):  amiodarone    Tablet 400 milliGRAM(s) Oral daily  artificial  tears Solution 1 Drop(s) Right EYE daily  aspirin  chewable 81 milliGRAM(s) Oral daily  Biotene Dry Mouth Oral Rinse 5 milliLiter(s) Swish and Spit three times a day  buDESOnide 160 MICROgram(s)/formoterol 4.5 MICROgram(s) Inhaler 2 Puff(s) Inhalation two times a day  Dakins Solution - 1/2 Strength 1 Application(s) Topical two times a day  dextrose 5% + lactated ringers. 1000 milliLiter(s) (60 mL/Hr) IV Continuous <Continuous>  dextrose 50% Injectable 12.5 Gram(s) IV Push once  enoxaparin Injectable 40 milliGRAM(s) SubCutaneous daily  FIRST- Mouthwash  BLM 5 milliLiter(s) Swish and Spit three times a day  latanoprost 0.005% Ophthalmic Solution 1 Drop(s) Both EYES at bedtime  levothyroxine Injectable 25 MICROGram(s) IV Push daily  magnesium sulfate  IVPB 2 Gram(s) IV Intermittent once  metoprolol    tartrate Injectable 2.5 milliGRAM(s) IV Push every 6 hours  mirtazapine 7.5 milliGRAM(s) Oral at bedtime  morphine  - Injectable 1.5 milliGRAM(s) IV Push every 4 hours  multivitamin 1 Tablet(s) Oral daily  potassium phosphate IVPB 15 milliMole(s) IV Intermittent once  tiotropium 18 MICROgram(s) Capsule 1 Capsule(s) Inhalation daily    MEDICATIONS  (PRN):  acetaminophen  Suppository 650 milliGRAM(s) Rectal every 6 hours PRN For Temp greater than 38 C (100.4 F)  acetaminophen  Suppository. 650 milliGRAM(s) Rectal every 6 hours PRN Mild Pain (1 - 3)  dextrose Gel 1 Dose(s) Oral every 6 hours PRN Blood Glucose LESS THAN 70 milliGRAM(s)/deciliter  dextrose Gel 1 Dose(s) Oral every 6 hours PRN Blood Glucose LESS THAN 70 milliGRAM(s)/deciliter      Vital Signs Last 24 Hrs  T(C): 37.1 (01-07-18 @ 05:38), Max: 37.1 (01-07-18 @ 05:38)  T(F): 98.7 (01-07-18 @ 05:38), Max: 98.7 (01-07-18 @ 05:38)  HR: 87 (01-07-18 @ 08:56) (63 - 105)  BP: 101/73 (01-07-18 @ 08:56) (101/73 - 144/70)  BP(mean): --  RR: 32 (01-07-18 @ 08:56) (19 - 32)  SpO2: 91% (01-07-18 @ 08:56) (91% - 93%)    PHYSICAL EXAM:               GENERAL: cachectic, NAD, opens eyes when spoke to		  	EYES: fixed dilated R pupil  	MOUTH: mucous membranes dry  	CHEST/LUNG: tachypneic, rhonchorous diffusely, poor inspiratory effort  	HEART: Regular rate and rhythm; S3 present  	ABDOMEN: Soft, Nontender, Nondistended; Bowel sounds present  	GENITOURINARY: knutson in place draining yellow liquid with debris  	BACK: unstageable sacral decub ulcer worse since last admission  	PSYCH: Calm, nonverbal   	NEUROLOGY: R facial droop, general weakness, unable to asses thoroughly for focal deficits, patient not following commands, resting hand tremors    LABS                              8.4    17.77 )-----------( 336      ( 06 Jan 2018 10:10 )             27.0       01-07    140  |  101  |  10  ----------------------------<  133<H>  4.4   |  28  |  0.53    Ca    8.3<L>      07 Jan 2018 08:00  Phos  1.9     01-07  Mg     1.6     01-07    TPro  4.8<L>  /  Alb  2.1<L>  /  TBili  0.5  /  DBili  x   /  AST  45<H>  /  ALT  13  /  AlkPhos  114  01-07    LIVER FUNCTIONS - ( 07 Jan 2018 08:00 )  Alb: 2.1 g/dL / Pro: 4.8 g/dL / ALK PHOS: 114 u/L / ALT: 13 u/L / AST: 45 u/L / GGT: x           POCT Blood Glucose.: 87 mg/dL (05 Jan 2018 12:41)

## 2018-01-07 NOTE — CHART NOTE - NSCHARTNOTEFT_GEN_A_CORE
Attending Note -     Provider notified by RN that patient is agonally breathing. Patient was assessed at bedside. On exam patient was not responsive to command. Patient was found moaning in pain. Lung exam was remarkable for ronchi. Nurse was asked to suction the patient. Morphine was ordered for symptoms relief. I attempted to reach son Mr. Gavin Pulido. Voice message was left for him for a call back. Per review of chart, multiple [facility] conversations were held regarding resuscitation. Palliative care and Ethics teams were involved as well. After multiple discussion it was clearly informed to the family that patient would not benefit with resuscitative efforts and PEG tubes given terminal illness, multiple comorbidities and overall poor prognosis. On re-evaluation patient was found pulseless. It was my medical determination that patient would not benefit with resuscitative efforts given overall poor prognosis and given risk of chest compression and intubation would outweigh any benefit to the patient. However patient's family retuned the phone call and demanded all aggressive measures be done despite understanding the harm it would cause patient. Code blue was called and patient underwent 3 rounds of resuscitative efforts. Patient  at [id number].

## 2018-01-07 NOTE — PROGRESS NOTE ADULT - PROBLEM SELECTOR PLAN 4
- Speech and Swallow recommended NPO; pleasure feeds are being attempted however pt pockets food and does not swallow it  - GI eval for PEG; recommend holding off on PEG given risk outweighs benefits  - Attempted to place NG 12/21 and 12/26 per son's request; however first time was not positioned appropriately and was removed; second time son decided against it given pts obvious discomfort  - finger sticks Q6  - now on D5 LR since not eating - uptitrated morphine to 1.5 IV q4 + PRNs for sacral ulcer pain; to be given with dressing change

## 2018-01-07 NOTE — PROGRESS NOTE ADULT - PROBLEM SELECTOR PLAN 2
- likely toxic metabolic encephalopathy vs. vascular dementia; however pt also has R facial droop and R blown pupil  - She was reportedly able to converse normally prior to last admission  - CT head with no clear signs of CVA     #: Opacification of the right tympanomastoid cavity which may be secondary to an effusion or inflammatory related soft tissue seen on CT  - Reviewed ENT note; No need for steroids in palsy of this duration.  - CTIAC d/c'ed given it would be unlikely to change pts. outlook - Sepsis - Resolved, no longer febrile and WBC wnl  - Xrays inconclusive, but given extent of ulcer diagnosis likely  - 14 day Meropenem course completed. Monitor off antibiotics  - Wound care debridement cultures growing ESBL E.Coli, pseudomonas and VRE  - Blood cultures negative, Urine Cx [Candida] - asymptomatic candiduria - No tx

## 2018-01-07 NOTE — CHART NOTE - NSCHARTNOTEFT_GEN_A_CORE
Code Blue was called at ~11:25AM after patient was found to be pulseless without breaths by providers. Monitor showed PEA arrest on rhythm check and 1mg of epinephrine was given. Subsequent rhythm and pulse checks continued to show PEA arrest, and 2 additional rounds of epinephrine were given. 1 amp of bicarb was also given. Reversible causes were considered with all providers present at the [facility] [place]. Patient was pronounced at 11:39AM with patient not having any palpated pulses, no auscultated heart sounds, no breath sounds, fixed pupils, and absent corneal reflexes. Patient's family was notified by primary team and support offered to family.    Lorene Crocker MAR  [phone], pager [phone]

## 2018-01-07 NOTE — PROGRESS NOTE ADULT - ASSESSMENT
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

## 2018-01-07 NOTE — PROGRESS NOTE ADULT - PROBLEM SELECTOR PLAN 9
- reconsulted Ethics for dispo planning; they say they can come to family meeting social work set up for Monday at 3PM with son Gavin  - Son currently refusing hospice; had discussion with the son who is not proxy on 1/3 and discussed end of life care plans with him given that the pt is showing no signs of improvement.  He said he will discuss this with his brother.  - several Face to face conversations held with miguel Alonso at bedside  - Updated on current clinical progress and that patient is not candidate for PEG. GI fellow also at bedside on Fri 12/22 and discussed with son that offered a trial of NGT Patient advocate called per son request. Ethics consult also placed and stated that a trial of NGT could be attempted but would not recommend discharging patient with NGT. Per son, would still like CPR and intubation if necessary knowing that this will likely prolong patient's suffering and pain.  - Will get labs Q48 hours as opposed to daily  - PT recs: extended care facility - reconsulted Ethics for dispo planning; they say they can come to family meeting social work set up for Monday at 3PM with son Gavin  - Son currently refusing hospice; had discussion with the son who is not proxy on 1/3 and discussed end of life care plans with him given that the pt is showing no signs of improvement.  He said he will discuss this with his brother.  - several Face to face conversations held with miguel Alonso at bedside  - Updated on current clinical progress and that patient is not candidate for PEG. GI fellow also at bedside on Fri 12/22 and discussed with son that offered a trial of NGT Patient advocate called per son request. Ethics consult also placed and stated that a trial of NGT could be attempted but would not recommend discharging patient with NGT. Per son, would still like CPR and intubation if necessary knowing that this will likely prolong patient's suffering and pain.  At the start of Code, son notified of patient's condition and expressed that patient still FULL CODE.  - Will get labs Q48 hours as opposed to daily  - PT recs: extended care facility

## 2018-01-07 NOTE — PROGRESS NOTE ADULT - ATTENDING COMMENTS
Provider notified by RN that patient is agonally breathing. Patient was assessed at bedside. On exam patient was not responsive to command. Patient was found moaning in pain. Lung exam was remarkable for ronchi. Nurse was asked to suction the patient. Morphine was ordered for symptoms relief. I attempted to reach son Mr. Gavin Pulido. Voice message was left for him for a call back. Per review of chart, multiple [facility] conversations were held regarding resuscitation. Palliative care and Ethics teams were involved as well. After multiple discussion it was clearly informed to the family that patient would not benefit with resuscitative efforts and PEG tubes given terminal illness, multiple comorbidities and overall poor prognosis. On re-evaluation patient was found pulseless. It was my medical determination that patient would not benefit with resuscitative efforts given overall poor prognosis and given risk of chest compression and intubation would outweigh any benefit to the patient. However patient's family retuned the phone call and demanded all aggressive measures be done despite understanding the harm it would cause patient. Code blue was called and patient underwent 3 rounds of resuscitative efforts. Patient  at [id number].

## 2018-01-07 NOTE — DISCHARGE NOTE FOR THE EXPIRED PATIENT - HOSPITAL COURSE
76 yo f w/ CAD, MI, HFrEF (EF 15-20%) s/p AICD, HTN, lymphoma previously on chemo with known spinal mets and compression fractures sent from NH for fever after just being discharged with hospitalization due to AMS with tachycardia, fever, hypotension.  She had previous admission for UTI with pseudomonas and CRE.  Prior to last admission her son reports that she was able to converse in words/short phrases at baseline, however since then has been non-verbal.  She received cefepime and vanco during last admission based on previous sensitivities.  Pt AMS improved after a few days (however she still was hardly speaking) and she passed s/s eval, so was started on oral feeds and was able to take PO meds when she was given them with ensure. Pt grew pseudomonas in blood and was continued on Cefepime and dc'd vanc. Swab of decubitus ulcer came back positive for MRSA and therefore, pt was continued on contact isolations and started on Vanc. TTE returned negative.  Pt completed 14 course of Cefepime, however, she spiked a fever 2 days after completion.   However, she then had no fevers for the next few days and repeat blood cultures were negative so ID was in agreement with stopping antibiotics and discharging back to nursing home.           When the pt returned to the hospital for this hospitalizations she was not speaking and was nodding her head yes/no to questions, however not appropriately.  It was noted that she had R facial drop, hand tremors and fixed dilated R pupil.  CT head was only significant for opacification of the right tympanomastoid cavity which may be secondary to an effusion or inflammatory related soft tissue.  This was discussed with ENT who felt that CTIAC would better image the findings, however given pts functional status and prognosis it would not be of any value in terms of pts outlook.  Also, ENT reported that if they were able to see more detailed nerve compression they would recommend steroids, however given pts infections this would not be advisable.        On this admission sacral ulcer had clearly gotten worse.  Wound care doctor was able to probe bone during I&D and cultures grew ESBL e coli, pseudomonas and vancomycin-resistant enterococcus faecalis.  The wound care doctor left recommendations for wound care which were strictly followed by the nursing staff.  Pt was kept on meropenem per ID recs for 14 day course.  A longer course was not needed as the pt will likely stay chronically infected in the ulcer given its size, depth and location.  Blood cultures were negative.         Xray was done to r/o osteo, however image was not ideal.  However, no further imaging was warranted as it would not change pts prognosis or care.  Given the extent of the ulcer we can assume that osteomyelitis is present.         Pt received GI eval for possible PEG based on son's request, however GI ruled the pt to not be a candidate.  NG tube placement was attempted twice per the son's request, however it was unable to be properly positioned and given the obvious distress that the NG tube was causing the pt her son decided against NG tube placement.          She was in obvious distress despite being nonverbal, likely due to unstageable sacral ulcer.  Pt was given low dose morphine to keep her more comfortable.  She never showed signs of lethargy or respiratory depression.  We discussed increasing the dose of your morphine with the pts son Gavin, however given the possibility that this could lead to respiratory suppression he preferred that we keep the dose the same.  He continued to express that his goal was to keep you alive for as long as possible, regardless of whether or not this causes increase/prolonged pain and suffering.         Speech and swallow recommended NPO however pts family understood the risks of aspiration and decided on pleasure feeds.  However, she began to be unable to swallow at all and was kept on D5 to keep her glucose wnl.          The patient has a history of lymphoma, however the stage and current status is not know.  We asked the pts son (Gavin) to provide us with the information for the doctor who was taking care of this and he told us that he would get it for us on several occasions.  However, we were never able to obtain this doctor's name or phone number.  We were given the name of one doctor who the son thought was the Oncologist, however we were unable to locate any contact information for this physician and Gavin told us that he likely had the name wrong.  We explained to him that if he gets us the information we would be willing to get in touch with the doctor and give him more answers; however we would not be pursing a workup for this while in the hospital since it would not have an impact on the patient's treatment or prognosis.         Several conversations were had with the pts son (also her other son was present for some of the conversations) about code status.  The son did not want his mother to be put DNR/DNI and wanted everything done for her.  He voiced to the team that he was ok with his mother having increased suffering as long as it allowed her to live longer.  He said that he felt that it was his obligation to have everything done for her in order to get her to live as long as possible, regardless of her quality of life.      Providers were notified by nursing that patient was agonally breathing.  Providers went to examine patient and at 11:28AM was noted to be pulseless without breaths.  Code Blue was called with ACLS performed with 3 rounds of epinephrine with 1 of NaHCO3 also given.  Patient was pronounced at 11:39a with no palpated or auscultated pulse, no breath sounds, and absent corneal reflexes.  Hospitalist Attending Dr Burrows attempted to notify family and left voicemails on the following numbers: [phone]; [phone]; [phone].  Family had stated during prior phone conversation that they would be at the hospital within the hour. 76 yo f w/ CAD, MI, HFrEF (EF 15-20%) s/p AICD, HTN, lymphoma previously on chemo with known spinal mets and compression fractures sent from NH for fever after just being discharged with hospitalization due to AMS with tachycardia, fever, hypotension.  She had previous admission for UTI with pseudomonas and CRE.  Prior to last admission her son reports that she was able to converse in words/short phrases at baseline, however since then has been non-verbal.  She received cefepime and vanco during last admission based on previous sensitivities.  Pt AMS improved after a few days (however she still was hardly speaking) and she passed s/s eval, so was started on oral feeds and was able to take PO meds when she was given them with ensure. Pt grew pseudomonas in blood and was continued on Cefepime and dc'd vanc. Swab of decubitus ulcer came back positive for MRSA and therefore, pt was continued on contact isolations and started on Vanc. TTE returned negative.  Pt completed 14 course of Cefepime, however, she spiked a fever 2 days after completion.   However, she then had no fevers for the next few days and repeat blood cultures were negative so ID was in agreement with stopping antibiotics and discharging back to nursing home.           When the pt returned to the hospital for this hospitalizations she was not speaking and was nodding her head yes/no to questions, however not appropriately.  It was noted that she had R facial drop, hand tremors and fixed dilated R pupil.  CT head was only significant for opacification of the right tympanomastoid cavity which may be secondary to an effusion or inflammatory related soft tissue.  This was discussed with ENT who felt that CTIAC would better image the findings, however given pts functional status and prognosis it would not be of any value in terms of pts outlook.  Also, ENT reported that if they were able to see more detailed nerve compression they would recommend steroids, however given pts infections this would not be advisable.        On this admission sacral ulcer had clearly gotten worse.  Wound care doctor was able to probe bone during I&D and cultures grew ESBL e coli, pseudomonas and vancomycin-resistant enterococcus faecalis.  The wound care doctor left recommendations for wound care which were strictly followed by the nursing staff.  Pt was kept on meropenem per ID recs for 14 day course.  A longer course was not needed as the pt will likely stay chronically infected in the ulcer given its size, depth and location.  Blood cultures were negative.         Xray was done to r/o osteo, however image was not ideal.  However, no further imaging was warranted as it would not change pts prognosis or care.  Given the extent of the ulcer we can assume that osteomyelitis is present.         Pt received GI eval for possible PEG based on son's request, however GI ruled the pt to not be a candidate.  NG tube placement was attempted twice per the son's request, however it was unable to be properly positioned and given the obvious distress that the NG tube was causing the pt her son decided against NG tube placement.          She was in obvious distress despite being nonverbal, likely due to unstageable sacral ulcer.  Pt was given low dose morphine to keep her more comfortable.  She never showed signs of lethargy or respiratory depression.  We discussed increasing the dose of your morphine with the pts son Gavin, however given the possibility that this could lead to respiratory suppression he preferred that we keep the dose the same.  He continued to express that his goal was to keep you alive for as long as possible, regardless of whether or not this causes increase/prolonged pain and suffering.         Speech and swallow recommended NPO however pts family understood the risks of aspiration and decided on pleasure feeds.  However, she began to be unable to swallow at all and was kept on D5 to keep her glucose wnl.          The patient has a history of lymphoma, however the stage and current status is not know.  We asked the pts son (Gavin) to provide us with the information for the doctor who was taking care of this and he told us that he would get it for us on several occasions.  However, we were never able to obtain this doctor's name or phone number.  We were given the name of one doctor who the son thought was the Oncologist, however we were unable to locate any contact information for this physician and Gavin told us that he likely had the name wrong.  We explained to him that if he gets us the information we would be willing to get in touch with the doctor and give him more answers; however we would not be pursing a workup for this while in the hospital since it would not have an impact on the patient's treatment or prognosis.         Several conversations were had with the pts son (also her other son was present for some of the conversations) about code status.  The son did not want his mother to be put DNR/DNI and wanted everything done for her.  He voiced to the team that he was ok with his mother having increased suffering as long as it allowed her to live longer.  He said that he felt that it was his obligation to have everything done for her in order to get her to live as long as possible, regardless of her quality of life.      Providers were notified by nursing that patient was agonally breathing.  Providers went to examine patient and at 11:28AM was noted to be pulseless without breaths.  Code Blue was called with ACLS performed with 3 rounds of epinephrine with 1 of NaHCO3 also given.  Patient was pronounced at 11:39a with no palpated or auscultated pulse, no breath sounds, and absent corneal reflexes.  Hospitalist Attending Dr Burrows attempted to notify family and left voicemails on the following numbers: [phone]; [phone]; [phone].  Family had stated during prior phone conversation that they would be at the hospital within the hour.    Family came and Bishop Gavin Pulido informed in person that we attempted resuscitation but were unfortunately unsuccessful.  He expressed his thanks and declined autopsy.

## 2018-01-07 NOTE — PROVIDER CONTACT NOTE (OTHER) - ACTION/TREATMENT ORDERED:
low blood pressure, patient alert orient to person, MD notified stat order bolus fluid as per MD and tala
low blood pressure, patient alert with confusion, MD notified ordered stat bolus fluid
pain management, moaning and groaning with movement
MD aware and no new orders at this time.
MD aware. No interventions at this time.
MD aware. States he will come to assess the pt. Will continue to monitor
MD aware. States she will put in order for pain medication. Will continue to monitor
MD notified, no further interventions at this time, will continue to monitor
MD notified, no further interventions at this time, will continue to monitor.
MD notified, will continue to monitor, no further interventions at this time
Will treat as per MD order
hold medication and retry when patient is more awake
iv fluid dextrose 5% increased to 75cc/hour from 60 cc. will continue monitoring.
provider notified, administer dextrose 50 as per order and reassess
recheck in 1 hour
redraw labs
will follow up with ID

## 2018-01-07 NOTE — PROGRESS NOTE ADULT - SUBJECTIVE AND OBJECTIVE BOX
Subjective: non verbal, lethargic, but able to arouse                    MEDICATIONS  (STANDING):  amiodarone    Tablet 400 milliGRAM(s) Oral daily  artificial  tears Solution 1 Drop(s) Right EYE daily  aspirin  chewable 81 milliGRAM(s) Oral daily  Biotene Dry Mouth Oral Rinse 5 milliLiter(s) Swish and Spit three times a day  buDESOnide 160 MICROgram(s)/formoterol 4.5 MICROgram(s) Inhaler 2 Puff(s) Inhalation two times a day  Dakins Solution - 1/2 Strength 1 Application(s) Topical two times a day  dextrose 5% + lactated ringers. 1000 milliLiter(s) (60 mL/Hr) IV Continuous <Continuous>  dextrose 50% Injectable 12.5 Gram(s) IV Push once  enoxaparin Injectable 40 milliGRAM(s) SubCutaneous daily  FIRST- Mouthwash  BLM 5 milliLiter(s) Swish and Spit three times a day  latanoprost 0.005% Ophthalmic Solution 1 Drop(s) Both EYES at bedtime  levothyroxine Injectable 25 MICROGram(s) IV Push daily  magnesium sulfate  IVPB 2 Gram(s) IV Intermittent once  metoprolol    tartrate Injectable 2.5 milliGRAM(s) IV Push every 6 hours  mirtazapine 7.5 milliGRAM(s) Oral at bedtime  morphine  - Injectable 1.5 milliGRAM(s) IV Push every 4 hours  multivitamin 1 Tablet(s) Oral daily  potassium phosphate IVPB 15 milliMole(s) IV Intermittent once  tiotropium 18 MICROgram(s) Capsule 1 Capsule(s) Inhalation daily    MEDICATIONS  (PRN):  acetaminophen  Suppository 650 milliGRAM(s) Rectal every 6 hours PRN For Temp greater than 38 C (100.4 F)  acetaminophen  Suppository. 650 milliGRAM(s) Rectal every 6 hours PRN Mild Pain (1 - 3)  dextrose Gel 1 Dose(s) Oral every 6 hours PRN Blood Glucose LESS THAN 70 milliGRAM(s)/deciliter  dextrose Gel 1 Dose(s) Oral every 6 hours PRN Blood Glucose LESS THAN 70 milliGRAM(s)/deciliter      LABS:                        8.4    17.77 )-----------( 336      ( 06 Jan 2018 10:10 )             27.0     Hemoglobin: 8.4 g/dL (01-06 @ 10:10)  Hemoglobin: 7.2 g/dL (01-05 @ 10:25)  Hemoglobin: 8.5 g/dL (01-03 @ 06:30)  Hemoglobin: 9.4 g/dL (01-02 @ 11:40)    01-07    140  |  101  |  10  ----------------------------<  133<H>  4.4   |  28  |  0.53    Ca    8.3<L>      07 Jan 2018 08:00  Phos  1.9     01-07  Mg     1.6     01-07    TPro  4.8<L>  /  Alb  2.1<L>  /  TBili  0.5  /  DBili  x   /  AST  45<H>  /  ALT  13  /  AlkPhos  114  01-07    Creatinine Trend: 0.53<--, 0.33<--, 0.33<--, Insufficient quant<--, 0.35<--, 0.27<--       PHYSICAL EXAM  Vital Signs Last 24 Hrs  T(C): 37.1 (07 Jan 2018 05:38), Max: 37.1 (07 Jan 2018 05:38)  T(F): 98.7 (07 Jan 2018 05:38), Max: 98.7 (07 Jan 2018 05:38)  HR: 87 (07 Jan 2018 08:56) (63 - 105)  BP: 101/73 (07 Jan 2018 08:56) (101/73 - 144/70)  BP(mean): --  RR: 32 (07 Jan 2018 08:56) (19 - 32)  SpO2: 91% (07 Jan 2018 08:56) (91% - 93%)      Cardiovascular: Normal S1 S2, RRR   No JVD,  Respiratory: decreased breath sounds w/ decreased inspiratory effort noted  B/L	  Gastrointestinal:  Soft, Non-tender, + BS	  Extremities no edema, cyanosis, clubbing B/L LE's     DIAGNOSTIC DATA:    < from: TTE with Doppler (w/Cont) (04.03.17 @ 16:18) >  CONCLUSIONS:  1. Mitral annular calcification, otherwise normal mitral  valve. Mild mitral regurgitation.  2. Moderately dilated left atrium.  LA volume index = 46  cc/m2.  3. Moderate left ventricular enlargement.  4. Severe global left ventricular systolic dysfunction.  Endocardial visualization enhanced with intravenous  injection of echo contrast (Definity).  5. The right ventricle is not well visualized; grossly  normal right ventricular systolic function.  A device wire  is noted in the right heart.  6. Estimated right ventricular systolic pressure equals 60  mm Hg, assuming right atrial pressure equals 10 mm Hg,  consistent with moderate pulmonary hypertension.  ------------------------------------------------------------------------  Confirmed on  4/3/2017 - 17:19:47 by Jean-Pierre Carver M.D.    < end of copied text >    < from: Cardiac Cath Lab - Adult (04.26.16 @ 13:12) >  VENTRICLES: No LV gram was performed; however, a recent echocardiogram  demonstrated an EF of 19 %.  CORONARY VESSELS: The coronary circulation is right dominant.  LM:   --  LM: Normal.  LAD:   --  Proximal LAD: There was a [id number] % stenosis. There was mild  restenosis in proximal LAD stent.  --  Mid LAD: Angiography showed minor luminal irregularities with no flow  limiting lesions.  --  Distal LAD: Angiography showed minor luminal irregularities with no  flow limiting lesions.  --  D1: Angiography showed minor luminal irregularities with no flow  limiting lesions.  CX:   --  Circumflex: Normal.  RI:   --  Ramus intermedius: Angiography showed minor luminal  irregularities with no flow limiting lesions.  RCA:   --  Proximal RCA: Normal.  --  Mid RCA: Angiography showed mild atherosclerosis with no flow limiting  lesions.  --  Distal RCA: Angiography showed minor luminal irregularities with no  flow limiting lesions.  --  RPDA: Angiography showed minor luminal irregularities with no flow  limiting lesions.  --  RPLS: Angiography showed minor luminal irregularities with no flow  limiting lesions.  COMPLICATIONS: There were no complications.  DIAGNOSTIC RECOMMENDATIONS: Medical management and aggressive risk factor  modification is recommended.  Prepared and signed by  Nehal López M.D.  Signed 04/27/2016 13:13:20    < end of copied text >    < from: Transthoracic Echocardiogram (12.01.17 @ 16:53) >  CONCLUSIONS:  1. Normal mitral valve. Minimal mitral regurgitation.  2. Mild left ventricular enlargement.  3. Severe global left ventricular systolic dysfunction.  4. The right ventricle is not well visualized. A device  wire is noted in the right heart.  ------------------------------------------------------------------------  Confirmed on  12/1/2017 - 18:24:53 by TAJ Hyatt    < end of copied text      ASSESSMENT/PLAN: 	77y Female with hypertension, dyslipidemia, CAD s/p LAD stent with only mild re-stenosis with minor luminal irregularities otherwise on cath 4/16, with known severe LV dysfunction , NICM s/p [employer] BiV ICD, VT on Amio, COPD, lymphoma previously on chemo with known spinal mets and compression fractures recent admit with GNR bacteremia (pseudomonas), felt to be urinary source, and sacral decub wound culture +MRSA, s/p ABX, now admitted from SNF with fever and AMS/lethargy.     -continue with medical management of cad / pci and  cardiomyopathy with ASA/statin/BB  -not in clinical heart failure  -no further inpatient cv workup needed at this time  -ethics consult noted 12/21  -Anemic      monitor H/H w/i GOC   -family meeting planned for Monday   -patient remains full code

## 2018-01-07 NOTE — PROGRESS NOTE ADULT - PROBLEM SELECTOR PLAN 1
- Sepsis - Resolved, no longer febrile and WBC wnl  - Xrays inconclusive, but given extent of ulcer diagnosis likely  - 14 day Meropenem course completed. Now off antibiotics  - Wound care debridement cultures growing ESBL E.Coli, pseudomonas and VRE  - Blood cultures negative, Urine Cx [Candida] - asymptomatic candiduria - No tx - Speech and Swallow recommended NPO; pleasure feeds are being attempted however pt pockets food and does not swallow it  - new rhonchi likely 2/2 aspiration of secretions despite good oral care by nursing staff; SpO2 maintained >90% on RA  - GI eval for PEG; recommend holding off on PEG given risk outweighs benefits  - Attempted to place NG 12/21 and 12/26 per son's request; however first time was not positioned appropriately and was removed; second time son decided against it given pts obvious discomfort  - finger sticks Q6  - now on D5 LR since not eating

## 2018-01-18 NOTE — PROGRESS NOTE ADULT - PROBLEM SELECTOR PLAN 4
Not on AC. c/w amiodarone No pertinent past medical history Not on AC. c/w amiodarone for h/o Vfib shock per EP

## 2018-03-07 NOTE — PROGRESS NOTE ADULT - ATTENDING COMMENTS
Problem: Falls - Risk of  Goal: *Absence of Falls  Document Kinjal Fall Risk and appropriate interventions in the flowsheet.    Outcome: Progressing Towards Goal  Fall Risk Interventions:  Mobility Interventions: Bed/chair exit alarm, Communicate number of staff needed for ambulation/transfer, Utilize walker, cane, or other assitive device    Mentation Interventions: Adequate sleep, hydration, pain control, Bed/chair exit alarm, Door open when patient unattended, Evaluate medications/consider consulting pharmacy, Eyeglasses and hearing aids, Familiar objects from home, Family/sitter at bedside, Gait belt with transfers/ambulation, Increase mobility, More frequent rounding, Reorient patient, Room close to nurse's station, Self-releasing belt, Toileting rounds, Update white board    Medication Interventions: Assess postural VS orthostatic hypotension, Evaluate medications/consider consulting pharmacy, Patient to call before getting OOB    Elimination Interventions: Call light in reach, Toileting schedule/hourly rounds Agree with above assessment and plan as outlined above.    - Cont Abx per primary team    Raymond Kinney MD, FACC  [facility], [facility]  [address] [place] [address]  PHONE:  [phone]  BEEPER : [phone]

## 2018-04-24 NOTE — CONSULT NOTE ADULT - PROBLEM SELECTOR PROBLEM 5
Patient states she will be flying by plane to [place] for a work trip in the next few weeks. We discussed discouraging travel after 35 weeks of pregnancy, compression socks, hydration, and walking the plane aisle every 1 hour. She verbalized understanding. No further questions at this time.   Decubitus ulcer of sacral region, unspecified ulcer stage

## 2018-08-12 NOTE — PROGRESS NOTE ADULT - PROBLEM SELECTOR PROBLEM 8
D/C instructions and prescriptions given to pt, pt verbalizes understanding.     Need for prophylactic measure

## 2019-03-04 NOTE — AIRWAY PLACEMENT NOTE ADULT - POST AIRWAY PLACEMENT ASSESSMENT:
Patient switched to Klonopin 1 mg  Bid, and Clonidine 0 2 mg  At bedtime    Continue yoga/meditation, exercise, therapy, etc  breath sounds bilateral/positive end tidal CO2 noted/breath sounds equal

## 2019-03-31 NOTE — ED PROVIDER NOTE - CONSTITUTIONAL NEGATIVE STATEMENT, MLM
Full range of motion of upper and lower extremities, no joint tenderness/swelling. no fever and no chills.

## 2019-05-15 NOTE — PROGRESS NOTE ADULT - PROBLEM SELECTOR PLAN 7
Pt bed bound and unable to move arms well at baseline. She is completely dependent and long term resident in nursing home.   -pt to be discharged back to NH on Friday after completion of abx no

## 2019-07-24 NOTE — PROGRESS NOTE ADULT - PROBLEM SELECTOR PLAN 5
OK to refill x1 year   - Pt bed bound and unable to move arms well at baseline. She is completely dependent and long term resident in nursing home

## 2019-12-19 NOTE — DIETITIAN INITIAL EVALUATION ADULT. - DIET TYPE
amLODIPine 5 mg oral tablet: 1 tab(s) orally once a day  aspirin 81 mg oral tablet: 1 tab(s) orally once a day  Ativan 0.5 mg oral tablet: 1 tab(s) orally 2 times a day  benztropine 0.5 mg oral tablet: 1 tab(s) orally once a day (at bedtime)  calcium carbonate 500 mg (200 mg elemental calcium) oral tablet, chewable: 1 tab(s) orally once a day  dexamethasone 4 mg oral tablet: 5 tab(s) orally once a day  ferrous sulfate 325 mg (65 mg elemental iron) oral tablet: 1 tab(s) orally once a day  folic acid 1 mg oral tablet: 1 tab(s) orally once a day  furosemide 40 mg oral tablet: 1 tab(s) orally once a day  levothyroxine 75 mcg (0.075 mg) oral tablet: 1 tab(s) orally once a day  levothyroxine 75 mcg (0.075 mg) oral tablet: 1 tab(s) orally once a day  lithium 300 mg oral capsule: 1 cap(s) orally once a day  lithium 300 mg/5 mL (8 mEq/5 mL) oral syrup: 5 milliliter(s) orally 3 times a day  loperamide 2 mg oral tablet: 1 tab(s) orally 2 times a day, As Needed  risperiDONE 50 mg/2 weeks intramuscular injection, extended release: 50 milligram(s) intramuscular every other week  selinexor 20 mg oral tablet: 4 tab(s) orally once a day  valACYclovir 500 mg oral tablet: 1 tab(s) orally 2 times a day  Vitamin D3 1000 intl units oral tablet: 1 tab(s) orally once a day NPO amLODIPine 5 mg oral tablet: 1 tab(s) orally once a day  aspirin 81 mg oral tablet: 1 tab(s) orally once a day  Ativan 0.5 mg oral tablet: 1 tab(s) orally 2 times a day  benzonatate 100 mg oral capsule: 1 cap(s) orally 3 times a day, As needed, Cough  benztropine 0.5 mg oral tablet: 1 tab(s) orally once a day (at bedtime)  calcium carbonate 500 mg (200 mg elemental calcium) oral tablet, chewable: 1 tab(s) orally once a day  carvedilol 3.125 mg oral tablet: 1 tab(s) orally every 12 hours  dexamethasone 4 mg oral tablet: 5 tab(s) orally once a day  fentaNYL 12 mcg/hr transdermal film, extended release: 1 patch transdermal every 72 hours  ferrous sulfate 325 mg (65 mg elemental iron) oral tablet: 1 tab(s) orally once a day  folic acid 1 mg oral tablet: 1 tab(s) orally once a day  furosemide 40 mg oral tablet: 1 tab(s) orally once a day  gabapentin 100 mg oral capsule: 1 cap(s) orally once a day (at bedtime)  levothyroxine 75 mcg (0.075 mg) oral tablet: 1 tab(s) orally once a day  lisinopril 2.5 mg oral tablet: 1 tab(s) orally once a day  lithium 300 mg oral capsule: 1 cap(s) orally once a day  lithium 300 mg/5 mL (8 mEq/5 mL) oral syrup: 5 milliliter(s) orally 3 times a day  loperamide 2 mg oral tablet: 1 tab(s) orally 2 times a day, As Needed  Protonix 40 mg oral delayed release tablet: orally once a day for 7 days then stop   risperiDONE 50 mg/2 weeks intramuscular injection, extended release: 50 milligram(s) intramuscular every other week  selinexor 20 mg oral tablet: 4 tab(s) orally once a day  valACYclovir 500 mg oral tablet: 1 tab(s) orally 2 times a day  vancomycin 125 mg oral capsule: 1 cap(s) orally every 6 hours for 10 days then stop   Vitamin D3 1000 intl units oral tablet: 1 tab(s) orally once a day aluminum hydroxide-magnesium hydroxide 200 mg-200 mg/5 mL oral suspension: 30 milliliter(s) orally every 4 hours, As needed, Dyspepsia  aspirin 81 mg oral tablet: 1 tab(s) orally once a day  Ativan 0.5 mg oral tablet: 1 tab(s) orally 2 times a day  benzonatate 100 mg oral capsule: 1 cap(s) orally 3 times a day, As needed, Cough  benztropine 0.5 mg oral tablet: 1 tab(s) orally once a day (at bedtime)  carvedilol 3.125 mg oral tablet: 1 tab(s) orally every 12 hours  dexamethasone 4 mg oral tablet: 5 tab(s) orally every 7 days  fentaNYL 12 mcg/hr transdermal film, extended release: 1 patch transdermal every 72 hours  ferrous sulfate 325 mg (65 mg elemental iron) oral tablet: 1 tab(s) orally once a day  folic acid 1 mg oral tablet: 1 tab(s) orally once a day  furosemide 40 mg oral tablet: 1 tab(s) orally once a day  gabapentin 100 mg oral capsule: 1 cap(s) orally once a day (at bedtime)  levothyroxine 75 mcg (0.075 mg) oral tablet: 1 tab(s) orally once a day  lisinopril 2.5 mg oral tablet: 1 tab(s) orally once a day  lithium 300 mg oral capsule: 1 cap(s) orally once a day  lithium 300 mg/5 mL (8 mEq/5 mL) oral syrup: 5 milliliter(s) orally 3 times a day  Protonix 40 mg oral delayed release tablet: orally once a day for 7 days then stop   risperiDONE 50 mg/2 weeks intramuscular injection, extended release: 50 milligram(s) intramuscular every other week  selinexor 20 mg oral tablet: 4 tab(s) orally once a day  valACYclovir 500 mg oral tablet: 1 tab(s) orally 2 times a day  vancomycin 125 mg oral capsule: 1 cap(s) orally every 6 hours for 10 days then stop   Vitamin D3 1000 intl units oral tablet: 1 tab(s) orally once a day

## 2020-03-01 NOTE — PHYSICAL THERAPY INITIAL EVALUATION ADULT - LONG TERM MEMORY, REHAB EVAL
Alert-The patient is alert, awake and responds to voice. The patient is oriented to time, place, and person. The triage nurse is able to obtain subjective information. intact

## 2020-03-10 NOTE — ED ADULT NURSE NOTE - CCCP TRG CHIEF CMPLNT
Final Ova and Parasite Exam Routine is NEGATIVE.  No treatment or change in treatment per [place] ED Lab Result protocol.
abd pain

## 2020-04-29 NOTE — PROGRESS NOTE ADULT - PROBLEM SELECTOR PROBLEM 6
-- DO NOT REPLY / DO NOT REPLY ALL --  -- Message is from the [facility]--    COVID-19 Universal Screening: Negative    Referral Request  Name of Specialist: Dr Mcelroy  Provider's specialty: Pulmonary Medicine    Medical condition for referral:  Lung problem    Is this a NEW request?: yes      Referral ordered by: Dr Liss Billings      Insurance type: HMO      Payor: [employer] DIRECT HMO - [employer]VOCATE / Plan: [employer] DIRECT HMO - ADVOCATE / Product Type: [facility]      Preferred Delivery Method   Call when ready for pickup - phone number to notify: [phone]     Dr Mcelroy phone [phone], patient asking to fax the referral to the office.      Caller Information       Type Contact Phone    04/29/2020 09:15 AM Phone (Incoming) Beena Calixto (Self) [phone] (M)          Alternative phone number: none      Turnaround time given to caller:   \"This message will be sent to [state Provider's full name]. The clinical team will return your call as soon as they review your message. Typically, it takes 3 business days to process referral requests.\"   CAD (coronary artery disease)

## 2020-06-16 NOTE — PROGRESS NOTE ADULT - PROBLEM SELECTOR PLAN 2
Physical Therapy Daily Treatment Note     Name: Ashli Michaels  Clinic Number: [id number]    Therapy Diagnosis:   Encounter Diagnoses   Name Primary?    Acute pain of right knee     Abnormal gait     Atrophy of muscle of right thigh      Physician: Farhad Hess PA*    Visit Date: 6/16/2020     Patient will weight bear as tolerates with the brace locked in extension. Range of motion may be full. Plan to follow an ACL allograft rehab protocol. Initial goals include maintenance of full knee extension, regaining flexion, swelling control, and quadriceps activation exercises.  May wean the brace and crutches as soon as she demonstrates good quadriceps control over the knee.  Expected release for sports no earlier than 12 months following [place] sport cord testing for the allograft. DVT prophylaxis with ASA 81 mg twice daily x 2 weeks.      Medical Diagnosis from Referral:   Evaluation Date: 5/27/2020 S83.511A (ICD-10-CM) - Rupture of anterior cruciate ligament of right knee, initial encounter  Authorization Period Expiration: 12-31-20  Plan of Care Expiration: 11-30-20  Visit # / Visits authorized:6/ 20    Time In: 0830  Time Out: 0930  Total Billable Time: 60 minutes    Precautions: Standard    Subjective     Pt reports: pt states she is walking independently with no brace. Incisions healing well. Going on vacation next week. Will take crutches and brace. Reports she feels about 35% improvement.  She was compliant with home exercise program.  Response to previous treatment: improved knee flexion  Functional change: improved ROM    Pain: 0-3/10  Location: right knee      Objective     R knee ROM   4-0-115    Ashli Eng received therapeutic exercises to develop strength, endurance, ROM and flexibility for 55 minutes including:  Heel raises on step  Step ups  Mini squat holds 1 mins 5 x  Single leg stance 3 x 1 min max effort  Quad set towel underknee with SLR 2 x 10  Flexion  10/10 hip circles   ABD  10/10 hip circles  Prone hamstring curls to tolerance  Heel slides 40 x  Lunge isometrics 4 x 1 mins  LAQ 3 x 10 3 second hold  SAQ with quad set prep 2  X 10  Prone quad stretch  Supine h/s stretch  Standing TKE green 2 x 10    Ashli Eng received NMES to quad for re education x 0 mins in full ext and hyper extension    Ashli Eng received hot pack for 0 minutes to 0.    Ashli Eng received cold pack for knee minutes to 10.      Home Exercises Provided and Patient Education Provided     Education provided:   - issued hep    Written Home Exercises Provided: yes.  Exercises were reviewed and Ashli Eng was able to demonstrate them prior to the end of the session.  Ashli Eng demonstrated good  understanding of the education provided.     See EMR under Patient Instructions for exercises provided 5/29/2020.    Assessment     Pt continues to progress well. Instructed not to get in river water. To sit while on boat, wear brace on boat. Quad stretching and ROM cont to improve. Doing well for 3 weeks post op.    Ashli Eng is progressing well towards her goals.   Pt prognosis is Excellent.     Pt will continue to benefit from skilled outpatient physical therapy to address the deficits listed in the problem list box on initial evaluation, provide pt/family education and to maximize pt's level of independence in the home and community environment.     Pt's spiritual, cultural and educational needs considered and pt agreeable to plan of care and goals.     Anticipated barriers to physical therapy: none    Goals: see eval    Plan     Cont per POC    Willy Novak, PT      - likely toxic metabolic encephalopathy vs. vascular dementia; however pt also has R facial droop and R blown pupil  - pts was able to converse normally prior to last admission  - CT head: Opacification of the right tympanomastoid cavity which may be secondary to an effusion or inflammatory related soft tissue  - ENT consulted for further evaluation based on CT results: recommends steroids (will not be given due to risk of infection), taping eye closed if pt unable to keep closed and eye drops - CTIAC d/c'ed given it would be unlikely to change pts. outlook

## 2020-07-17 NOTE — H&P ADULT - ASSESSMENT
Patient here for GYN follow up for Post Op  Last seen on: 7/14/2020  Pt states still having a yellowish discharge with fishy odor.   Last Pap smear 7/26/2019 WN  Pharmacy verified:  # [phone]     77yof w/ CAD, MI, CHF, HTN, sent from [facility] for AMS most likely due to sepsis 2/2 UTI. 77yof w/ CAD, MI, Systolic CHF/s/p AICD, HTN, sent from [facility] for AMS due to sepsis 2/2 UTI.

## 2020-11-23 NOTE — PROGRESS NOTE ADULT - SUBJECTIVE AND OBJECTIVE BOX
INTERVAL HPI/OVERNIGHT EVENTS:    denies n/v/d/c, abdominal pain, melena or brbpr      MEDICATIONS  (STANDING):  allopurinol 300milliGRAM(s) Oral daily  amiodarone    Tablet 400milliGRAM(s) Oral daily  aspirin enteric coated 81milliGRAM(s) Oral daily  atorvastatin 20milliGRAM(s) Oral at bedtime  buDESOnide 160 MICROgram(s)/formoterol 4.5 MICROgram(s) Inhaler 2Puff(s) Inhalation two times a day  lactobacillus acidophilus 1Tablet(s) Oral daily  levothyroxine 50MICROGram(s) Oral daily  losartan 25milliGRAM(s) Oral daily  metoprolol succinate ER 25milliGRAM(s) Oral daily  senna 2Tablet(s) Oral at bedtime  spironolactone 12.5milliGRAM(s) Oral daily  sucralfate 1Gram(s) Oral four times a day  tiotropium 18 MICROgram(s) Capsule 1Capsule(s) Inhalation daily  enoxaparin Injectable 40milliGRAM(s) SubCutaneous every 24 hours  insulin lispro (HumaLOG) corrective regimen sliding scale  SubCutaneous three times a day before meals  insulin lispro (HumaLOG) corrective regimen sliding scale  SubCutaneous at bedtime  dextrose 5%. 1000milliLiter(s) IV Continuous <Continuous>  dextrose 50% Injectable 12.5Gram(s) IV Push once  dextrose 50% Injectable 25Gram(s) IV Push once  dextrose 50% Injectable 25Gram(s) IV Push once  pantoprazole    Tablet 40milliGRAM(s) Oral two times a day before meals  metoclopramide Injectable 5milliGRAM(s) IV Push every 6 hours  docusate sodium 100milliGRAM(s) Oral three times a day  polyethylene glycol 3350 17Gram(s) Oral daily  dicyclomine 10milliGRAM(s) Oral two times a day before meals  linezolid    Tablet 600milliGRAM(s) Oral every 12 hours  furosemide    Tablet 40milliGRAM(s) Oral daily    MEDICATIONS  (PRN):  acetaminophen   Tablet. 650milliGRAM(s) Oral every 6 hours PRN Mild and Moderate pain  oxyCODONE  5 mG/acetaminophen 325 mG 1Tablet(s) Oral every 6 hours PRN Severe Pain (7 - 10)  acetaminophen   Tablet 650milliGRAM(s) Oral every 6 hours PRN For Temp greater than 38 C (100.4 F)  ALBUTerol    90 MICROgram(s) HFA Inhaler 2Puff(s) Inhalation every 6 hours PRN Shortness of Breath and/or Wheezing  dextrose Gel 1Dose(s) Oral once PRN Blood Glucose LESS THAN 70 milliGRAM(s)/deciliter  glucagon  Injectable 1milliGRAM(s) IntraMuscular once PRN Glucose LESS THAN 70 milligrams/deciliter  simethicone 80milliGRAM(s) Chew every 6 hours PRN Upset Stomach  guaiFENesin   Syrup  (Sugar-Free) 100milliGRAM(s) Oral every 6 hours PRN Cough      Allergies    peanuts (Unknown)  penicillin (Nausea)    Intolerances        Review of Systems:    General:  No wt loss, fevers, chills, night sweats,fatigue,   Eyes:  Good vision, no reported pain  ENT:  No sore throat, pain, runny nose, dysphagia  CV:  No pain, palpitatioins, hypo/hypertension  Resp:  No dyspnea, cough, tachypnea, wheezing  GI:  No pain, No nausea, No vomiting, No diarrhea, No constipatiion, No weight loss, No fever, No pruritis, No rectal bleeding, No tarry stools, No dysphagia,  :  No pain, bleeding, incontinence, nocturia  Muscle:  No pain, weakness  Neuro:  No weakness, tingling, memory problems  Psych:  No fatigue, insomnia, mood problems, depression  Endocrine:  No polyuria, polydypsia, cold/heat intolerance  Heme:  No petechiae, ecchymosis, easy bruisability  Skin:  No rash, tattoos, scars, edema      Vital Signs Last 24 Hrs  T(C): 36.4, Max: 36.6 (05-25 @ 20:30)  T(F): 97.6, Max: 97.8 (05-25 @ 20:30)  HR: 60 (60 - 74)  BP: 109/57 (107/58 - 119/65)  BP(mean): --  RR: 17 (16 - 17)  SpO2: 100% (100% - 100%)    PHYSICAL EXAM:    Constitutional: NAD, well-developed  HEENT: EOMI, throat clear  Neck: No LAD, supple  Respiratory: CTA and P  Cardiovascular: S1 and S2, RRR, no M  Gastrointestinal: BS+, soft, NT/ND, neg HSM,  Extremities: No peripheral edema, neg clubing, cyanosis  Vascular: 2+ peripheral pulses  Neurological: A/O x 3, no focal deficits  Psychiatric: Normal mood, normal affect  Skin: No rashes      LABS:                        11.6   6.83  )-----------( 296      ( 26 May 2017 04:25 )             38.0     05-26    141  |  98  |  20  ----------------------------<  102<H>  3.9   |  30  |  1.00    Ca    9.4      26 May 2017 04:26  Mg     2.1     05-26            RADIOLOGY & ADDITIONAL TESTS: 1 person assist/nonverbal cues (demo/gestures)

## 2021-03-16 NOTE — PROGRESS NOTE ADULT - ATTENDING COMMENTS
Patient was seen and examined personally by me. I have discussed the plan and reviewed the resident's note and agree with the above physical exam findings including assessment and plan except as indicated below.    77 female admitted with sepsis 2/2 infected sacral decubitus with probe to bone, suspicious for osteomyelitis. Also with encephalopathy: possibly vascular dementia with poor functional status and dysphagia. Course complicated by sepsis 2/2 catheter associated UTI.      Nonverbal and not following commands. Occasionally nods head, responsive to pain.  Right pupil dilated  + indwelling knutson with clear yellow urine    s/p sacral debridement 12/20. Tissue cx with ESBL Ecoli, pseudomonas and GPC.   DC cefepime, flagyl and switch to Meropenem based on sensitivities. Continue Vanco.  Check Vanc troughs when checking labs every other day  Pain control with morphine 0.5 IV q6 ATC, hold for sedation  Will hold off replacing NGT until son present at bedside. Will perform trial with NGT and if no improvement, plan to DC.  Prognosis guarded  Surrogate/primary decision maker: Gavin yes

## 2021-11-30 NOTE — DISCHARGE NOTE ADULT - FUNCTIONAL SCREEN CURRENT LEVEL: DRESSING, MLM
"Adama is a 31 year old who is being evaluated via a billable video visit.      How would you like to obtain your AVS? MyChart  If the video visit is dropped, the invitation should be resent by: Text to cell phone: [phone]  Will anyone else be joining your video visit? No    Video Start Time: 7:30    Assessment & Plan     (F41.1) SKYLER (generalized anxiety disorder)  (primary encounter diagnosis)  Comment: uncontrolled will increase Lexapro to 20 mg  Plan: escitalopram (LEXAPRO) 20 MG tablet          (I10) Benign essential hypertension  Comment:  Controlled no change in treatment plan  Plan: losartan (COZAAR) 50 MG tablet       (F90.2) Attention deficit hyperactivity disorder (ADHD), combined type  Comment:  Controlled no change in treatment plan  Plan: amphetamine-dextroamphetamine (ADDERALL) 10 MG         tablet, amphetamine-dextroamphetamine (ADDERALL        XR) 30 MG 24 hr capsule,         amphetamine-dextroamphetamine (ADDERALL XR) 30         MG 24 hr capsule, amphetamine-dextroamphetamine        (ADDERALL XR) 30 MG 24 hr capsule,         amphetamine-dextroamphetamine (ADDERALL) 10 MG         tablet, amphetamine-dextroamphetamine         (ADDERALL) 10 MG tablet,         amphetamine-dextroamphetamine (ADDERALL) 10 MG         tablet       BMI:   Estimated body mass index is 30.27 kg/m  as calculated from the following:    Height as of 10/13/21: 1.753 m (5' 9\").    Weight as of 10/13/21: 93 kg (205 lb).   Weight management plan: Discussed healthy diet and exercise guidelines    See Patient Instructions    No follow-ups on file.    DEANDRE [facility]    Subjective   Adama is a 31 year old who presents for the following health issues     HPI     Hypertension Follow-up      Do you check your blood pressure regularly outside of the clinic? Yes     Are you following a low salt diet? Yes    Are your blood pressures ever more than 140 on the top number (systolic) OR " more   than 90 on the bottom number (diastolic), for example 140/90? No    Depression and Anxiety Follow-Up    How are you doing with your depression since your last visit? No change    How are you doing with your anxiety since your last visit?  No change    Are you having other symptoms that might be associated with depression or anxiety? No    Have you had a significant life event? No     Do you have any concerns with your use of alcohol or other drugs? No    Social History     Tobacco Use     Smoking status: Former Smoker     Packs/day: 0.50     Types: Cigarettes     Smokeless tobacco: Former User     Quit date: 6/22/2013   Vaping Use     Vaping Use: Never used   Substance Use Topics     Alcohol use: Yes     Comment: social     Drug use: No     No flowsheet data found.  No flowsheet data found.  Last PHQ-9 11/30/2021   1.  Little interest or pleasure in doing things 0   2.  Feeling down, depressed, or hopeless 0   3.  Trouble falling or staying asleep, or sleeping too much 1   4.  Feeling tired or having little energy 0   5.  Poor appetite or overeating 1   6.  Feeling bad about yourself 0   7.  Trouble concentrating 0   8.  Moving slowly or restless 0   Q9: Thoughts of better off dead/self-harm past 2 weeks 0   PHQ-9 Total Score 2       Suicide Assessment Five-step Evaluation and Treatment (SAFE-T)        Medication Followup of Adderall    Taking Medication as prescribed: yes    Side Effects:  None    Medication Helping Symptoms:  yes         Review of Systems   Constitutional, HEENT, cardiovascular, pulmonary, gi and gu systems are negative, except as otherwise noted.      Objective           Vitals:  No vitals were obtained today due to virtual visit.    Physical Exam   GENERAL: Healthy, alert and no distress  EYES: Eyes grossly normal to inspection.  No discharge or erythema, or obvious scleral/conjunctival abnormalities.  RESP: No audible wheeze, cough, or visible cyanosis.  No visible retractions or  increased work of breathing.    SKIN: Visible skin clear. No significant rash, abnormal pigmentation or lesions.  NEURO: Cranial nerves grossly intact.  Mentation and speech appropriate for age.  PSYCH: Mentation appears normal, affect normal/bright, judgement and insight intact, normal speech and appearance well-groomed.    No results found for this or any previous visit (from the past 24 hour(s)).            Video-Visit Details    Type of service:  Video Visit    Video End Time 7:47    Originating Location (pt. Location): Home    Distant Location (provider location):  [facility]     Platform used for Video Visit: [employer]   (2) assistive person (1) assistive equipment

## 2022-05-03 NOTE — DIETITIAN INITIAL EVALUATION ADULT. - PATIENT PROFILE REVIEWED
This is a historical note converted from Blake. Formatting and pictures may have been removed.  Please reference Blake for original formatting and attached multimedia. Chief Complaint  c/o SOB, cough, ?and weakness since this am. sent by Dr. Orantes for eval after finding BP in 70s systolic this am. HR was 31 per EMS, given 0.5 mg atropine and 200 mL NS. VS improved. denies cardiac hx.  History of Present Illness  61-year-old white male with past medical history of hypertension, hyperlipidemia patient went to see his primary care physician as he had an episode on Tuesday where?he was?having some generalized weakness and diaphoresis?patient was in his camp?so he put a wet towel on his face?it got better. ?Patient went to his primary care physician this morning where he had a similar episode?of generalized weakness and diaphoresis?at that time his blood pressure was found to be in 70s and his heart rate was in 30s patient was given atropine ?and was sent in to the ER. ?Chest x-ray was done that was negative. ?Lab work showed normal WBC count.? COVID-19 was negative,?he has been admitted to hospitalist service for further management and care.? Patient reports that he gets short winded while he has those episodes. ?He reports did not take his blood pressure medications this morning but did take some?pain medication and?2 Aleves?patient is also taking?muscle relaxant  ?  Review of Systems  except as documented all other systems reviewed and negative  Physical Exam  Vitals & Measurements  T:?36.3? ?C (Oral)? HR:?81(Peripheral)? HR:?80(Monitored)? RR:?19? BP:?100/62? SpO2:?96%? WT:?136?kg?  General:?well-developed well-nourished in no acute distress  Eye: PERRLA, EOMI, clear conjunctiva, eyelids normal  HENT:?at/nc  Neck: NT  Respiratory:?clear to auscultation bilaterally  Cardiovascular:?regular rate and rhythm without murmurs, gallops or rubs  Gastrointestinal:?soft, non-tender, non-distended with normal bowel sounds,  yes without masses to palpation  Genitourinary: no CVA tenderness to palpation  Musculoskeletal:?full range of motion of all extremities/spine without limitation or discomfort  Integumentary: no rashes or skin lesions present  Neurologic: AAOX3  ?  Assessment/Plan  Hypotension  Symptomatic bradycardia  History of hypertension  Hyperlipidemia  Near-syncope  Shortness of breath  ?  ?  EKG?done showed normal sinus rhythm?but apparently patient was given a dose of atropine when her heart rate was?in 30s  We will order?TSH  We will consult cardiology  Order 2D echo  Blood pressure on the lower side we will keep holding all the blood pressure medications  Patient is not on any AV ally blocking agents  Chest x-ray as such negative  White cell count normal  We will start on half-normal saline?at?75 cc an hour  Will order CBC and BMP for tomorrow  ?  ?  Prophylaxis: Ambulatory   Problem List/Past Medical History  Essential hypertension, hyperlipidemia  Procedure/Surgical History  EGD (08/13/2019)  Esophagogastroduodenoscopy, flexible, transoral; diagnostic, including collection of specimen(s) by brushing or washing, when performed (separate procedure) (08/13/2019)  COLONOSCOPY (08/20/2018)  Colonoscopy, flexible; diagnostic, including collection of specimen(s) by brushing or washing, when performed (separate procedure) (08/20/2018)  Colonoscopy (09/04/2012)  ARTHROSCOPY LEFT KNEE 1997  RIGHT ROTATOR CUFF REPAIR 2016   Medications  Inpatient  No active inpatient medications  Home  amlodipine-benazepril 5 mg-10 mg oral capsule, 1 cap(s), Oral, qPM  aspirin 81 mg oral tablet, 81 mg= 1 tab(s), Oral, qPM  latanoprost 0.005% ophthalmic solution, 1 drop(s), OPTH, Once a day (at bedtime)  Protonix 40 mg ORAL enteric coated tablet, 40 mg= 1 tab(s), Oral, Daily  rosuvastatin 5 mg oral tablet, 5 mg= 1 tab(s), Oral, qPM  Allergies  codeine?(n/a\ )  Social History  Abuse/Neglect  No, 10/13/2020  No, 03/23/2020  No,  09/05/2019  Alcohol  Current, 1-2 times per month, Previous treatment: None., 08/16/2018  Employment/School  Employed, Highest education level: High school., 02/06/2018  Nutrition/Health  Regular, 08/16/2018  Substance Use  Never, 08/16/2018  Tobacco  Never (less than 100 in lifetime), N/A, 10/13/2020  Never (less than 100 in lifetime), No, 03/23/2020  Never (less than 100 in lifetime), N/A, 09/05/2019  Never (less than 100 in lifetime), No, 08/12/2019  Never smoker, 02/06/2018  Family History  Diabetes mellitus type II: Mother and Father.  Hypertension.....: Mother.  Lab Results  Labs Last 24 Hours?  ?Chemistry? Hematology/Coagulation?   Sodium Lvl: 140 mmol/L (04/22/21 11:32:00) WBC: 7.3 x10(3)/mcL (04/22/21 11:32:00)   Potassium Lvl: 3.9 mmol/L (04/22/21 11:32:00) RBC:?3.92 x10(6)/mcL?Low (04/22/21 11:32:00)   Chloride:?108 mmol/L?High (04/22/21 11:32:00) Hgb:?11.6 gm/dL?Low (04/22/21 11:32:00)   CO2:?22 mmol/L?Low (04/22/21 11:32:00) Hct:?35.8 %?Low (04/22/21 11:32:00)   Calcium Lvl: 9.3 mg/dL (04/22/21 11:32:00) Platelet: 273 x10(3)/mcL (04/22/21 11:32:00)   Magnesium Lvl: 1.9 mg/dL (04/22/21 11:32:00) MCV: 91.3 fL (04/22/21 11:32:00)   Glucose Lvl:?120 mg/dL?High (04/22/21 11:32:00) MCH: 29.6 pg (04/22/21 11:32:00)   BUN: 12.3 mg/dL (04/22/21 11:32:00) MCHC:?32.4 gm/dL?Low (04/22/21 11:32:00)   Creatinine: 0.86 mg/dL (04/22/21 11:32:00) RDW: 13 % (04/22/21 11:32:00)   Est Creat Clearance Ser: 93.26 mL/min (04/22/21 13:12:58) MPV: 9.8 fL (04/22/21 11:32:00)   eGFR-AA: >60 (04/22/21 11:32:00) Abs Neut: 5.67 x10(3)/mcL (04/22/21 11:32:00)   eGFR-MARISOL: >60 (04/22/21 11:32:00) Neutro Auto: 78 % (04/22/21 11:32:00)   Bili Total: 0.5 mg/dL (04/22/21 11:32:00) Lymph Auto: 12 % (04/22/21 11:32:00)   Bili Direct: 0.2 mg/dL (04/22/21 11:32:00) Mono Auto: 6 % (04/22/21 11:32:00)   Bili Indirect: 0.3 mg/dL (04/22/21 11:32:00) Eos Auto: 4 % (04/22/21 11:32:00)   AST: 15 unit/L (04/22/21 11:32:00) Abs Eos: 0.3 x10(3)/mcL  (04/22/21 11:32:00)   ALT: 12 unit/L (04/22/21 11:32:00) Basophil Auto: 0 % (04/22/21 11:32:00)   Alk Phos: 102 unit/L (04/22/21 11:32:00) Abs Neutro: 5.67 x10(3)/mcL (04/22/21 11:32:00)   Total Protein: 7.2 gm/dL (04/22/21 11:32:00) Abs Lymph: 0.9 x10(3)/mcL (04/22/21 11:32:00)   Albumin Lvl: 3.4 gm/dL (04/22/21 11:32:00) Abs Mono: 0.4 x10(3)/mcL (04/22/21 11:32:00)   Globulin:?3.8 gm/dL?High (04/22/21 11:32:00) Abs Baso: 0 x10(3)/mcL (04/22/21 11:32:00)   A/G Ratio:?0.9 ratio?Low (04/22/21 11:32:00)    Total CK: 40 U/L (04/22/21 11:32:00)    CK MB: 0.5 ng/mL (04/22/21 11:32:00)    Troponin-I: <0.010 (04/22/21 11:32:00)

## 2022-06-09 NOTE — PROVIDER CONTACT NOTE (MEDICATION) - RECOMMENDATIONS
[de-identified] : urgent care visit on Saturday for SOB, feeling better under no distress, exercised at gym yesterday no chest pressure or SOB. [FreeTextEntry6] : was playing a game on saturday (deck hockey) plays defense and started to get a pain int he center of her chest it felt mostly like a dull pressure, no heat racing, no nausea or vomiting, was able to sit out and felt better gradually\par it was very hot at the time\par \par mom took her to the urgent care center they did EKG and chest xray were both normally\par since then has been feeling fine, totally normal no issues\par \par not a good drinker usually and dad thinks it could have been dehydration\par \par has been moderately physically active doing gym and after school plays volleball outside and has been having \par \par sleeping well she hadnt slept for abnout 12 or 13 hours the night before that happened\par \par she has been feeling more tired than usual felt like the fatigue started tuesday or wednesday after school and has been on and off for about a month \par \par \par feeling fine now, no chest pain/sob etc  MD aware. MD notified. Will draw blood cultures and UA.

## 2022-10-10 NOTE — ED ADULT NURSE NOTE - TIMING
Charles Trevino presents to the clinic today for management of her anticoagulation therapy in treatment of her A-fib. Target INR is 2.0-3.0. Her INR today was therapeutic at 2.3 on 11.25mg of Warfarin in the last 3 days, and was also bridging with Lovenox 70mg Q12 hours and her last dose was Sunday evening.  Her previous INR was 1.4 on 10/7/22. The patient reports medication changes since the last visit, she is only taking 1 tablet of Tylenol every 8-9 hours now versus 1000mg every 6 hours. . She  reports diet changes since the last visit, since her back is \"feeling much better\" she has more of an appetite. She was not able to ambulate to office without assistive device, she was in a wheelchair. Charles will stop Lovenox injections as her INR is therapeutic today. She will take 3.75mg on Monday and 2.5mg all other days; totaling 18.75mg weekly and return to the clinic in 1 week on Monday 10/1722 for INR fingerstick, prior to another appointment. Onsite billing provider is ROBERT Austin.     
gradual onset

## 2022-12-20 NOTE — DIETITIAN INITIAL EVALUATION ADULT. - SIGNS/SYMPTOMS
As evidenced by Pt C/O chewing difficulty [Hypertension] : hypertension [Initial Evaluation] : an initial evaluation of [FreeTextEntry2] : Chronic HTN

## 2023-01-01 NOTE — CHART NOTE - NSCHARTNOTEFT_GEN_A_CORE
NUTRITION SERVICES     Upon Nutritional Assessment by the Registered Dietitian your patient was determined to meet criteria/ has evidence of the following diagnosis/diagnoses:  [ ] Mild Protein Calorie Malnutrition   [ ] Moderate Protein Calorie Malnutrition   [X ] Severe Protein Calorie Malnutrition   [ ] Unspecified Protein Calorie Malnutrition   [ ] Underweight / BMI <19  [ ] Morbid Obesity / BMI >40    Findings as based on:  •  Comprehensive nutritional assessment and consultation    Please refer to Initial Dietitian Evaluation via documents section of [facility] EMR for further recommendations.    Uma Chu RDN, CDN   pager: [phone] Third call to parent.  Mom states that Nimo has been fussy and up much of the night last night, very clingy, he was hot to the touch last night.  He is drinking well, not interested in food.    Tylenol dosage discussed and sent in [place].    Reason for Disposition  • Normal immunization reaction    Protocols used: Immunization [id number]     NUTRITION SERVICES     Upon Nutritional Assessment by the Registered Dietitian your patient was determined to meet criteria/ has evidence of the following diagnosis/diagnoses:  [ ] Mild Protein Calorie Malnutrition   [ ] Moderate Protein Calorie Malnutrition   [X ] Severe Protein Calorie Malnutrition   [ ] Unspecified Protein Calorie Malnutrition   [ ] Underweight / BMI <19  [ ] Morbid Obesity / BMI >40    Findings as based on:  •  Comprehensive nutritional assessment and consultation    Please refer to Initial Dietitian Evaluation via documents section of [facility] for further recommendations.    Uma Chu RDN, CDN   pager: [phone]  .

## 2023-01-16 NOTE — H&P ADULT - PMH
Asthma    CAD (coronary artery disease)    CHF (congestive heart failure)  On home oxygen 2L PRN  DM (diabetes mellitus)    GERD (gastroesophageal reflux disease)    HLD (hyperlipidemia)    HTN (hypertension)    Lymphoma  S/P resection and chemotherapy in remission  MI (myocardial infarction)    Paroxysmal atrial fibrillation Island Pedicle Flap Text: The defect edges were debeveled with a #15 scalpel blade.  Given the location of the defect, shape of the defect and the proximity to free margins an island pedicle advancement flap was deemed most appropriate.  Using a sterile surgical marker, an appropriate advancement flap was drawn incorporating the defect, outlining the appropriate donor tissue and placing the expected incisions within the relaxed skin tension lines where possible.    The area thus outlined was incised deep to adipose tissue with a #15 scalpel blade.  The skin margins were undermined to an appropriate distance in all directions around the primary defect and laterally outward around the island pedicle utilizing iris scissors.  There was minimal undermining beneath the pedicle flap.

## 2023-02-07 NOTE — PROGRESS NOTE ADULT - PROBLEM SELECTOR PLAN 2
Patient brought in post vasectomy to the lab, but their is no order. Patient at [facility] lab now, prior patient of Dr. Greg Ruiz. Please call at [phone]. thanks. Pt was found to have pseudomonas growing in the blood. ID wants to rule out AICD vegetation. last positive blood clx was on 11/27  -ordered TTE   -will continue cefepime  -appreciate ID recs

## 2023-03-15 NOTE — CHART NOTE - NSCHARTNOTESELECT_GEN_ALL_CORE
[facility] - Observation [address] [place] [address] Medicine  Discharge Summary      Patient Name: Yolanda Norman  MRN: [id number]  EVAN: [id number]  Patient Class: OP- Observation  Admission Date: 3/4/2023  Hospital Length of Stay: 0 days  Discharge Date and Time:  03/15/2023 10:47 AM  Attending Physician: Mayi att. providers found   Discharging Provider: Jonathan Kapoor MD  Primary Care Provider: Primary Doctor Mayi  [facility] Medicine Team: [facility] MED S Jonathan Kapoor MD  Primary Care Team: [facility] S    HPI:   Ms. Norman is a 33-year-old female with a history of migraine headache, HTN, ADHD, anxiety, asthma, GERD who presents w/ complaints of intractable headache. Patient reports onset of this headache on Sunday, proximally 6 days ago. Reports 8/10 intractable headache that is mainly located to her frontal and right head. She endorses associated photophobia, phonophobia, nausea, hand and feet paresthesias, floaters, tunnel vision which are typical with her headaches. Pt took Imitrex and Fioricet without relief. She tried to stay hydrated and sleep but her headache would not improve. Of note, She went to [facility] ED 3 days ago and received IV fluids, Toradol and Compazine with some improvement but her headache returned. There, her CT head was without acute processes. She followed up with her neurologist who performed a nerve block as well as discharged her on Compazine and prednisone which she has been compliant with. She is on day 3 of prednisone 40 mg. She notified her neurologist who instructed her to report to the ED for emergent evaluation and likely IV admission for DHE 45 which she has had in the past before for intractable pain. Pt reports taking prednisone and Topamax today. Denies f/c, lightheadedness, syncope, head trauma, chest pain, SOB, vomiting, abdominal pain, diarrhea, dysuria.    In ED, Pt AFVSS. CBC w/ WBC 13.71. CMP w/o significant electrolyte abnormalities. Initially treated w/ 1L LR bolus, IV phenergan, IV  Event Note mag sulfate. Neurology consulted.      * No surgery found *      Hospital Course:   Ms. Norman is a 33-year-old female with a history of migraine headache, HTN, ADHD, anxiety, asthma, GERD who presents w/ complaints of intractable headache. Patient reports onset of this headache on Sunday, proximally 6 days ago. Reports 8/10 intractable headache that is mainly located to her frontal and right head. She endorses associated photophobia, phonophobia, nausea, hand and feet paresthesias, floaters, tunnel vision which are typical with her headaches. Pt took Imitrex and Fioricet without relief. She tried to stay hydrated and sleep but her headache would not improve. Of note, She went to [facility] ED 3 days ago and received IV fluids, Toradol and Compazine with some improvement but her headache returned. There, her CT head was without acute processes. She followed up with her neurologist who performed a nerve block as well as discharged her on Compazine and prednisone which she has been compliant with. She is on day 3 of prednisone 40 mg. She notified her neurologist who instructed her to report to the ED for emergent evaluation and likely IV admission for DHE 45 which she has had in the past before for intractable pain. Pt reports taking prednisone and Topamax today. Denies f/c, lightheadedness, syncope, head trauma, chest pain, SOB, vomiting, abdominal pain, diarrhea, dysuria.     In ED, Pt AFVSS. CBC w/ WBC 13.71. CMP w/o significant electrolyte abnormalities. Initially treated w/ 1L LR bolus, IV phenergan, IV mag sulfate. Neurology consulted.    HA resolved by next morning and  was discharged home.          Goals of Care Treatment Preferences:  Code Status: Full Code    Living Will: Yes              Consults:   Consults (From admission, onward)        Status Ordering Provider     Inpatient consult to neurology  Once        Provider:  (Not yet assigned)    Completed TACO FRIEDMAN new Assessment & Plan notes have  been filed under this hospital service since the last note was generated.  Service: Hospital Medicine    Final Active Diagnoses:    Diagnosis Date Noted POA    PRINCIPAL PROBLEM:  Intractable chronic migraine without aura and with status migrainosus [G43.711] 03/04/2023 Yes    Intractable headache [R51.9] 03/04/2023 Yes    Nausea [R11.0] 02/09/2021 Yes    Moderate episode of recurrent major depressive disorder [F33.1] 01/31/2021 Yes    Malignant neoplasm of upper-outer quadrant of right breast in female, estrogen receptor positive [C50.411, Z17.0] 12/22/2019 Not Applicable     Chronic    Generalized anxiety disorder [F41.1] 05/17/2017 Yes     Chronic      Problems Resolved During this Admission:       Discharged Condition: good    Disposition: Home or Self Care    Follow Up:    Patient Instructions:   No discharge procedures on file.    Significant Diagnostic Studies: Labs: BMP: No results for input(s): GLU, NA, K, CL, CO2, BUN, CREATININE, CALCIUM, MG in the last 48 hours.    Pending Diagnostic Studies:     None         Medications:  Reconciled Home Medications:      Medication List      CONTINUE taking these medications    ALPRAZolam 0.25 MG tablet  Commonly known as: XANAX  Take 1 tablet (0.25 mg total) by mouth daily as needed for Anxiety.     anastrozole 1 mg Tab  Commonly known as: ARIMIDEX  Take 1 tablet (1 mg total) by mouth once daily.     BOTOX INJ  Inject as directed. [id number]     butalbital-acetaminophen-caffeine -40 mg -40 mg per tablet  Commonly known as: FIORICET, ESGIC  Take 1 tablet by mouth every 4 (four) hours as needed for Pain.     cholecalciferol (vitamin D3) 25 mcg (1,000 unit) capsule  Commonly known as: VITAMIN D3  Take 1,000 Units by mouth 2 (two) times a day.     citalopram 40 MG tablet  Commonly known as: CeleXA  TAKE 1 TABLET(40 MG) BY MOUTH EVERY DAY     FLUARIX QUAD 1339-9649 (PF) 60 mcg (15 mcg x 4)/0.5 mL Syrg  Generic drug: flu vacc [id number] 6mos up(PF)  admister  as directed     INTRAROSA 6.5 mg Inst  Generic drug: prasterone (dhea)  Place 6.5 mg vaginally every evening.     loratadine 10 mg tablet  Commonly known as: CLARITIN  Take 10 mg by mouth once daily.     magnesium 30 mg Tab  Take by mouth once.     metFORMIN 500 MG tablet  Commonly known as: GLUCOPHAGE  Take 1 tablet (500 mg total) by mouth 2 (two) times daily with meals.     metoclopramide HCl 10 MG tablet  Commonly known as: REGLAN  Take 1 tablet 3 (three) times daily before meals by mouth     multivitamin per tablet  Commonly known as: THERAGRAN  Take 1 tablet by mouth once daily.     ondansetron 4 MG Tbdl  Commonly known as: ZOFRAN-ODT  Take 1 tablet every 6 (six) hours as needed by mouth for Nausea for up to 7 days     pantoprazole 40 MG tablet  Commonly known as: PROTONIX  Take 1 tablet (40 mg total) by mouth once daily.     predniSONE 20 MG tablet  Commonly known as: DELTASONE  Take 2 tablets by mouth daily for 4 days then 1 tablet daily for 4 days     prochlorperazine 10 MG tablet  Commonly known as: COMPAZINE  Take 1 tablet (10 mg total) by mouth 3 (three) times daily as needed (Headache and nausea).     spironolactone 100 MG tablet  Commonly known as: ALDACTONE  Take 1 tablet by mouth once a day     sucralfate 1 gram tablet  Commonly known as: CARAFATE  Take 1 tablet (1 g total) by mouth 4 (four) times daily. Can be crushed if cannot swallow.     sumatriptan 100 MG tablet  Commonly known as: IMITREX  Take 1/2 to 1 tab at onset of headache.  If no improvement in 2 hours, take another.  Do not take more than 2 in 24 hours.     topiramate 200 MG Tab  Commonly known as: TOPAMAX  Take 1 tablet (200 mg total) by mouth every evening.     traZODone 50 MG tablet  Commonly known as: DESYREL  Take 1 tablet (50 mg total) by mouth every evening.     tretinoin 0.05 % gel  Commonly known as: ALTRALIN  SMARTSIG:Sparingly Topical Every Night     valACYclovir 1000 MG tablet  Commonly known as: VALTREX  Take 1 tablet (1,000  mg total) by mouth every 12 (twelve) hours.        ASK your doctor about these medications    albuterol 90 mcg/actuation inhaler  Commonly known as: PROVENTIL/VENTOLIN HFA  Inhale 1 puff every 6 (six) hours  into the lungs for Wheezing (As Needed) for up to 60 doses            Indwelling Lines/Drains at time of discharge:   Lines/Drains/Airways     Drain  Duration                Closed/Suction Drain 10/03/20 0824 Right Breast Bulb 15 Fr. 893 days                Time spent on the discharge of patient: 15 minutes         Jonathan Kapoor MD  Department of Hospital Medicine  [facility] - Observation 11H

## 2023-04-21 NOTE — PROGRESS NOTE ADULT - PROBLEM SELECTOR PLAN 4
Danyelle barrett, patient has an appointment scheduled.  Rita Saab RN on 4/21/2023 at 2:13 PM     improving slowly  - will continue to trend BMP

## 2023-06-15 NOTE — DIETITIAN INITIAL EVALUATION ADULT. - COPY OF WEIGHT IN KG
52.6
Bed in lowest position, wheels locked, appropriate side rails in place/Call bell, personal items and telephone in reach/Instruct patient to call for assistance before getting out of bed or chair/Non-slip footwear when patient is out of bed/Kimberly to call system/Physically safe environment - no spills, clutter or unnecessary equipment/Purposeful Proactive Rounding/Room/bathroom lighting operational, light cord in reach

## 2023-06-19 NOTE — PROVIDER CONTACT NOTE (CRITICAL VALUE NOTIFICATION) - BACKGROUND
Azithromycin Counseling:  I discussed with the patient the risks of azithromycin including but not limited to GI upset, allergic reaction, drug rash, diarrhea, and yeast infections. Topical Sulfur Applications Pregnancy And Lactation Text: This medication is Pregnancy Category C and has an unknown safety profile during pregnancy. It is unknown if this topical medication is excreted in breast milk. Topical Retinoid counseling:  Patient advised to apply a pea-sized amount only at bedtime and wait 30 minutes after washing their face before applying.  If too drying, patient may add a non-comedogenic moisturizer. The patient verbalized understanding of the proper use and possible adverse effects of retinoids.  All of the patient's questions and concerns were addressed. Topical Clindamycin Pregnancy And Lactation Text: This medication is Pregnancy Category B and is considered safe during pregnancy. It is unknown if it is excreted in breast milk. Benzoyl Peroxide Counseling: Patient counseled that medicine may cause skin irritation and bleach clothing.  In the event of skin irritation, the patient was advised to reduce the amount of the drug applied or use it less frequently.   The patient verbalized understanding of the proper use and possible adverse effects of benzoyl peroxide.  All of the patient's questions and concerns were addressed. Dapsone Pregnancy And Lactation Text: This medication is Pregnancy Category C and is not considered safe during pregnancy or breast feeding. High Dose Vitamin A Counseling: Side effects reviewed, pt to contact office should one occur. Spironolactone Pregnancy And Lactation Text: This medication can cause feminization of the male fetus and should be avoided during pregnancy. The active metabolite is also found in breast milk. Birth Control Pills Pregnancy And Lactation Text: This medication should be avoided if pregnant and for the first 30 days post-partum. Patient  is admitted with sepsis. Detail Level: Simple Isotretinoin Counseling: Patient should get monthly blood tests, not donate blood, not drive at night if vision affected, not share medication, and not undergo elective surgery for 6 months after tx completed. Side effects reviewed, pt to contact office should one occur. Sarecycline Pregnancy And Lactation Text: This medication is Pregnancy Category D and not consider safe during pregnancy. It is also excreted in breast milk. Include Pregnancy/Lactation Warning?: No Tazorac Pregnancy And Lactation Text: This medication is not safe during pregnancy. It is unknown if this medication is excreted in breast milk. Azelaic Acid Counseling: Patient counseled that medicine may cause skin irritation and to avoid applying near the eyes.  In the event of skin irritation, the patient was advised to reduce the amount of the drug applied or use it less frequently.   The patient verbalized understanding of the proper use and possible adverse effects of azelaic acid.  All of the patient's questions and concerns were addressed. Bactrim Pregnancy And Lactation Text: This medication is Pregnancy Category D and is known to cause fetal risk.  It is also excreted in breast milk. Erythromycin Counseling:  I discussed with the patient the risks of erythromycin including but not limited to GI upset, allergic reaction, drug rash, diarrhea, increase in liver enzymes, and yeast infections. Topical Retinoid Pregnancy And Lactation Text: This medication is Pregnancy Category C. It is unknown if this medication is excreted in breast milk. Winlevi Counseling:  I discussed with the patient the risks of topical clascoterone including but not limited to erythema, scaling, itching, and stinging. Patient voiced their understanding. Aklief counseling:  Patient advised to apply a pea-sized amount only at bedtime and wait 30 minutes after washing their face before applying.  If too drying, patient may add a non-comedogenic moisturizer.  The most commonly reported side effects including irritation, redness, scaling, dryness, stinging, burning, itching, and increased risk of sunburn.  The patient verbalized understanding of the proper use and possible adverse effects of retinoids.  All of the patient's questions and concerns were addressed. Azithromycin Pregnancy And Lactation Text: This medication is considered safe during pregnancy and is also secreted in breast milk. Doxycycline Counseling:  Patient counseled regarding possible photosensitivity and increased risk for sunburn.  Patient instructed to avoid sunlight, if possible.  When exposed to sunlight, patients should wear protective clothing, sunglasses, and sunscreen.  The patient was instructed to call the office immediately if the following severe adverse effects occur:  hearing changes, easy bruising/bleeding, severe headache, or vision changes.  The patient verbalized understanding of the proper use and possible adverse effects of doxycycline.  All of the patient's questions and concerns were addressed. High Dose Vitamin A Pregnancy And Lactation Text: High dose vitamin A therapy is contraindicated during pregnancy and breast feeding. Tetracycline Counseling: Patient counseled regarding possible photosensitivity and increased risk for sunburn.  Patient instructed to avoid sunlight, if possible.  When exposed to sunlight, patients should wear protective clothing, sunglasses, and sunscreen.  The patient was instructed to call the office immediately if the following severe adverse effects occur:  hearing changes, easy bruising/bleeding, severe headache, or vision changes.  The patient verbalized understanding of the proper use and possible adverse effects of tetracycline.  All of the patient's questions and concerns were addressed. Patient understands to avoid pregnancy while on therapy due to potential birth defects. Benzoyl Peroxide Pregnancy And Lactation Text: This medication is Pregnancy Category C. It is unknown if benzoyl peroxide is excreted in breast milk. Topical Sulfur Applications Counseling: Topical Sulfur Counseling: Patient counseled that this medication may cause skin irritation or allergic reactions.  In the event of skin irritation, the patient was advised to reduce the amount of the drug applied or use it less frequently.   The patient verbalized understanding of the proper use and possible adverse effects of topical sulfur application.  All of the patient's questions and concerns were addressed. Topical Clindamycin Counseling: Patient counseled that this medication may cause skin irritation or allergic reactions.  In the event of skin irritation, the patient was advised to reduce the amount of the drug applied or use it less frequently.   The patient verbalized understanding of the proper use and possible adverse effects of clindamycin.  All of the patient's questions and concerns were addressed. Azelaic Acid Pregnancy And Lactation Text: This medication is considered safe during pregnancy and breast feeding. Isotretinoin Pregnancy And Lactation Text: This medication is Pregnancy Category X and is considered extremely dangerous during pregnancy. It is unknown if it is excreted in breast milk. Dapsone Counseling: I discussed with the patient the risks of dapsone including but not limited to hemolytic anemia, agranulocytosis, rashes, methemoglobinemia, kidney failure, peripheral neuropathy, headaches, GI upset, and liver toxicity.  Patients who start dapsone require monitoring including baseline LFTs and weekly CBCs for the first month, then every month thereafter.  The patient verbalized understanding of the proper use and possible adverse effects of dapsone.  All of the patient's questions and concerns were addressed. Spironolactone Counseling: Patient advised regarding risks of diarrhea, abdominal pain, hyperkalemia, birth defects (for female patients), liver toxicity and renal toxicity. The patient may need blood work to monitor liver and kidney function and potassium levels while on therapy. The patient verbalized understanding of the proper use and possible adverse effects of spironolactone.  All of the patient's questions and concerns were addressed. Birth Control Pills Counseling: Birth Control Pill Counseling: I discussed with the patient the potential side effects of OCPs including but not limited to increased risk of stroke, heart attack, thrombophlebitis, deep venous thrombosis, hepatic adenomas, breast changes, GI upset, headaches, and depression.  The patient verbalized understanding of the proper use and possible adverse effects of OCPs. All of the patient's questions and concerns were addressed. Erythromycin Pregnancy And Lactation Text: This medication is Pregnancy Category B and is considered safe during pregnancy. It is also excreted in breast milk. Tazorac Counseling:  Patient advised that medication is irritating and drying.  Patient may need to apply sparingly and wash off after an hour before eventually leaving it on overnight.  The patient verbalized understanding of the proper use and possible adverse effects of tazorac.  All of the patient's questions and concerns were addressed. Winlevi Pregnancy And Lactation Text: This medication is considered safe during pregnancy and breastfeeding. Sarecycline Counseling: Patient advised regarding possible photosensitivity and discoloration of the teeth, skin, lips, tongue and gums.  Patient instructed to avoid sunlight, if possible.  When exposed to sunlight, patients should wear protective clothing, sunglasses, and sunscreen.  The patient was instructed to call the office immediately if the following severe adverse effects occur:  hearing changes, easy bruising/bleeding, severe headache, or vision changes.  The patient verbalized understanding of the proper use and possible adverse effects of sarecycline.  All of the patient's questions and concerns were addressed. Aklief Pregnancy And Lactation Text: It is unknown if this medication is safe to use during pregnancy.  It is unknown if this medication is excreted in breast milk.  Breastfeeding women should use the topical cream on the smallest area of the skin for the shortest time needed while breastfeeding.  Do not apply to nipple and areola. Bactrim Counseling:  I discussed with the patient the risks of sulfa antibiotics including but not limited to GI upset, allergic reaction, drug rash, diarrhea, dizziness, photosensitivity, and yeast infections.  Rarely, more serious reactions can occur including but not limited to aplastic anemia, agranulocytosis, methemoglobinemia, blood dyscrasias, liver or kidney failure, lung infiltrates or desquamative/blistering drug rashes. Doxycycline Pregnancy And Lactation Text: This medication is Pregnancy Category D and not consider safe during pregnancy. It is also excreted in breast milk but is considered safe for shorter treatment courses. Minocycline Counseling: Patient advised regarding possible photosensitivity and discoloration of the teeth, skin, lips, tongue and gums.  Patient instructed to avoid sunlight, if possible.  When exposed to sunlight, patients should wear protective clothing, sunglasses, and sunscreen.  The patient was instructed to call the office immediately if the following severe adverse effects occur:  hearing changes, easy bruising/bleeding, severe headache, or vision changes.  The patient verbalized understanding of the proper use and possible adverse effects of minocycline.  All of the patient's questions and concerns were addressed. Detail Level: Detailed

## 2023-10-06 NOTE — DISCHARGE NOTE ADULT - VISION (WITH CORRECTIVE LENSES IF THE PATIENT USUALLY WEARS THEM):
Occupational Therapy Visit    Visit Type: Daily Treatment Note  Visit: 3  Referring Provider: Virginia Wong MD  Medical Diagnosis (from order): Diagnosis Information    Diagnosis  M72.0 (ICD-10-CM) - Dupuytren's contracture of left hand         SUBJECTIVE                                                                                                               Patient reports the R hand is less sore than last session, She states it feels like she has been working it out. HEP multiple times daily.   Functional Change: No significant functional changes since last session.     Pain / Symptoms  - Pain rating (out of 10): Current: 0 ; Best: 0; Worst: 3     OBJECTIVE                                                                                                                                        Treatment    Ultrasound (63704)  - Location: volar L hand  - Position: sitting  - Duty Cycle: 100%  - Frequency: 3 Mhz  - Intensity (w/cm2): 1.2  - Duration: 10 minutes    Results: tissue softening  Reaction: no adverse reaction to treatment      Therapeutic Exercise  Tendon glides L hand.  Thumb opposition with slide down to base of digits exercise.  Table cylindrical foam roll for hand flexor stretches (into digit extension/hyperextension).     Manual Therapy   STM to the volar L hand: nodules remain.   PROM into passive flexor stretching (into extension).   Retrograde edema mobilizations to the L hand/wrist/forearm: decreased pain and edema following.     Skilled input: verbal instruction/cues and tactile instruction/cues  education/instruction on: Educated on retrograde edema techniques/benefits    Writer verbally educated and received verbal consent for hand placement, positioning of patient, and techniques to be performed today from patient for therapist position for techniques and hand placement and palpation for techniques as described above and how they are pertinent to the patient's plan of care.  Home  Exercise Program  [employer] Access Code: [email or web address] (provided handout 9/28)      ASSESSMENT                                                                                                            Increased soreness to 4/10  after exercises, decreased to 2-3/10 following retrograde edema mobilizations. Tissue softening noted after head and manual techniques. Patient will continue to benefit from O.T. to address L hand ROM/strength/coordination in order to return to prior level of function with ADLs/IADLs.   Pain/symptoms after session (out of 10): 3  Education:   - Results of above outlined education: Verbalizes understanding and Demonstrates understanding    PLAN                                                                                                                           Suggestions for next session as indicated: Progress per plan of care. US, tissue mobilizations, ROM, strengthening, coordination exercises.        Therapy procedure time and total treatment time can be found documented on the Time Entry flowsheet   Normal vision: sees adequately in most situations; can see medication labels, newsprint

## 2023-10-11 NOTE — PATIENT PROFILE ADULT. - NUTRITION PROFILE
Medical Necessity Clause: This procedure was medically necessary because the lesions that were treated were: irritated
Consent: The patient's verbal consent was obtained including but not limited to risks of crusting, scabbing, blistering, scarring, darker or lighter pigmentary change, recurrence, incomplete removal and infection.
[place]: 4
Detail Level: Zone
Post-Care Instructions: I reviewed with the patient in detail post-care instructions. Patient is to wear sunprotection, and avoid picking at any of the treated lesions. Pt may apply Vaseline to crusted or scabbing areas
Medical Necessity Information: It is in your best interest to select a reason for this procedure from the list below. All of these items fulfill various CMS LCD requirements except the new and changing color options.
Include Z78.9 (Other Specified Conditions Influencing Health Status) As An Associated Diagnosis?: No
no indicators present

## 2023-12-05 NOTE — CONSULT NOTE ADULT - RESPIRATORY AND THORAX
details… Quality 431: Preventive Care And Screening: Unhealthy Alcohol Use - Screening: Patient not identified as an unhealthy alcohol user when screened for unhealthy alcohol use using a systematic screening method Quality 130: Documentation Of Current Medications In The Medical Record: Current Medications Documented Quality 137: Melanoma: Continuity Of Care - Recall System: Patient information entered into a recall system that includes: target date for the next exam specified AND a process to follow up with patients regarding missed or unscheduled appointments Quality 138: Melanoma: Coordination Of Care: A treatment plan was communicated to the physicians providing continuing care within one month of diagnosis outlining: diagnosis, tumor thickness and a plan for surgery or alternate care. Quality 226: Preventive Care And Screening: Tobacco Use: Screening And Cessation Intervention: Patient screened for tobacco use and is an ex/non-smoker Detail Level: Detailed

## 2024-02-28 NOTE — DIETITIAN INITIAL EVALUATION ADULT. - ORAL INTAKE PTA
PCP: Alphonse Rueda MD    Last appt: 12/15/2023    Future Appointments   Date Time Provider Department Center   3/15/2024 10:10 AM Alphonse Rueda MD [facility]       Requested Prescriptions     Pending Prescriptions Disp Refills    tamsulosin (FLOMAX) 0.4 MG capsule [Pharmacy Med Name: TAMSULOSIN HCL 0.4 MG CAPSULE] 60 capsule 0     Sig: TAKE 2 CAPSULES BY MOUTH EVERY DAY       fair

## 2024-04-24 NOTE — CONSULT NOTE ADULT - SUBJECTIVE AND OBJECTIVE BOX
In an effort to ensure that our patients [employer], a Team Member has reviewed your chart and identified an opportunity to provide the best care possible. An attempt was made to discuss or schedule overdue Preventive or Disease Management screening.     The Outcome was Contact was made, appointment scheduled. Care Gaps include Diabetes.      PATIENT INFORMED TO COMPLETE DM LABS PRIOR TO UPCOMING APPOINTMENT. PATIENT VOICED UNDERSTANDING.    please see full dictation  necrotic infected sacral decubitus, mechanically cleansed at bedside, extending into left buttock with purulent material  continue antibiotics, will reach out to family for consent for sharp debridement  start dakins wet to dry twice daily

## 2024-06-14 NOTE — PROGRESS NOTE ADULT - SUBJECTIVE AND OBJECTIVE BOX
Subjective:   	 no chest pain  SOB improving Back pain and B/L LE pain/tingling w/ some improvement    MEDICATIONS:  MEDICATIONS  (STANDING):  spironolactone 12.5 milliGRAM(s) Oral daily  aspirin enteric coated 81 milliGRAM(s) Oral daily  losartan 25 milliGRAM(s) Oral daily  amiodarone    Tablet 400 milliGRAM(s) Oral daily  allopurinol 300 milliGRAM(s) Oral daily  atorvastatin 20 milliGRAM(s) Oral at bedtime  metoprolol succinate ER 25 milliGRAM(s) Oral daily  buDESOnide 160 MICROgram(s)/formoterol 4.5 MICROgram(s) Inhaler 2 Puff(s) Inhalation two times a day  tiotropium 18 MICROgram(s) Capsule 1 Capsule(s) Inhalation daily  dicyclomine 10 milliGRAM(s) Oral two times a day before meals  docusate sodium 100 milliGRAM(s) Oral three times a day  senna 2 Tablet(s) Oral at bedtime  sucralfate 1 Gram(s) Oral four times a day  latanoprost 0.005% Ophthalmic Solution 1 Drop(s) Both EYES at bedtime  lactobacillus acidophilus 1 Tablet(s) Oral daily  levothyroxine 50 MICROGram(s) Oral daily  sodium chloride 0.9% lock flush 3 milliLiter(s) IV Push every 8 hours  enoxaparin Injectable 40 milliGRAM(s) SubCutaneous every 24 hours  insulin lispro (HumaLOG) corrective regimen sliding scale   SubCutaneous three times a day before meals  insulin lispro (HumaLOG) corrective regimen sliding scale   SubCutaneous at bedtime  dextrose 5%. 1000 milliLiter(s) (50 mL/Hr) IV Continuous <Continuous>  dextrose 50% Injectable 12.5 Gram(s) IV Push once  dextrose 50% Injectable 25 Gram(s) IV Push once  dextrose 50% Injectable 25 Gram(s) IV Push once  furosemide    Tablet 40 milliGRAM(s) Oral daily  lidocaine   Patch 1 Patch Transdermal daily  gabapentin 200 milliGRAM(s) Oral three times a day  pantoprazole    Tablet 40 milliGRAM(s) Oral two times a day before meals  sucralfate suspension 1 Gram(s) Oral four times a day  simethicone 80 milliGRAM(s) Chew every 6 hours    MEDICATIONS  (PRN):  acetaminophen   Tablet. 650 milliGRAM(s) Oral every 6 hours PRN mild, moderate, severe pain  dextrose Gel 1 Dose(s) Oral once PRN Blood Glucose LESS THAN 70 milliGRAM(s)/deciliter  glucagon  Injectable 1 milliGRAM(s) IntraMuscular once PRN Glucose LESS THAN 70 milligrams/deciliter  polyethylene glycol 3350 17 Gram(s) Oral daily PRN Constipation      LABS:	 	    CARDIAC MARKERS:                                12.8   6.53  )-----------( 305      ( 30 Jun 2017 07:00 )             40.3     06-30    140  |  98  |  30<H>  ----------------------------<  96  4.1   |  27  |  0.93    Ca    10.2      30 Jun 2017 07:00  Mg     2.1     06-30      COAGS:       proBNP:   Lipid Profile:   HgA1c:   TSH:       PHYSICAL EXAM:  T(C): 36.6 (06-30-17 @ 13:39), Max: 37.1 (06-29-17 @ 21:09)  HR: 72 (06-30-17 @ 13:39) (63 - 72)  BP: 91/42 (06-30-17 @ 13:39) (91/42 - 112/65)  RR: 17 (06-30-17 @ 13:39) (17 - 18)  SpO2: 98% (06-30-17 @ 13:39) (98% - 99%)  Wt(kg): --  I&O's Summary    29 Jun 2017 07:01  -  30 Jun 2017 07:00  --------------------------------------------------------  IN: 580 mL / OUT: 720 mL / NET: -140 mL    30 Jun 2017 07:01  -  30 Jun 2017 14:27  --------------------------------------------------------  IN: 210 mL / OUT: 200 mL / NET: 10 mL          HEENT:   Normal oral mucosa,  EOMI	  Lymphatic: No obvious lymphadenopathy , no edema  Cardiovascular: Normal S1 S2, RRR   No JVD, 1/6 ERICA murmur, Peripheral pulses palpable 2+ bilaterally  Respiratory: Lungs clear to auscultation, normal effort 	  Gastrointestinal:  Soft, Non-tender, + BS	  Skin: No rashes,  No cyanosis, warm to touch  Extremities no edema, cyanosis, clubbing B/L LE's   Psychiatry:  Appropriate Mood & affect     TELEMETRY: none	    ECG:  paced	  RADIOLOGY:   CT spine  < from: CT Lumbar Spine No Cont (06.29.17 @ 10:41) >  Cervical spine:    Loss of normal cervical lordosis is seen. This could be due to   positioning or muscle spasm.    The vertebral body height and alignment appear normal.    Anterior osteophytes are seen involving the C4, C5, C6 and C7 levels.   These findings are secondary to degenerative changes.    There are no acute fractures or dislocations seen.    Evaluation of the paraspinal soft tissues is limited due to lack of IV   contrast though grossly unremarkable    Thoracic spine:    Slight increased kyphosis is identified.    Pacemaker wires are seen.    Compression deformities are again seen involving the superior endplate of   T1 and T2-T3 and T4. These findings appear unchanged when compared with   the prior study. Anterior compression deformities are again seen   involving the T7 and T10 vertebral bodies. These findings appear   unchanged when compared with the prior study.    There is a Schmorl's node seen along the superior endplate of T10 which   appears unchanged. This is likely result of degenerative changes.    No acute fractures or dislocations are seen.    No abnormal lytic or blastic lesions are seen.    Tiny density is again seen along the superior endplate of T7. Please   correlate clinically. This finding appears unchanged. Evaluation of the   paraspinal soft tissues is limited due to lack of IV contrast. Congestive   changes are seen involving both lung bases. There is also linear density   seen involving the right lung base which was present prior study. This   could be compatible with underlying scarring. Please correlate   clinically. Dedicated imaging of the lungs with CT scan can be done if   clinically indicated.    Lumbar spine:     Reversal normal lumbar lordosis is seen. This could be due to positioning   or muscle spasm. The vertebral body height and alignment appear normal    Vacuum disc changes are seen involving the L5-S1 level which are   secondary to degenerative changes.    There are no acute fractures or dislocation seen.    Evaluation of the paraspinal soft tissues is limited due to lack of IV   contrast though grossly unremarkable.    Impression: Degenerative changes involving the cervical thoracic and   lumbar spine are identified as described above.    Stable compression deformities are identified as described above.    < end of copied text >        DIAGNOSTIC TESTING:  [ ] Echocardiogram: 4/3/17    CONCLUSIONS:  1. Mitral annular calcification, otherwise normal mitral  valve. Mild mitral regurgitation.  2. Moderately dilated left atrium.  LA volume index = 46  cc/m2.  3. Moderate left ventricular enlargement.  4. Severe global left ventricular systolic dysfunction.  Endocardial visualization enhanced with intravenous  injection of echo contrast (Definity).  5. The right ventricle is not well visualized; grossly  normal right ventricular systolic function.  A device wire  is noted in the right heart.  6. Estimated right ventricular systolic pressure equals 60  mm Hg, assuming right atrial pressure equals 10 mm Hg,  consistent with moderate pulmonary hypertension.    [ ]  Catheterization:  [ ] Stress Test:    OTHER: 	      ASSESSMENT/PLAN: 	77y Female  with hypertension, dyslipidemia, CAD s/p LAD stent with only mild re-stenosis with minor luminal irregularities otherwise on cath 4/16, with known severe LV dysfunction , NICM s/p [employer] BiV ICD, COPD, lymphoma previously on chemo with known spinal mets admitted with CP/SOB/LE pain.    -- CHF compensated.  Continue PO Lasix  -- CT C/T/L spine as above   --Appreciate Neurosurgery consult. Known Thoracic compression fx, with TLSO brace at home but with worsening back pain and leg pain--   --Neuro input appreciated- started Neurontin titrate  as needed as per neuro  --Onc eval with Dr. Walsh - patient with known lymphoma and spinal mets (follows with outside onc Dr. Reyna [phone], [phone])  -- c/w Amio 400mg po qd for h/o VT/VF - the patient has NO HISTORY OF AFIB  --PT recommended rehab No

## 2024-12-27 NOTE — PROGRESS NOTE ADULT - PROBLEM SELECTOR PLAN 5
Ghada CELIS Mike 28 y.o. female is here today for Blood Pressure check.   History of HTN yes.    Review of patient's allergies indicates:   Allergen Reactions    Perflutren lipid microspheres Other (See Comments)     Chest tightness, back pain     Creatinine   Date Value Ref Range Status   12/11/2024 1.1 0.5 - 1.4 mg/dL Final     Sodium   Date Value Ref Range Status   12/11/2024 140 136 - 145 mmol/L Final     Potassium   Date Value Ref Range Status   12/11/2024 3.4 (L) 3.5 - 5.1 mmol/L Final   ]  Patient denies taking blood pressure medications on a regular basis at the same time of the day.     Current Outpatient Medications:     amLODIPine (NORVASC) 5 MG tablet, Take 1 tablet (5 mg total) by mouth once daily., Disp: 30 tablet, Rfl: 0    etonogestreL-ethinyl estradioL (NUVARING) 0.12-0.015 mg/24 hr vaginal ring, Place 1 each vaginally every 21 days., Disp: 1 each, Rfl: 6  Does patient have record of home blood pressure readings no. Readings have been averaging 190/100.   Last dose of blood pressure medication was taken at n/a.  Patient is asymptomatic.   Complains of n/a.    Vitals:    12/27/24 1004   BP: (!) 150/100   BP Location: Right arm   Patient Position: Sitting         Dr. Avilez informed of nurse visit.        pt started on her home heart medications. pt goes slightly hypotensive in the PM.   -hold parameters set for BP meds

## 2025-01-28 NOTE — PROVIDER CONTACT NOTE (MEDICATION) - BACKGROUND
Head,  normocephalic,  atraumatic,  Face,  Face within normal limits,  Ears,  External ears within normal limits,  Nose/Nasopharynx,  External nose  normal appearance,  nares patent,  no nasal discharge,  Mouth and Throat,  Oral cavity appearance normal, Lips,  Appearance normal
patient admitted with UTI

## 2025-02-04 NOTE — ED POST DISCHARGE NOTE - REASON FOR FOLLOW-UP
Culture Follow-up
Additional Notes: Plan for LN2 at suture removal
Render Risk Assessment In Note?: no
Detail Level: Detailed

## 2025-03-07 NOTE — DISCHARGE NOTE ADULT - PROVIDER RX CONTACT NUMBER
"Endometrial biopsy    Date/Time: 3/7/2025 3:30 PM    Performed by: GURVINDER Lino  Authorized by: GURVINDER Lino  Universal Protocol:  Procedure performed by:  Consent: Verbal consent obtained. Written consent not obtained.  Risks and benefits: risks, benefits and alternatives were discussed  Consent given by: patient  Time out: Immediately prior to procedure a \"time out\" was called to verify the correct patient, procedure, equipment, support staff and site/side marked as required.  Patient understanding: patient states understanding of the procedure being performed  Patient consent: the patient's understanding of the procedure matches consent given  Procedure consent: procedure consent matches procedure scheduled  Relevant documents: relevant documents not present or verified  Test results: test results not available  Site marked: the operative site was not marked  Radiology Images displayed and confirmed. If images not available, report reviewed: imaging studies not available  Patient identity confirmed: verbally with patient    Indication:     Indications: Other disorder of menstruation and other abnormal bleeding from female genital tract    Procedure:     Procedure: endometrial biopsy with Pipelle      A bivalve speculum was placed in the vagina: yes      Cervix cleaned and prepped: yes      A paracervical block was performed: no      An intracervical block was performed: no      The cervix was dilated: no      Uterus sounded: yes      Uterus sound depth (cm):  7    Curettes used:  2    Specimen collected: specimen collected and sent to pathology      Patient tolerated procedure well with no complications: yes    Findings:     Uterus size:  Non-gravid    Cervix: normal    Comments:     Procedure comments:  Procedure explained to patient consent form obtained.  Procedure completed without difficulty.  Hemostasis appreciated  Patient advised nothing in the vagina for 1 week  Patient " advised she may have bleeding or spotting for the next several days.  Patient advised to take Tylenol or ibuprofen for cramping if necessary  Patient advised that we will call with biopsy results        [phone]

## 2025-04-03 NOTE — ED PROVIDER NOTE - MUSCULOSKELETAL MINIMAL EXAM
Enter Query Response Below      Query Response: HFrEF     Electronically signed by VICKI Costello, 04/03/25, 3:45 PM EDT.               If applicable, please update the problem list.      RANGE OF MOTION LIMITED

## 2025-04-24 NOTE — ED PROVIDER NOTE - CARE PLAN
Refill request for the following      amLODIPine (Norvasc) 5 mg tablet     pantoprazole (ProtoNix) 40 mg EC tablet       Please send to [facility]   Principal Discharge DX:	Urinary tract infection  Secondary Diagnosis:	Altered mental status

## 2025-07-28 NOTE — H&P ADULT. - ENDOCRINE
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      negative